# Patient Record
Sex: MALE | Race: ASIAN | NOT HISPANIC OR LATINO | Employment: OTHER | URBAN - METROPOLITAN AREA
[De-identification: names, ages, dates, MRNs, and addresses within clinical notes are randomized per-mention and may not be internally consistent; named-entity substitution may affect disease eponyms.]

---

## 2017-03-13 ENCOUNTER — OFFICE VISIT - HEALTHEAST (OUTPATIENT)
Dept: FAMILY MEDICINE | Facility: CLINIC | Age: 57
End: 2017-03-13

## 2017-03-13 ENCOUNTER — OFFICE VISIT - HEALTHEAST (OUTPATIENT)
Dept: NURSING | Facility: CLINIC | Age: 57
End: 2017-03-13

## 2017-03-13 DIAGNOSIS — E11.9 DIABETES MELLITUS (H): ICD-10-CM

## 2017-03-13 DIAGNOSIS — R26.89 LIMP: ICD-10-CM

## 2017-03-13 DIAGNOSIS — E55.9 VITAMIN D DEFICIENCY: ICD-10-CM

## 2017-03-13 DIAGNOSIS — Z79.4 TYPE 2 DIABETES MELLITUS WITH HYPERGLYCEMIA, WITH LONG-TERM CURRENT USE OF INSULIN (H): ICD-10-CM

## 2017-03-13 DIAGNOSIS — Z12.11 SCREEN FOR COLON CANCER: ICD-10-CM

## 2017-03-13 DIAGNOSIS — E11.65 TYPE 2 DIABETES MELLITUS WITH HYPERGLYCEMIA, WITH LONG-TERM CURRENT USE OF INSULIN (H): ICD-10-CM

## 2017-03-13 DIAGNOSIS — R51.9 HEADACHE DISORDER: ICD-10-CM

## 2017-03-13 DIAGNOSIS — E78.5 DYSLIPIDEMIA: ICD-10-CM

## 2017-03-13 DIAGNOSIS — M54.12 BRACHIAL NEURITIS OR RADICULITIS NOS: ICD-10-CM

## 2017-03-13 DIAGNOSIS — N28.9 DISORDER OF KIDNEY AND URETER: ICD-10-CM

## 2017-03-13 DIAGNOSIS — R53.1 WEAKNESS OF RIGHT SIDE OF BODY: ICD-10-CM

## 2017-03-13 DIAGNOSIS — G56.01 CARPAL TUNNEL SYNDROME OF RIGHT WRIST: ICD-10-CM

## 2017-03-13 DIAGNOSIS — G43.009 MIGRAINE WITHOUT AURA AND WITHOUT STATUS MIGRAINOSUS, NOT INTRACTABLE: ICD-10-CM

## 2017-03-13 LAB
CHOLEST SERPL-MCNC: 243 MG/DL
FASTING STATUS PATIENT QL REPORTED: NORMAL
HBA1C MFR BLD: 11.1 % (ref 3.5–6)
HDLC SERPL-MCNC: 52 MG/DL
LDLC SERPL CALC-MCNC: 160 MG/DL

## 2017-03-14 ENCOUNTER — AMBULATORY - HEALTHEAST (OUTPATIENT)
Dept: NURSING | Facility: CLINIC | Age: 57
End: 2017-03-14

## 2017-03-31 ENCOUNTER — OFFICE VISIT - HEALTHEAST (OUTPATIENT)
Dept: NURSING | Facility: CLINIC | Age: 57
End: 2017-03-31

## 2017-03-31 DIAGNOSIS — E11.65 TYPE 2 DIABETES MELLITUS WITH HYPERGLYCEMIA, WITH LONG-TERM CURRENT USE OF INSULIN (H): ICD-10-CM

## 2017-03-31 DIAGNOSIS — Z79.4 TYPE 2 DIABETES MELLITUS WITH HYPERGLYCEMIA, WITH LONG-TERM CURRENT USE OF INSULIN (H): ICD-10-CM

## 2017-03-31 DIAGNOSIS — E55.9 VITAMIN D DEFICIENCY: ICD-10-CM

## 2017-03-31 DIAGNOSIS — R80.9 MICROALBUMINURIA: ICD-10-CM

## 2017-05-31 ENCOUNTER — COMMUNICATION - HEALTHEAST (OUTPATIENT)
Dept: NURSING | Facility: CLINIC | Age: 57
End: 2017-05-31

## 2017-10-16 ENCOUNTER — OFFICE VISIT - HEALTHEAST (OUTPATIENT)
Dept: FAMILY MEDICINE | Facility: CLINIC | Age: 57
End: 2017-10-16

## 2017-10-16 ENCOUNTER — AMBULATORY - HEALTHEAST (OUTPATIENT)
Dept: NURSING | Facility: CLINIC | Age: 57
End: 2017-10-16

## 2017-10-16 DIAGNOSIS — E11.65 TYPE 2 DIABETES MELLITUS WITH HYPERGLYCEMIA, WITH LONG-TERM CURRENT USE OF INSULIN (H): ICD-10-CM

## 2017-10-16 DIAGNOSIS — N28.9 DISORDER OF KIDNEY AND URETER: ICD-10-CM

## 2017-10-16 DIAGNOSIS — Z79.4 TYPE 2 DIABETES MELLITUS WITH HYPERGLYCEMIA, WITH LONG-TERM CURRENT USE OF INSULIN (H): ICD-10-CM

## 2017-10-16 DIAGNOSIS — D49.6 NEOPLASM OF BRAIN (H): ICD-10-CM

## 2017-10-16 DIAGNOSIS — E11.9 DIABETES MELLITUS (H): ICD-10-CM

## 2017-10-16 DIAGNOSIS — E78.5 DYSLIPIDEMIA: ICD-10-CM

## 2017-10-16 DIAGNOSIS — R29.898 WEAKNESS OF RIGHT LOWER EXTREMITY: ICD-10-CM

## 2017-10-16 LAB — HBA1C MFR BLD: 11.8 % (ref 3.5–6)

## 2017-10-16 NOTE — ASSESSMENT & PLAN NOTE
Diabetes is unchanged.   Reminded to bring in blood sugar diary at next visit.  Dietary recommendations for ADA diet.  Discussed foot care.  Reminded to get yearly retinal exam.  Diabetes will be reassessed in 3 months       !0 units .  Novolog meals   24 units bedtime  Lantus  ---Tresciba ?  2 pens left      Trulicty bad reaction      Feet OK  No tobacco

## 2017-10-17 ENCOUNTER — COMMUNICATION - HEALTHEAST (OUTPATIENT)
Dept: FAMILY MEDICINE | Facility: CLINIC | Age: 57
End: 2017-10-17

## 2017-10-17 DIAGNOSIS — E11.65 TYPE 2 DIABETES MELLITUS WITH HYPERGLYCEMIA, WITH LONG-TERM CURRENT USE OF INSULIN (H): ICD-10-CM

## 2017-10-17 DIAGNOSIS — Z79.4 TYPE 2 DIABETES MELLITUS WITH HYPERGLYCEMIA, WITH LONG-TERM CURRENT USE OF INSULIN (H): ICD-10-CM

## 2017-10-18 ENCOUNTER — COMMUNICATION - HEALTHEAST (OUTPATIENT)
Dept: FAMILY MEDICINE | Facility: CLINIC | Age: 57
End: 2017-10-18

## 2018-01-16 ENCOUNTER — AMBULATORY - HEALTHEAST (OUTPATIENT)
Dept: NURSING | Facility: CLINIC | Age: 58
End: 2018-01-16

## 2018-01-16 DIAGNOSIS — E55.9 VITAMIN D DEFICIENCY: ICD-10-CM

## 2018-01-16 DIAGNOSIS — Z79.4 TYPE 2 DIABETES MELLITUS WITH HYPERGLYCEMIA, WITH LONG-TERM CURRENT USE OF INSULIN (H): ICD-10-CM

## 2018-01-16 DIAGNOSIS — E11.65 TYPE 2 DIABETES MELLITUS WITH HYPERGLYCEMIA, WITH LONG-TERM CURRENT USE OF INSULIN (H): ICD-10-CM

## 2018-02-12 ENCOUNTER — COMMUNICATION - HEALTHEAST (OUTPATIENT)
Dept: FAMILY MEDICINE | Facility: CLINIC | Age: 58
End: 2018-02-12

## 2018-02-12 DIAGNOSIS — E11.65 TYPE 2 DIABETES MELLITUS WITH HYPERGLYCEMIA, WITH LONG-TERM CURRENT USE OF INSULIN (H): ICD-10-CM

## 2018-02-12 DIAGNOSIS — Z79.4 TYPE 2 DIABETES MELLITUS WITH HYPERGLYCEMIA, WITH LONG-TERM CURRENT USE OF INSULIN (H): ICD-10-CM

## 2018-02-19 ENCOUNTER — RECORDS - HEALTHEAST (OUTPATIENT)
Dept: ADMINISTRATIVE | Facility: OTHER | Age: 58
End: 2018-02-19

## 2018-02-26 ENCOUNTER — OFFICE VISIT - HEALTHEAST (OUTPATIENT)
Dept: FAMILY MEDICINE | Facility: CLINIC | Age: 58
End: 2018-02-26

## 2018-02-26 DIAGNOSIS — D49.6 BRAIN TUMOR (H): ICD-10-CM

## 2018-02-26 DIAGNOSIS — E55.9 VITAMIN D DEFICIENCY: ICD-10-CM

## 2018-02-26 DIAGNOSIS — Z79.4 TYPE 2 DIABETES MELLITUS WITH HYPERGLYCEMIA, WITH LONG-TERM CURRENT USE OF INSULIN (H): ICD-10-CM

## 2018-02-26 DIAGNOSIS — N28.9 DISORDER OF KIDNEY AND URETER: ICD-10-CM

## 2018-02-26 DIAGNOSIS — R29.898 WEAKNESS OF RIGHT LOWER EXTREMITY: ICD-10-CM

## 2018-02-26 DIAGNOSIS — G60.9 HEREDITARY AND IDIOPATHIC PERIPHERAL NEUROPATHY: ICD-10-CM

## 2018-02-26 DIAGNOSIS — E11.65 TYPE 2 DIABETES MELLITUS WITH HYPERGLYCEMIA, WITH LONG-TERM CURRENT USE OF INSULIN (H): ICD-10-CM

## 2018-02-26 DIAGNOSIS — G81.90 HEMIPLEGIA OF DOMINANT SIDE (H): ICD-10-CM

## 2018-02-26 DIAGNOSIS — G43.909 MIGRAINE: ICD-10-CM

## 2018-02-26 DIAGNOSIS — Z12.11 SCREEN FOR COLON CANCER: ICD-10-CM

## 2018-02-26 LAB
ALBUMIN SERPL-MCNC: 3.3 G/DL (ref 3.5–5)
ALP SERPL-CCNC: 83 U/L (ref 45–120)
ALT SERPL W P-5'-P-CCNC: 21 U/L (ref 0–45)
ANION GAP SERPL CALCULATED.3IONS-SCNC: 10 MMOL/L (ref 5–18)
AST SERPL W P-5'-P-CCNC: 22 U/L (ref 0–40)
BASOPHILS # BLD AUTO: 0.1 THOU/UL (ref 0–0.2)
BASOPHILS NFR BLD AUTO: 1 % (ref 0–2)
BILIRUB SERPL-MCNC: 0.3 MG/DL (ref 0–1)
BUN SERPL-MCNC: 16 MG/DL (ref 8–22)
CALCIUM SERPL-MCNC: 9.3 MG/DL (ref 8.5–10.5)
CHLORIDE BLD-SCNC: 108 MMOL/L (ref 98–107)
CO2 SERPL-SCNC: 25 MMOL/L (ref 22–31)
CREAT SERPL-MCNC: 1.16 MG/DL (ref 0.7–1.3)
CREAT UR-MCNC: 51.6 MG/DL
EOSINOPHIL # BLD AUTO: 0.2 THOU/UL (ref 0–0.4)
EOSINOPHIL NFR BLD AUTO: 2 % (ref 0–6)
ERYTHROCYTE [DISTWIDTH] IN BLOOD BY AUTOMATED COUNT: 11.5 % (ref 11–14.5)
GFR SERPL CREATININE-BSD FRML MDRD: >60 ML/MIN/1.73M2
GLUCOSE BLD-MCNC: 136 MG/DL (ref 70–125)
HBA1C MFR BLD: 14 % (ref 3.5–6)
HCT VFR BLD AUTO: 35.6 % (ref 40–54)
HGB BLD-MCNC: 12.1 G/DL (ref 14–18)
LYMPHOCYTES # BLD AUTO: 3.3 THOU/UL (ref 0.8–4.4)
LYMPHOCYTES NFR BLD AUTO: 41 % (ref 20–40)
MCH RBC QN AUTO: 32.1 PG (ref 27–34)
MCHC RBC AUTO-ENTMCNC: 34 G/DL (ref 32–36)
MCV RBC AUTO: 95 FL (ref 80–100)
MICROALBUMIN UR-MCNC: 5.53 MG/DL (ref 0–1.99)
MICROALBUMIN/CREAT UR: 107.2 MG/G
MONOCYTES # BLD AUTO: 0.5 THOU/UL (ref 0–0.9)
MONOCYTES NFR BLD AUTO: 7 % (ref 2–10)
NEUTROPHILS # BLD AUTO: 3.8 THOU/UL (ref 2–7.7)
NEUTROPHILS NFR BLD AUTO: 49 % (ref 50–70)
PLATELET # BLD AUTO: 163 THOU/UL (ref 140–440)
PMV BLD AUTO: 7.3 FL (ref 7–10)
POTASSIUM BLD-SCNC: 4.7 MMOL/L (ref 3.5–5)
PROT SERPL-MCNC: 7.2 G/DL (ref 6–8)
RBC # BLD AUTO: 3.77 MILL/UL (ref 4.4–6.2)
SODIUM SERPL-SCNC: 143 MMOL/L (ref 136–145)
WBC: 7.9 THOU/UL (ref 4–11)

## 2018-02-27 ENCOUNTER — AMBULATORY - HEALTHEAST (OUTPATIENT)
Dept: FAMILY MEDICINE | Facility: CLINIC | Age: 58
End: 2018-02-27

## 2018-02-27 DIAGNOSIS — D49.6 BRAIN TUMOR (H): ICD-10-CM

## 2018-02-27 LAB
25(OH)D3 SERPL-MCNC: 16.9 NG/ML (ref 30–80)
VALPROATE SERPL-MCNC: <2 UG/ML (ref 50–150)

## 2018-02-28 ENCOUNTER — RECORDS - HEALTHEAST (OUTPATIENT)
Dept: ADMINISTRATIVE | Facility: OTHER | Age: 58
End: 2018-02-28

## 2018-03-06 ENCOUNTER — RECORDS - HEALTHEAST (OUTPATIENT)
Dept: ADMINISTRATIVE | Facility: OTHER | Age: 58
End: 2018-03-06

## 2018-03-20 ENCOUNTER — COMMUNICATION - HEALTHEAST (OUTPATIENT)
Dept: FAMILY MEDICINE | Facility: CLINIC | Age: 58
End: 2018-03-20

## 2018-06-25 ENCOUNTER — COMMUNICATION - HEALTHEAST (OUTPATIENT)
Dept: FAMILY MEDICINE | Facility: CLINIC | Age: 58
End: 2018-06-25

## 2018-07-13 LAB
HEMOCCULT SP1 STL QL: NEGATIVE
HEMOCCULT SP2 STL QL: NEGATIVE
HEMOCCULT SP3 STL QL: NEGATIVE

## 2019-05-14 ENCOUNTER — OFFICE VISIT - HEALTHEAST (OUTPATIENT)
Dept: FAMILY MEDICINE | Facility: CLINIC | Age: 59
End: 2019-05-14

## 2019-05-14 DIAGNOSIS — E11.29 TYPE 2 DIABETES MELLITUS WITH MICROALBUMINURIA, WITH LONG-TERM CURRENT USE OF INSULIN (H): ICD-10-CM

## 2019-05-14 DIAGNOSIS — Z79.4 TYPE 2 DIABETES MELLITUS WITH MICROALBUMINURIA, WITH LONG-TERM CURRENT USE OF INSULIN (H): ICD-10-CM

## 2019-05-14 DIAGNOSIS — R80.9 TYPE 2 DIABETES MELLITUS WITH MICROALBUMINURIA, WITH LONG-TERM CURRENT USE OF INSULIN (H): ICD-10-CM

## 2019-05-14 LAB
ANION GAP SERPL CALCULATED.3IONS-SCNC: 9 MMOL/L (ref 5–18)
BUN SERPL-MCNC: 18 MG/DL (ref 8–22)
CALCIUM SERPL-MCNC: 9 MG/DL (ref 8.5–10.5)
CHLORIDE BLD-SCNC: 103 MMOL/L (ref 98–107)
CO2 SERPL-SCNC: 25 MMOL/L (ref 22–31)
CREAT SERPL-MCNC: 1 MG/DL (ref 0.7–1.3)
GFR SERPL CREATININE-BSD FRML MDRD: >60 ML/MIN/1.73M2
GLUCOSE BLD-MCNC: 281 MG/DL (ref 70–125)
HBA1C MFR BLD: 13.9 % (ref 3.5–6)
POTASSIUM BLD-SCNC: 4.4 MMOL/L (ref 3.5–5)
SODIUM SERPL-SCNC: 137 MMOL/L (ref 136–145)

## 2019-05-16 ENCOUNTER — COMMUNICATION - HEALTHEAST (OUTPATIENT)
Dept: NURSING | Facility: CLINIC | Age: 59
End: 2019-05-16

## 2019-05-17 ENCOUNTER — AMBULATORY - HEALTHEAST (OUTPATIENT)
Dept: FAMILY MEDICINE | Facility: CLINIC | Age: 59
End: 2019-05-17

## 2019-05-17 DIAGNOSIS — E11.29 TYPE 2 DIABETES MELLITUS WITH MICROALBUMINURIA, WITH LONG-TERM CURRENT USE OF INSULIN (H): ICD-10-CM

## 2019-05-17 DIAGNOSIS — R80.9 TYPE 2 DIABETES MELLITUS WITH MICROALBUMINURIA, WITH LONG-TERM CURRENT USE OF INSULIN (H): ICD-10-CM

## 2019-05-17 DIAGNOSIS — Z79.4 TYPE 2 DIABETES MELLITUS WITH MICROALBUMINURIA, WITH LONG-TERM CURRENT USE OF INSULIN (H): ICD-10-CM

## 2019-05-21 ENCOUNTER — COMMUNICATION - HEALTHEAST (OUTPATIENT)
Dept: PHARMACY | Facility: CLINIC | Age: 59
End: 2019-05-21

## 2019-05-22 ENCOUNTER — COMMUNICATION - HEALTHEAST (OUTPATIENT)
Dept: PHARMACY | Facility: CLINIC | Age: 59
End: 2019-05-22

## 2019-05-23 ENCOUNTER — AMBULATORY - HEALTHEAST (OUTPATIENT)
Dept: FAMILY MEDICINE | Facility: CLINIC | Age: 59
End: 2019-05-23

## 2019-05-23 DIAGNOSIS — D49.6 NEOPLASM OF BRAIN (H): ICD-10-CM

## 2019-05-23 DIAGNOSIS — G81.90 HEMIPLEGIA OF DOMINANT SIDE (H): ICD-10-CM

## 2019-05-23 DIAGNOSIS — E11.29 TYPE 2 DIABETES MELLITUS WITH MICROALBUMINURIA, WITH LONG-TERM CURRENT USE OF INSULIN (H): ICD-10-CM

## 2019-05-23 DIAGNOSIS — Z79.4 TYPE 2 DIABETES MELLITUS WITH MICROALBUMINURIA, WITH LONG-TERM CURRENT USE OF INSULIN (H): ICD-10-CM

## 2019-05-23 DIAGNOSIS — R80.9 TYPE 2 DIABETES MELLITUS WITH MICROALBUMINURIA, WITH LONG-TERM CURRENT USE OF INSULIN (H): ICD-10-CM

## 2019-05-24 ENCOUNTER — COMMUNICATION - HEALTHEAST (OUTPATIENT)
Dept: FAMILY MEDICINE | Facility: CLINIC | Age: 59
End: 2019-05-24

## 2019-05-24 ENCOUNTER — COMMUNICATION - HEALTHEAST (OUTPATIENT)
Dept: NURSING | Facility: CLINIC | Age: 59
End: 2019-05-24

## 2019-06-03 ENCOUNTER — RECORDS - HEALTHEAST (OUTPATIENT)
Dept: HEALTH INFORMATION MANAGEMENT | Facility: CLINIC | Age: 59
End: 2019-06-03

## 2019-06-07 ENCOUNTER — AMBULATORY - HEALTHEAST (OUTPATIENT)
Dept: NURSING | Facility: CLINIC | Age: 59
End: 2019-06-07

## 2019-06-07 ENCOUNTER — COMMUNICATION - HEALTHEAST (OUTPATIENT)
Dept: CARE COORDINATION | Facility: CLINIC | Age: 59
End: 2019-06-07

## 2019-06-11 ENCOUNTER — AMBULATORY - HEALTHEAST (OUTPATIENT)
Dept: FAMILY MEDICINE | Facility: CLINIC | Age: 59
End: 2019-06-11

## 2019-06-11 ENCOUNTER — AMBULATORY - HEALTHEAST (OUTPATIENT)
Dept: CARE COORDINATION | Facility: CLINIC | Age: 59
End: 2019-06-11

## 2019-06-11 DIAGNOSIS — Z79.4 TYPE 2 DIABETES MELLITUS WITH HYPERGLYCEMIA, WITH LONG-TERM CURRENT USE OF INSULIN (H): ICD-10-CM

## 2019-06-11 DIAGNOSIS — E11.9 DIABETES MELLITUS (H): ICD-10-CM

## 2019-06-11 DIAGNOSIS — G60.9 HEREDITARY AND IDIOPATHIC PERIPHERAL NEUROPATHY: ICD-10-CM

## 2019-06-11 DIAGNOSIS — D49.6 NEOPLASM OF BRAIN (H): ICD-10-CM

## 2019-06-11 DIAGNOSIS — E11.65 TYPE 2 DIABETES MELLITUS WITH HYPERGLYCEMIA, WITH LONG-TERM CURRENT USE OF INSULIN (H): ICD-10-CM

## 2019-06-11 DIAGNOSIS — E78.5 DYSLIPIDEMIA: ICD-10-CM

## 2019-06-11 DIAGNOSIS — R51.9 HEADACHE DISORDER: ICD-10-CM

## 2019-06-11 DIAGNOSIS — E55.9 VITAMIN D DEFICIENCY: ICD-10-CM

## 2019-06-11 RX ORDER — ASPIRIN 81 MG/1
TABLET, CHEWABLE ORAL
Qty: 90 TABLET | Refills: 3 | Status: ON HOLD | OUTPATIENT
Start: 2019-06-11 | End: 2021-08-02

## 2019-06-12 ENCOUNTER — COMMUNICATION - HEALTHEAST (OUTPATIENT)
Dept: FAMILY MEDICINE | Facility: CLINIC | Age: 59
End: 2019-06-12

## 2019-06-12 DIAGNOSIS — E11.65 TYPE 2 DIABETES MELLITUS WITH HYPERGLYCEMIA, WITH LONG-TERM CURRENT USE OF INSULIN (H): ICD-10-CM

## 2019-06-12 DIAGNOSIS — Z79.4 TYPE 2 DIABETES MELLITUS WITH HYPERGLYCEMIA, WITH LONG-TERM CURRENT USE OF INSULIN (H): ICD-10-CM

## 2019-06-17 ENCOUNTER — COMMUNICATION - HEALTHEAST (OUTPATIENT)
Dept: NURSING | Facility: CLINIC | Age: 59
End: 2019-06-17

## 2019-06-24 ENCOUNTER — COMMUNICATION - HEALTHEAST (OUTPATIENT)
Dept: NURSING | Facility: CLINIC | Age: 59
End: 2019-06-24

## 2019-06-28 ENCOUNTER — COMMUNICATION - HEALTHEAST (OUTPATIENT)
Dept: FAMILY MEDICINE | Facility: CLINIC | Age: 59
End: 2019-06-28

## 2019-07-01 ENCOUNTER — COMMUNICATION - HEALTHEAST (OUTPATIENT)
Dept: NURSING | Facility: CLINIC | Age: 59
End: 2019-07-01

## 2019-07-18 ENCOUNTER — COMMUNICATION - HEALTHEAST (OUTPATIENT)
Dept: CARE COORDINATION | Facility: CLINIC | Age: 59
End: 2019-07-18

## 2019-10-11 ENCOUNTER — COMMUNICATION - HEALTHEAST (OUTPATIENT)
Dept: FAMILY MEDICINE | Facility: CLINIC | Age: 59
End: 2019-10-11

## 2019-10-11 ENCOUNTER — COMMUNICATION - HEALTHEAST (OUTPATIENT)
Dept: NURSING | Facility: CLINIC | Age: 59
End: 2019-10-11

## 2019-10-11 DIAGNOSIS — Z79.4 TYPE 2 DIABETES MELLITUS WITH HYPERGLYCEMIA, WITH LONG-TERM CURRENT USE OF INSULIN (H): ICD-10-CM

## 2019-10-11 DIAGNOSIS — E11.65 TYPE 2 DIABETES MELLITUS WITH HYPERGLYCEMIA, WITH LONG-TERM CURRENT USE OF INSULIN (H): ICD-10-CM

## 2019-11-12 ENCOUNTER — OFFICE VISIT - HEALTHEAST (OUTPATIENT)
Dept: FAMILY MEDICINE | Facility: CLINIC | Age: 59
End: 2019-11-12

## 2019-11-12 ENCOUNTER — AMBULATORY - HEALTHEAST (OUTPATIENT)
Dept: PHARMACY | Facility: CLINIC | Age: 59
End: 2019-11-12

## 2019-11-12 ENCOUNTER — COMMUNICATION - HEALTHEAST (OUTPATIENT)
Dept: NURSING | Facility: CLINIC | Age: 59
End: 2019-11-12

## 2019-11-12 DIAGNOSIS — G81.90 HEMIPLEGIA OF DOMINANT SIDE (H): ICD-10-CM

## 2019-11-12 DIAGNOSIS — Z79.4 TYPE 2 DIABETES MELLITUS WITH HYPERGLYCEMIA, WITH LONG-TERM CURRENT USE OF INSULIN (H): ICD-10-CM

## 2019-11-12 DIAGNOSIS — Z79.4 TYPE 2 DIABETES MELLITUS WITH MICROALBUMINURIA, WITH LONG-TERM CURRENT USE OF INSULIN (H): ICD-10-CM

## 2019-11-12 DIAGNOSIS — E11.29 TYPE 2 DIABETES MELLITUS WITH MICROALBUMINURIA, WITH LONG-TERM CURRENT USE OF INSULIN (H): ICD-10-CM

## 2019-11-12 DIAGNOSIS — E11.65 TYPE 2 DIABETES MELLITUS WITH HYPERGLYCEMIA, WITH LONG-TERM CURRENT USE OF INSULIN (H): ICD-10-CM

## 2019-11-12 DIAGNOSIS — R80.9 TYPE 2 DIABETES MELLITUS WITH MICROALBUMINURIA, WITH LONG-TERM CURRENT USE OF INSULIN (H): ICD-10-CM

## 2019-11-12 DIAGNOSIS — Z12.11 SCREEN FOR COLON CANCER: ICD-10-CM

## 2019-11-12 DIAGNOSIS — G43.009 MIGRAINE WITHOUT AURA AND WITHOUT STATUS MIGRAINOSUS, NOT INTRACTABLE: ICD-10-CM

## 2019-11-12 DIAGNOSIS — E11.9 DIABETES MELLITUS (H): ICD-10-CM

## 2019-11-12 DIAGNOSIS — H53.9 VISION CHANGES: ICD-10-CM

## 2019-11-12 LAB
ALBUMIN SERPL-MCNC: 3.4 G/DL (ref 3.5–5)
ALP SERPL-CCNC: 75 U/L (ref 45–120)
ALT SERPL W P-5'-P-CCNC: 13 U/L (ref 0–45)
ANION GAP SERPL CALCULATED.3IONS-SCNC: 6 MMOL/L (ref 5–18)
AST SERPL W P-5'-P-CCNC: 16 U/L (ref 0–40)
BILIRUB SERPL-MCNC: 0.5 MG/DL (ref 0–1)
BUN SERPL-MCNC: 15 MG/DL (ref 8–22)
CALCIUM SERPL-MCNC: 9 MG/DL (ref 8.5–10.5)
CHLORIDE BLD-SCNC: 107 MMOL/L (ref 98–107)
CO2 SERPL-SCNC: 28 MMOL/L (ref 22–31)
CREAT SERPL-MCNC: 0.97 MG/DL (ref 0.7–1.3)
CREAT UR-MCNC: 103.3 MG/DL
GFR SERPL CREATININE-BSD FRML MDRD: >60 ML/MIN/1.73M2
GLUCOSE BLD-MCNC: 190 MG/DL (ref 70–125)
HBA1C MFR BLD: 13 % (ref 3.5–6)
MICROALBUMIN UR-MCNC: 79.69 MG/DL (ref 0–1.99)
MICROALBUMIN/CREAT UR: 771.4 MG/G
POTASSIUM BLD-SCNC: 4.8 MMOL/L (ref 3.5–5)
PROT SERPL-MCNC: 7 G/DL (ref 6–8)
SODIUM SERPL-SCNC: 141 MMOL/L (ref 136–145)
VALPROATE SERPL-MCNC: <2 UG/ML (ref 50–150)

## 2019-11-12 ASSESSMENT — ANXIETY QUESTIONNAIRES
7. FEELING AFRAID AS IF SOMETHING AWFUL MIGHT HAPPEN: NEARLY EVERY DAY
1. FEELING NERVOUS, ANXIOUS, OR ON EDGE: NOT AT ALL
6. BECOMING EASILY ANNOYED OR IRRITABLE: SEVERAL DAYS
5. BEING SO RESTLESS THAT IT IS HARD TO SIT STILL: SEVERAL DAYS
3. WORRYING TOO MUCH ABOUT DIFFERENT THINGS: SEVERAL DAYS
4. TROUBLE RELAXING: SEVERAL DAYS
IF YOU CHECKED OFF ANY PROBLEMS ON THIS QUESTIONNAIRE, HOW DIFFICULT HAVE THESE PROBLEMS MADE IT FOR YOU TO DO YOUR WORK, TAKE CARE OF THINGS AT HOME, OR GET ALONG WITH OTHER PEOPLE: VERY DIFFICULT
GAD7 TOTAL SCORE: 8
2. NOT BEING ABLE TO STOP OR CONTROL WORRYING: SEVERAL DAYS

## 2019-11-12 ASSESSMENT — MIFFLIN-ST. JEOR: SCORE: 1234.33

## 2019-11-12 NOTE — ASSESSMENT & PLAN NOTE
Diabetes is unchanged.   Reminded to bring in blood sugar diary at next visit.  Dietary recommendations for ADA diet.  Discussed foot care.  Reminded to get yearly retinal exam.  Diabetes will be reassessed in 3 months        7   Novolog meals Orange   24 units bedtime  Basaglar  Yellow Lehman    Metformin 1000 two times a day   Atovastatin 20 mg Bedtime  Aspirin 81 at bedtime   lisnopril 2.5 mg at bedtime            Feet OK  No tobacco

## 2019-11-13 ENCOUNTER — COMMUNICATION - HEALTHEAST (OUTPATIENT)
Dept: CARE COORDINATION | Facility: CLINIC | Age: 59
End: 2019-11-13

## 2019-11-14 ENCOUNTER — COMMUNICATION - HEALTHEAST (OUTPATIENT)
Dept: PHARMACY | Facility: CLINIC | Age: 59
End: 2019-11-14

## 2019-11-14 DIAGNOSIS — E11.29 TYPE 2 DIABETES MELLITUS WITH MICROALBUMINURIA, WITH LONG-TERM CURRENT USE OF INSULIN (H): ICD-10-CM

## 2019-11-14 DIAGNOSIS — Z79.4 TYPE 2 DIABETES MELLITUS WITH MICROALBUMINURIA, WITH LONG-TERM CURRENT USE OF INSULIN (H): ICD-10-CM

## 2019-11-14 DIAGNOSIS — R80.9 TYPE 2 DIABETES MELLITUS WITH MICROALBUMINURIA, WITH LONG-TERM CURRENT USE OF INSULIN (H): ICD-10-CM

## 2019-11-15 ENCOUNTER — COMMUNICATION - HEALTHEAST (OUTPATIENT)
Dept: NURSING | Facility: CLINIC | Age: 59
End: 2019-11-15

## 2019-11-20 ASSESSMENT — PATIENT HEALTH QUESTIONNAIRE - PHQ9: SUM OF ALL RESPONSES TO PHQ QUESTIONS 1-9: 13

## 2019-11-29 ENCOUNTER — COMMUNICATION - HEALTHEAST (OUTPATIENT)
Dept: FAMILY MEDICINE | Facility: CLINIC | Age: 59
End: 2019-11-29

## 2019-12-17 ENCOUNTER — COMMUNICATION - HEALTHEAST (OUTPATIENT)
Dept: NURSING | Facility: CLINIC | Age: 59
End: 2019-12-17

## 2019-12-27 ENCOUNTER — COMMUNICATION - HEALTHEAST (OUTPATIENT)
Dept: NURSING | Facility: CLINIC | Age: 59
End: 2019-12-27

## 2020-01-14 ENCOUNTER — RECORDS - HEALTHEAST (OUTPATIENT)
Dept: ADMINISTRATIVE | Facility: OTHER | Age: 60
End: 2020-01-14

## 2020-01-14 ENCOUNTER — AMBULATORY - HEALTHEAST (OUTPATIENT)
Dept: PHARMACY | Facility: CLINIC | Age: 60
End: 2020-01-14

## 2020-01-14 ENCOUNTER — COMMUNICATION - HEALTHEAST (OUTPATIENT)
Dept: PHARMACY | Facility: CLINIC | Age: 60
End: 2020-01-14

## 2020-01-14 DIAGNOSIS — R80.9 TYPE 2 DIABETES MELLITUS WITH MICROALBUMINURIA, WITH LONG-TERM CURRENT USE OF INSULIN (H): ICD-10-CM

## 2020-01-14 DIAGNOSIS — E11.29 TYPE 2 DIABETES MELLITUS WITH MICROALBUMINURIA, WITH LONG-TERM CURRENT USE OF INSULIN (H): ICD-10-CM

## 2020-01-14 DIAGNOSIS — Z79.4 TYPE 2 DIABETES MELLITUS WITH MICROALBUMINURIA, WITH LONG-TERM CURRENT USE OF INSULIN (H): ICD-10-CM

## 2020-01-14 RX ORDER — FLASH GLUCOSE SENSOR
1 KIT MISCELLANEOUS CONTINUOUS PRN
Qty: 1 EACH | Refills: 0 | Status: SHIPPED | OUTPATIENT
Start: 2020-01-14 | End: 2021-11-08

## 2020-01-14 RX ORDER — GLUCOSAMINE HCL/CHONDROITIN SU 500-400 MG
CAPSULE ORAL
Qty: 300 STRIP | Refills: 3 | Status: SHIPPED | OUTPATIENT
Start: 2020-01-14 | End: 2021-01-01

## 2020-01-14 RX ORDER — FLASH GLUCOSE SENSOR
1 KIT MISCELLANEOUS CONTINUOUS PRN
Qty: 2 KIT | Refills: 5 | Status: SHIPPED | OUTPATIENT
Start: 2020-01-14 | End: 2021-08-09

## 2020-01-16 RX ORDER — LANCETS
EACH MISCELLANEOUS
Qty: 400 EACH | Refills: 0 | Status: SHIPPED | OUTPATIENT
Start: 2020-01-16 | End: 2021-01-01

## 2020-01-28 ENCOUNTER — COMMUNICATION - HEALTHEAST (OUTPATIENT)
Dept: NURSING | Facility: CLINIC | Age: 60
End: 2020-01-28

## 2020-02-13 ENCOUNTER — COMMUNICATION - HEALTHEAST (OUTPATIENT)
Dept: NURSING | Facility: CLINIC | Age: 60
End: 2020-02-13

## 2020-03-30 ENCOUNTER — COMMUNICATION - HEALTHEAST (OUTPATIENT)
Dept: FAMILY MEDICINE | Facility: CLINIC | Age: 60
End: 2020-03-30

## 2020-04-02 ENCOUNTER — AMBULATORY - HEALTHEAST (OUTPATIENT)
Dept: FAMILY MEDICINE | Facility: CLINIC | Age: 60
End: 2020-04-02

## 2020-04-02 DIAGNOSIS — E11.65 TYPE 2 DIABETES MELLITUS WITH HYPERGLYCEMIA, WITH LONG-TERM CURRENT USE OF INSULIN (H): ICD-10-CM

## 2020-04-02 DIAGNOSIS — Z79.4 TYPE 2 DIABETES MELLITUS WITH HYPERGLYCEMIA, WITH LONG-TERM CURRENT USE OF INSULIN (H): ICD-10-CM

## 2020-04-06 ENCOUNTER — COMMUNICATION - HEALTHEAST (OUTPATIENT)
Dept: FAMILY MEDICINE | Facility: CLINIC | Age: 60
End: 2020-04-06

## 2020-04-06 DIAGNOSIS — R80.9 TYPE 2 DIABETES MELLITUS WITH MICROALBUMINURIA, WITH LONG-TERM CURRENT USE OF INSULIN (H): ICD-10-CM

## 2020-04-06 DIAGNOSIS — Z79.4 TYPE 2 DIABETES MELLITUS WITH MICROALBUMINURIA, WITH LONG-TERM CURRENT USE OF INSULIN (H): ICD-10-CM

## 2020-04-06 DIAGNOSIS — E11.29 TYPE 2 DIABETES MELLITUS WITH MICROALBUMINURIA, WITH LONG-TERM CURRENT USE OF INSULIN (H): ICD-10-CM

## 2020-06-23 ENCOUNTER — COMMUNICATION - HEALTHEAST (OUTPATIENT)
Dept: FAMILY MEDICINE | Facility: CLINIC | Age: 60
End: 2020-06-23

## 2020-06-23 DIAGNOSIS — E78.5 DYSLIPIDEMIA: ICD-10-CM

## 2020-07-20 ENCOUNTER — OFFICE VISIT - HEALTHEAST (OUTPATIENT)
Dept: FAMILY MEDICINE | Facility: CLINIC | Age: 60
End: 2020-07-20

## 2020-07-20 DIAGNOSIS — E55.9 VITAMIN D DEFICIENCY: ICD-10-CM

## 2020-07-20 DIAGNOSIS — N28.9 DISORDER OF KIDNEY AND URETER: ICD-10-CM

## 2020-07-20 DIAGNOSIS — I73.9 PERIPHERAL VASCULAR DISEASE (H): ICD-10-CM

## 2020-07-20 DIAGNOSIS — H91.90 HEARING LOSS, UNSPECIFIED HEARING LOSS TYPE, UNSPECIFIED LATERALITY: ICD-10-CM

## 2020-07-20 DIAGNOSIS — E11.65 TYPE 2 DIABETES MELLITUS WITH HYPERGLYCEMIA, WITH LONG-TERM CURRENT USE OF INSULIN (H): ICD-10-CM

## 2020-07-20 DIAGNOSIS — G81.90 HEMIPLEGIA OF DOMINANT SIDE (H): ICD-10-CM

## 2020-07-20 DIAGNOSIS — D49.6 BRAIN TUMOR (H): ICD-10-CM

## 2020-07-20 DIAGNOSIS — Z79.4 TYPE 2 DIABETES MELLITUS WITH HYPERGLYCEMIA, WITH LONG-TERM CURRENT USE OF INSULIN (H): ICD-10-CM

## 2020-07-20 LAB
ALBUMIN SERPL-MCNC: 3.5 G/DL (ref 3.5–5)
ALP SERPL-CCNC: 82 U/L (ref 45–120)
ALT SERPL W P-5'-P-CCNC: 20 U/L (ref 0–45)
ANION GAP SERPL CALCULATED.3IONS-SCNC: 9 MMOL/L (ref 5–18)
AST SERPL W P-5'-P-CCNC: 26 U/L (ref 0–40)
BASOPHILS # BLD AUTO: 0.1 THOU/UL (ref 0–0.2)
BASOPHILS NFR BLD AUTO: 1 % (ref 0–2)
BILIRUB SERPL-MCNC: 0.6 MG/DL (ref 0–1)
BUN SERPL-MCNC: 13 MG/DL (ref 8–22)
CALCIUM SERPL-MCNC: 8.7 MG/DL (ref 8.5–10.5)
CHLORIDE BLD-SCNC: 103 MMOL/L (ref 98–107)
CO2 SERPL-SCNC: 23 MMOL/L (ref 22–31)
CREAT SERPL-MCNC: 1.17 MG/DL (ref 0.7–1.3)
CREAT UR-MCNC: 35.7 MG/DL
EOSINOPHIL # BLD AUTO: 0.1 THOU/UL (ref 0–0.4)
EOSINOPHIL NFR BLD AUTO: 1 % (ref 0–6)
ERYTHROCYTE [DISTWIDTH] IN BLOOD BY AUTOMATED COUNT: 11.2 % (ref 11–14.5)
GFR SERPL CREATININE-BSD FRML MDRD: >60 ML/MIN/1.73M2
GLUCOSE BLD-MCNC: 320 MG/DL (ref 70–125)
HBA1C MFR BLD: 13.4 %
HCT VFR BLD AUTO: 39.5 % (ref 40–54)
HGB BLD-MCNC: 13.9 G/DL (ref 14–18)
LYMPHOCYTES # BLD AUTO: 2.1 THOU/UL (ref 0.8–4.4)
LYMPHOCYTES NFR BLD AUTO: 27 % (ref 20–40)
MCH RBC QN AUTO: 33 PG (ref 27–34)
MCHC RBC AUTO-ENTMCNC: 35.2 G/DL (ref 32–36)
MCV RBC AUTO: 94 FL (ref 80–100)
MICROALBUMIN UR-MCNC: 25.77 MG/DL (ref 0–1.99)
MICROALBUMIN/CREAT UR: 721.8 MG/G
MONOCYTES # BLD AUTO: 0.6 THOU/UL (ref 0–0.9)
MONOCYTES NFR BLD AUTO: 8 % (ref 2–10)
NEUTROPHILS # BLD AUTO: 5 THOU/UL (ref 2–7.7)
NEUTROPHILS NFR BLD AUTO: 64 % (ref 50–70)
PLATELET # BLD AUTO: 138 THOU/UL (ref 140–440)
PMV BLD AUTO: 7.2 FL (ref 7–10)
POTASSIUM BLD-SCNC: 4.2 MMOL/L (ref 3.5–5)
PROT SERPL-MCNC: 6.6 G/DL (ref 6–8)
RBC # BLD AUTO: 4.22 MILL/UL (ref 4.4–6.2)
SODIUM SERPL-SCNC: 135 MMOL/L (ref 136–145)
WBC: 7.8 THOU/UL (ref 4–11)

## 2020-07-20 ASSESSMENT — PATIENT HEALTH QUESTIONNAIRE - PHQ9: SUM OF ALL RESPONSES TO PHQ QUESTIONS 1-9: 4

## 2020-07-20 ASSESSMENT — MIFFLIN-ST. JEOR: SCORE: 1244.45

## 2020-07-20 NOTE — ASSESSMENT & PLAN NOTE
Right Dorsalis Pedis weaker   Right Posterior tib normal     Walking 4 miles a day  Some tightness in his right calf when he walks  But otherwise doing well      Plan  Continue walking build collaterals  Aspirin 81 mg daily

## 2020-07-21 ENCOUNTER — COMMUNICATION - HEALTHEAST (OUTPATIENT)
Dept: PHARMACY | Facility: CLINIC | Age: 60
End: 2020-07-21

## 2020-08-19 ENCOUNTER — COMMUNICATION - HEALTHEAST (OUTPATIENT)
Dept: FAMILY MEDICINE | Facility: CLINIC | Age: 60
End: 2020-08-19

## 2020-08-19 DIAGNOSIS — E11.65 TYPE 2 DIABETES MELLITUS WITH HYPERGLYCEMIA, WITH LONG-TERM CURRENT USE OF INSULIN (H): ICD-10-CM

## 2020-08-19 DIAGNOSIS — Z79.4 TYPE 2 DIABETES MELLITUS WITH HYPERGLYCEMIA, WITH LONG-TERM CURRENT USE OF INSULIN (H): ICD-10-CM

## 2020-08-21 ENCOUNTER — COMMUNICATION - HEALTHEAST (OUTPATIENT)
Dept: FAMILY MEDICINE | Facility: CLINIC | Age: 60
End: 2020-08-21

## 2020-08-26 ENCOUNTER — COMMUNICATION - HEALTHEAST (OUTPATIENT)
Dept: FAMILY MEDICINE | Facility: CLINIC | Age: 60
End: 2020-08-26

## 2020-08-26 DIAGNOSIS — E11.29 TYPE 2 DIABETES MELLITUS WITH MICROALBUMINURIA, WITH LONG-TERM CURRENT USE OF INSULIN (H): ICD-10-CM

## 2020-08-26 DIAGNOSIS — R80.9 TYPE 2 DIABETES MELLITUS WITH MICROALBUMINURIA, WITH LONG-TERM CURRENT USE OF INSULIN (H): ICD-10-CM

## 2020-08-26 DIAGNOSIS — Z79.4 TYPE 2 DIABETES MELLITUS WITH MICROALBUMINURIA, WITH LONG-TERM CURRENT USE OF INSULIN (H): ICD-10-CM

## 2021-01-01 ENCOUNTER — OFFICE VISIT (OUTPATIENT)
Dept: FAMILY MEDICINE | Facility: CLINIC | Age: 61
End: 2021-01-01
Payer: COMMERCIAL

## 2021-01-01 ENCOUNTER — LAB (OUTPATIENT)
Dept: LAB | Facility: CLINIC | Age: 61
End: 2021-01-01
Payer: COMMERCIAL

## 2021-01-01 ENCOUNTER — TELEPHONE (OUTPATIENT)
Dept: FAMILY MEDICINE | Facility: CLINIC | Age: 61
End: 2021-01-01
Payer: MEDICAID

## 2021-01-01 ENCOUNTER — OFFICE VISIT (OUTPATIENT)
Dept: PHARMACY | Facility: CLINIC | Age: 61
End: 2021-01-01
Payer: MEDICAID

## 2021-01-01 VITALS — OXYGEN SATURATION: 97 % | DIASTOLIC BLOOD PRESSURE: 64 MMHG | SYSTOLIC BLOOD PRESSURE: 106 MMHG | HEART RATE: 63 BPM

## 2021-01-01 VITALS
WEIGHT: 120.06 LBS | SYSTOLIC BLOOD PRESSURE: 104 MMHG | BODY MASS INDEX: 21.61 KG/M2 | OXYGEN SATURATION: 99 % | DIASTOLIC BLOOD PRESSURE: 62 MMHG | HEART RATE: 64 BPM

## 2021-01-01 DIAGNOSIS — Z79.4 TYPE 2 DIABETES MELLITUS WITH MICROALBUMINURIA, WITH LONG-TERM CURRENT USE OF INSULIN (H): ICD-10-CM

## 2021-01-01 DIAGNOSIS — I25.118 CORONARY ARTERY DISEASE OF NATIVE ARTERY WITH STABLE ANGINA PECTORIS, UNSPECIFIED WHETHER NATIVE OR TRANSPLANTED HEART (H): ICD-10-CM

## 2021-01-01 DIAGNOSIS — G89.29 CHRONIC MIDLINE LOW BACK PAIN WITH RIGHT-SIDED SCIATICA: ICD-10-CM

## 2021-01-01 DIAGNOSIS — E11.29 TYPE 2 DIABETES MELLITUS WITH MICROALBUMINURIA, WITH LONG-TERM CURRENT USE OF INSULIN (H): ICD-10-CM

## 2021-01-01 DIAGNOSIS — R55 SYNCOPE, UNSPECIFIED SYNCOPE TYPE: ICD-10-CM

## 2021-01-01 DIAGNOSIS — E11.29 TYPE 2 DIABETES MELLITUS WITH MICROALBUMINURIA, WITH LONG-TERM CURRENT USE OF INSULIN (H): Primary | ICD-10-CM

## 2021-01-01 DIAGNOSIS — M54.41 CHRONIC MIDLINE LOW BACK PAIN WITH RIGHT-SIDED SCIATICA: ICD-10-CM

## 2021-01-01 DIAGNOSIS — R80.9 TYPE 2 DIABETES MELLITUS WITH MICROALBUMINURIA, WITH LONG-TERM CURRENT USE OF INSULIN (H): Primary | ICD-10-CM

## 2021-01-01 DIAGNOSIS — R80.9 TYPE 2 DIABETES MELLITUS WITH MICROALBUMINURIA, WITH LONG-TERM CURRENT USE OF INSULIN (H): ICD-10-CM

## 2021-01-01 DIAGNOSIS — Z79.4 TYPE 2 DIABETES MELLITUS WITH MICROALBUMINURIA, WITH LONG-TERM CURRENT USE OF INSULIN (H): Primary | ICD-10-CM

## 2021-01-01 DIAGNOSIS — I25.10 CORONARY ARTERY DISEASE INVOLVING NATIVE CORONARY ARTERY OF NATIVE HEART, UNSPECIFIED WHETHER ANGINA PRESENT: Primary | ICD-10-CM

## 2021-01-01 DIAGNOSIS — G60.9 HEREDITARY AND IDIOPATHIC PERIPHERAL NEUROPATHY: ICD-10-CM

## 2021-01-01 LAB
ANION GAP SERPL CALCULATED.3IONS-SCNC: 11 MMOL/L (ref 5–18)
BUN SERPL-MCNC: 24 MG/DL (ref 8–22)
CALCIUM SERPL-MCNC: 9.5 MG/DL (ref 8.5–10.5)
CHLORIDE BLD-SCNC: 99 MMOL/L (ref 98–107)
CO2 SERPL-SCNC: 23 MMOL/L (ref 22–31)
CREAT SERPL-MCNC: 1.65 MG/DL (ref 0.7–1.3)
GFR SERPL CREATININE-BSD FRML MDRD: 44 ML/MIN/1.73M2
GLUCOSE BLD-MCNC: 299 MG/DL (ref 70–125)
HBA1C MFR BLD: 10.4 % (ref 0–5.6)
POTASSIUM BLD-SCNC: 5.3 MMOL/L (ref 3.5–5)
SODIUM SERPL-SCNC: 133 MMOL/L (ref 136–145)

## 2021-01-01 PROCEDURE — 99606 MTMS BY PHARM EST 15 MIN: CPT | Performed by: PHARMACIST

## 2021-01-01 PROCEDURE — 36415 COLL VENOUS BLD VENIPUNCTURE: CPT

## 2021-01-01 PROCEDURE — 83036 HEMOGLOBIN GLYCOSYLATED A1C: CPT

## 2021-01-01 PROCEDURE — 99215 OFFICE O/P EST HI 40 MIN: CPT | Performed by: FAMILY MEDICINE

## 2021-01-01 PROCEDURE — 80048 BASIC METABOLIC PNL TOTAL CA: CPT

## 2021-01-01 PROCEDURE — 99607 MTMS BY PHARM ADDL 15 MIN: CPT | Performed by: PHARMACIST

## 2021-01-01 RX ORDER — NITROGLYCERIN 0.4 MG/1
TABLET SUBLINGUAL
Qty: 25 TABLET | Refills: 1 | Status: SHIPPED | OUTPATIENT
Start: 2021-01-01 | End: 2022-01-01

## 2021-01-01 RX ORDER — FLASH GLUCOSE SENSOR
1 KIT MISCELLANEOUS
Qty: 2 EACH | Refills: 11 | Status: SHIPPED | OUTPATIENT
Start: 2021-01-01

## 2021-01-01 RX ORDER — INSULIN LISPRO 100 [IU]/ML
8-10 INJECTION, SOLUTION INTRAVENOUS; SUBCUTANEOUS
Qty: 15 ML | Refills: 11 | COMMUNITY
Start: 2021-01-01 | End: 2022-01-01

## 2021-01-01 RX ORDER — FLASH GLUCOSE SCANNING READER
1 EACH MISCELLANEOUS ONCE
Qty: 1 EACH | Refills: 1 | Status: SHIPPED | OUTPATIENT
Start: 2021-01-01 | End: 2021-01-01

## 2021-04-23 ENCOUNTER — COMMUNICATION - HEALTHEAST (OUTPATIENT)
Dept: SCHEDULING | Facility: CLINIC | Age: 61
End: 2021-04-23

## 2021-04-26 ENCOUNTER — COMMUNICATION - HEALTHEAST (OUTPATIENT)
Dept: FAMILY MEDICINE | Facility: CLINIC | Age: 61
End: 2021-04-26

## 2021-04-26 ENCOUNTER — OFFICE VISIT - HEALTHEAST (OUTPATIENT)
Dept: FAMILY MEDICINE | Facility: CLINIC | Age: 61
End: 2021-04-26

## 2021-04-26 DIAGNOSIS — Z79.4 TYPE 2 DIABETES MELLITUS WITH MICROALBUMINURIA, WITH LONG-TERM CURRENT USE OF INSULIN (H): ICD-10-CM

## 2021-04-26 DIAGNOSIS — R80.9 TYPE 2 DIABETES MELLITUS WITH MICROALBUMINURIA, WITH LONG-TERM CURRENT USE OF INSULIN (H): ICD-10-CM

## 2021-04-26 DIAGNOSIS — I73.9 PERIPHERAL VASCULAR DISEASE (H): ICD-10-CM

## 2021-04-26 DIAGNOSIS — Z79.4 TYPE 2 DIABETES MELLITUS WITH HYPERGLYCEMIA, WITH LONG-TERM CURRENT USE OF INSULIN (H): ICD-10-CM

## 2021-04-26 DIAGNOSIS — D49.6 BRAIN TUMOR (H): ICD-10-CM

## 2021-04-26 DIAGNOSIS — E11.29 TYPE 2 DIABETES MELLITUS WITH MICROALBUMINURIA, WITH LONG-TERM CURRENT USE OF INSULIN (H): ICD-10-CM

## 2021-04-26 DIAGNOSIS — E11.65 TYPE 2 DIABETES MELLITUS WITH HYPERGLYCEMIA, WITH LONG-TERM CURRENT USE OF INSULIN (H): ICD-10-CM

## 2021-04-26 NOTE — ASSESSMENT & PLAN NOTE
"Form for Diabetes   Fax to me   's   Today     Insulin   Lispro 10 units 3 x day  \"check blood\"    Lantus nightly 24 untis     Low?  No blood sugar monitor             Tried to use friends     [facility]     No Loss of Consciousness  No Seizures  Can I operate vehicle?  Yes  No hypoglycemic episodes             Would like to have   MTM   Lisinopril 5 mg 1 Daily   Simvastatin 40 mg HS   "

## 2021-05-12 ENCOUNTER — AMBULATORY - HEALTHEAST (OUTPATIENT)
Dept: LAB | Facility: CLINIC | Age: 61
End: 2021-05-12

## 2021-05-12 ENCOUNTER — OFFICE VISIT - HEALTHEAST (OUTPATIENT)
Dept: PHARMACY | Facility: CLINIC | Age: 61
End: 2021-05-12

## 2021-05-12 DIAGNOSIS — E11.65 TYPE 2 DIABETES MELLITUS WITH HYPERGLYCEMIA, WITH LONG-TERM CURRENT USE OF INSULIN (H): ICD-10-CM

## 2021-05-12 DIAGNOSIS — D49.6 NEOPLASM OF BRAIN (H): ICD-10-CM

## 2021-05-12 DIAGNOSIS — E11.29 TYPE 2 DIABETES MELLITUS WITH MICROALBUMINURIA, WITH LONG-TERM CURRENT USE OF INSULIN (H): ICD-10-CM

## 2021-05-12 DIAGNOSIS — Z79.4 TYPE 2 DIABETES MELLITUS WITH MICROALBUMINURIA, WITH LONG-TERM CURRENT USE OF INSULIN (H): ICD-10-CM

## 2021-05-12 DIAGNOSIS — R80.9 TYPE 2 DIABETES MELLITUS WITH MICROALBUMINURIA, WITH LONG-TERM CURRENT USE OF INSULIN (H): ICD-10-CM

## 2021-05-12 DIAGNOSIS — Z79.4 TYPE 2 DIABETES MELLITUS WITH HYPERGLYCEMIA, WITH LONG-TERM CURRENT USE OF INSULIN (H): ICD-10-CM

## 2021-05-12 LAB
ALBUMIN SERPL-MCNC: 3.1 G/DL (ref 3.5–5)
ALP SERPL-CCNC: 85 U/L (ref 45–120)
ALT SERPL W P-5'-P-CCNC: 13 U/L (ref 0–45)
ANION GAP SERPL CALCULATED.3IONS-SCNC: 8 MMOL/L (ref 5–18)
AST SERPL W P-5'-P-CCNC: 20 U/L (ref 0–40)
BILIRUB SERPL-MCNC: 0.5 MG/DL (ref 0–1)
BUN SERPL-MCNC: 14 MG/DL (ref 8–22)
CALCIUM SERPL-MCNC: 8.9 MG/DL (ref 8.5–10.5)
CHLORIDE BLD-SCNC: 101 MMOL/L (ref 98–107)
CHOLEST SERPL-MCNC: 240 MG/DL
CO2 SERPL-SCNC: 28 MMOL/L (ref 22–31)
CREAT SERPL-MCNC: 1.05 MG/DL (ref 0.7–1.3)
CREAT UR-MCNC: 104.1 MG/DL
FASTING STATUS PATIENT QL REPORTED: NO
GFR SERPL CREATININE-BSD FRML MDRD: >60 ML/MIN/1.73M2
GLUCOSE BLD-MCNC: 291 MG/DL (ref 70–125)
HBA1C MFR BLD: 12.5 %
HDLC SERPL-MCNC: 55 MG/DL
LDLC SERPL CALC-MCNC: 159 MG/DL
MICROALBUMIN UR-MCNC: 108.83 MG/DL (ref 0–1.99)
MICROALBUMIN/CREAT UR: 1045.4 MG/G
POTASSIUM BLD-SCNC: 4.7 MMOL/L (ref 3.5–5)
PROT SERPL-MCNC: 6.9 G/DL (ref 6–8)
SODIUM SERPL-SCNC: 137 MMOL/L (ref 136–145)
TRIGL SERPL-MCNC: 131 MG/DL

## 2021-05-12 PROCEDURE — 99607 MTMS BY PHARM ADDL 15 MIN: CPT | Performed by: PHARMACIST

## 2021-05-12 PROCEDURE — 99605 MTMS BY PHARM NP 15 MIN: CPT | Performed by: PHARMACIST

## 2021-05-12 RX ORDER — LISINOPRIL 5 MG/1
5 TABLET ORAL DAILY
Qty: 90 TABLET | Refills: 3 | Status: SHIPPED | OUTPATIENT
Start: 2021-05-12 | End: 2021-09-28

## 2021-05-12 RX ORDER — INSULIN GLARGINE 100 [IU]/ML
INJECTION, SOLUTION SUBCUTANEOUS
Qty: 30 ML | Refills: 2 | Status: SHIPPED
Start: 2021-05-12 | End: 2021-11-08

## 2021-05-19 ENCOUNTER — COMMUNICATION - HEALTHEAST (OUTPATIENT)
Dept: FAMILY MEDICINE | Facility: CLINIC | Age: 61
End: 2021-05-19

## 2021-05-20 ENCOUNTER — AMBULATORY - HEALTHEAST (OUTPATIENT)
Dept: FAMILY MEDICINE | Facility: CLINIC | Age: 61
End: 2021-05-20

## 2021-05-20 DIAGNOSIS — E11.29 TYPE 2 DIABETES MELLITUS WITH MICROALBUMINURIA, WITH LONG-TERM CURRENT USE OF INSULIN (H): ICD-10-CM

## 2021-05-20 DIAGNOSIS — Z91.89 AT RISK FOR CORONARY ARTERY DISEASE: ICD-10-CM

## 2021-05-20 DIAGNOSIS — R80.9 TYPE 2 DIABETES MELLITUS WITH MICROALBUMINURIA, WITH LONG-TERM CURRENT USE OF INSULIN (H): ICD-10-CM

## 2021-05-20 DIAGNOSIS — E78.00 HYPERCHOLESTEROLEMIA: ICD-10-CM

## 2021-05-20 DIAGNOSIS — N28.9 DISORDER OF KIDNEY AND URETER: ICD-10-CM

## 2021-05-20 DIAGNOSIS — I73.9 PERIPHERAL VASCULAR DISEASE (H): ICD-10-CM

## 2021-05-20 DIAGNOSIS — Z79.4 TYPE 2 DIABETES MELLITUS WITH MICROALBUMINURIA, WITH LONG-TERM CURRENT USE OF INSULIN (H): ICD-10-CM

## 2021-05-26 ASSESSMENT — PATIENT HEALTH QUESTIONNAIRE - PHQ9: SUM OF ALL RESPONSES TO PHQ QUESTIONS 1-9: 13

## 2021-05-27 VITALS — WEIGHT: 117 LBS | HEART RATE: 55 BPM | SYSTOLIC BLOOD PRESSURE: 122 MMHG | DIASTOLIC BLOOD PRESSURE: 66 MMHG

## 2021-05-27 ASSESSMENT — PATIENT HEALTH QUESTIONNAIRE - PHQ9: SUM OF ALL RESPONSES TO PHQ QUESTIONS 1-9: 4

## 2021-05-28 ASSESSMENT — ANXIETY QUESTIONNAIRES: GAD7 TOTAL SCORE: 8

## 2021-05-28 NOTE — PROGRESS NOTES
Assessment & Plan  1. Type 2 diabetes mellitus with microalbuminuria, with long-term current use of insulin (H)  The patient has complications of diabetes including CKD.  He declines a referral to ophthalmology today.  He has neuropathy of his right foot, but this is likely due to his brain tumor.  He is not interested in seeking treatment for his known brain tumor.    He is due for diabetic testing.  I ordered a BMP and a hemoglobin A1c.  He does not want a referral to ophthalmology.  He has known microalbuminuria.    His current prescriptions are Lantus and NovoLog.  I am unable to make changes to his insulin as he does not record any of his blood sugars.  We encouraged him to keep a record of his blood sugars and provided him with a new glucometer today.    His prescriptions for his diabetes also include lisinopril and metformin.  I am not aware of the way for him to receive these at low or no cost.  We will contact her care coordinating team and our pharmacist to see if there are any options for him to receive his medicines at low cost.      He also has a driving form today.  He  reports he has never had a seizure.  Dr. Kaufman was able to fill this form out for him today.  Chata Nettles MD    Subjective  Chief Complaint:  Follow-up (f/u 6 months DM check ) and Form (DL form renewal )    HPI:   Av Fernando is a 59 y.o. male who presents for diabetes check.  He was last seen over a year ago in February 2018.  He is usually seen by Dr. Kaufman.  His last A1c was 14!!!!!!!!!!!   Last creatinine was 1.1  Has micro albinuria     The patient had Type II DM and he is seen because he is out of his medicines.  He has not been here for 1 year and 3 months.  He says it is because his health insurance wasn't active at the time.  He has been taking his insulin.  It is only his pills he is not taking.  He is on Lantus 24 U.  He is on Novolog 8 U 3 times per day.  He is not taking his blood sugars.  He stopped because he  didn't have a refill for his strips.  He saw an eye doctor for his diabetes 1 year ago, but he cannot pay for this.      The last time he was here he had to pay a copay.      He has not been taking his medicines since December.  He says it is due to cost.    He feels weak.      He has a known brain lesion last seen in MRI in 2018. He was advised to have it removed, but he has refused to do the operation.  He still is not interested in having surgery for this.     NO nausea or vomitting  NO fevers or chills.     Allergies:  is allergic to januvia [sitagliptin] and trulicity [dulaglutide].    SH/FH:  Social History and Family History reviewed and updated.   Tobacco Status:  He  reports that he has never smoked. He has never used smokeless tobacco.    Review of Systems:    Constitutional: weakness  Eyes: eye pain  Cardiovascular: No Chest Pain, No SOB  Respiratory:  No Dyspnea   Gastrointestinal: No Nausea, No Vomiting, No Pain,   Genitourinary: No Dysuria   Musculoskeletal: No Arthralgias  Skin: No Skin Lesions  Neuro: hemiparesis and numbness on the right  Psych: No Depression  Heme/Lymph: No Bruising,    Objective  Vitals:    05/14/19 1440   BP: 124/74   Patient Site: Left Arm   Patient Position: Sitting   Cuff Size: Adult Regular   Pulse: 64   Resp: 16   Weight: 121 lb 4 oz (55 kg)       Physical Exam:  GENERAL: Alert, well-appearing, in no acute distress, thin, with   PSYCH: Pleasant mood, affect appropriate.  Good eye contact.  SKIN: No apparent lesions, no lesions on his feet  HEAD: Normocephalic, atraumatic  EYES: Conjunctiva pink, sclera white  CV: Regular rate and rhythm without murmurs, rubs or gallops. No peripheral edema  RESP: No increased work of breathing.  Lung sounds bilateral, clear, symmetric excursion.   PV : No edema.Pedal pulses 2+   NEURO:  Decreased sensation of the right foot; normal sensation to the monofilament test on the left foot

## 2021-05-28 NOTE — PROGRESS NOTES
Attempt 1: Juliana HERNANDEZ, Clinic Care Coordination (CCC) Community Health Worker (CHW), has reviewed patient chart upon reception of order for Ambulatory Referral to Care Management.     Order Placed: 05/15/19     First outreach: 05/16/19     Outcome: LMTCB         Last OV with PCP: 02/26/19, seen by Dr. Nettles 05/14/19  Was order discussed with patient during a recent PCP communication: Unknown  Order comments: He cannot afford his medications, including his diabetes medicine. He needs help finding low cost options. And coordination of diabetes care/education    Community Health Worker comments: Per chart review patient has diabetes, recent A1C was 13.9. Patient also has brain tumor as of 2018 he declines treatment for.    Previously enrolled in [facility]: No    Next outreach: 05/24/19    Plan:  -Offer CCC

## 2021-05-29 NOTE — TELEPHONE ENCOUNTER
Josephine Kaufman,     Writer and [facility] MSW met with patient today for enrollment in [facility]. [facility] MSW was able to determine patient has Medicare Part D, so his medications should be covered. Writer forwarded insurance information to patient's pharmacy. Patient does need new prescriptions for all his medications according to the pharmacist at [facility]. Please send order for medications you feel are appropriate.     Thank you,   Dave Myhre, RN   CCC RN

## 2021-05-29 NOTE — TELEPHONE ENCOUNTER
RN cannot approve Refill Request    RN can NOT refill this medication overdue for office visits and/or labs.    Cristian Palm, [facility] Triage/Med Refill 6/13/2019    Requested Prescriptions   Pending Prescriptions Disp Refills     LANTUS SOLOSTAR U-100 INSULIN 100 unit/mL (3 mL) pen [Pharmacy Med Name: LANTUS SOLOSTAR PEN INJ 3ML] 30 mL 0     Sig: INJECT 23 UNITS UNDER THE SKIN AT BEDTIME       Insulin/GLP-1 Refill Protocol Failed - 6/12/2019  1:44 PM        Failed - Microalbumin in last year     Microalbumin, Random Urine   Date Value Ref Range Status   02/26/2018 5.53 (H) 0.00 - 1.99 mg/dL Final                  Passed - Visit with PCP or prescribing provider visit in last 6 months     Last office visit with prescriber/PCP: Visit date not found OR same dept: 5/14/2019 Chata Nettles MD OR same specialty: 5/14/2019 Chata Nettles MD Last physical: Visit date not found Last MTM visit: Visit date not found     Next appt within 3 mo: Visit date not found  Next physical within 3 mo: Visit date not found  Prescriber OR PCP: Garcia Kaufman MD  Last diagnosis associated with med order: 1. Type 2 diabetes mellitus with hyperglycemia, with long-term current use of insulin (H)  - LANTUS SOLOSTAR U-100 INSULIN 100 unit/mL (3 mL) pen [Pharmacy Med Name: LANTUS SOLOSTAR PEN INJ 3ML]; Inject 23 Units under the skin at bedtime.  Dispense: 30 mL; Refill: 0  - LANTUS SOLOSTAR U-100 INSULIN 100 unit/mL (3 mL) pen [Pharmacy Med Name: LANTUS SOLOSTAR PEN INJ 3ML]; ADMINISTER 23 UNITS UNDER THE SKIN AT BEDTIME  Dispense: 15 mL; Refill: 0    If protocol passes may refill for 6 months if within 3 months of last provider visit (or a total of 9 months).              Passed - A1C in last 6 months     Hemoglobin A1c   Date Value Ref Range Status   05/14/2019 13.9 (H) 3.5 - 6.0 % Final               Passed - Blood pressure in last year     BP Readings from Last 1 Encounters:   05/14/19 124/74             Passed -  Creatinine done in last year     Creatinine   Date Value Ref Range Status   05/14/2019 1.00 0.70 - 1.30 mg/dL Final             LANTUS SOLOSTAR U-100 INSULIN 100 unit/mL (3 mL) pen [Pharmacy Med Name: LANTUS SOLOSTAR PEN INJ 3ML] 15 mL 0     Sig: ADMINISTER 23 UNITS UNDER THE SKIN AT BEDTIME       Insulin/GLP-1 Refill Protocol Failed - 6/12/2019  1:44 PM        Failed - Microalbumin in last year     Microalbumin, Random Urine   Date Value Ref Range Status   02/26/2018 5.53 (H) 0.00 - 1.99 mg/dL Final                  Passed - Visit with PCP or prescribing provider visit in last 6 months     Last office visit with prescriber/PCP: Visit date not found OR same dept: 5/14/2019 Chata Nettles MD OR same specialty: 5/14/2019 Chata Nettles MD Last physical: Visit date not found Last MTM visit: Visit date not found     Next appt within 3 mo: Visit date not found  Next physical within 3 mo: Visit date not found  Prescriber OR PCP: Garcia Kaufman MD  Last diagnosis associated with med order: 1. Type 2 diabetes mellitus with hyperglycemia, with long-term current use of insulin (H)  - LANTUS SOLOSTAR U-100 INSULIN 100 unit/mL (3 mL) pen [Pharmacy Med Name: LANTUS SOLOSTAR PEN INJ 3ML]; Inject 23 Units under the skin at bedtime.  Dispense: 30 mL; Refill: 0  - LANTUS SOLOSTAR U-100 INSULIN 100 unit/mL (3 mL) pen [Pharmacy Med Name: LANTUS SOLOSTAR PEN INJ 3ML]; ADMINISTER 23 UNITS UNDER THE SKIN AT BEDTIME  Dispense: 15 mL; Refill: 0    If protocol passes may refill for 6 months if within 3 months of last provider visit (or a total of 9 months).              Passed - A1C in last 6 months     Hemoglobin A1c   Date Value Ref Range Status   05/14/2019 13.9 (H) 3.5 - 6.0 % Final               Passed - Blood pressure in last year     BP Readings from Last 1 Encounters:   05/14/19 124/74             Passed - Creatinine done in last year     Creatinine   Date Value Ref Range Status   05/14/2019 1.00 0.70 - 1.30 mg/dL Final

## 2021-05-29 NOTE — TELEPHONE ENCOUNTER
Received MTM referral from provider     Patient was not reachable after several attempts, will route to MTM Pharmacist/Provider as an FYI. Left MTM scheduling information on patients voicemail.    Thank you for the referral,    Tammy Mitchell, MTM Coordinator

## 2021-05-29 NOTE — PROGRESS NOTES
"Juliana HERNANDEZ, Clinic Care Coordination (CCC) Community Health Worker (CHW), has reviewed patient chart upon reception of order for Ambulatory Referral to Care Management.      Order Placed: 05/15/19                                    First outreach: 05/16/19         Second outreach: 05/24/19    Agreeable with referral: Yes  Open to scheduling: Yes  RN assessment: 06/07/19 2pm in person     CHW comments: MTM referred due to lack of coverage for trulicity; patient believed not to have Part D. PCP later referred patient on 05/22/19 citing general difficult social situation. CHW updated PCP that patient had already been referred and outreach attempted. CHW spoke with Av today who reported he did not have medical insurance but then later reported he has \"insurance from the state\" but cannot attend appointments due to copay. He also reported that he has a brain tumor he has been unable to check on due to not being able to see providers (CHW notes from chart review that patient had elected not to treat this), and that his teeth are decaying. Patient reported he has SSDI but this does not help him afford his medications. CHW has routed to [facility] for review. CHW can offer MTM back to back.    Next outreach: 06/07/19    Plan:  -Meet and greet  "

## 2021-05-29 NOTE — TELEPHONE ENCOUNTER
Placed call to patient to schedule MTM appt with Alix Chavarria per order from MD.    Thanks,  Tammy Mitchell, MTM Coordinator

## 2021-05-29 NOTE — PROGRESS NOTES
TEJ joined RN Assessment to discuss concerns with Part D coverage for medications. SW looked up pt on Medicare.gov. Pt shows as having active Part D shown below and Full Extra Help through [employer].     [employer] (LEV) (-104-5); Effective 10/01/18    TEJ called [employer] and verified pt has an active Part D plan. TEJ informed pt pays no premiums for his plan as he has a subsidy plan. Pt s RX plan info below:    ID: [id number]   RX Bin Number: [id number]   RX PCN: [id number]  Group ID: [id number]  [phone]    Pt also asked about affordable dental resources. SW provided list of sliding fee scale options.

## 2021-05-29 NOTE — TELEPHONE ENCOUNTER
Attempted to call patient to schedule MTM after CCC RN appt on 6/7/19. No answer, no ability to leave voicemail. I will plan on saying hi to patient on 6/7 after RN appointment and hopefully he will be available to set up a follow up.     Alix Chavarria, Pharm.D., BCACP  Medication Therapy Management Pharmacist  [facility] and [facility]

## 2021-05-29 NOTE — PROGRESS NOTES
Attempt 2: Juliana HERNANDEZ, Clinic Care Coordination (CCC) Community Health Worker (CHW), called patient for CCC outreach.  If this patient is returning my call, please transfer to Juliana at ext [phone] on MWF or [phone] on T/TH.     Next outreach: 07/01/19     Plan:  -Care Plan

## 2021-05-29 NOTE — PROGRESS NOTES
Josephine Kaufman,       Please send patient's medication order to the [facility] on [address]. The pharmacy should have all of his insurance information. Please let me know if there is anything I can do to assist.     Thanks,   Dave Myhre, RN  CCC RN

## 2021-05-29 NOTE — TELEPHONE ENCOUNTER
Call deric  meds filled   Have him meet with Alix to review meds and usage.  Blood sugar checks and how insulin works.    Spencer

## 2021-05-29 NOTE — TELEPHONE ENCOUNTER
We will try! I have already LMTCB for Av.     Juliana HERNANDEZ, Community Health Worker (CHW)  Clinic Care Coordination (CCC)

## 2021-05-29 NOTE — TELEPHONE ENCOUNTER
OK  Seen in clinic recently       Difficult social situation  NO drug coverage.    Juliana Can your team help?  Spencer

## 2021-05-29 NOTE — PROGRESS NOTES
Attempt 1: Juliana HERNANDEZ, Clinic Care Coordination (CCC) Community Health Worker (CHW), called patient for CCC outreach.  If this patient is returning my call, please transfer to Juliana at ext [phone] on MWF or [phone] on T/TH.    Next outreach: 06/24/19    Plan:  -Care Plan

## 2021-05-29 NOTE — PROGRESS NOTES
Patient is a 59 year old male with a history of diabetes, common migraine, hyperlipidemia, brain tumor, renal insufficiency, peripheral neuropathy, hemiparesis of right side, carpal tunnel syndrome, delayed post traumatic stress disorder. Patient here today for RN assessment and enrollment in [facility]. Patient was accompanied by a [facility] , but does speak English fairly well.   Patient currently lives in a large single family home with his 5 adult children. Patient said she has a total of 7 children. He is currently  from his wife, and said that he does not plan to divorce her because of the cost. Patient denied any safety concerns within his home and denied the need for any DME at this time.   Patient said is able to do his own cares and is able to assist his children in maintaining their home. He denied a need for in-home services at this time. Patient walks unassisted.   Patient is able to drive a car and said he has one available to him. He said he only drives short distances to the grocery store, pharmacy and to his medical appointments. He denied any transportation needs at this time.   Patient stated he is able to manage his medications independently, but was currently only taking his scheduled Lantus and Novolog insulin. Patient said he would not afford his other medications taras said he did not have a Medicare Part D plan. This writer asked [facility] MSW to join in on the assessment at this point of the interview. [facility] MSW was able to determine through calling his Medicare program that he indeed did have a  Medicare Part D plan with medication coverage. [facility] MSW asked that a Part D ID card be sent to patient's home address. [facility] MSW gave patient a printed copy of his Part D ID numbers. Writer contacted patient's pharmacy and forwarded his Medicare Part ID info to them, including the phone number for his particular insurance plan.   Writer spoke with his PCP and asked that new orders for his  medications be sent to his pharmacy, as all orders for his medications, except the Novolog and Lantus, has  according to the pharmacist. PCP stated he would send new orders for his medications.   Patient was able to name each medication on his medication list and stated the reason he took each medication for during interview. He was able to demonstrate reading off of the medication bottle label and how to correctly  take each medications. Patient denied needing assistance with medication management. He stated if he had questions he would ask his pharmacist or his daughter who he said is an RN.   Patient's last A1C was 13.9 at last Office Visit. Writer encouraged patient to take his diabetic medications as scheduled by his PCP. As noted earlier, patient reported he was taking his Lantus and Novolog as directed and had been paying out of pocket for these medications. He said he would start taking the metformin now that he knows it is covered by his insurance. Writer offered to schedule him with the Diabetic Educator, but he declined at this time. He said he would see the Diabetic Educator within the next 3 months. Writer also encouraged Patient to meet with the Clinic Pharmacist to discuss medication options for his diabetes, but he declined that offer at this time. He said he wanted to wait to see if his A1C would come down on his current medication regimen. Patient was accepting of assistance setting up a follow up appointment with his PCP. Patient was encouraged to check his blood glucose levels daily and bring his glucometer to his appointment. He agreed to this plan.   Patient reported a history of a brain tumor. He said he has spoken to his Neurologist and PCP about his brain tumor and said he is not interested in having surgery to remove it. He reported no side effects from th tumor. Patient said he would follow out with his [facility] Neurologist in the next 2 months. He said it has been over year since  he met with his Neurologist. Writer offered to assist him with setting up a follow up appointment, but he declined and said he would set up the appointment on his own or have his daughter assist him.   Patient said he is also due for an ophthalmology appointment. He said it has been over a year since he has last seen his ophthalmologist, and believes there have been changes to his eyes. Patient said he was hesitant to schedule appointment as he said he does not like that fact that his ophthalmology was not covered by his insurance. He said is still has not paid for his appointment last year. Writer offered to set up an appointment with the [facility] MSW regarding possible resources that may assist him with his ophthalmology bill, but patient declined at this time. He said he was waiting to discuss with the collection agency, and if he could not resolve the issue with them, he would contact [facility]. Writer stressed the importance of seeing his ophthalmologist, especially with is uncontrolled diabetes and vision change.   With regards to finances, patient said his wife and he have a combined income of $4,000 month. Of that, he said he receives a $1,000 a month. He denied needing assistance to pay for his food and other living expenses.   [facility] RN will continue to support patient with his current goals and will be available as nursing need arise.     RN Recommendations and Referrals  [facility] : Will assit with setting up appointment with Medicare Specialist during open enrollment.   [facility] RN: Will continue to support patient around his current goals.   See below for action plan    Action Plan    RN Will  Will add the patient to [facility] RN tracking list  Be available to the patient as nursing needs arise    Care Guide Will    Schedule appointment with [facility] MSW during Medicare Open Enrollment    Continue to support patient with current goals.     Goals    Goals        Patient Stated      I would like to have my eyes examined by an  ophthalmologist  in the next 2 months.  (pt-stated)      Action steps to achieve this goal  1.  I will schedule an appointment to see my ophthalmologist in the next month.   2.  If for some reason I cannot schedule an appointment with my ophthalmologist, I will contact my [facility] CHW for resources.   3.  If I have difficulty affording my ophthalmology appointment, I will contact my [facility] CHW and ask to be scheduled with the [facility] MSW to discuss resources that may be available to assist me with my bill.     Date goal set:  6/7/19        I would like to meet with the [facility] MSW during Medicare Open Enrollment to get connected with a Medicare Specialst.  I want to make sure I have the Medicare options that fit my medical needs.  (pt-stated)      Action steps to achieve this goal  1.  I understand the [facility] CHW will meet assist me in scheduling an appointment with the [facility] MSW during Medicare Open Enrollment.         Date goal set:  6/7/19        I would like to see my neurologist in the next 2 months.  (pt-stated)      Action steps to achieve this goal  1.  I will schedule an appointment with my [facility] Neurologist by the end of this month. If I have difficulty scheduling my appointment, I will contact my [facility] CHW or Specialty Scheduling at the [facility].   2.  If I have difficulty affording my [facility] neurology bill, I will contact my [facility] CHW and a referral will be sent to the [facility] FRG regarding resources that may be available to me to assist my my [facility] Neurology bill.       Date goal set:  6/7/19        RN Goal: I would like my diabetes to be under better control in the next 3 months.  (pt-stated)      Action steps to achieve this goal  1.  I will attend all follow up appointments with my PCP.   2.  I will take my diabetic medications daily as directed.   3.  I will check my blood sugars at least daily and bring my glucose meter to all follow up appointments.   4.  I will meet with the Diabetic Educator in the  "next 3 months for additional support in getting by diabetes back on track.   5.  I will meet with the Clinic Pharm D in the next three months to discuss any new medications that may help me get my diabetes under better control.    Date goal set:  6/7/19         Other      Carry a form of carbohydrate with you to treat low blood sugars.       Eat a consistent amount of rice at meals.       .    Clinic Care Coordination RN Assessed Needs  Patient Centered Assessment Method-PCAM TOTAL SCORE: 23 (6/11/2019  8:10 AM)    Level 1:  A score of 12-24 indicates that the patient has very little to no initial need for RN or SW intervention.  Standard care guide outreach/support should be appropriate at the discretion of the .  The care guide can reach out to the RN/SW as needed for support or with new concerns.      PCAM (Patient Centered Assessment Method)   HEALTH AND WELL-BEING  Physical Health Concerns: Diabetes, History of Stroke  Other Physical Health Concerns:: ommon migraine, hyperlipidemia, carpal tunnel syndrome.  RN Assessment: Physical Health Needs: Mod to severe symptoms or problems that impact on daily life  RN Assessment: Physical Health Problems: Mild impact on mental well-being e.g. \"feeling fed-up\", \"reduced enjoyment\"  Mental Health Concerns: PTSD (Post Traumatic Stress Disorder)  RN Assessment:Other Mental Well-Being Concern: Mild problems- don't interfere with function  Lifestyle/Habit Concerns: Denies concerns that require further investigation  RN Assessment: Lifestyle Behaviors: No identified areas of concern  SOCIAL ENVIRONMENT  Home Environment Concerns: Denies concerns that require further investigation  RN Assessment: Home Environment: Consistently safe, supportive, stable, no identified problems  RN Assessment: Daily Activites: Adequate participation with social networks  Social Network Concerns: Denies concerns that require further investigation  RN Assessment: Social Network: Adequate " participation with social networks  Financial Status and Service Concerns: Denies concerns that require further investigation  RN Assessment: Financial Resources: Financially secure, some resource challenges  HEALTH LITERACY AND COMMUNICATION  Understanding of Health and Wellbeing Concerns: Denies concerns that require further investigation  RN Assessment: Health Literacy: Reasonable to good understanding and already engages in managing health or is willing to undertake better management  Engagement Concerns: Some difficulties in communication with or without moderate barriers  RN Assessment: Engagement: Some difficulties in communication with or without moderate barriers  Barriers to Compliance with Medical Recommendations: Financial, Mental Illness, Language, Literacy  SERVICE COORDINATION  Other Services: Other care/services in place and adequate  Coordination of Services: Required care/services in place and adequately coordinated  PCAM TOTAL SCORE: 23      Emergency Plan  jLong-Term Complications of Diabetes can cause health problems over time. These are called complications. They are more likely to happen if your blood sugar is often too high. Over time, high blood sugar can damage blood vessels in your body. It is important to keep your blood sugar in your target range. This can help prevent or delay complications from diabetes.  Possible complications  Complications of diabetes include:    Eye problems, including damage to the blood vessels in the eyes (retinopathy), pressure in the eye (glaucoma), and clouding of the eye s lens (a cataract). Eye problems can eventually lead to irreversible blindness.     Tooth and gum problems (periodontal disease), causing loss of teeth and bone    Blood vessel (vascular) disease leading to circulation problems, heart attack or stroke, or a need for amputation of a limb     Problems with sexual function leading to erectile dysfunction in men and sexual discomfort in women      Kidney disease (nephropathy) can eventually lead to kidney failure, which may require dialysis or kidney transplant     Nerve problems (neuropathy), causing pain or loss of feeling in your feet and other parts of your body, potentially leading to an amputation of a limb     High blood pressure (hypertension), putting strain on your heart and blood vessels    Serious infections, possibly leading to loss of toes, feet, or limbs  How to avoid complications  The serious consequences of these complications may be avoidable for most people with diabetes by managing your blood glucose, blood pressure, and cholesterol levels. This can help you feel better and stay healthy. You can manage diabetes by tracking your blood sugar. You can also eat healthy and exercise to avoid gaining weight. And you should take medicine if directed by your healthcare provider.  Call your health care provider if:    You vomit or have diarrhea for more than 6 hours.    Your blood glucose level is higher than usual or over 250 mg/dL after you have taken extra insulin (if recommended in your sick-day plan).    You take oral medicine for diabetes, and your blood sugar is higher than usual or over 250 mg/dL, before a meal and stays that high for more than 24 hours.    Your blood glucose is lower than usual or less than 70 mg/dL    You have moderate to large amounts of ketones in your blood or urine.    You aren t better after 2 days.    Diabetes: Sick-Day Plan  Infections, the flu, and even a cold, can cause your blood sugar to rise. And, eating less, nausea, and vomiting may cause your blood glucose to fall (hypoglycemia). Ask your health care provider to help you develop a sick-day plan. The following information can help.    Call your health care provider if:    You vomit or have diarrhea for more than 6 hours.    Your blood glucose level is higher than usual or over 250 mg/dL after you have taken extra insulin (if recommended in your sick-day  plan).    You take oral medicine for diabetes, and your blood sugar is higher than usual or over 250 mg/dL, before a meal and stays that high for more than 24 hours.    Your blood glucose is lower than usual or less than 70 mg/dL    You have moderate to large amounts of ketones in your blood or urine.    You aren t better after 2 days.    Preventing Falls    Having a health problem can make you more likely to fall. Taking certain kinds of medicines may also increase your risk of falls. So, improving your health can help you avoid a fall. Work with your healthcare provider to manage health problems and to review your medicines. If you have your health under control, your risk of falling is lessened.    When to call your healthcare provider  Be sure to call your healthcare provider if you fall. Also call if you have any of these signs or symptoms (someone else may need to point them out to you):    Feeling lightheaded or dizzy more than once a day    Losing your balance often or feeling unsteady on your feet    Feeling numbness in your legs or feet, or noticing a change in the way you walk    Having a steady decline in your memory or mental sharpness  Emergency Plan Recommendation:    When to Use the Emergency Department (ED)  An emergency means you could die if you don t get care quickly. Or you could be hurt permanently (disabled). Read below to know when to use -- and when not to use -- an emergency department (also called ED).    Dangers to your life  Here are examples of emergencies. These need immediate care:  A hard time breathing  Severe chest pain  Choking  Severe bleeding  Suddenly not able to move or speak  Blacking out (fainting)`  Poisoning    Dangers of permanent injuries  Here are other emergencies. These also need immediate care:  Deep cuts or severe burns  Broken bones, or sudden severe pain and swelling in a joint    When it s an emergency  If you have an emergency, follow these steps:    1. Go to  the nearest emergency department  If you can, go to the hospital ED closest to you right away.  If you cannot get there right away, or if it is not safe to take yourself, call 911 or your police emergency number.  2. Call your primary care doctor  Tell your doctor about the emergency. Call within 24 hours of going to the ED.  If you cannot call, have someone call for you.  Go to your doctor (not the ED) for any follow-up care.    When it s not an emergency  If a problem is not an emergency, follow these steps:    1. Call your primary care doctor  If you don t know the name of your doctor, call your health plan.  If you cannot call, have someone call for you.  2. Follow instructions  Your doctor will tell you what you should do.  You may be told to see your doctor right away. You may be told to go to the ED. Or you may be told to go to an urgent care center.  Follow your doctor s advice.

## 2021-05-30 VITALS — BODY MASS INDEX: 22.95 KG/M2 | WEIGHT: 127.5 LBS

## 2021-05-30 NOTE — TELEPHONE ENCOUNTER
Please advise if we should write a letter for medical necessity.  Letter can be written and encounter routed back to the PA pool for further review.    Per prior auth team:  This Medication was denied. If the provider would like to appeal please provide a letter of medical necessity and route back to the team pool: HE PA MED. Otherwise you can close the encounter.   Thank you,   Central PA Team

## 2021-05-30 NOTE — TELEPHONE ENCOUNTER
Would patient be open to switching to Tresiba? It would be unit to unit conversion, and I prefer it over Levemir which is the other option below.     I would be happy to meet with patient. I have recommended it to him in the past recently but he refused. Will continue to try.

## 2021-05-30 NOTE — TELEPHONE ENCOUNTER
Central PA team  [phone]  Pool: HE PA MED ([id number])          PA has been initiated.       PA form completed and faxed insurance via Cover My Meds     Key:  [id number]     Medication:  Lantus SoloStar 100UNIT/ML pen-injectors    Insurance:  [employer] Medicare Request for Prescription Drug Coverage Determination Form          Response will be received via fax and may take up to 5-10 business days depending on plan

## 2021-05-30 NOTE — TELEPHONE ENCOUNTER
I will offer MTM visit with Av at my next scheduled outreach on 08/01/19. Unfortunately he has been unreachable to me since his enrollment with [facility] on 06/07/19. I have left him VM's with my contact information. He was extremely happy to get connected with [facility] so I do not think his lack of contact is due to lack of interest.    Juliana HERNANDEZ, Community Health Worker (CHW)  Clinic Care Coordination (CCC)

## 2021-05-30 NOTE — PROGRESS NOTES
Attempt 3: Juliana HERNANDEZ, Clinic Care Coordination (CCC) Community Health Worker (CHW), called patient for [facility] outreach.  If this patient is returning my call, please transfer to Juliana [facility] ext [phone], T/TH ext [phone].  I have called this patient 3 times over the past two weeks and have been unsuccessful in reaching Av.  This patient has also not returned any of my messages.  I will continue attempting to reach out to this patient in one month.  I will also check this patient's chart for upcoming appointments, ER reports that may contain a new phone number, or any other recent activity.    Next outreach: 08/01/19    Plan:  -Reassessment if no contact  -Schedule [facility]

## 2021-05-30 NOTE — TELEPHONE ENCOUNTER
Fax received from [facility] pharmacy requesting Prior Authorization    Medication Name:   LANTUS SOLOSTAR U-100 INSULIN 100 unit/mL (3 mL) pen 15 mL 0 6/13/2019     Sig: ADMINISTER 23 UNITS UNDER THE SKIN AT BEDTIME    Sent to pharmacy as: LANTUS SOLOSTAR U-100 INSULIN 100 unit/mL (3 mL) pen    E-Prescribing Status: Receipt confirmed by pharmacy (6/13/2019  7:18 AM CDT)          Insurance Plan: Medicare Part D   PBM: [employer]   Patient ID Number: [id number]    Please start PA process

## 2021-05-31 VITALS — WEIGHT: 127.5 LBS | BODY MASS INDEX: 22.95 KG/M2

## 2021-06-01 ENCOUNTER — COMMUNICATION - HEALTHEAST (OUTPATIENT)
Dept: FAMILY MEDICINE | Facility: CLINIC | Age: 61
End: 2021-06-01

## 2021-06-01 VITALS — WEIGHT: 128 LBS | BODY MASS INDEX: 23.04 KG/M2

## 2021-06-02 NOTE — PROGRESS NOTES
"Communication:   Jasmin Manning is the new Clinic Care Guide, CHW with Clinic Care Coordination Service Dept assigned for this patient. Jasmin's contact phone number is locate in the patient Care Teams for any questions. Thank you.    Per patient chart reviewed:   -07/18/2019: [employer] Upgrade.   -06/24/19: per former CHW notes \"Plan: care plan.\"   -06/07/2019: CCC RN Assessment.   -Enrollment status with [facility] service: enrolled.   -05/14/2019: Last office visit with Dr. Tho MD (PCP/GAC team).     Plan:   Status: enrolled.   Follow up reason: 10/11/2019: 1). Goal follow up. 2). Create CCC Care Plan. 3). Encourage pt to attend appt scheduled Tuesday, 11/12/19 at 2:40PM with PCP and bring all med including the glucose monitor to appt.      "

## 2021-06-02 NOTE — PROGRESS NOTES
"Patient Outreach:   Reason(s):   1). Introduce self-New CHW with [facility].   2). Goal follow up. Verify new goal and most urgent need at this time.   3. Create CCC Care Plan.   4). Encourage pt attend appt scheduled Tuesday, 11/12/2019 at 2:40PM with PCP and bring all med including the glucose monitor to appt.      Community Health Worker called patient today. Spoke with pt regards to reason's above. Have introduced self as the new CHW with [facility]. CHW phone number is provide. Set new goal. Not able to go over the Care Plan questionnaires and create care plan for pt due to pt's time. Pt request to meet with CHW on 11/12/19 in the clinic. Pt confirmed; \"will attend to appt 11/12/19 with pcp and bring all med and med bottles to appt.\"    Patient reported:   -Speak and understand most English.  request for all medical and office visit appt.   -Ophthalmologist update: \"Have poor medical insurance coverage plan. Cancelled all medical insurance including Medicare Part A and Part B. Still owe medical bill. I pay about $300.00 for each visit. Not able to afford f/u appt.\"   -Office visit: \"have co-pay $180.00 to see Dr. Kaufman each time. Not able to afford office visit.\"   -Insulin: Use 2 different insulin for DM. Use to have MA through [employer] before Medicare Part A and Part B. Insulin not cover.  -Medication refill: not refill most medication due to no income coverage. Will discuss this and appt with the DM Ed and MTM with pcp in the clinic 11/12/19.   -New goal: Spouse employed full-time. Open enrollment time. I'm added to spouse medical insurance through employer for a better coverage plan. Waiting to hear from spouse employer regards to approval.     -Currently with spouse and daughter for a appt at this time. Busy. No other concerns or questions.     Plan:   Status: enrolled.   Follow up: 11/12/2019 in clinic-Create care plan. Refer patient to the [place] [facility] Assistance Program.    "

## 2021-06-02 NOTE — TELEPHONE ENCOUNTER
RN cannot approve Refill Request    RN can NOT refill this medication PCP messaged that patient is overdue for Labs. Last office visit: 2/26/2018 Garcia Kaufman MD Last Physical: Visit date not found Last MTM visit: Visit date not found Last visit same specialty: 5/14/2019 Chata Nettles MD.  Next visit within 3 mo: Visit date not found  Next physical within 3 mo: Visit date not found      Jorge Milian, [facility] Connection Triage/Med Refill 10/13/2019    Requested Prescriptions   Pending Prescriptions Disp Refills     insulin glargine (BASAGLAR KWIKPEN) 100 unit/mL (3 mL) pen [Pharmacy Med Name: BASAGLAR 100 U/ML KWIKPEN INJ 3ML]  0     Sig: ADMINISTER 23 UNITS UNDER THE SKIN AT BEDTIME       Insulin/GLP-1 Refill Protocol Failed - 10/11/2019  1:53 PM        Failed - Microalbumin in last year     Microalbumin, Random Urine   Date Value Ref Range Status   02/26/2018 5.53 (H) 0.00 - 1.99 mg/dL Final                  Passed - Visit with PCP or prescribing provider visit in last 6 months     Last office visit with prescriber/PCP: Visit date not found OR same dept: 5/14/2019 Chata Nettles MD OR same specialty: 5/14/2019 Chata Nettles MD Last physical: Visit date not found Last MTM visit: Visit date not found     Next appt within 3 mo: Visit date not found  Next physical within 3 mo: Visit date not found  Prescriber OR PCP: Garcia Kaufman MD  Last diagnosis associated with med order: 1. Type 2 diabetes mellitus with hyperglycemia, with long-term current use of insulin (H)  - insulin glargine (BASAGLAR KWIKPEN) 100 unit/mL (3 mL) pen [Pharmacy Med Name: BASAGLAR 100 U/ML KWIKPEN INJ 3ML]; ADMINISTER 23 UNITS UNDER THE SKIN AT BEDTIME; Refill: 0    If protocol passes may refill for 6 months if within 3 months of last provider visit (or a total of 9 months).              Passed - A1C in last 6 months     Hemoglobin A1c   Date Value Ref Range Status   05/14/2019 13.9 (H) 3.5 - 6.0 % Final                Passed - Blood pressure in last year     BP Readings from Last 1 Encounters:   05/14/19 124/74             Passed - Creatinine done in last year     Creatinine   Date Value Ref Range Status   05/14/2019 1.00 0.70 - 1.30 mg/dL Final

## 2021-06-02 NOTE — TELEPHONE ENCOUNTER
RN cannot approve Refill Request    RN can NOT refill this medication PCP messaged that patient is overdue for Labs. Last office visit: 2/26/2018 Garcia Kaufman MD Last Physical: Visit date not found Last MTM visit: Visit date not found Last visit same specialty: 5/14/2019 Chata Nettles MD.  Next visit within 3 mo: Visit date not found  Next physical within 3 mo: Visit date not found      Jorge Milian, [facility] Connection Triage/Med Refill 10/13/2019    Requested Prescriptions   Pending Prescriptions Disp Refills     NOVOLOG FLEXPEN U-100 INSULIN 100 unit/mL (3 mL) injection pen 15 mL prn     Sig: INJECT 6-9 UNITS UNDER THE SKIN WITH MEALS       Insulin/GLP-1 Refill Protocol Failed - 10/11/2019  2:22 PM        Failed - Microalbumin in last year     Microalbumin, Random Urine   Date Value Ref Range Status   02/26/2018 5.53 (H) 0.00 - 1.99 mg/dL Final                  Passed - Visit with PCP or prescribing provider visit in last 6 months     Last office visit with prescriber/PCP: Visit date not found OR same dept: 5/14/2019 Chata Nettles MD OR same specialty: 5/14/2019 Chata Nettles MD Last physical: Visit date not found Last MTM visit: Visit date not found     Next appt within 3 mo: Visit date not found  Next physical within 3 mo: Visit date not found  Prescriber OR PCP: Garcia Kaufman MD  Last diagnosis associated with med order: 1. Type 2 diabetes mellitus with hyperglycemia, with long-term current use of insulin (H)  - NOVOLOG FLEXPEN U-100 INSULIN 100 unit/mL (3 mL) injection pen; INJECT 6-9 UNITS UNDER THE SKIN WITH MEALS  Dispense: 15 mL; Refill: prn    If protocol passes may refill for 6 months if within 3 months of last provider visit (or a total of 9 months).              Passed - A1C in last 6 months     Hemoglobin A1c   Date Value Ref Range Status   05/14/2019 13.9 (H) 3.5 - 6.0 % Final               Passed - Blood pressure in last year     BP Readings from Last 1 Encounters:    05/14/19 124/74             Passed - Creatinine done in last year     Creatinine   Date Value Ref Range Status   05/14/2019 1.00 0.70 - 1.30 mg/dL Final

## 2021-06-03 VITALS
DIASTOLIC BLOOD PRESSURE: 76 MMHG | SYSTOLIC BLOOD PRESSURE: 122 MMHG | WEIGHT: 119.08 LBS | HEART RATE: 60 BPM | HEIGHT: 63 IN | BODY MASS INDEX: 21.1 KG/M2

## 2021-06-03 VITALS — BODY MASS INDEX: 21.82 KG/M2 | WEIGHT: 121.25 LBS

## 2021-06-03 NOTE — TELEPHONE ENCOUNTER
This patient has Medicare Part B.. PA team does not do Part B PA's.  Most of the time the issue is with the pharmacy not being able to bill CGM supplies thru Part B.  We have been suggesting patient's use the [facility].  They are able to bill these correctly, and have a team that takes care of the Prior Auths thru patient's medicare B benefits.

## 2021-06-03 NOTE — PROGRESS NOTES
"Patient Outreach:   Pt is in the clinic and request to meet with [facility] regards to medical bill concerns per clinic staff.     Met with pt. Pt request helps with waive or lowered bill statement and applying for prescription assistance program. Pt shared enrolled with [email or web address]-member ID [id number]. Pt bring in a [employer] bill statement account# [id number] amount due $108.56. Per bill statement reviewed; \"Visit #: [id number] on 05/14/2019, loc: [facility].\"     Make copy of bill statement, pt current medical insurance card-[employer] ID#[id number], and the [email or web address] Prescription [employer] Drug Plan ID.     Pt is informed will consult with [facility] SW/central staff for further advised about applying for prescription assistance program and will connect with pt in 3-7 business days. Message will send to [facility] about \"[facility]\" bill statement for further assistance once consultation completed. Pt confirmed understand and no other questions.     Plan:   Status: enrolled.   Consult with [facility] staff on 11/13/19 in [facility] case review.    [facility] [facility].                 "

## 2021-06-03 NOTE — PROGRESS NOTES
Copy of the [employer] Prescription [employer] Plan with Medicare Rx, and the [employer] medical insurance card sent to scanning 11/14/2019.    The [employer] bill statement placed in [facility] mailbox in the [facility] clinic 11/14/19.     Plan:   Status: enrolled.   Waiting to hear from the patient.   Follow up: 12/16/2019.

## 2021-06-03 NOTE — TELEPHONE ENCOUNTER
Name of form/paperwork: Other:  CMN Form, Medicare compliance prescription form request for [employer]/supplies  Have you been seen for this request: Yes:  11/12/19  Do we have the form: Yes- Caller reported the forms were sent to the clinic on 11/22/19 and 11/25/19 using the following fax number [phone].  Caller willfax forms again.   When is form needed by: as soon as possible    How would you like the form returned: Fax  Fax Number: Included on form.  Patient Notified form requests are processed in 3-5 business days: No  (If patient needs form sooner, please note that in this message.)  Okay to leave a detailed message? Yes  [phone]

## 2021-06-03 NOTE — PROGRESS NOTES
ASSESSMENT & PLAN    Increase Basaglar to 26 units  Novolog to 8 units with meals    See [facility] for CGM    Proteinuria on Lisinopril  Change to 5 mg Daily    Vision see Ophthalmology        Type 2 diabetes mellitus with microalbuminuria, with long-term current use of insulin (H)       Diabetes is unchanged.   Reminded to bring in blood sugar diary at next visit.  Dietary recommendations for ADA diet.  Discussed foot care.  Reminded to get yearly retinal exam.  Diabetes will be reassessed in 3 months        7   Novolog meals Orange   24 units bedtime   Basaglar  Yellow Lehman    Metformin 1000 two times a day   Atovastatin 20 mg Bedtime  Aspirin 81 at bedtime   lisnopril 2.5 mg at bedtime            Feet OK  No tobacco              Av was seen today for diabetes.    Diagnoses and all orders for this visit:    Screen for colon cancer  -     Cancel: Occult Blood(ICT)  -     Occult Blood(ICT); Future    Type 2 diabetes mellitus with hyperglycemia, with long-term current use of insulin (H)  -     insulin glargine (BASAGLAR KWIKPEN) 100 unit/mL (3 mL) pen; ADMINISTER 24 UNITS UNDER THE SKIN AT BEDTIME  -     Glycosylated Hemoglobin A1c  -     Comprehensive Metabolic Panel  -     NOVOLOG FLEXPEN U-100 INSULIN 100 unit/mL (3 mL) injection pen; INJECT 7 UNITS UNDER THE SKIN WITH MEALS  -     Cancel: Comprehensive Metabolic Panel  -     Ambulatory referral to Ophthalmology  -     Microalbumin, Random Urine    Hemiparesis Of Right Side - (Dominant Side)  -     Valproic Acid (Depakene )    Migraine without aura and without status migrainosus, not intractable  -     Valproic Acid (Depakene )    Vision changes  -     Ambulatory referral to Ophthalmology        Patient Instructions     I would like you to see Dr. Alix Chavarria in 4 weeks time she can help set you up for continuous blood glucose monitoring and to see if this is covered    Your medications for diabetes  Basaglar 24 units nightly  NovoLog 7 units with  meals  Metformin 1000 mg 1 with breakfast one with dinner  8 atorvastatin 20 mg at bedtime  Aspirin 81 mg at bedtime  Lisinopril 2.5 mg at bedtime    Continue divalproex 250 mg 1 daily    I would like you to stop  Gabapentin  Amitriptyline  Duloxetine    Here the numbers and the goals for diabetes    I would like you to do a stool test to screen for colon cancer    I would like you to see an eye doctor to ensure that your vision is not worsening with a history of diabetes    Below other goals for diabetes  We should see you at least every 3 months    Goals for Diabetes  know Your Numbers  Hemoglobin A1C = A1C is a window into blood sugar control.  We shoot for a goal of less than 8%    A1C Goal <8%  Your most recent:   Lab Results   Component Value Date    HGBA1C 13.9 (H) 05/14/2019     Blood Pressure should be controlled with Top Number reading always less than 140 and Bottom number less than 90, unless during a stress test.    BP Goal <140 TOP and <90 bottom Your three most recent:    BP Readings from Last 3 Encounters:   11/12/19 122/76   05/14/19 124/74   02/26/18 122/82     Tobacco Free = Non Smoker /Non Chewer  This is a one of the most well documented risk factors that creates inflammation and damages tissues over time leading to risk of stroke, heart disease and cancers.  Being on a Statin  For the present moment this is listed as a quality measure.  Examples of Statins are Lipitor/Atorvastatin,   If you are on a statin, I suggest you consider taking a supplement of CoQ 10  200-300 mg Daily along with it.  If you have Vascular disease and no contraindication, take a baby Aspirin Daily.  If Vascular Disease History Take Baby Aspirin 81 mg     With Diabetes it is absolutely crucial to remember that Diabetes is a symptom of a back log of too much sugar.  The most important manner to reduce the diabetes burden is too Eliminate Sugars, Especially all added sugars from your nutrition.    It is also important to  move your body to use up sugar that is taken in.  With extra sugar to process, the body stimulated the production of insulin and thereby the storage of fat.  Insulin s jobs:  Burn sugars and Store fats for energy for a rainy day.          Look for low glycemic foods:    Lean Meats Protein and Fat: good fat from Fish, Nuts, Avocados, Extra Virgin Olive Oil (not cooked) other vegetables for example.    Beans- black, garbanzo, lentil, kidney, lima, sanches    Bread in general avoid, but if doing 100% whole-grain Grains --Bran, brown rice, Flax----Fiber offsets glycemic affect    Vegetables most vegetables are low glycemic with the exception of starchy ones:  Avoid excessive potatoes, carrots, corn, and beets    Whole Fruits most are low glycemic:  try to eat as snacks: Try not to pair with fat.  Insulin burns sugar and stores fat as a job     High glycemic fruits: Dates, Watermelon, Figgs, Apricots, Raisins are OK  in moderation    For Snacks go for nuts and seeds unless you are instructed not to do so due to another health condition; avoid corn chips, jellybeans, pretzels other processed snacks that usually pair sugar and fat.      If you need to use sweeteners:  Sugars with the lowest glycemic index: maple syrup, brown rice syrup, agave nectar or Stevia          Return in about 4 weeks (around 12/10/2019).            CHIEF COMPLAINT: Av Fernando had concerns including Diabetes (3 mo follow up).    [place]: 1.............. had concerns including Diabetes (3 mo follow up).    1. Screen for colon cancer    2. Type 2 diabetes mellitus with hyperglycemia, with long-term current use of insulin (H)    3. Hemiparesis Of Right Side - (Dominant Side)    4. Migraine without aura and without status migrainosus, not intractable    5. Vision changes          CC:             Why are you here today?                            Today to follow-up on diabetes mellitus      Past medical history significant for  Peripheral neuropathy  Right  hemiparesis  Headache disorder  History of a C6 radiculopathy  History of benign brain tumor    Medications reviewed today  Nontobacco user  Here today with his son as well as an   He states that his arm is getting better but still has some trouble holding a spoon  Right leg is better  Swallowing okay  He said no issues with loss of consciousness    He wishes to pare back on some of his medications  He is allergies to Januvia Trulicity    He has not had a blood sugar monitor    He brings in a bill that he is worried about paying        Any other Problems in order of Priority:        SUBJECTIVE:  Av Fernando is a 59 y.o. male                                SOCIAL: He  reports that he has never smoked. He has never used smokeless tobacco. He reports that he does not drink alcohol or use drugs.    REVIEW OF SYSTEMS:   Family history not pertinent to chief complaint or presenting problem    Review of Systems:      Nervous System: No new changes  in headache, he has some right-sided weakness mainly of the leg                                 Ears: No hearing loss or ringing in the ears    Eyes: No new blurring of vision, Double Vision has known cataracts                Nose: No nosebleed or loss of smell    Mouth: No mouth sores or  coated tongue    Throat: No hoarseness or difficulty swallowing    Neck: No enlarged thyroid or lymph nodes.    Heart: No new chest pain, palpitation or irregular heartbeat.                  Lungs: No new shortness of breath, wheezing or hemoptysis.    Gastrointestinal: No new nausea or vomiting, melena or blood in stools.    Kidney/Bladder: No new polyuria, polydipsia, or hematuria.                             Genital/Sexual: No new Sex function Changes                                Skin: No new rash    Muscles/Joints/Bones: No changes in muscles / joint swelling     Review of systems otherwise negative as requested from patient, except   Those positive ROS outlined and discussed  in [place].    Head:  No deformity  Eyes: Sclerae white, pupils equal and reactive, extra ocular movements intact bilateral cataracts  Throat: Lips, tongue, and mucosa are moist, pink and intact; tongue no thrush oral pharynx no injection or lesions  Neck: Supple, symmetric ROM no nodes, No carotid Buits  Chest: Lungs clear to auscultation, no retractions  Heart: Regular rate & rhythm, S1 S2, no murmur  Abdomen: Soft, non-tender, no masses; umbilical area normal no appreciable bruit  Pulses: Equal femoral pulses  : No hernia palpable   Extremities: Well-perfused, warm and dry, No Edema,   Right posterior tibial pulse palpable anterior dorsalis pedis pulse not palpable on the right  Posterior tibial and anterior dorsalis pedis palpable on the left  Normal foot exam normal monofilament exam facial muscles symmetric  Neuro: Easily aroused good tone, No tremor normal use of his right and left hand  Up and on the table without difficulty normal monofilament testing  Skin  No Rash    Recent Results (from the past 240 hour(s))   Glycosylated Hemoglobin A1c   Result Value Ref Range    Hemoglobin A1c 13.0 (H) 3.5 - 6.0 %   Comprehensive Metabolic Panel   Result Value Ref Range    Sodium 141 136 - 145 mmol/L    Potassium 4.8 3.5 - 5.0 mmol/L    Chloride 107 98 - 107 mmol/L    CO2 28 22 - 31 mmol/L    Anion Gap, Calculation 6 5 - 18 mmol/L    Glucose 190 (H) 70 - 125 mg/dL    BUN 15 8 - 22 mg/dL    Creatinine 0.97 0.70 - 1.30 mg/dL    GFR MDRD Af Amer >60 >60 mL/min/1.73m2    GFR MDRD Non Af Amer >60 >60 mL/min/1.73m2    Bilirubin, Total 0.5 0.0 - 1.0 mg/dL    Calcium 9.0 8.5 - 10.5 mg/dL    Protein, Total 7.0 6.0 - 8.0 g/dL    Albumin 3.4 (L) 3.5 - 5.0 g/dL    Alkaline Phosphatase 75 45 - 120 U/L    AST 16 0 - 40 U/L    ALT 13 0 - 45 U/L   Valproic Acid (Depakene )   Result Value Ref Range    Valproic Acid <2.0 (L) 50.0 - 150.0 ug/mL   Microalbumin, Random Urine   Result Value Ref Range    Microalbumin, Random Urine 79.69 (H)  "0.00 - 1.99 mg/dL    Creatinine, Urine 103.3 mg/dL    Microalbumin/Creatinine Ratio Random Urine 771.4 (H) <=19.9 mg/g       VITALS:  Vitals:    11/12/19 1455   BP: 122/76   Patient Site: Left Arm   Patient Position: Sitting   Cuff Size: Adult Regular   Pulse: 60   Weight: 119 lb 1.3 oz (54 kg)   Height: 5' 2.63\" (1.591 m)     Wt Readings from Last 3 Encounters:   11/12/19 119 lb 1.3 oz (54 kg)   05/14/19 121 lb 4 oz (55 kg)   02/26/18 128 lb (58.1 kg)     Body mass index is 21.35 kg/m .    PFSH:    Social History     Tobacco Use   Smoking Status Never Smoker   Smokeless Tobacco Never Used       History reviewed. No pertinent family history.    Social History     Socioeconomic History     Marital status:      Spouse name: Not on file     Number of children: Not on file     Years of education: Not on file     Highest education level: Not on file   Occupational History     Not on file   Social Needs     Financial resource strain: Not on file     Food insecurity:     Worry: Not on file     Inability: Not on file     Transportation needs:     Medical: Not on file     Non-medical: Not on file   Tobacco Use     Smoking status: Never Smoker     Smokeless tobacco: Never Used   Substance and Sexual Activity     Alcohol use: No     Drug use: No     Sexual activity: Yes     Partners: Female     Birth control/protection: None   Lifestyle     Physical activity:     Days per week: Not on file     Minutes per session: Not on file     Stress: Not on file   Relationships     Social connections:     Talks on phone: Not on file     Gets together: Not on file     Attends [employer] service: Not on file     Active member of club or organization: Not on file     Attends meetings of clubs or organizations: Not on file     Relationship status: Not on file     Intimate partner violence:     Fear of current or ex partner: Not on file     Emotionally abused: Not on file     Physically abused: Not on file     Forced sexual activity: Not " "on file   Other Topics Concern     Not on file   Social History Narrative     Not on file       Past Surgical History:   Procedure Laterality Date     LEG TRAUMA         Allergies   Allergen Reactions     Januvia [Sitagliptin]      HEADACHE     Trulicity [Dulaglutide] Dizziness     Weak and muscle weak       Active Ambulatory Problems     Diagnosis Date Noted     Peripheral Neuropathy      Renal Insufficiency      Corns      Arthralgias In Multiple Sites      Delayed Post-traumatic Stress Disorder      Brain Tumor      Hemiparesis Of Right Side - (Dominant Side)      Common Migraine (Without Aura)      Hyperlipidemia      Carpal Tunnel Syndrome      Type 2 diabetes mellitus with microalbuminuria, with long-term current use of insulin (H) 02/24/2015     Headache disorder 03/17/2015     Cervical radiculopathy at C6 07/28/2015     Vitamin D deficiency 10/05/2015     Lower extremity weakness 06/17/2016     Brachial neuritis or radiculitis 03/13/2017     Resolved Ambulatory Problems     Diagnosis Date Noted     Cervical Radiculopathy At C7 Nerve Root      Hyperglycemia      Past Medical History:   Diagnosis Date     Diabetes mellitus (H)          MEDICATIONS:  Current Outpatient Medications   Medication Sig Dispense Refill     aspirin 81 mg chewable tablet CHEW AND SWALLOW 1 TABLET DAILY 90 tablet 3     atorvastatin (LIPITOR) 20 MG tablet TAKE 1 TABLET BY MOUTH AT BEDTIME 90 tablet 3     divalproex (DEPAKOTE ER) 250 MG 24 hour tablet TAKE 1 TABLET BY MOUTH EVERY DAY 30 tablet 3     lisinopril (ZESTRIL) 2.5 MG tablet Take 1 tablet (2.5 mg total) by mouth daily. 30 tablet 11     metFORMIN (GLUCOPHAGE) 1000 MG tablet Take 1 tablet (1,000 mg total) by mouth 2 (two) times a day with meals. 180 tablet 3     BD ULTRA-FINE JERSON PEN NEEDLES 32 gauge x 5/32\" Ndle USE TO INJECT NOVOLOG AND LANTUS INSULIN WITH 4 NEEDLES DAILY 100 each 12     blood glucose meter (GLUCOMETER) Use 1 each As Directed as needed. Dispense glucometer brand " per patient's insurance at pharmacy discretion. 1 each 0     blood glucose test (ACCU-CHEK SMARTVIEW TEST STRIP) strips Use to test blood sugars three times daily. Dispense brand per patient's insurance at pharmacy discretion. 300 strip 3     blood-glucose meter,continuous (DEXCOM G6 ) Misc Use as directed for continuous glucose monitoring 1 each 0     blood-glucose sensor (DEXCOM G6 SENSOR) [place] Use as directed for continuous glucose monitoring. Change every 10 days 1 Device 13     blood-glucose transmitter (DEXCOM G6 TRANSMITTER) [place] Use as directed for continuous glucose monitoring. Change every 3 months 1 Device 4     ergocalciferol (ERGOCALCIFEROL) 50,000 unit capsule 1 capsule every Wednesday and Sunday 24 capsule 1     generic lancets (ACCU-CHEK SOFT TOUCH) Use 1 each As Directed as needed. Use daily as directed Accu Chek Avia 100 each 11     insulin glargine (BASAGLAR KWIKPEN) 100 unit/mL (3 mL) pen ADMINISTER 24 UNITS UNDER THE SKIN AT BEDTIME 3 Syringe 12     NOVOLOG FLEXPEN U-100 INSULIN 100 unit/mL (3 mL) injection pen INJECT 7 UNITS UNDER THE SKIN WITH MEALS 3 Pre-filled Pen Syringe 12     No current facility-administered medications for this visit.               I spent 30 minutes with this patient face to face, of which 50% or greater was spent in counseling and coordination of care with regards to Av was seen today for diabetes.    Diagnoses and all orders for this visit:    Screen for colon cancer  -     Cancel: Occult Blood(ICT)  -     Occult Blood(ICT); Future    Type 2 diabetes mellitus with hyperglycemia, with long-term current use of insulin (H)  -     insulin glargine (BASAGLAR KWIKPEN) 100 unit/mL (3 mL) pen; ADMINISTER 24 UNITS UNDER THE SKIN AT BEDTIME  -     Glycosylated Hemoglobin A1c  -     Comprehensive Metabolic Panel  -     NOVOLOG FLEXPEN U-100 INSULIN 100 unit/mL (3 mL) injection pen; INJECT 7 UNITS UNDER THE SKIN WITH MEALS  -     Cancel: Comprehensive Metabolic  Panel  -     Ambulatory referral to Ophthalmology  -     Microalbumin, Random Urine    Hemiparesis Of Right Side - (Dominant Side)  -     Valproic Acid (Depakene )    Migraine without aura and without status migrainosus, not intractable  -     Valproic Acid (Depakene )    Vision changes  -     Ambulatory referral to Ophthalmology        Garcia Kaufman MD  [facility] [id number] [phone]

## 2021-06-03 NOTE — PROGRESS NOTES
"Clinic Care Coordination Contact     Disciplines Present:    The patient was discussed during weekly Care Conference Huddle today.   Goals and barriers were discussed and a plan was established.     Goals        Patient Stated      Goal: I would like to be approved for medical insurance through spouse employer in the next 1-3 months. (pt-stated)      Action steps to achieve this goal:  1.  I have cancelled all medical insurance including Medicare Part A and Part B due to poor coverage plan for medication and office visit.   2.  Spouse employed full-time. Open enrollment time. I'm added to spouse medical insurance through employer for a better coverage plan. Application submitted.   3.  I currently waiting to hear from spouse employer regards to eligibility approval.    4.  When medical insurance through spouse employer approved, I will contact TAVIA Castellanos to coordinate appt scheduling with the speciality clinic's as recommend by PCP.    (Date goal set:  10/11/2019)            I would like to have my eyes examined by an ophthalmologist  in the next 2 months.  (pt-stated)      Action steps to achieve this goal:  1.  I have met with the ophthalmologist team couple times.   2.  I still owe medical bill from the opthalmology clinic. Medicare Part A and Part B not coverage all.   3.  I still have difficulty affording my ophthalmology appointment.  4.  \"I have cancelled all current medical insurance. Am added to spouse medical insurance through employer recently at open enrollment time. Currently waiting to hear from the employer regards to eligibility approval.   5.  I will contact TAVIA Castellanos for update regards to eligibility approval, assist with schedule f/u appt with the ophthalmologist clinic as needed, and schedule appt with the [facility] MSW to discuss resources that may be available to assist me with my bill.     (Date goal set:  6/7/19); (Updated: 10/11/2019)        I would like to meet with the [facility] MSW during Medicare Open " "Enrollment to get connected with a Medicare Specialst.  I want to make sure I have the Medicare options that fit my medical needs.  (pt-stated)      Action steps to achieve this goal:  1.  I understand the [facility] CHW will meet assist me in scheduling an appointment with the [facility] MSW during Medicare Open Enrollment. (Date: 06/7/19)  2.  \"I have cancelled all current medical insurance. Am added to spouse medical insurance through employer recently at open enrollment time. Currently waiting to hear from the employer regards to eligibility approval.   3.  I will contact TAVIA Castellanos for update regards to eligibility approval. Will schedule appt with [facility] if needed.     (Date goal set:  6/7/19); (Updated: 10/11/2019)        I would like to see my neurologist in the next 2 months.  (pt-stated)      Action steps to achieve this goal  1.  I will schedule an appointment with my [facility] Neurologist by the end of this month. If I have difficulty scheduling my appointment, I will contact my [facility] CHW or Specialty Scheduling at the [facility].   2.  If I have difficulty affording my [facility] bill, I will contact my [facility] CHW and a referral will be sent to the [facility] FRG regarding resources that may be available to me to assist my my [facility] bill.       Date goal set:  6/7/19        RN Goal: I would like my diabetes to be under better control in the next 3 months.  (pt-stated)      Action steps to achieve this goal:  1.  I will attend all follow up appointments with my PCP. (Date: 06/7/19)  2.  I will take my diabetic medications daily as directed. (Date: 06/7/19)  3.  I will check my blood sugars at least daily and bring my glucose meter to all follow up appointments. (Date: 06/7/19)  4.  I will meet with the Diabetic Educator in the next 3 months for additional support in getting by diabetes back on track. (Date: 06/7/19)  5.  I will meet with the Clinic Pharm D in the next three months to discuss any " new medications that may help me get my diabetes under better control. (Date: 06/7/19)  6.  I have not refill most med timely due to medical insurance coverage reason. All medical insurance is cancelled. Add to spouse medical insurance through employer. Currently waiting for approval. Will seek further advise with PCP on 11/12/19 in the clinic.     (Date goal set:  6/7/19); (Updated: 10/11/2019)         Other      Carry a form of carbohydrate with you to treat low blood sugars.       Eat a consistent amount of rice at meals.           Barriers: Patient reported he has difficulty affording his medications. Patient has not been in contact with the CCC team regarding his goals for several months. Last time the CCC team met, patient was informed he had a Medicare Part D plan. Patient is now stating his had cancelled his insurance and plans to go his wife's policy.     Action Plan if indicated: CCC CHW will reach out to patient to schedule an appointment with the CCC MSW to ensure insurance need are met. CCC CHW will also send referral to the [facility] FRW to for help with possible resources for an unpaid medical bill.     Plan/Follow up needed: CHW and MSW will update team as needed.

## 2021-06-03 NOTE — PATIENT INSTRUCTIONS - HE
I would like you to see Dr. Alix Chavarria in 4 weeks time she can help set you up for continuous blood glucose monitoring and to see if this is covered    Your medications for diabetes  Basaglar 24 units nightly  NovoLog 7 units with meals  Metformin 1000 mg 1 with breakfast one with dinner  8 atorvastatin 20 mg at bedtime  Aspirin 81 mg at bedtime  Lisinopril 2.5 mg at bedtime    Continue divalproex 250 mg 1 daily    I would like you to stop  Gabapentin  Amitriptyline  Duloxetine    Here the numbers and the goals for diabetes    I would like you to do a stool test to screen for colon cancer    I would like you to see an eye doctor to ensure that your vision is not worsening with a history of diabetes    Below other goals for diabetes  We should see you at least every 3 months    Goals for Diabetes  know Your Numbers  Hemoglobin A1C = A1C is a window into blood sugar control.  We shoot for a goal of less than 8%    A1C Goal <8%  Your most recent:   Lab Results   Component Value Date    HGBA1C 13.9 (H) 05/14/2019     Blood Pressure should be controlled with Top Number reading always less than 140 and Bottom number less than 90, unless during a stress test.    BP Goal <140 TOP and <90 bottom Your three most recent:    BP Readings from Last 3 Encounters:   11/12/19 122/76   05/14/19 124/74   02/26/18 122/82     Tobacco Free = Non Smoker /Non Chewer  This is a one of the most well documented risk factors that creates inflammation and damages tissues over time leading to risk of stroke, heart disease and cancers.  Being on a Statin  For the present moment this is listed as a quality measure.  Examples of Statins are Lipitor/Atorvastatin,   If you are on a statin, I suggest you consider taking a supplement of CoQ 10  200-300 mg Daily along with it.  If you have Vascular disease and no contraindication, take a baby Aspirin Daily.  If Vascular Disease History Take Baby Aspirin 81 mg     With Diabetes it is absolutely  crucial to remember that Diabetes is a symptom of a back log of too much sugar.  The most important manner to reduce the diabetes burden is too Eliminate Sugars, Especially all added sugars from your nutrition.    It is also important to move your body to use up sugar that is taken in.  With extra sugar to process, the body stimulated the production of insulin and thereby the storage of fat.  Insulin s jobs:  Burn sugars and Store fats for energy for a rainy day.          Look for low glycemic foods:    Lean Meats Protein and Fat: good fat from Fish, Nuts, Avocados, Extra Virgin Olive Oil (not cooked) other vegetables for example.    Beans- black, garbanzo, lentil, kidney, lima, sanches    Bread in general avoid, but if doing 100% whole-grain Grains --Bran, brown rice, Flax----Fiber offsets glycemic affect    Vegetables most vegetables are low glycemic with the exception of starchy ones:  Avoid excessive potatoes, carrots, corn, and beets    Whole Fruits most are low glycemic:  try to eat as snacks: Try not to pair with fat.  Insulin burns sugar and stores fat as a job     High glycemic fruits: Dates, Watermelon, Figgs, Apricots, Raisins are OK  in moderation    For Snacks go for nuts and seeds unless you are instructed not to do so due to another health condition; avoid corn chips, jellybeans, pretzels other processed snacks that usually pair sugar and fat.      If you need to use sweeteners:  Sugars with the lowest glycemic index: maple syrup, brown rice syrup, agave nectar or Stevia

## 2021-06-03 NOTE — PROGRESS NOTES
"Programs Applying For: None      Outreach:   11/15/19: FRW checked billing notes, pt's balance has been processed. Balance is zero. FRW called pt with an update and left billing number if pt has any further questions.     Health Insurance Information:   Medicare and Medicaid     Referral:   Pt bring in a [facility] bill statement account# [id number] amount due $108.56. Per bill statement reviewed; \"Visit #: [id number] on 05/14/2019, loc: [facility].\"   Pt request help with waive or lowering bill. Patient agreed to see you for a appt.     "

## 2021-06-03 NOTE — PROGRESS NOTES
11-:   CHW relayed pt request from conversation with pt on 11/12/19 with RAJAN BURNHAM and CCC RN for further advised.     Per ccc sw advised; patient need to sign up for the medicare supplement 2020 enrollment. Closing soon beginning of December or week of 12/6/2019. Okayed appt schedule with me for 1 1/2 hour long. Any non-[facility] bill, appt can schedule with me (fyi: ccc sw). [facility] bill refer to CCC FRG. End of consultation.     Plan:   Send message to CCC FRG for further assistance about the pt bill statement [facility].   Call the patient-offer appt with CCC SW sign up for the medicare supplement 2020 enrollment.

## 2021-06-03 NOTE — TELEPHONE ENCOUNTER
Central PA team  [phone]  Pool: HE PA MED ([id number])          PA has been initiated.       PA form completed and faxed insurance via Cover My Meds     Key:  [id number]     Medication:  dexcom    Insurance:  [employer] Medicare Part D        Response will be received via fax and may take up to 5-10 business days depending on plan

## 2021-06-03 NOTE — TELEPHONE ENCOUNTER
Spoke to BENJAMIN RAY and they will work on the Marcus. Currently they are verifying his insurance.

## 2021-06-03 NOTE — TELEPHONE ENCOUNTER
FreeStyle Marcus was not covered, therefore sent DexCom G6. Needs a PA. Thank you!     Prior Authorization Request  Who s requesting:  Pharmacy  Pharmacy Name and Location: [facility]  Medication Name: DexCom G6 sensor, transmitter,     Insurance Plan: Unknown -- [facility] to see what they ran rx through  Insurance Member ID Number:  ?   Informed patient that prior authorizations can take up to 10 business days for response:   Yes  Okay to leave a detailed message: Yes    Please keep me posted on status of PA -- thanks!     Alix Chavarria, Pharm.D., BCACP  Medication Therapy Management Pharmacist  [facility]

## 2021-06-03 NOTE — PROGRESS NOTES
"Patient Outreach:    Pt bring in a [facility] bill statement account #[id number] amount due $108.56. Per bill statement reviewed; \"Visit #: [id number] on 05/14/2019, loc: [facility].\"     Communication:  A Remind Me message sent to [facility] KAYLEE/W 11/14/19 at 8:38AM for further assistance about [facility] bill statement account #[id number].     Patient Outreach:   11/14/19: CHW called patient pt about appt with [facility] SW and no answer. Left message for pt to returning called. Message includes message sent to [facility] FRW (KAYLEE) to contact him about HE bill statement. CHW phone number is provide.     Plan:   Status: enrolled.   Waiting to hear from the patient.   Follow up: 12/16/2019.      "

## 2021-06-04 VITALS
OXYGEN SATURATION: 98 % | WEIGHT: 120 LBS | HEART RATE: 75 BPM | SYSTOLIC BLOOD PRESSURE: 116 MMHG | HEIGHT: 63 IN | DIASTOLIC BLOOD PRESSURE: 60 MMHG | BODY MASS INDEX: 21.26 KG/M2

## 2021-06-04 NOTE — PROGRESS NOTES
Patient Outreach:   Scheduled Follow Up Call: Attempt 1 Community Health Worker called and left a message for the patient. If the patient is returning my call, please transfer the patient to [facility] at [phone]. Thank you.     Plan:   Status: enrolled.   Follow up: 12/27/2019.

## 2021-06-04 NOTE — PATIENT INSTRUCTIONS - HE
Dear Av,                                                                                                                                      You enrolled with the [facility] Care Coordination Services.  In order for us to provide guidance we need to connect on a monthly bases.  We have tried calling you 2 times in the last month and have been unsuccessful in reaching you. Please call me at [phone] at your earliest convenience.  If you reach my voicemail, please leave a message with your daytime telephone number and a date and time that I can return your call. Feel free to connect with your pcp clinic or the [facility] at [phone] for any questions. Thank you.                                                                             Sincerely,        Jasmin Manning, [facility]                                                                     Clinic Care Coordination                                         [facility]

## 2021-06-04 NOTE — TELEPHONE ENCOUNTER
I left detailed message, advising CMN form, Medicare compliance prescription form requesting for Freestyle Marcus/supplies needs to be re- faxed to [phone].    BR,CMA

## 2021-06-04 NOTE — PROGRESS NOTES
"Patient Outreach:   Reason: goals follow up.      Scheduled Follow Up Call: Attempt 2:  Community Health Worker called and left a message for the patient. If the patient is returning my call, please transfer the patient to [facility] at [phone]. Thank you.      I have called the patient 2 times I the past month and have been unsuccessful in reaching him. The patient has not returned any of my messages. I have mailed a letter to the patient requesting a return call and will continue attempting to reach out to the patient in one month; letter includes posted it notes \"letter to patient.\"  I will also check the patient's chart for upcoming appointments, ER reports that may contain a new phone number, or any other recent activity.    Unsuccessfully outreach letter mailed to the patient today, 12/27/2019:  \"Dear Av,                                                                                                                                      You enrolled with the [facility] Care Coordination Services.  In order for us to provide guidance we need to connect on a monthly bases.  We have tried calling you 2 times in the last month and have been unsuccessful in reaching you. Please call me at [phone] at your earliest convenience.  If you reach my voicemail, please leave a message with your daytime telephone number and a date and time that I can return your call. Feel free to connect with your pcp clinic or the [facility] at [phone] for any questions. Thank you.\"    Plan:   Status: enrolled.   Follow up: 1/27/2020-one month follow up (attempt# 3)           "

## 2021-06-05 NOTE — PROGRESS NOTES
"Voicemail Check:   Patient returning called; left a message in CHW/writer phone dated 1/27/2020 at 9:17AM. Per message; \"would like to talk with you about a blood pressure machine; medical insurance is not cover. Like to discuss about 2 bill statements; 1 is a office visit and other is medication not covered. My phone number is [phone].\"    Plan:  Status: enrolled.   Follow up: 1/28/2020.     "

## 2021-06-05 NOTE — PROGRESS NOTES
Patient Outreach:  Reason: goals follow up. Returning called (see CHW note encounter dated 12/27/2019 for detail).     Scheduled Follow Up Call: Attempt 1:  Community Health Worker called and left a message for the patient to returning called. Message including this info: CHW/writer is off 1/29/2020 and returning 3/05; coverage CHW is available; encouraged continue to connect writer at [phone] regarding appt and resource need with SW, RN, FRG and CHW for any update. Refer pt to connect with PCP office [phone] regarding med and health concerns; prefer pharmacy for med refill; and 911 line regarding crisis situation. If the patient is returning my call, please transfer the patient to [facility] at ext. [phone]. Thank you.     Plan:   Status: enrolled.   Follow up: 2/05/2020.

## 2021-06-06 NOTE — PROGRESS NOTES
Scheduled Follow Up Call: Attempt 2 Community Health Worker called and left a message for the patient. If the patient is returning my call, please transfer the patient to [facility] at ext. [phone].   I have called the patient 2 times over the past two weeks, mailed an disenrollment letter today and have been unsuccessful in reaching him/her.  Patient has been disenrolled from Clinic Care coordination services, should they return my call or answer my letter, I will discuss clinic care coordination re-enrollment.

## 2021-06-07 NOTE — TELEPHONE ENCOUNTER
Prior Authorization Request  Who s requesting:  Pharmacy  Pharmacy Name and Location: [facility] #[id number]  Medication Name: NOVOLOG FLEXPEN U-100 INSULIN 100 unit/mL (3 mL) injection pen   Insurance Plan: [employer]   Insurance Member ID Number: [id number]  CoverMyMeds Key: N/A  Informed patient that prior authorizations can take up to 10 business days for response:   NA  Okay to leave a detailed message: No

## 2021-06-07 NOTE — TELEPHONE ENCOUNTER
These were both denied!!  I sent Isabela  Send to [employer] for suggestions, as this is the scond go around.    Alix ideas?    Spencer

## 2021-06-07 NOTE — TELEPHONE ENCOUNTER
Central PA team  [phone]  Pool: HE PA MED ([id number])          PA has been initiated.       PA form completed and faxed insurance via Cover My Meds     Key:  [id number]     Medication:  NOVOLOG FLEXPEN    Insurance:  [employer]        Response will be received via fax and may take up to 5-10 business days depending on plan

## 2021-06-07 NOTE — TELEPHONE ENCOUNTER
What are the alternatives?  To jamesonog    Please call the pharmacy and see if Novolog equivalent is covered.      Spencer

## 2021-06-07 NOTE — TELEPHONE ENCOUNTER
Prior Authorization Request  Who s requesting:  Pharmacy  Pharmacy Name and Location: [facility] #[id number]  Medication Name: NOVOLOG FLEXPEN U-100 INSULIN 100 unit/mL (3 mL) injection pen   Insurance Plan: [employer]   Insurance Member ID Number:  [id number]  CoverMyMeds Key: [id number]  Informed patient that prior authorizations can take up to 10 business days for response:   NA  Okay to leave a detailed message: No

## 2021-06-09 NOTE — PROGRESS NOTES
"CHIEF COMPLAINT: Av Fernando had concerns including Follow-up; Medication Refill; and Extremity Weakness.    [place]: 1.............. had concerns including Follow-up; Medication Refill; and Extremity Weakness.    1. Screen for colon cancer    2. Diabetes mellitus    3. Cervical Radiculopathy At C7 Nerve Root    4. Dyslipidemia    5. Headache disorder    6. Vitamin D deficiency    7. Renal Insufficiency    8. Carpal tunnel syndrome of right wrist    9. Limp    10. Weakness of right side of body      No problem-specific Assessment & Plan notes found for this encounter.      CC:            F/u DM and cholesterol. Med refills. Extremity weakness.            Follow-up DM and cholesterol check, discuss cholesterol and depression meds. discuss colonoscopy and Head MRI.     Medication Refill remeron, metformin, aspirin refills. pt states novolog and lantus injections super expensive, requesting alternatives     Extremity Weakness right arm pain and weakness, numbness         Upper extremity weakness  [place]  Forehands to hands  ll 5 fingers  Leg is better    What makes it worse :     Nothing, arm issue comes and goes   What makes it better:       Muscle relaxer med helps with right arm   How long is it ongoing:     Pt states ongoing right arm weakness  0/10-10/10:                6 pain and discomfort level   What's it like:  Pt states feels like numbing and weak pain     Associated Sx:  Pt states right leg use to have same issue but better due to hmong med from [facility].     Hypoglycemia?   No  With Scheduled insulin  No issues      Returned from [facility]   Only one high Blood sugar    IN [facility] Massage less pain   Sometime hand is \"stiff\" have to stretch fingers    Weak Tingling Not progressive  Weakness not progressing   Pain overall is better  Wants to see his psychiatrist          SUBJECTIVE:  Av Fernando is a 56 y.o. male    Past Medical History:   Diagnosis Date     Diabetes mellitus      Past Surgical History:   Procedure " "Laterality Date     LEG TRAUMA       Januvia [sitagliptin]  Current Outpatient Prescriptions   Medication Sig Dispense Refill     aspirin 81 mg chewable tablet CHEW AND SWALLOW 1 TABLET DAILY 90 tablet 3     BD ULTRA-FINE JERSON PEN NEEDLES 32 gauge x 5/32\" Ndle USE TO INJECT NOVOLOG AND LANTUS INSULIN WITH 4 NEEDLES DAILY 100 each 12     blood glucose meter (GLUCOMETER) Use 1 each As Directed as needed. Dispense glucometer brand per patient's insurance at pharmacy discretion. 1 each 0     divalproex (DEPAKOTE) 250 MG 24 hr tablet TAKE 1 TABLET BY MOUTH EVERY DAY 30 tablet 3     ergocalciferol (ERGOCALCIFEROL) 50,000 unit capsule 1 capsule every sunday 12 capsule 1     generic lancets (ACCU-CHEK SOFT TOUCH) Use 1 each As Directed as needed. Use daily as directed Accu Chek [facility]ia 100 each 11     magnesium 250 mg Tab Take 2 in the AM 60 each 5     metFORMIN (GLUCOPHAGE) 1000 MG tablet Take 1 tablet (1,000 mg total) by mouth 2 (two) times a day with meals. 180 tablet 3     mirtazapine (REMERON) 15 MG tablet TAKE 1 TABLET BY MOUTH AT BEDTIME 90 tablet 3     NOVOLOG FLEXPEN 100 unit/mL injection pen 9 units with meals 15 mL PRN     amitriptyline (ELAVIL) 10 MG tablet Take 1 tablet (10 mg total) by mouth bedtime. 90 tablet 1     atorvastatin (LIPITOR) 20 MG tablet TAKE 1 TABLET BY MOUTH AT BEDTIME 90 tablet 3     blood glucose test (ACCU-CHEK GENNY PLUS TEST STRP) strips Use to check blood sugars three times daily as directed. Dispense brand per patient's insurance at pharmacy discretion. 100 each 11     DULoxetine (CYMBALTA) 20 MG capsule Take 1 capsule (20 mg total) by mouth daily. Take 20 mg by mouth once daily 90 capsule 0     gabapentin (NEURONTIN) 300 MG capsule Take 1 capsule (300 mg total) by mouth daily. 90 capsule 3     insulin glargine (BASAGLAR KWIKPEN) 100 unit/mL (3 mL) pen Inject 22 Units under the skin bedtime. 15 mL 0     ondansetron (ZOFRAN) 4 MG tablet TAKE 1 TABLET BY MOUTH DAILY AS NEEDED FOR HEADACHE " WITH NAUSEA, MAY REPEAT IN 4 HOURS 30 tablet 3     No current facility-administered medications for this visit.      No family history on file.  Social History     Social History     Marital status:      Spouse name: N/A     Number of children: N/A     Years of education: N/A     Social History Main Topics     Smoking status: Never Smoker     Smokeless tobacco: Never Used     Alcohol use No     Drug use: No     Sexual activity: Yes     Partners: Female     Birth control/ protection: None     Other Topics Concern     None     Social History Narrative     Patient Active Problem List   Diagnosis     Peripheral Neuropathy     Renal Insufficiency     Corns     Arthralgias In Multiple Sites     Delayed Post-traumatic Stress Disorder     Brain Tumor     Hemiparesis Of Right Side - (Dominant Side)     Common Migraine (Without Aura)     Hyperlipidemia     Carpal Tunnel Syndrome     Diabetes mellitus     Headache disorder     Cervical radiculopathy at C6     Vitamin D deficiency     Lower extremity weakness     Cervical Radiculopathy At C7 Nerve Root                                              SOCIAL: He  reports that he has never smoked. He has never used smokeless tobacco. He reports that he does not drink alcohol or use illicit drugs.    REVIEW OF SYSTEMS:     Family reviewed and not pertinent to presenting issue   Review of systems otherwise negative as requested from patient, except   Positive ROS outlined and discussed in [place].    OBJECTIVE:  Visit Vitals     /82 (Patient Site: Left Arm, Patient Position: Sitting, Cuff Size: Adult Regular)     Pulse 75     Resp 16     Wt 127 lb 8 oz (57.8 kg)     SpO2 97%     BMI 22.95 kg/m2       GENERAL:     No acute distress.   Alert and oriented X 3         Physical:    Oral clear  Clear to auscultation lungs  No carotid bruits  Cardiac no murmur regular  Soft + BS  Supple calves  ++ DP PT   Weakness Right Hand Muscles. Flex and Extend elbow 4+  Weakness LEg limp  weakness flex and Ext knee 4+  Palpable distal pulses normal light touch sensation otherwise normal foot exam  Full range of affect     PH Q9 21  no intention hurt himself   ASSESSMENT & PLAN      Av was seen today for follow-up, medication refill and extremity weakness.    Diagnoses and all orders for this visit:    Screen for colon cancer  -     Microalbumin, Random Urine    Diabetes mellitus  -     A1c (if last A1c > 8.0 and greater than 90 days)  -     Microalbumin, Random Urine  -     aspirin 81 mg chewable tablet; CHEW AND SWALLOW 1 TABLET DAILY  -     metFORMIN (GLUCOPHAGE) 1000 MG tablet; Take 1 tablet (1,000 mg total) by mouth 2 (two) times a day with meals.  -     Basic Metabolic Panel  -     Ambulatory referral to Ophthalmology    Cervical Radiculopathy At C7 Nerve Root  -     mirtazapine (REMERON) 15 MG tablet; TAKE 1 TABLET BY MOUTH AT BEDTIME    Dyslipidemia  -     atorvastatin (LIPITOR) 20 MG tablet; TAKE 1 TABLET BY MOUTH AT BEDTIME  -     LDL Cholesterol, Direct  -     HDL Cholesterol  -     Cholesterol, Total    Headache disorder  -     MR Head With Without Contrast; Future  -     ondansetron (ZOFRAN) 4 MG tablet; TAKE 1 TABLET BY MOUTH DAILY AS NEEDED FOR HEADACHE WITH NAUSEA, MAY REPEAT IN 4 HOURS    Vitamin D deficiency    Renal Insufficiency  -     Basic Metabolic Panel    Carpal tunnel syndrome of right wrist    Limp    Weakness of right side of body    Other orders  -     Cancel: Cologuard  -     amitriptyline (ELAVIL) 10 MG tablet; Take 1 tablet (10 mg total) by mouth bedtime.  -     ergocalciferol (ERGOCALCIFEROL) 50,000 unit capsule; 1 capsule every sunday  -     gabapentin (NEURONTIN) 300 MG capsule; Take 1 capsule (300 mg total) by mouth daily.  -     DULoxetine (CYMBALTA) 20 MG capsule; Take 1 capsule (20 mg total) by mouth daily. Take 20 mg by mouth once daily  -     Tdap vaccine greater than or equal to 8yo IM; Future      Talked about the importance of diet, exercise and its  impact on diabetes  A1c is 11 he has not been regularly taking his medications and he's been eating high glycemic foods in [place]  We talked about working up his neck in the end he decided not to do anything  He has a known history of carpal tunnel the right hand he does not wish to have a brace we talked about the pathophysiology he has no peripheral to Dr. Alonso Layne  Brain tumor left side with right-sided weakness repeat MRI scan in May  Insulin today discussion continuing metformin he declined the addition of elma [facility] and he will switch to  Basaglar          Return in about 3 months (around 6/13/2017), or if symptoms worsen or fail to improve.       Anticipatory Guidance and Symptomatic Cares Discussed   Advised to call back directly if there are further questions, or if these symptoms fail to improve as anticipated or worsen.  Return to clinic if patient has a clinical concern that warrants an exam.        35  Min Total Time, > 50% counseling and coordination of Care    Garcia Kaufman MD  Family Medicine   [facility] [address] [phone]

## 2021-06-09 NOTE — PROGRESS NOTES
MTM Follow Up Encounter  ASSESSMENT AND PLAN  Type 2 Diabetes:  reasonably well controlled and experiencing frequent episodes of hypoglycemia. A1C not at goal of <7%. FBP at goal  mg/dL. reviewed that Lantus was switched to basaglar at last MTM appointment due to insurance.  Reviewed that Lantus and basically are the same type of insulin, however it appears that he is not tolerating it.  Recommended that if he continues to experience headaches and eye pain on Lantus then something else is going on and it is not medication related.  Okay to switch back to Lantus per patient preference, but will need to contact insurance.  Av reports to having at least a month and a half of Lantus at home.  Since he has been experiencing frequent hypoglycemia will not increase his dose.  Could consider switching to Victoza at follow-up.  Blood sugars to follow-up.  PLAN:   1.  Change basaglar back to Lantus.  Continue 22 units daily    Vitamin D deficiency: Last vitamin D level not at goal greater than 30.  Recommend increasing vitamin D to twice weekly and rechecking a level in 3 months.  PLAN:  1.  Increase 50,000 unit vitamin D to twice a week  2.  Recheck vitamin D in 3 months    Proteinuria: Last microalbumin creatinine ratio indicates proteinuria.  Since last potassium level was borderline high, we will initiate low-dose ACE inhibitor.  We will monitor potassium and serum creatinine at follow-up.  Reviewed last lab value and importance of an ACE inhibitor for renal protection.  PLAN  1.  Start lisinopril 2.5 mg daily  2.  BMP at follow-up    Will look at the cost of his medications and try to find cheaper alternatives if possible.     MTM FOLLOW UP  3 weeks    SUBJECTIVE AND OBJECTIVE  Av Fernando is a 56 y.o. male here for a follow-up medication therapy management (MTM) appointment. A [place]  joins us today.     Medication Concern(s)/Question(s): Headaches and his eye hurts with the new insulin.   Worried  about the cost of his medications    Medication Adherence: Brought his medications today.      Type 2 Diabetes: Currently taking basaglar 22 units and NovoLog 7 or 8 units per meal and metformin 1000 mg twice daily.  At last [facility] appointment Lantus was changed to basic lower per insurance.  Reports that since using the new insulin he has had headaches and his eye hurts.  Has been taking aspirin and Advil to help.  Has had a history of low blood sugars in the past, but reports that having hypoglycemia 1-2 times a week is good for him.  Is aware to not give NovoLog if not eating.  Tests BG 0-2 times daily. Blood sugars per glucometer: 114 this morning.  108 yesterday morning  3/29: 64 fasting a.m.  3/27 49 at 11 PM **vomiting this day, 287 at 9 PM, 210 at 10 AM  3/24 130 fasting a.m.  3/23 75 at 8 AM  3/22 89 at 8 AM  Last A1c checked 3/14/17 =.  11.1  Hypoglycemia twice.  Hypoglycemia symptoms: Sweaty.  On the 27th when blood sugar was 49 he ate rice.  Otherwise he carries glucose tablets with him.  Using a AccuChek Claire meter.     Vitamin D deficiency: Completed weekly vitamin D last week.  Reports that he feels tired and thinks he has low vitamins.  Believes he was taking twice a week vitamin D in the past.  Vitamin D level = 15.8 on 3/13/2017    Proteinuria: Is not on an ACE or an ARB.  Last microalbumin creatinine ratio = 244.6          Av's medication list was reviewed with them, discussing reason for use, directions for use, and potential side effects of each medication as needed. Indication, safety, efficacy, and convenience was assessed for all medications addressed above.  No environmental factors were noted currently affecting patient.  This care plan was communicated via EMR with his primary care provider, Garcia Kaufman MD, who is the authorizing prescriber for this visit.  Direct supervision was available by either the patient's PCP or other available provider.    Time and complexity billing metrics  are included in the docflowsheet linked to this visit    Time spent: 30 min  Alix Chavarria, Pharm.D., BCACP   MTM Pharmacist at [facility]

## 2021-06-09 NOTE — TELEPHONE ENCOUNTER
Alix is out today, can you please call Av and see when he is able to schedule a phone appointment with her to discuss medication coverage with Alix this week?

## 2021-06-09 NOTE — TELEPHONE ENCOUNTER
----- Message from Garcia Kaufman MD sent at 7/21/2020  7:54 AM CDT -----  Regarding: RE: Medication Assistance for sample or programs  He is open to it now.  No drug coverage at all on wife's insurance  His English is excellent too.  He has been reading and getting much improved.   DanL  ----- Message -----  From: Alix Chavarria, PharmD  Sent: 7/20/2020   4:36 PM CDT  To: Garcia Kaufman MD  Subject: RE: Medication Assistance for sample or prog#    Will do! He always refuses me... but I will continue to persist :) Thanks!   ----- Message -----  From: Garcia Kaufman MD  Sent: 7/20/2020   3:58 PM CDT  To: Alix Chavarria, PharmD  Subject: Medication Assistance for sample or programs     Please call Av and help him with some medication as he is still without insurance      Dan    [phone]   Av Akers

## 2021-06-09 NOTE — PROGRESS NOTES
"MTM Follow Up Encounter  ASSESSMENT AND PLAN    Type 2 Diabetes:  poorly controlled. A1C not at goal of <7%. FBP unknown but likely not goal  mg/dL -- patient only had one reported value. Unclear which meter he has, but [email or web address] covers AccuChek. Called [facility] and AccuChek is $40.88 for #100 strips. Found him a discount card but [employer] was not able to run the discount yet. Lantus no longer covered by [email or web address] either -- refill sent for basaglar $135 for 1 box since it is a 68 day supply. Also found him a discount program that [employer] was not able to try today. Will call them tomorrow to run claim.   PLAN:   1. Stop Lantus and start basaglar 22 units daily.   2. Coupons at [facility] for basaglar and AccuChek. Test blood sugars three times daily.    **labs returned -- microalbumin/creatinine ratio elevated. Start ACE/ARB at follow up    Vitamin D Deficiency: Last Vitamin D level not at goal. Will recheck level today since he has been taking Vitamin D 50,000 IU.   PLAN:   1. Check Vitamin D    Brain Tumor/Migraines: Will check hepatic panel and CBC since >1 year and risk of hepatic dysfunction and blood disorders with divalproex.   PLAN:   1. Check hepatic panel and CBC    MT FOLLOW UP  2-3 weeks     SUBJECTIVE AND OBJECTIVE  Av Fernando is a 56 y.o. male here for a follow-up medication therapy management (MTM) appointment.  A [facility] , Christal, joins us today.      Medication Concern(s)/Question(s): Needs a refill of: aspirin, metformin, mirtazepine. Threw out the bottles of amitriptyline, atorvastatin, and ondansetron and would like a refill. Does not have magnesium with him today because he ran out.   - Cannot afford test strips or insulin    Type 2 Diabetes: Currently taking Lantus 22 units, Novolog 7-8 units before meals, and metformin 1000 mg BID. Did not tolerate the once weekly \"insulin,\" was \"too strong.\" If he eats a lot, he will take 23 units of Lantus. Tests BG 2-3 times a " week because he cannot afford the strips. Does not know the name of his glucometer. Reports blood sugars: only value to report was 137 last Friday, fasting AM.   Is using his dead uncle's insulin.   Last A1c checked today = 11.1%, previously 9.6% on 6/30/16.   Microalbumin checked today    Vitamin D Deficiency: Currently taking Vitamin D 50,000 IU once weekly. Last Vitamin D level = 20.8 on 6/20/16.     Brain Tumor/Migraines: Currently taking divalproex 250 mg daily. Last CBC and hepatic panel checked Feb 2016.           Av's medication list was reviewed with them, discussing reason for use, directions for use, and potential side effects of each medication as needed. Indication, safety, efficacy, and convenience was assessed for all medications addressed above.  No environmental factors were noted currently affecting patient.  This care plan was communicated via EMR with his primary care provider, Garcia Kaufman MD, who is the authorizing prescriber for this visit.  Direct supervision was available by either the patient's PCP or other available provider.    Time and complexity billing metrics are included in the docflowsheet linked to this visit    Time spent: 30 min  Alix Chavarria, Pharm.D., BCACP   MTM Pharmacist at [facility]

## 2021-06-10 NOTE — TELEPHONE ENCOUNTER
Prior Authorization Request  Who s requesting:  [employer]  Pharmacy Name and Location: [id number] - [place], [place] - [address] AT [facility] [address]   Medication Name: insulin lispro (HUMALOG KWIKPEN INSULIN) 100 unit/mL pen   Insurance Plan: [employer]  Insurance Member ID Number:  [id number]  CoverMyMeds Key: [id number]  Informed patient that prior authorizations can take up to 10 business days for response:   No  Okay to leave a detailed message: No

## 2021-06-10 NOTE — TELEPHONE ENCOUNTER
Medication Question or Clarification  Who is calling: [facility] fax  What medication are you calling about (include dose and sig)?: Novolog Flexpen Inj 3 mL (orange) - inject 7 units under the skin with meals  Who prescribed the medication?: n/a  What is your question/concern?: [facility] is requesting for prior authorization to be done but there is no current Rx for Novolog on file. Please advise if Garcia Kaufman MD wants to send in an Rx for the Novolog or if the patient is to continue Humalog as there are past messages from 4/6/20, about a prior authorization on Novolog.    If Garcia Kaufman MD wants the patient on the Novolog then a new Rx needs to be sent in and a prior authorization needs to be done.  Requested Pharmacy: [facility]  Okay to leave a detailed message?: No

## 2021-06-10 NOTE — TELEPHONE ENCOUNTER
Refill Approved    Rx renewed per Medication Renewal Policy. Medication was last renewed on 11/12/2019.    Melody Price, [facility] Triage/Med Refill 8/22/2020     Requested Prescriptions   Pending Prescriptions Disp Refills     LANTUS SOLOSTAR U-100 INSULIN 100 unit/mL (3 mL) pen [Pharmacy Med Name: LANTUS SOLOSTAR PEN INJ 3ML] 30 mL 0     Sig: INJECT 23 UNITS UNDER THE SKIN AT BEDTIME       Insulin/GLP-1 Refill Protocol Passed - 8/19/2020  1:55 PM        Passed - Visit with PCP or prescribing provider visit in last 6 months     Last office visit with prescriber/PCP: Visit date not found OR same dept: 11/12/2019 Garcia Kaufman MD OR same specialty: 11/12/2019 Garcia Kaufman MD Last physical: 7/20/2020 Last MTM visit: Visit date not found     Next appt within 3 mo: Visit date not found  Next physical within 3 mo: Visit date not found  Prescriber OR PCP: Garcia Kaufman MD  Last diagnosis associated with med order: 1. Type 2 diabetes mellitus with hyperglycemia, with long-term current use of insulin (H)  - LANTUS SOLOSTAR U-100 INSULIN 100 unit/mL (3 mL) pen [Pharmacy Med Name: LANTUS SOLOSTAR PEN INJ 3ML]; INJECT 23 UNITS UNDER THE SKIN AT BEDTIME  Dispense: 30 mL; Refill: 0    If protocol passes may refill for 6 months if within 3 months of last provider visit (or a total of 9 months).              Passed - A1C in last 6 months     Hemoglobin A1c   Date Value Ref Range Status   07/20/2020 13.4 (H) <=5.6 % Final     Comment:     Prediabetes:   HBA1c       5.7 to 6.4%        Diabetes:        HBA1c        >=6.5%   Patients with Hgb F >5%, total bilirubin >10.0 mg/dL, abnormal red cell turnover, severe renal or hepatic disease or malignancy should not have this A1C method used to diagnose or monitor diabetes.                   Passed - Microalbumin in last year     Microalbumin, Random Urine   Date Value Ref Range Status   07/20/2020 25.77 (H) 0.00 - 1.99 mg/dL Final                  Passed - Blood  pressure in last year     BP Readings from Last 1 Encounters:   07/20/20 116/60             Passed - Creatinine done in last year     Creatinine   Date Value Ref Range Status   07/20/2020 1.17 0.70 - 1.30 mg/dL Final

## 2021-06-10 NOTE — TELEPHONE ENCOUNTER
Refill Approved    Rx renewed per Medication Renewal Policy. Medication was last renewed on 4/8/20.    Christal Fletcher, [facility] Triage/Med Refill 8/26/2020     Requested Prescriptions   Pending Prescriptions Disp Refills     insulin lispro (HUMALOG KWIKPEN INSULIN) 100 unit/mL pen 24 mL 0     Sig: INJECT 7 UNITS UNDER THE SKIN THREE TIMES DAILY BEFORE MEALS       Insulin/GLP-1 Refill Protocol Passed - 8/26/2020 11:15 AM        Passed - Visit with PCP or prescribing provider visit in last 6 months     Last office visit with prescriber/PCP: Visit date not found OR same dept: 11/12/2019 Garcia Kaufman MD OR same specialty: 11/12/2019 Garcia Kaufman MD Last physical: 7/20/2020 Last MTM visit: Visit date not found     Next appt within 3 mo: Visit date not found  Next physical within 3 mo: Visit date not found  Prescriber OR PCP: Garcia Kaufman MD  Last diagnosis associated with med order: 1. Type 2 diabetes mellitus with microalbuminuria, with long-term current use of insulin (H)  - insulin lispro (HUMALOG KWIKPEN INSULIN) 100 unit/mL pen; INJECT 7 UNITS UNDER THE SKIN THREE TIMES DAILY BEFORE MEALS  Dispense: 24 mL; Refill: 0    If protocol passes may refill for 6 months if within 3 months of last provider visit (or a total of 9 months).              Passed - A1C in last 6 months     Hemoglobin A1c   Date Value Ref Range Status   07/20/2020 13.4 (H) <=5.6 % Final     Comment:     Prediabetes:   HBA1c       5.7 to 6.4%        Diabetes:        HBA1c        >=6.5%   Patients with Hgb F >5%, total bilirubin >10.0 mg/dL, abnormal red cell turnover, severe renal or hepatic disease or malignancy should not have this A1C method used to diagnose or monitor diabetes.                   Passed - Microalbumin in last year     Microalbumin, Random Urine   Date Value Ref Range Status   07/20/2020 25.77 (H) 0.00 - 1.99 mg/dL Final                  Passed - Blood pressure in last year     BP Readings from Last 1  Encounters:   07/20/20 116/60             Passed - Creatinine done in last year     Creatinine   Date Value Ref Range Status   07/20/2020 1.17 0.70 - 1.30 mg/dL Final

## 2021-06-10 NOTE — TELEPHONE ENCOUNTER
Refill Request  Did you contact pharmacy: No  Medication name:   Requested Prescriptions     Pending Prescriptions Disp Refills     insulin lispro (HUMALOG KWIKPEN INSULIN) 100 unit/mL pen 24 mL 0     Sig: INJECT 7 UNITS UNDER THE SKIN THREE TIMES DAILY BEFORE MEALS   Who prescribed the medication: Garcia Kaufman MD  Requested Pharmacy: [facility]  Is patient out of medication: Yes  Patient notified refills processed in 3 business days:  yes  Okay to leave a detailed message: no

## 2021-06-10 NOTE — TELEPHONE ENCOUNTER
Looks like he might be on Humalog. I was supposed to meet with Av but I have not been able to get a hold of him after multiple attempts.     Let me know if the Humalog needs to be resent since it might be out of refills. I would rather not switch him to avoid confusion.

## 2021-06-10 NOTE — TELEPHONE ENCOUNTER
Patient Returning Call  Reason for call:  Returning call.  Information relayed to patient:    Relayed below message to patient.  Patient has additional questions:  Yes  If YES, what are your questions/concerns:    Patient states he has his phone at certain times.  He shares it with his son. His son has it at work a lot of the time.  Is it an option to put patient in a huddle to talk with Alix the next time he calls? Patient states he can't make an appointment as he doesn't always know when he will have his phone.  Please reach out to patient and advise.  Thank you.  Okay to leave a detailed message?: Yes

## 2021-06-13 NOTE — PROGRESS NOTES
ASSESSMENT & PLAN      Av was seen today for follow-up and medication refill.    Diagnoses and all orders for this visit:    Type 2 diabetes mellitus with hyperglycemia, with long-term current use of insulin  -     Ambulatory referral to Ophthalmology  -     Glycosylated Hemoglobin A1c    Renal Insufficiency  -     HM1(CBC and Differential)  -     Comprehensive Metabolic Panel  -     Thyroid Cascade  -     HM1 (CBC with Diff)    Diabetes mellitus  -     metFORMIN (GLUCOPHAGE) 1000 MG tablet; Take 1 tablet (1,000 mg total) by mouth 2 (two) times a day with meals.  -     aspirin 81 mg chewable tablet; CHEW AND SWALLOW 1 TABLET DAILY    Neoplasm of brain  -     divalproex (DEPAKOTE) 250 MG 24 hr tablet; TAKE 1 TABLET BY MOUTH EVERY DAY    Dyslipidemia  -     atorvastatin (LIPITOR) 20 MG tablet; TAKE 1 TABLET BY MOUTH AT BEDTIME  -     Thyroid Cascade    Weakness of right lower extremity    Other orders  -     lisinopril (ZESTRIL) 2.5 MG tablet; Take 1 tablet (2.5 mg total) by mouth daily.  -     gabapentin (NEURONTIN) 300 MG capsule; Take 1 capsule (300 mg total) by mouth daily.  -     ergocalciferol (ERGOCALCIFEROL) 50,000 unit capsule; 1 capsule every Wednesday and Sunday  -     amitriptyline (ELAVIL) 10 MG tablet; Take 1 tablet (10 mg total) by mouth at bedtime.        Return in about 3 months (around 1/16/2018).           CHIEF COMPLAINT: Av Fernando had concerns including Follow-up and Medication Refill.    [place]: 1.............. had concerns including Follow-up and Medication Refill.    1. Type 2 diabetes mellitus with hyperglycemia, with long-term current use of insulin    2. Renal Insufficiency    3. Diabetes mellitus    4. Neoplasm of brain    5. Dyslipidemia    6. Weakness of right lower extremity      Diabetes mellitus  Diabetes is unchanged.   Reminded to bring in blood sugar diary at next visit.  Dietary recommendations for ADA diet.  Discussed foot care.  Reminded to get yearly retinal exam.  Diabetes  "will be reassessed in 3 months       !0 units .  Novolog meals   24 units bedtime  Lantus  ---Tresciba ?  2 pens left      Trulicty bad reaction      Feet OK  No tobacco                SUBJECTIVE:  Av Fernando is a 57 y.o. male    Past Medical History:   Diagnosis Date     Diabetes mellitus      Past Surgical History:   Procedure Laterality Date     LEG TRAUMA       Januvia [sitagliptin] and Trulicity [dulaglutide]  Current Outpatient Prescriptions   Medication Sig Dispense Refill     amitriptyline (ELAVIL) 10 MG tablet Take 1 tablet (10 mg total) by mouth at bedtime. 90 tablet 1     aspirin 81 mg chewable tablet CHEW AND SWALLOW 1 TABLET DAILY 90 tablet 3     divalproex (DEPAKOTE) 250 MG 24 hr tablet TAKE 1 TABLET BY MOUTH EVERY DAY 30 tablet 3     gabapentin (NEURONTIN) 300 MG capsule Take 1 capsule (300 mg total) by mouth daily. 90 capsule 3     mirtazapine (REMERON) 15 MG tablet TAKE 1 TABLET BY MOUTH AT BEDTIME 90 tablet 3     NOVOLOG FLEXPEN 100 unit/mL injection pen 9 units with meals 15 mL PRN     ondansetron (ZOFRAN) 4 MG tablet TAKE 1 TABLET BY MOUTH DAILY AS NEEDED FOR HEADACHE WITH NAUSEA, MAY REPEAT IN 4 HOURS 30 tablet 3     atorvastatin (LIPITOR) 20 MG tablet TAKE 1 TABLET BY MOUTH AT BEDTIME 90 tablet 3     BD ULTRA-FINE JERSON PEN NEEDLES 32 gauge x 5/32\" Ndle USE TO INJECT NOVOLOG AND LANTUS INSULIN WITH 4 NEEDLES DAILY 100 each 12     blood glucose meter (GLUCOMETER) Use 1 each As Directed as needed. Dispense glucometer brand per patient's insurance at pharmacy discretion. 1 each 0     blood glucose test (ACCU-CHEK GENNY PLUS TEST STRP) strips Use to check blood sugars three times daily as directed. Dispense brand per patient's insurance at pharmacy discretion. 100 each 11     ergocalciferol (ERGOCALCIFEROL) 50,000 unit capsule 1 capsule every Wednesday and Sunday 24 capsule 1     generic lancets (ACCU-CHEK SOFT TOUCH) Use 1 each As Directed as needed. Use daily as directed Accu Chek Avia 100 each " 11     lisinopril (ZESTRIL) 2.5 MG tablet Take 1 tablet (2.5 mg total) by mouth daily. 30 tablet 11     metFORMIN (GLUCOPHAGE) 1000 MG tablet Take 1 tablet (1,000 mg total) by mouth 2 (two) times a day with meals. 180 tablet 3     No current facility-administered medications for this visit.      No family history on file.  Social History     Social History     Marital status:      Spouse name: N/A     Number of children: N/A     Years of education: N/A     Social History Main Topics     Smoking status: Never Smoker     Smokeless tobacco: Never Used     Alcohol use No     Drug use: No     Sexual activity: Yes     Partners: Female     Birth control/ protection: None     Other Topics Concern     None     Social History Narrative     Patient Active Problem List   Diagnosis     Peripheral Neuropathy     Renal Insufficiency     Corns     Arthralgias In Multiple Sites     Delayed Post-traumatic Stress Disorder     Brain Tumor     Hemiparesis Of Right Side - (Dominant Side)     Common Migraine (Without Aura)     Hyperlipidemia     Carpal Tunnel Syndrome     Diabetes mellitus     Headache disorder     Cervical radiculopathy at C6     Vitamin D deficiency     Lower extremity weakness     Cervical Radiculopathy At C7 Nerve Root                                              SOCIAL: He  reports that he has never smoked. He has never used smokeless tobacco. He reports that he does not drink alcohol or use illicit drugs.    REVIEW OF SYSTEMS:   Family history not pertinent to chief complaint or presenting problem    Review of systems otherwise negative as requested from patient, except   Those positive ROS outlined and discussed in [place].    OBJECTIVE:  /72 (Patient Site: Left Arm, Patient Position: Sitting, Cuff Size: Adult Regular)  Pulse 65  Resp 16  Wt 127 lb 8 oz (57.8 kg)  SpO2 97%  BMI 22.95 kg/m2    GENERAL:     No acute distress.   Alert and oriented X 3         Physical:    Her pulses are full without  bruits oropharynx is clear neck is supple no cervical or supraclavicular nodes lungs are clear with equal symmetric range  Cardiac S1-S2 regular sinus appreciable murmur gallop lower extremities 4 out of 5 flexion sense of the ankle flexion extension knee on the right  5/5 flexion sense of the ankle flexors and knee on the left  2+ reflexes at the patella and Achilles on the left difficult to elicit reflexes on the right no ankle clonus  He is able to walk without difficulty  Monofilament testing is diminished on his right leg versus his left        ASSESSMENT & PLAN      Av was seen today for follow-up and medication refill.    Diagnoses and all orders for this visit:    Type 2 diabetes mellitus with hyperglycemia, with long-term current use of insulin  -     Ambulatory referral to Ophthalmology  -     Glycosylated Hemoglobin A1c    Renal Insufficiency  -     HM1(CBC and Differential)  -     Comprehensive Metabolic Panel  -     Thyroid Cascade  -     HM1 (CBC with Diff)    Diabetes mellitus  -     metFORMIN (GLUCOPHAGE) 1000 MG tablet; Take 1 tablet (1,000 mg total) by mouth 2 (two) times a day with meals.  -     aspirin 81 mg chewable tablet; CHEW AND SWALLOW 1 TABLET DAILY    Neoplasm of brain  -     divalproex (DEPAKOTE) 250 MG 24 hr tablet; TAKE 1 TABLET BY MOUTH EVERY DAY    Dyslipidemia  -     atorvastatin (LIPITOR) 20 MG tablet; TAKE 1 TABLET BY MOUTH AT BEDTIME  -     Thyroid Cascade    Weakness of right lower extremity    Other orders  -     lisinopril (ZESTRIL) 2.5 MG tablet; Take 1 tablet (2.5 mg total) by mouth daily.  -     gabapentin (NEURONTIN) 300 MG capsule; Take 1 capsule (300 mg total) by mouth daily.  -     ergocalciferol (ERGOCALCIFEROL) 50,000 unit capsule; 1 capsule every Wednesday and Sunday  -     amitriptyline (ELAVIL) 10 MG tablet; Take 1 tablet (10 mg total) by mouth at bedtime.    Check diabetes numbers today  He will follow through with Alix patrick Pharm.D. he needs to have a  working glucometer  He needs to follow through on his MRI which is been ordered  Open referral back to neurology declined at the present time open referral back to spine care with muscle weakness and leg weakness in the right he declined  Return in about 3 months (around 1/16/2018).       Anticipatory Guidance and Symptomatic Cares Discussed   Advised to call back directly if there are further questions, or if these symptoms fail to improve as anticipated or worsen.  Return to clinic if patient has a clinical concern that warrants an exam.         25  Min Total Time, > 50% counseling and coordination of Care    Garcia Kaufman MD  Family Medicine   [facility] [id number] [phone]

## 2021-06-14 ENCOUNTER — RECORDS - HEALTHEAST (OUTPATIENT)
Dept: ADMINISTRATIVE | Facility: CLINIC | Age: 61
End: 2021-06-14

## 2021-06-15 PROBLEM — M54.12 BRACHIAL NEURITIS OR RADICULITIS: Status: ACTIVE | Noted: 2017-03-13

## 2021-06-16 NOTE — TELEPHONE ENCOUNTER
Telephone Encounter by Alicia Noyola at 11/15/2019  8:27 AM     Author: Alicia Noyola Service: -- Author Type: --    Filed: 11/15/2019  8:29 AM Encounter Date: 11/14/2019 Status: Signed    : Alicia Noyola       This will have to go under the patient's Part B, these are excluded under Part D pharmacy benefits.

## 2021-06-16 NOTE — PROGRESS NOTES
ASSESSMENT & PLAN      Av was seen today for follow-up, form and medication refill.    Diagnoses and all orders for this visit:    Renal Insufficiency  -     Comprehensive Metabolic Panel  -     HM1(CBC and Differential)  -     HM1 (CBC with Diff)    Type 2 diabetes mellitus with hyperglycemia, with long-term current use of insulin  -     HM1(CBC and Differential)  -     Glycosylated Hemoglobin A1c  -     Microalbumin, Random Urine  -     HM1 (CBC with Diff)    Screen for colon cancer  -     Occult Blood(ICT)    Vitamin D deficiency    Peripheral Neuropathy    Weakness of right lower extremity    Hemiparesis Of Right Side - (Dominant Side)      No Follow-up on file.           CHIEF COMPLAINT: Av Fernando had concerns including Follow-up; Form; and Medication Refill.    [place]: 1.............. had concerns including Follow-up; Form; and Medication Refill.    1. Renal Insufficiency    2. Type 2 diabetes mellitus with hyperglycemia, with long-term current use of insulin    3. Screen for colon cancer    4. Vitamin D deficiency    5. Peripheral Neuropathy    6. Weakness of right lower extremity    7. Hemiparesis Of Right Side - (Dominant Side)      No problem-specific Assessment & Plan notes found for this encounter.      CC:            Patient has known diabetes such as blood sugars before meals as well as at bedtime unfortunately has not had blood glucose machine as well as blood test strips she is using a friend's  He is using Lantus at 28  He is using NovoLog at 9 units with meals  He has had no symptomatic low blood sugars  He has had no seizures  He has had no loss of consciousness  His breathing is normal  He went to [place] and was treating with what sounds like CBD oil and had some improvement of his symptoms        SUBJECTIVE:  Av Fernando is a 57 y.o. male    Past Medical History:   Diagnosis Date     Diabetes mellitus      Past Surgical History:   Procedure Laterality Date     LEG TRAUMA       Yoni  "[sitagliptin] and Trulicity [dulaglutide]  Current Outpatient Prescriptions   Medication Sig Dispense Refill     amitriptyline (ELAVIL) 10 MG tablet Take 1 tablet (10 mg total) by mouth at bedtime. 90 tablet 1     aspirin 81 mg chewable tablet CHEW AND SWALLOW 1 TABLET DAILY 90 tablet 3     atorvastatin (LIPITOR) 20 MG tablet TAKE 1 TABLET BY MOUTH AT BEDTIME 90 tablet 3     BD ULTRA-FINE JERSON PEN NEEDLES 32 gauge x 5/32\" Ndle USE TO INJECT NOVOLOG AND LANTUS INSULIN WITH 4 NEEDLES DAILY 100 each 12     blood glucose meter (GLUCOMETER) Use 1 each As Directed as needed. Dispense glucometer brand per patient's insurance at pharmacy discretion. 1 each 0     blood glucose test (ACCU-CHEK SMARTVIEW TEST STRIP) strips Use to test blood sugars three times daily. Dispense brand per patient's insurance at pharmacy discretion. 300 strip 3     divalproex (DEPAKOTE) 250 MG 24 hr tablet TAKE 1 TABLET BY MOUTH EVERY DAY 30 tablet 3     ergocalciferol (ERGOCALCIFEROL) 50,000 unit capsule 1 capsule every Wednesday and Sunday 24 capsule 1     gabapentin (NEURONTIN) 300 MG capsule Take 1 capsule (300 mg total) by mouth daily. 90 capsule 3     generic lancets (ACCU-CHEK SOFT TOUCH) Use 1 each As Directed as needed. Use daily as directed Accu Chek Avia 100 each 11     LANTUS SOLOSTAR 100 unit/mL (3 mL) pen Inject 23 Units under the skin at bedtime. 30 mL 0     lisinopril (ZESTRIL) 2.5 MG tablet Take 1 tablet (2.5 mg total) by mouth daily. 30 tablet 11     metFORMIN (GLUCOPHAGE) 1000 MG tablet Take 1 tablet (1,000 mg total) by mouth 2 (two) times a day with meals. 180 tablet 3     mirtazapine (REMERON) 15 MG tablet TAKE 1 TABLET BY MOUTH AT BEDTIME 90 tablet 3     NOVOLOG FLEXPEN 100 unit/mL injection pen INJECT 9 UNITS UNDER THE SKIN WITH MEALS 15 mL 1     ondansetron (ZOFRAN) 4 MG tablet TAKE 1 TABLET BY MOUTH DAILY AS NEEDED FOR HEADACHE WITH NAUSEA, MAY REPEAT IN 4 HOURS 30 tablet 3     No current facility-administered medications " for this visit.      No family history on file.  Social History     Social History     Marital status:      Spouse name: N/A     Number of children: N/A     Years of education: N/A     Social History Main Topics     Smoking status: Never Smoker     Smokeless tobacco: Never Used     Alcohol use No     Drug use: No     Sexual activity: Yes     Partners: Female     Birth control/ protection: None     Other Topics Concern     None     Social History Narrative     Patient Active Problem List   Diagnosis     Peripheral Neuropathy     Renal Insufficiency     Corns     Arthralgias In Multiple Sites     Delayed Post-traumatic Stress Disorder     Brain Tumor     Hemiparesis Of Right Side - (Dominant Side)     Common Migraine (Without Aura)     Hyperlipidemia     Carpal Tunnel Syndrome     Diabetes mellitus     Headache disorder     Cervical radiculopathy at C6     Vitamin D deficiency     Lower extremity weakness     Cervical Radiculopathy At C7 Nerve Root                                              SOCIAL: He  reports that he has never smoked. He has never used smokeless tobacco. He reports that he does not drink alcohol or use illicit drugs.    REVIEW OF SYSTEMS:   Family history not pertinent to chief complaint or presenting problem    Review of systems otherwise negative as requested from patient, except   Those positive ROS outlined and discussed in [place].    OBJECTIVE:  /82 (Patient Site: Right Arm, Patient Position: Sitting, Cuff Size: Adult Regular)  Pulse 66  Resp 16  Wt 128 lb (58.1 kg)  SpO2 95%  BMI 23.04 kg/m2    GENERAL:     No acute distress.   Alert and oriented X 3         Physical:    Full range of affect  Pupils equal reactive  Normal facial expressions  Normal ileana of speech  Lungs are clear  Cardiac no murmur regular rate and rhythm  Lower extremities without edema  Calf muscles are essentially symmetric  Flexion sense of the ankle 4+ out of 5 on the right 5/5 in the left flexion  sense of the knee 4+ out of 5 on the right 5/5 in the left  He is able to walk with slight limp  No clonus  He is able to repetitively flex and extend at the ankle at least 12 times  He is able to lift and move his ankle from left to right without a problem        ASSESSMENT & PLAN      Av was seen today for follow-up, form and medication refill.    Diagnoses and all orders for this visit:    Renal Insufficiency  -     Comprehensive Metabolic Panel  -     HM1(CBC and Differential)  -     HM1 (CBC with Diff)    Type 2 diabetes mellitus with hyperglycemia, with long-term current use of insulin  -     HM1(CBC and Differential)  -     Glycosylated Hemoglobin A1c  -     Microalbumin, Random Urine  -     HM1 (CBC with Diff)    Screen for colon cancer  -     Occult Blood(ICT)    Vitamin D deficiency    Peripheral Neuropathy    Weakness of right lower extremity    Hemiparesis Of Right Side - (Dominant Side)      No Follow-up on file.       Anticipatory Guidance and Symptomatic Cares Discussed   Advised to call back directly if there are further questions, or if these symptoms fail to improve as anticipated or worsen.  Return to clinic if patient has a clinical concern that warrants an exam.         35 Min Total Time, > 50% counseling and coordination of Care    Garcia Kaufman MD  Family Medicine   [facility] [id number] [phone]

## 2021-06-16 NOTE — TELEPHONE ENCOUNTER
New Appointment Needed  What is the reason for the visit:    diabetic check / discuss paperwork   Provider Preference: PCP only  How soon do you need to be seen?: as soon as able. Please call pt to go over options.   Waitlist offered?: No  Okay to leave a detailed message:  Yes

## 2021-06-16 NOTE — TELEPHONE ENCOUNTER
Telephone Encounter by Alexa Arcos at 6/28/2019  5:16 PM     Author: Alexa Arcos Service: -- Author Type: --    Filed: 6/28/2019  5:19 PM Encounter Date: 6/28/2019 Status: Signed    : Alexa Arcos       PRIOR AUTHORIZATION DENIED    Denial Rational:  The patient needs to have a history of trial and failure of 2 formulary options. The other formulary alternatives are: Levemir Flextouch Solution Pen-Injector (100, 200) unit/ml, Tresiba Solution 100 unit/ml          Appeal Information: If the provider would like to appeal this denial, please provide a letter of medical necessity and once it has been completed and placed in the patient's chart, notify the [facility] PA Team ([facility] MED [id number]) and the appeal can be initiated on behalf of the patient and provider.  Please also include any therapies that the patient has tried and their outcomes.

## 2021-06-17 NOTE — PROGRESS NOTES
"Av Fernando is a 60 y.o. male who is being evaluated via a billable telephone visit.      What phone number would you like to be contacted at? [phone]  How would you like to obtain your AVS? AVS Preference: Mail a copy.    Assessment & Plan   Problem List Items Addressed This Visit     Type 2 diabetes mellitus with microalbuminuria, with long-term current use of insulin (H) - Primary     Form for Diabetes   Fax to me   's   Today     Insulin   Lispro 10 units 3 x day  \"check blood\"    Lantus nightly 24 untis     Low?  No blood sugar monitor             Tried to use friends     [facility]     No Loss of Consciousness  No Seizures  Can I operate vehicle?  Yes  No hypoglycemic episodes             Would like to have   MTM   Lisinopril 5 mg 1 Daily   Simvastatin 40 mg HS          Peripheral vascular disease (H)      Other Visit Diagnoses     Type 2 diabetes mellitus with hyperglycemia, with long-term current use of insulin (H)        Relevant Orders    Ambulatory referral to Medication Management    Glycosylated Hemoglobin A1c    Comprehensive Metabolic Panel    Lipid Cascade RANDOM    Microalbumin, Random Urine    Brain tumor (H)               Problem List Items Addressed This Visit     Type 2 diabetes mellitus with microalbuminuria, with long-term current use of insulin (H) - Primary     Form for Diabetes   Fax to me   's   Today     Insulin   Lispro 10 units 3 x day  \"check blood\"    Lantus nightly 24 untis     Low?  No blood sugar monitor             Tried to use friends     [facility]     No Loss of Consciousness  No Seizures  Can I operate vehicle?  Yes  No hypoglycemic episodes             Would like to have   MTM   Lisinopril 5 mg 1 Daily   Simvastatin 40 mg HS          Peripheral vascular disease (H)      Other Visit Diagnoses     Type 2 diabetes mellitus with hyperglycemia, with long-term current use of insulin (H)        Relevant Orders    Ambulatory referral to Medication " Management    Glycosylated Hemoglobin A1c    Comprehensive Metabolic Panel    Lipid Cascade RANDOM    Microalbumin, Random Urine    Brain tumor (H)                  Return in about 1 month (around 5/26/2021).    Garcia Kaufman MD  [facility]      Subjective   Av Fernando is 60 y.o. and presents today for the following health issues   HPI             Review of Systems        Objective       Vitals:  No vitals were obtained today due to virtual visit.    Physical Exam  None     Phone call duration: 12   minutes

## 2021-06-17 NOTE — TELEPHONE ENCOUNTER
Pt came to the [facility] looking for Dr. Kaufman, but he had a telephone visit today.  He gave me an Insulin-Treated DM report that he would like Dr. Kaufman to fill out and fax back ASA.  I have faxed this to the [facility], and I will send an email as well.

## 2021-06-17 NOTE — TELEPHONE ENCOUNTER
Telephone Encounter by Dot Newman at 4/1/2020  3:18 PM     Author: Dot Newman Service: -- Author Type: --    Filed: 4/1/2020  3:19 PM Encounter Date: 3/30/2020 Status: Signed    : Dot Newman       PRIOR AUTHORIZATION DENIED    Denial Rational: needs to try/fail Humalog (Humalog or Humalog mix brand or generic)        Appeal Information: This medication was denied. If physician would like to appeal because patient has contraindication or allergy to covered medication please write letter of medical necessity and route back to PA team to initiate.  If no further action is needed please close encounter thank you.

## 2021-06-17 NOTE — PROGRESS NOTES
Medication Therapy Management (MTM) Encounter    Assessment:                                                      1. Type 2 diabetes mellitus with microalbuminuria, with long-term current use of insulin (H)  Not at goal A1c less than 7% per ADA guidelines, however A1c is trending towards improvement.  He is nonadherent to Metformin, and taking supratherapeutic doses on occasion, provide education to continue on only max dose of 2000 mg daily.  Based on the minimal blood sugar testing due to cost of testing supplies out-of-pocket it is difficult to adjust his current insulin regimen.  He is not experiencing hypoglycemia, therefore continue current regimen at this time.  I have provided him with a new Contour glucose meter and a sample test strips so that he can test his blood sugars at this time.  Provided education on ReliOn glucose meter, which is much more affordable over-the-counter.  Additionally due to significant expense of his insulin, I provided him with the [employer] prescription assistance program phone number, as I believe he will qualify for free insulin through the .  I encouraged him to call as soon as possible.  Based on significant macroalbuminuria, he will benefit from restarting lisinopril, recommend to resume lisinopril 5 mg daily.  He is not currently interested in the use of a statin ongoing, however based on poorly controlled diabetes he would benefit from statin therapy.  We have decided to wait until lab results have returned to discuss going forward.    2. Brain Tumor  Currently untreated, recent discussion with PCP around ongoing monitoring.  He has declined ongoing use of Depakote as previously prescribed by neurology.    Plan:                                                     1.  Decrease Metformin to 1000 mg twice daily daily  2.  Initiate lisinopril 5 mg daily  3.  Increase blood sugar monitoring to 3 times daily  4.  Refer to [employer] prescription assistance program:  [phone]     Follow Up  Return in about 1 month (around 6/12/2021) for Aixa Jones PharmD, BCACP, By phone.    Subjective & Objective                                                     Av Fernando is a 61 y.o. male coming in for an initial visit for Medication Therapy Management. He was referred to me from Garcia Kaufman MD.     Reason for visit: Medication management initial    Medication Adherence/Access: Ongoing nonadherence, due to concerns about taking too many Medications and impact on his body, as well as cost.  His wife's insurance does not have prescription coverage.    Type 2 diabetes: Notes that he was diagnosed with diabetes in 2011.  He is currently taking Metformin and basal/bolus insulin.  When he eats a large meal he will take a tablet of Metformin, resulting in 2 to 3000 mg of Metformin daily.  Denies signs or symptoms of adverse effects.  He is utilizing Lantus 24 units once daily, and Humalog 10 units under the skin 3 times daily before large meals.  He had previously been on 7 units, but due to elevated blood sugars increased this dose.  Of note he does eat a bowl of rice with each meal.  Due to cost he is not checking his blood sugars prior to each meal he is generally checking 1-2 times daily.  He was having troubles with his current glucose meter, and identified that the batteries are actually dead.  He will be able to replace the batteries and use of some of the test strips that he has.  He would be a great candidate for freestyle fco device, however this would be an out-of-pocket cost for him due to lack of prescription insurance.  He has gone to a diabetes class, and learned how to treat low blood sugars which he demonstrates understanding of.  He continues on baby aspirin, but has discontinued his statin and lisinopril.  He is not interested in restarting his statin, but is interested in restarting lisinopril as he understands that this was to help with his kidneys.  He did run  "out of this several months ago.  In the past he had experienced adverse effects to both Trulicity and Januvia, he prefers to continue on insulin since he knows that he tolerates this without difficulty.  Lab Results   Component Value Date    HGBA1C 12.5 (H) 05/12/2021    HGBA1C 13.4 (H) 07/20/2020    HGBA1C 13.0 (H) 11/12/2019     Lab Results   Component Value Date    MICROALBUR 108.83 (H) 05/12/2021    LDLCALC 159 (H) 05/12/2021    CREATININE 1.05 05/12/2021     History of brain tumor: Ongoing monitoring, which has not occurred since 2018.  He previously was placed on Depakote  mg daily by neurology due to the size of his tumor.  He had declined surgery that had previously been recommended.  Notes that previously when he would get angry he would feel significant head pressure, with headache and feeling dizzy.  He went back home and met with an herbal medicine provider who provided him with herbs that he boils and drinks to help \"melt clotting blood.\"  After using this herbal he no longer experiences symptoms, and has since discontinued Depakote.  He declines ongoing use of Depakote, not only because he is feeling better, but also because of cost.      PMH: reviewed in EPIC   Allergies/ADRs: reviewed in EPIC   Alcohol: None   Tobacco:   Social History     Tobacco Use   Smoking Status Never Smoker   Smokeless Tobacco Never Used     Today's Vitals:   BP Readings from Last 3 Encounters:   05/12/21 122/66   07/20/20 116/60   11/12/19 122/76   ----------------  The patient was given a summary of these recommendations as an after visit summary    I spent 60 minutes with this patient today.   All changes were made via collaborative practice agreement with Garcia Kaufman MD. A copy of the visit note was provided to the patient's provider.     Dwayne Jones, PharmD, BCACP  Medication Management (MTM) Pharmacist  [facility]      Current Outpatient Medications   Medication Sig Dispense Refill     " "aspirin 81 mg chewable tablet CHEW AND SWALLOW 1 TABLET DAILY 90 tablet 3     blood glucose meter (GLUCOMETER) Use 1 each As Directed as needed. Dispense glucometer brand per patient's insurance at pharmacy discretion. 1 each 0     flash glucose scanning reader (FREESTYLE KATIE 14 DAY READER) Misc Use 1 each As Directed continuous as needed. Use to read blood sugars as 's instructions 1 each 0     flash glucose sensor (FREESTYLE KATIE 14 DAY SENSOR) Kit Use 1 each As Directed continuous as needed. Change every 14 days 2 kit 5     insulin lispro (HUMALOG KWIKPEN INSULIN) 100 unit/mL pen INJECT 10 UNITS UNDER THE SKIN THREE TIMES DAILY BEFORE MEALS 24 mL 0     LANTUS SOLOSTAR U-100 INSULIN 100 unit/mL (3 mL) pen INJECT 24 UNITS UNDER THE SKIN AT BEDTIME 30 mL 2     lisinopriL (PRINIVIL,ZESTRIL) 5 MG tablet Take 1 tablet (5 mg total) by mouth daily. 90 tablet 3     metFORMIN (GLUCOPHAGE) 1000 MG tablet Take 1 tablet (1,000 mg total) by mouth 2 (two) times a day. With lunch and dinner 180 tablet 3     ACCU-CHEK SOFTCLIX LANCETS lancets CHECK BLOOD SUGAR FOUR TIMES DAILY 400 each 0     BD ULTRA-FINE JERSON PEN NEEDLES 32 gauge x 5/32\" Ndle USE TO INJECT NOVOLOG AND LANTUS INSULIN WITH 4 NEEDLES DAILY 100 each 12     blood glucose test (ACCU-CHEK SMARTVIEW TEST STRIP) strips Use to test blood sugars four times daily. Dispense brand per patient's insurance at pharmacy discretion. 300 strip 3     No current facility-administered medications for this visit.         Medication Therapy Recommendations  Type 2 diabetes mellitus with microalbuminuria, with long-term current use of insulin (H)    Current Medication: insulin lispro (HUMALOG KWIKPEN INSULIN) 100 unit/mL pen   Rationale: Medication requires monitoring - Needs additional monitoring   Recommendation: Self-Monitoring - Increase self-monitoring to 3 times daily, with sample supplies that were provided   Status: Patient Agreed - Adherence/Education          " Current Medication: LANTUS SOLOSTAR U-100 INSULIN 100 unit/mL (3 mL) pen   Rationale: Cannot afford medication product - Cost - Adherence   Recommendation: Referral to Service  - Referral to [facility] prescription assistance program   Status: Patient Agreed - Adherence/Education          Current Medication: metFORMIN (GLUCOPHAGE) 1000 MG tablet   Rationale: Dose too high - Dosage too high - Safety   Recommendation: Decrease Dose - Decrease Metformin to 1000 mg twice daily   Status: Patient Agreed - Adherence/Education          Rationale: Preventive therapy - Needs additional medication therapy - Indication   Recommendation: Start Medication - lisinopriL 5 MG tablet - Initiate lisinopril 5 mg daily   Status: Accepted per CPA

## 2021-06-17 NOTE — TELEPHONE ENCOUNTER
Reason contacted: Message from Dr. Kaufman  Information relayed:  Message from Garcia Kaufman MD sent at 5/16/2021 10:03 AM CDT -----  Latha has persistent out of control diabetes      HE should see an Endocrinologist   I have referred  A1C out of control   Protein in the urine needs addressing   Additional questions:  No  Further follow-up needed:  No  Okay to leave a detailed message:  No

## 2021-06-17 NOTE — PATIENT INSTRUCTIONS - HE
See MTM   Needs Diabetes supplies  Needs ACE Inh and Statin       No issues with Hypoglycemia    Needs follow up brain scan for Meningioma     Preventative care due

## 2021-06-17 NOTE — TELEPHONE ENCOUNTER
Telephone Encounter by Dot Newman at 8/28/2020  9:01 AM     Author: Dot Newman Service: -- Author Type: --    Filed: 8/28/2020  9:04 AM Encounter Date: 8/21/2020 Status: Addendum    : Dot Newman    Related Notes: Original Note by Dot Newman filed at 8/28/2020  9:04 AM       No PA needed, Humalog is the preferred insulin and is covered under patient's plan.  Novolog is non-preferred.

## 2021-06-17 NOTE — TELEPHONE ENCOUNTER
----- Message from Garcia Kaufman MD sent at 5/16/2021 10:03 AM CDT -----  Latha has persistent out of control diabetes     HE should see an Endocrinologist   I have referred  A1C out of control   Protein in the urine needs addressing     [place]

## 2021-06-17 NOTE — TELEPHONE ENCOUNTER
Left message to call back for: Patient  Information to relay to patient:  Please see test results message below from Dr. Kaufman and relay to pt when he calls back.

## 2021-06-17 NOTE — PATIENT INSTRUCTIONS - HE
1. You can purchase aspirin 81mg over the counter at the store   2. Only take metformin, 1 tablet by mouth with lunch and dinner (three tabs daily is too much)   3. Lisinopril is a blood pressure medication that also helps prevent kidney damage from diabetes   4. Atorvastatin is for cholesterol (to decrease risk of heart attack and stroke) we will wait to send a prescription until after we have your lab results   5.   Monitoring blood sugars and goals:   Right away in the morning: less than 140   Before meals: less than 140  Two hours after meals: less than 180     6. [place] Prescription Assistance Program - please call to ask if you qualify for  program for free insulin   [phone]     7. There are cheaper glucose meter brand like Relion (at [facility]) or at [facility]

## 2021-06-17 NOTE — TELEPHONE ENCOUNTER
Telephone Encounter by Dot Newman at 4/6/2020  1:05 PM     Author: Dot Newman Service: -- Author Type: --    Filed: 4/6/2020  1:11 PM Encounter Date: 4/6/2020 Status: Addendum    : Dot Newman    Related Notes: Original Note by Dot Newman filed at 4/6/2020  1:07 PM       PA was initiated for Novolog and was previously denied, please see telephone encounter from 3/30/2020.  Called insurance and confirmed Humalog is the preferred alternative. Patient needs to try/fail Humalog.   Information was routed to clinic for review.     If provider would like to appeal please write a letter of medical necessity and route back to HE PAMED to initiate appeal.

## 2021-06-20 NOTE — LETTER
Letter by Ashanti Chavez CHW at      Author: Ashanti Chavez CHW Service: -- Author Type: --    Filed:  Encounter Date: 2/13/2020 Status: (Other)       CARE COORDINATION  [facility] [[email or web address]]  [address] [place] [address]    February 13, 2020    Av Fernando  [address] [address] [facility] [address] [facility] [address]      Dear vA,  You enrolled with the [facility] Care Coordination Services.  In order for us to provide guidance we need to connect on a monthly bases.  I have been trying to reach you for 2 weeks and have been unsuccessful.   At this time the Care Coordination team will make no further attempts to reach you, however this does not change your ability to continue receiving care from your providers at your primary care clinic. If you need additional support from a care coordinator in the future, please contact Jasmin at [phone].    All of us at [facility] are invested in your health and are here to assist you in meeting your goals.     Sincerely,  Ashanti (on behalf of Jasmin)- Community Health Worker

## 2021-06-20 NOTE — LETTER
Letter by Garcia Kaufman MD at      Author: Garcia Kaufman MD Service: -- Author Type: --    Filed:  Encounter Date: 4/2/2020 Status: (Other)                    My Depression Action Plan  Name: Av Fernando   Date of Birth 1960  Date: 4/2/2020    My Doctor: Garcia Kaufman MD   My Clinic: [facility]  [address] [facility] [address]  [phone]          GREEN    ZONE   Good Control    What it looks like:     Things are going generally well. You have normal ups and downs. You may even feel depressed from time to time, but bad moods usually last less than a day.   What you need to do:  1. Continue to care for yourself (see self care plan)  2. Check your depression survival kit and update it as needed  3. Follow your physicians recommendations including any medication.  4. Do not stop taking medication unless you consult with your physician first.           YELLOW         ZONE Getting Worse    What it looks like:     Depression is starting to interfere with your life.     It may be hard to get out of bed; you may be starting to isolate yourself from others.    Symptoms of depression are starting to last most all day and this has happened for several days.     You may have suicidal thoughts but they are not constant.   What you need to do:     1. Call your care team. Your response to treatment will improve if you keep your care team informed of your progress. Yellow periods are signs an adjustment may need to be made.     2. Continue your self-care.  Just get dressed and ready for the day.  Don't give yourself time to talk yourself out of it.    3. Talk to someone in your support network.    4. Open up your depression Depression Self-Care Plan / Wellness kit.           RED    ZONE Medical Alert - Get Help    What it looks like:     Depression is seriously interfering with your life.     You may experience these or other symptoms: You cant get out of bed most days, cant work or  engage in other necessary activities, you have trouble taking care of basic hygiene, or basic responsibilities, thoughts of suicide or death that will not go away, self-injurious behavior.     What you need to do:  1. Call your care team and request a same-day appointment. If they are not available (weekends or after hours) call your local crisis line, emergency room or 911.            Self-Care Plan / Wellness Kit    Self-Care for Depression  Heres the deal. Your body and mind are really not as separate as most people think.  What you do and think affects how you feel and how you feel influences what you do and think. This means if you do things that people who feel good do, it will help you feel better.  Sometimes this is all it takes.  There is also a place for medication and therapy depending on how severe your depression is, so be sure to consult with your medical provider and/ or Behavioral Health Consultant if your symptoms are worsening or not improving.     In order to better manage my stress, I will:    Exercise  Get some form of exercise, every day. This will help reduce pain and release endorphins, the feel good chemicals in your brain. This is almost as good as taking antidepressants!  This is not the same as joining a gym and then never going! (they count on that by the way?) It can be as simple as just going for a walk or doing some gardening, anything that will get you moving.      Hygiene   Maintain good hygiene (get out of bed in the morning, make your bed, brush your teeth, take a shower, and get dressed like you were going to work, even if you are unemployed).  If your clothes don't fit try to get ones that do.    Diet  Strive to eat foods that are good for me, drink plenty of water, and avoid excessive sugar, caffeine, alcohol, and other mood-altering substances.  Some foods that are helpful in depression are: complex carbohydrates, B vitamins, flaxseed, fish or fish oil, fresh fruits and  vegetables.    Psychotherapy  Agree to participate in Individual Therapy (if recommended).    Medication  If prescribed medications, I agree to take them.  Missing doses can result in serious side effects.  I understand that drinking alcohol, or other illicit drug use, may cause potential side effects.  I will not stop my medication abruptly without first discussing it with my provider.    Staying Connected With Others  Stay in touch with my friends, family members, and my primary care provider/team.    Use your imagination  Be creative.  We all have a creative side; it doesnt matter if its oil painting, sand castles, or mud pies! This will also kick up the endorphins.    Witness Beauty  (AKA stop and smell the roses) Take a look outside, even in mid-winter. Notice colors, textures. Watch the squirrels and birds.     Service to others  Be of service to others.  There is always someone else in need.  By helping others we can get out of ourselves and remember the really important things.  This also provides opportunities for practicing all the other parts of the program.    Humor  Laugh and be silly!  Adjust your TV habits for less news and crime-drama and more comedy.    Control your stress  Try breathing deep, massage therapy, biofeedback, and meditation. Find time to relax each day.     Crisis Text Line  http://www.crisistextline.org    The Crisis Text Line serves anyone, in any type of crisis, providing access to free, 24/7 support and information via the medium people already use and trust:    Here's how it works:  1.  Text 687-287 from anywhere in the [place], anytime, about any type of crisis.  2.  A live, trained Crisis Counselor receives the text and responds quickly.  3.  The volunteer Crisis Counselor will help you move from a 'hot moment to a cool moment'.  My support system    Clinic Contact:  Phone number:    Contact 1:  Phone number:    Contact 2:  Phone number:    Uatsdin/:  Phone  number:    Therapist:  Phone number:    [facility] crisis center:    Phone number:    Other community support:  Phone number:

## 2021-06-25 NOTE — TELEPHONE ENCOUNTER
Called pt back and I reprinted forms and going to refax to facility for him and also send a copy to his home address on file.  Mor Dela Cruz CMA

## 2021-06-25 NOTE — TELEPHONE ENCOUNTER
Called pt to get him sched with  for a 40 mi office visit. Pt was a no show for a f/u with Aixa.   Please schedule pt for a 40 min office visit with agapito if pt calls back.   Mor Dela Cruz CMA

## 2021-06-25 NOTE — TELEPHONE ENCOUNTER
Reason for Call:  Other-states he brought in a form in April for Dr Kaufman    Detailed comments: Wondering where the form is and if it ever got sent in.  DM form for drivers license     He needs to figure out the form as they need it back by Friday.    Phone Number Patient can be reached at: Home number on file [phone] (home)    Best Time: any    Can we leave a detailed message on this number?: Yes    Call taken on 6/14/2021 at 9:34 AM by Pamela Behr

## 2021-06-25 NOTE — TELEPHONE ENCOUNTER
----- Message from Dwayne Jones PharmD sent at 6/15/2021 10:51 AM CDT -----  I have already met with him and am following. He no showed our follow up.   Salbador   ----- Message -----  From: Mor Dela Cruz CMA  Sent: 6/15/2021  10:42 AM CDT  To: Gigi Watson called pt with information on 05/20/2021. No further questions at this time. Waiting for response from salbador to take on pt.   Mor Dela Cruz CMA

## 2021-06-26 NOTE — TELEPHONE ENCOUNTER
Pt sched for august. Soonest open date to see  unless we take same day or INF/EDF spots. Pt is out of town until further notice. We will keep August appt for pt to be seen.  Mor Dela Cruz CMA

## 2021-07-03 NOTE — ADDENDUM NOTE
Addendum Note by Alix Chavarria, PharmWILLIAM at 11/12/2019  4:10 PM     Author: Alix Chavarria PharmD Service: -- Author Type: Pharmacist    Filed: 11/13/2019  4:00 PM Encounter Date: 11/12/2019 Status: Signed    : Alix Chavarria PharmD (Pharmacist)    Addended by: ALIX CHAVARRIA on: 11/13/2019 04:00 PM        Modules accepted: Orders

## 2021-07-03 NOTE — ADDENDUM NOTE
Addendum Note by Alix Chavarria, PharmD at 1/14/2020  2:01 PM     Author: Alix Chavarria, PharmWILLIAM Service: -- Author Type: Pharmacist    Filed: 1/14/2020  2:08 PM Encounter Date: 1/14/2020 Status: Signed    : Alix Chavarria PharmWILLIAM (Pharmacist)    Addended by: ALIX CHAVARRIA on: 1/14/2020 02:08 PM        Modules accepted: Orders

## 2021-07-04 NOTE — LETTER
Author: -- Service: -- Author Type: --    Filed:  Encounter Date: 6/1/2021 Status: (Other)       06/01/21  Re: Av Fernando  Birthday: 1960          Dear Colleague,      Thank you for your referral to Endocrinology. This is a notification to let you know that we were unable to reach the patient to arrange an appointment.     If you have any questions or concerns, please contact our office at [phone]          Sincerely,    Endocrinology Scheduling

## 2021-08-01 ENCOUNTER — APPOINTMENT (OUTPATIENT)
Dept: RADIOLOGY | Facility: HOSPITAL | Age: 61
DRG: 282 | End: 2021-08-01
Attending: INTERNAL MEDICINE
Payer: MEDICARE

## 2021-08-01 ENCOUNTER — HOSPITAL ENCOUNTER (INPATIENT)
Facility: HOSPITAL | Age: 61
LOS: 1 days | Discharge: LEFT BEFORE DISCHARGE | DRG: 282 | End: 2021-08-02
Attending: EMERGENCY MEDICINE | Admitting: INTERNAL MEDICINE
Payer: MEDICARE

## 2021-08-01 DIAGNOSIS — R00.2 PALPITATIONS: ICD-10-CM

## 2021-08-01 DIAGNOSIS — Z98.890 STATUS POST CORONARY ANGIOGRAM: Primary | ICD-10-CM

## 2021-08-01 DIAGNOSIS — I21.3 ST ELEVATION MYOCARDIAL INFARCTION (STEMI), UNSPECIFIED ARTERY (H): ICD-10-CM

## 2021-08-01 DIAGNOSIS — R06.00 DYSPNEA, UNSPECIFIED TYPE: ICD-10-CM

## 2021-08-01 DIAGNOSIS — R55 SYNCOPE, UNSPECIFIED SYNCOPE TYPE: ICD-10-CM

## 2021-08-01 DIAGNOSIS — R73.9 HYPERGLYCEMIA: ICD-10-CM

## 2021-08-01 DIAGNOSIS — R07.9 CHEST PAIN, UNSPECIFIED TYPE: ICD-10-CM

## 2021-08-01 DIAGNOSIS — I24.9 ACS (ACUTE CORONARY SYNDROME) (H): ICD-10-CM

## 2021-08-01 DIAGNOSIS — I21.3 ST ELEVATION MI (STEMI) (H): ICD-10-CM

## 2021-08-01 LAB
ALBUMIN SERPL-MCNC: 3.5 G/DL (ref 3.5–5)
ALP SERPL-CCNC: 94 U/L (ref 45–120)
ALT SERPL W P-5'-P-CCNC: 21 U/L (ref 0–45)
ANION GAP SERPL CALCULATED.3IONS-SCNC: 9 MMOL/L (ref 5–18)
AST SERPL W P-5'-P-CCNC: 51 U/L (ref 0–40)
BASOPHILS # BLD AUTO: 0 10E3/UL (ref 0–0.2)
BASOPHILS NFR BLD AUTO: 0 %
BILIRUB SERPL-MCNC: 0.6 MG/DL (ref 0–1)
BNP SERPL-MCNC: 963 PG/ML (ref 0–53)
BUN SERPL-MCNC: 14 MG/DL (ref 8–22)
CALCIUM SERPL-MCNC: 9.5 MG/DL (ref 8.5–10.5)
CHLORIDE BLD-SCNC: 102 MMOL/L (ref 98–107)
CO2 SERPL-SCNC: 25 MMOL/L (ref 22–31)
CREAT SERPL-MCNC: 1.3 MG/DL (ref 0.7–1.3)
EOSINOPHIL # BLD AUTO: 0 10E3/UL (ref 0–0.7)
EOSINOPHIL NFR BLD AUTO: 0 %
ERYTHROCYTE [DISTWIDTH] IN BLOOD BY AUTOMATED COUNT: 12.2 % (ref 10–15)
ERYTHROCYTE [DISTWIDTH] IN BLOOD BY AUTOMATED COUNT: 12.3 % (ref 10–15)
GFR SERPL CREATININE-BSD FRML MDRD: 59 ML/MIN/1.73M2
GLUCOSE BLD-MCNC: 436 MG/DL (ref 70–125)
GLUCOSE BLDC GLUCOMTR-MCNC: 146 MG/DL (ref 70–125)
HCT VFR BLD AUTO: 37.3 % (ref 40–53)
HCT VFR BLD AUTO: 37.6 % (ref 40–53)
HGB BLD-MCNC: 12.8 G/DL (ref 13.3–17.7)
HGB BLD-MCNC: 13.1 G/DL (ref 13.3–17.7)
IMM GRANULOCYTES # BLD: 0 10E3/UL
IMM GRANULOCYTES NFR BLD: 0 %
INR PPP: 0.96 (ref 0.9–1.15)
LYMPHOCYTES # BLD AUTO: 1.9 10E3/UL (ref 0.8–5.3)
LYMPHOCYTES NFR BLD AUTO: 24 %
MAGNESIUM SERPL-MCNC: 2.1 MG/DL (ref 1.8–2.6)
MCH RBC QN AUTO: 32.1 PG (ref 26.5–33)
MCH RBC QN AUTO: 32.6 PG (ref 26.5–33)
MCHC RBC AUTO-ENTMCNC: 34.3 G/DL (ref 31.5–36.5)
MCHC RBC AUTO-ENTMCNC: 34.8 G/DL (ref 31.5–36.5)
MCV RBC AUTO: 94 FL (ref 78–100)
MCV RBC AUTO: 94 FL (ref 78–100)
MONOCYTES # BLD AUTO: 0.5 10E3/UL (ref 0–1.3)
MONOCYTES NFR BLD AUTO: 6 %
NEUTROPHILS # BLD AUTO: 5.5 10E3/UL (ref 1.6–8.3)
NEUTROPHILS NFR BLD AUTO: 70 %
NRBC # BLD AUTO: 0 10E3/UL
NRBC BLD AUTO-RTO: 0 /100
PLATELET # BLD AUTO: 176 10E3/UL (ref 150–450)
PLATELET # BLD AUTO: 186 10E3/UL (ref 150–450)
POTASSIUM BLD-SCNC: 4.7 MMOL/L (ref 3.5–5)
PROT SERPL-MCNC: 7.8 G/DL (ref 6–8)
RBC # BLD AUTO: 3.99 10E6/UL (ref 4.4–5.9)
RBC # BLD AUTO: 4.02 10E6/UL (ref 4.4–5.9)
SARS-COV-2 RNA RESP QL NAA+PROBE: NEGATIVE
SODIUM SERPL-SCNC: 136 MMOL/L (ref 136–145)
TROPONIN I SERPL-MCNC: 1.27 NG/ML (ref 0–0.29)
UFH PPP CHRO-ACNC: 0.26 IU/ML
WBC # BLD AUTO: 7.6 10E3/UL (ref 4–11)
WBC # BLD AUTO: 7.9 10E3/UL (ref 4–11)

## 2021-08-01 PROCEDURE — 80053 COMPREHEN METABOLIC PANEL: CPT | Performed by: STUDENT IN AN ORGANIZED HEALTH CARE EDUCATION/TRAINING PROGRAM

## 2021-08-01 PROCEDURE — 96376 TX/PRO/DX INJ SAME DRUG ADON: CPT

## 2021-08-01 PROCEDURE — 93005 ELECTROCARDIOGRAM TRACING: CPT | Performed by: STUDENT IN AN ORGANIZED HEALTH CARE EDUCATION/TRAINING PROGRAM

## 2021-08-01 PROCEDURE — 250N000011 HC RX IP 250 OP 636: Performed by: INTERNAL MEDICINE

## 2021-08-01 PROCEDURE — C1769 GUIDE WIRE: HCPCS | Performed by: INTERNAL MEDICINE

## 2021-08-01 PROCEDURE — C1894 INTRO/SHEATH, NON-LASER: HCPCS | Performed by: INTERNAL MEDICINE

## 2021-08-01 PROCEDURE — 96365 THER/PROPH/DIAG IV INF INIT: CPT

## 2021-08-01 PROCEDURE — 99223 1ST HOSP IP/OBS HIGH 75: CPT | Mod: 25 | Performed by: INTERNAL MEDICINE

## 2021-08-01 PROCEDURE — 250N000009 HC RX 250: Performed by: INTERNAL MEDICINE

## 2021-08-01 PROCEDURE — B2111ZZ FLUOROSCOPY OF MULTIPLE CORONARY ARTERIES USING LOW OSMOLAR CONTRAST: ICD-10-PCS | Performed by: INTERNAL MEDICINE

## 2021-08-01 PROCEDURE — 87635 SARS-COV-2 COVID-19 AMP PRB: CPT | Performed by: EMERGENCY MEDICINE

## 2021-08-01 PROCEDURE — B2151ZZ FLUOROSCOPY OF LEFT HEART USING LOW OSMOLAR CONTRAST: ICD-10-PCS | Performed by: INTERNAL MEDICINE

## 2021-08-01 PROCEDURE — C9803 HOPD COVID-19 SPEC COLLECT: HCPCS

## 2021-08-01 PROCEDURE — 272N000001 HC OR GENERAL SUPPLY STERILE: Performed by: INTERNAL MEDICINE

## 2021-08-01 PROCEDURE — 83880 ASSAY OF NATRIURETIC PEPTIDE: CPT | Performed by: STUDENT IN AN ORGANIZED HEALTH CARE EDUCATION/TRAINING PROGRAM

## 2021-08-01 PROCEDURE — 99223 1ST HOSP IP/OBS HIGH 75: CPT | Mod: AI | Performed by: INTERNAL MEDICINE

## 2021-08-01 PROCEDURE — 200N000001 HC R&B ICU

## 2021-08-01 PROCEDURE — 36415 COLL VENOUS BLD VENIPUNCTURE: CPT | Performed by: INTERNAL MEDICINE

## 2021-08-01 PROCEDURE — 85520 HEPARIN ASSAY: CPT | Performed by: INTERNAL MEDICINE

## 2021-08-01 PROCEDURE — 83735 ASSAY OF MAGNESIUM: CPT | Performed by: STUDENT IN AN ORGANIZED HEALTH CARE EDUCATION/TRAINING PROGRAM

## 2021-08-01 PROCEDURE — 85027 COMPLETE CBC AUTOMATED: CPT | Performed by: INTERNAL MEDICINE

## 2021-08-01 PROCEDURE — 4A023N7 MEASUREMENT OF CARDIAC SAMPLING AND PRESSURE, LEFT HEART, PERCUTANEOUS APPROACH: ICD-10-PCS | Performed by: INTERNAL MEDICINE

## 2021-08-01 PROCEDURE — 250N000012 HC RX MED GY IP 250 OP 636 PS 637: Performed by: INTERNAL MEDICINE

## 2021-08-01 PROCEDURE — 93458 L HRT ARTERY/VENTRICLE ANGIO: CPT | Mod: 26 | Performed by: INTERNAL MEDICINE

## 2021-08-01 PROCEDURE — 250N000011 HC RX IP 250 OP 636: Performed by: EMERGENCY MEDICINE

## 2021-08-01 PROCEDURE — 36415 COLL VENOUS BLD VENIPUNCTURE: CPT | Performed by: STUDENT IN AN ORGANIZED HEALTH CARE EDUCATION/TRAINING PROGRAM

## 2021-08-01 PROCEDURE — 255N000002 HC RX 255 OP 636: Performed by: INTERNAL MEDICINE

## 2021-08-01 PROCEDURE — 93458 L HRT ARTERY/VENTRICLE ANGIO: CPT | Performed by: INTERNAL MEDICINE

## 2021-08-01 PROCEDURE — 85025 COMPLETE CBC W/AUTO DIFF WBC: CPT | Performed by: STUDENT IN AN ORGANIZED HEALTH CARE EDUCATION/TRAINING PROGRAM

## 2021-08-01 PROCEDURE — 250N000012 HC RX MED GY IP 250 OP 636 PS 637

## 2021-08-01 PROCEDURE — 99285 EMERGENCY DEPT VISIT HI MDM: CPT | Mod: 25

## 2021-08-01 PROCEDURE — 71045 X-RAY EXAM CHEST 1 VIEW: CPT

## 2021-08-01 PROCEDURE — 84484 ASSAY OF TROPONIN QUANT: CPT | Performed by: STUDENT IN AN ORGANIZED HEALTH CARE EDUCATION/TRAINING PROGRAM

## 2021-08-01 PROCEDURE — 96375 TX/PRO/DX INJ NEW DRUG ADDON: CPT

## 2021-08-01 PROCEDURE — 250N000012 HC RX MED GY IP 250 OP 636 PS 637: Performed by: EMERGENCY MEDICINE

## 2021-08-01 PROCEDURE — C1887 CATHETER, GUIDING: HCPCS | Performed by: INTERNAL MEDICINE

## 2021-08-01 PROCEDURE — 250N000013 HC RX MED GY IP 250 OP 250 PS 637: Performed by: INTERNAL MEDICINE

## 2021-08-01 PROCEDURE — 250N000013 HC RX MED GY IP 250 OP 250 PS 637: Performed by: EMERGENCY MEDICINE

## 2021-08-01 PROCEDURE — 85610 PROTHROMBIN TIME: CPT | Performed by: EMERGENCY MEDICINE

## 2021-08-01 PROCEDURE — 258N000003 HC RX IP 258 OP 636: Performed by: INTERNAL MEDICINE

## 2021-08-01 RX ORDER — ACETAMINOPHEN 325 MG/1
650 TABLET ORAL EVERY 4 HOURS PRN
Status: DISCONTINUED | OUTPATIENT
Start: 2021-08-01 | End: 2021-08-02 | Stop reason: HOSPADM

## 2021-08-01 RX ORDER — BISACODYL 10 MG
10 SUPPOSITORY, RECTAL RECTAL DAILY PRN
Status: DISCONTINUED | OUTPATIENT
Start: 2021-08-01 | End: 2021-08-02 | Stop reason: HOSPADM

## 2021-08-01 RX ORDER — OXYCODONE HYDROCHLORIDE 5 MG/1
5 TABLET ORAL EVERY 4 HOURS PRN
Status: DISCONTINUED | OUTPATIENT
Start: 2021-08-01 | End: 2021-08-02 | Stop reason: HOSPADM

## 2021-08-01 RX ORDER — ASPIRIN 325 MG
325 TABLET ORAL ONCE
Status: COMPLETED | OUTPATIENT
Start: 2021-08-01 | End: 2021-08-01

## 2021-08-01 RX ORDER — ONDANSETRON 2 MG/ML
4 INJECTION INTRAMUSCULAR; INTRAVENOUS EVERY 6 HOURS PRN
Status: DISCONTINUED | OUTPATIENT
Start: 2021-08-01 | End: 2021-08-02 | Stop reason: HOSPADM

## 2021-08-01 RX ORDER — LANOLIN ALCOHOL/MO/W.PET/CERES
3 CREAM (GRAM) TOPICAL
Status: DISCONTINUED | OUTPATIENT
Start: 2021-08-01 | End: 2021-08-02 | Stop reason: HOSPADM

## 2021-08-01 RX ORDER — NICOTINE POLACRILEX 4 MG
15-30 LOZENGE BUCCAL
Status: DISCONTINUED | OUTPATIENT
Start: 2021-08-01 | End: 2021-08-02 | Stop reason: HOSPADM

## 2021-08-01 RX ORDER — ATROPINE SULFATE 0.1 MG/ML
0.5 INJECTION INTRAVENOUS
Status: DISCONTINUED | OUTPATIENT
Start: 2021-08-01 | End: 2021-08-02

## 2021-08-01 RX ORDER — FENTANYL CITRATE 50 UG/ML
INJECTION, SOLUTION INTRAMUSCULAR; INTRAVENOUS
Status: DISCONTINUED | OUTPATIENT
Start: 2021-08-01 | End: 2021-08-01 | Stop reason: HOSPADM

## 2021-08-01 RX ORDER — OXYCODONE HYDROCHLORIDE 5 MG/1
10 TABLET ORAL EVERY 4 HOURS PRN
Status: DISCONTINUED | OUTPATIENT
Start: 2021-08-01 | End: 2021-08-02 | Stop reason: HOSPADM

## 2021-08-01 RX ORDER — NALOXONE HYDROCHLORIDE 0.4 MG/ML
0.2 INJECTION, SOLUTION INTRAMUSCULAR; INTRAVENOUS; SUBCUTANEOUS
Status: DISCONTINUED | OUTPATIENT
Start: 2021-08-01 | End: 2021-08-02

## 2021-08-01 RX ORDER — POLYETHYLENE GLYCOL 3350 17 G/17G
17 POWDER, FOR SOLUTION ORAL DAILY PRN
Status: DISCONTINUED | OUTPATIENT
Start: 2021-08-01 | End: 2021-08-02 | Stop reason: HOSPADM

## 2021-08-01 RX ORDER — SODIUM CHLORIDE 9 MG/ML
75 INJECTION, SOLUTION INTRAVENOUS CONTINUOUS
Status: ACTIVE | OUTPATIENT
Start: 2021-08-01 | End: 2021-08-01

## 2021-08-01 RX ORDER — HEPARIN SODIUM 10000 [USP'U]/100ML
0-5000 INJECTION, SOLUTION INTRAVENOUS CONTINUOUS
Status: DISCONTINUED | OUTPATIENT
Start: 2021-08-01 | End: 2021-08-01 | Stop reason: ALTCHOICE

## 2021-08-01 RX ORDER — HYDRALAZINE HYDROCHLORIDE 20 MG/ML
10 INJECTION INTRAMUSCULAR; INTRAVENOUS
Status: DISCONTINUED | OUTPATIENT
Start: 2021-08-01 | End: 2021-08-02 | Stop reason: HOSPADM

## 2021-08-01 RX ORDER — ONDANSETRON 4 MG/1
4 TABLET, ORALLY DISINTEGRATING ORAL EVERY 6 HOURS PRN
Status: DISCONTINUED | OUTPATIENT
Start: 2021-08-01 | End: 2021-08-02 | Stop reason: HOSPADM

## 2021-08-01 RX ORDER — LIDOCAINE 40 MG/G
CREAM TOPICAL
Status: DISCONTINUED | OUTPATIENT
Start: 2021-08-01 | End: 2021-08-02 | Stop reason: HOSPADM

## 2021-08-01 RX ORDER — NALOXONE HYDROCHLORIDE 0.4 MG/ML
0.4 INJECTION, SOLUTION INTRAMUSCULAR; INTRAVENOUS; SUBCUTANEOUS
Status: DISCONTINUED | OUTPATIENT
Start: 2021-08-01 | End: 2021-08-02

## 2021-08-01 RX ORDER — NITROGLYCERIN 20 MG/100ML
10-200 INJECTION INTRAVENOUS CONTINUOUS
Status: DISCONTINUED | OUTPATIENT
Start: 2021-08-01 | End: 2021-08-02 | Stop reason: HOSPADM

## 2021-08-01 RX ORDER — MAGNESIUM HYDROXIDE/ALUMINUM HYDROXICE/SIMETHICONE 120; 1200; 1200 MG/30ML; MG/30ML; MG/30ML
30 SUSPENSION ORAL EVERY 4 HOURS PRN
Status: DISCONTINUED | OUTPATIENT
Start: 2021-08-01 | End: 2021-08-02 | Stop reason: HOSPADM

## 2021-08-01 RX ORDER — MORPHINE SULFATE 4 MG/ML
4 INJECTION, SOLUTION INTRAMUSCULAR; INTRAVENOUS ONCE
Status: COMPLETED | OUTPATIENT
Start: 2021-08-01 | End: 2021-08-01

## 2021-08-01 RX ORDER — FLUMAZENIL 0.1 MG/ML
0.2 INJECTION, SOLUTION INTRAVENOUS
Status: DISCONTINUED | OUTPATIENT
Start: 2021-08-01 | End: 2021-08-02

## 2021-08-01 RX ORDER — DEXTROSE MONOHYDRATE 25 G/50ML
25-50 INJECTION, SOLUTION INTRAVENOUS
Status: DISCONTINUED | OUTPATIENT
Start: 2021-08-01 | End: 2021-08-02 | Stop reason: HOSPADM

## 2021-08-01 RX ORDER — LISINOPRIL 5 MG/1
5 TABLET ORAL DAILY
Status: DISCONTINUED | OUTPATIENT
Start: 2021-08-02 | End: 2021-08-02 | Stop reason: HOSPADM

## 2021-08-01 RX ORDER — HEPARIN SODIUM 10000 [USP'U]/100ML
0-5000 INJECTION, SOLUTION INTRAVENOUS CONTINUOUS
Status: DISCONTINUED | OUTPATIENT
Start: 2021-08-01 | End: 2021-08-02 | Stop reason: HOSPADM

## 2021-08-01 RX ORDER — ROSUVASTATIN CALCIUM 40 MG/1
40 TABLET, COATED ORAL DAILY
Status: DISCONTINUED | OUTPATIENT
Start: 2021-08-01 | End: 2021-08-02 | Stop reason: HOSPADM

## 2021-08-01 RX ORDER — IODIXANOL 320 MG/ML
INJECTION, SOLUTION INTRAVASCULAR
Status: DISCONTINUED | OUTPATIENT
Start: 2021-08-01 | End: 2021-08-01 | Stop reason: HOSPADM

## 2021-08-01 RX ORDER — NITROGLYCERIN 0.4 MG/1
0.4 TABLET SUBLINGUAL EVERY 5 MIN PRN
Status: DISCONTINUED | OUTPATIENT
Start: 2021-08-01 | End: 2021-08-02 | Stop reason: HOSPADM

## 2021-08-01 RX ORDER — ACETAMINOPHEN 325 MG/1
650 TABLET ORAL EVERY 6 HOURS PRN
Status: DISCONTINUED | OUTPATIENT
Start: 2021-08-01 | End: 2021-08-02 | Stop reason: HOSPADM

## 2021-08-01 RX ORDER — ACETAMINOPHEN 650 MG/1
650 SUPPOSITORY RECTAL EVERY 6 HOURS PRN
Status: DISCONTINUED | OUTPATIENT
Start: 2021-08-01 | End: 2021-08-02 | Stop reason: HOSPADM

## 2021-08-01 RX ORDER — ASPIRIN 81 MG/1
81 TABLET ORAL DAILY
Status: DISCONTINUED | OUTPATIENT
Start: 2021-08-02 | End: 2021-08-02 | Stop reason: HOSPADM

## 2021-08-01 RX ORDER — FENTANYL CITRATE 50 UG/ML
25 INJECTION, SOLUTION INTRAMUSCULAR; INTRAVENOUS
Status: DISCONTINUED | OUTPATIENT
Start: 2021-08-01 | End: 2021-08-02 | Stop reason: HOSPADM

## 2021-08-01 RX ADMIN — HEPARIN SODIUM 3500 UNITS: 1000 INJECTION INTRAVENOUS; SUBCUTANEOUS at 17:27

## 2021-08-01 RX ADMIN — NITROGLYCERIN 10 MCG/MIN: 20 INJECTION INTRAVENOUS at 23:08

## 2021-08-01 RX ADMIN — TICAGRELOR 180 MG: 90 TABLET ORAL at 17:34

## 2021-08-01 RX ADMIN — INSULIN ASPART 10 UNITS: 100 INJECTION, SOLUTION INTRAVENOUS; SUBCUTANEOUS at 17:38

## 2021-08-01 RX ADMIN — SODIUM CHLORIDE 75 ML/HR: 9 INJECTION, SOLUTION INTRAVENOUS at 21:09

## 2021-08-01 RX ADMIN — HEPARIN SODIUM 650 UNITS/HR: 10000 INJECTION, SOLUTION INTRAVENOUS at 17:25

## 2021-08-01 RX ADMIN — MORPHINE SULFATE 4 MG: 4 INJECTION, SOLUTION INTRAMUSCULAR; INTRAVENOUS at 17:04

## 2021-08-01 RX ADMIN — ROSUVASTATIN CALCIUM 40 MG: 40 TABLET, FILM COATED ORAL at 20:55

## 2021-08-01 RX ADMIN — INSULIN GLARGINE 24 UNITS: 100 INJECTION, SOLUTION SUBCUTANEOUS at 20:54

## 2021-08-01 RX ADMIN — ASPIRIN 325 MG: 325 TABLET ORAL at 17:01

## 2021-08-01 RX ADMIN — HEPARIN SODIUM 650 UNITS/HR: 10000 INJECTION, SOLUTION INTRAVENOUS at 22:35

## 2021-08-01 RX ADMIN — NITROGLYCERIN 15 MG: 20 OINTMENT TOPICAL at 17:12

## 2021-08-01 NOTE — Clinical Note
Hemodynamic equipment used: 5 lead ECG, LIFEPAK With 3 Leads, Calometric ETCO2 device, Machine BP Cuff and pulse oximeter probe.

## 2021-08-01 NOTE — ED PROVIDER NOTES
EMERGENCY DEPARTMENT ENCOUNTER      NAME: Av Fernando  AGE: 61 year old male  YOB: 1960  MRN: [id number]  EVALUATION DATE & TIME: 8/1/2021  4:07 PM    PCP: Garcia Kaufman    ED PROVIDER: Nikki Bartlett M.D.      Chief Complaint   Patient presents with     Shortness of Breath     Syncope         FINAL IMPRESSION:  1. Syncope, unspecified syncope type    2. Chest pain, unspecified type    3. Palpitations    4. Dyspnea, unspecified type    5. ST elevation myocardial infarction (STEMI), unspecified artery (H)    6. Hyperglycemia          ED COURSE & MEDICAL DECISION MAKING:    ED Course as of Aug 01 1733   Sun Aug 01, 2021   1645 Pt with chest pain this week with follow up with [facility] cardiology per him with palpitations and then prodromal syncope on Wednesday with unconscious 25 min at that time per wife and again today, here in ED VS WNL, EKG reassuring. Patietn amenable to plan to CTA r/o PE and labs (CMP, CBC, troponin, BNP) and admit overnight on cardiac tele for serial cardiac reassessments and troponin testing, COVID19 PCR underway to facilitate admission, no leg swelling to suggest DVT and no prior DVT/PE, no CHF history or rales, vaccinated against COVID19. Pt and family amenable. EKG not done in triage, I asked RN to do EKG now      1653 Patient with chest pain with episodes of palpitations leading to prodromal prolonged syncopal events twice this week, EKG concerning with T inversions in inferolateral leads, given ASA/NTG/morphine, on cardiac monitor and currently with NSR iwthout ST anomalies apparent on monitor other than EKG visible T inversions in inferolateral leads and HR 69 with /88, pt given ASA/NTG ointment and morphine for pain, cardiology paged to discuss s ST elevation III but J point approximately normal II and aVF and in V4-v6 thus not perfectly at STEMI criteria but concerning and suggestive      1655 Cardiology paged again with concerning EKG with chest pain      1705  Per lab call, troponin 1.2, and with ongoing pain and EKG ST depressions, heparin begun and cardiology page awaited, pt and family updated      1708 T Eze indicates call STEMI with inferior ST slight elevation III and possibly enough elevation aVF with other changes and story consistent with ACS, STEMI alert called stat and orders placed, charge RN aware and discussing with faisal's RN      1713 [facility] paging stat hospitalist      1714 Per lab, glucose 436, and per patient his glucose has recently been poorly controlled, insulin administered for tight glycemic control with STEMI      1719 I spoke with interventional cardiology who is en route to take pt to cath lab now      1725 Pt endorsed to hospitalist to cardiac tele          Pertinent Labs & Imaging studies reviewed. (See chart for details)    4:41 PM I met with the patient, obtained history, performed an initial exam, and discussed options and plan for diagnostics and treatment here in the ED, wearing a surgical mask.    At the conclusion of the encounter I discussed the results of all of the tests and the disposition. The questions were answered. The patient or family acknowledged understanding and was agreeable with the care plan.     Critical Care time was 35 minutes for this patient excluding procedures.      MEDICATIONS GIVEN IN THE EMERGENCY:  Medications   nitroGLYcerin (NITRO-BID) 2 % ointment 15 mg (15 mg Transdermal Patch/Med Applied 8/1/21 1712)   heparin 25,000 units in 0.45% NaCl 250 mL ANTICOAGULANT infusion (650 Units/hr Intravenous New Bag 8/1/21 1725)   ticagrelor (BRILINTA) tablet 180 mg (has no administration in time range)   insulin aspart (NovoLOG) injection (RAPID ACTING) (has no administration in time range)   aspirin (ASA) tablet 325 mg (325 mg Oral Given 8/1/21 1701)   morphine (PF) injection 4 mg (4 mg Intravenous Given 8/1/21 1704)   heparin (porcine) injection 1000 units/mL (rounds in 500 unit increments) (3,500 Units Intravenous  "Given 8/1/21 5135)       NEW PRESCRIPTIONS STARTED AT TODAY'S ER VISIT  New Prescriptions    No medications on file          =================================================================    HPI      Av Fernando is a 61 year old male with PMHx of hyperlipidemia, diabetes mellitus (type II), peripheral vascular disease, remote brain tumor who presents to the ED today via private car with shortness of breath and syncope.     For the past week, patient has been experiencing ongoing 7-8/10 upper middle pressured chest pain, having two syncopal episodes. Before each episode, patient was outside gardening, when he had a sudden onset of cloudy vision, lightheaded dizziness, heart racing sensation and chest tightness before lowering himself to the ground, secondary to syncope. Patient denies head trauma from either syncopal episodes. Per patient's family members, patient had been unconscious for about  20-25 minutes after each syncopal episode. On Saturday, patient had a heard time breathing, secondary to chest pressure. He states that after eating food, he feels irritating abdominal discomfort and the sensation that \"something is stuck in his chest\" He reports that when he coughs hard, this minimally relieves his chest pressure. Patient is vaccinated for COVID.     Denies past history of syncope. Denies past history of blood clots. Denies use of blood thinners. Denies history of smoking. Denies fever, abdominal pain, nausea, vomiting, diarrhea, urinary symptoms including hematuria, dysuria, frequency, urgency or any changes in bowel movement habits, rashes, or any other symptoms at this time.         REVIEW OF SYSTEMS   All other systems reviewed and are negative except as noted above in HPI.    PAST MEDICAL HISTORY:  Past Medical History:   Diagnosis Date     Diabetes mellitus (H)        PAST SURGICAL HISTORY:  Past Surgical History:   Procedure Laterality Date     OTHER SURGICAL HISTORY      LEG TRAUMA       CURRENT " "MEDICATIONS:    ACCU-CHEK SOFTCLIX LANCETS lancets  aspirin 81 mg chewable tablet  BD ULTRA-FINE JERSON PEN NEEDLES 32 gauge x 5/32\" Ndle  blood glucose meter (GLUCOMETER)  blood glucose test (ACCU-CHEK SMARTVIEW TEST STRIP) strips  flash glucose scanning reader (FREESTYLE KATIE 14 DAY READER) Misc  flash glucose sensor (FREESTYLE KATIE 14 DAY SENSOR) Kit  insulin lispro (HUMALOG KWIKPEN INSULIN) 100 unit/mL pen  LANTUS SOLOSTAR U-100 INSULIN 100 unit/mL (3 mL) pen  lisinopriL (PRINIVIL,ZESTRIL) 5 MG tablet  metFORMIN (GLUCOPHAGE) 1000 MG tablet        ALLERGIES:  Allergies   Allergen Reactions     Januvia [Sitagliptin] Unknown     HEADACHE     Trulicity [Dulaglutide] Dizziness     Weak and muscle weak       FAMILY HISTORY:  History reviewed. No pertinent family history.    SOCIAL HISTORY:   Social History     Socioeconomic History     Marital status:      Spouse name: Not on file     Number of children: Not on file     Years of education: Not on file     Highest education level: Not on file   Occupational History     Not on file   Tobacco Use     Smoking status: Never Smoker     Smokeless tobacco: Never Used   Substance and Sexual Activity     Alcohol use: No     Drug use: No     Sexual activity: Yes     Partners: Female     Birth control/protection: None   Other Topics Concern     Not on file   Social History Narrative     Not on file     Social Determinants of Health     Financial Resource Strain:      Difficulty of Paying Living Expenses:    Food Insecurity:      Worried About Running Out of Food in the Last Year:      Ran Out of Food in the Last Year:    Transportation Needs:      Lack of Transportation (Medical):      Lack of Transportation (Non-Medical):    Physical Activity:      Days of Exercise per Week:      Minutes of Exercise per Session:    Stress:      Feeling of Stress :    Social Connections:      Frequency of Communication with Friends and Family:      Frequency of Social Gatherings with " Friends and Family:      Attends [employer] Services:      Active Member of Clubs or Organizations:      Attends Club or Organization Meetings:      Marital Status:    Intimate Partner Violence:      Fear of Current or Ex-Partner:      Emotionally Abused:      Physically Abused:      Sexually Abused:        VITALS:  Patient Vitals for the past 24 hrs:   BP Temp Temp src Pulse Resp SpO2 Weight   08/01/21 1715 -- -- -- 69 25 99 % --   08/01/21 1700 (!) 148/95 -- -- 72 16 100 % --   08/01/21 1645 -- -- -- 65 23 100 % --   08/01/21 1630 134/88 -- -- 66 -- 100 % --   08/01/21 1549 119/71 -- -- 64 16 98 % --   08/01/21 1433 126/82 97.7  F (36.5  C) Tympanic 68 18 98 % 53.1 kg (117 lb)       PHYSICAL EXAM    GENERAL: Awake, alert.  In no acute distress.   HEENT: Normocephalic, atraumatic.  Pupils equal, round and reactive.  Conjunctiva normal.  EOMI.  NECK: No stridor or apparent deformity.  PULMONARY: Symmetrical breath sounds without distress.  Lungs clear to auscultation bilaterally without wheezes, rhonchi or rales.  CARDIO: Regular rate and rhythm.  No significant murmur, rub or gallop.  Radial pulses strong and symmetrical.  ABDOMINAL: Abdomen soft, non-distended and non-tender to palpation.  No CVAT, no palpable hepatosplenomegaly.  EXTREMITIES: No lower extremity swelling or edema.    NEURO: Alert and oriented to person, place and time.  Cranial nerves grossly intact.  No focal motor deficit.  PSYCH: Normal mood and affect  SKIN: No rashes      LAB:  All pertinent labs reviewed and interpreted.  Results for orders placed or performed during the hospital encounter of 08/01/21   Comprehensive metabolic panel   Result Value Ref Range    Sodium 136 136 - 145 mmol/L    Potassium 4.7 3.5 - 5.0 mmol/L    Chloride 102 98 - 107 mmol/L    Carbon Dioxide (CO2) 25 22 - 31 mmol/L    Anion Gap 9 5 - 18 mmol/L    Urea Nitrogen 14 8 - 22 mg/dL    Creatinine 1.30 0.70 - 1.30 mg/dL    Calcium 9.5 8.5 - 10.5 mg/dL    Glucose 436 (HH)  70 - 125 mg/dL    Alkaline Phosphatase 94 45 - 120 U/L    AST 51 (H) 0 - 40 U/L    ALT 21 0 - 45 U/L    Protein Total 7.8 6.0 - 8.0 g/dL    Albumin 3.5 3.5 - 5.0 g/dL    Bilirubin Total 0.6 0.0 - 1.0 mg/dL    GFR Estimate 59 (L) >60 mL/min/1.73m2   Result Value Ref Range    Troponin I 1.27 () 0.00 - 0.29 ng/mL   Result Value Ref Range    Magnesium 2.1 1.8 - 2.6 mg/dL   B-Type Natriuretic Peptide ([facility] Only)   Result Value Ref Range     (H) 0 - 53 pg/mL   CBC with platelets and differential   Result Value Ref Range    WBC Count 7.9 4.0 - 11.0 10e3/uL    RBC Count 4.02 (L) 4.40 - 5.90 10e6/uL    Hemoglobin 13.1 (L) 13.3 - 17.7 g/dL    Hematocrit 37.6 (L) 40.0 - 53.0 %    MCV 94 78 - 100 fL    MCH 32.6 26.5 - 33.0 pg    MCHC 34.8 31.5 - 36.5 g/dL    RDW 12.2 10.0 - 15.0 %    Platelet Count 186 150 - 450 10e3/uL    % Neutrophils 70 %    % Lymphocytes 24 %    % Monocytes 6 %    % Eosinophils 0 %    % Basophils 0 %    % Immature Granulocytes 0 %    NRBCs per 100 WBC 0 <1 /100    Absolute Neutrophils 5.5 1.6 - 8.3 10e3/uL    Absolute Lymphocytes 1.9 0.8 - 5.3 10e3/uL    Absolute Monocytes 0.5 0.0 - 1.3 10e3/uL    Absolute Eosinophils 0.0 0.0 - 0.7 10e3/uL    Absolute Basophils 0.0 0.0 - 0.2 10e3/uL    Absolute Immature Granulocytes 0.0 <=0.0 10e3/uL    Absolute NRBCs 0.0 10e3/uL   Result Value Ref Range    INR 0.96 0.90 - 1.15       RADIOLOGY:  Reviewed all pertinent imaging. Please see official radiology report.  XR Chest Port 1 View    (Results Pending)         EKG:    Reviewed and interpreted as: 01-Aug-2021 16:47:31. Vent. Rate: 65 BPM. Sinus rhythm. ST & T wave abnormality, consider inferolateral ischemia. Abnormal ECG. No previous ECGs available.       I have independently reviewed and interpreted the EKG(s) documented above.        I, Sara Behmanesh , am serving as a scribe to document services personally performed by Dr. Nikki Bartlett based on my observation and the provider's statements to me. INikki  MD Tri attest that Sara Behmanesh  is acting in a scribe capacity, has observed my performance of the services and has documented them in accordance with my direction.       Nikki Bartlett MD  08/01/21 1737

## 2021-08-01 NOTE — CONSULTS
HEART CARE CONSULTATON NOTE        Assessment/Recommendations   Assessment/Plan:  Coronary artery disease: Urgent coronary angiography showing severe multivessel coronary artery disease, details per cath report.  Given multivessel disease including proximal to mid LAD involvement, cardiomyopathy and diabetes, as well as the absence of chest pain at the end of the angiogram, no intervention was performed, and a surgical consultation will be obtained.    Ischemic cardiomyopathy: Left ventriculography showing severe left ventricular dysfunction, with an ejection fraction of 25%.  Echocardiogram will be obtained to confirm LVEF.  Will be started on beta-blockade and ACE inhibition, based on ability to tolerate them in terms of hemodynamics    Arrhythmia: His prior syncopal events may have been episodes of VT/V. fib with spontaneous recovery.  Monitor closely on telemetry.       History of Present Illness/Subjective    HPI: Av Fernando is a 61 year old male with no documented prior cardiac history who was brought by his family to the emergency department with complaints of chest discomfort and shortness of breath this afternoon.    The patient has a history of hypertension, dyslipidemia, diabetes mellitus type 2, peripheral vascular disease, remote brain tumor who describes approximately 1 week of 7-8 out of 10 chest pressure along with two syncopal episodes.  It appears that each of the syncopal episodes occurred with activity or exertion and was preceded by a sensation of cloudy vision, lightheadedness, dizziness and palpitations.  According to family the patient will take about 20 to 30 minutes to recover full consciousness.    Today in the ER he continued to complain of 3-4 out of 10 chest pain.  An EKG shows sinus rhythm with inferolateral current of injury in lead III and V6.       Physical Examination  Review of Systems   VITALS: /79   Pulse 67   Temp 97.7  F (36.5  C) (Tympanic)   Resp 17   Wt 53.1 kg  (117 lb)   SpO2 98%   BMI 20.73 kg/m    BMI: Body mass index is 20.73 kg/m .  Wt Readings from Last 3 Encounters:   08/01/21 53.1 kg (117 lb)   05/12/21 53.1 kg (117 lb)   07/20/20 54.4 kg (120 lb)     No intake or output data in the 24 hours ending 08/01/21 1807  General Appearance:   no distress, normal body habitus   ENT/Mouth: membranes moist, no oral lesions or bleeding gums.      EYES:  no scleral icterus, normal conjunctivae   Neck: no carotid bruits or thyromegaly   Chest/Lungs:   lungs are clear to auscultation, no rales or wheezing,   Cardiovascular:   Regular. Normal first and second heart sounds, radial and posterior tibial pulses are intact, , no edema bilaterally    Abdomen:  no organomegaly, masses, bruits, or tenderness; bowel sounds are present   Extremities: no cyanosis or clubbing   Skin: no xanthelasma, warm.    Neurologic: normal  bilateral, no tremors     Psychiatric: alert and oriented x3, calm     Review Of Systems negative except as noted in the HPI          Lab Results    Chemistry/lipid CBC Cardiac Enzymes/BNP/TSH/INR   Recent Labs   Lab Test 05/12/21  0934   CHOL 240*   HDL 55   *   TRIG 131     Recent Labs   Lab Test 05/12/21  0934 03/13/17  1719 06/30/16  1516   * 160* 136*     Recent Labs   Lab Test 08/01/21  1637      POTASSIUM 4.7   CHLORIDE 102   CO2 25   *   BUN 14   CR 1.30   GFRESTIMATED 59*   LISA 9.5     Recent Labs   Lab Test 08/01/21  1637 05/12/21  0934 07/20/20  1627   CR 1.30 1.05 1.17     Recent Labs   Lab Test 05/12/21  0934 07/20/20  1627 11/12/19  1609   A1C 12.5* 13.4* 13.0*          Recent Labs   Lab Test 08/01/21  1600   WBC 7.9   HGB 13.1*   HCT 37.6*   MCV 94        Recent Labs   Lab Test 08/01/21  1600 07/20/20  1627 02/26/18  1600   HGB 13.1* 13.9* 12.1*    Recent Labs   Lab Test 08/01/21  1600   TROPONINI 1.27*     Recent Labs   Lab Test 08/01/21  1600   *     No results for input(s): TSH in the last 88390  hours.  Recent Labs   Lab Test 08/01/21  1637   INR 0.96        Medical History  Surgical History Family History Social History   Past Medical History:   Diagnosis Date     Diabetes mellitus (H)      Past Surgical History:   Procedure Laterality Date     OTHER SURGICAL HISTORY      LEG TRAUMA     History reviewed. No pertinent family history.     Social History     Socioeconomic History     Marital status:      Spouse name: Not on file     Number of children: Not on file     Years of education: Not on file     Highest education level: Not on file   Occupational History     Not on file   Tobacco Use     Smoking status: Never Smoker     Smokeless tobacco: Never Used   Substance and Sexual Activity     Alcohol use: No     Drug use: No     Sexual activity: Yes     Partners: Female     Birth control/protection: None   Other Topics Concern     Not on file   Social History Narrative     Not on file     Social Determinants of Health     Financial Resource Strain:      Difficulty of Paying Living Expenses:    Food Insecurity:      Worried About Running Out of Food in the Last Year:      Ran Out of Food in the Last Year:    Transportation Needs:      Lack of Transportation (Medical):      Lack of Transportation (Non-Medical):    Physical Activity:      Days of Exercise per Week:      Minutes of Exercise per Session:    Stress:      Feeling of Stress :    Social Connections:      Frequency of Communication with Friends and Family:      Frequency of Social Gatherings with Friends and Family:      Attends Church Services:      Active Member of Clubs or Organizations:      Attends Club or Organization Meetings:      Marital Status:    Intimate Partner Violence:      Fear of Current or Ex-Partner:      Emotionally Abused:      Physically Abused:      Sexually Abused:          Medications  Allergies   No current outpatient medications on file.        Allergies   Allergen Reactions     Januvia [Sitagliptin] Unknown      HEADACHE     Trulicity [Dulaglutide] Dizziness     Weak and muscle weak         Deidre Lopes MD

## 2021-08-01 NOTE — ED NOTES
Patient now taken to cath lab. Report given nurses.  Patient presented to the ED with intermittent chest pain since Wednesday and also some syncopal episodes.  Patient does have a cardiac history, is diabetic and takes insulin. Family escorted to the waiting room while patient is in cath lab.

## 2021-08-01 NOTE — ED TRIAGE NOTES
Patient arrives by private car with his son for evaluation of shortness of breath for 1 week and syncopal episode x 2.  Patient reports he had 1st syncopal episode on Wednesday after gardening and 2nd episode was this am when he was gardening.

## 2021-08-02 ENCOUNTER — APPOINTMENT (OUTPATIENT)
Dept: CARDIOLOGY | Facility: HOSPITAL | Age: 61
DRG: 282 | End: 2021-08-02
Attending: INTERNAL MEDICINE
Payer: MEDICARE

## 2021-08-02 VITALS
BODY MASS INDEX: 21.66 KG/M2 | TEMPERATURE: 98 F | DIASTOLIC BLOOD PRESSURE: 80 MMHG | HEART RATE: 77 BPM | WEIGHT: 122.3 LBS | SYSTOLIC BLOOD PRESSURE: 133 MMHG | RESPIRATION RATE: 28 BRPM | OXYGEN SATURATION: 97 %

## 2021-08-02 LAB
ANION GAP SERPL CALCULATED.3IONS-SCNC: 5 MMOL/L (ref 5–18)
BUN SERPL-MCNC: 12 MG/DL (ref 8–22)
CALCIUM SERPL-MCNC: 8.8 MG/DL (ref 8.5–10.5)
CHLORIDE BLD-SCNC: 111 MMOL/L (ref 98–107)
CHOLEST SERPL-MCNC: 176 MG/DL
CO2 SERPL-SCNC: 28 MMOL/L (ref 22–31)
CREAT SERPL-MCNC: 0.96 MG/DL (ref 0.7–1.3)
ERYTHROCYTE [DISTWIDTH] IN BLOOD BY AUTOMATED COUNT: 12.5 % (ref 10–15)
FASTING STATUS PATIENT QL REPORTED: NORMAL
GFR SERPL CREATININE-BSD FRML MDRD: 85 ML/MIN/1.73M2
GLUCOSE BLD-MCNC: 69 MG/DL (ref 70–125)
GLUCOSE BLDC GLUCOMTR-MCNC: 106 MG/DL (ref 70–125)
GLUCOSE BLDC GLUCOMTR-MCNC: 111 MG/DL (ref 70–125)
GLUCOSE BLDC GLUCOMTR-MCNC: 78 MG/DL (ref 70–125)
HBA1C MFR BLD: 10.4 %
HCT VFR BLD AUTO: 35.7 % (ref 40–53)
HDLC SERPL-MCNC: 53 MG/DL
HGB BLD-MCNC: 12.1 G/DL (ref 13.3–17.7)
LDLC SERPL CALC-MCNC: 109 MG/DL
LVEF ECHO: NORMAL
MCH RBC QN AUTO: 32.1 PG (ref 26.5–33)
MCHC RBC AUTO-ENTMCNC: 33.9 G/DL (ref 31.5–36.5)
MCV RBC AUTO: 95 FL (ref 78–100)
PLATELET # BLD AUTO: 164 10E3/UL (ref 150–450)
POTASSIUM BLD-SCNC: 4.9 MMOL/L (ref 3.5–5)
RBC # BLD AUTO: 3.77 10E6/UL (ref 4.4–5.9)
SODIUM SERPL-SCNC: 144 MMOL/L (ref 136–145)
TRIGL SERPL-MCNC: 70 MG/DL
TROPONIN I SERPL-MCNC: 39.19 NG/ML (ref 0–0.29)
UFH PPP CHRO-ACNC: 0.4 IU/ML
UFH PPP CHRO-ACNC: 0.41 IU/ML
WBC # BLD AUTO: 9.9 10E3/UL (ref 4–11)

## 2021-08-02 PROCEDURE — 99233 SBSQ HOSP IP/OBS HIGH 50: CPT | Performed by: HOSPITALIST

## 2021-08-02 PROCEDURE — 93306 TTE W/DOPPLER COMPLETE: CPT

## 2021-08-02 PROCEDURE — 36415 COLL VENOUS BLD VENIPUNCTURE: CPT | Performed by: INTERNAL MEDICINE

## 2021-08-02 PROCEDURE — 85520 HEPARIN ASSAY: CPT | Performed by: INTERNAL MEDICINE

## 2021-08-02 PROCEDURE — 250N000013 HC RX MED GY IP 250 OP 250 PS 637: Performed by: INTERNAL MEDICINE

## 2021-08-02 PROCEDURE — 80061 LIPID PANEL: CPT | Performed by: INTERNAL MEDICINE

## 2021-08-02 PROCEDURE — 83036 HEMOGLOBIN GLYCOSYLATED A1C: CPT | Performed by: INTERNAL MEDICINE

## 2021-08-02 PROCEDURE — 85027 COMPLETE CBC AUTOMATED: CPT | Performed by: INTERNAL MEDICINE

## 2021-08-02 PROCEDURE — 93306 TTE W/DOPPLER COMPLETE: CPT | Mod: 26 | Performed by: INTERNAL MEDICINE

## 2021-08-02 PROCEDURE — 84484 ASSAY OF TROPONIN QUANT: CPT | Performed by: INTERNAL MEDICINE

## 2021-08-02 PROCEDURE — 80048 BASIC METABOLIC PNL TOTAL CA: CPT | Performed by: INTERNAL MEDICINE

## 2021-08-02 PROCEDURE — 99233 SBSQ HOSP IP/OBS HIGH 50: CPT | Performed by: INTERNAL MEDICINE

## 2021-08-02 RX ORDER — ROSUVASTATIN CALCIUM 40 MG/1
40 TABLET, COATED ORAL DAILY
Qty: 30 TABLET | Refills: 0 | Status: SHIPPED | OUTPATIENT
Start: 2021-08-03 | End: 2021-09-14

## 2021-08-02 RX ORDER — CLOPIDOGREL BISULFATE 75 MG/1
150 TABLET ORAL ONCE
Status: COMPLETED | OUTPATIENT
Start: 2021-08-02 | End: 2021-08-02

## 2021-08-02 RX ORDER — CLOPIDOGREL BISULFATE 75 MG/1
75 TABLET ORAL DAILY
Qty: 30 TABLET | Refills: 0 | Status: SHIPPED | OUTPATIENT
Start: 2021-08-03 | End: 2021-09-14

## 2021-08-02 RX ORDER — CLOPIDOGREL BISULFATE 75 MG/1
75 TABLET ORAL DAILY
Status: DISCONTINUED | OUTPATIENT
Start: 2021-08-03 | End: 2021-08-02 | Stop reason: HOSPADM

## 2021-08-02 RX ORDER — NITROGLYCERIN 0.4 MG/1
TABLET SUBLINGUAL
Qty: 10 TABLET | Refills: 0 | Status: SHIPPED | OUTPATIENT
Start: 2021-08-02 | End: 2021-09-14

## 2021-08-02 RX ADMIN — CLOPIDOGREL BISULFATE 150 MG: 75 TABLET ORAL at 20:51

## 2021-08-02 RX ADMIN — ROSUVASTATIN CALCIUM 40 MG: 40 TABLET, FILM COATED ORAL at 08:24

## 2021-08-02 RX ADMIN — ASPIRIN 81 MG: 81 TABLET, COATED ORAL at 08:23

## 2021-08-02 ASSESSMENT — ACTIVITIES OF DAILY LIVING (ADL): DEPENDENT_IADLS:: CLEANING;COOKING;LAUNDRY;SHOPPING;MEAL PREPARATION

## 2021-08-02 NOTE — H&P
ADMISSION HISTORY & PHYSICAL    CODE STATUS:  Full Code    Garcia Kaufman, [phone]    Patient YOB: 1960  Admit date: 8/1/2021  Date of Service: 8/1/2021    ASSESSMENT AND PLAN:  61 year old male with past medical history of type II DM, essential hypertension, hyperlipidemia, brain tumor who presented to ED for evaluation of intermittent chest pain, palpitation, episode of syncope, found to have ST depression MI, emergently coronary angiogram, reported multivessel disease and admitted for cardiothoracic surgery consultation and further management    STEMI;  --Emergent coronary angiogram, reported multivessel disease, at the time of this H&P interventional cardiologist has not placed consult note or dictated coronary angiogram result  --Continue heparin drip, aspirin, Crestor, as needed nitro, if recurrent chest pain then will consider nitro drip  --Cardiology consulted  --Cardiothoracic surgery consulted    Type II DM, uncontrolled with hyperglycemia;  --Recent A1c 12.5 on 5/12/2021  Patient reports at home taking Metformin, NovoLog 10 units with each meal and Lantus 25 units at bedtime.  --On presentation to ED blood sugar more than 400  --Hold Metformin while inpatient  --Resume home Lantus.  --Insulin sliding scale and hypoglycemic protocol  --Hold Metformin while inpatient    Probable mild ELIZABETH versus progressive CKD;  --On IV fluid for post coronary angiogram protocol  --Monitor intake/output, weight, labs closely.    Essential hypertension; controlled  --Home medication includes lisinopril, resume once verified by pharmacy  --Defer to cardiology team for beta-blocker  --Monitor vitals closely    History of brain tumor;  --Reviewed previous imaging, MRI brain from 6/2016 reported 1.6 cm rim-enhancing lesion within lateral aspect of left postcentral gyrus, stable in appearance dating back to 10/2013  --Currently denied headache, focal weakness      DVT prophylaxis; on heparin drip for other  medical reason      Disposition:  -Anticipated Length of Stay in midnights and medical necessity (including a midnight in the Emergency Department after triage if applicable): >2      CHIEF COMPLAINT:  Chest pain, syncope    HISTORY OF PRESENTING ILLNESS:  Patient is a 61 year old male with PMH significant for  type II DM, essential hypertension, hyperlipidemia, brain tumor who presented to ED for evaluation of intermittent chest pain, palpitation, episode of syncope, found to have ST depression MI, emergently coronary angiogram, reported multivessel disease and admitted for cardiothoracic surgery consultation and further management.    As per available information, patient reported having intermittent mid chest pressure/discomfort, when severe pain is scale 7-8/10 since last 1 week.  Last Saturday, after eating food, felt irritation in her upper abdomen moving to his upper chest, felt something stuck in his chest, reported hard time breathing and chest discomfort, reported when he coughs hard that helps to relieve his chest pressure.  On Admission, patient was outside gardening when he suddenly felt lightheaded, dizziness, heart racing, cloudy vision then later he passed out, reported patient's wife was with him and no reported fall.   Patient denied recent febrile illness.  Denied cough or congestion.  Denied abdomen pain, nausea, vomiting, diarrhea.  Denied urinary symptoms.  Denied headache or focal weakness.  Denied family history of CAD.  Denied smoking or drinking.  Patient does report that his blood sugar has been running high lately.    Currently patient reports chest pain/discomfort continue to improve, currently 3/10 in intensity on mid chest, no radiation, no other associated symptoms.  Currently on a clear liquid diet per intervention cardiologist ordered.  Cardiothoracic surgery team discussed with patient nurse about what to do overnight if any acute issues arises and they will see him tomorrow  morning    PMH/PSH:  Patient Active Problem List   Diagnosis     Peripheral Neuropathy     Renal Insufficiency     Corns     Arthralgias In Multiple Sites     Delayed Post-traumatic Stress Disorder     Brain Tumor     Hypercholesterolemia     Carpal Tunnel Syndrome     Type 2 diabetes mellitus with microalbuminuria, with long-term current use of insulin (H)     Cervical radiculopathy at C6     Vitamin D deficiency     Brachial neuritis or radiculitis     Peripheral vascular disease (H)     Palpitations     ST elevation myocardial infarction (STEMI), unspecified artery (H)     Syncope, unspecified syncope type     Dyspnea, unspecified type     Chest pain, unspecified type     Status post coronary angiogram       Past Medical History:   Diagnosis Date     Diabetes mellitus (H)         Past Surgical History:   Procedure Laterality Date     OTHER SURGICAL HISTORY      LEG TRAUMA          ALLERGIES:  Allergies   Allergen Reactions     Januvia [Sitagliptin] Unknown     HEADACHE     Trulicity [Dulaglutide] Dizziness     Weak and muscle weak       MEDICATIONS:  Reviewed.  Current Facility-Administered Medications   Medication     0.9% sodium chloride BOLUS     acetaminophen (TYLENOL) tablet 650 mg    Or     acetaminophen (TYLENOL) Suppository 650 mg     acetaminophen (TYLENOL) tablet 650 mg     alum & mag hydroxide-simethicone (MAALOX) suspension 30 mL     [START ON 8/2/2021] aspirin EC tablet 81 mg     atropine injection 0.5 mg     bisacodyl (DULCOLAX) Suppository 10 mg     glucose gel 15-30 g    Or     dextrose 50 % injection 25-50 mL    Or     glucagon injection 1 mg     fentaNYL (PF) (SUBLIMAZE) injection 25 mcg     flumazenil (ROMAZICON) injection 0.2 mg     heparin 25,000 units in 0.45% NaCl 250 mL ANTICOAGULANT infusion     HOLD:  Metformin and metformin containing medications if patient received IV contrast with acute kidney injury or severe chronic kidney disease (stage IV or stage V; i.e., eGFR less than 30)      hydrALAZINE (APRESOLINE) injection 10 mg     insulin aspart (NovoLOG) injection (RAPID ACTING)     insulin glargine (LANTUS PEN) injection 24 Units     lidocaine (LMX4) cream     lidocaine 1 % 0.1-1 mL     magnesium hydroxide (MILK OF MAGNESIA) suspension 30 mL     melatonin tablet 3 mg     midazolam (VERSED) injection 0.5 mg     naloxone (NARCAN) injection 0.2 mg    Or     naloxone (NARCAN) injection 0.4 mg    Or     naloxone (NARCAN) injection 0.2 mg    Or     naloxone (NARCAN) injection 0.4 mg     nitroGLYcerin (NITRO-BID) 2 % ointment 15 mg     ondansetron (ZOFRAN-ODT) ODT tab 4 mg    Or     ondansetron (ZOFRAN) injection 4 mg     oxyCODONE (ROXICODONE) tablet 5 mg    Or     oxyCODONE (ROXICODONE) tablet 10 mg     Patient is already receiving anticoagulation with heparin, enoxaparin (LOVENOX), warfarin (COUMADIN)  or other anticoagulant medication     polyethylene glycol (MIRALAX) Packet 17 g     polyethylene glycol (MIRALAX) Packet 17 g     rosuvastatin (CRESTOR) tablet 40 mg     sodium chloride (PF) 0.9% PF flush 3 mL     sodium chloride (PF) 0.9% PF flush 3 mL     sodium chloride 0.9% infusion       Current Facility-Administered Medications:      0.9% sodium chloride BOLUS, 250 mL, Intravenous, Once PRN, Jerrell TANNER MD     acetaminophen (TYLENOL) tablet 650 mg, 650 mg, Oral, Q6H PRN **OR** acetaminophen (TYLENOL) Suppository 650 mg, 650 mg, Rectal, Q6H PRN, Jerrell TANNER MD     acetaminophen (TYLENOL) tablet 650 mg, 650 mg, Oral, Q4H PRN, Jerrell TANNER MD     alum & mag hydroxide-simethicone (MAALOX) suspension 30 mL, 30 mL, Oral, Q4H PRN, Jerrell TANNER MD     [START ON 8/2/2021] aspirin EC tablet 81 mg, 81 mg, Oral, Daily, Jerrell TANNER MD     atropine injection 0.5 mg, 0.5 mg, Intravenous, Once PRN, Jerrell TANNER MD     bisacodyl (DULCOLAX) Suppository 10 mg, 10 mg, Rectal, Daily PRN, Jerrell TANNER MD     glucose gel 15-30 g, 15-30 g, Oral, Q15 Min PRN **OR** dextrose 50 % injection 25-50 mL, 25-50 mL, Intravenous, Q15 Min  PRN **OR** glucagon injection 1 mg, 1 mg, Subcutaneous, Q15 Min PRN, Jerrell TANNER MD     fentaNYL (PF) (SUBLIMAZE) injection 25 mcg, 25 mcg, Intravenous, Q15 Min PRN, Jerrell TANNER MD     flumazenil (ROMAZICON) injection 0.2 mg, 0.2 mg, Intravenous, q1 min prn, Jerrell TANNER MD     heparin 25,000 units in 0.45% NaCl 250 mL ANTICOAGULANT infusion, 0-5,000 Units/hr, Intravenous, Continuous, Jerrell TANNER MD     HOLD:  Metformin and metformin containing medications if patient received IV contrast with acute kidney injury or severe chronic kidney disease (stage IV or stage V; i.e., eGFR less than 30), , Does not apply, HOLD, Jerrell TANNER MD     hydrALAZINE (APRESOLINE) injection 10 mg, 10 mg, Intravenous, Once PRN, Jerrell TANNER MD     insulin aspart (NovoLOG) injection (RAPID ACTING), 1-12 Units, Subcutaneous, Q4H, Jerrell TANNER MD     insulin glargine (LANTUS PEN) injection 24 Units, 24 Units, Subcutaneous, At Bedtime, Jerrell TANNER MD     lidocaine (LMX4) cream, , Topical, Q1H PRN, Jerrell TANNER MD     lidocaine 1 % 0.1-1 mL, 0.1-1 mL, Other, Q1H PRN, Jerrell TANNER MD     magnesium hydroxide (MILK OF MAGNESIA) suspension 30 mL, 30 mL, Oral, Daily PRN, Jerrell TANNER MD     melatonin tablet 3 mg, 3 mg, Oral, At Bedtime PRN, Jerrell TANNER MD     midazolam (VERSED) injection 0.5 mg, 0.5 mg, Intravenous, Q5 Min PRN, Jerrell TANNER MD     naloxone (NARCAN) injection 0.2 mg, 0.2 mg, Intravenous, Q2 Min PRN **OR** naloxone (NARCAN) injection 0.4 mg, 0.4 mg, Intravenous, Q2 Min PRN **OR** naloxone (NARCAN) injection 0.2 mg, 0.2 mg, Intramuscular, Q2 Min PRN **OR** naloxone (NARCAN) injection 0.4 mg, 0.4 mg, Intramuscular, Q2 Min PRN, Jerrell TANNER MD     nitroGLYcerin (NITRO-BID) 2 % ointment 15 mg, 1 inch, Transdermal, Once, Nikki Bartlett MD, 15 mg at 08/01/21 1712     ondansetron (ZOFRAN-ODT) ODT tab 4 mg, 4 mg, Oral, Q6H PRN **OR** ondansetron (ZOFRAN) injection 4 mg, 4 mg, Intravenous, Q6H PRN, Jerrell TANNER MD     oxyCODONE (ROXICODONE) tablet 5 mg, 5 mg, Oral,  Q4H PRN **OR** oxyCODONE (ROXICODONE) tablet 10 mg, 10 mg, Oral, Q4H PRN, Jerrell TANNER MD     Patient is already receiving anticoagulation with heparin, enoxaparin (LOVENOX), warfarin (COUMADIN)  or other anticoagulant medication, , Does not apply, Continuous PRN, Jerrell TANNER MD     polyethylene glycol (MIRALAX) Packet 17 g, 17 g, Oral, Daily PRN, Jerrell TANNER MD     polyethylene glycol (MIRALAX) Packet 17 g, 17 g, Oral, Daily PRN, Jerrell TANNER MD     rosuvastatin (CRESTOR) tablet 40 mg, 40 mg, Oral, Daily, Jerrell TANNER MD     sodium chloride (PF) 0.9% PF flush 3 mL, 3 mL, Intracatheter, Q8H, Jerrell TANNER MD     sodium chloride (PF) 0.9% PF flush 3 mL, 3 mL, Intracatheter, q1 min prn, Jerrell TANNER MD     sodium chloride 0.9% infusion, 75 mL/hr, Intravenous, Continuous, Jerrell TANNER MD   Scheduled Meds:    [START ON 8/2/2021] aspirin  81 mg Oral Daily     insulin aspart  1-12 Units Subcutaneous Q4H     insulin glargine  24 Units Subcutaneous At Bedtime     nitroGLYcerin  1 inch Transdermal Once     rosuvastatin  40 mg Oral Daily     sodium chloride (PF)  3 mL Intracatheter Q8H     Continuous Infusions:    heparin       - MEDICATION INSTRUCTIONS -       sodium chloride       PRN Meds:.    SOCIAL HISTORY:  Social History     Socioeconomic History     Marital status:      Spouse name: Not on file     Number of children: Not on file     Years of education: Not on file     Highest education level: Not on file   Occupational History     Not on file   Tobacco Use     Smoking status: Never Smoker     Smokeless tobacco: Never Used   Substance and Sexual Activity     Alcohol use: No     Drug use: No     Sexual activity: Yes     Partners: Female     Birth control/protection: None   Other Topics Concern     Not on file   Social History Narrative     Not on file     Social Determinants of Health     Financial Resource Strain:      Difficulty of Paying Living Expenses:    Food Insecurity:      Worried About Running Out of Food in the Last  Year:      Ran Out of Food in the Last Year:    Transportation Needs:      Lack of Transportation (Medical):      Lack of Transportation (Non-Medical):    Physical Activity:      Days of Exercise per Week:      Minutes of Exercise per Session:    Stress:      Feeling of Stress :    Social Connections:      Frequency of Communication with Friends and Family:      Frequency of Social Gatherings with Friends and Family:      Attends Yarsani Services:      Active Member of Clubs or Organizations:      Attends Club or Organization Meetings:      Marital Status:    Intimate Partner Violence:      Fear of Current or Ex-Partner:      Emotionally Abused:      Physically Abused:      Sexually Abused:        FAMILY HISTORY:  Denied family history of CAD    ROS:  12 point review of systems reviewed and is negative except for what has already been mentioned in HPI.       PHYSICAL EXAM:  GENRL:  Not in acute distress   /77   Pulse 72   Temp 97.7  F (36.5  C) (Tympanic)   Resp 15   Wt 53.1 kg (117 lb)   SpO2 98%   BMI 20.73 kg/m    No intake/output data recorded.  No intake/output data recorded.  HEENT: NC/AT      Eyes-  Pupil round and reactive to light bilaterally       Neck- supple, no JVP elevation,       Sclera- anicteric      Oropharynx- moist and pink  CHEST: Clear to auscultation bilateral anteriorly, no ronchi or wheezing  HEART: S1S2 normal, regular.   ABDMN: Soft. Non-tender, non-distended.  No guarding or rigidity. Bowel sounds- active  EXTRM: No pedal edema   INTGM: No skin rash, no cyanosis or clubbing  MUSCULOSKELETAL: no joint tenderness or swelling on upper and lower extremities  NEURO: Alert and awake. Cranial nerves II-XII grossly intact. No focal neurological deficit.  PSYCH:   Normal affect and mood, cooperative    DIAGNOSTIC DATA:    Recent Labs   Lab 08/01/21  1600   WBC 7.9   HGB 13.1*   HCT 37.6*          Recent Labs   Lab 08/01/21  1637      CO2 25   BUN 14       Recent Labs    Lab 08/01/21  1637   INR 0.96       Recent Labs   Lab 08/01/21  1637      CO2 25   BUN 14   ALBUMIN 3.5   ALKPHOS 94   ALT 21   AST 51*     Patient's new lab studies reviewed personally.  Patient's new radiology reports reviewed personally.  I personally viewed and personally interpreted patient's EKG: Inferior ST elevation on 3, possible elevation in aVF, T wave depression on lateral leads    Detailed plan of care discussed with patient and his family members at bedside.  Patient and family declined professional .  Discussed with nursing staffs.    Note created using dragon voice recognition software.  Errors in spelling or words which seems out of context are unintentional.  Sounds alike errors may have escaped editing.     08/01/2021  JAIME RODRIGUEZ MD  HOSPITALIST, [facility]  PAGER NO. [phone]

## 2021-08-02 NOTE — PLAN OF CARE
Received report from cath lab, patient arrived to unit at 1850. Alert, oriented, instructed on call light use.  Vital signs stable.

## 2021-08-02 NOTE — CONSULTS
Care Management Note    Length of Stay (days) 1    Patient plan of care discussed at Interdisciplinary Rounds: yes                Expected Discharge Date:  8/4/21.       Concerns to be Addressed / Barriers to Discharge:  Heparin drip, Nitroglycerin drip.     Follow up from rounds/notes:   Patient would like to discuss any possible surgery with his family before deciding. Has a  cardiologist.     Anticipated Discharge Disposition: Discharge goal is home pending response to treatment, medical needs and mobility closer to discharge.   Anticipated Discharge Services:  To be determined by destination, patient/family preferences, medical needs and mobility status closer to the time of discharge.  Anticipated Discharge DME: Per therapy (if indicated).    Plan:  Per chart review, patient is  and independent at baseline. He has a history of a brain tumor but denies any focal weakness. CM will meet with patient after therapy today to determine patient's discharge planning goals and preferences.     Care Management Initial Consult    General Information  Assessment completed with: Av Joseph  Type of CM/SW Visit: Initial Assessment    Primary Care Provider verified and updated as needed: Yes   Readmission within the last 30 days:        Reason for Consult: discharge planning  Advance Care Planning:            Communication Assessment  Patient's communication style: spoken language (non-English)    Hearing Difficulty or Deaf: no   Wear Glasses or Blind: no    Cognitive  Cognitive/Neuro/Behavioral: WDL                      Living Environment:   People in home: spouse     Current living Arrangements: house      Able to return to prior arrangements: yes       Family/Social Support:  Care provided by: self  Provides care for:    Marital Status:              Description of Support System:           Current Resources:   Patient receiving home care services: No     Community Resources: None  Equipment  currently used at home: none  Supplies currently used at home: None    Employment/Financial:  Employment Status:          Financial Concerns: No concerns identified   Referral to Financial Counselor: No       Lifestyle & Psychosocial Needs:  Social Determinants of Health     Tobacco Use: Low Risk      Smoking Tobacco Use: Never Smoker     Smokeless Tobacco Use: Never Used   Alcohol Use:      Frequency of Alcohol Consumption:      Average Number of Drinks:      Frequency of Binge Drinking:    Financial Resource Strain:      Difficulty of Paying Living Expenses:    Food Insecurity:      Worried About Running Out of Food in the Last Year:      Ran Out of Food in the Last Year:    Transportation Needs:      Lack of Transportation (Medical):      Lack of Transportation (Non-Medical):    Physical Activity:      Days of Exercise per Week:      Minutes of Exercise per Session:    Stress:      Feeling of Stress :    Social Connections:      Frequency of Communication with Friends and Family:      Frequency of Social Gatherings with Friends and Family:      Attends Adventism Services:      Active Member of Clubs or Organizations:      Attends Club or Organization Meetings:      Marital Status:    Intimate Partner Violence:      Fear of Current or Ex-Partner:      Emotionally Abused:      Physically Abused:      Sexually Abused:    Depression: Not at risk     PHQ-2 Score: 2   Housing Stability:      Unable to Pay for Housing in the Last Year:      Number of Places Lived in the Last Year:      Unstable Housing in the Last Year:        Functional Status:  Prior to admission patient needed assistance:   Dependent ADLs:: Independent, Ambulation-no assistive device, Bathing, Eating  Dependent IADLs:: Cleaning, Cooking, Laundry, Shopping, Meal Preparation  Assesssment of Functional Status: At functional baseline    Mental Health Status:  Mental Health Status: No Current Concerns       Chemical Dependency Status:  Chemical Dependency  Status: No Current Concerns             Values/Beliefs:  Spiritual, Cultural Beliefs, Synagogue Practices, Values that affect care:                 Additional Information:  Goal is home.     Anali Callejas RN

## 2021-08-02 NOTE — PROGRESS NOTES
[facility] [place]    Medicine Progress Note - Hospitalist Service       Date of Admission:  8/1/2021    Assessment & Plan           Av Fernando is a 61 year old male admitted on 8/1/2021. He has history of type II DM, essential hypertension, hyperlipidemia, brain tumor who presented to ED for evaluation of intermittent chest pain, palpitation, episode of syncope, found to have STEMI, emergently had coronary angiogram, reported multivessel disease and admitted for cardiothoracic surgery consultation and further management     STEMI;  --Presented for chest pain, palpitations, and syncope, found to have inferior STEMI, underwent emergent coronary angiogram, reported multivessel disease and severe left ventricular dysfunction  --Continue heparin drip, as needed ntg  -Currently chest pain-free  --Cardiology following appreciate assistance  --Cardiothoracic surgery consulted  -Started on Crestor 40 mg  -Continue home lisinopril 5mg and aspirin 81 mg  -Holding on initiation of beta-blocker due to lower normal heart rates     Type II DM, uncontrolled with hyperglycemia;  --Recent A1c 12.5 on 5/12/2021  --Patient reports at home taking Metformin, humalog 10 units with each meal and Lantus 25 units at bedtime.  --On presentation to ED blood sugar more than 400  --Hold Metformin while inpatient  --Resume home Lantus.  --Insulin sliding scale and hypoglycemic protocol  --For some reason no noon Accu-Chek charted today. Continue QID Accuchecks     Probable mild ELIZABETH versus progressive CKD;  --Cr 1.3 on admission  --On IV fluid for post coronary angiogram protocol. Cr better today 0.96  --Monitor intake/output, weight, labs closely.     Essential hypertension; controlled  --Continue home lisinopril  --Monitor vitals closely    History of brain tumor;  --Reviewed previous imaging, MRI brain from 6/2016 reported 1.6 cm rim-enhancing lesion within lateral aspect of left postcentral gyrus, stable in appearance dating  back to 10/2013  --Currently denied headache, focal weakness       Diet: Combination Diet Consistent Carb 60 Grams CHO per Meal Diet; No Caffeine Diet, Low Saturated Fat Na <2400mg Diet    DVT Prophylaxis: Moderate risk. heparin drip   Bustamante Catheter: Not present  Central Lines: None  Code Status: Full Code      Disposition Plan   Expected discharge: 08/04/2021 recommended to prior living arrangement once cardiac plan established.     The patient's care was discussed with the Patient and Patient's Family.    Erika Brannon MD  Hospitalist Service  [facility]  Text page via [employer] Paging/Directory      Clinically Significant Risk Factors Present on Admission              # Platelet Defect: home medication list includes an antiplatelet medication      ____________        Physical Exam   Vital Signs: Temp: 98  F (36.7  C) Temp src: Oral BP: 112/70 Pulse: 74   Resp: 25 SpO2: 98 % O2 Device: None (Room air) Oxygen Delivery: 2 LPM  Weight: 122 lbs 4.8 oz  General: in no apparent distress, non-toxic and alert male lying in hospital bed oriented x3  HEENT: Head normocephalic atraumatic, oral mucosa moist. Sclerae anicteric  CV: Regular rhythm, normal rate, no murmurs  Resp: No wheezes, no rales or rhonchi, no focal consolidations  GI: Belly soft, nondistended, nontender, bowel sounds present  Skin: No rashes or lesions  Extremities: No peripheral edema  Psych: Normal affect, mood euthymic  Neuro: CNII-XII grossly intact, moving all 4 extremities      Data   Recent Results (from the past 24 hour(s))   CBC with platelets    Collection Time: 08/01/21  7:42 PM   Result Value Ref Range    WBC Count 7.6 4.0 - 11.0 10e3/uL    RBC Count 3.99 (L) 4.40 - 5.90 10e6/uL    Hemoglobin 12.8 (L) 13.3 - 17.7 g/dL    Hematocrit 37.3 (L) 40.0 - 53.0 %    MCV 94 78 - 100 fL    MCH 32.1 26.5 - 33.0 pg    MCHC 34.3 31.5 - 36.5 g/dL    RDW 12.3 10.0 - 15.0 %    Platelet Count 176 150 - 450 10e3/uL   Glucose by meter     Collection Time: 08/01/21  8:23 PM   Result Value Ref Range    GLUCOSE BY METER POCT 146 (H) 70 - 125 mg/dL   Heparin Unfractionated Anti Xa Level    Collection Time: 08/01/21 10:53 PM   Result Value Ref Range    Anti Xa Unfractionated Heparin 0.26 For Reference Range, See Comment IU/mL   Glucose by meter    Collection Time: 08/02/21  4:00 AM   Result Value Ref Range    GLUCOSE BY METER POCT 78 70 - 125 mg/dL   Basic metabolic panel    Collection Time: 08/02/21  4:18 AM   Result Value Ref Range    Sodium 144 136 - 145 mmol/L    Potassium 4.9 3.5 - 5.0 mmol/L    Chloride 111 (H) 98 - 107 mmol/L    Carbon Dioxide (CO2) 28 22 - 31 mmol/L    Anion Gap 5 5 - 18 mmol/L    Urea Nitrogen 12 8 - 22 mg/dL    Creatinine 0.96 0.70 - 1.30 mg/dL    Calcium 8.8 8.5 - 10.5 mg/dL    Glucose 69 (L) 70 - 125 mg/dL    GFR Estimate 85 >60 mL/min/1.73m2   Lipid panel    Collection Time: 08/02/21  4:18 AM   Result Value Ref Range    Cholesterol 176 <=199 mg/dL    Triglycerides 70 <=149 mg/dL    Direct Measure HDL 53 >=40 mg/dL    LDL Cholesterol Calculated 109 <=129 mg/dL    Patient Fasting > 8hrs? Unknown    CBC with platelets    Collection Time: 08/02/21  4:18 AM   Result Value Ref Range    WBC Count 9.9 4.0 - 11.0 10e3/uL    RBC Count 3.77 (L) 4.40 - 5.90 10e6/uL    Hemoglobin 12.1 (L) 13.3 - 17.7 g/dL    Hematocrit 35.7 (L) 40.0 - 53.0 %    MCV 95 78 - 100 fL    MCH 32.1 26.5 - 33.0 pg    MCHC 33.9 31.5 - 36.5 g/dL    RDW 12.5 10.0 - 15.0 %    Platelet Count 164 150 - 450 10e3/uL   Troponin I    Collection Time: 08/02/21  4:18 AM   Result Value Ref Range    Troponin I 39.19 (HH) 0.00 - 0.29 ng/mL   Hemoglobin A1c    Collection Time: 08/02/21  4:18 AM   Result Value Ref Range    Hemoglobin A1C 10.4 (H) <=5.6 %   Heparin Unfractionated Anti Xa Level    Collection Time: 08/02/21  4:18 AM   Result Value Ref Range    Anti Xa Unfractionated Heparin 0.40 For Reference Range, See Comment IU/mL   Glucose by meter    Collection Time: 08/02/21   7:15 AM   Result Value Ref Range    GLUCOSE BY METER POCT 111 70 - 125 mg/dL   Echocardiogram Complete    Collection Time: 08/02/21  9:15 AM   Result Value Ref Range    LVEF  30-35%    Heparin Unfractionated Anti Xa Level    Collection Time: 08/02/21 10:18 AM   Result Value Ref Range    Anti Xa Unfractionated Heparin 0.41 For Reference Range, See Comment IU/mL     ____________  Interval History   Data reviewed today: I reviewed all medications, new labs and imaging results over the last 24 hours. I personally reviewed the EKG tracing showing subtle inferior ST elevation, ST depression in lateral leads.  patient states he is doing ok today. chest pain has resolved. has not been out of bed so unsure about clayton or dizziness. had low BG this AM better 111 on recheck and he is tolerating clear liquid diet. hold on dextrose drip. discussed briefly plan to meet with CV surgery today. patient would like his own [facility] cardiologist involved in planning if at all possible. son also indicates they would want to have a family discussion before agreeing to any big surgery. not ready for discharge.

## 2021-08-02 NOTE — PROGRESS NOTES
Cardiology Progress Note    Assessment/Plan:  1.  Status post ST elevation inferior wall MI with emergent cardiac catheterization demonstrating severe three-vessel coronary artery disease along with severe left ventricular dysfunction.  Given his diabetic status, CV surgical consult requested.  He currently reports no recurrence of the chest discomfort that prompted his presentation.  We will continue with current medical management.  Given borderline bradycardia, will hold on initiation of beta-blocker therapy.  2.  Severe ischemic cardiomyopathy, EF 25% at time of coronary angiogram.  Echocardiogram has been ordered to reassess LV function as well as valve status.  He reports no symptoms of shortness of breath.  Initiating low-dose lisinopril.  Will monitor blood pressure response.  3.  Mildly elevated LDL of 109.  Initiated statin therapy to drive LDL below 70.  4.  Type 2 diabetes mellitus    Subjective:  Patient reports no complaints of chest discomfort or shortness of breath.    No current outpatient medications on file.         Objective:   Vital signs in last 24 hours:  Temp:  [97.6  F (36.4  C)-97.9  F (36.6  C)] 97.6  F (36.4  C)  Pulse:  [59-77] 60  Resp:  [9-29] 18  BP: ()/(51-97) 112/68  SpO2:  [96 %-100 %] 98 %  Weight:   [unfilled]     Review of Systems:  Negative    Physical Exam:  General appearance: alert, cooperative, no distress   Head: Normocephalic, without obvious abnormality, atraumatic  Neck: no JVD   Lungs: clear to auscultation bilaterally   Heart: Regular rate and rhythm.  S1, S2 normal.  No murmur or gallop  Extremities: No peripheral edema, clubbing or cyanosis.  Mild ecchymosis and small hematoma above the right radial catheterization site.    Cardiographics (personally reviewed):   Telemetry demonstrates sinus rhythm to sinus bradycardia    Imaging (personally reviewed):   Echocardiogram currently pending.    Lab Results (personally reviewed):   Recent Labs   Lab  08/02/21  0418      CO2 28   BUN 12       INR   Date Value Ref Range Status   08/01/2021 0.96 0.90 - 1.15 Final     Comment:     Effective 7/11/2021, the reference range for this assay has changed.       Troponin I   Date Value Ref Range Status   08/02/2021 39.19 (HH) 0.00 - 0.29 ng/mL Final   08/01/2021 1.27 (HH) 0.00 - 0.29 ng/mL Final       BNP   Date Value Ref Range Status   08/01/2021 963 (H) 0 - 53 pg/mL Final       Hemoglobin   Date Value Ref Range Status   08/02/2021 12.1 (L) 13.3 - 17.7 g/dL Final         Yamilet Knowles MD

## 2021-08-02 NOTE — PHARMACY-ADMISSION MEDICATION HISTORY
Pharmacy Note - Admission Medication History    Pertinent Provider Information: None     ______________________________________________________________________    Prior To Admission (PTA) med list completed and updated in EMR.       PTA Med List   Medication Sig Last Dose     aspirin 81 mg chewable tablet [ASPIRIN 81 MG CHEWABLE TABLET] CHEW AND SWALLOW 1 TABLET DAILY 8/1/2021 at Unknown time     insulin lispro (HUMALOG KWIKPEN INSULIN) 100 unit/mL pen [INSULIN LISPRO (HUMALOG KWIKPEN INSULIN) 100 UNIT/ML PEN] INJECT 10 UNITS UNDER THE SKIN THREE TIMES DAILY BEFORE MEALS 8/1/2021 at Unknown time     LANTUS SOLOSTAR U-100 INSULIN 100 unit/mL (3 mL) pen [LANTUS SOLOSTAR U-100 INSULIN 100 UNIT/ML (3 ML) PEN] INJECT 24 UNITS UNDER THE SKIN AT BEDTIME 7/31/2021 at Unknown time     lisinopriL (PRINIVIL,ZESTRIL) 5 MG tablet [LISINOPRIL (PRINIVIL,ZESTRIL) 5 MG TABLET] Take 1 tablet (5 mg total) by mouth daily. 8/1/2021 at Unknown time     metFORMIN (GLUCOPHAGE) 1000 MG tablet [METFORMIN (GLUCOPHAGE) 1000 MG TABLET] Take 1 tablet (1,000 mg total) by mouth 2 (two) times a day. With lunch and dinner 8/1/2021 at Unknown time       Information source(s): Patient, Family member and [facility]  Method of interview communication: in-person    Summary of Changes to PTA Med List  New:    Discontinued:    Changed:      Patient was asked about OTC/herbal products specifically.  PTA med list reflects this.    In the past week, patient estimated taking medication this percent of the time:  greater than 90%.    Allergies were reviewed, assessed, and updated with the patient.      Patient does not use any multi-dose medications prior to admission.    The information provided in this note is only as accurate as the sources available at the time of the update(s).    Thank you for the opportunity to participate in the care of this patient.    Ernie Lemus RPH  8/1/2021 8:42 PM

## 2021-08-02 NOTE — CONSULTS
DIABETES CARE  Situation:  Consulted by Provider for Diabetes Education  61 year old Male with type 2 Diabetes. Patient was admitted for possible CABG., was having intermittent chest pain, palpitation and episode of syncope.     Background:  Related Co-morbidities include: HTN, HLD,   PCP: Dr. Garcia Kaufman  Social: Lives with spouse.  Son helped wt interpreting today at their request to do so.     Diabetes History:   Insulin dependent Type 2 for long time. Uses meal and basal insulin.       Meds for BG Management PTA:  Lantus insulin 24 units at bedtime  Humalog 10 units under the skin before meals   Metformin 1000 mg twice daily with lunch and dinner.     Current Inpatient Meds for BG Management:  Novolog corrrection scale : 1 unit every 50 over 140.  Lantus pen 15 units at bedtime.     Labs:  Hemoglobin A1C: 10.4              Hgb: 12.1    SCr: 0.96    GFR:85   Blood Glucose POC: 146,78,111    Diet Order:  60 grams, low NA no caffeine  Intake: Not known   Weight: 55.5    BMI:21.66    DM EDUCATION/COUNSELING:  Barriers to Learning and/or DM Self-Management: Language: Declined interpretor and son would interpret for consult.   Previous DM Education: Yes  Current Education and/or visit with Patient and/or caregiver  Educated/reviewed diabetes basics: pathophysiology, hyperglycemia, long term complications, treatment.  Educated/reviewed hypoglycemia: sx, causes, treatment  Explained normal/goals of blood sugar control,A1C, when to call provider.  Educated/reviewed use of home glucose meter..    Specific medications were explained, including how they each acted on blood sugar, their timing and differences in dosing.      Also discussed site rotation, proper storage and safe needle disposal.   Carbohydrates were briefly discussed and their effect on BG. Reinforced healthy eating.    Discussed needing to adjust insulin to what he is eating since weight loss is not a concern but blood glucose control is.   Written  "handouts given on all education provided.      Created goals with patient:A1cand blood glucose goals      Assessment:  Av takes his insulin regulary and rotates sites. Encouraged not just side to side but doing in in a clock like way around the abdomen.    He finds restricting rice hard. Not a concern but needs to cover for the rice he does eat.   More of a concern is the Japanese energy beverage he drinks that has caffeine and sugar that he is drinking regularly.  Encouraged stopping or lessening of this.     Recommendations:  1. Stop energy drink if possible or lessen intake  2. Needs medication adjustment to match his intake for lowering of his blood sugar levels.   3. Would like to go back on Freestyle marcus if can lower cost or covered.    Refer to \"Guidelines for Insulin Initiation and Care in Hospitalized Adults\"  link in Diabetes Management Order set for dosing guidelines    Hospital BG goals for blood glucose levels are < 180 for improved health outcomes.    F/U Plans:  Checking on coverage for Freestyle Marcus.     Thank you,   Jennifer Price RN, [facility]  Diabetes Care   VM: [phone]            "

## 2021-08-03 NOTE — PLAN OF CARE
Physical Therapy Discharge Summary    Reason for therapy discharge:    Pt. Left hospital AMA    Progress towards therapy goal(s). See goals on Care Plan in [facility] electronic health record for goal details.  Pt. was not seen by PT  prior to discharge    Therapy recommendation(s):    Not able to assess (possibly cardiac rehab if medically indicated)

## 2021-08-03 NOTE — SIGNIFICANT EVENT
"Significant Event Note    Time of event: 8:26 PM August 2, 2021    Description of event:  Nursing staffs paged oncall hospitalist for medications prescription recommended by cardiologist, Dr Becerra as patient leaving AMA.    Patient admitted for STEMI and now wanting to go AMA. Already seen and House Officer and patient signed AMA paper.   I again discussed with patient and recommended to remain in hospital for treatment/management. Patient reported \" I and my family decided to try cultural treatment for few days if that doesn't work then I will come to hospital\". Patient understands risk of fatal heart attack. He agreed to take medications recommended by cardiologist.    Plan:  D/w Cardiologist Dr Becerra  recommended ASA 81mg daily, plavix 75mg daily, rosuvastatin and s/l NTG. I did advise patient to continue taking home insulin regimen, lisinopril.      Note created using dragon voice recognition software.  Errors in spelling or words which seems out of context are unintentional.  Sounds alike errors may have escaped editing.    8/2/2021  JAIME RODRIGUEZ MD  [facility]  PAGER NO. [phone]    "

## 2021-08-03 NOTE — SIGNIFICANT EVENT
Significant Event Note  Time of event: 7:50pm PM August 2, 2021    Description of event:  Patient requesting to leave AMA despite STEMI and recommendations from cardiology and hospitalist.    Plan:  Did have a long discussion with patient and family, going over extensive risks, etc.  Patient still desiring to leave AMA stating that he needs to try herbal remedies for the next week because of cultural desires.  Counseled on return precautions as much as possible.  Hospitalist to place orders for any meds needed at discharge.    Discussed with: patient's family, patient, cardiology    Rory Hidalgo MD

## 2021-08-03 NOTE — PROGRESS NOTES
Called by nurse re: patients wishes to leave AMA.  Cahrt reviewed.  Svere 3v CAD, with severe LV dysfunction. Currently on heparin gtt.  Advised of potentially fatal result of patient's plan of action, and recommendation for him to remain hospitalized at this point.  If he chooses to leave against medical advice, would suggest  ASA 81 mg daily, lisinopril, rosuvastatin, SL NTG for prn use, and clopidogrel 150mg today, then 75 mg daily with close followup. cmcliffeMD

## 2021-08-03 NOTE — PROGRESS NOTES
Pt left AMA at 2058, all paperwork was signed. All of pts belongings sent with him. Pt advised on new prescriptions and nurse stressed the importance of taking these medications as well as stressed the need to return to the hospital if cardiac symptoms returned.     Malinda Ott RN

## 2021-08-03 NOTE — DISCHARGE SUMMARY
[facility] MEDICINE  DISCHARGE SUMMARY  *LEFT AGAINST MEDICAL ADVICE*     Primary Care Physician: Garcia Kaufman  Admission Date: 8/1/2021   Discharge Provider: Eirka Brannon MD Discharge Date: 8/2/2021   Diet: n/a   Code Status: Full   Activity: n/a        Condition at Discharge: Serious     REASON FOR PRESENTATION(See Admission Note for Details)   Chest pain, syncope    PRINCIPAL & ACTIVE DISCHARGE DIAGNOSES     Principal Problem:    ST elevation myocardial infarction (STEMI), unspecified artery (H)  Active Problems:    Palpitations    Syncope, unspecified syncope type    Dyspnea, unspecified type    Chest pain, unspecified type    Status post coronary angiogram    ACS (acute coronary syndrome) (H)      PENDING LABS     Unresulted Labs Ordered in the Past 30 Days of this Admission     No orders found from 7/2/2021 to 8/2/2021.            PROCEDURES ( this hospitalization only)      Procedure(s):  Coronary Angiogram  Left Ventriculogram    RECOMMENDATIONS TO OUTPATIENT PROVIDER FOR F/U VISIT     Patient left the hospital against medical advice. He was recommended to have CV Surgery consult for likely urgent CABG but left AMA to try herbal remedies at home.    DISPOSITION     Left AMA    SUMMARY OF HOSPITAL COURSE:      Av Fernando is a 61 year old male admitted on 8/1/2021. He has history of type II DM, essential hypertension, hyperlipidemia, brain tumor who presented to ED for evaluation of intermittent chest pain, palpitation, episode of syncope, found to have STEMI. He was seen by cardiology, underwent emergent coronary angiogram which showed severe multivessel disease. Patient was admitted to the hospital, started on heparin drip with plan for CV Surgery to see him and plan for likely CABG. Patient expressed hesitation about proceeding without input from his own [facility] cardiologist, we expressed willingness to work with him on getting their input with the caveat  that [facility] is outside of our system and this may not be possible. Before CV Surgery could see him, patient demanded to be discharged, indicating that he wished to return home and try [facility] traditional therapies. This was against medical advice and multiple after-hours providers attempted to inform him of the high risk nature of this decision. Cardiology did leave recommendations for bare minimum medications for patient to take on discharge and these were sent by after-hours provider. We remain seriously concerned about his prognosis with his chosen course of action.    Discharge Medications with Med changes:     Discharge Medication List as of 8/2/2021  9:00 PM      START taking these medications    Details   aspirin (ASA) 81 MG EC tablet Take 1 tablet (81 mg) by mouth daily, Disp-30 tablet, R-0, E-Prescribe      clopidogrel (PLAVIX) 75 MG tablet Take 1 tablet (75 mg) by mouth daily, Disp-30 tablet, R-0, E-Prescribe      nitroGLYcerin (NITROSTAT) 0.4 MG sublingual tablet For chest pain place 1 tablet under the tongue every 5 minutes for 3 doses. If symptoms persist 5 minutes after 1st dose call 911., Disp-10 tablet, R-0, E-Prescribe      rosuvastatin (CRESTOR) 40 MG tablet Take 1 tablet (40 mg) by mouth daily, Disp-30 tablet, R-0, E-Prescribe         CONTINUE these medications which have NOT CHANGED    Details   insulin lispro (HUMALOG KWIKPEN INSULIN) 100 unit/mL pen [INSULIN LISPRO (HUMALOG KWIKPEN INSULIN) 100 UNIT/ML PEN] INJECT 10 UNITS UNDER THE SKIN THREE TIMES DAILY BEFORE MEALS, Disp-24 mL, R-0, No Print Out**Patient requests 90 days supply**      LANTUS SOLOSTAR U-100 INSULIN 100 unit/mL (3 mL) pen [LANTUS SOLOSTAR U-100 INSULIN 100 UNIT/ML (3 ML) PEN] INJECT 24 UNITS UNDER THE SKIN AT BEDTIME, Disp-30 mL, R-2, NATALIA, No Print OutIf LANTUS is not covered by insurance, may substitute BASAGLAR at same dose and frequency.        lisinopriL (PRINIVIL,ZESTRIL) 5 MG tablet [LISINOPRIL (PRINIVIL,ZESTRIL) 5  "MG TABLET] Take 1 tablet (5 mg total) by mouth daily., Disp-90 tablet, R-3, Normal      metFORMIN (GLUCOPHAGE) 1000 MG tablet [METFORMIN (GLUCOPHAGE) 1000 MG TABLET] Take 1 tablet (1,000 mg total) by mouth 2 (two) times a day. With lunch and dinner, Disp-180 tablet, R-3, Normal      ACCU-CHEK SOFTCLIX LANCETS lancets [ACCU-CHEK SOFTCLIX LANCETS LANCETS] CHECK BLOOD SUGAR FOUR TIMES DAILY, Disp-400 each, R-0, Normal**Patient requests 90 days supply**      BD ULTRA-FINE JERSON PEN NEEDLES 32 gauge x 5/32\" Ndle [BD ULTRA-FINE JERSON PEN NEEDLES 32 GAUGE X 5/32\" NDLE] USE TO INJECT NOVOLOG AND LANTUS INSULIN WITH 4 NEEDLES DAILYDisp-100 each, R-12Normal      blood glucose meter (GLUCOMETER) [BLOOD GLUCOSE METER (GLUCOMETER)] Use 1 each As Directed as needed. Dispense glucometer brand per patient's insurance at pharmacy discretion.Disp-1 each, A-3GeybssBedl-lzcb Jeri Plus      blood glucose test (ACCU-CHEK SMARTVIEW TEST STRIP) strips [BLOOD GLUCOSE TEST (ACCU-CHEK SMARTVIEW TEST STRIP) STRIPS] Use to test blood sugars four times daily. Dispense brand per patient's insurance at pharmacy discretion., Disp-300 strip, R-3, NormalAccuChek Jerson meter      flash glucose scanning reader (FREESTYLE KATIE 14 DAY READER) Misc [FLASH GLUCOSE SCANNING READER (FREESTYLE KATIE 14 DAY READER) MISC] Use 1 each As Directed continuous as needed. Use to read blood sugars as 's instructions, Disp-1 each, R-0, Normal      flash glucose sensor (FREESTYLE KATIE 14 DAY SENSOR) Kit [FLASH GLUCOSE SENSOR (FREESTYLE KATIE 14 DAY SENSOR) KIT] Use 1 each As Directed continuous as needed. Change every 14 days, Disp-2 kit, R-5, Normal         STOP taking these medications       aspirin 81 mg chewable tablet Comments:   Reason for Stopping:               Consults       PHARMACY IP CONSULT  PHARMACY IP CONSULT  PHARMACY IP CONSULT  PHARMACY IP CONSULT  CARDIOTHORACIC SURGERY IP CONSULT  HOSPITALIST IP CONSULT  PHARMACY IP CONSULT  PHARMACY IP " CONSULT  CARE MANAGEMENT / SOCIAL WORK IP CONSULT  PHYSICAL THERAPY ADULT IP CONSULT  OCCUPATIONAL THERAPY ADULT IP CONSULT  CARDIOLOGY IP CONSULT  DIABETES EDUCATION IP CONSULT  PHARMACY IP CONSULT  SMOKING CESSATION PROGRAM IP CONSULT    SIGNIFICANT IMAGING FINDINGS     Results for orders placed or performed during the hospital encounter of 08/01/21   XR Chest Port 1 View    Impression    IMPRESSION: Heart size prominent on this AP view. Pulmonary vascularity normal. The lungs are.   Echocardiogram Complete   Result Value Ref Range    LVEF  30-35%        SIGNIFICANT LABORATORY FINDINGS     n/a    Discharge Orders     No discharge procedures on file.    Examination   Physical Exam   Temp:  [97.9  F (36.6  C)-98  F (36.7  C)] 98  F (36.7  C)  Pulse:  [66-83] 77  Resp:  [12-28] 28  BP: (100-133)/(65-87) 133/80  SpO2:  [95 %-98 %] 97 %  Wt Readings from Last 1 Encounters:   08/02/21 55.5 kg (122 lb 4.8 oz)     Please see EMR for more detailed significant labs, imaging, consultant notes etc.    Patient not discharged by me personally as incident occurred after hours    Erika Brannon MD  [facility]    CC:Garcia Kaufman

## 2021-08-04 LAB
ATRIAL RATE - MUSE: 65 BPM
DIASTOLIC BLOOD PRESSURE - MUSE: 88 MMHG
INTERPRETATION ECG - MUSE: NORMAL
P AXIS - MUSE: 51 DEGREES
PR INTERVAL - MUSE: 166 MS
QRS DURATION - MUSE: 108 MS
QT - MUSE: 428 MS
QTC - MUSE: 445 MS
R AXIS - MUSE: 57 DEGREES
SYSTOLIC BLOOD PRESSURE - MUSE: 134 MMHG
T AXIS - MUSE: -75 DEGREES
VENTRICULAR RATE- MUSE: 65 BPM

## 2021-08-06 NOTE — PATIENT INSTRUCTIONS - HE
Patient Instructions by Garcia Kaufman MD at 7/20/2020  3:20 PM     Author: Garcia Kaufman MD Service: -- Author Type: Physician    Filed: 7/20/2020  4:18 PM Encounter Date: 7/20/2020 Status: Addendum    : Garcia Kaufman MD (Physician)    Related Notes: Original Note by Garcia Kaufman MD (Physician) filed at 7/20/2020  4:16 PM         Patient Education   Instrumental Activities of Daily Living  Your Health Risk Assessment indicates you have difficulties with instrumental activities of daily living which include laundry, housekeeping, banking, shopping, using the telephone, food preparation, transportation, or taking your own medications. Please make a follow up appointment for us to address this issue in more detail.    [employer] has resources available on the following website: [email or web address]     Also, here is a local agency that provides help with meals and other assistance:   [facility] Line: [phone]     Patient Education   Signs of Hearing Loss  Hearing loss is a problem shared by many people. In fact, it is one of the most common health conditions, particularly as people age. Most people over age 65 have some hearing loss, and by age 80, almost everyone does. Because hearing loss usually occurs slowly over the years, you may not realize your hearing ability has gotten worse.       Have your hearing checked  Contact your [facility] care provider if you:    Have to strain to hear normal conversation.    Have to watch other peoples faces very carefully to follow what theyre saying.    Need to ask people to repeat what theyve said.    Often misunderstand what people are saying.    Turn the volume of the television or radio up so high that others complain.    Feel that people are mumbling when theyre talking to you.    Find that the effort to hear leaves you feeling tired and irritated.    Notice, when using the phone, that you hear better with 1 ear than the  other.    0426-2438 [employer]. [address] [place] [address], [place] [address]. All rights reserved. This information is not intended as a substitute for professional medical care. Always follow your healthcare professional's instructions.         Patient Education   Your Health Risk Assessment indicates you feel you are not in good emotional health.    Recreation   Recreation is not limited to sports and team events. It includes any activity that provides relaxation, interest, enjoyment, and exercise. Recreation provides an outlet for physical, mental, and social energy. It can give a sense of worth and achievement. It can help you stay healthy.    Mental Exercise and Social Involvement  Mental and emotional health is as important as physical health. Keep in touch with friends and family. Stay as active as possible. Continue to learn and challenge yourself.   Things you can do to stay mentally active are:    Learn something new, like a foreign language or musical instrument.     Play SCRABBLE or do crossword puzzles. If you cannot find people to play these games with you at home, you can play them with others on your computer through the Internet.     Join a games club--anything from card games to chess or checkers or lawn bowling.     Start a new hobby.     Go back to school.     Volunteer.     Read.     Keep up with world events.       Patient Education   Understanding Advance Care Planning  Advance care planning is the process of deciding ones own future medical care. It helps ensure that if you cant speak for yourself, your wishes can still be carried out. The plan is a series of legal documents that note a persons wishes. The documents vary by state. Advance care planning may be done when a person has a serious illness that is expected to get worse. It may be done before major surgery. And it can help you and your family be prepared in case of a major illness or injury. Advance care planning helps  with making decisions at these times.       A health care proxy is a person who acts as the voice of a patient when the patient cant speak for himself or herself. The name of this role varies by state. It may be called a Durable Medical Power of  or Durable Power of  for Healthcare. It may be called an agent, surrogate, or advocate. Or it may be called a representative or decision maker. It is an official duty that is identified by a legal document. The document also varies by state.    Why Is Advance Care Planning Important?  If a person communicates their healthcare wishes:    They will be given medical care that matches their values and goals.    Their family members will not be forced to make decisions in a crisis with no guidance.  Creating a Plan  Making an advance care plan is often done in 3 steps:    Thinking about ones wishes. To create an advance care plan, you should think about what kind of medical treatment you would want if you lose the ability to communicate. Are there any situations in which you would refuse or stop treatment? Are there therapies you would want or not want? And whom do you want to make decisions for you? There are many places to learn more about how to plan for your care. Ask your doctor or  for resources.    Picking a health care proxy. This means choosing a trusted person to speak for you only when you cant speak for yourself. When you cannot make medical decisions, your proxy makes sure the instructions in your advance care plan are followed. A proxy does not make decisions based on his or her own opinions. They must put aside those opinions and values if needed, and carry out your wishes.    Filling out the legal documents. There are several kinds of legal documents for advance care planning. Each one tells health care providers your wishes. The documents may vary by state. They must be signed and may need to be witnessed or notarized. You can cancel  or change them whenever you wish. Depending on your state, the documents may include a Healthcare Proxy form, Living Will, Durable Medical Power of , Advance Directive, or others.  The Familys Role  The best help a family can give is to support their loved ones wishes. Open and honest communication is vital. Family should express any concerns they have about the patients choices while the patient can still make decisions.    1787-7419 [employer]. [address] [place] [address], [place] [address]. All rights reserved. This information is not intended as a substitute for professional medical care. Always follow your healthcare professional's instructions.         Also, [facility] LOG[facility] [place] offers a free, downloadable health care directive that allows you to share your treatment choices and personal preferences if you cannot communicate your wishes. It also allows you to appoint another person (called a health care agent) to make health care decisions if you are unable to do so. You can download an advance directive by going here: [email or web address]     Patient Education   Personalized Prevention Plan  You are due for the preventive services outlined below.  Your care team is available to assist you in scheduling these services.  If you have already completed any of these items, please share that information with your care team to update in your medical record.  Health Maintenance   Topic Date Due   ? HIV SCREENING  05/05/1975   ? MEDICARE ANNUAL WELLNESS VISIT  05/05/1978   ? TD 18+ HE  05/05/1978   ? TDAP ADULT ONE TIME DOSE  05/05/1978   ? PNEUMOCOCCAL IMMUNIZATION 19-64 MEDIUM RISK (1 of 1 - PPSV23) 05/05/1979   ? HEPATITIS B VACCINES (1 of 3 - Risk 3-dose series) 05/05/1979   ? ZOSTER VACCINES (1 of 2) 05/05/2010   ? LIPID  06/30/2017   ? DIABETIC EYE EXAM  03/06/2019   ? COLORECTAL CANCER SCREENING  07/13/2019   ? A1C  05/12/2020   ? ADVANCE CARE PLANNING   01/11/2021   ? INFLUENZA VACCINE RULE BASED (1) 08/01/2020   ? BMP  11/12/2020   ? DIABETIC FOOT EXAM  11/12/2020   ? MICROALBUMIN  11/12/2020   ? DEPRESSION ACTION PLAN  Completed   ? HEPATITIS C SCREENING  Completed             Av it was so good to see you today  Connect with our junk is daily    We will try to connect you with the medications that I looked up    The Humulin and is like a long-acting insulin  It does not last for 24 hours but it does a good job  You take this in the evening like you taking your basaglar  24 units at bedtime    The Novolin R  Is like NovoLog    Take 7 units with meals    Let us know if you have any low blood sugars    It would be excellent to see you every 3 months

## 2021-08-06 NOTE — PROGRESS NOTES
Progress Notes by Garcia Kaufman MD at 7/20/2020  3:20 PM     Author: Garcia Kaufman MD Service: -- Author Type: Physician    Filed: 7/20/2020  5:32 PM Encounter Date: 7/20/2020 Status: Signed    : Garcia Kaufman MD (Physician)       ASSESSMENT & PLAN    Peripheral vascular disease (H)  Right Dorsalis Pedis weaker   Right Posterior tib normal     Walking 4 miles a day  Some tightness in his right calf when he walks  But otherwise doing well      Plan  Continue walking build collaterals  Aspirin 81 mg daily        Av was seen today for annual wellness visit.    Diagnoses and all orders for this visit:    Renal Insufficiency    Hemiplegia of dominant side (H)    Type 2 diabetes mellitus with hyperglycemia, with long-term current use of insulin (H)  -     Comprehensive Metabolic Panel  -     HM1(CBC and Differential)  -     Glycosylated Hemoglobin A1c  -     Microalbumin, Random Urine  -     HM1 (CBC with Diff)    Brain tumor (H)    Peripheral vascular disease (H)    Vitamin D deficiency    Hearing loss, unspecified hearing loss type, unspecified laterality  -     Hearing Screening        Patient Instructions       Patient Education   Instrumental Activities of Daily Living  Your Health Risk Assessment indicates you have difficulties with instrumental activities of daily living which include laundry, housekeeping, banking, shopping, using the telephone, food preparation, transportation, or taking your own medications. Please make a follow up appointment for us to address this issue in more detail.    [employer] has resources available on the following website: [email or web address]     Also, here is a local agency that provides help with meals and other assistance:   [facility] Line: [phone]     Patient Education   Signs of Hearing Loss  Hearing loss is a problem shared by many people. In fact, it is one of the most common health conditions, particularly as people age.  Most people over age 65 have some hearing loss, and by age 80, almost everyone does. Because hearing loss usually occurs slowly over the years, you may not realize your hearing ability has gotten worse.       Have your hearing checked  Contact your [facility] care provider if you:    Have to strain to hear normal conversation.    Have to watch other peoples faces very carefully to follow what theyre saying.    Need to ask people to repeat what theyve said.    Often misunderstand what people are saying.    Turn the volume of the television or radio up so high that others complain.    Feel that people are mumbling when theyre talking to you.    Find that the effort to hear leaves you feeling tired and irritated.    Notice, when using the phone, that you hear better with 1 ear than the other.    6415-1570 [employer]. [address] [place] [address], [place] [address]. All rights reserved. This information is not intended as a substitute for professional medical care. Always follow your healthcare professional's instructions.         Patient Education   Your Health Risk Assessment indicates you feel you are not in good emotional health.    Recreation   Recreation is not limited to sports and team events. It includes any activity that provides relaxation, interest, enjoyment, and exercise. Recreation provides an outlet for physical, mental, and social energy. It can give a sense of worth and achievement. It can help you stay healthy.    Mental Exercise and Social Involvement  Mental and emotional health is as important as physical health. Keep in touch with friends and family. Stay as active as possible. Continue to learn and challenge yourself.   Things you can do to stay mentally active are:    Learn something new, like a foreign language or musical instrument.     Play SCRABBLE or do crossword puzzles. If you cannot find people to play these games with you at home, you can play them with others on your computer  through the Internet.     Join a games club--anything from card games to chess or checkers or lawn bowling.     Start a new hobby.     Go back to school.     Volunteer.     Read.     Keep up with world events.       Patient Education   Understanding Advance Care Planning  Advance care planning is the process of deciding ones own future medical care. It helps ensure that if you cant speak for yourself, your wishes can still be carried out. The plan is a series of legal documents that note a persons wishes. The documents vary by state. Advance care planning may be done when a person has a serious illness that is expected to get worse. It may be done before major surgery. And it can help you and your family be prepared in case of a major illness or injury. Advance care planning helps with making decisions at these times.       A health care proxy is a person who acts as the voice of a patient when the patient cant speak for himself or herself. The name of this role varies by state. It may be called a Durable Medical Power of  or Durable Power of  for Healthcare. It may be called an agent, surrogate, or advocate. Or it may be called a representative or decision maker. It is an official duty that is identified by a legal document. The document also varies by state.    Why Is Advance Care Planning Important?  If a person communicates their healthcare wishes:    They will be given medical care that matches their values and goals.    Their family members will not be forced to make decisions in a crisis with no guidance.  Creating a Plan  Making an advance care plan is often done in 3 steps:    Thinking about ones wishes. To create an advance care plan, you should think about what kind of medical treatment you would want if you lose the ability to communicate. Are there any situations in which you would refuse or stop treatment? Are there therapies you would want or not want? And whom do you want to make  decisions for you? There are many places to learn more about how to plan for your care. Ask your doctor or  for resources.    Picking a health care proxy. This means choosing a trusted person to speak for you only when you cant speak for yourself. When you cannot make medical decisions, your proxy makes sure the instructions in your advance care plan are followed. A proxy does not make decisions based on his or her own opinions. They must put aside those opinions and values if needed, and carry out your wishes.    Filling out the legal documents. There are several kinds of legal documents for advance care planning. Each one tells health care providers your wishes. The documents may vary by state. They must be signed and may need to be witnessed or notarized. You can cancel or change them whenever you wish. Depending on your state, the documents may include a Healthcare Proxy form, Living Will, Durable Medical Power of , Advance Directive, or others.  The Familys Role  The best help a family can give is to support their loved ones wishes. Open and honest communication is vital. Family should express any concerns they have about the patients choices while the patient can still make decisions.    0128-0075 [employer]. [address] [place] [address]. All rights reserved. This information is not intended as a substitute for professional medical care. Always follow your healthcare professional's instructions.         Also, [employer] [place] offers a free, downloadable health care directive that allows you to share your treatment choices and personal preferences if you cannot communicate your wishes. It also allows you to appoint another person (called a health care agent) to make health care decisions if you are unable to do so. You can download an advance directive by going here: [email or web address]     Patient Education   Personalized  Prevention Plan  You are due for the preventive services outlined below.  Your care team is available to assist you in scheduling these services.  If you have already completed any of these items, please share that information with your care team to update in your medical record.  Health Maintenance   Topic Date Due   ? HIV SCREENING  05/05/1975   ? MEDICARE ANNUAL WELLNESS VISIT  05/05/1978   ? TD 18+ HE  05/05/1978   ? TDAP ADULT ONE TIME DOSE  05/05/1978   ? PNEUMOCOCCAL IMMUNIZATION 19-64 MEDIUM RISK (1 of 1 - PPSV23) 05/05/1979   ? HEPATITIS B VACCINES (1 of 3 - Risk 3-dose series) 05/05/1979   ? ZOSTER VACCINES (1 of 2) 05/05/2010   ? LIPID  06/30/2017   ? DIABETIC EYE EXAM  03/06/2019   ? COLORECTAL CANCER SCREENING  07/13/2019   ? A1C  05/12/2020   ? ADVANCE CARE PLANNING  01/11/2021   ? INFLUENZA VACCINE RULE BASED (1) 08/01/2020   ? BMP  11/12/2020   ? DIABETIC FOOT EXAM  11/12/2020   ? MICROALBUMIN  11/12/2020   ? DEPRESSION ACTION PLAN  Completed   ? HEPATITIS C SCREENING  Completed             Av it was so good to see you today  Connect with our junk is daily    We will try to connect you with the medications that I looked up    The Humulin and is like a long-acting insulin  It does not last for 24 hours but it does a good job  You take this in the evening like you taking your basaglar  24 units at bedtime    The Novolin R  Is like NovoLog    Take 7 units with meals    Let us know if you have any low blood sugars          No follow-ups on file.       Little interest or pleasure in doing things: Several days  Feeling down, depressed, or hopeless: Several days  Trouble falling or staying asleep, or sleeping too much: Several days  Feeling tired or having little energy: Several days  Poor appetite or overeating: Not at all  Feeling bad about yourself - or that you are a failure or have let yourself or your family down: Not at all  Trouble concentrating on things, such as reading the newspaper or  watching television: Not at all  Moving or speaking so slowly that other people could have noticed. Or the opposite - being so fidgety or restless that you have been moving around a lot more than usual: Not at all  Thoughts that you would be better off dead, or of hurting yourself in some way: Not at all  PHQ-9 Total Score: 4  If you checked off any problems, how difficult have these problems made it for you to do your work, take care of things at home, or get along with other people?: Not difficult at all    CHIEF COMPLAINT: Av Fernando had concerns including Annual Wellness Visit.    [place]: 1.............. SUBJECTIVE:  Av Fernando is a 60 y.o. male had concerns including Annual Wellness Visit.    1. Renal Insufficiency    2. Hemiplegia of dominant side (H)    3. Type 2 diabetes mellitus with hyperglycemia, with long-term current use of insulin (H)    4. Brain tumor (H)    5. Peripheral vascular disease (H)    6. Vitamin D deficiency    7. Hearing loss, unspecified hearing loss type, unspecified laterality      Av is doing an annual wellness exam  His English is excellent  General health is overall good  Is exercising regularly  He describes he is walking 4 miles a day  No alcohol  He is   Eating fruits and vegetables he is a carbon monoxide detector  He does need some help with banking and shopping  He is a little concerned about his hearing  He wishes to have a hearing screen  No issues with leaking of his urine  He is having some mild itch issues with depression   He does not have an advanced directive  No falls  He had no problems with hypoglycemia  He said no seizures  He is able to operate a vehicle safely          Allergies   Allergen Reactions   ? Januvia [Sitagliptin]      HEADACHE   ? Trulicity [Dulaglutide] Dizziness     Weak and muscle weak                         SOCIAL: He  reports that he has never smoked. He has never used smokeless tobacco. He reports that he does not drink alcohol or use  "drugs.    REVIEW OF SYSTEMS:   Family history not pertinent to chief complaint or presenting problem    Review of systems otherwise negative as requested from patient, except   Those positive ROS outlined and discussed in [facility].      VITALS:  Vitals:    07/20/20 1522   BP: 116/60   Pulse: 75   SpO2: 98%   Weight: 120 lb (54.4 kg)   Height: 5' 3\" (1.6 m)     Wt Readings from Last 3 Encounters:   07/20/20 120 lb (54.4 kg)   11/12/19 119 lb 1.3 oz (54 kg)   05/14/19 121 lb 4 oz (55 kg)     Body mass index is 21.26 kg/m .    Physical Exam:  Bilateral cataracts present  TMs are clear  No carotid bruits  Cervical supraclavicular nodes  Lungs are clear  Cardiac no murmur regular rhythm  No appreciable abdominal bruit  Palpable femoral pulses  Absent dorsalis pedis on the right  Palpable posterior tibial pulse on the right  Calves are supple  Monofilament testing is slightly diminished at the foot on the right    Recent Results (from the past 240 hour(s))   HM1 (CBC with Diff)   Result Value Ref Range    WBC 7.8 4.0 - 11.0 thou/uL    RBC 4.22 (L) 4.40 - 6.20 mill/uL    Hemoglobin 13.9 (L) 14.0 - 18.0 g/dL    Hematocrit 39.5 (L) 40.0 - 54.0 %    MCV 94 80 - 100 fL    MCH 33.0 27.0 - 34.0 pg    MCHC 35.2 32.0 - 36.0 g/dL    RDW 11.2 11.0 - 14.5 %    Platelets 138 (L) 140 - 440 thou/uL    MPV 7.2 7.0 - 10.0 fL    Neutrophils % 64 50 - 70 %    Lymphocytes % 27 20 - 40 %    Monocytes % 8 2 - 10 %    Eosinophils % 1 0 - 6 %    Basophils % 1 0 - 2 %    Neutrophils Absolute 5.0 2.0 - 7.7 thou/uL    Lymphocytes Absolute 2.1 0.8 - 4.4 thou/uL    Monocytes Absolute 0.6 0.0 - 0.9 thou/uL    Eosinophils Absolute 0.1 0.0 - 0.4 thou/uL    Basophils Absolute 0.1 0.0 - 0.2 thou/uL          I spent 25 minutes with this patient face to face, of which 50% or greater was spent in counseling and coordination of care with regards to Av was seen today for annual wellness visit.    Diagnoses and all orders for this visit:    Renal " Insufficiency    Hemiplegia of dominant side (H)    Type 2 diabetes mellitus with hyperglycemia, with long-term current use of insulin (H)  -     Comprehensive Metabolic Panel  -     HM1(CBC and Differential)  -     Glycosylated Hemoglobin A1c  -     Microalbumin, Random Urine  -     HM1 (CBC with Diff)    Brain tumor (H)    Peripheral vascular disease (H)    Vitamin D deficiency    Hearing loss, unspecified hearing loss type, unspecified laterality  -     Hearing Screening        Garcia Kaufman MD  [facility] [id number] [phone]  Assessment and Plan:       Problem List Items Addressed This Visit     Vitamin D deficiency    Renal Insufficiency - Primary    Peripheral vascular disease (H)     Right Dorsalis Pedis weaker   Right Posterior tib normal     Walking 4 miles a day  Some tightness in his right calf when he walks  But otherwise doing well      Plan  Continue walking build collaterals  Aspirin 81 mg daily           RESOLVED: Hemiparesis Of Right Side - (Dominant Side)      Other Visit Diagnoses     Type 2 diabetes mellitus with hyperglycemia, with long-term current use of insulin (H)        Relevant Orders    Comprehensive Metabolic Panel    HM1(CBC and Differential)    Glycosylated Hemoglobin A1c    Microalbumin, Random Urine    HM1 (CBC with Diff)    Brain tumor (H)        Hearing loss, unspecified hearing loss type, unspecified laterality        Relevant Orders    Hearing Screening (Completed)            The patient's current medical problems were reviewed.    I have had an Advance Directives discussion with the patient.  The following health maintenance schedule was reviewed with the patient and provided in printed form in the after visit summary:   Health Maintenance   Topic Date Due   ? HIV SCREENING  05/05/1975   ? MEDICARE ANNUAL WELLNESS VISIT  05/05/1978   ? TD 18+ HE  05/05/1978   ? TDAP ADULT ONE TIME DOSE  05/05/1978   ? PNEUMOCOCCAL  IMMUNIZATION 19-64 MEDIUM RISK (1 of 1 - PPSV23) 05/05/1979   ? HEPATITIS B VACCINES (1 of 3 - Risk 3-dose series) 05/05/1979   ? ZOSTER VACCINES (1 of 2) 05/05/2010   ? LIPID  06/30/2017   ? DIABETIC EYE EXAM  03/06/2019   ? COLORECTAL CANCER SCREENING  07/13/2019   ? A1C  05/12/2020   ? ADVANCE CARE PLANNING  01/11/2021   ? INFLUENZA VACCINE RULE BASED (1) 08/01/2020   ? BMP  11/12/2020   ? DIABETIC FOOT EXAM  11/12/2020   ? MICROALBUMIN  11/12/2020   ? DEPRESSION ACTION PLAN  Completed   ? HEPATITIS C SCREENING  Completed        Subjective:   Chief Complaint: Av Fernando is an 60 y.o. male here for an Annual Wellness visit.   HPI:      Review of Systems:    Please see above.  The rest of the review of systems are negative for all systems.    Patient Care Team:  Garcia Kaufman MD as PCP - General  Garcia Kaufman MD as Assigned PCP  Alix Chavarria, PharmD as Pharmacist (Pharmacist)     Patient Active Problem List   Diagnosis   ? Peripheral Neuropathy   ? Renal Insufficiency   ? Corns   ? Arthralgias In Multiple Sites   ? Delayed Post-traumatic Stress Disorder   ? Brain Tumor   ? Hyperlipidemia   ? Carpal Tunnel Syndrome   ? Type 2 diabetes mellitus with microalbuminuria, with long-term current use of insulin (H)   ? Cervical radiculopathy at C6   ? Vitamin D deficiency   ? Brachial neuritis or radiculitis   ? Peripheral vascular disease (H)     Past Medical History:   Diagnosis Date   ? Diabetes mellitus (H)       Past Surgical History:   Procedure Laterality Date   ? LEG TRAUMA        No family history on file.   Social History     Socioeconomic History   ? Marital status:      Spouse name: Not on file   ? Number of children: Not on file   ? Years of education: Not on file   ? Highest education level: Not on file   Occupational History   ? Not on file   Social Needs   ? Financial resource strain: Not on file   ? Food insecurity     Worry: Not on file     Inability: Not on file   ? Transportation  "needs     Medical: Not on file     Non-medical: Not on file   Tobacco Use   ? Smoking status: Never Smoker   ? Smokeless tobacco: Never Used   Substance and Sexual Activity   ? Alcohol use: No   ? Drug use: No   ? Sexual activity: Yes     Partners: Female     Birth control/protection: None   Lifestyle   ? Physical activity     Days per week: Not on file     Minutes per session: Not on file   ? Stress: Not on file   Relationships   ? Social connections     Talks on phone: Not on file     Gets together: Not on file     Attends [employer] service: Not on file     Active member of club or organization: Not on file     Attends meetings of clubs or organizations: Not on file     Relationship status: Not on file   ? Intimate partner violence     Fear of current or ex partner: Not on file     Emotionally abused: Not on file     Physically abused: Not on file     Forced sexual activity: Not on file   Other Topics Concern   ? Not on file   Social History Narrative   ? Not on file      Current Outpatient Medications   Medication Sig Dispense Refill   ? ACCU-CHEK SOFTCLIX LANCETS lancets CHECK BLOOD SUGAR FOUR TIMES DAILY 400 each 0   ? aspirin 81 mg chewable tablet CHEW AND SWALLOW 1 TABLET DAILY 90 tablet 3   ? atorvastatin (LIPITOR) 20 MG tablet TAKE 1 TABLET BY MOUTH AT BEDTIME 90 tablet 1   ? BD ULTRA-FINE JERSON PEN NEEDLES 32 gauge x 5/32\" Ndle USE TO INJECT NOVOLOG AND LANTUS INSULIN WITH 4 NEEDLES DAILY 100 each 12   ? blood glucose meter (GLUCOMETER) Use 1 each As Directed as needed. Dispense glucometer brand per patient's insurance at pharmacy discretion. 1 each 0   ? blood glucose test (ACCU-CHEK SMARTVIEW TEST STRIP) strips Use to test blood sugars four times daily. Dispense brand per patient's insurance at pharmacy discretion. 300 strip 3   ? blood-glucose transmitter (DEXCOM G6 TRANSMITTER) [place] Use as directed for continuous glucose monitoring. Change every 3 months 1 Device 4   ? divalproex (DEPAKOTE ER) 250 MG 24 " "hour tablet TAKE 1 TABLET BY MOUTH EVERY DAY 30 tablet 3   ? flash glucose scanning reader (FREESTYLE KATIE 14 DAY READER) Misc Use 1 each As Directed continuous as needed. Use to read blood sugars as 's instructions 1 each 0   ? flash glucose sensor (FREESTYLE KATIE 14 DAY SENSOR) Kit Use 1 each As Directed continuous as needed. Change every 14 days 2 kit 5   ? HUMALOG KWIKPEN INSULIN 100 unit/mL pen INJECT 7 UNITS UNDER THE SKIN THREE TIMES DAILY BEFORE MEALS 24 mL 0   ? insulin glargine (BASAGLAR KWIKPEN) 100 unit/mL (3 mL) pen ADMINISTER 24 UNITS UNDER THE SKIN AT BEDTIME 3 Syringe 12   ? lisinopril (PRINIVIL,ZESTRIL) 5 MG tablet Take 1 tablet (5 mg total) by mouth daily. 90 tablet 3   ? metFORMIN (GLUCOPHAGE) 1000 MG tablet Take 1 tablet (1,000 mg total) by mouth 2 (two) times a day with meals. 180 tablet 3     No current facility-administered medications for this visit.       Objective:   Vital Signs:   Visit Vitals  /60   Pulse 75   Ht 5' 3\" (1.6 m)   Wt 120 lb (54.4 kg)   SpO2 98%   BMI 21.26 kg/m           VisionScreening:  No exam data present     PHYSICAL EXAM    Assessment Results 7/20/2020   Activities of Daily Living No help needed   Instrumental Activities of Daily Living 2-4 - Moderate impairment   Mini Cog Total Score 3   Some recent data might be hidden     A Mini-Cog score of 0-2 suggests the possibility of dementia, score of 3-5 suggests no dementia  No falls risk  Cognitive screen within normal limits normal clock test normal recall    Identified Health Risks:     The patient reports that he has difficulty with instrumental activities of daily living.  He was provided with a list of local organizations that provide support services and advised to make a follow up appointment in12 weeks to address this further.   The patient was provided with written information regarding signs of hearing loss.  The patient was provided with suggestions to help him develop a healthy emotional " lifestyle.   Information regarding advance directives (living woods), including where he can download the appropriate form, was provided to the patient via the AVS.

## 2021-08-09 ENCOUNTER — OFFICE VISIT (OUTPATIENT)
Dept: FAMILY MEDICINE | Facility: CLINIC | Age: 61
End: 2021-08-09
Payer: COMMERCIAL

## 2021-08-09 VITALS
SYSTOLIC BLOOD PRESSURE: 116 MMHG | OXYGEN SATURATION: 98 % | HEART RATE: 64 BPM | WEIGHT: 123.4 LBS | BODY MASS INDEX: 21.86 KG/M2 | DIASTOLIC BLOOD PRESSURE: 60 MMHG

## 2021-08-09 DIAGNOSIS — I73.9 PERIPHERAL VASCULAR DISEASE (H): ICD-10-CM

## 2021-08-09 DIAGNOSIS — Z79.4 TYPE 2 DIABETES MELLITUS WITH MICROALBUMINURIA, WITH LONG-TERM CURRENT USE OF INSULIN (H): ICD-10-CM

## 2021-08-09 DIAGNOSIS — R80.9 TYPE 2 DIABETES MELLITUS WITH MICROALBUMINURIA, WITH LONG-TERM CURRENT USE OF INSULIN (H): ICD-10-CM

## 2021-08-09 DIAGNOSIS — I21.3 ST ELEVATION MYOCARDIAL INFARCTION (STEMI), UNSPECIFIED ARTERY (H): Primary | ICD-10-CM

## 2021-08-09 DIAGNOSIS — I24.9 ACS (ACUTE CORONARY SYNDROME) (H): ICD-10-CM

## 2021-08-09 DIAGNOSIS — E11.29 TYPE 2 DIABETES MELLITUS WITH MICROALBUMINURIA, WITH LONG-TERM CURRENT USE OF INSULIN (H): ICD-10-CM

## 2021-08-09 PROCEDURE — 80053 COMPREHEN METABOLIC PANEL: CPT | Performed by: FAMILY MEDICINE

## 2021-08-09 PROCEDURE — 99214 OFFICE O/P EST MOD 30 MIN: CPT | Performed by: FAMILY MEDICINE

## 2021-08-09 PROCEDURE — 36415 COLL VENOUS BLD VENIPUNCTURE: CPT | Performed by: FAMILY MEDICINE

## 2021-08-09 RX ORDER — CARVEDILOL 3.12 MG/1
3.12 TABLET ORAL 2 TIMES DAILY WITH MEALS
Qty: 60 TABLET | Refills: 1 | Status: SHIPPED | OUTPATIENT
Start: 2021-08-09 | End: 2021-09-28

## 2021-08-09 RX ORDER — FLASH GLUCOSE SENSOR
1 KIT MISCELLANEOUS CONTINUOUS
Qty: 2 EACH | Refills: 11 | Status: SHIPPED | OUTPATIENT
Start: 2021-08-09 | End: 2021-11-08

## 2021-08-09 NOTE — PROGRESS NOTES
Conclusion    61-year-old male with multiple risk factors for atherosclerosis, but no documented prior cardiac history, presenting with 1 week of anginal symptoms as well as 2-3 episodes of syncope concerning for a cardiac arrhythmic etiology.  Presented to the ER today with chest pain and shortness of breath with EKG showing borderline inferior current of injury.     Urgent coronary angiography showed:     Left main with mild disease  LAD with a 60% proximal stenosis, along diagonal vessel with a 70 to 80% proximal stenosis.  The mid LAD has a 90% stenosis in the distal LAD is subtotally occluded with severe diffuse disease  Left circumflex is nondominant with a 90% proximal stenosis leading into a large first obtuse marginal which has ostial involvement.  The mid circumflex is occluded with a second obtuse marginal filling via faint left to left collaterals.  Right coronary artery is large and dominant with an 80% distal stenosis.  There appeared to be 2 parallel PDA's with the more distal one having an 80% ostial stenosis.  The R MINDY has a 70% stenosis.     LVEF 25% with inferoapical hypokinesis more pronounced  EDP 15mmHg     The angiographic films were carefully reviewed.  The patient was chest pain-free at this point.  Given the severe multivessel disease, resolution of symptoms, cardiomyopathy and diabetes, it was felt that a multidisciplinary evaluation including surgical consultation would be beneficial prior to any decisions regarding revascularization.       Recommendations    Recommendations Restart intravenous heparin 4 hours after radial sheath removal  Intravenous nitroglycerin for chest discomfort if it recurs  Started on high-dose statin  Continue aspirin therapy (holding P2 Y 12 agents, although he did get a Brilinta load in the emergency department)  Heart team evaluation to determine optimal mode of revascularization

## 2021-08-09 NOTE — PATIENT INSTRUCTIONS
Please start the Carvedilol  3.125 1 at 8 AM and 1 at 8 PM   See Cardiology I have mentioned to Dr Av Loo but the cardiologist are all excellent    Start Cardiac Rehab      I would like you to see Cardiology regularly      Currently the medications you are taking for your heart  Aspirin 81 mg 1 daily to thin the blood  Clopidogrel/Plavix 75 mg 1 daily to thin the blood  Lisinopril 5 mg 1 in the morning to help with blood pressure as well as to help the heart heal  Rosuvastatin 40 mg 1 at bedtime this is to help lower the cholesterol and stabilize any cholesterol plaques in the heart arteries    It is important to test your stamina ability because you have 3 major blood vessels that have significant blockages  By exercising you can tell if you are heart can tolerate the increase in workload for demand for blood and oxygen to the heart muscle    I think this is one of the most important follow-ups in addition to seeing a cardiologist    From a blood sugar standpoint  Try to obtain the freestyle fco monitor  Your Lantus level at 24 units controls your first thing in the morning blood sugar after you have woken up  Your after meal blood sugars are indicative of how well your NovoLog or short acting insulin is working    We will follow your hemoglobin A1c to adjust the levels of your medication as well as look at the freestyle fco blood sugars that you are machine is giving you    DanL since are excellently this CAD this could possibly could

## 2021-08-09 NOTE — LETTER
August 11, 2021      Av Fernando  [id number] [address] [address]        Dear ,    We are writing to inform you of your test results.    Kidney liver tests are within normal limits  This is reassuring with current medications  Resulted Orders   Comprehensive metabolic panel (BMP + Alb, Alk Phos, ALT, AST, Total. Bili, TP)   Result Value Ref Range    Sodium 134 (L) 136 - 145 mmol/L    Potassium 4.9 3.5 - 5.0 mmol/L    Chloride 102 98 - 107 mmol/L    Carbon Dioxide (CO2) 22 22 - 31 mmol/L    Anion Gap 10 5 - 18 mmol/L    Urea Nitrogen 11 8 - 22 mg/dL    Creatinine 1.26 0.70 - 1.30 mg/dL    Calcium 8.6 8.5 - 10.5 mg/dL    Glucose 283 (H) 70 - 125 mg/dL    Alkaline Phosphatase 73 45 - 120 U/L    AST 22 0 - 40 U/L    ALT 17 0 - 45 U/L    Protein Total 6.8 6.0 - 8.0 g/dL    Albumin 3.2 (L) 3.5 - 5.0 g/dL    Bilirubin Total 0.3 0.0 - 1.0 mg/dL    GFR Estimate 61 >60 mL/min/1.73m2      Comment:      As of July 11, 2021, eGFR is calculated by the CKD-EPI creatinine equation, without race adjustment. eGFR can be influenced by muscle mass, exercise, and diet. The reported eGFR is an estimation only and is only applicable if the renal function is stable.       If you have any questions or concerns, please call the clinic at the number listed above.       Sincerely,      Garcia Kaufman MD

## 2021-08-10 LAB
ALBUMIN SERPL-MCNC: 3.2 G/DL (ref 3.5–5)
ALP SERPL-CCNC: 73 U/L (ref 45–120)
ALT SERPL W P-5'-P-CCNC: 17 U/L (ref 0–45)
ANION GAP SERPL CALCULATED.3IONS-SCNC: 10 MMOL/L (ref 5–18)
AST SERPL W P-5'-P-CCNC: 22 U/L (ref 0–40)
BILIRUB SERPL-MCNC: 0.3 MG/DL (ref 0–1)
BUN SERPL-MCNC: 11 MG/DL (ref 8–22)
CALCIUM SERPL-MCNC: 8.6 MG/DL (ref 8.5–10.5)
CHLORIDE BLD-SCNC: 102 MMOL/L (ref 98–107)
CO2 SERPL-SCNC: 22 MMOL/L (ref 22–31)
CREAT SERPL-MCNC: 1.26 MG/DL (ref 0.7–1.3)
GFR SERPL CREATININE-BSD FRML MDRD: 61 ML/MIN/1.73M2
GLUCOSE BLD-MCNC: 283 MG/DL (ref 70–125)
POTASSIUM BLD-SCNC: 4.9 MMOL/L (ref 3.5–5)
PROT SERPL-MCNC: 6.8 G/DL (ref 6–8)
SODIUM SERPL-SCNC: 134 MMOL/L (ref 136–145)

## 2021-08-22 ENCOUNTER — HEALTH MAINTENANCE LETTER (OUTPATIENT)
Age: 61
End: 2021-08-22

## 2021-09-15 DIAGNOSIS — Z98.890 STATUS POST CORONARY ANGIOGRAM: ICD-10-CM

## 2021-09-15 DIAGNOSIS — I21.3 ST ELEVATION MYOCARDIAL INFARCTION (STEMI), UNSPECIFIED ARTERY (H): ICD-10-CM

## 2021-09-15 RX ORDER — ROSUVASTATIN CALCIUM 40 MG/1
40 TABLET, COATED ORAL DAILY
Qty: 30 TABLET | Refills: 0 | OUTPATIENT
Start: 2021-09-15

## 2021-09-15 RX ORDER — CLOPIDOGREL BISULFATE 75 MG/1
75 TABLET ORAL DAILY
Qty: 30 TABLET | Refills: 0 | OUTPATIENT
Start: 2021-09-15

## 2021-09-15 RX ORDER — NITROGLYCERIN 0.4 MG/1
TABLET SUBLINGUAL
Qty: 10 TABLET | Refills: 0 | OUTPATIENT
Start: 2021-09-15

## 2021-09-15 NOTE — TELEPHONE ENCOUNTER
Crista, these printed for Dr Kaufman can you please resend    Donna Rodriguez CMA ([facility])

## 2021-09-16 ENCOUNTER — NURSE TRIAGE (OUTPATIENT)
Dept: NURSING | Facility: CLINIC | Age: 61
End: 2021-09-16

## 2021-09-16 NOTE — TELEPHONE ENCOUNTER
"Call from patient's son, Jocelyn, who says he picked up 2 of the medication for patient. He is missing the nitroglycerin. Advised will resend it to the pharmacy since it was sent to local print and may not have been sent to the pharmacy.    Verbal order given to pharmacy.    Susan Manning RN/[facility]        Reason for Disposition    [1] Prescription prescribed recently is not at pharmacy AND [2] triager has access to patient's EMR AND [3] prescription is recorded in the EMR    Additional Information    Negative: MORE THAN A DOUBLE DOSE of a prescription or over-the-counter (OTC) drug    Negative: [1] DOUBLE DOSE (an extra dose or lesser amount) of over-the-counter (OTC) drug AND [2] any symptoms (e.g., dizziness, nausea, pain, sleepiness)    Negative: [1] DOUBLE DOSE (an extra dose or lesser amount) of prescription drug AND [2] any symptoms (e.g., dizziness, nausea, pain, sleepiness)    Negative: Took another person's prescription drug    Negative: [1] DOUBLE DOSE (an extra dose or lesser amount) of prescription drug AND [2] NO symptoms (Exception: a double dose of antibiotics)    Negative: Diabetes drug error or overdose (e.g., took wrong type of insulin or took extra dose)    Negative: [1] Request for URGENT new prescription or refill of \"essential\" medication (i.e., likelihood of harm to patient if not taken) AND [2] triager unable to fill per unit policy    Negative: [1] Prescription not at pharmacy AND [2] was prescribed by PCP recently    Negative: [1] Pharmacy calling with prescription questions AND [2] triager unable to answer question    Negative: [1] Caller has URGENT medication question about med that PCP or specialist prescribed AND [2] triager unable to answer question    Negative: [1] Caller has NON-URGENT medication question about med that PCP prescribed AND [2] triager unable to answer question    Negative: [1] Caller requesting a NON-URGENT new prescription or refill AND [2] triager " unable to refill per unit policy    Negative: [1] Caller has medication question about med not prescribed by PCP AND [2] triager unable to answer question (e.g., compatibility with other med, storage)    Negative: Caller requesting a CONTROLLED substance prescription refill (e.g., narcotics, ADHD medicines)    Negative: Caller wants to use a complementary or alternative medicine    Protocols used: MEDICATION QUESTION CALL-A-AH

## 2021-09-21 ENCOUNTER — NURSE TRIAGE (OUTPATIENT)
Dept: NURSING | Facility: CLINIC | Age: 61
End: 2021-09-21

## 2021-09-21 DIAGNOSIS — I21.3 ST ELEVATION MYOCARDIAL INFARCTION (STEMI), UNSPECIFIED ARTERY (H): ICD-10-CM

## 2021-09-21 DIAGNOSIS — Z98.890 STATUS POST CORONARY ANGIOGRAM: ICD-10-CM

## 2021-09-21 RX ORDER — ROSUVASTATIN CALCIUM 40 MG/1
40 TABLET, COATED ORAL DAILY
Qty: 30 TABLET | Refills: 0 | Status: SHIPPED | OUTPATIENT
Start: 2021-09-21 | End: 2021-09-28

## 2021-09-21 NOTE — TELEPHONE ENCOUNTER
Last night had a hard time breathing and this morning both ankles are swollen.  Av has concerns for his heart and kidneys and is requesting an in person clinic visit.  Denies fever and shortness of breath.  Patient has been tested for covid and was negative.  Patient states that he has had a cough since July.      COVID 19 Nurse Triage Plan/Patient Instructions    Please be aware that novel coronavirus (COVID-19) may be circulating in the community. If you develop symptoms such as fever, cough, or SOB or if you have concerns about the presence of another infection including coronavirus (COVID-19), please contact your health care provider or visit [email or web address].     Disposition/Instructions    In-Person Visit with provider recommended. Reference Visit Selection Guide.    Thank you for taking steps to prevent the spread of this virus.  o Limit your contact with others.  o Wear a simple mask to cover your cough.  o Wash your hands well and often.    Resources    [facility]: About COVID-19: [email or web address]    CDC: What to Do If You're Sick: www.cdc.gov/coronavirus/2019-ncov/about/steps-when-sick.html    CDC: Ending Home Isolation: www.cdc.gov/coronavirus/2019-ncov/hcp/disposition-in-home-patients.html     CDC: Caring for Someone: www.cdc.gov/coronavirus/2019-ncov/if-you-are-sick/care-for-someone.html     [facility]: Interim Guidance for Hospital Discharge to Home: www.health.[facility].mn.us/diseases/coronavirus/hcp/hospdischarge.pdf    [facility] clinical trials (COVID-19 research studies): clinicalaffairs.[facility]/umn-clinical-trials     Below are the COVID-19 hotlines at the [facility] of [facility] ([facility]). Interpreters are available.   o For health questions: Call 275-930-4037 or 1-167.571.9699 (7 a.m. to 7 p.m.)  o For questions about schools and childcare: Call 580-704-9586 or 1-698.344.9644 (7 a.m. to 7 p.m.)                       Reason for Disposition    [1]  MODERATE pain (e.g., interferes with normal activities, limping) AND [2] present > 3 days    Additional Information    Negative: Difficulty breathing    Negative: Entire foot is cool or blue in comparison to other side    Negative: Fever    Negative: Patient sounds very sick or weak to the triager    Negative: [1] SEVERE pain (e.g., excruciating, unable to walk) AND [2] not improved after 2 hours of pain medicine    Negative: [1] Can't move swollen joint at all AND [2] no fever    Negative: [1] Redness AND [2] painful when touched AND [3] no fever    Negative: [1] Red area or streak [2] large (> 2 in. or 5 cm)    Negative: [1] Thigh or calf pain AND [2] only 1 side AND [3] present > 1 hour    Negative: [1] Thigh, calf, or ankle swelling AND [2] only 1 side    Negative: [1] Thigh, calf, or ankle swelling AND [2] bilateral AND [3] 1 side is more swollen    Negative: [1] Very swollen joint AND [2] no fever    Negative: Looks like a boil, infected sore, deep ulcer or other infected rash (spreading redness, pus)    Protocols used: ANKLE SWELLING-A-AH

## 2021-09-21 NOTE — TELEPHONE ENCOUNTER
Scheduling transferred Av to [facility] as no appointment available.  [facility] advised to go to [facility] for edema in ankles and patient agreed.    COVID 19 Nurse Triage Plan/Patient Instructions    Please be aware that novel coronavirus (COVID-19) may be circulating in the community. If you develop symptoms such as fever, cough, or SOB or if you have concerns about the presence of another infection including coronavirus (COVID-19), please contact your health care provider or visit [email or web address].     Disposition/Instructions    Home care recommended. Follow home care protocol based instructions.    Thank you for taking steps to prevent the spread of this virus.  o Limit your contact with others.  o Wear a simple mask to cover your cough.  o Wash your hands well and often.    Resources    [facility]: About COVID-19: [email or web address]    CDC: What to Do If You're Sick: www.cdc.gov/coronavirus/2019-ncov/about/steps-when-sick.html    CDC: Ending Home Isolation: www.cdc.gov/coronavirus/2019-ncov/hcp/disposition-in-home-patients.html     CDC: Caring for Someone: www.cdc.gov/coronavirus/2019-ncov/if-you-are-sick/care-for-someone.html     [facility]: Interim Guidance for Hospital Discharge to Home: www.health.[facility].mn.us/diseases/coronavirus/hcp/hospdischarge.pdf    [facility] clinical trials (COVID-19 research studies): clinicalaffairs.[facility]-clinical-trials     Below are the COVID-19 hotlines at the [facility] of Health ([facility]). Interpreters are available.   o For health questions: Call 322-489-2638 or 1-301.167.7820 (7 a.m. to 7 p.m.)  o For questions about schools and childcare: Call 714-616-7593 or 1-328.333.4730 (7 a.m. to 7 p.m.)

## 2021-09-24 ENCOUNTER — NURSE TRIAGE (OUTPATIENT)
Dept: NURSING | Facility: CLINIC | Age: 61
End: 2021-09-24

## 2021-09-24 NOTE — TELEPHONE ENCOUNTER
For the last three patient is having a hard time to breath.  Shortness of breath has been present for three days now.  Av is feeling tightness in his chest currently.  Cough is present but has for many years.  Cough is minimal.  Patient was told in August that he has troubles with his veins to heart and declined to have surgery.    Chest tightness currently is minimal.  When Av lays down is when he is getting shortness of breath.  Patient is requesting an in person clinic visit.      COVID 19 Nurse Triage Plan/Patient Instructions    Please be aware that novel coronavirus (COVID-19) may be circulating in the community. If you develop symptoms such as fever, cough, or SOB or if you have concerns about the presence of another infection including coronavirus (COVID-19), please contact your health care provider or visit [email or web address].     Disposition/Instructions    In-Person Visit with provider recommended. Reference Visit Selection Guide.    Thank you for taking steps to prevent the spread of this virus.  o Limit your contact with others.  o Wear a simple mask to cover your cough.  o Wash your hands well and often.    Resources    [facility]: About COVID-19: [email or web address]    CDC: What to Do If You're Sick: www.cdc.gov/coronavirus/2019-ncov/about/steps-when-sick.html    CDC: Ending Home Isolation: www.cdc.gov/coronavirus/2019-ncov/hcp/disposition-in-home-patients.html     CDC: Caring for Someone: www.cdc.gov/coronavirus/2019-ncov/if-you-are-sick/care-for-someone.html     [facility]: Interim Guidance for Hospital Discharge to Home: www.health.[facility].mn.us/diseases/coronavirus/hcp/hospdischarge.pdf    [facility] clinical trials (COVID-19 research studies): clinicalaffairs.[facility]-clinical-trials     Below are the COVID-19 hotlines at the [facility] of [facility] ([facility]). Interpreters are available.   o For health questions: Call 938-264-4022 or 1-792.575.2178  (7 a.m. to 7 p.m.)  o For questions about schools and childcare: Call 112-411-0534 or 1-461.136.8150 (7 a.m. to 7 p.m.)                       Reason for Disposition    Patient wants to be seen    Additional Information    Negative: Severe difficulty breathing (e.g., struggling for each breath, speaks in single words)    Negative: Passed out (i.e., fainted, collapsed and was not responding)    Negative: Difficult to awaken or acting confused (e.g., disoriented, slurred speech)    Negative: Shock suspected (e.g., cold/pale/clammy skin, too weak to stand, low BP, rapid pulse)    Negative: Chest pain lasting longer than 5 minutes and ANY of the following:* Over 45 years old* Over 30 years old and at least one cardiac risk factor (e.g., diabetes, high blood pressure, high cholesterol, smoker, or strong family history of heart disease)* History of heart disease (i.e., angina, heart attack, heart failure, bypass surgery, takes nitroglycerin)* Pain is crushing, pressure-like, or heavy    Negative: Heart beating < 50 beats per minute OR > 140 beats per minute    Negative: Visible sweat on face or sweat dripping down face    Negative: Sounds like a life-threatening emergency to the triager    Negative: SEVERE chest pain    Negative: Pain also in shoulder(s) or arm(s) or jaw    Negative: Difficulty breathing    Negative: Cocaine use within last 3 days    Negative: Major surgery in the past month    Negative: Hip or leg fracture (broken bone) in past month (or had cast on leg or ankle in past month)    Negative: Illness requiring prolonged bedrest in past month (e.g., immobilization, long hospital stay)    Negative: Long-distance travel in past month (e.g., car, bus, train, plane; with trip lasting 6 or more hours)    Negative: History of prior 'blood clot' in leg or lungs (i.e., deep vein thrombosis, pulmonary embolism)    Negative: History of inherited increased risk of blood clots (e.g., Factor 5 Leiden, Anti-thrombin 3,  Protein C or Protein S deficiency, Prothrombin mutation)    Negative: Heart beating irregularly or very rapidly    Negative: Chest pain lasting longer than 5 minutes and occurred in last 3 days (72 hours) (Exception: feels exactly the same as previously diagnosed heartburn and has accompanying sour taste in mouth)    Negative: Chest pain or 'angina' comes and goes and is happening more often (increasing in frequency) or getting worse (increasing in severity) (Exception: chest pains that last only a few seconds)    Negative: Dizziness or lightheadedness    Negative: Coughing up blood    Negative: Patient sounds very sick or weak to the triager    Negative: Cancer treatment in the past two months (or has cancer now)    Negative: Patient says chest pain feels exactly the same as previously diagnosed 'heartburn'  and  describes burning in chest and accompanying sour taste in mouth    Negative: Fever > 100.4 F (38.0 C)    Negative: Chest pain(s) lasting a few seconds persists > 3 days    Negative: Rash in same area as pain (may be described as 'small blisters')    Negative: All other patients with chest pain (Exception: fleeting chest pain lasting a few seconds)    Protocols used: CHEST PAIN-A-OH

## 2021-09-25 NOTE — TELEPHONE ENCOUNTER
Hello       This appeared in my in basket     This patient needs to go to the Emergency Room     [place]

## 2021-09-28 ENCOUNTER — OFFICE VISIT (OUTPATIENT)
Dept: CARDIOLOGY | Facility: CLINIC | Age: 61
End: 2021-09-28
Attending: FAMILY MEDICINE
Payer: COMMERCIAL

## 2021-09-28 VITALS
DIASTOLIC BLOOD PRESSURE: 62 MMHG | HEART RATE: 72 BPM | WEIGHT: 122 LBS | RESPIRATION RATE: 16 BRPM | SYSTOLIC BLOOD PRESSURE: 102 MMHG | BODY MASS INDEX: 21.62 KG/M2 | HEIGHT: 63 IN

## 2021-09-28 DIAGNOSIS — I50.22 CHRONIC SYSTOLIC HEART FAILURE (H): ICD-10-CM

## 2021-09-28 DIAGNOSIS — E11.29 TYPE 2 DIABETES MELLITUS WITH MICROALBUMINURIA, WITH LONG-TERM CURRENT USE OF INSULIN (H): ICD-10-CM

## 2021-09-28 DIAGNOSIS — R80.9 TYPE 2 DIABETES MELLITUS WITH MICROALBUMINURIA, WITH LONG-TERM CURRENT USE OF INSULIN (H): ICD-10-CM

## 2021-09-28 DIAGNOSIS — Z98.890 STATUS POST CORONARY ANGIOGRAM: ICD-10-CM

## 2021-09-28 DIAGNOSIS — Z79.4 TYPE 2 DIABETES MELLITUS WITH MICROALBUMINURIA, WITH LONG-TERM CURRENT USE OF INSULIN (H): ICD-10-CM

## 2021-09-28 DIAGNOSIS — I21.3 ST ELEVATION MYOCARDIAL INFARCTION (STEMI), UNSPECIFIED ARTERY (H): Primary | ICD-10-CM

## 2021-09-28 DIAGNOSIS — I24.9 ACS (ACUTE CORONARY SYNDROME) (H): ICD-10-CM

## 2021-09-28 DIAGNOSIS — I25.5 ISCHEMIC CARDIOMYOPATHY: ICD-10-CM

## 2021-09-28 DIAGNOSIS — I21.3 ST ELEVATION MYOCARDIAL INFARCTION (STEMI), UNSPECIFIED ARTERY (H): ICD-10-CM

## 2021-09-28 LAB
ANION GAP SERPL CALCULATED.3IONS-SCNC: 4 MMOL/L (ref 5–18)
BUN SERPL-MCNC: 14 MG/DL (ref 8–22)
CALCIUM SERPL-MCNC: 9.2 MG/DL (ref 8.5–10.5)
CHLORIDE BLD-SCNC: 108 MMOL/L (ref 98–107)
CO2 SERPL-SCNC: 28 MMOL/L (ref 22–31)
CREAT SERPL-MCNC: 1.1 MG/DL (ref 0.7–1.3)
GFR SERPL CREATININE-BSD FRML MDRD: 72 ML/MIN/1.73M2
GLUCOSE BLD-MCNC: 213 MG/DL (ref 70–125)
POTASSIUM BLD-SCNC: 4.9 MMOL/L (ref 3.5–5)
SODIUM SERPL-SCNC: 140 MMOL/L (ref 136–145)

## 2021-09-28 PROCEDURE — 36415 COLL VENOUS BLD VENIPUNCTURE: CPT | Performed by: INTERNAL MEDICINE

## 2021-09-28 PROCEDURE — 99215 OFFICE O/P EST HI 40 MIN: CPT | Performed by: INTERNAL MEDICINE

## 2021-09-28 PROCEDURE — 83880 ASSAY OF NATRIURETIC PEPTIDE: CPT | Performed by: INTERNAL MEDICINE

## 2021-09-28 PROCEDURE — 80048 BASIC METABOLIC PNL TOTAL CA: CPT | Performed by: INTERNAL MEDICINE

## 2021-09-28 RX ORDER — CLOPIDOGREL BISULFATE 75 MG/1
75 TABLET ORAL DAILY
Qty: 90 TABLET | Refills: 3 | Status: SHIPPED | OUTPATIENT
Start: 2021-09-28 | End: 2021-11-08

## 2021-09-28 RX ORDER — LISINOPRIL 5 MG/1
5 TABLET ORAL DAILY
Qty: 90 TABLET | Refills: 3 | Status: SHIPPED | OUTPATIENT
Start: 2021-09-28 | End: 2021-11-08

## 2021-09-28 RX ORDER — CARVEDILOL 3.12 MG/1
3.12 TABLET ORAL 2 TIMES DAILY WITH MEALS
Qty: 180 TABLET | Refills: 3 | Status: ON HOLD | OUTPATIENT
Start: 2021-09-28 | End: 2022-01-01

## 2021-09-28 RX ORDER — ROSUVASTATIN CALCIUM 40 MG/1
40 TABLET, COATED ORAL DAILY
Qty: 90 TABLET | Refills: 3 | Status: SHIPPED | OUTPATIENT
Start: 2021-09-28 | End: 2022-01-01

## 2021-09-28 RX ORDER — FUROSEMIDE 20 MG
20 TABLET ORAL DAILY
Qty: 90 TABLET | Refills: 3 | Status: ON HOLD | OUTPATIENT
Start: 2021-09-28 | End: 2022-01-01

## 2021-09-28 RX ORDER — ISOSORBIDE MONONITRATE 10 MG/1
20 TABLET ORAL DAILY
Qty: 90 TABLET | Refills: 3 | Status: SHIPPED | OUTPATIENT
Start: 2021-09-28 | End: 2021-09-30

## 2021-09-28 ASSESSMENT — MIFFLIN-ST. JEOR: SCORE: 1245.58

## 2021-09-28 NOTE — LETTER
9/28/2021    Garcia Kaufman MD  [address] [place] [address]    RE: Av Fernando       Dear Colleague,    I had the pleasure of seeing Av Fernando in the [facility].        Thank you, Dr. Garcia Kaufman, for asking the [facility] team to see Mr. Av Fernando to evaluate ischemic heart disease.    Assessment/Recommendations   Assessment:    1.  Coronary artery disease, status post ST elevation inferior wall myocardial infarction in August 2021 with subsequent coronary angiography demonstrating multivessel coronary artery disease.  Was advised to undergo evaluation for cardiovascular surgery; however, the patient left AMA as he has lost to distant cousins following bypass surgery.  Currently reports symptoms of exertional dyspnea and chest tightness with minimal activity.  Suggested adding low-dose isosorbide mononitrate to see if we can help with symptoms.  Also brought up the option of cardiovascular surgical consultation so that he can formally hear options and morbidity/mortality associated with his surgery.  He seems reluctant to consider this today.  2.  Moderate ischemic cardiomyopathy, EF 30 to 35%.  He does report symptoms of shortness of breath, orthopnea and frequent coughing at night.  Suspect he may have an element of volume overload.  Will initiate furosemide at low-dose as his BNP was elevated in the hospital and he was discharged without any diuretic.  We will plan to check BNP today along with basic metabolic profile.  3.  Hypercholesterolemia, now on rosuvastatin 40 mg daily.  4.  Type 2 diabetes mellitus    Plan:  1.  Continue current medications  2.  Check BNP and basic metabolic profile today  3.  Begin isosorbide mononitrate 10 mg daily.  We will also initiate furosemide 20 mg daily for the next 2 days then decrease to 10 mg daily  4.  Follow-up with me in 1 month     History of Present Illness      "Av Fernando is a 61 year old male with history of essential hypertension, hypercholesterolemia, type 2 diabetes mellitus who presented to the ED in early 2021 with complaint of chest pain.  He was found to have evidence of an acute ST elevation inferior wall MI and was taken emergently to cardiac catheterization lab where he was found to have severe three-vessel coronary artery disease.  No stenting was performed and it was recommended that the patient be seen by cardiovascular surgery regarding coronary artery bypass grafting.  Unfortunately, patient became quite fearful as he had 2 cousins who  some time following bypass surgery.  As a result, he signed out AGAINST MEDICAL ADVICE and had plans to pursue a second opinion at [facility] although this never occurred.  Over the last several weeks, he has noted symptoms of exertional dyspnea with some chest tightness.  Has used sublingual nitroglycerin but states it takes a couple hours for his symptoms to improve.  Does report orthopnea as well as a nonproductive cough at nighttime.  Was not discharged on any diuretic despite elevated BNP of 963 at admission.    ECG (personally reviewed): No ECG today    Cardiac Imaging Studies (personally reviewed): No new imaging     Physical Examination Review of Systems   /62 (BP Location: Left arm, Patient Position: Sitting, Cuff Size: Adult Regular)   Pulse 72   Resp 16   Ht 1.588 m (5' 2.5\")   Wt 55.3 kg (122 lb)   BMI 21.96 kg/m    Body mass index is 21.96 kg/m .  Wt Readings from Last 3 Encounters:   21 55.3 kg (122 lb)   21 56 kg (123 lb 6.4 oz)   21 55.5 kg (122 lb 4.8 oz)     General Appearance:   Awake, Alert, No acute distress.   HEENT:  No scleral icterus; the mucous membranes were pink and moist.   Neck: No cervical bruits or jugular venous distention    Chest: The spine was straight. The chest was symmetric.   Lungs:   Respirations unlabored; the lungs are relatively clear " "with few crackles noted at the right base   Cardiovascular:    Regular rate and rhythm.  S1, S2 normal.  No murmur or gallop   Abdomen:  No organomegaly, masses, bruits, or tenderness. Bowels sounds are present   Extremities:  No peripheral edema, clubbing or cyanosis   Skin: No xanthelasma. Warm, Dry.   Musculoskeletal: No tenderness.   Neurologic: Mood and affect are appropriate.    Enc Vitals  BP: 102/62  Pulse: 72  Resp: 16  Weight: 55.3 kg (122 lb)  Height: 158.8 cm (5' 2.5\")                                         Medical History  Surgical History Family History Social History   Past Medical History:   Diagnosis Date     Diabetes mellitus (H)     Past Surgical History:   Procedure Laterality Date     CV CORONARY ANGIOGRAM N/A 8/1/2021    Procedure: Coronary Angiogram;  Surgeon: Deidre Lopes MD;  Location: [facility] CV     CV LEFT VENTRICULOGRAM N/A 8/1/2021    Procedure: Left Ventriculogram;  Surgeon: Deidre Lopes MD;  Location: [facility] CATH LAB CV     OTHER SURGICAL HISTORY      LEG TRAUMA    No family history on file. Social History     Socioeconomic History     Marital status:      Spouse name: Not on file     Number of children: Not on file     Years of education: Not on file     Highest education level: Not on file   Occupational History     Not on file   Tobacco Use     Smoking status: Never Smoker     Smokeless tobacco: Never Used   Substance and Sexual Activity     Alcohol use: No     Drug use: No     Sexual activity: Yes     Partners: Female     Birth control/protection: None   Other Topics Concern     Not on file   Social History Narrative     Not on file     Social Determinants of Health     Financial Resource Strain:      Difficulty of Paying Living Expenses:    Food Insecurity:      Worried About Running Out of Food in the Last Year:      Ran Out of Food in the Last Year:    Transportation Needs:      Lack of Transportation (Medical):      Lack of Transportation " "(Non-Medical):    Physical Activity:      Days of Exercise per Week:      Minutes of Exercise per Session:    Stress:      Feeling of Stress :    Social Connections:      Frequency of Communication with Friends and Family:      Frequency of Social Gatherings with Friends and Family:      Attends [employer] Services:      Active Member of Clubs or Organizations:      Attends Club or Organization Meetings:      Marital Status:    Intimate Partner Violence:      Fear of Current or Ex-Partner:      Emotionally Abused:      Physically Abused:      Sexually Abused:           Medications  Allergies   Current Outpatient Medications   Medication Sig Dispense Refill     ACCU-CHEK SOFTCLIX LANCETS lancets [ACCU-CHEK SOFTCLIX LANCETS LANCETS] CHECK BLOOD SUGAR FOUR TIMES DAILY 400 each 0     aspirin (ASA) 81 MG EC tablet Take 1 tablet (81 mg) by mouth daily 30 tablet 0     BD ULTRA-FINE JERSON PEN NEEDLES 32 gauge x 5/32\" Ndle [BD ULTRA-FINE JERSON PEN NEEDLES 32 GAUGE X 5/32\" NDLE] USE TO INJECT NOVOLOG AND LANTUS INSULIN WITH 4 NEEDLES DAILY 100 each 12     blood glucose meter (GLUCOMETER) [BLOOD GLUCOSE METER (GLUCOMETER)] Use 1 each As Directed as needed. Dispense glucometer brand per patient's insurance at pharmacy discretion. 1 each 0     blood glucose test (ACCU-CHEK SMARTVIEW TEST STRIP) strips [BLOOD GLUCOSE TEST (ACCU-CHEK SMARTVIEW TEST STRIP) STRIPS] Use to test blood sugars four times daily. Dispense brand per patient's insurance at pharmacy discretion. 300 strip 3     carvedilol (COREG) 3.125 MG tablet Take 1 tablet (3.125 mg) by mouth 2 times daily (with meals) 180 tablet 3     clopidogrel (PLAVIX) 75 MG tablet Take 1 tablet (75 mg) by mouth daily 90 tablet 3     continuous blood glucose monitoring (FREESTYLE KATIE) sensor 1 each continuous 2 each 11     flash glucose scanning reader (FREESTYLE KATIE 14 DAY READER) Misc [FLASH GLUCOSE SCANNING READER (FREESTYLE KATIE 14 DAY READER) MISC] Use 1 each As Directed " continuous as needed. Use to read blood sugars as 's instructions 1 each 0     furosemide (LASIX) 20 MG tablet Take 1 tablet (20 mg) by mouth daily 90 tablet 3     insulin lispro (HUMALOG KWIKPEN INSULIN) 100 unit/mL pen [INSULIN LISPRO (HUMALOG KWIKPEN INSULIN) 100 UNIT/ML PEN] INJECT 10 UNITS UNDER THE SKIN THREE TIMES DAILY BEFORE MEALS 24 mL 0     isosorbide mononitrate (ISMO/MONOKET) 10 MG tablet Take 2 tablets (20 mg) by mouth daily 90 tablet 3     LANTUS SOLOSTAR U-100 INSULIN 100 unit/mL (3 mL) pen [LANTUS SOLOSTAR U-100 INSULIN 100 UNIT/ML (3 ML) PEN] INJECT 24 UNITS UNDER THE SKIN AT BEDTIME 30 mL 2     lisinopril (ZESTRIL) 5 MG tablet Take 1 tablet (5 mg) by mouth daily 90 tablet 3     metFORMIN (GLUCOPHAGE) 1000 MG tablet [METFORMIN (GLUCOPHAGE) 1000 MG TABLET] Take 1 tablet (1,000 mg total) by mouth 2 (two) times a day. With lunch and dinner (Patient taking differently: Take 1,000 mg by mouth 2 times daily as needed ) 180 tablet 3     nitroGLYcerin (NITROSTAT) 0.4 MG sublingual tablet For chest pain place 1 tablet under the tongue every 5 minutes for 3 doses. If symptoms persist 5 minutes after 1st dose call 911. 10 tablet 0     rosuvastatin (CRESTOR) 40 MG tablet Take 1 tablet (40 mg) by mouth daily 90 tablet 3      Allergies   Allergen Reactions     Januvia [Sitagliptin] Unknown     HEADACHE     Trulicity [Dulaglutide] Dizziness     Weak and muscle weak         Lab Results    Chemistry/lipid CBC Cardiac Enzymes/BNP/TSH/INR   Recent Labs   Lab Test 08/09/21  1636 08/02/21 0418 08/02/21 0418   TRIG  --   --  70   LDL  --   --  109   BUN 11   < > 12   *   < > 144   CO2 22   < > 28    < > = values in this interval not displayed.    Recent Labs   Lab Test 08/02/21 0418   WBC 9.9   HGB 12.1*   HCT 35.7*   MCV 95       Recent Labs   Lab Test 08/02/21 0418 08/01/21  1637 08/01/21  1600 08/01/21  1600   TROPONINI 39.19*  --    < > 1.27*   BNP  --   --   --  963*   INR  --  0.96  --    --     < > = values in this interval not displayed.        A total of 61 minutes was spent reviewing patient's medical records, obtaining history and performing examination, as well as discussing diagnoses/ recommendations with patient and answering all questions.                          Thank you for allowing me to participate in the care of your patient.      Sincerely,     Yamilet Knowlse MD     [facility]  cc:   Garcia Kaufman MD  [address] [place] [address]

## 2021-09-28 NOTE — PATIENT INSTRUCTIONS
1. Draw blood work today  2. Continue your current medications  3. Begin furosemide 20 mg daily to get extra fluid out of your system; do this for 2 days, then decrease to half tablet daily  4. Begin isosorbide mononitrate 10 mg daily  5. Follow back in 1 month  
No

## 2021-09-29 LAB — NT-PROBNP SERPL-MCNC: 3508 PG/ML (ref 0–125)

## 2021-09-30 DIAGNOSIS — I21.3 ST ELEVATION MYOCARDIAL INFARCTION (STEMI), UNSPECIFIED ARTERY (H): ICD-10-CM

## 2021-09-30 RX ORDER — ISOSORBIDE MONONITRATE 10 MG/1
10 TABLET ORAL DAILY
Qty: 90 TABLET | Refills: 1 | Status: SHIPPED | OUTPATIENT
Start: 2021-09-30 | End: 2022-01-01

## 2021-10-05 ENCOUNTER — TELEPHONE (OUTPATIENT)
Dept: FAMILY MEDICINE | Facility: CLINIC | Age: 61
End: 2021-10-05

## 2021-10-05 DIAGNOSIS — R04.2 HEMOPTYSIS: Primary | ICD-10-CM

## 2021-10-05 NOTE — TELEPHONE ENCOUNTER
I have made a referral to pulmonary critical care medicine for coughing up blood    I would like him to do a CT scan prior to seeing them    But he should make an appointment to be scheduled as soon as he is able    Spencer

## 2021-10-05 NOTE — TELEPHONE ENCOUNTER
Reason for Call:  Other call back    Detailed comments: ALFREDO SPOKE TO PT WHO STATES BLOOD WHEN COUGH. DR BRAXTON IS OUT OF OFFICE. PT DOES NOT AGREE WITH DR BRAXTON AND DOES NOT WANT TO SEE HER. PT STATED HE CALLED DR NAVARRO AND WAS TOLD NO APPTS AVAIL TO SEE HIM. ALFREDO STATED SHE WOULD CONTACT DR MARES OFFICE TO SEE IF THEY CAN HELP PT GET APPT SOON. ALFREDO WILL FORWARD HER NOTES FROM RECENT LAB RESULTS TO DR NAVARRO.    Phone Number Patient can be reached at: Home number on file [phone] (home) PT CALL BACK (ALFREDO CELL [phone])    Best Time: ALFREDO IS NOT EXPECTING CALL BACK, WOULD LIKE CALL BACK FOR PT BUT IF YOU DO CALL HER YOU CAN LEAVE DETAILED VM    Can we leave a detailed message on this number? YES    Call taken on 10/5/2021 at 12:02 PM by Chantale Blevins

## 2021-10-05 NOTE — TELEPHONE ENCOUNTER
Called all numbers listed for patient in chart. Left voice mail for patient to return call. Left voice mail for Yamilet the Heart clinic nurse for Dr. Knowles and informed her.

## 2021-10-17 ENCOUNTER — HEALTH MAINTENANCE LETTER (OUTPATIENT)
Age: 61
End: 2021-10-17

## 2021-11-05 ENCOUNTER — HOSPITAL ENCOUNTER (OUTPATIENT)
Dept: CT IMAGING | Facility: CLINIC | Age: 61
Discharge: HOME | End: 2021-11-05
Attending: FAMILY MEDICINE | Admitting: FAMILY MEDICINE
Payer: COMMERCIAL

## 2021-11-05 DIAGNOSIS — R04.2 HEMOPTYSIS: ICD-10-CM

## 2021-11-05 PROCEDURE — 71250 CT THORAX DX C-: CPT

## 2021-11-08 ENCOUNTER — OFFICE VISIT (OUTPATIENT)
Dept: FAMILY MEDICINE | Facility: CLINIC | Age: 61
End: 2021-11-08
Payer: COMMERCIAL

## 2021-11-08 VITALS
DIASTOLIC BLOOD PRESSURE: 62 MMHG | OXYGEN SATURATION: 99 % | WEIGHT: 122.1 LBS | HEART RATE: 60 BPM | BODY MASS INDEX: 21.98 KG/M2 | SYSTOLIC BLOOD PRESSURE: 124 MMHG

## 2021-11-08 DIAGNOSIS — I21.3 ST ELEVATION MYOCARDIAL INFARCTION (STEMI), UNSPECIFIED ARTERY (H): ICD-10-CM

## 2021-11-08 DIAGNOSIS — R80.9 TYPE 2 DIABETES MELLITUS WITH MICROALBUMINURIA, WITH LONG-TERM CURRENT USE OF INSULIN (H): ICD-10-CM

## 2021-11-08 DIAGNOSIS — E11.29 TYPE 2 DIABETES MELLITUS WITH MICROALBUMINURIA, WITH LONG-TERM CURRENT USE OF INSULIN (H): ICD-10-CM

## 2021-11-08 DIAGNOSIS — R05.9 COUGH: Primary | ICD-10-CM

## 2021-11-08 DIAGNOSIS — Z98.890 STATUS POST CORONARY ANGIOGRAM: ICD-10-CM

## 2021-11-08 DIAGNOSIS — Z79.4 TYPE 2 DIABETES MELLITUS WITH MICROALBUMINURIA, WITH LONG-TERM CURRENT USE OF INSULIN (H): ICD-10-CM

## 2021-11-08 PROCEDURE — 99214 OFFICE O/P EST MOD 30 MIN: CPT | Performed by: FAMILY MEDICINE

## 2021-11-08 RX ORDER — INSULIN GLARGINE 100 [IU]/ML
INJECTION, SOLUTION SUBCUTANEOUS
Qty: 15 ML | Refills: 11 | Status: SHIPPED | OUTPATIENT
Start: 2021-11-08 | End: 2022-01-01

## 2021-11-08 RX ORDER — INSULIN LISPRO 100 [IU]/ML
8 INJECTION, SOLUTION INTRAVENOUS; SUBCUTANEOUS
Qty: 15 ML | Refills: 11 | Status: SHIPPED | OUTPATIENT
Start: 2021-11-08 | End: 2021-01-01

## 2021-11-08 RX ORDER — CLOPIDOGREL BISULFATE 75 MG/1
75 TABLET ORAL DAILY
Qty: 90 TABLET | Refills: 3 | Status: SHIPPED | OUTPATIENT
Start: 2021-11-08 | End: 2022-01-01

## 2021-11-08 RX ORDER — NITROGLYCERIN 0.4 MG/1
TABLET SUBLINGUAL
Qty: 10 TABLET | Refills: 0 | Status: SHIPPED | OUTPATIENT
Start: 2021-11-08 | End: 2021-01-01

## 2021-11-08 NOTE — ASSESSMENT & PLAN NOTE
Night only   Since July   After Emergency and Heart Doctor     Carvedilol 3.125 mg BID   Furosemide 20 mg Daily   Isosorbide Mono 20 mg 2 daily   Lisinopril 5 mg Daily   No chest pain   No tightness anymore     Happened in September

## 2021-11-08 NOTE — PROGRESS NOTES
Av is 61 diabetes dyslipidemia was in the emergency room in August 2021 was found to have evidence of acute ST-T wave elevation  Inferior wall myocardial infarction  He was taken to the Cath Lab  Was found to have severe three-vessel disease    No stenting was performed it was recommended that he be seen by cardiovascular surgery    He declined cardiovascular surgery  Apparently left AGAINST MEDICAL ADVICE  He continues to feel better  Has been walking 2-3 blocks a day  Recently saw Dr. Knowles    Is managing his diabetes  Is interested in continuous glucose monitoring such as freestyle or Dexcom    Currently is on medical assistance    He states that he is having trouble caring for himself that his wife needs to do things around the house    No chest pain activity    Lantus dose 24  NovoLog dose 8  No issues with low blood sugars    Does have an ongoing cough  Is on lisinopril 5 mg  Had a CT scan of his chest which showed some bronchiectasis    He is pleasant well dressed no distress    Full range of affect  I do not appreciate carotid bruits  Lungs clear no crackles no wheezing  Cardiac S1-S2 I do not appreciate a murmur I reviewed his echocardiogram he does have a mitral valve regurgitation  No lower extremity edema  Palpable excellent distal pulses  Some nail changes  No evidence of callus buildup or preulcerative calluses  Light touch sensation is intact          Patient Instructions   Av thank you for coming in    The following medications are for your heart    Carvedilol 3.125 mg 1 twice daily   this is to take the pressure off your heart    Furosemide 20 mg 1 daily  This is to help to keep the water levels lower in your circulation again to take the pressure off your heart    Isosorbide mononitrate 10 mg 1 daily  This is to help keep the heart arteries flowing freely    Clopidogrel 75 mg 1 daily this is a blood thinner  Aspirin 81 mg 1 daily this is a blood thinner    Rosuvastatin 40 mg 1 at bedtime  this is to help prevent buildup of plaque or stuff in the arteries of the heart    Your diabetes medications  Lantus 24 units at bedtime  NovoLog 8 units with meals  Metformin 1000 mg twice daily is again to help diabetes        I would like to see you back in 3 weeks    Talk to your  about a PCA evaluation  This will need to document services that are needed    We are stopping the lisinopril 5 mg 1 daily as this may be causing your cough I would also like to treat you with  Amoxicillin 500 mg 3 times a day for total of 7 days for bronchitis

## 2021-11-08 NOTE — PATIENT INSTRUCTIONS
Av thank you for coming in    The following medications are for your heart    Carvedilol 3.125 mg 1 twice daily   this is to take the pressure off your heart    Furosemide 20 mg 1 daily  This is to help to keep the water levels lower in your circulation again to take the pressure off your heart    Isosorbide mononitrate 10 mg 1 daily  This is to help keep the heart arteries flowing freely    Clopidogrel 75 mg 1 daily this is a blood thinner  Aspirin 81 mg 1 daily this is a blood thinner    Rosuvastatin 40 mg 1 at bedtime this is to help prevent buildup of plaque or stuff in the arteries of the heart    Your diabetes medications  Lantus 24 units at bedtime  NovoLog 8 units with meals  Metformin 1000 mg twice daily is again to help diabetes        I would like to see you back in 3 weeks    Talk to your  about a PCA evaluation  This will need to document services that are needed      Changes:  We are stopping the lisinopril 5 mg 1 daily as this may be causing your cough I would also like to treat you with  Amoxicillin 500 mg 3 times a day for total of 7 days for bronchitis    I would like you to follow-up in 3 weeks  Lets see how your cough is  Lets do your lab work at that time including your hemoglobin A1c which is a measure of your diabetes  I would also like you to see Dwayne Jones about a continuous glucose monitor

## 2021-11-09 ENCOUNTER — TELEPHONE (OUTPATIENT)
Dept: FAMILY MEDICINE | Facility: CLINIC | Age: 61
End: 2021-11-09

## 2021-11-09 NOTE — TELEPHONE ENCOUNTER
----- Message from Garcia Kaufman MD sent at 11/6/2021 12:29 PM CDT -----  Please relay the following message to Av:        Av     This is reassuringThere is no evidence of cancerThere is some trapped debris in the left lower lobe which is felt to be small and mildThis does reaffirm that they are a coronary calcium deposits in your coronary arteries consistent with your known coronary artery diseaseThe last coronary angiogram showed significant changes inLeft anterior descending artery of the heart with 2 areas of blockageLeft circumflex artery with a significant blockageRight coronary artery with 2 blockagesIn some diminished heart function pump as evidenced by a low or depressed ejection fraction of 25%I would be happy to talk to you about these findings at a visit ThomasIt is absolutely imperative that you continue medications to mitigate the stress and strain on your heart and to allow your heart arteries to develop collateral circulation or work around the circulation to feed the muscles of your heartThis also involves controlling your diabetes which will lower inflammation which will also lower your risk of developing more blockage    I would like him to follow-up for a visit to go over these findings and talk through his CT scan as well as his angiogram and bring all of his medicationsDanL

## 2021-11-29 PROBLEM — G89.29 CHRONIC MIDLINE LOW BACK PAIN WITH RIGHT-SIDED SCIATICA: Status: ACTIVE | Noted: 2021-01-01

## 2021-11-29 PROBLEM — M54.41 CHRONIC MIDLINE LOW BACK PAIN WITH RIGHT-SIDED SCIATICA: Status: ACTIVE | Noted: 2021-01-01

## 2021-11-29 PROBLEM — R00.2 PALPITATIONS: Status: RESOLVED | Noted: 2021-08-01 | Resolved: 2021-01-01

## 2021-11-29 NOTE — ASSESSMENT & PLAN NOTE
"Carvedilol 3.125 mg 1 twice daily currently once daily   this is to take the pressure off your heart     Furosemide 20 mg 1 daily  This is to help to keep the water levels lower in your circulation again to take the pressure off your heart     Isosorbide mononitrate 10 mg 1 Twice daily 7:30 AM/ 5:00 PM   This is to help keep the heart arteries flowing freely     Clopidogrel 75 mg 1 daily this is a blood thinner  Aspirin 81 mg 1 daily this is a blood thinner     Rosuvastatin 40 mg 1 at bedtime this is to help prevent buildup of plaque or stuff in the arteries of the heart    Last NTG 2 weeks Triggered   \"at night Chest was tight>> NTG >> 30 minutes better\"  Went to kitchen hot water >> even better         Walking 1/2 mile every day   Feeling OK as long as I walk        "

## 2021-11-29 NOTE — ASSESSMENT & PLAN NOTE
"July 27 2021  = in the home \"hungry for food\" and went to basement to get food and walked up and wnet from kitchen to tv room \"passed out\"  10 minutes      Aug 1 2021 = Had lumber sale \"grabbed 4-5 2x4 and hard time breathing and collapse and passed out\" \"close to 30 min\"     "

## 2021-11-29 NOTE — PATIENT INSTRUCTIONS
Thank you for coming in again today Av    Your blood pressure is excellent    I am glad to hear your angina (pain that comes on with activity) has only been once in the last 2 weeks  Remember nitroglycerin under the tongue helps to dilate the arteries of the heart to help improve blood flow    I would like to explore the possibility of you taking the medicine Entresto  This is a combination of 2 medications  It helps to improve the function of the heart and preserve heart function  If you were to take this likely you would have to discontinue 2 of your other medications because it can powerfully lower blood pressure  We would have to consider likely discontinuing: isosorbide mononitrate 10 mg dosing and furosemide 20 mg dosing    I will ask our pharmacist Dwayne Jones to see if this is a covered benefit  If you wish to transition to this Entresto at the lowest dose 24/26 I would have you come in for discussion with Dwayne Jones to ensure that you discontinue the medications that could lower your blood pressure too much    I would also like you to check your hemoglobin A1c at next visit which is your diabetes test    Spencer

## 2021-11-29 NOTE — PROGRESS NOTES
"ASSESSMENT & PLAN    Coronary artery disease of native artery with stable angina pectoris (H)  Carvedilol 3.125 mg 1 twice daily currently once daily   this is to take the pressure off your heart     Furosemide 20 mg 1 daily  This is to help to keep the water levels lower in your circulation again to take the pressure off your heart     Isosorbide mononitrate 10 mg 1 Twice daily 7:30 AM/ 5:00 PM   This is to help keep the heart arteries flowing freely     Clopidogrel 75 mg 1 daily this is a blood thinner  Aspirin 81 mg 1 daily this is a blood thinner     Rosuvastatin 40 mg 1 at bedtime this is to help prevent buildup of plaque or stuff in the arteries of the heart    Last NTG 2 weeks Triggered   \"at night Chest was tight>> NTG >> 30 minutes better\"  Went to kitchen hot water >> even better         Walking 1/2 mile every day   Feeling OK as long as I walk          Type 2 diabetes mellitus with microalbuminuria, with long-term current use of insulin (H)  Your diabetes medications  Lantus 24 units at bedtime  NovoLog 8 units with meals  (2 meals)         Metformin 1000 mg twice daily is again to help diabetes      Peripheral Neuropathy  Pain       Chronic midline low back pain with right-sided sciatica  Makes legs shaky  Low back   Right > Left  Occasional Numb     Occasional Leg gets frozen  \"help out of bed\"    Trouble with   Can't Clean house / Laundry \"back Locks\"  Cooking  Back starts to hurt and lock   Shopping Due to can't go by myself had a fainting    Somebody there in case of falls    6- 8 hours       Syncope, unspecified syncope type  July 27 2021  = in the home \"hungry for food\" and went to basement to get food and walked up and wnet from kitchen to tv room \"passed out\"  10 minutes      Aug 1 2021 = Had lumber sale \"grabbed 4-5 2x4 and hard time breathing and collapse and passed out\" \"close to 30 min\"         Av was seen today for follow up.    Diagnoses and all orders for this visit:    Coronary artery " disease involving native coronary artery of native heart, unspecified whether angina present    Type 2 diabetes mellitus with microalbuminuria, with long-term current use of insulin (H)  -     metFORMIN (GLUCOPHAGE) 1000 MG tablet; Take 1 tablet (1,000 mg) by mouth 2 times daily (with meals)    Peripheral Neuropathy    Chronic midline low back pain with right-sided sciatica    Syncope, unspecified syncope type        There are no Patient Instructions on file for this visit.    No follow-ups on file.       [unfilled]    CHIEF COMPLAINT: Av Fernando had concerns including Follow Up (cough has gotten better, coronary angiogram).    [place]: 1.............. SUBJECTIVE:  Av Fernando is a 61 year old male had concerns including Follow Up (cough has gotten better, coronary angiogram).    1. Coronary artery disease involving native coronary artery of native heart, unspecified whether angina present    2. Type 2 diabetes mellitus with microalbuminuria, with long-term current use of insulin (H)    3. Peripheral Neuropathy    4. Chronic midline low back pain with right-sided sciatica    5. Syncope, unspecified syncope type      Av comes in follow-up coronary artery disease  He has been doing quite well  He continues to walk half a mile as long as it is on the flat he does well    He is having some problems with certain activities of daily living  Cleaning, cooking, shopping and sometimes just transferring such as getting out of a chair needs help  Has had 2 episodes of syncope    He is concerned that he has coronary artery disease was triggered by his Covid vaccine  Unfortunately he had episodes of syncope x2 after he received the vaccine  And was diagnosed with three-vessel coronary disease    Report as below    He has a 80% left circumflex  He has a 60% and 90% LAD  He has another 80% RCA lesion      His ejection fraction was 25%    We talked about an ICD defibrillator device which she is for the time being declined    We  talked about coronary bypass he tells me about 2 stories about cousins who passed away after coronary bypass surgery    We talked about sequential stenting and stenting procedures he wishes to hold off at the present time    Currently he is on furosemide 20 mg daily  Isosorbide 10 mg prescribed to twice daily twice daily but he takes it 1 twice daily  Carvedilol 3.125 prescribed twice daily he takes it once daily    He consistently takes clopidogrel 75 mg daily and aspirin 81 mg daily    He is currently not using Metformin as he takes this only when he feels he has larger meals    He is taking his Lantus at 24 and Humalog 8 mg usually before 2 meals daily he is due for hemoglobin A1c decline any blood work at the present time  Next we talked about patient care assistance PCA services and I wrote a letter for him today    He has stable angina but does come on after certain activities last time was 2 weeks ago which resolved after 30 minutes        Allergies   Allergen Reactions     Januvia [Sitagliptin] Unknown     HEADACHE     Trulicity [Dulaglutide] Dizziness     Weak and muscle weak                         SOCIAL: He  reports that he has never smoked. He has never used smokeless tobacco. He reports that he does not drink alcohol and does not use drugs.    REVIEW OF SYSTEMS:   Family history not pertinent to chief complaint or presenting problem    Review of systems otherwise negative as requested from patient, except   Those positive ROS outlined and discussed in [place].      VITALS:  Vitals:    11/29/21 1547   BP: 104/62   Pulse: 64   SpO2: 99%   Weight: 54.5 kg (120 lb 1 oz)     Wt Readings from Last 3 Encounters:   11/29/21 54.5 kg (120 lb 1 oz)   11/08/21 55.4 kg (122 lb 1.6 oz)   09/28/21 55.3 kg (122 lb)     Body mass index is 21.61 kg/m .    Physical Exam:  Full range of affect  No carotid bruits  No jugular venous distention  Lungs clear without crackles  Cardiac S1-S2 regular sinus no appreciable murmur  gallop  Lower extremities without edema  He has palpable posterior tibial pulses       I spent 40  minutes with this patient.  This includes pre-visit, intra-visit and post visit work an evaluation with regards to Av was seen today for follow up.    Diagnoses and all orders for this visit:    Coronary artery disease involving native coronary artery of native heart, unspecified whether angina present    Type 2 diabetes mellitus with microalbuminuria, with long-term current use of insulin (H)  -     metFORMIN (GLUCOPHAGE) 1000 MG tablet; Take 1 tablet (1,000 mg) by mouth 2 times daily (with meals)    Peripheral Neuropathy    Chronic midline low back pain with right-sided sciatica    Syncope, unspecified syncope type        Garcia Kaufman MD  Family Medicine   [place] [id number]  [phone]        Conclusion    61-year-old male with multiple risk factors for atherosclerosis, but no documented prior cardiac history, presenting with 1 week of anginal symptoms as well as 2-3 episodes of syncope concerning for a cardiac arrhythmic etiology.  Presented to the ER today with chest pain and shortness of breath with EKG showing borderline inferior current of injury.     Urgent coronary angiography showed:     Left main with mild disease  LAD with a 60% proximal stenosis, along diagonal vessel with a 70 to 80% proximal stenosis.  The mid LAD has a 90% stenosis in the distal LAD is subtotally occluded with severe diffuse disease  Left circumflex is nondominant with a 90% proximal stenosis leading into a large first obtuse marginal which has ostial involvement.  The mid circumflex is occluded with a second obtuse marginal filling via faint left to left collaterals.  Right coronary artery is large and dominant with an 80% distal stenosis.  There appeared to be 2 parallel PDA's with the more distal one having an 80% ostial stenosis.  The R MINDY has a 70% stenosis.     LVEF 25% with inferoapical  hypokinesis more pronounced  EDP 15mmHg     The angiographic films were carefully reviewed.  The patient was chest pain-free at this point.  Given the severe multivessel disease, resolution of symptoms, cardiomyopathy and diabetes, it was felt that a multidisciplinary evaluation including surgical consultation would be beneficial prior to any decisions regarding revascularization.        Recommendations    Restart intravenous heparin 4 hours after radial sheath removal  Intravenous nitroglycerin for chest discomfort if it recurs  Started on high-dose statin  Continue aspirin therapy (holding P2 Y 12 agents, although he did get a Brilinta load in the emergency department)  Heart team evaluation to determine optimal mode of revascularization     Covid Booster Declined

## 2021-11-29 NOTE — ASSESSMENT & PLAN NOTE
Your diabetes medications  Lantus 24 units at bedtime  NovoLog 8 units with meals  (2 meals)         Metformin 1000 mg twice daily is again to help diabetes

## 2021-11-29 NOTE — ASSESSMENT & PLAN NOTE
"Makes legs shaky  Low back   Right > Left  Occasional Numb     Occasional Leg gets frozen  \"help out of bed\"    Trouble with   Can't Clean house / Laundry \"back Locks\"  Cooking  Back starts to hurt and lock   Shopping Due to can't go by myself had a fainting    Somebody there in case of falls    6- 8 hours     "

## 2021-12-10 NOTE — PATIENT INSTRUCTIONS
PLAN:                            1. Do not take additional aspirin or advil, this may increase your risk of bleeding. Tylenol (acetaminophen) is the safest medication for you to take.   2. We will try to see if the Freestyle Marcus system is covered by your insurance.   3. Aixa will talk to Dr. Kaufman about a new medication called Farxiga to help with heart failure, diabetes, and kidney   4. Labs today     Follow-up: Return in about 2 weeks (around 12/24/2021) for Follow up, with me.

## 2021-12-10 NOTE — Clinical Note
Dr. Kaufman. I do not think he will tolerate Entresto, the best data was at the highest dose. I had historically talked about this with Dr. Echols when I was there several years ago. I wonder about SGLT2 inhibitor. What do you think? I will be out of the office Monday but back Tuesday. I would like to discuss this with you when I get back before starting Av on one of these new medications. I did add BMP to blood work as baseline in anticipation of adjustment next week. Thank you! Aixa

## 2021-12-10 NOTE — PROGRESS NOTES
Medication Therapy Management (MTM) Encounter    ASSESSMENT:                            Medication Adherence/Access: See below for considerations    1. Type 2 diabetes mellitus with microalbuminuria, with long-term current use of insulin (H)  Not at goal A1c less than 7% per ADA guidelines, however A1c is slightly improving.  A1c updated today.  Now that he is on medical assistance a CGM device should be covered, will send this in today.  If needing education on how to use this device he can follow back up in clinic to do so.  Will recommend to continue his current blood sugar regimen until he receives this device.  Again acknowledged that he is not adherent to goal dose of Metformin, recommend continue highest tolerated dose at 1000 mg daily.  Additionally as discussed further below it would benefit from addition of Farxiga 5 mg daily.  Updated baseline BMP today.  Stable on aspirin, ACE inhibitor, refuses statin.    2. Coronary artery disease of native artery with stable angina pectoris, unspecified whether native or transplanted heart (H)  Significant nonadherence to medications as recommended by PCP and cardiology.  He is taking all of his medications once daily including aspirin, clopidogrel, furosemide, lisinopril, isosorbide, carvedilol.  Tolerating without adverse effects.  He adamantly refuses statin.  Provided education to avoid additional aspirin or NSAIDs due to increased risk of bleeding.  The most significant data to reduce rehospitalization for heart failure with regards to Entresto occurred at maximum dose.  He will not tolerate that high of a dose.  Considering benefits versus risks of his current medication regimen which he tolerates versus switching to Entresto, I would favor continuing his current regimen.  Alternatively consider initiation of Farxiga once daily, for decreasing heart failure exacerbations rapidly, benefits occur at low dose, and would also provide additional benefits for elevated  blood sugars.  To discuss with PCP.  Update BMP today in anticipation of medication adjustment next week.    PLAN:                            1. Do not take additional aspirin or advil, this may increase your risk of bleeding. Tylenol (acetaminophen) is the safest medication for you to take.   2. We will try to see if the Freestyle Marcus system is covered by your insurance.   3. Aixa will talk to Dr. Kaufman about a new medication called Farxiga to help with heart failure, diabetes, and kidney   4. Labs today     Follow-up: Return in about 2 weeks (around 12/24/2021) for Follow up, with me.    SUBJECTIVE/OBJECTIVE:                          Av Fernando is a 61 year old male coming in for a follow-up visit. He was referred to me from Dr. Kaufman.  Today's visit is a follow-up MTM visit from 5/21/21.      Reason for visit: Medication management follow-up.    Allergies/ADRs: Reviewed in chart  Past Medical History: Reviewed in chart  Tobacco: He reports that he has never smoked. He has never used smokeless tobacco.  Alcohol: none    Medication Adherence/Access: Ongoing nonadherence, due to concerns about taking too many Medications and impact on his body.  He was removed from his wife's insurance due to cost, he is now through medical assistance and has prescription coverage.    1. Type 2 diabetes mellitus with microalbuminuria, with long-term current use of insulin (H)  He is not monitoring his blood sugars because his test strips are no longer covered by insurance.  He would like to consider a continuous glucose monitor with medical assistance.  He continues on Metformin, however is only agreeable to take 1000 mg once daily.  He has refused previous recommendations to increase to 2 tablets daily.  He is taking 24 units of Lantus every night at bedtime, and Humalog with meals.  He generally takes 8 units prior to each meal, however if he has a particularly large meal he may take 10 units.  If he is simply eating fruits  or vegetables he will not take Humalog.  He will only take Humalog if he eats meat or rice.  Denies signs or symptoms of hypoglycemia.  He will feel sluggish at times.   Hemoglobin A1C   Date Value Ref Range Status   08/02/2021 10.4 (H) <=5.6 % Final   05/12/2021 12.5 (H) <=5.6 % Final   07/20/2020 13.4 (H) <=5.6 % Final      Microalbumin Urine mg/dL   Date Value Ref Range Status   05/12/2021 108.83 (H) 0.00 - 1.99 mg/dL Final      Creatinine   Date Value Ref Range Status   09/28/2021 1.10 0.70 - 1.30 mg/dL Final     2. Coronary artery disease of native artery with stable angina pectoris, unspecified whether native or transplanted heart (H)  Denies chest pain however he does experience shortness of breath particularly when lying flat.  He does feel better when he takes furosemide, experiencing less shortness of breath and improved exercise tolerance.  He will take furosemide, aspirin, clopidogrel every morning and 1 dose of isosorbide and carvedilol in the evening.  He does adhere to lisinopril daily however he adjusts the time of this dosing depending on how he is feeling.  He refuses to take rosuvastatin.  Denies signs or symptoms of hypotension at this time.  He keeps track of his medications on a piece of paper, noting what time he takes which medication as he adjusts them.  If he had pain he would rarely take an extra dose of aspirin or Advil.  He has not previously felt that Tylenol is effective for him.  Reviewed alternative medication discussed by Dion Martinez.  We also reviewed use of Farxiga.  He would be agreeable to start an additional medication, if recommended by Dr. Kaufman.    Today's Vitals:   BP Readings from Last 3 Encounters:   12/10/21 106/64   11/29/21 104/62   11/08/21 124/62   ----------------    I spent 45 minutes with this patient today. All changes were made via collaborative practice agreement with Garcia Kaufman MD. A copy of the visit note was provided to the patient's primary  care provider.    The patient was given a summary of these recommendations.     Dwayne Jones, PharmD, BCACP  Medication Management (MTM) Pharmacist  [facility]   Medication Therapy Recommendations  Coronary artery disease of native artery with stable angina pectoris (H)    Current Medication: aspirin (ASA) 81 MG EC tablet   Rationale: Does not understand instructions - Adherence - Adherence   Recommendation: Provide Education   Status: Patient Agreed - Adherence/Education         Type 2 diabetes mellitus with microalbuminuria, with long-term current use of insulin (H)    Current Medication: insulin lispro (HUMALOG KWIKPEN) 100 UNIT/ML (1 unit dial) KWIKPEN   Rationale: Medication requires monitoring - Needs additional monitoring   Recommendation: Self-Monitoring - FreeStyle Marcus 2 Sensor Misc   Status: Accepted per CPA

## 2021-12-13 NOTE — TELEPHONE ENCOUNTER
Called and LVM.     Due to lab results, PCP would like a follow up with patient and have patient bring in all herbal supplements, supplements, and medications for an office visit. Okay to change a virtual into an office visit.      PCP would also like for patient to be scheduled with Dwayne Jones for MTM review of medications.

## 2021-12-14 NOTE — TELEPHONE ENCOUNTER
I spoke with Tony, since I cannot get a hold of Av (his phone does not consistently function correctly). I have relayed the message that based on recent labs we want Av to completely stop Metformin at this time, and drink more water.  His electrolyte abnormalities are likely due to volume depletion.  I would like to recheck these labs within 1 to 2 weeks to ensure that his renal function has normalized.  Pending those follow-up labs, discussed with Dr. Kaufman that Av would benefit and tolerate SGLT2 inhibitor Farxiga 5 mg daily.  We will discuss this at follow-up. Tony will try to attend this follow-up appointment as well.

## 2022-01-01 ENCOUNTER — OFFICE VISIT (OUTPATIENT)
Dept: CARDIOLOGY | Facility: CLINIC | Age: 62
End: 2022-01-01
Attending: FAMILY MEDICINE
Payer: MEDICAID

## 2022-01-01 ENCOUNTER — APPOINTMENT (OUTPATIENT)
Dept: CT IMAGING | Facility: HOSPITAL | Age: 62
DRG: 291 | End: 2022-01-01
Attending: INTERNAL MEDICINE
Payer: MEDICARE

## 2022-01-01 ENCOUNTER — TELEPHONE (OUTPATIENT)
Dept: CARDIOLOGY | Facility: CLINIC | Age: 62
End: 2022-01-01

## 2022-01-01 ENCOUNTER — OFFICE VISIT (OUTPATIENT)
Dept: CARDIOLOGY | Facility: CLINIC | Age: 62
End: 2022-01-01
Attending: NURSE PRACTITIONER
Payer: MEDICAID

## 2022-01-01 ENCOUNTER — NURSE TRIAGE (OUTPATIENT)
Dept: NURSING | Facility: CLINIC | Age: 62
End: 2022-01-01

## 2022-01-01 ENCOUNTER — MEDICAL CORRESPONDENCE (OUTPATIENT)
Dept: HEALTH INFORMATION MANAGEMENT | Facility: CLINIC | Age: 62
End: 2022-01-01

## 2022-01-01 ENCOUNTER — HOME INFUSION (PRE-WILLOW HOME INFUSION) (OUTPATIENT)
Dept: PHARMACY | Facility: CLINIC | Age: 62
End: 2022-01-01

## 2022-01-01 ENCOUNTER — APPOINTMENT (OUTPATIENT)
Dept: INTERPRETER SERVICES | Facility: CLINIC | Age: 62
End: 2022-01-01

## 2022-01-01 ENCOUNTER — APPOINTMENT (OUTPATIENT)
Dept: OCCUPATIONAL THERAPY | Facility: HOSPITAL | Age: 62
DRG: 291 | End: 2022-01-01
Attending: HOSPITALIST
Payer: MEDICARE

## 2022-01-01 ENCOUNTER — VIRTUAL VISIT (OUTPATIENT)
Dept: INTERNAL MEDICINE | Facility: CLINIC | Age: 62
End: 2022-01-01
Payer: COMMERCIAL

## 2022-01-01 ENCOUNTER — HOSPITAL ENCOUNTER (INPATIENT)
Facility: HOSPITAL | Age: 62
LOS: 3 days | Discharge: HOME | DRG: 250 | End: 2022-05-07
Attending: EMERGENCY MEDICINE | Admitting: INTERNAL MEDICINE
Payer: MEDICARE

## 2022-01-01 ENCOUNTER — TELEPHONE (OUTPATIENT)
Dept: INTERNAL MEDICINE | Facility: CLINIC | Age: 62
End: 2022-01-01

## 2022-01-01 ENCOUNTER — APPOINTMENT (OUTPATIENT)
Dept: OCCUPATIONAL THERAPY | Facility: HOSPITAL | Age: 62
DRG: 291 | End: 2022-01-01
Payer: MEDICARE

## 2022-01-01 ENCOUNTER — PATIENT OUTREACH (OUTPATIENT)
Dept: CARE COORDINATION | Facility: CLINIC | Age: 62
End: 2022-01-01

## 2022-01-01 ENCOUNTER — HOSPITAL ENCOUNTER (INPATIENT)
Facility: HOSPITAL | Age: 62
LOS: 1 days | DRG: 100 | End: 2022-11-11
Attending: EMERGENCY MEDICINE | Admitting: HOSPITALIST
Payer: MEDICARE

## 2022-01-01 ENCOUNTER — OFFICE VISIT (OUTPATIENT)
Dept: FAMILY MEDICINE | Facility: CLINIC | Age: 62
End: 2022-01-01
Payer: MEDICAID

## 2022-01-01 ENCOUNTER — HOSPITAL ENCOUNTER (OUTPATIENT)
Dept: CARDIOLOGY | Facility: HOSPITAL | Age: 62
Discharge: HOME | End: 2022-02-21
Attending: NURSE PRACTITIONER | Admitting: NURSE PRACTITIONER
Payer: MEDICAID

## 2022-01-01 ENCOUNTER — TRANSFERRED RECORDS (OUTPATIENT)
Dept: HEALTH INFORMATION MANAGEMENT | Facility: CLINIC | Age: 62
End: 2022-01-01

## 2022-01-01 ENCOUNTER — TELEPHONE (OUTPATIENT)
Dept: NURSING | Facility: CLINIC | Age: 62
End: 2022-01-01

## 2022-01-01 ENCOUNTER — APPOINTMENT (OUTPATIENT)
Dept: OCCUPATIONAL THERAPY | Facility: HOSPITAL | Age: 62
DRG: 871 | End: 2022-01-01
Payer: MEDICARE

## 2022-01-01 ENCOUNTER — APPOINTMENT (OUTPATIENT)
Dept: RADIOLOGY | Facility: HOSPITAL | Age: 62
DRG: 291 | End: 2022-01-01
Attending: INTERNAL MEDICINE
Payer: MEDICARE

## 2022-01-01 ENCOUNTER — ANESTHESIA (OUTPATIENT)
Dept: INTENSIVE CARE | Facility: HOSPITAL | Age: 62
DRG: 100 | End: 2022-01-01
Payer: MEDICARE

## 2022-01-01 ENCOUNTER — APPOINTMENT (OUTPATIENT)
Dept: RADIOLOGY | Facility: HOSPITAL | Age: 62
DRG: 100 | End: 2022-01-01
Attending: SOCIAL WORKER
Payer: MEDICARE

## 2022-01-01 ENCOUNTER — APPOINTMENT (OUTPATIENT)
Dept: CARDIOLOGY | Facility: HOSPITAL | Age: 62
DRG: 100 | End: 2022-01-01
Attending: INTERNAL MEDICINE
Payer: MEDICARE

## 2022-01-01 ENCOUNTER — DOCUMENTATION ONLY (OUTPATIENT)
Dept: INTERNAL MEDICINE | Facility: CLINIC | Age: 62
End: 2022-01-01

## 2022-01-01 ENCOUNTER — OFFICE VISIT (OUTPATIENT)
Dept: CARDIOLOGY | Facility: CLINIC | Age: 62
End: 2022-01-01
Payer: MEDICAID

## 2022-01-01 ENCOUNTER — HEALTH MAINTENANCE LETTER (OUTPATIENT)
Age: 62
End: 2022-01-01

## 2022-01-01 ENCOUNTER — APPOINTMENT (OUTPATIENT)
Dept: RADIOLOGY | Facility: HOSPITAL | Age: 62
DRG: 100 | End: 2022-01-01
Attending: INTERNAL MEDICINE
Payer: MEDICARE

## 2022-01-01 ENCOUNTER — LAB REQUISITION (OUTPATIENT)
Dept: LAB | Facility: CLINIC | Age: 62
End: 2022-01-01

## 2022-01-01 ENCOUNTER — ANESTHESIA EVENT (OUTPATIENT)
Dept: INTENSIVE CARE | Facility: HOSPITAL | Age: 62
DRG: 100 | End: 2022-01-01
Payer: MEDICARE

## 2022-01-01 ENCOUNTER — APPOINTMENT (OUTPATIENT)
Dept: CT IMAGING | Facility: HOSPITAL | Age: 62
DRG: 871 | End: 2022-01-01
Attending: EMERGENCY MEDICINE
Payer: MEDICARE

## 2022-01-01 ENCOUNTER — APPOINTMENT (OUTPATIENT)
Dept: PHYSICAL THERAPY | Facility: HOSPITAL | Age: 62
DRG: 871 | End: 2022-01-01
Attending: INTERNAL MEDICINE
Payer: MEDICARE

## 2022-01-01 ENCOUNTER — HOSPITAL ENCOUNTER (OUTPATIENT)
Facility: HOSPITAL | Age: 62
Discharge: HOME | End: 2022-03-04
Attending: INTERNAL MEDICINE | Admitting: INTERNAL MEDICINE
Payer: MEDICAID

## 2022-01-01 ENCOUNTER — DOCUMENTATION ONLY (OUTPATIENT)
Dept: CARDIOLOGY | Facility: CLINIC | Age: 62
End: 2022-01-01

## 2022-01-01 ENCOUNTER — HOSPITAL ENCOUNTER (INPATIENT)
Facility: HOSPITAL | Age: 62
LOS: 6 days | Discharge: HOME | DRG: 291 | End: 2022-01-19
Attending: EMERGENCY MEDICINE | Admitting: HOSPITALIST
Payer: MEDICARE

## 2022-01-01 ENCOUNTER — TELEPHONE (OUTPATIENT)
Dept: OPHTHALMOLOGY | Facility: CLINIC | Age: 62
End: 2022-01-01

## 2022-01-01 ENCOUNTER — TELEPHONE (OUTPATIENT)
Dept: FAMILY MEDICINE | Facility: CLINIC | Age: 62
End: 2022-01-01

## 2022-01-01 ENCOUNTER — OFFICE VISIT (OUTPATIENT)
Dept: CARDIOLOGY | Facility: CLINIC | Age: 62
End: 2022-01-01
Attending: INTERNAL MEDICINE
Payer: MEDICAID

## 2022-01-01 ENCOUNTER — APPOINTMENT (OUTPATIENT)
Dept: MRI IMAGING | Facility: HOSPITAL | Age: 62
DRG: 291 | End: 2022-01-01
Attending: INTERNAL MEDICINE
Payer: MEDICARE

## 2022-01-01 ENCOUNTER — APPOINTMENT (OUTPATIENT)
Dept: CT IMAGING | Facility: HOSPITAL | Age: 62
DRG: 100 | End: 2022-01-01
Attending: EMERGENCY MEDICINE
Payer: MEDICARE

## 2022-01-01 ENCOUNTER — HOSPITAL ENCOUNTER (OUTPATIENT)
Dept: CARDIOLOGY | Facility: CLINIC | Age: 62
Discharge: HOME | End: 2022-06-21
Attending: INTERNAL MEDICINE | Admitting: INTERNAL MEDICINE
Payer: MEDICAID

## 2022-01-01 ENCOUNTER — HOSPITAL ENCOUNTER (EMERGENCY)
Facility: HOSPITAL | Age: 62
Discharge: HOME | End: 2022-11-03
Attending: EMERGENCY MEDICINE | Admitting: EMERGENCY MEDICINE
Payer: COMMERCIAL

## 2022-01-01 ENCOUNTER — APPOINTMENT (OUTPATIENT)
Dept: RADIOLOGY | Facility: HOSPITAL | Age: 62
DRG: 100 | End: 2022-01-01
Attending: EMERGENCY MEDICINE
Payer: MEDICARE

## 2022-01-01 ENCOUNTER — PREP FOR PROCEDURE (OUTPATIENT)
Dept: CARDIOLOGY | Facility: CLINIC | Age: 62
End: 2022-01-01

## 2022-01-01 ENCOUNTER — HOSPITAL ENCOUNTER (EMERGENCY)
Facility: HOSPITAL | Age: 62
Discharge: HOME | End: 2022-01-27
Attending: EMERGENCY MEDICINE | Admitting: EMERGENCY MEDICINE
Payer: MEDICAID

## 2022-01-01 ENCOUNTER — APPOINTMENT (OUTPATIENT)
Dept: OCCUPATIONAL THERAPY | Facility: HOSPITAL | Age: 62
DRG: 871 | End: 2022-01-01
Attending: INTERNAL MEDICINE
Payer: MEDICARE

## 2022-01-01 ENCOUNTER — OFFICE VISIT (OUTPATIENT)
Dept: OPHTHALMOLOGY | Facility: CLINIC | Age: 62
End: 2022-01-01
Attending: OPHTHALMOLOGY
Payer: MEDICAID

## 2022-01-01 ENCOUNTER — LAB (OUTPATIENT)
Dept: LAB | Facility: CLINIC | Age: 62
End: 2022-01-01
Payer: MEDICAID

## 2022-01-01 ENCOUNTER — OFFICE VISIT (OUTPATIENT)
Dept: CARDIOLOGY | Facility: CLINIC | Age: 62
End: 2022-01-01
Attending: INTERNAL MEDICINE
Payer: COMMERCIAL

## 2022-01-01 ENCOUNTER — APPOINTMENT (OUTPATIENT)
Dept: RADIOLOGY | Facility: HOSPITAL | Age: 62
DRG: 100 | End: 2022-01-01
Attending: HOSPITALIST
Payer: MEDICARE

## 2022-01-01 ENCOUNTER — LAB REQUISITION (OUTPATIENT)
Dept: LAB | Facility: HOSPITAL | Age: 62
End: 2022-01-01
Payer: COMMERCIAL

## 2022-01-01 ENCOUNTER — HOSPITAL ENCOUNTER (OUTPATIENT)
Facility: HOSPITAL | Age: 62
End: 2022-01-01
Attending: INTERNAL MEDICINE | Admitting: INTERNAL MEDICINE
Payer: MEDICARE

## 2022-01-01 ENCOUNTER — VIRTUAL VISIT (OUTPATIENT)
Dept: PHARMACY | Facility: CLINIC | Age: 62
End: 2022-01-01
Attending: FAMILY MEDICINE
Payer: MEDICAID

## 2022-01-01 ENCOUNTER — APPOINTMENT (OUTPATIENT)
Dept: ULTRASOUND IMAGING | Facility: HOSPITAL | Age: 62
DRG: 100 | End: 2022-01-01
Attending: HOSPITALIST
Payer: MEDICARE

## 2022-01-01 ENCOUNTER — DOCUMENTATION ONLY (OUTPATIENT)
Dept: FAMILY MEDICINE | Facility: CLINIC | Age: 62
End: 2022-01-01

## 2022-01-01 ENCOUNTER — APPOINTMENT (OUTPATIENT)
Dept: RADIOLOGY | Facility: HOSPITAL | Age: 62
DRG: 291 | End: 2022-01-01
Attending: EMERGENCY MEDICINE
Payer: MEDICARE

## 2022-01-01 ENCOUNTER — APPOINTMENT (OUTPATIENT)
Dept: RADIOLOGY | Facility: HOSPITAL | Age: 62
DRG: 250 | End: 2022-01-01
Attending: FAMILY MEDICINE
Payer: MEDICARE

## 2022-01-01 ENCOUNTER — APPOINTMENT (OUTPATIENT)
Dept: CARDIOLOGY | Facility: HOSPITAL | Age: 62
DRG: 291 | End: 2022-01-01
Attending: HOSPITALIST
Payer: MEDICARE

## 2022-01-01 ENCOUNTER — HOSPITAL ENCOUNTER (INPATIENT)
Facility: HOSPITAL | Age: 62
LOS: 8 days | Discharge: HOME HEALTH | DRG: 871 | End: 2022-10-19
Attending: EMERGENCY MEDICINE | Admitting: INTERNAL MEDICINE
Payer: MEDICARE

## 2022-01-01 ENCOUNTER — APPOINTMENT (OUTPATIENT)
Dept: OCCUPATIONAL THERAPY | Facility: HOSPITAL | Age: 62
DRG: 250 | End: 2022-01-01
Attending: INTERNAL MEDICINE
Payer: MEDICARE

## 2022-01-01 ENCOUNTER — APPOINTMENT (OUTPATIENT)
Dept: RADIOLOGY | Facility: HOSPITAL | Age: 62
DRG: 280 | End: 2022-01-01
Attending: EMERGENCY MEDICINE
Payer: MEDICARE

## 2022-01-01 ENCOUNTER — PATIENT OUTREACH (OUTPATIENT)
Dept: NURSING | Facility: CLINIC | Age: 62
End: 2022-01-01

## 2022-01-01 ENCOUNTER — APPOINTMENT (OUTPATIENT)
Dept: PHYSICAL THERAPY | Facility: HOSPITAL | Age: 62
DRG: 871 | End: 2022-01-01
Payer: MEDICARE

## 2022-01-01 ENCOUNTER — OFFICE VISIT (OUTPATIENT)
Dept: INTERNAL MEDICINE | Facility: CLINIC | Age: 62
End: 2022-01-01
Payer: MEDICAID

## 2022-01-01 ENCOUNTER — HOSPITAL ENCOUNTER (INPATIENT)
Facility: HOSPITAL | Age: 62
LOS: 2 days | Discharge: HOME | DRG: 280 | End: 2022-04-27
Attending: EMERGENCY MEDICINE | Admitting: HOSPITALIST
Payer: MEDICARE

## 2022-01-01 ENCOUNTER — APPOINTMENT (OUTPATIENT)
Dept: RADIOLOGY | Facility: HOSPITAL | Age: 62
DRG: 250 | End: 2022-01-01
Payer: MEDICARE

## 2022-01-01 VITALS
BODY MASS INDEX: 21.22 KG/M2 | HEART RATE: 62 BPM | WEIGHT: 116 LBS | RESPIRATION RATE: 16 BRPM | DIASTOLIC BLOOD PRESSURE: 62 MMHG | SYSTOLIC BLOOD PRESSURE: 88 MMHG

## 2022-01-01 VITALS
SYSTOLIC BLOOD PRESSURE: 104 MMHG | OXYGEN SATURATION: 98 % | BODY MASS INDEX: 21.64 KG/M2 | HEART RATE: 80 BPM | WEIGHT: 118.3 LBS | DIASTOLIC BLOOD PRESSURE: 70 MMHG

## 2022-01-01 VITALS
WEIGHT: 99 LBS | SYSTOLIC BLOOD PRESSURE: 104 MMHG | HEIGHT: 62 IN | HEART RATE: 63 BPM | DIASTOLIC BLOOD PRESSURE: 70 MMHG | BODY MASS INDEX: 18.22 KG/M2 | RESPIRATION RATE: 16 BRPM

## 2022-01-01 VITALS
HEIGHT: 62 IN | HEART RATE: 60 BPM | TEMPERATURE: 98 F | SYSTOLIC BLOOD PRESSURE: 90 MMHG | RESPIRATION RATE: 18 BRPM | OXYGEN SATURATION: 97 % | BODY MASS INDEX: 20.98 KG/M2 | HEART RATE: 62 BPM | HEIGHT: 62 IN | RESPIRATION RATE: 16 BRPM | DIASTOLIC BLOOD PRESSURE: 58 MMHG | WEIGHT: 114 LBS | SYSTOLIC BLOOD PRESSURE: 89 MMHG | BODY MASS INDEX: 21.71 KG/M2 | DIASTOLIC BLOOD PRESSURE: 62 MMHG | WEIGHT: 118 LBS

## 2022-01-01 VITALS
WEIGHT: 115 LBS | SYSTOLIC BLOOD PRESSURE: 112 MMHG | BODY MASS INDEX: 21.03 KG/M2 | DIASTOLIC BLOOD PRESSURE: 68 MMHG | HEART RATE: 63 BPM | OXYGEN SATURATION: 97 %

## 2022-01-01 VITALS
DIASTOLIC BLOOD PRESSURE: 58 MMHG | SYSTOLIC BLOOD PRESSURE: 86 MMHG | RESPIRATION RATE: 16 BRPM | BODY MASS INDEX: 20.5 KG/M2 | OXYGEN SATURATION: 98 % | WEIGHT: 112.1 LBS | HEART RATE: 80 BPM

## 2022-01-01 VITALS
SYSTOLIC BLOOD PRESSURE: 92 MMHG | DIASTOLIC BLOOD PRESSURE: 66 MMHG | WEIGHT: 116.4 LBS | BODY MASS INDEX: 21.42 KG/M2 | RESPIRATION RATE: 20 BRPM | HEART RATE: 72 BPM | HEIGHT: 62 IN

## 2022-01-01 VITALS
TEMPERATURE: 93.4 F | WEIGHT: 108.6 LBS | DIASTOLIC BLOOD PRESSURE: 51 MMHG | BODY MASS INDEX: 19.86 KG/M2 | RESPIRATION RATE: 20 BRPM | OXYGEN SATURATION: 88 % | SYSTOLIC BLOOD PRESSURE: 119 MMHG

## 2022-01-01 VITALS
HEIGHT: 62 IN | WEIGHT: 110.7 LBS | TEMPERATURE: 97.4 F | HEART RATE: 63 BPM | SYSTOLIC BLOOD PRESSURE: 112 MMHG | BODY MASS INDEX: 20.37 KG/M2 | RESPIRATION RATE: 16 BRPM | OXYGEN SATURATION: 98 % | DIASTOLIC BLOOD PRESSURE: 66 MMHG

## 2022-01-01 VITALS
WEIGHT: 108 LBS | BODY MASS INDEX: 19.75 KG/M2 | OXYGEN SATURATION: 98 % | DIASTOLIC BLOOD PRESSURE: 69 MMHG | HEART RATE: 58 BPM | SYSTOLIC BLOOD PRESSURE: 128 MMHG | TEMPERATURE: 98 F | RESPIRATION RATE: 16 BRPM

## 2022-01-01 VITALS
WEIGHT: 109 LBS | OXYGEN SATURATION: 97 % | BODY MASS INDEX: 19.94 KG/M2 | HEART RATE: 68 BPM | RESPIRATION RATE: 16 BRPM | SYSTOLIC BLOOD PRESSURE: 102 MMHG | DIASTOLIC BLOOD PRESSURE: 68 MMHG

## 2022-01-01 VITALS
DIASTOLIC BLOOD PRESSURE: 63 MMHG | HEIGHT: 62 IN | SYSTOLIC BLOOD PRESSURE: 102 MMHG | HEART RATE: 87 BPM | RESPIRATION RATE: 22 BRPM | WEIGHT: 99.5 LBS | TEMPERATURE: 98.6 F | BODY MASS INDEX: 18.31 KG/M2 | OXYGEN SATURATION: 90 %

## 2022-01-01 VITALS
SYSTOLIC BLOOD PRESSURE: 108 MMHG | WEIGHT: 120.9 LBS | OXYGEN SATURATION: 99 % | HEART RATE: 68 BPM | RESPIRATION RATE: 18 BRPM | DIASTOLIC BLOOD PRESSURE: 75 MMHG | TEMPERATURE: 97.4 F | HEIGHT: 62 IN | BODY MASS INDEX: 22.25 KG/M2

## 2022-01-01 VITALS
BODY MASS INDEX: 21.36 KG/M2 | HEART RATE: 60 BPM | DIASTOLIC BLOOD PRESSURE: 60 MMHG | RESPIRATION RATE: 12 BRPM | WEIGHT: 116.8 LBS | SYSTOLIC BLOOD PRESSURE: 92 MMHG

## 2022-01-01 VITALS
HEIGHT: 62 IN | TEMPERATURE: 97.8 F | SYSTOLIC BLOOD PRESSURE: 109 MMHG | BODY MASS INDEX: 21.42 KG/M2 | WEIGHT: 116.4 LBS | OXYGEN SATURATION: 99 % | RESPIRATION RATE: 16 BRPM | DIASTOLIC BLOOD PRESSURE: 69 MMHG | HEART RATE: 65 BPM

## 2022-01-01 VITALS
OXYGEN SATURATION: 99 % | SYSTOLIC BLOOD PRESSURE: 102 MMHG | HEIGHT: 62 IN | DIASTOLIC BLOOD PRESSURE: 65 MMHG | TEMPERATURE: 97.3 F | BODY MASS INDEX: 21.9 KG/M2 | WEIGHT: 119 LBS | HEART RATE: 64 BPM

## 2022-01-01 VITALS
SYSTOLIC BLOOD PRESSURE: 98 MMHG | WEIGHT: 115.31 LBS | BODY MASS INDEX: 21.09 KG/M2 | HEART RATE: 76 BPM | DIASTOLIC BLOOD PRESSURE: 66 MMHG

## 2022-01-01 VITALS
TEMPERATURE: 97.4 F | HEART RATE: 64 BPM | WEIGHT: 97 LBS | RESPIRATION RATE: 24 BRPM | BODY MASS INDEX: 17.85 KG/M2 | OXYGEN SATURATION: 100 % | HEIGHT: 62 IN | DIASTOLIC BLOOD PRESSURE: 77 MMHG | SYSTOLIC BLOOD PRESSURE: 118 MMHG

## 2022-01-01 DIAGNOSIS — E11.29 TYPE 2 DIABETES MELLITUS WITH MICROALBUMINURIA, WITH LONG-TERM CURRENT USE OF INSULIN (H): ICD-10-CM

## 2022-01-01 DIAGNOSIS — I21.4 NSTEMI (NON-ST ELEVATED MYOCARDIAL INFARCTION) (H): ICD-10-CM

## 2022-01-01 DIAGNOSIS — I24.9 ACS (ACUTE CORONARY SYNDROME) (H): ICD-10-CM

## 2022-01-01 DIAGNOSIS — Z11.4 SCREENING FOR HIV (HUMAN IMMUNODEFICIENCY VIRUS): ICD-10-CM

## 2022-01-01 DIAGNOSIS — J18.9 PNEUMONIA OF BOTH LOWER LOBES DUE TO INFECTIOUS ORGANISM: ICD-10-CM

## 2022-01-01 DIAGNOSIS — R80.9 TYPE 2 DIABETES MELLITUS WITH MICROALBUMINURIA, WITH LONG-TERM CURRENT USE OF INSULIN (H): ICD-10-CM

## 2022-01-01 DIAGNOSIS — I25.5 ISCHEMIC CARDIOMYOPATHY: Primary | ICD-10-CM

## 2022-01-01 DIAGNOSIS — Z98.890 STATUS POST CORONARY ANGIOGRAM: ICD-10-CM

## 2022-01-01 DIAGNOSIS — R74.01 ELEVATION OF LEVELS OF LIVER TRANSAMINASE LEVELS: ICD-10-CM

## 2022-01-01 DIAGNOSIS — Z79.4 TYPE 2 DIABETES MELLITUS WITH MICROALBUMINURIA, WITH LONG-TERM CURRENT USE OF INSULIN (H): ICD-10-CM

## 2022-01-01 DIAGNOSIS — Z98.890 STATUS POST CORONARY ANGIOGRAM: Primary | ICD-10-CM

## 2022-01-01 DIAGNOSIS — I25.5 ISCHEMIC CARDIOMYOPATHY: ICD-10-CM

## 2022-01-01 DIAGNOSIS — Z12.11 SCREEN FOR COLON CANCER: ICD-10-CM

## 2022-01-01 DIAGNOSIS — R80.9 TYPE 2 DIABETES MELLITUS WITH MICROALBUMINURIA, WITH LONG-TERM CURRENT USE OF INSULIN (H): Primary | ICD-10-CM

## 2022-01-01 DIAGNOSIS — I50.20 SYSTOLIC CONGESTIVE HEART FAILURE, UNSPECIFIED HF CHRONICITY (H): ICD-10-CM

## 2022-01-01 DIAGNOSIS — I25.10 CORONARY ARTERY DISEASE INVOLVING NATIVE CORONARY ARTERY OF NATIVE HEART, UNSPECIFIED WHETHER ANGINA PRESENT: ICD-10-CM

## 2022-01-01 DIAGNOSIS — I50.23 ACUTE ON CHRONIC SYSTOLIC CONGESTIVE HEART FAILURE (H): ICD-10-CM

## 2022-01-01 DIAGNOSIS — I21.3 ST ELEVATION MYOCARDIAL INFARCTION (STEMI), UNSPECIFIED ARTERY (H): ICD-10-CM

## 2022-01-01 DIAGNOSIS — B33.8 RSV (RESPIRATORY SYNCYTIAL VIRUS INFECTION): ICD-10-CM

## 2022-01-01 DIAGNOSIS — I25.118 CORONARY ARTERY DISEASE OF NATIVE ARTERY WITH STABLE ANGINA PECTORIS, UNSPECIFIED WHETHER NATIVE OR TRANSPLANTED HEART (H): ICD-10-CM

## 2022-01-01 DIAGNOSIS — I21.3 ST ELEVATION MYOCARDIAL INFARCTION (STEMI), UNSPECIFIED ARTERY (H): Primary | ICD-10-CM

## 2022-01-01 DIAGNOSIS — I50.9 ACUTE DECOMPENSATED HEART FAILURE (H): ICD-10-CM

## 2022-01-01 DIAGNOSIS — I51.3 MURAL THROMBUS OF HEART: ICD-10-CM

## 2022-01-01 DIAGNOSIS — R06.01 ORTHOPNEA: ICD-10-CM

## 2022-01-01 DIAGNOSIS — Z79.4 TYPE 2 DIABETES MELLITUS WITH MICROALBUMINURIA, WITH LONG-TERM CURRENT USE OF INSULIN (H): Primary | ICD-10-CM

## 2022-01-01 DIAGNOSIS — R65.20 SEVERE SEPSIS (H): ICD-10-CM

## 2022-01-01 DIAGNOSIS — H53.132 SUDDEN VISUAL LOSS OF LEFT EYE: Primary | ICD-10-CM

## 2022-01-01 DIAGNOSIS — A41.9 SEVERE SEPSIS (H): ICD-10-CM

## 2022-01-01 DIAGNOSIS — R79.89 ELEVATED TROPONIN: ICD-10-CM

## 2022-01-01 DIAGNOSIS — I50.9 HEART FAILURE, UNSPECIFIED (H): ICD-10-CM

## 2022-01-01 DIAGNOSIS — I31.9 DISEASE OF PERICARDIUM, UNSPECIFIED: ICD-10-CM

## 2022-01-01 DIAGNOSIS — I25.118 CORONARY ARTERY DISEASE OF NATIVE ARTERY OF NATIVE HEART WITH STABLE ANGINA PECTORIS (H): Primary | ICD-10-CM

## 2022-01-01 DIAGNOSIS — R07.9 CHEST PAIN, UNSPECIFIED TYPE: ICD-10-CM

## 2022-01-01 DIAGNOSIS — I50.9 ACUTE DECOMPENSATED HEART FAILURE (H): Primary | ICD-10-CM

## 2022-01-01 DIAGNOSIS — R50.9 FEVER, UNSPECIFIED: ICD-10-CM

## 2022-01-01 DIAGNOSIS — D49.6 NEOPLASM OF BRAIN (H): Primary | ICD-10-CM

## 2022-01-01 DIAGNOSIS — I50.23 ACUTE ON CHRONIC SYSTOLIC (CONGESTIVE) HEART FAILURE (H): ICD-10-CM

## 2022-01-01 DIAGNOSIS — K21.9 GASTROESOPHAGEAL REFLUX DISEASE WITHOUT ESOPHAGITIS: ICD-10-CM

## 2022-01-01 DIAGNOSIS — I50.20 HEART FAILURE WITH REDUCED EJECTION FRACTION (H): ICD-10-CM

## 2022-01-01 DIAGNOSIS — R73.9 HYPERGLYCEMIA: ICD-10-CM

## 2022-01-01 DIAGNOSIS — I50.9 ACUTE ON CHRONIC CONGESTIVE HEART FAILURE, UNSPECIFIED HEART FAILURE TYPE (H): ICD-10-CM

## 2022-01-01 DIAGNOSIS — I25.10 ATHEROSCLEROTIC HEART DISEASE OF NATIVE CORONARY ARTERY WITHOUT ANGINA PECTORIS: ICD-10-CM

## 2022-01-01 DIAGNOSIS — I50.43 CHF (CONGESTIVE HEART FAILURE), NYHA CLASS I, ACUTE ON CHRONIC, COMBINED (H): Primary | ICD-10-CM

## 2022-01-01 DIAGNOSIS — I20.0 ACCELERATING ANGINA (H): ICD-10-CM

## 2022-01-01 DIAGNOSIS — R79.89 ABNORMAL LFTS: ICD-10-CM

## 2022-01-01 DIAGNOSIS — E11.29 TYPE 2 DIABETES MELLITUS WITH MICROALBUMINURIA, WITH LONG-TERM CURRENT USE OF INSULIN (H): Primary | ICD-10-CM

## 2022-01-01 DIAGNOSIS — R33.9 URINARY RETENTION: ICD-10-CM

## 2022-01-01 DIAGNOSIS — K59.04 CHRONIC IDIOPATHIC CONSTIPATION: ICD-10-CM

## 2022-01-01 DIAGNOSIS — I50.23 ACUTE ON CHRONIC SYSTOLIC CONGESTIVE HEART FAILURE (H): Primary | ICD-10-CM

## 2022-01-01 DIAGNOSIS — I25.118 CORONARY ARTERY DISEASE OF NATIVE ARTERY OF NATIVE HEART WITH STABLE ANGINA PECTORIS (H): ICD-10-CM

## 2022-01-01 DIAGNOSIS — I50.20 HEART FAILURE WITH REDUCED EJECTION FRACTION (H): Primary | ICD-10-CM

## 2022-01-01 DIAGNOSIS — I50.43 CHF (CONGESTIVE HEART FAILURE), NYHA CLASS I, ACUTE ON CHRONIC, COMBINED (H): ICD-10-CM

## 2022-01-01 DIAGNOSIS — Z71.89 OTHER SPECIFIED COUNSELING: ICD-10-CM

## 2022-01-01 DIAGNOSIS — R56.9 SEIZURES (H): ICD-10-CM

## 2022-01-01 DIAGNOSIS — B01.9 VARICELLA WITHOUT COMPLICATION: ICD-10-CM

## 2022-01-01 DIAGNOSIS — F41.9 ANXIETY: Primary | ICD-10-CM

## 2022-01-01 DIAGNOSIS — Z11.59 ENCOUNTER FOR SCREENING FOR OTHER VIRAL DISEASES: Primary | ICD-10-CM

## 2022-01-01 DIAGNOSIS — I50.20 HFREF (HEART FAILURE WITH REDUCED EJECTION FRACTION) (H): ICD-10-CM

## 2022-01-01 DIAGNOSIS — Z11.59 ENCOUNTER FOR SCREENING FOR OTHER VIRAL DISEASES: ICD-10-CM

## 2022-01-01 DIAGNOSIS — N17.9 ACUTE KIDNEY FAILURE, UNSPECIFIED (H): ICD-10-CM

## 2022-01-01 DIAGNOSIS — E11.319 DIABETIC RETINOPATHY ASSOCIATED WITH TYPE 2 DIABETES MELLITUS (H): Primary | ICD-10-CM

## 2022-01-01 DIAGNOSIS — R06.00 DYSPNEA, UNSPECIFIED TYPE: ICD-10-CM

## 2022-01-01 DIAGNOSIS — N17.9 ACUTE KIDNEY INJURY (H): ICD-10-CM

## 2022-01-01 DIAGNOSIS — H11.30 CONJUNCTIVAL HEMORRHAGE, UNSPECIFIED LATERALITY: ICD-10-CM

## 2022-01-01 DIAGNOSIS — H31.309: Primary | ICD-10-CM

## 2022-01-01 DIAGNOSIS — N17.9 AKI (ACUTE KIDNEY INJURY) (H): ICD-10-CM

## 2022-01-01 DIAGNOSIS — I51.3 MURAL THROMBUS OF HEART: Primary | ICD-10-CM

## 2022-01-01 DIAGNOSIS — I50.22 CHRONIC SYSTOLIC HEART FAILURE (H): Primary | ICD-10-CM

## 2022-01-01 DIAGNOSIS — E11.311 DIABETIC RETINOPATHY WITH MACULAR EDEMA ASSOCIATED WITH TYPE 2 DIABETES MELLITUS, UNSPECIFIED LATERALITY, UNSPECIFIED RETINOPATHY SEVERITY (H): Primary | ICD-10-CM

## 2022-01-01 DIAGNOSIS — I50.9 ACUTE CONGESTIVE HEART FAILURE, UNSPECIFIED HEART FAILURE TYPE (H): ICD-10-CM

## 2022-01-01 DIAGNOSIS — E11.319 DIABETIC RETINOPATHY ASSOCIATED WITH TYPE 2 DIABETES MELLITUS (H): ICD-10-CM

## 2022-01-01 DIAGNOSIS — N28.9 DISORDER OF KIDNEY AND URETER: ICD-10-CM

## 2022-01-01 DIAGNOSIS — E03.9 HYPOTHYROIDISM, UNSPECIFIED: ICD-10-CM

## 2022-01-01 DIAGNOSIS — I51.3 MURAL THROMBUS OF LEFT VENTRICLE: ICD-10-CM

## 2022-01-01 LAB
ABO/RH(D): NORMAL
ABO/RH(D): NORMAL
ACT BLD: 156 SECONDS (ref 74–150)
ACT BLD: 177 SECONDS (ref 74–150)
ACT BLD: 186 SECONDS (ref 74–150)
ACT BLD: 220 SECONDS (ref 74–150)
ACT BLD: 220 SECONDS (ref 74–150)
ACT BLD: 245 SECONDS (ref 74–150)
ALBUMIN SERPL BCG-MCNC: 2 G/DL (ref 3.5–5.2)
ALBUMIN SERPL BCG-MCNC: 2.2 G/DL (ref 3.5–5.2)
ALBUMIN SERPL BCG-MCNC: 2.4 G/DL (ref 3.5–5.2)
ALBUMIN SERPL BCG-MCNC: 2.5 G/DL (ref 3.5–5.2)
ALBUMIN SERPL BCG-MCNC: 2.7 G/DL (ref 3.5–5.2)
ALBUMIN SERPL BCG-MCNC: 2.8 G/DL (ref 3.5–5.2)
ALBUMIN SERPL BCG-MCNC: 2.8 G/DL (ref 3.5–5.2)
ALBUMIN SERPL BCG-MCNC: 2.9 G/DL (ref 3.5–5.2)
ALBUMIN SERPL BCG-MCNC: 3.1 G/DL (ref 3.5–5.2)
ALBUMIN SERPL-MCNC: 3.4 G/DL (ref 3.5–5)
ALBUMIN SERPL-MCNC: 3.6 G/DL (ref 3.5–5)
ALBUMIN SERPL-MCNC: 3.6 G/DL (ref 3.5–5)
ALBUMIN UR-MCNC: 300 MG/DL
ALP SERPL-CCNC: 120 U/L (ref 40–129)
ALP SERPL-CCNC: 127 U/L (ref 40–129)
ALP SERPL-CCNC: 201 U/L (ref 40–129)
ALP SERPL-CCNC: 213 U/L (ref 40–129)
ALP SERPL-CCNC: 255 U/L (ref 40–129)
ALP SERPL-CCNC: 257 U/L (ref 40–129)
ALP SERPL-CCNC: 266 U/L (ref 40–129)
ALP SERPL-CCNC: 266 U/L (ref 40–129)
ALP SERPL-CCNC: 277 U/L (ref 40–129)
ALP SERPL-CCNC: 291 U/L (ref 40–129)
ALP SERPL-CCNC: 431 U/L (ref 40–129)
ALP SERPL-CCNC: 485 U/L (ref 40–129)
ALP SERPL-CCNC: 55 U/L (ref 45–120)
ALP SERPL-CCNC: 61 U/L (ref 45–120)
ALP SERPL-CCNC: 77 U/L (ref 45–120)
ALT SERPL W P-5'-P-CCNC: 114 U/L (ref 10–50)
ALT SERPL W P-5'-P-CCNC: 127 U/L (ref 10–50)
ALT SERPL W P-5'-P-CCNC: 16 U/L (ref 10–50)
ALT SERPL W P-5'-P-CCNC: 19 U/L (ref 10–50)
ALT SERPL W P-5'-P-CCNC: 20 U/L (ref 0–45)
ALT SERPL W P-5'-P-CCNC: 230 U/L (ref 10–50)
ALT SERPL W P-5'-P-CCNC: 24 U/L (ref 0–45)
ALT SERPL W P-5'-P-CCNC: 295 U/L (ref 10–50)
ALT SERPL W P-5'-P-CCNC: 50 U/L (ref 10–50)
ALT SERPL W P-5'-P-CCNC: 52 U/L (ref 0–45)
ALT SERPL W P-5'-P-CCNC: 60 U/L (ref 10–50)
ALT SERPL W P-5'-P-CCNC: 75 U/L (ref 10–50)
ALT SERPL W P-5'-P-CCNC: 85 U/L (ref 10–50)
ALT SERPL W P-5'-P-CCNC: 88 U/L (ref 10–50)
ALT SERPL W P-5'-P-CCNC: 90 U/L (ref 10–50)
ANION GAP SERPL CALCULATED.3IONS-SCNC: 10 MMOL/L (ref 5–18)
ANION GAP SERPL CALCULATED.3IONS-SCNC: 10 MMOL/L (ref 7–15)
ANION GAP SERPL CALCULATED.3IONS-SCNC: 11 MMOL/L (ref 5–18)
ANION GAP SERPL CALCULATED.3IONS-SCNC: 11 MMOL/L (ref 7–15)
ANION GAP SERPL CALCULATED.3IONS-SCNC: 12 MMOL/L (ref 5–18)
ANION GAP SERPL CALCULATED.3IONS-SCNC: 12 MMOL/L (ref 7–15)
ANION GAP SERPL CALCULATED.3IONS-SCNC: 13 MMOL/L (ref 5–18)
ANION GAP SERPL CALCULATED.3IONS-SCNC: 13 MMOL/L (ref 7–15)
ANION GAP SERPL CALCULATED.3IONS-SCNC: 16 MMOL/L (ref 7–15)
ANION GAP SERPL CALCULATED.3IONS-SCNC: 17 MMOL/L (ref 7–15)
ANION GAP SERPL CALCULATED.3IONS-SCNC: 20 MMOL/L (ref 7–15)
ANION GAP SERPL CALCULATED.3IONS-SCNC: 21 MMOL/L (ref 7–15)
ANION GAP SERPL CALCULATED.3IONS-SCNC: 5 MMOL/L (ref 5–18)
ANION GAP SERPL CALCULATED.3IONS-SCNC: 7 MMOL/L (ref 5–18)
ANION GAP SERPL CALCULATED.3IONS-SCNC: 7 MMOL/L (ref 5–18)
ANION GAP SERPL CALCULATED.3IONS-SCNC: 8 MMOL/L (ref 5–18)
ANION GAP SERPL CALCULATED.3IONS-SCNC: 9 MMOL/L (ref 5–18)
ANTIBODY SCREEN: NEGATIVE
ANTIBODY SCREEN: NEGATIVE
APPEARANCE UR: CLEAR
APTT PPP: 30 SECONDS (ref 22–38)
APTT PPP: 32 SECONDS (ref 22–38)
APTT PPP: 38 SECONDS (ref 22–38)
APTT PPP: 59 SECONDS (ref 22–38)
APTT PPP: 60 SECONDS (ref 22–38)
APTT PPP: 65 SECONDS (ref 22–38)
APTT PPP: 67 SECONDS (ref 22–38)
APTT PPP: 69 SECONDS (ref 22–38)
APTT PPP: 70 SECONDS (ref 22–38)
AST SERPL W P-5'-P-CCNC: 114 U/L (ref 10–50)
AST SERPL W P-5'-P-CCNC: 188 U/L (ref 10–50)
AST SERPL W P-5'-P-CCNC: 219 U/L (ref 10–50)
AST SERPL W P-5'-P-CCNC: 26 U/L (ref 0–40)
AST SERPL W P-5'-P-CCNC: 26 U/L (ref 10–50)
AST SERPL W P-5'-P-CCNC: 27 U/L (ref 0–40)
AST SERPL W P-5'-P-CCNC: 30 U/L (ref 10–50)
AST SERPL W P-5'-P-CCNC: 32 U/L (ref 10–50)
AST SERPL W P-5'-P-CCNC: 404 U/L (ref 10–50)
AST SERPL W P-5'-P-CCNC: 46 U/L (ref 10–50)
AST SERPL W P-5'-P-CCNC: 46 U/L (ref 10–50)
AST SERPL W P-5'-P-CCNC: 51 U/L (ref 10–50)
AST SERPL W P-5'-P-CCNC: 52 U/L (ref 10–50)
AST SERPL W P-5'-P-CCNC: 53 U/L (ref 10–50)
AST SERPL W P-5'-P-CCNC: 71 U/L (ref 10–50)
AST SERPL W P-5'-P-CCNC: 88 U/L (ref 0–40)
AST SERPL W P-5'-P-CCNC: 95 U/L (ref 10–50)
AST SERPL W P-5'-P-CCNC: ABNORMAL U/L
ATRIAL RATE - MUSE: 108 BPM
ATRIAL RATE - MUSE: 54 BPM
ATRIAL RATE - MUSE: 57 BPM
ATRIAL RATE - MUSE: 58 BPM
ATRIAL RATE - MUSE: 60 BPM
ATRIAL RATE - MUSE: 61 BPM
ATRIAL RATE - MUSE: 68 BPM
ATRIAL RATE - MUSE: 79 BPM
ATRIAL RATE - MUSE: 80 BPM
ATRIAL RATE - MUSE: 82 BPM
ATRIAL RATE - MUSE: 83 BPM
ATRIAL RATE - MUSE: 84 BPM
ATRIAL RATE - MUSE: 96 BPM
BACTERIA #/AREA URNS HPF: ABNORMAL /HPF
BACTERIA BLD CULT: NO GROWTH
BACTERIA BLD CULT: NO GROWTH
BASE EXCESS BLDA CALC-SCNC: -0.6 MMOL/L
BASE EXCESS BLDA CALC-SCNC: -21.6 MMOL/L
BASE EXCESS BLDV CALC-SCNC: -6.1 MMOL/L
BASE EXCESS BLDV CALC-SCNC: 2 MMOL/L
BASOPHILS # BLD AUTO: 0 10E3/UL (ref 0–0.2)
BASOPHILS # BLD AUTO: 0.1 10E3/UL (ref 0–0.2)
BASOPHILS # BLD AUTO: 0.1 10E3/UL (ref 0–0.2)
BASOPHILS # BLD MANUAL: 0 10E3/UL (ref 0–0.2)
BASOPHILS # BLD MANUAL: 0 10E3/UL (ref 0–0.2)
BASOPHILS NFR BLD AUTO: 0 %
BASOPHILS NFR BLD AUTO: 1 %
BASOPHILS NFR BLD MANUAL: 0 %
BASOPHILS NFR BLD MANUAL: 0 %
BILIRUB DIRECT SERPL-MCNC: 0.23 MG/DL (ref 0–0.3)
BILIRUB DIRECT SERPL-MCNC: 0.57 MG/DL (ref 0–0.3)
BILIRUB SERPL-MCNC: 0.3 MG/DL (ref 0–1)
BILIRUB SERPL-MCNC: 0.5 MG/DL (ref 0–1)
BILIRUB SERPL-MCNC: 0.6 MG/DL
BILIRUB SERPL-MCNC: 0.7 MG/DL
BILIRUB SERPL-MCNC: 0.8 MG/DL
BILIRUB SERPL-MCNC: 0.8 MG/DL
BILIRUB SERPL-MCNC: 0.9 MG/DL
BILIRUB SERPL-MCNC: 1 MG/DL
BILIRUB SERPL-MCNC: 1 MG/DL (ref 0–1)
BILIRUB SERPL-MCNC: 1.3 MG/DL
BILIRUB SERPL-MCNC: 1.5 MG/DL
BILIRUB SERPL-MCNC: 1.5 MG/DL
BILIRUB UR QL STRIP: NEGATIVE
BNP SERPL-MCNC: 2494 PG/ML (ref 0–53)
BNP SERPL-MCNC: 3288 PG/ML (ref 0–53)
BNP SERPL-MCNC: 4308 PG/ML (ref 0–53)
BUN SERPL-MCNC: 12 MG/DL (ref 8–22)
BUN SERPL-MCNC: 13 MG/DL (ref 8–22)
BUN SERPL-MCNC: 14 MG/DL (ref 8–22)
BUN SERPL-MCNC: 16 MG/DL (ref 8–22)
BUN SERPL-MCNC: 16.6 MG/DL (ref 8–23)
BUN SERPL-MCNC: 17 MG/DL (ref 8–22)
BUN SERPL-MCNC: 17.1 MG/DL (ref 8–23)
BUN SERPL-MCNC: 17.4 MG/DL (ref 8–23)
BUN SERPL-MCNC: 18 MG/DL (ref 8–22)
BUN SERPL-MCNC: 18 MG/DL (ref 8–22)
BUN SERPL-MCNC: 18.7 MG/DL (ref 8–23)
BUN SERPL-MCNC: 20 MG/DL (ref 8–22)
BUN SERPL-MCNC: 20.6 MG/DL (ref 8–23)
BUN SERPL-MCNC: 21 MG/DL (ref 8–22)
BUN SERPL-MCNC: 22 MG/DL (ref 8–22)
BUN SERPL-MCNC: 22.9 MG/DL (ref 8–23)
BUN SERPL-MCNC: 23.4 MG/DL (ref 8–23)
BUN SERPL-MCNC: 23.8 MG/DL (ref 8–23)
BUN SERPL-MCNC: 24.2 MG/DL (ref 8–23)
BUN SERPL-MCNC: 24.9 MG/DL (ref 8–23)
BUN SERPL-MCNC: 25.4 MG/DL (ref 8–23)
BUN SERPL-MCNC: 27 MG/DL (ref 8–22)
BUN SERPL-MCNC: 27.1 MG/DL (ref 8–23)
BUN SERPL-MCNC: 28 MG/DL (ref 8–22)
BUN SERPL-MCNC: 28 MG/DL (ref 8–22)
BUN SERPL-MCNC: 35.9 MG/DL (ref 8–23)
BUN SERPL-MCNC: 36 MG/DL (ref 8–22)
BUN SERPL-MCNC: 38 MG/DL (ref 8–22)
BUN SERPL-MCNC: 38 MG/DL (ref 8–22)
BUN SERPL-MCNC: 38.5 MG/DL (ref 8–23)
BUN SERPL-MCNC: 40 MG/DL (ref 8–22)
BUN SERPL-MCNC: 41.4 MG/DL (ref 8–23)
BUN SERPL-MCNC: 46 MG/DL (ref 8–22)
BUN SERPL-MCNC: 46.8 MG/DL (ref 8–23)
BUN SERPL-MCNC: 48 MG/DL (ref 8–22)
BUN SERPL-MCNC: 48.3 MG/DL (ref 8–23)
BUN SERPL-MCNC: 61.1 MG/DL (ref 8–23)
BUN SERPL-MCNC: 62.5 MG/DL (ref 8–23)
BURR CELLS BLD QL SMEAR: ABNORMAL
BURR CELLS BLD QL SMEAR: SLIGHT
CALCIUM SERPL-MCNC: 10 MG/DL (ref 8.5–10.5)
CALCIUM SERPL-MCNC: 13.3 MG/DL (ref 8.8–10.2)
CALCIUM SERPL-MCNC: 6.8 MG/DL (ref 8.8–10.2)
CALCIUM SERPL-MCNC: 7.6 MG/DL (ref 8.8–10.2)
CALCIUM SERPL-MCNC: 7.8 MG/DL (ref 8.8–10.2)
CALCIUM SERPL-MCNC: 7.9 MG/DL (ref 8.8–10.2)
CALCIUM SERPL-MCNC: 8.1 MG/DL (ref 8.5–10.5)
CALCIUM SERPL-MCNC: 8.1 MG/DL (ref 8.8–10.2)
CALCIUM SERPL-MCNC: 8.2 MG/DL (ref 8.8–10.2)
CALCIUM SERPL-MCNC: 8.2 MG/DL (ref 8.8–10.2)
CALCIUM SERPL-MCNC: 8.3 MG/DL (ref 8.8–10.2)
CALCIUM SERPL-MCNC: 8.4 MG/DL (ref 8.5–10.5)
CALCIUM SERPL-MCNC: 8.5 MG/DL (ref 8.5–10.5)
CALCIUM SERPL-MCNC: 8.5 MG/DL (ref 8.5–10.5)
CALCIUM SERPL-MCNC: 8.5 MG/DL (ref 8.8–10.2)
CALCIUM SERPL-MCNC: 8.7 MG/DL (ref 8.5–10.5)
CALCIUM SERPL-MCNC: 8.7 MG/DL (ref 8.5–10.5)
CALCIUM SERPL-MCNC: 8.7 MG/DL (ref 8.8–10.2)
CALCIUM SERPL-MCNC: 8.7 MG/DL (ref 8.8–10.2)
CALCIUM SERPL-MCNC: 8.8 MG/DL (ref 8.8–10.2)
CALCIUM SERPL-MCNC: 8.9 MG/DL (ref 8.5–10.5)
CALCIUM SERPL-MCNC: 8.9 MG/DL (ref 8.5–10.5)
CALCIUM SERPL-MCNC: 9 MG/DL (ref 8.5–10.5)
CALCIUM SERPL-MCNC: 9.1 MG/DL (ref 8.5–10.5)
CALCIUM SERPL-MCNC: 9.2 MG/DL (ref 8.5–10.5)
CALCIUM SERPL-MCNC: 9.3 MG/DL (ref 8.5–10.5)
CALCIUM SERPL-MCNC: 9.4 MG/DL (ref 8.5–10.5)
CALCIUM SERPL-MCNC: 9.5 MG/DL (ref 8.5–10.5)
CALCIUM SERPL-MCNC: 9.5 MG/DL (ref 8.5–10.5)
CALCIUM SERPL-MCNC: 9.6 MG/DL (ref 8.5–10.5)
CALCIUM, IONIZED MEASURED: 1.07 MMOL/L (ref 1.11–1.3)
CHLORIDE BLD-SCNC: 102 MMOL/L (ref 98–107)
CHLORIDE BLD-SCNC: 103 MMOL/L (ref 98–107)
CHLORIDE BLD-SCNC: 103 MMOL/L (ref 98–107)
CHLORIDE BLD-SCNC: 104 MMOL/L (ref 98–107)
CHLORIDE BLD-SCNC: 104 MMOL/L (ref 98–107)
CHLORIDE BLD-SCNC: 105 MMOL/L (ref 98–107)
CHLORIDE BLD-SCNC: 105 MMOL/L (ref 98–107)
CHLORIDE BLD-SCNC: 107 MMOL/L (ref 98–107)
CHLORIDE BLD-SCNC: 93 MMOL/L (ref 98–107)
CHLORIDE BLD-SCNC: 96 MMOL/L (ref 98–107)
CHLORIDE BLD-SCNC: 96 MMOL/L (ref 98–107)
CHLORIDE BLD-SCNC: 99 MMOL/L (ref 98–107)
CHLORIDE BLD-SCNC: 99 MMOL/L (ref 98–107)
CHLORIDE SERPL-SCNC: 103 MMOL/L (ref 98–107)
CHLORIDE SERPL-SCNC: 103 MMOL/L (ref 98–107)
CHLORIDE SERPL-SCNC: 104 MMOL/L (ref 98–107)
CHLORIDE SERPL-SCNC: 87 MMOL/L (ref 98–107)
CHLORIDE SERPL-SCNC: 89 MMOL/L (ref 98–107)
CHLORIDE SERPL-SCNC: 90 MMOL/L (ref 98–107)
CHLORIDE SERPL-SCNC: 91 MMOL/L (ref 98–107)
CHLORIDE SERPL-SCNC: 92 MMOL/L (ref 98–107)
CHLORIDE SERPL-SCNC: 94 MMOL/L (ref 98–107)
CHLORIDE SERPL-SCNC: 94 MMOL/L (ref 98–107)
CHLORIDE SERPL-SCNC: 95 MMOL/L (ref 98–107)
CHLORIDE SERPL-SCNC: 95 MMOL/L (ref 98–107)
CHLORIDE SERPL-SCNC: 97 MMOL/L (ref 98–107)
CHLORIDE SERPL-SCNC: 97 MMOL/L (ref 98–107)
CHLORIDE SERPL-SCNC: 98 MMOL/L (ref 98–107)
CHLORIDE SERPL-SCNC: 99 MMOL/L (ref 98–107)
CHLORIDE SERPL-SCNC: 99 MMOL/L (ref 98–107)
CHOLEST SERPL-MCNC: 125 MG/DL
CK SERPL-CCNC: 78 U/L (ref 39–308)
CO2 SERPL-SCNC: 21 MMOL/L (ref 22–31)
CO2 SERPL-SCNC: 22 MMOL/L (ref 22–31)
CO2 SERPL-SCNC: 23 MMOL/L (ref 22–31)
CO2 SERPL-SCNC: 25 MMOL/L (ref 22–31)
CO2 SERPL-SCNC: 26 MMOL/L (ref 22–31)
CO2 SERPL-SCNC: 26 MMOL/L (ref 22–31)
CO2 SERPL-SCNC: 27 MMOL/L (ref 22–31)
CO2 SERPL-SCNC: 28 MMOL/L (ref 22–31)
CO2 SERPL-SCNC: 29 MMOL/L (ref 22–31)
CO2 SERPL-SCNC: 31 MMOL/L (ref 22–31)
COHGB MFR BLD: 89.1 % (ref 96–97)
COHGB MFR BLD: 96.6 % (ref 96–97)
COLOR UR AUTO: YELLOW
CREAT SERPL-MCNC: 0.79 MG/DL (ref 0.67–1.17)
CREAT SERPL-MCNC: 1.16 MG/DL (ref 0.67–1.17)
CREAT SERPL-MCNC: 1.18 MG/DL (ref 0.7–1.3)
CREAT SERPL-MCNC: 1.18 MG/DL (ref 0.7–1.3)
CREAT SERPL-MCNC: 1.21 MG/DL (ref 0.7–1.3)
CREAT SERPL-MCNC: 1.23 MG/DL (ref 0.7–1.3)
CREAT SERPL-MCNC: 1.26 MG/DL (ref 0.67–1.17)
CREAT SERPL-MCNC: 1.29 MG/DL (ref 0.67–1.17)
CREAT SERPL-MCNC: 1.3 MG/DL (ref 0.7–1.3)
CREAT SERPL-MCNC: 1.33 MG/DL (ref 0.7–1.3)
CREAT SERPL-MCNC: 1.34 MG/DL (ref 0.67–1.17)
CREAT SERPL-MCNC: 1.38 MG/DL (ref 0.67–1.17)
CREAT SERPL-MCNC: 1.38 MG/DL (ref 0.7–1.3)
CREAT SERPL-MCNC: 1.4 MG/DL (ref 0.67–1.17)
CREAT SERPL-MCNC: 1.41 MG/DL (ref 0.7–1.3)
CREAT SERPL-MCNC: 1.42 MG/DL (ref 0.67–1.17)
CREAT SERPL-MCNC: 1.44 MG/DL (ref 0.7–1.3)
CREAT SERPL-MCNC: 1.45 MG/DL (ref 0.67–1.17)
CREAT SERPL-MCNC: 1.45 MG/DL (ref 0.67–1.17)
CREAT SERPL-MCNC: 1.48 MG/DL (ref 0.67–1.17)
CREAT SERPL-MCNC: 1.48 MG/DL (ref 0.7–1.3)
CREAT SERPL-MCNC: 1.54 MG/DL (ref 0.7–1.3)
CREAT SERPL-MCNC: 1.56 MG/DL (ref 0.67–1.17)
CREAT SERPL-MCNC: 1.56 MG/DL (ref 0.67–1.17)
CREAT SERPL-MCNC: 1.56 MG/DL (ref 0.7–1.3)
CREAT SERPL-MCNC: 1.6 MG/DL (ref 0.7–1.3)
CREAT SERPL-MCNC: 1.62 MG/DL (ref 0.7–1.3)
CREAT SERPL-MCNC: 1.66 MG/DL (ref 0.67–1.17)
CREAT SERPL-MCNC: 1.67 MG/DL (ref 0.67–1.17)
CREAT SERPL-MCNC: 1.71 MG/DL (ref 0.67–1.17)
CREAT SERPL-MCNC: 1.74 MG/DL (ref 0.7–1.3)
CREAT SERPL-MCNC: 1.77 MG/DL (ref 0.67–1.17)
CREAT SERPL-MCNC: 1.8 MG/DL (ref 0.7–1.3)
CREAT SERPL-MCNC: 1.82 MG/DL (ref 0.7–1.3)
CREAT SERPL-MCNC: 1.85 MG/DL (ref 0.67–1.17)
CREAT SERPL-MCNC: 1.91 MG/DL (ref 0.67–1.17)
CREAT SERPL-MCNC: 1.97 MG/DL (ref 0.7–1.3)
CREAT SERPL-MCNC: 2.23 MG/DL (ref 0.7–1.3)
CREAT SERPL-MCNC: 2.37 MG/DL (ref 0.67–1.17)
CREAT SERPL-MCNC: 2.55 MG/DL (ref 0.67–1.17)
CRP SERPL-MCNC: 13.6 MG/L
CRP SERPL-MCNC: 320 MG/L
CRP SERPL-MCNC: <3 MG/L
CRP SERPL-MCNC: <3 MG/L
D DIMER PPP FEU-MCNC: 3.27 UG/ML FEU (ref 0–0.5)
DEPRECATED CALCIDIOL+CALCIFEROL SERPL-MC: 8 UG/L (ref 20–75)
DEPRECATED HCO3 PLAS-SCNC: 14 MMOL/L (ref 22–29)
DEPRECATED HCO3 PLAS-SCNC: 14 MMOL/L (ref 22–29)
DEPRECATED HCO3 PLAS-SCNC: 17 MMOL/L (ref 22–29)
DEPRECATED HCO3 PLAS-SCNC: 18 MMOL/L (ref 22–29)
DEPRECATED HCO3 PLAS-SCNC: 18 MMOL/L (ref 22–29)
DEPRECATED HCO3 PLAS-SCNC: 21 MMOL/L (ref 22–29)
DEPRECATED HCO3 PLAS-SCNC: 23 MMOL/L (ref 22–29)
DEPRECATED HCO3 PLAS-SCNC: 24 MMOL/L (ref 22–29)
DEPRECATED HCO3 PLAS-SCNC: 25 MMOL/L (ref 22–29)
DEPRECATED HCO3 PLAS-SCNC: 25 MMOL/L (ref 22–29)
DEPRECATED HCO3 PLAS-SCNC: 27 MMOL/L (ref 22–29)
DEPRECATED HCO3 PLAS-SCNC: 28 MMOL/L (ref 22–29)
DEPRECATED HCO3 PLAS-SCNC: 30 MMOL/L (ref 22–29)
DIASTOLIC BLOOD PRESSURE - MUSE: 74 MMHG
DIASTOLIC BLOOD PRESSURE - MUSE: NORMAL MMHG
EOSINOPHIL # BLD AUTO: 0 10E3/UL (ref 0–0.7)
EOSINOPHIL # BLD AUTO: 0.1 10E3/UL (ref 0–0.7)
EOSINOPHIL # BLD MANUAL: 0 10E3/UL (ref 0–0.7)
EOSINOPHIL # BLD MANUAL: 0 10E3/UL (ref 0–0.7)
EOSINOPHIL NFR BLD AUTO: 0 %
EOSINOPHIL NFR BLD AUTO: 1 %
EOSINOPHIL NFR BLD AUTO: 2 %
EOSINOPHIL NFR BLD MANUAL: 0 %
EOSINOPHIL NFR BLD MANUAL: 0 %
ERYTHROCYTE [DISTWIDTH] IN BLOOD BY AUTOMATED COUNT: 11.8 % (ref 10–15)
ERYTHROCYTE [DISTWIDTH] IN BLOOD BY AUTOMATED COUNT: 11.9 % (ref 10–15)
ERYTHROCYTE [DISTWIDTH] IN BLOOD BY AUTOMATED COUNT: 12 % (ref 10–15)
ERYTHROCYTE [DISTWIDTH] IN BLOOD BY AUTOMATED COUNT: 12.1 % (ref 10–15)
ERYTHROCYTE [DISTWIDTH] IN BLOOD BY AUTOMATED COUNT: 12.1 % (ref 10–15)
ERYTHROCYTE [DISTWIDTH] IN BLOOD BY AUTOMATED COUNT: 12.2 % (ref 10–15)
ERYTHROCYTE [DISTWIDTH] IN BLOOD BY AUTOMATED COUNT: 12.4 % (ref 10–15)
ERYTHROCYTE [DISTWIDTH] IN BLOOD BY AUTOMATED COUNT: 12.5 % (ref 10–15)
ERYTHROCYTE [DISTWIDTH] IN BLOOD BY AUTOMATED COUNT: 12.7 % (ref 10–15)
ERYTHROCYTE [DISTWIDTH] IN BLOOD BY AUTOMATED COUNT: 12.8 % (ref 10–15)
ERYTHROCYTE [DISTWIDTH] IN BLOOD BY AUTOMATED COUNT: 13.5 % (ref 10–15)
ERYTHROCYTE [DISTWIDTH] IN BLOOD BY AUTOMATED COUNT: 13.6 % (ref 10–15)
ERYTHROCYTE [DISTWIDTH] IN BLOOD BY AUTOMATED COUNT: 14.1 % (ref 10–15)
ERYTHROCYTE [DISTWIDTH] IN BLOOD BY AUTOMATED COUNT: 14.5 % (ref 10–15)
ERYTHROCYTE [DISTWIDTH] IN BLOOD BY AUTOMATED COUNT: 15.4 % (ref 10–15)
ERYTHROCYTE [DISTWIDTH] IN BLOOD BY AUTOMATED COUNT: 16.5 % (ref 10–15)
ERYTHROCYTE [DISTWIDTH] IN BLOOD BY AUTOMATED COUNT: 16.7 % (ref 10–15)
ERYTHROCYTE [DISTWIDTH] IN BLOOD BY AUTOMATED COUNT: 16.7 % (ref 10–15)
ERYTHROCYTE [DISTWIDTH] IN BLOOD BY AUTOMATED COUNT: 17.1 % (ref 10–15)
ERYTHROCYTE [DISTWIDTH] IN BLOOD BY AUTOMATED COUNT: 17.2 % (ref 10–15)
ERYTHROCYTE [DISTWIDTH] IN BLOOD BY AUTOMATED COUNT: 17.2 % (ref 10–15)
ERYTHROCYTE [DISTWIDTH] IN BLOOD BY AUTOMATED COUNT: 17.3 % (ref 10–15)
ERYTHROCYTE [DISTWIDTH] IN BLOOD BY AUTOMATED COUNT: 17.5 % (ref 10–15)
ERYTHROCYTE [DISTWIDTH] IN BLOOD BY AUTOMATED COUNT: 20.8 % (ref 10–15)
ERYTHROCYTE [DISTWIDTH] IN BLOOD BY AUTOMATED COUNT: 21.4 % (ref 10–15)
ERYTHROCYTE [DISTWIDTH] IN BLOOD BY AUTOMATED COUNT: 21.5 % (ref 10–15)
ERYTHROCYTE [DISTWIDTH] IN BLOOD BY AUTOMATED COUNT: 21.6 % (ref 10–15)
ERYTHROCYTE [SEDIMENTATION RATE] IN BLOOD BY WESTERGREN METHOD: 13 MM/HR (ref 0–15)
ERYTHROCYTE [SEDIMENTATION RATE] IN BLOOD BY WESTERGREN METHOD: 16 MM/HR (ref 0–15)
FLOW: 100 LPM
FLUAV RNA SPEC QL NAA+PROBE: NEGATIVE
FLUAV RNA SPEC QL NAA+PROBE: NEGATIVE
FLUBV RNA RESP QL NAA+PROBE: NEGATIVE
FLUBV RNA RESP QL NAA+PROBE: NEGATIVE
GFR SERPL CREATININE-BSD FRML MDRD: 28 ML/MIN/1.73M2
GFR SERPL CREATININE-BSD FRML MDRD: 30 ML/MIN/1.73M2
GFR SERPL CREATININE-BSD FRML MDRD: 33 ML/MIN/1.73M2
GFR SERPL CREATININE-BSD FRML MDRD: 38 ML/MIN/1.73M2
GFR SERPL CREATININE-BSD FRML MDRD: 39 ML/MIN/1.73M2
GFR SERPL CREATININE-BSD FRML MDRD: 41 ML/MIN/1.73M2
GFR SERPL CREATININE-BSD FRML MDRD: 42 ML/MIN/1.73M2
GFR SERPL CREATININE-BSD FRML MDRD: 42 ML/MIN/1.73M2
GFR SERPL CREATININE-BSD FRML MDRD: 43 ML/MIN/1.73M2
GFR SERPL CREATININE-BSD FRML MDRD: 44 ML/MIN/1.73M2
GFR SERPL CREATININE-BSD FRML MDRD: 45 ML/MIN/1.73M2
GFR SERPL CREATININE-BSD FRML MDRD: 46 ML/MIN/1.73M2
GFR SERPL CREATININE-BSD FRML MDRD: 46 ML/MIN/1.73M2
GFR SERPL CREATININE-BSD FRML MDRD: 48 ML/MIN/1.73M2
GFR SERPL CREATININE-BSD FRML MDRD: 49 ML/MIN/1.73M2
GFR SERPL CREATININE-BSD FRML MDRD: 50 ML/MIN/1.73M2
GFR SERPL CREATININE-BSD FRML MDRD: 51 ML/MIN/1.73M2
GFR SERPL CREATININE-BSD FRML MDRD: 53 ML/MIN/1.73M2
GFR SERPL CREATININE-BSD FRML MDRD: 53 ML/MIN/1.73M2
GFR SERPL CREATININE-BSD FRML MDRD: 54 ML/MIN/1.73M2
GFR SERPL CREATININE-BSD FRML MDRD: 54 ML/MIN/1.73M2
GFR SERPL CREATININE-BSD FRML MDRD: 55 ML/MIN/1.73M2
GFR SERPL CREATININE-BSD FRML MDRD: 56 ML/MIN/1.73M2
GFR SERPL CREATININE-BSD FRML MDRD: 57 ML/MIN/1.73M2
GFR SERPL CREATININE-BSD FRML MDRD: 57 ML/MIN/1.73M2
GFR SERPL CREATININE-BSD FRML MDRD: 58 ML/MIN/1.73M2
GFR SERPL CREATININE-BSD FRML MDRD: 58 ML/MIN/1.73M2
GFR SERPL CREATININE-BSD FRML MDRD: 60 ML/MIN/1.73M2
GFR SERPL CREATININE-BSD FRML MDRD: 61 ML/MIN/1.73M2
GFR SERPL CREATININE-BSD FRML MDRD: 63 ML/MIN/1.73M2
GFR SERPL CREATININE-BSD FRML MDRD: 63 ML/MIN/1.73M2
GFR SERPL CREATININE-BSD FRML MDRD: 64 ML/MIN/1.73M2
GFR SERPL CREATININE-BSD FRML MDRD: 67 ML/MIN/1.73M2
GFR SERPL CREATININE-BSD FRML MDRD: 68 ML/MIN/1.73M2
GFR SERPL CREATININE-BSD FRML MDRD: 70 ML/MIN/1.73M2
GFR SERPL CREATININE-BSD FRML MDRD: 70 ML/MIN/1.73M2
GFR SERPL CREATININE-BSD FRML MDRD: 71 ML/MIN/1.73M2
GFR SERPL CREATININE-BSD FRML MDRD: >90 ML/MIN/1.73M2
GLUCOSE BLD-MCNC: 113 MG/DL (ref 70–125)
GLUCOSE BLD-MCNC: 116 MG/DL (ref 70–125)
GLUCOSE BLD-MCNC: 127 MG/DL (ref 70–125)
GLUCOSE BLD-MCNC: 129 MG/DL (ref 70–125)
GLUCOSE BLD-MCNC: 134 MG/DL (ref 70–125)
GLUCOSE BLD-MCNC: 150 MG/DL (ref 70–125)
GLUCOSE BLD-MCNC: 152 MG/DL (ref 70–125)
GLUCOSE BLD-MCNC: 157 MG/DL (ref 70–125)
GLUCOSE BLD-MCNC: 162 MG/DL (ref 70–125)
GLUCOSE BLD-MCNC: 165 MG/DL (ref 70–125)
GLUCOSE BLD-MCNC: 183 MG/DL (ref 70–125)
GLUCOSE BLD-MCNC: 189 MG/DL (ref 70–125)
GLUCOSE BLD-MCNC: 191 MG/DL (ref 70–125)
GLUCOSE BLD-MCNC: 197 MG/DL (ref 70–125)
GLUCOSE BLD-MCNC: 252 MG/DL (ref 70–125)
GLUCOSE BLD-MCNC: 282 MG/DL (ref 70–125)
GLUCOSE BLD-MCNC: 363 MG/DL (ref 70–125)
GLUCOSE BLD-MCNC: 605 MG/DL (ref 70–125)
GLUCOSE BLD-MCNC: 82 MG/DL (ref 70–125)
GLUCOSE BLDC GLUCOMTR-MCNC: 100 MG/DL (ref 70–99)
GLUCOSE BLDC GLUCOMTR-MCNC: 101 MG/DL (ref 70–99)
GLUCOSE BLDC GLUCOMTR-MCNC: 105 MG/DL (ref 70–99)
GLUCOSE BLDC GLUCOMTR-MCNC: 108 MG/DL (ref 70–99)
GLUCOSE BLDC GLUCOMTR-MCNC: 109 MG/DL (ref 70–99)
GLUCOSE BLDC GLUCOMTR-MCNC: 109 MG/DL (ref 70–99)
GLUCOSE BLDC GLUCOMTR-MCNC: 111 MG/DL (ref 70–99)
GLUCOSE BLDC GLUCOMTR-MCNC: 113 MG/DL (ref 70–99)
GLUCOSE BLDC GLUCOMTR-MCNC: 114 MG/DL (ref 70–99)
GLUCOSE BLDC GLUCOMTR-MCNC: 115 MG/DL (ref 70–99)
GLUCOSE BLDC GLUCOMTR-MCNC: 117 MG/DL (ref 70–99)
GLUCOSE BLDC GLUCOMTR-MCNC: 118 MG/DL (ref 70–99)
GLUCOSE BLDC GLUCOMTR-MCNC: 119 MG/DL (ref 70–99)
GLUCOSE BLDC GLUCOMTR-MCNC: 119 MG/DL (ref 70–99)
GLUCOSE BLDC GLUCOMTR-MCNC: 120 MG/DL (ref 70–99)
GLUCOSE BLDC GLUCOMTR-MCNC: 120 MG/DL (ref 70–99)
GLUCOSE BLDC GLUCOMTR-MCNC: 121 MG/DL (ref 70–99)
GLUCOSE BLDC GLUCOMTR-MCNC: 123 MG/DL (ref 70–99)
GLUCOSE BLDC GLUCOMTR-MCNC: 126 MG/DL (ref 70–99)
GLUCOSE BLDC GLUCOMTR-MCNC: 127 MG/DL (ref 70–99)
GLUCOSE BLDC GLUCOMTR-MCNC: 129 MG/DL (ref 70–99)
GLUCOSE BLDC GLUCOMTR-MCNC: 132 MG/DL (ref 70–99)
GLUCOSE BLDC GLUCOMTR-MCNC: 134 MG/DL (ref 70–99)
GLUCOSE BLDC GLUCOMTR-MCNC: 140 MG/DL (ref 70–99)
GLUCOSE BLDC GLUCOMTR-MCNC: 140 MG/DL (ref 70–99)
GLUCOSE BLDC GLUCOMTR-MCNC: 142 MG/DL (ref 70–99)
GLUCOSE BLDC GLUCOMTR-MCNC: 143 MG/DL (ref 70–99)
GLUCOSE BLDC GLUCOMTR-MCNC: 144 MG/DL (ref 70–99)
GLUCOSE BLDC GLUCOMTR-MCNC: 145 MG/DL (ref 70–99)
GLUCOSE BLDC GLUCOMTR-MCNC: 146 MG/DL (ref 70–99)
GLUCOSE BLDC GLUCOMTR-MCNC: 146 MG/DL (ref 70–99)
GLUCOSE BLDC GLUCOMTR-MCNC: 150 MG/DL (ref 70–99)
GLUCOSE BLDC GLUCOMTR-MCNC: 152 MG/DL (ref 70–99)
GLUCOSE BLDC GLUCOMTR-MCNC: 154 MG/DL (ref 70–99)
GLUCOSE BLDC GLUCOMTR-MCNC: 155 MG/DL (ref 70–99)
GLUCOSE BLDC GLUCOMTR-MCNC: 156 MG/DL (ref 70–99)
GLUCOSE BLDC GLUCOMTR-MCNC: 157 MG/DL (ref 70–99)
GLUCOSE BLDC GLUCOMTR-MCNC: 160 MG/DL (ref 70–99)
GLUCOSE BLDC GLUCOMTR-MCNC: 160 MG/DL (ref 70–99)
GLUCOSE BLDC GLUCOMTR-MCNC: 162 MG/DL (ref 70–99)
GLUCOSE BLDC GLUCOMTR-MCNC: 163 MG/DL (ref 70–99)
GLUCOSE BLDC GLUCOMTR-MCNC: 168 MG/DL (ref 70–99)
GLUCOSE BLDC GLUCOMTR-MCNC: 169 MG/DL (ref 70–99)
GLUCOSE BLDC GLUCOMTR-MCNC: 170 MG/DL (ref 70–99)
GLUCOSE BLDC GLUCOMTR-MCNC: 173 MG/DL (ref 70–99)
GLUCOSE BLDC GLUCOMTR-MCNC: 173 MG/DL (ref 70–99)
GLUCOSE BLDC GLUCOMTR-MCNC: 174 MG/DL (ref 70–99)
GLUCOSE BLDC GLUCOMTR-MCNC: 175 MG/DL (ref 70–99)
GLUCOSE BLDC GLUCOMTR-MCNC: 179 MG/DL (ref 70–99)
GLUCOSE BLDC GLUCOMTR-MCNC: 180 MG/DL (ref 70–99)
GLUCOSE BLDC GLUCOMTR-MCNC: 180 MG/DL (ref 70–99)
GLUCOSE BLDC GLUCOMTR-MCNC: 181 MG/DL (ref 70–99)
GLUCOSE BLDC GLUCOMTR-MCNC: 184 MG/DL (ref 70–99)
GLUCOSE BLDC GLUCOMTR-MCNC: 190 MG/DL (ref 70–99)
GLUCOSE BLDC GLUCOMTR-MCNC: 192 MG/DL (ref 70–99)
GLUCOSE BLDC GLUCOMTR-MCNC: 194 MG/DL (ref 70–99)
GLUCOSE BLDC GLUCOMTR-MCNC: 199 MG/DL (ref 70–99)
GLUCOSE BLDC GLUCOMTR-MCNC: 199 MG/DL (ref 70–99)
GLUCOSE BLDC GLUCOMTR-MCNC: 202 MG/DL (ref 70–99)
GLUCOSE BLDC GLUCOMTR-MCNC: 217 MG/DL (ref 70–99)
GLUCOSE BLDC GLUCOMTR-MCNC: 217 MG/DL (ref 70–99)
GLUCOSE BLDC GLUCOMTR-MCNC: 219 MG/DL (ref 70–99)
GLUCOSE BLDC GLUCOMTR-MCNC: 221 MG/DL (ref 70–99)
GLUCOSE BLDC GLUCOMTR-MCNC: 223 MG/DL (ref 70–99)
GLUCOSE BLDC GLUCOMTR-MCNC: 223 MG/DL (ref 70–99)
GLUCOSE BLDC GLUCOMTR-MCNC: 224 MG/DL (ref 70–99)
GLUCOSE BLDC GLUCOMTR-MCNC: 226 MG/DL (ref 70–99)
GLUCOSE BLDC GLUCOMTR-MCNC: 227 MG/DL (ref 70–99)
GLUCOSE BLDC GLUCOMTR-MCNC: 233 MG/DL (ref 70–99)
GLUCOSE BLDC GLUCOMTR-MCNC: 237 MG/DL (ref 70–99)
GLUCOSE BLDC GLUCOMTR-MCNC: 238 MG/DL (ref 70–99)
GLUCOSE BLDC GLUCOMTR-MCNC: 241 MG/DL (ref 70–99)
GLUCOSE BLDC GLUCOMTR-MCNC: 242 MG/DL (ref 70–99)
GLUCOSE BLDC GLUCOMTR-MCNC: 243 MG/DL (ref 70–99)
GLUCOSE BLDC GLUCOMTR-MCNC: 244 MG/DL (ref 70–99)
GLUCOSE BLDC GLUCOMTR-MCNC: 248 MG/DL (ref 70–99)
GLUCOSE BLDC GLUCOMTR-MCNC: 260 MG/DL (ref 70–99)
GLUCOSE BLDC GLUCOMTR-MCNC: 275 MG/DL (ref 70–99)
GLUCOSE BLDC GLUCOMTR-MCNC: 279 MG/DL (ref 70–99)
GLUCOSE BLDC GLUCOMTR-MCNC: 282 MG/DL (ref 70–99)
GLUCOSE BLDC GLUCOMTR-MCNC: 284 MG/DL (ref 70–99)
GLUCOSE BLDC GLUCOMTR-MCNC: 303 MG/DL (ref 70–99)
GLUCOSE BLDC GLUCOMTR-MCNC: 305 MG/DL (ref 70–99)
GLUCOSE BLDC GLUCOMTR-MCNC: 308 MG/DL (ref 70–99)
GLUCOSE BLDC GLUCOMTR-MCNC: 312 MG/DL (ref 70–99)
GLUCOSE BLDC GLUCOMTR-MCNC: 329 MG/DL (ref 70–99)
GLUCOSE BLDC GLUCOMTR-MCNC: 331 MG/DL (ref 70–99)
GLUCOSE BLDC GLUCOMTR-MCNC: 339 MG/DL (ref 70–99)
GLUCOSE BLDC GLUCOMTR-MCNC: 347 MG/DL (ref 70–99)
GLUCOSE BLDC GLUCOMTR-MCNC: 366 MG/DL (ref 70–99)
GLUCOSE BLDC GLUCOMTR-MCNC: 376 MG/DL (ref 70–99)
GLUCOSE BLDC GLUCOMTR-MCNC: 445 MG/DL (ref 70–99)
GLUCOSE BLDC GLUCOMTR-MCNC: 48 MG/DL (ref 70–99)
GLUCOSE BLDC GLUCOMTR-MCNC: 481 MG/DL (ref 70–99)
GLUCOSE BLDC GLUCOMTR-MCNC: 57 MG/DL (ref 70–99)
GLUCOSE BLDC GLUCOMTR-MCNC: 60 MG/DL (ref 70–99)
GLUCOSE BLDC GLUCOMTR-MCNC: 61 MG/DL (ref 70–99)
GLUCOSE BLDC GLUCOMTR-MCNC: 62 MG/DL (ref 70–99)
GLUCOSE BLDC GLUCOMTR-MCNC: 63 MG/DL (ref 70–99)
GLUCOSE BLDC GLUCOMTR-MCNC: 68 MG/DL (ref 70–99)
GLUCOSE BLDC GLUCOMTR-MCNC: 78 MG/DL (ref 70–99)
GLUCOSE BLDC GLUCOMTR-MCNC: 80 MG/DL (ref 70–99)
GLUCOSE BLDC GLUCOMTR-MCNC: 82 MG/DL (ref 70–99)
GLUCOSE BLDC GLUCOMTR-MCNC: 84 MG/DL (ref 70–99)
GLUCOSE BLDC GLUCOMTR-MCNC: 86 MG/DL (ref 70–99)
GLUCOSE BLDC GLUCOMTR-MCNC: 91 MG/DL (ref 70–99)
GLUCOSE BLDC GLUCOMTR-MCNC: 92 MG/DL (ref 70–99)
GLUCOSE BLDC GLUCOMTR-MCNC: 93 MG/DL (ref 70–99)
GLUCOSE BLDC GLUCOMTR-MCNC: 96 MG/DL (ref 70–99)
GLUCOSE SERPL-MCNC: 108 MG/DL (ref 70–99)
GLUCOSE SERPL-MCNC: 111 MG/DL (ref 70–99)
GLUCOSE SERPL-MCNC: 116 MG/DL (ref 70–99)
GLUCOSE SERPL-MCNC: 127 MG/DL (ref 70–99)
GLUCOSE SERPL-MCNC: 128 MG/DL (ref 70–99)
GLUCOSE SERPL-MCNC: 129 MG/DL (ref 70–99)
GLUCOSE SERPL-MCNC: 140 MG/DL (ref 70–99)
GLUCOSE SERPL-MCNC: 171 MG/DL (ref 70–99)
GLUCOSE SERPL-MCNC: 247 MG/DL (ref 70–99)
GLUCOSE SERPL-MCNC: 256 MG/DL (ref 70–99)
GLUCOSE SERPL-MCNC: 259 MG/DL (ref 70–99)
GLUCOSE SERPL-MCNC: 278 MG/DL (ref 70–99)
GLUCOSE SERPL-MCNC: 286 MG/DL (ref 70–99)
GLUCOSE SERPL-MCNC: 294 MG/DL (ref 70–99)
GLUCOSE SERPL-MCNC: 358 MG/DL (ref 70–99)
GLUCOSE SERPL-MCNC: 43 MG/DL (ref 70–99)
GLUCOSE SERPL-MCNC: 48 MG/DL (ref 70–99)
GLUCOSE UR STRIP-MCNC: NEGATIVE MG/DL
HBA1C MFR BLD: 10.3 % (ref 4.2–6.1)
HBA1C MFR BLD: 11.6 %
HBA1C MFR BLD: 9.6 % (ref 0–5.6)
HCO3 BLD-SCNC: 12 MMOL/L (ref 23–29)
HCO3 BLD-SCNC: 24 MMOL/L (ref 23–29)
HCO3 BLDV-SCNC: 22 MMOL/L (ref 24–30)
HCO3 BLDV-SCNC: 25 MMOL/L (ref 24–30)
HCT VFR BLD AUTO: 26.7 % (ref 40–53)
HCT VFR BLD AUTO: 27 % (ref 40–53)
HCT VFR BLD AUTO: 28 % (ref 40–53)
HCT VFR BLD AUTO: 29.3 % (ref 40–53)
HCT VFR BLD AUTO: 29.8 % (ref 40–53)
HCT VFR BLD AUTO: 30.5 % (ref 40–53)
HCT VFR BLD AUTO: 30.5 % (ref 40–53)
HCT VFR BLD AUTO: 30.9 % (ref 40–53)
HCT VFR BLD AUTO: 31.3 % (ref 40–53)
HCT VFR BLD AUTO: 31.4 % (ref 40–53)
HCT VFR BLD AUTO: 32.4 % (ref 40–53)
HCT VFR BLD AUTO: 32.8 % (ref 40–53)
HCT VFR BLD AUTO: 33 % (ref 40–53)
HCT VFR BLD AUTO: 33.2 % (ref 40–53)
HCT VFR BLD AUTO: 33.7 % (ref 40–53)
HCT VFR BLD AUTO: 34.4 % (ref 40–53)
HCT VFR BLD AUTO: 35.8 % (ref 40–53)
HCT VFR BLD AUTO: 36.2 % (ref 40–53)
HCT VFR BLD AUTO: 36.5 % (ref 40–53)
HCT VFR BLD AUTO: 36.8 % (ref 40–53)
HCT VFR BLD AUTO: 37.2 % (ref 40–53)
HCT VFR BLD AUTO: 37.5 % (ref 40–53)
HCT VFR BLD AUTO: 37.9 % (ref 40–53)
HCT VFR BLD AUTO: 38.6 % (ref 40–53)
HCT VFR BLD AUTO: 38.6 % (ref 40–53)
HCT VFR BLD AUTO: 39.5 % (ref 40–53)
HCT VFR BLD AUTO: 41.4 % (ref 40–53)
HCT VFR BLD AUTO: 41.8 % (ref 40–53)
HCT VFR BLD AUTO: 42.5 % (ref 40–53)
HDLC SERPL-MCNC: 56 MG/DL
HGB BLD-MCNC: 10 G/DL (ref 13.3–17.7)
HGB BLD-MCNC: 10 G/DL (ref 13.3–17.7)
HGB BLD-MCNC: 10.3 G/DL (ref 13.3–17.7)
HGB BLD-MCNC: 10.4 G/DL (ref 13.3–17.7)
HGB BLD-MCNC: 10.5 G/DL (ref 13.3–17.7)
HGB BLD-MCNC: 10.8 G/DL (ref 13.3–17.7)
HGB BLD-MCNC: 10.8 G/DL (ref 13.3–17.7)
HGB BLD-MCNC: 11.3 G/DL (ref 13.3–17.7)
HGB BLD-MCNC: 11.3 G/DL (ref 13.3–17.7)
HGB BLD-MCNC: 11.5 G/DL (ref 13.3–17.7)
HGB BLD-MCNC: 12.3 G/DL (ref 13.3–17.7)
HGB BLD-MCNC: 12.3 G/DL (ref 13.3–17.7)
HGB BLD-MCNC: 12.4 G/DL (ref 13.3–17.7)
HGB BLD-MCNC: 12.6 G/DL (ref 13.3–17.7)
HGB BLD-MCNC: 12.6 G/DL (ref 13.3–17.7)
HGB BLD-MCNC: 12.7 G/DL (ref 13.3–17.7)
HGB BLD-MCNC: 12.9 G/DL (ref 13.3–17.7)
HGB BLD-MCNC: 13 G/DL (ref 13.3–17.7)
HGB BLD-MCNC: 13.5 G/DL (ref 13.3–17.7)
HGB BLD-MCNC: 13.6 G/DL (ref 13.3–17.7)
HGB BLD-MCNC: 13.9 G/DL (ref 13.3–17.7)
HGB BLD-MCNC: 14.4 G/DL (ref 13.3–17.7)
HGB BLD-MCNC: 14.8 G/DL (ref 13.3–17.7)
HGB BLD-MCNC: 8.8 G/DL (ref 13.3–17.7)
HGB BLD-MCNC: 8.8 G/DL (ref 13.3–17.7)
HGB BLD-MCNC: 8.9 G/DL (ref 13.3–17.7)
HGB BLD-MCNC: 9.1 G/DL (ref 13.3–17.7)
HGB BLD-MCNC: 9.4 G/DL (ref 13.3–17.7)
HGB BLD-MCNC: 9.8 G/DL (ref 13.3–17.7)
HGB UR QL STRIP: ABNORMAL
HOLD SPECIMEN: NORMAL
HYALINE CASTS: 10 /LPF
IMM GRANULOCYTES # BLD: 0 10E3/UL
IMM GRANULOCYTES # BLD: 0.1 10E3/UL
IMM GRANULOCYTES # BLD: 0.1 10E3/UL
IMM GRANULOCYTES # BLD: 0.2 10E3/UL
IMM GRANULOCYTES NFR BLD: 0 %
IMM GRANULOCYTES NFR BLD: 1 %
IMM GRANULOCYTES NFR BLD: 1 %
IMM GRANULOCYTES NFR BLD: 2 %
INR PPP: 1.1 (ref 0.85–1.15)
INR PPP: 1.24 (ref 0.9–1.15)
INR PPP: 1.28 (ref 0.85–1.15)
INR PPP: 1.82 (ref 0.85–1.15)
INTERPRETATION ECG - MUSE: NORMAL
ION CA PH 7.4: ABNORMAL
IRON BINDING CAPACITY (ROCHE): 318 UG/DL (ref 240–430)
IRON SATN MFR SERPL: 22 % (ref 15–46)
IRON SERPL-MCNC: 36 UG/DL (ref 61–157)
IRON SERPL-MCNC: 70 UG/DL (ref 61–157)
KETONES BLD-SCNC: 0.2 MMOL/L (ref 0–0.6)
KETONES UR STRIP-MCNC: NEGATIVE MG/DL
LACTATE SERPL-SCNC: 1.3 MMOL/L (ref 0.7–2)
LACTATE SERPL-SCNC: 11.4 MMOL/L (ref 0.7–2)
LACTATE SERPL-SCNC: 16 MMOL/L (ref 0.7–2)
LACTATE SERPL-SCNC: 3.1 MMOL/L (ref 0.7–2)
LACTATE SERPL-SCNC: 3.5 MMOL/L (ref 0.7–2)
LACTATE SERPL-SCNC: 6.7 MMOL/L (ref 0.7–2)
LACTATE SERPL-SCNC: 7.9 MMOL/L (ref 0.7–2)
LDLC SERPL CALC-MCNC: 60 MG/DL
LEUKOCYTE ESTERASE UR QL STRIP: NEGATIVE
LEVETIRACETAM SERPL-MCNC: 33.3 ΜG/ML (ref 10–40)
LIPASE SERPL-CCNC: 24 U/L (ref 0–52)
LVEF ECHO: NORMAL
LVEF ECHO: NORMAL
LYMPHOCYTES # BLD AUTO: 0.6 10E3/UL (ref 0.8–5.3)
LYMPHOCYTES # BLD AUTO: 0.6 10E3/UL (ref 0.8–5.3)
LYMPHOCYTES # BLD AUTO: 0.7 10E3/UL (ref 0.8–5.3)
LYMPHOCYTES # BLD AUTO: 1 10E3/UL (ref 0.8–5.3)
LYMPHOCYTES # BLD AUTO: 1.6 10E3/UL (ref 0.8–5.3)
LYMPHOCYTES # BLD AUTO: 1.6 10E3/UL (ref 0.8–5.3)
LYMPHOCYTES # BLD AUTO: 2 10E3/UL (ref 0.8–5.3)
LYMPHOCYTES # BLD AUTO: 2.2 10E3/UL (ref 0.8–5.3)
LYMPHOCYTES # BLD MANUAL: 0.8 10E3/UL (ref 0.8–5.3)
LYMPHOCYTES # BLD MANUAL: 4.9 10E3/UL (ref 0.8–5.3)
LYMPHOCYTES NFR BLD AUTO: 10 %
LYMPHOCYTES NFR BLD AUTO: 10 %
LYMPHOCYTES NFR BLD AUTO: 18 %
LYMPHOCYTES NFR BLD AUTO: 25 %
LYMPHOCYTES NFR BLD AUTO: 27 %
LYMPHOCYTES NFR BLD AUTO: 29 %
LYMPHOCYTES NFR BLD AUTO: 40 %
LYMPHOCYTES NFR BLD AUTO: 6 %
LYMPHOCYTES NFR BLD MANUAL: 15 %
LYMPHOCYTES NFR BLD MANUAL: 93 %
MAGNESIUM SERPL-MCNC: 1.7 MG/DL (ref 1.8–2.6)
MAGNESIUM SERPL-MCNC: 1.7 MG/DL (ref 1.8–2.6)
MAGNESIUM SERPL-MCNC: 1.8 MG/DL (ref 1.7–2.3)
MAGNESIUM SERPL-MCNC: 1.8 MG/DL (ref 1.7–2.3)
MAGNESIUM SERPL-MCNC: 1.8 MG/DL (ref 1.8–2.6)
MAGNESIUM SERPL-MCNC: 1.8 MG/DL (ref 1.8–2.6)
MAGNESIUM SERPL-MCNC: 1.9 MG/DL (ref 1.7–2.3)
MAGNESIUM SERPL-MCNC: 1.9 MG/DL (ref 1.7–2.3)
MAGNESIUM SERPL-MCNC: 1.9 MG/DL (ref 1.8–2.6)
MAGNESIUM SERPL-MCNC: 2 MG/DL (ref 1.7–2.3)
MAGNESIUM SERPL-MCNC: 2 MG/DL (ref 1.8–2.6)
MAGNESIUM SERPL-MCNC: 2.1 MG/DL (ref 1.8–2.6)
MAGNESIUM SERPL-MCNC: 2.2 MG/DL (ref 1.7–2.3)
MAGNESIUM SERPL-MCNC: 2.2 MG/DL (ref 1.7–2.3)
MAGNESIUM SERPL-MCNC: 2.3 MG/DL (ref 1.7–2.3)
MAGNESIUM SERPL-MCNC: 2.3 MG/DL (ref 1.7–2.3)
MAGNESIUM SERPL-MCNC: 2.3 MG/DL (ref 1.8–2.6)
MAGNESIUM SERPL-MCNC: 2.4 MG/DL (ref 1.7–2.3)
MAGNESIUM SERPL-MCNC: 3.8 MG/DL (ref 1.7–2.3)
MCH RBC QN AUTO: 28.9 PG (ref 26.5–33)
MCH RBC QN AUTO: 29.1 PG (ref 26.5–33)
MCH RBC QN AUTO: 29.1 PG (ref 26.5–33)
MCH RBC QN AUTO: 29.3 PG (ref 26.5–33)
MCH RBC QN AUTO: 29.5 PG (ref 26.5–33)
MCH RBC QN AUTO: 29.5 PG (ref 26.5–33)
MCH RBC QN AUTO: 29.6 PG (ref 26.5–33)
MCH RBC QN AUTO: 29.7 PG (ref 26.5–33)
MCH RBC QN AUTO: 29.7 PG (ref 26.5–33)
MCH RBC QN AUTO: 29.8 PG (ref 26.5–33)
MCH RBC QN AUTO: 30.2 PG (ref 26.5–33)
MCH RBC QN AUTO: 30.4 PG (ref 26.5–33)
MCH RBC QN AUTO: 31.5 PG (ref 26.5–33)
MCH RBC QN AUTO: 31.8 PG (ref 26.5–33)
MCH RBC QN AUTO: 31.9 PG (ref 26.5–33)
MCH RBC QN AUTO: 32 PG (ref 26.5–33)
MCH RBC QN AUTO: 32.1 PG (ref 26.5–33)
MCH RBC QN AUTO: 32.1 PG (ref 26.5–33)
MCH RBC QN AUTO: 32.2 PG (ref 26.5–33)
MCH RBC QN AUTO: 32.3 PG (ref 26.5–33)
MCH RBC QN AUTO: 32.3 PG (ref 26.5–33)
MCH RBC QN AUTO: 32.4 PG (ref 26.5–33)
MCH RBC QN AUTO: 32.6 PG (ref 26.5–33)
MCH RBC QN AUTO: 32.7 PG (ref 26.5–33)
MCH RBC QN AUTO: 32.9 PG (ref 26.5–33)
MCHC RBC AUTO-ENTMCNC: 29.5 G/DL (ref 31.5–36.5)
MCHC RBC AUTO-ENTMCNC: 29.9 G/DL (ref 31.5–36.5)
MCHC RBC AUTO-ENTMCNC: 31.8 G/DL (ref 31.5–36.5)
MCHC RBC AUTO-ENTMCNC: 32.4 G/DL (ref 31.5–36.5)
MCHC RBC AUTO-ENTMCNC: 32.5 G/DL (ref 31.5–36.5)
MCHC RBC AUTO-ENTMCNC: 32.5 G/DL (ref 31.5–36.5)
MCHC RBC AUTO-ENTMCNC: 32.8 G/DL (ref 31.5–36.5)
MCHC RBC AUTO-ENTMCNC: 32.8 G/DL (ref 31.5–36.5)
MCHC RBC AUTO-ENTMCNC: 32.9 G/DL (ref 31.5–36.5)
MCHC RBC AUTO-ENTMCNC: 33 G/DL (ref 31.5–36.5)
MCHC RBC AUTO-ENTMCNC: 33 G/DL (ref 31.5–36.5)
MCHC RBC AUTO-ENTMCNC: 33.1 G/DL (ref 31.5–36.5)
MCHC RBC AUTO-ENTMCNC: 33.3 G/DL (ref 31.5–36.5)
MCHC RBC AUTO-ENTMCNC: 33.4 G/DL (ref 31.5–36.5)
MCHC RBC AUTO-ENTMCNC: 33.5 G/DL (ref 31.5–36.5)
MCHC RBC AUTO-ENTMCNC: 33.6 G/DL (ref 31.5–36.5)
MCHC RBC AUTO-ENTMCNC: 33.7 G/DL (ref 31.5–36.5)
MCHC RBC AUTO-ENTMCNC: 34.1 G/DL (ref 31.5–36.5)
MCHC RBC AUTO-ENTMCNC: 34.1 G/DL (ref 31.5–36.5)
MCHC RBC AUTO-ENTMCNC: 34.4 G/DL (ref 31.5–36.5)
MCHC RBC AUTO-ENTMCNC: 34.5 G/DL (ref 31.5–36.5)
MCHC RBC AUTO-ENTMCNC: 34.8 G/DL (ref 31.5–36.5)
MCHC RBC AUTO-ENTMCNC: 34.8 G/DL (ref 31.5–36.5)
MCHC RBC AUTO-ENTMCNC: 35 G/DL (ref 31.5–36.5)
MCHC RBC AUTO-ENTMCNC: 35.2 G/DL (ref 31.5–36.5)
MCV RBC AUTO: 100 FL (ref 78–100)
MCV RBC AUTO: 88 FL (ref 78–100)
MCV RBC AUTO: 89 FL (ref 78–100)
MCV RBC AUTO: 90 FL (ref 78–100)
MCV RBC AUTO: 92 FL (ref 78–100)
MCV RBC AUTO: 93 FL (ref 78–100)
MCV RBC AUTO: 94 FL (ref 78–100)
MCV RBC AUTO: 94 FL (ref 78–100)
MCV RBC AUTO: 95 FL (ref 78–100)
MCV RBC AUTO: 96 FL (ref 78–100)
MCV RBC AUTO: 96 FL (ref 78–100)
MCV RBC AUTO: 97 FL (ref 78–100)
MCV RBC AUTO: 98 FL (ref 78–100)
METAMYELOCYTES # BLD MANUAL: 0.1 10E3/UL
METAMYELOCYTES NFR BLD MANUAL: 1 %
MONOCYTES # BLD AUTO: 0.2 10E3/UL (ref 0–1.3)
MONOCYTES # BLD AUTO: 0.4 10E3/UL (ref 0–1.3)
MONOCYTES # BLD AUTO: 0.4 10E3/UL (ref 0–1.3)
MONOCYTES # BLD AUTO: 0.5 10E3/UL (ref 0–1.3)
MONOCYTES # BLD MANUAL: 0.3 10E3/UL (ref 0–1.3)
MONOCYTES # BLD MANUAL: 0.4 10E3/UL (ref 0–1.3)
MONOCYTES NFR BLD AUTO: 3 %
MONOCYTES NFR BLD AUTO: 3 %
MONOCYTES NFR BLD AUTO: 4 %
MONOCYTES NFR BLD AUTO: 7 %
MONOCYTES NFR BLD AUTO: 8 %
MONOCYTES NFR BLD AUTO: 9 %
MONOCYTES NFR BLD MANUAL: 5 %
MONOCYTES NFR BLD MANUAL: 7 %
NEUTROPHILS # BLD AUTO: 2.8 10E3/UL (ref 1.6–8.3)
NEUTROPHILS # BLD AUTO: 3.6 10E3/UL (ref 1.6–8.3)
NEUTROPHILS # BLD AUTO: 4.1 10E3/UL (ref 1.6–8.3)
NEUTROPHILS # BLD AUTO: 4.2 10E3/UL (ref 1.6–8.3)
NEUTROPHILS # BLD AUTO: 4.4 10E3/UL (ref 1.6–8.3)
NEUTROPHILS # BLD AUTO: 5.7 10E3/UL (ref 1.6–8.3)
NEUTROPHILS # BLD AUTO: 5.8 10E3/UL (ref 1.6–8.3)
NEUTROPHILS # BLD AUTO: 8.8 10E3/UL (ref 1.6–8.3)
NEUTROPHILS # BLD MANUAL: 0.1 10E3/UL (ref 1.6–8.3)
NEUTROPHILS # BLD MANUAL: 3.9 10E3/UL (ref 1.6–8.3)
NEUTROPHILS NFR BLD AUTO: 51 %
NEUTROPHILS NFR BLD AUTO: 61 %
NEUTROPHILS NFR BLD AUTO: 62 %
NEUTROPHILS NFR BLD AUTO: 68 %
NEUTROPHILS NFR BLD AUTO: 72 %
NEUTROPHILS NFR BLD AUTO: 85 %
NEUTROPHILS NFR BLD AUTO: 86 %
NEUTROPHILS NFR BLD AUTO: 89 %
NEUTROPHILS NFR BLD MANUAL: 2 %
NEUTROPHILS NFR BLD MANUAL: 77 %
NITRATE UR QL: NEGATIVE
NRBC # BLD AUTO: 0 10E3/UL
NRBC # BLD AUTO: 0.1 10E3/UL
NRBC # BLD AUTO: 0.1 10E3/UL
NRBC BLD AUTO-RTO: 0 /100
NRBC BLD MANUAL-RTO: 1 %
NRBC BLD MANUAL-RTO: 1 %
NT-PROBNP SERPL-MCNC: ABNORMAL PG/ML (ref 0–125)
NT-PROBNP SERPL-MCNC: ABNORMAL PG/ML (ref 0–900)
NT-PROBNP SERPL-MCNC: ABNORMAL PG/ML (ref 0–900)
O2/TOTAL GAS SETTING VFR VENT: 0.21 %
O2/TOTAL GAS SETTING VFR VENT: 100 %
OXYHGB MFR BLD: 88.1 % (ref 96–97)
OXYHGB MFR BLD: 95.2 % (ref 96–97)
OXYHGB MFR BLDV: 53.4 % (ref 70–75)
OXYHGB MFR BLDV: 9.9 % (ref 70–75)
P AXIS - MUSE: 36 DEGREES
P AXIS - MUSE: 38 DEGREES
P AXIS - MUSE: 38 DEGREES
P AXIS - MUSE: 44 DEGREES
P AXIS - MUSE: 47 DEGREES
P AXIS - MUSE: 47 DEGREES
P AXIS - MUSE: 52 DEGREES
P AXIS - MUSE: 52 DEGREES
P AXIS - MUSE: 54 DEGREES
P AXIS - MUSE: 54 DEGREES
P AXIS - MUSE: 58 DEGREES
P AXIS - MUSE: 65 DEGREES
P AXIS - MUSE: 67 DEGREES
PCO2 BLD: 32 MM HG (ref 35–45)
PCO2 BLD: 62 MM HG (ref 35–45)
PCO2 BLDV: 41 MM HG (ref 35–50)
PCO2 BLDV: 53 MM HG (ref 35–50)
PEEP: 0 CM H2O
PH BLD: 6.88 [PH] (ref 7.37–7.44)
PH BLD: 7.47 [PH] (ref 7.37–7.44)
PH BLDV: 7.22 [PH] (ref 7.35–7.45)
PH BLDV: 7.42 [PH] (ref 7.35–7.45)
PH UR STRIP: 6 [PH] (ref 5–7)
PH: 7.2 (ref 7.35–7.45)
PHOSPHATE SERPL-MCNC: 3.6 MG/DL (ref 2.5–4.5)
PHOSPHATE SERPL-MCNC: 4.9 MG/DL (ref 2.5–4.5)
PLAT MORPH BLD: ABNORMAL
PLAT MORPH BLD: ABNORMAL
PLATELET # BLD AUTO: 100 10E3/UL (ref 150–450)
PLATELET # BLD AUTO: 131 10E3/UL (ref 150–450)
PLATELET # BLD AUTO: 133 10E3/UL (ref 150–450)
PLATELET # BLD AUTO: 135 10E3/UL (ref 150–450)
PLATELET # BLD AUTO: 138 10E3/UL (ref 150–450)
PLATELET # BLD AUTO: 141 10E3/UL (ref 150–450)
PLATELET # BLD AUTO: 143 10E3/UL (ref 150–450)
PLATELET # BLD AUTO: 146 10E3/UL (ref 150–450)
PLATELET # BLD AUTO: 146 10E3/UL (ref 150–450)
PLATELET # BLD AUTO: 149 10E3/UL (ref 150–450)
PLATELET # BLD AUTO: 152 10E3/UL (ref 150–450)
PLATELET # BLD AUTO: 153 10E3/UL (ref 150–450)
PLATELET # BLD AUTO: 159 10E3/UL (ref 150–450)
PLATELET # BLD AUTO: 164 10E3/UL (ref 150–450)
PLATELET # BLD AUTO: 167 10E3/UL (ref 150–450)
PLATELET # BLD AUTO: 168 10E3/UL (ref 150–450)
PLATELET # BLD AUTO: 169 10E3/UL (ref 150–450)
PLATELET # BLD AUTO: 169 10E3/UL (ref 150–450)
PLATELET # BLD AUTO: 170 10E3/UL (ref 150–450)
PLATELET # BLD AUTO: 177 10E3/UL (ref 150–450)
PLATELET # BLD AUTO: 182 10E3/UL (ref 150–450)
PLATELET # BLD AUTO: 192 10E3/UL (ref 150–450)
PLATELET # BLD AUTO: 195 10E3/UL (ref 150–450)
PLATELET # BLD AUTO: 209 10E3/UL (ref 150–450)
PLATELET # BLD AUTO: 229 10E3/UL (ref 150–450)
PLATELET # BLD AUTO: 71 10E3/UL (ref 150–450)
PLATELET # BLD AUTO: 86 10E3/UL (ref 150–450)
PO2 BLD: 85 MM HG (ref 80–90)
PO2 BLD: 99 MM HG (ref 80–90)
PO2 BLDV: 32 MM HG (ref 25–47)
PO2 BLDV: <15 MM HG (ref 25–47)
POLYCHROMASIA BLD QL SMEAR: SLIGHT
POTASSIUM BLD-SCNC: 3.5 MMOL/L (ref 3.5–5)
POTASSIUM BLD-SCNC: 3.6 MMOL/L (ref 3.5–5)
POTASSIUM BLD-SCNC: 3.8 MMOL/L (ref 3.5–5)
POTASSIUM BLD-SCNC: 3.9 MMOL/L (ref 3.5–5)
POTASSIUM BLD-SCNC: 3.9 MMOL/L (ref 3.5–5)
POTASSIUM BLD-SCNC: 4 MMOL/L (ref 3.5–5)
POTASSIUM BLD-SCNC: 4 MMOL/L (ref 3.5–5)
POTASSIUM BLD-SCNC: 4.1 MMOL/L (ref 3.5–5)
POTASSIUM BLD-SCNC: 4.1 MMOL/L (ref 3.5–5)
POTASSIUM BLD-SCNC: 4.3 MMOL/L (ref 3.5–5)
POTASSIUM BLD-SCNC: 4.4 MMOL/L (ref 3.5–5)
POTASSIUM BLD-SCNC: 4.6 MMOL/L (ref 3.5–5)
POTASSIUM BLD-SCNC: 4.6 MMOL/L (ref 3.5–5)
POTASSIUM BLD-SCNC: 4.7 MMOL/L (ref 3.5–5)
POTASSIUM BLD-SCNC: 4.7 MMOL/L (ref 3.5–5)
POTASSIUM BLD-SCNC: 5 MMOL/L (ref 3.5–5)
POTASSIUM BLD-SCNC: 5.2 MMOL/L (ref 3.5–5)
POTASSIUM SERPL-SCNC: 3.2 MMOL/L (ref 3.4–5.3)
POTASSIUM SERPL-SCNC: 3.3 MMOL/L (ref 3.4–5.3)
POTASSIUM SERPL-SCNC: 3.3 MMOL/L (ref 3.4–5.3)
POTASSIUM SERPL-SCNC: 3.4 MMOL/L (ref 3.4–5.3)
POTASSIUM SERPL-SCNC: 3.5 MMOL/L (ref 3.4–5.3)
POTASSIUM SERPL-SCNC: 3.5 MMOL/L (ref 3.4–5.3)
POTASSIUM SERPL-SCNC: 3.6 MMOL/L (ref 3.4–5.3)
POTASSIUM SERPL-SCNC: 4.2 MMOL/L (ref 3.4–5.3)
POTASSIUM SERPL-SCNC: 4.2 MMOL/L (ref 3.4–5.3)
POTASSIUM SERPL-SCNC: 4.4 MMOL/L (ref 3.4–5.3)
POTASSIUM SERPL-SCNC: 4.6 MMOL/L (ref 3.4–5.3)
POTASSIUM SERPL-SCNC: 4.8 MMOL/L (ref 3.4–5.3)
POTASSIUM SERPL-SCNC: 5.1 MMOL/L (ref 3.4–5.3)
POTASSIUM SERPL-SCNC: 5.1 MMOL/L (ref 3.4–5.3)
POTASSIUM SERPL-SCNC: 5.2 MMOL/L (ref 3.4–5.3)
POTASSIUM SERPL-SCNC: 5.4 MMOL/L (ref 3.4–5.3)
POTASSIUM SERPL-SCNC: 6.1 MMOL/L (ref 3.4–5.3)
PR INTERVAL - MUSE: 150 MS
PR INTERVAL - MUSE: 162 MS
PR INTERVAL - MUSE: 164 MS
PR INTERVAL - MUSE: 164 MS
PR INTERVAL - MUSE: 172 MS
PR INTERVAL - MUSE: 174 MS
PR INTERVAL - MUSE: 174 MS
PR INTERVAL - MUSE: 176 MS
PR INTERVAL - MUSE: 180 MS
PR INTERVAL - MUSE: 188 MS
PR INTERVAL - MUSE: 190 MS
PROCALCITONIN SERPL IA-MCNC: 0.32 NG/ML
PROCALCITONIN SERPL IA-MCNC: 1.31 NG/ML
PROCALCITONIN SERPL IA-MCNC: 14.05 NG/ML
PROCALCITONIN SERPL-MCNC: 0.03 NG/ML (ref 0–0.49)
PROLACTIN SERPL 3RD IS-MCNC: 34 NG/ML (ref 4–15)
PROT SERPL-MCNC: 5.6 G/DL (ref 6.4–8.3)
PROT SERPL-MCNC: 6 G/DL (ref 6.4–8.3)
PROT SERPL-MCNC: 6.3 G/DL (ref 6.4–8.3)
PROT SERPL-MCNC: 6.4 G/DL (ref 6.4–8.3)
PROT SERPL-MCNC: 6.9 G/DL (ref 6.4–8.3)
PROT SERPL-MCNC: 6.9 G/DL (ref 6–8)
PROT SERPL-MCNC: 7 G/DL (ref 6.4–8.3)
PROT SERPL-MCNC: 7.1 G/DL (ref 6.4–8.3)
PROT SERPL-MCNC: 7.2 G/DL (ref 6.4–8.3)
PROT SERPL-MCNC: 7.2 G/DL (ref 6–8)
PROT SERPL-MCNC: 7.4 G/DL (ref 6–8)
PROT SERPL-MCNC: 7.7 G/DL (ref 6.4–8.3)
PROT SERPL-MCNC: 7.7 G/DL (ref 6.4–8.3)
PSV (LAB BLOOD GAS): 0 CM H2O
QRS DURATION - MUSE: 100 MS
QRS DURATION - MUSE: 106 MS
QRS DURATION - MUSE: 86 MS
QRS DURATION - MUSE: 86 MS
QRS DURATION - MUSE: 88 MS
QRS DURATION - MUSE: 90 MS
QRS DURATION - MUSE: 92 MS
QRS DURATION - MUSE: 94 MS
QT - MUSE: 324 MS
QT - MUSE: 352 MS
QT - MUSE: 392 MS
QT - MUSE: 408 MS
QT - MUSE: 410 MS
QT - MUSE: 428 MS
QT - MUSE: 428 MS
QT - MUSE: 430 MS
QT - MUSE: 458 MS
QT - MUSE: 458 MS
QT - MUSE: 466 MS
QT - MUSE: 470 MS
QT - MUSE: 480 MS
QTC - MUSE: 409 MS
QTC - MUSE: 449 MS
QTC - MUSE: 455 MS
QTC - MUSE: 455 MS
QTC - MUSE: 457 MS
QTC - MUSE: 457 MS
QTC - MUSE: 461 MS
QTC - MUSE: 466 MS
QTC - MUSE: 470 MS
QTC - MUSE: 471 MS
QTC - MUSE: 481 MS
QTC - MUSE: 490 MS
QTC - MUSE: 508 MS
R AXIS - MUSE: -11 DEGREES
R AXIS - MUSE: 0 DEGREES
R AXIS - MUSE: 1 DEGREES
R AXIS - MUSE: 1 DEGREES
R AXIS - MUSE: 102 DEGREES
R AXIS - MUSE: 14 DEGREES
R AXIS - MUSE: 14 DEGREES
R AXIS - MUSE: 18 DEGREES
R AXIS - MUSE: 23 DEGREES
R AXIS - MUSE: 28 DEGREES
R AXIS - MUSE: 47 DEGREES
R AXIS - MUSE: 50 DEGREES
R AXIS - MUSE: 52 DEGREES
RATE: 16 RR/MIN
RBC # BLD AUTO: 2.96 10E6/UL (ref 4.4–5.9)
RBC # BLD AUTO: 2.98 10E6/UL (ref 4.4–5.9)
RBC # BLD AUTO: 3.01 10E6/UL (ref 4.4–5.9)
RBC # BLD AUTO: 3.11 10E6/UL (ref 4.4–5.9)
RBC # BLD AUTO: 3.23 10E6/UL (ref 4.4–5.9)
RBC # BLD AUTO: 3.32 10E6/UL (ref 4.4–5.9)
RBC # BLD AUTO: 3.39 10E6/UL (ref 4.4–5.9)
RBC # BLD AUTO: 3.43 10E6/UL (ref 4.4–5.9)
RBC # BLD AUTO: 3.44 10E6/UL (ref 4.4–5.9)
RBC # BLD AUTO: 3.44 10E6/UL (ref 4.4–5.9)
RBC # BLD AUTO: 3.46 10E6/UL (ref 4.4–5.9)
RBC # BLD AUTO: 3.5 10E6/UL (ref 4.4–5.9)
RBC # BLD AUTO: 3.54 10E6/UL (ref 4.4–5.9)
RBC # BLD AUTO: 3.55 10E6/UL (ref 4.4–5.9)
RBC # BLD AUTO: 3.62 10E6/UL (ref 4.4–5.9)
RBC # BLD AUTO: 3.63 10E6/UL (ref 4.4–5.9)
RBC # BLD AUTO: 3.82 10E6/UL (ref 4.4–5.9)
RBC # BLD AUTO: 3.86 10E6/UL (ref 4.4–5.9)
RBC # BLD AUTO: 3.9 10E6/UL (ref 4.4–5.9)
RBC # BLD AUTO: 3.94 10E6/UL (ref 4.4–5.9)
RBC # BLD AUTO: 4 10E6/UL (ref 4.4–5.9)
RBC # BLD AUTO: 4.02 10E6/UL (ref 4.4–5.9)
RBC # BLD AUTO: 4.09 10E6/UL (ref 4.4–5.9)
RBC # BLD AUTO: 4.13 10E6/UL (ref 4.4–5.9)
RBC # BLD AUTO: 4.2 10E6/UL (ref 4.4–5.9)
RBC # BLD AUTO: 4.26 10E6/UL (ref 4.4–5.9)
RBC # BLD AUTO: 4.37 10E6/UL (ref 4.4–5.9)
RBC # BLD AUTO: 4.41 10E6/UL (ref 4.4–5.9)
RBC # BLD AUTO: 4.62 10E6/UL (ref 4.4–5.9)
RBC MORPH BLD: ABNORMAL
RBC MORPH BLD: ABNORMAL
RBC URINE: <1 /HPF
RSV RNA SPEC NAA+PROBE: NEGATIVE
RSV RNA SPEC NAA+PROBE: POSITIVE
SAO2 % BLDV: 10 % (ref 70–75)
SAO2 % BLDV: 54.4 % (ref 70–75)
SARS-COV-2 RNA RESP QL NAA+PROBE: NEGATIVE
SODIUM SERPL-SCNC: 126 MMOL/L (ref 136–145)
SODIUM SERPL-SCNC: 128 MMOL/L (ref 136–145)
SODIUM SERPL-SCNC: 129 MMOL/L (ref 136–145)
SODIUM SERPL-SCNC: 130 MMOL/L (ref 136–145)
SODIUM SERPL-SCNC: 131 MMOL/L (ref 136–145)
SODIUM SERPL-SCNC: 132 MMOL/L (ref 136–145)
SODIUM SERPL-SCNC: 132 MMOL/L (ref 136–145)
SODIUM SERPL-SCNC: 133 MMOL/L (ref 136–145)
SODIUM SERPL-SCNC: 134 MMOL/L (ref 136–145)
SODIUM SERPL-SCNC: 135 MMOL/L (ref 136–145)
SODIUM SERPL-SCNC: 136 MMOL/L (ref 136–145)
SODIUM SERPL-SCNC: 136 MMOL/L (ref 136–145)
SODIUM SERPL-SCNC: 137 MMOL/L (ref 136–145)
SODIUM SERPL-SCNC: 138 MMOL/L (ref 136–145)
SODIUM SERPL-SCNC: 139 MMOL/L (ref 136–145)
SODIUM SERPL-SCNC: 139 MMOL/L (ref 136–145)
SODIUM SERPL-SCNC: 140 MMOL/L (ref 136–145)
SODIUM SERPL-SCNC: 141 MMOL/L (ref 136–145)
SODIUM SERPL-SCNC: 141 MMOL/L (ref 136–145)
SP GR UR STRIP: 1.02 (ref 1–1.03)
SPECIMEN EXPIRATION DATE: NORMAL
SPECIMEN EXPIRATION DATE: NORMAL
SYSTOLIC BLOOD PRESSURE - MUSE: 106 MMHG
SYSTOLIC BLOOD PRESSURE - MUSE: NORMAL MMHG
T AXIS - MUSE: -26 DEGREES
T AXIS - MUSE: -51 DEGREES
T AXIS - MUSE: 118 DEGREES
T AXIS - MUSE: 13 DEGREES
T AXIS - MUSE: 141 DEGREES
T AXIS - MUSE: 156 DEGREES
T AXIS - MUSE: 177 DEGREES
T AXIS - MUSE: 256 DEGREES
T AXIS - MUSE: 269 DEGREES
T AXIS - MUSE: 34 DEGREES
T AXIS - MUSE: 52 DEGREES
T AXIS - MUSE: 81 DEGREES
T AXIS - MUSE: 84 DEGREES
TEMPERATURE: 37 DEGREES C
TEMPERATURE: 37 DEGREES C
TOXIC GRANULES BLD QL SMEAR: PRESENT
TRIGL SERPL-MCNC: 43 MG/DL
TROPONIN I SERPL-MCNC: 0.02 NG/ML (ref 0–0.29)
TROPONIN I SERPL-MCNC: 0.06 NG/ML (ref 0–0.29)
TROPONIN I SERPL-MCNC: 0.07 NG/ML (ref 0–0.29)
TROPONIN I SERPL-MCNC: 0.69 NG/ML (ref 0–0.29)
TROPONIN I SERPL-MCNC: 0.71 NG/ML (ref 0–0.29)
TROPONIN I SERPL-MCNC: 0.73 NG/ML (ref 0–0.29)
TROPONIN I SERPL-MCNC: 10.49 NG/ML (ref 0–0.29)
TROPONIN I SERPL-MCNC: 8.33 NG/ML (ref 0–0.29)
TROPONIN T SERPL HS-MCNC: 42 NG/L
TROPONIN T SERPL HS-MCNC: 55 NG/L
TROPONIN T SERPL HS-MCNC: 59 NG/L
TROPONIN T SERPL HS-MCNC: 80 NG/L
TROPONIN T SERPL HS-MCNC: 81 NG/L
TROPONIN T SERPL HS-MCNC: 96 NG/L
TSH SERPL DL<=0.005 MIU/L-ACNC: 18.6 UIU/ML (ref 0.3–4.2)
TSH SERPL DL<=0.005 MIU/L-ACNC: 31.8 UIU/ML (ref 0.3–4.2)
UFH PPP CHRO-ACNC: 0.43 IU/ML
UFH PPP CHRO-ACNC: 0.45 IU/ML
UFH PPP CHRO-ACNC: 0.47 IU/ML
UFH PPP CHRO-ACNC: 0.47 IU/ML
UFH PPP CHRO-ACNC: 0.48 IU/ML
UFH PPP CHRO-ACNC: 0.52 IU/ML
UFH PPP CHRO-ACNC: 0.55 IU/ML
UFH PPP CHRO-ACNC: >1.1 IU/ML
UROBILINOGEN UR STRIP-MCNC: <2 MG/DL
VENTILATION MODE: ABNORMAL
VENTILATOR TIDAL VOLUME: 0 ML
VENTRICULAR RATE- MUSE: 108 BPM
VENTRICULAR RATE- MUSE: 54 BPM
VENTRICULAR RATE- MUSE: 57 BPM
VENTRICULAR RATE- MUSE: 58 BPM
VENTRICULAR RATE- MUSE: 60 BPM
VENTRICULAR RATE- MUSE: 61 BPM
VENTRICULAR RATE- MUSE: 68 BPM
VENTRICULAR RATE- MUSE: 79 BPM
VENTRICULAR RATE- MUSE: 80 BPM
VENTRICULAR RATE- MUSE: 82 BPM
VENTRICULAR RATE- MUSE: 83 BPM
VENTRICULAR RATE- MUSE: 84 BPM
VENTRICULAR RATE- MUSE: 96 BPM
VIT B12 SERPL-MCNC: 1546 PG/ML (ref 232–1245)
WBC # BLD AUTO: 10 10E3/UL (ref 4–11)
WBC # BLD AUTO: 4.5 10E3/UL (ref 4–11)
WBC # BLD AUTO: 4.6 10E3/UL (ref 4–11)
WBC # BLD AUTO: 4.9 10E3/UL (ref 4–11)
WBC # BLD AUTO: 5 10E3/UL (ref 4–11)
WBC # BLD AUTO: 5.1 10E3/UL (ref 4–11)
WBC # BLD AUTO: 5.3 10E3/UL (ref 4–11)
WBC # BLD AUTO: 5.4 10E3/UL (ref 4–11)
WBC # BLD AUTO: 5.5 10E3/UL (ref 4–11)
WBC # BLD AUTO: 5.5 10E3/UL (ref 4–11)
WBC # BLD AUTO: 5.6 10E3/UL (ref 4–11)
WBC # BLD AUTO: 5.7 10E3/UL (ref 4–11)
WBC # BLD AUTO: 5.7 10E3/UL (ref 4–11)
WBC # BLD AUTO: 6.2 10E3/UL (ref 4–11)
WBC # BLD AUTO: 6.4 10E3/UL (ref 4–11)
WBC # BLD AUTO: 6.5 10E3/UL (ref 4–11)
WBC # BLD AUTO: 6.7 10E3/UL (ref 4–11)
WBC # BLD AUTO: 6.7 10E3/UL (ref 4–11)
WBC # BLD AUTO: 6.9 10E3/UL (ref 4–11)
WBC # BLD AUTO: 7.1 10E3/UL (ref 4–11)
WBC # BLD AUTO: 7.4 10E3/UL (ref 4–11)
WBC # BLD AUTO: 9.6 10E3/UL (ref 4–11)
WBC URINE: <1 /HPF

## 2022-01-01 PROCEDURE — 99214 OFFICE O/P EST MOD 30 MIN: CPT | Performed by: FAMILY MEDICINE

## 2022-01-01 PROCEDURE — 51702 INSERT TEMP BLADDER CATH: CPT

## 2022-01-01 PROCEDURE — 82010 KETONE BODYS QUAN: CPT | Performed by: INTERNAL MEDICINE

## 2022-01-01 PROCEDURE — 85027 COMPLETE CBC AUTOMATED: CPT | Performed by: INTERNAL MEDICINE

## 2022-01-01 PROCEDURE — 84520 ASSAY OF UREA NITROGEN: CPT | Performed by: FAMILY MEDICINE

## 2022-01-01 PROCEDURE — 96376 TX/PRO/DX INJ SAME DRUG ADON: CPT

## 2022-01-01 PROCEDURE — 36415 COLL VENOUS BLD VENIPUNCTURE: CPT | Performed by: EMERGENCY MEDICINE

## 2022-01-01 PROCEDURE — 36415 COLL VENOUS BLD VENIPUNCTURE: CPT | Performed by: INTERNAL MEDICINE

## 2022-01-01 PROCEDURE — 250N000013 HC RX MED GY IP 250 OP 250 PS 637: Performed by: INTERNAL MEDICINE

## 2022-01-01 PROCEDURE — 99232 SBSQ HOSP IP/OBS MODERATE 35: CPT | Performed by: HOSPITALIST

## 2022-01-01 PROCEDURE — 81001 URINALYSIS AUTO W/SCOPE: CPT | Performed by: EMERGENCY MEDICINE

## 2022-01-01 PROCEDURE — 200N000001 HC R&B ICU

## 2022-01-01 PROCEDURE — 84132 ASSAY OF SERUM POTASSIUM: CPT | Performed by: INTERNAL MEDICINE

## 2022-01-01 PROCEDURE — 120N000013 HC R&B IMCU

## 2022-01-01 PROCEDURE — 83735 ASSAY OF MAGNESIUM: CPT | Performed by: INTERNAL MEDICINE

## 2022-01-01 PROCEDURE — 82248 BILIRUBIN DIRECT: CPT | Performed by: EMERGENCY MEDICINE

## 2022-01-01 PROCEDURE — 82607 VITAMIN B-12: CPT | Performed by: HOSPITALIST

## 2022-01-01 PROCEDURE — 85610 PROTHROMBIN TIME: CPT | Performed by: FAMILY MEDICINE

## 2022-01-01 PROCEDURE — 3E043XZ INTRODUCTION OF VASOPRESSOR INTO CENTRAL VEIN, PERCUTANEOUS APPROACH: ICD-10-PCS | Performed by: INTERNAL MEDICINE

## 2022-01-01 PROCEDURE — 80053 COMPREHEN METABOLIC PANEL: CPT | Performed by: EMERGENCY MEDICINE

## 2022-01-01 PROCEDURE — 93458 L HRT ARTERY/VENTRICLE ANGIO: CPT | Performed by: INTERNAL MEDICINE

## 2022-01-01 PROCEDURE — 250N000012 HC RX MED GY IP 250 OP 636 PS 637: Performed by: EMERGENCY MEDICINE

## 2022-01-01 PROCEDURE — 250N000012 HC RX MED GY IP 250 OP 636 PS 637: Performed by: INTERNAL MEDICINE

## 2022-01-01 PROCEDURE — 93010 ELECTROCARDIOGRAM REPORT: CPT | Performed by: INTERNAL MEDICINE

## 2022-01-01 PROCEDURE — 93005 ELECTROCARDIOGRAM TRACING: CPT | Performed by: EMERGENCY MEDICINE

## 2022-01-01 PROCEDURE — 80053 COMPREHEN METABOLIC PANEL: CPT | Performed by: INTERNAL MEDICINE

## 2022-01-01 PROCEDURE — 82550 ASSAY OF CK (CPK): CPT | Performed by: INTERNAL MEDICINE

## 2022-01-01 PROCEDURE — 258N000003 HC RX IP 258 OP 636: Performed by: HOSPITALIST

## 2022-01-01 PROCEDURE — 85027 COMPLETE CBC AUTOMATED: CPT | Performed by: HOSPITALIST

## 2022-01-01 PROCEDURE — 85025 COMPLETE CBC W/AUTO DIFF WBC: CPT | Performed by: EMERGENCY MEDICINE

## 2022-01-01 PROCEDURE — 80048 BASIC METABOLIC PNL TOTAL CA: CPT | Performed by: NURSE PRACTITIONER

## 2022-01-01 PROCEDURE — 250N000013 HC RX MED GY IP 250 OP 250 PS 637: Performed by: GENERAL ACUTE CARE HOSPITAL

## 2022-01-01 PROCEDURE — 99232 SBSQ HOSP IP/OBS MODERATE 35: CPT | Performed by: INTERNAL MEDICINE

## 2022-01-01 PROCEDURE — 84484 ASSAY OF TROPONIN QUANT: CPT | Performed by: EMERGENCY MEDICINE

## 2022-01-01 PROCEDURE — 87040 BLOOD CULTURE FOR BACTERIA: CPT | Performed by: INTERNAL MEDICINE

## 2022-01-01 PROCEDURE — 97535 SELF CARE MNGMENT TRAINING: CPT | Mod: GO

## 2022-01-01 PROCEDURE — 250N000011 HC RX IP 250 OP 636: Performed by: NURSE PRACTITIONER

## 2022-01-01 PROCEDURE — 99214 OFFICE O/P EST MOD 30 MIN: CPT | Performed by: NURSE PRACTITIONER

## 2022-01-01 PROCEDURE — 86140 C-REACTIVE PROTEIN: CPT | Performed by: FAMILY MEDICINE

## 2022-01-01 PROCEDURE — 3E043XZ INTRODUCTION OF VASOPRESSOR INTO CENTRAL VEIN, PERCUTANEOUS APPROACH: ICD-10-PCS | Performed by: HOSPITALIST

## 2022-01-01 PROCEDURE — 85730 THROMBOPLASTIN TIME PARTIAL: CPT | Performed by: INTERNAL MEDICINE

## 2022-01-01 PROCEDURE — 999N000157 HC STATISTIC RCP TIME EA 10 MIN

## 2022-01-01 PROCEDURE — 71046 X-RAY EXAM CHEST 2 VIEWS: CPT

## 2022-01-01 PROCEDURE — 250N000013 HC RX MED GY IP 250 OP 250 PS 637

## 2022-01-01 PROCEDURE — 210N000001 HC R&B IMCU HEART CARE

## 2022-01-01 PROCEDURE — 96366 THER/PROPH/DIAG IV INF ADDON: CPT

## 2022-01-01 PROCEDURE — 83735 ASSAY OF MAGNESIUM: CPT | Performed by: HOSPITALIST

## 2022-01-01 PROCEDURE — 999N000208 ECHOCARDIOGRAM COMPLETE

## 2022-01-01 PROCEDURE — 82374 ASSAY BLOOD CARBON DIOXIDE: CPT | Performed by: HOSPITALIST

## 2022-01-01 PROCEDURE — 97162 PT EVAL MOD COMPLEX 30 MIN: CPT | Mod: GP | Performed by: PHYSICAL THERAPIST

## 2022-01-01 PROCEDURE — 86140 C-REACTIVE PROTEIN: CPT | Performed by: INTERNAL MEDICINE

## 2022-01-01 PROCEDURE — 93970 EXTREMITY STUDY: CPT

## 2022-01-01 PROCEDURE — 93005 ELECTROCARDIOGRAM TRACING: CPT

## 2022-01-01 PROCEDURE — 250N000009 HC RX 250: Performed by: INTERNAL MEDICINE

## 2022-01-01 PROCEDURE — 255N000002 HC RX 255 OP 636: Performed by: INTERNAL MEDICINE

## 2022-01-01 PROCEDURE — 99207 PR NO CHARGE LOS: CPT | Performed by: PHARMACIST

## 2022-01-01 PROCEDURE — 99239 HOSP IP/OBS DSCHRG MGMT >30: CPT | Performed by: INTERNAL MEDICINE

## 2022-01-01 PROCEDURE — 99285 EMERGENCY DEPT VISIT HI MDM: CPT | Mod: 25

## 2022-01-01 PROCEDURE — 93306 TTE W/DOPPLER COMPLETE: CPT | Mod: 26 | Performed by: INTERNAL MEDICINE

## 2022-01-01 PROCEDURE — 250N000011 HC RX IP 250 OP 636: Performed by: NURSE ANESTHETIST, CERTIFIED REGISTERED

## 2022-01-01 PROCEDURE — 258N000001 HC RX 258: Performed by: INTERNAL MEDICINE

## 2022-01-01 PROCEDURE — 85730 THROMBOPLASTIN TIME PARTIAL: CPT | Performed by: NURSE PRACTITIONER

## 2022-01-01 PROCEDURE — 99291 CRITICAL CARE FIRST HOUR: CPT | Performed by: INTERNAL MEDICINE

## 2022-01-01 PROCEDURE — 82947 ASSAY GLUCOSE BLOOD QUANT: CPT | Performed by: HOSPITALIST

## 2022-01-01 PROCEDURE — 94002 VENT MGMT INPAT INIT DAY: CPT

## 2022-01-01 PROCEDURE — 83880 ASSAY OF NATRIURETIC PEPTIDE: CPT | Performed by: EMERGENCY MEDICINE

## 2022-01-01 PROCEDURE — 250N000011 HC RX IP 250 OP 636: Performed by: INTERNAL MEDICINE

## 2022-01-01 PROCEDURE — 96360 HYDRATION IV INFUSION INIT: CPT

## 2022-01-01 PROCEDURE — 85520 HEPARIN ASSAY: CPT | Performed by: INTERNAL MEDICINE

## 2022-01-01 PROCEDURE — 5A1935Z RESPIRATORY VENTILATION, LESS THAN 24 CONSECUTIVE HOURS: ICD-10-PCS | Performed by: INTERNAL MEDICINE

## 2022-01-01 PROCEDURE — 94799 UNLISTED PULMONARY SVC/PX: CPT

## 2022-01-01 PROCEDURE — 94640 AIRWAY INHALATION TREATMENT: CPT

## 2022-01-01 PROCEDURE — 97110 THERAPEUTIC EXERCISES: CPT | Mod: GO

## 2022-01-01 PROCEDURE — 83735 ASSAY OF MAGNESIUM: CPT | Performed by: EMERGENCY MEDICINE

## 2022-01-01 PROCEDURE — 82310 ASSAY OF CALCIUM: CPT | Performed by: INTERNAL MEDICINE

## 2022-01-01 PROCEDURE — 99254 IP/OBS CNSLTJ NEW/EST MOD 60: CPT | Performed by: INTERNAL MEDICINE

## 2022-01-01 PROCEDURE — 36415 COLL VENOUS BLD VENIPUNCTURE: CPT | Performed by: HOSPITALIST

## 2022-01-01 PROCEDURE — 85007 BL SMEAR W/DIFF WBC COUNT: CPT | Performed by: NURSE PRACTITIONER

## 2022-01-01 PROCEDURE — 97166 OT EVAL MOD COMPLEX 45 MIN: CPT | Mod: GO

## 2022-01-01 PROCEDURE — 84155 ASSAY OF PROTEIN SERUM: CPT | Performed by: STUDENT IN AN ORGANIZED HEALTH CARE EDUCATION/TRAINING PROGRAM

## 2022-01-01 PROCEDURE — 250N000009 HC RX 250: Performed by: NURSE ANESTHETIST, CERTIFIED REGISTERED

## 2022-01-01 PROCEDURE — 999N000009 HC STATISTIC AIRWAY CARE

## 2022-01-01 PROCEDURE — 93325 DOPPLER ECHO COLOR FLOW MAPG: CPT | Mod: 26 | Performed by: INTERNAL MEDICINE

## 2022-01-01 PROCEDURE — 80048 BASIC METABOLIC PNL TOTAL CA: CPT | Performed by: EMERGENCY MEDICINE

## 2022-01-01 PROCEDURE — 84155 ASSAY OF PROTEIN SERUM: CPT | Performed by: EMERGENCY MEDICINE

## 2022-01-01 PROCEDURE — 97165 OT EVAL LOW COMPLEX 30 MIN: CPT | Mod: GO

## 2022-01-01 PROCEDURE — C1894 INTRO/SHEATH, NON-LASER: HCPCS | Performed by: INTERNAL MEDICINE

## 2022-01-01 PROCEDURE — 82306 VITAMIN D 25 HYDROXY: CPT | Performed by: INTERNAL MEDICINE

## 2022-01-01 PROCEDURE — 250N000011 HC RX IP 250 OP 636: Performed by: EMERGENCY MEDICINE

## 2022-01-01 PROCEDURE — 83036 HEMOGLOBIN GLYCOSYLATED A1C: CPT | Performed by: FAMILY MEDICINE

## 2022-01-01 PROCEDURE — 84520 ASSAY OF UREA NITROGEN: CPT | Performed by: INTERNAL MEDICINE

## 2022-01-01 PROCEDURE — 258N000003 HC RX IP 258 OP 636: Performed by: INTERNAL MEDICINE

## 2022-01-01 PROCEDURE — 80053 COMPREHEN METABOLIC PANEL: CPT | Performed by: HOSPITALIST

## 2022-01-01 PROCEDURE — 83735 ASSAY OF MAGNESIUM: CPT | Performed by: NURSE PRACTITIONER

## 2022-01-01 PROCEDURE — 272N000452 HC KIT SHRLOCK 5FR POWER PICC TRIPLE LUMEN

## 2022-01-01 PROCEDURE — 99215 OFFICE O/P EST HI 40 MIN: CPT | Performed by: NURSE PRACTITIONER

## 2022-01-01 PROCEDURE — 93010 ELECTROCARDIOGRAM REPORT: CPT | Mod: 76 | Performed by: INTERNAL MEDICINE

## 2022-01-01 PROCEDURE — 99238 HOSP IP/OBS DSCHRG MGMT 30/<: CPT | Mod: 25 | Performed by: INTERNAL MEDICINE

## 2022-01-01 PROCEDURE — 99223 1ST HOSP IP/OBS HIGH 75: CPT | Mod: 25 | Performed by: GENERAL ACUTE CARE HOSPITAL

## 2022-01-01 PROCEDURE — 99284 EMERGENCY DEPT VISIT MOD MDM: CPT

## 2022-01-01 PROCEDURE — C9803 HOPD COVID-19 SPEC COLLECT: HCPCS

## 2022-01-01 PROCEDURE — 250N000011 HC RX IP 250 OP 636: Performed by: GENERAL ACUTE CARE HOSPITAL

## 2022-01-01 PROCEDURE — 85027 COMPLETE CBC AUTOMATED: CPT | Performed by: EMERGENCY MEDICINE

## 2022-01-01 PROCEDURE — 82565 ASSAY OF CREATININE: CPT | Performed by: INTERNAL MEDICINE

## 2022-01-01 PROCEDURE — U0005 INFEC AGEN DETEC AMPLI PROBE: HCPCS

## 2022-01-01 PROCEDURE — 250N000013 HC RX MED GY IP 250 OP 250 PS 637: Performed by: HOSPITALIST

## 2022-01-01 PROCEDURE — 85347 COAGULATION TIME ACTIVATED: CPT

## 2022-01-01 PROCEDURE — 99232 SBSQ HOSP IP/OBS MODERATE 35: CPT | Performed by: STUDENT IN AN ORGANIZED HEALTH CARE EDUCATION/TRAINING PROGRAM

## 2022-01-01 PROCEDURE — 97110 THERAPEUTIC EXERCISES: CPT | Mod: GP | Performed by: PHYSICAL THERAPIST

## 2022-01-01 PROCEDURE — 80048 BASIC METABOLIC PNL TOTAL CA: CPT | Performed by: INTERNAL MEDICINE

## 2022-01-01 PROCEDURE — 99207 PR APP CREDIT; MD BILLING SHARED VISIT: CPT | Performed by: INTERNAL MEDICINE

## 2022-01-01 PROCEDURE — 99255 IP/OBS CONSLTJ NEW/EST HI 80: CPT | Performed by: INTERNAL MEDICINE

## 2022-01-01 PROCEDURE — 93321 DOPPLER ECHO F-UP/LMTD STD: CPT | Mod: 26 | Performed by: INTERNAL MEDICINE

## 2022-01-01 PROCEDURE — 93325 DOPPLER ECHO COLOR FLOW MAPG: CPT

## 2022-01-01 PROCEDURE — 99233 SBSQ HOSP IP/OBS HIGH 50: CPT | Performed by: INTERNAL MEDICINE

## 2022-01-01 PROCEDURE — 83880 ASSAY OF NATRIURETIC PEPTIDE: CPT | Performed by: FAMILY MEDICINE

## 2022-01-01 PROCEDURE — 92920 PRQ TRLUML C ANGIOP 1ART&/BR: CPT | Mod: LD | Performed by: INTERNAL MEDICINE

## 2022-01-01 PROCEDURE — 97530 THERAPEUTIC ACTIVITIES: CPT | Mod: GP

## 2022-01-01 PROCEDURE — 93005 ELECTROCARDIOGRAM TRACING: CPT | Performed by: STUDENT IN AN ORGANIZED HEALTH CARE EDUCATION/TRAINING PROGRAM

## 2022-01-01 PROCEDURE — 250N000011 HC RX IP 250 OP 636: Performed by: HOSPITALIST

## 2022-01-01 PROCEDURE — 999N000122 MR MYOCARDIUM  OVERREAD

## 2022-01-01 PROCEDURE — 96375 TX/PRO/DX INJ NEW DRUG ADDON: CPT

## 2022-01-01 PROCEDURE — 999N000287 HC ICU ADULT ROUNDING, EACH 10 MINS

## 2022-01-01 PROCEDURE — 99223 1ST HOSP IP/OBS HIGH 75: CPT | Performed by: INTERNAL MEDICINE

## 2022-01-01 PROCEDURE — 80053 COMPREHEN METABOLIC PANEL: CPT | Performed by: FAMILY MEDICINE

## 2022-01-01 PROCEDURE — 272N000202 HC AEROBIKA WITH MANOMETER

## 2022-01-01 PROCEDURE — 99214 OFFICE O/P EST MOD 30 MIN: CPT | Performed by: INTERNAL MEDICINE

## 2022-01-01 PROCEDURE — 258N000001 HC RX 258

## 2022-01-01 PROCEDURE — 258N000003 HC RX IP 258 OP 636: Performed by: EMERGENCY MEDICINE

## 2022-01-01 PROCEDURE — 82040 ASSAY OF SERUM ALBUMIN: CPT | Performed by: EMERGENCY MEDICINE

## 2022-01-01 PROCEDURE — 86850 RBC ANTIBODY SCREEN: CPT | Performed by: INTERNAL MEDICINE

## 2022-01-01 PROCEDURE — 258N000003 HC RX IP 258 OP 636: Performed by: GENERAL ACUTE CARE HOSPITAL

## 2022-01-01 PROCEDURE — 85048 AUTOMATED LEUKOCYTE COUNT: CPT | Performed by: INTERNAL MEDICINE

## 2022-01-01 PROCEDURE — 250N000013 HC RX MED GY IP 250 OP 250 PS 637: Performed by: NURSE PRACTITIONER

## 2022-01-01 PROCEDURE — 93308 TTE F-UP OR LMTD: CPT | Mod: 26 | Performed by: INTERNAL MEDICINE

## 2022-01-01 PROCEDURE — 36415 COLL VENOUS BLD VENIPUNCTURE: CPT | Performed by: FAMILY MEDICINE

## 2022-01-01 PROCEDURE — 96365 THER/PROPH/DIAG IV INF INIT: CPT

## 2022-01-01 PROCEDURE — 83605 ASSAY OF LACTIC ACID: CPT | Performed by: INTERNAL MEDICINE

## 2022-01-01 PROCEDURE — 85007 BL SMEAR W/DIFF WBC COUNT: CPT | Performed by: EMERGENCY MEDICINE

## 2022-01-01 PROCEDURE — B211YZZ FLUOROSCOPY OF MULTIPLE CORONARY ARTERIES USING OTHER CONTRAST: ICD-10-PCS | Performed by: INTERNAL MEDICINE

## 2022-01-01 PROCEDURE — 250N000011 HC RX IP 250 OP 636: Performed by: STUDENT IN AN ORGANIZED HEALTH CARE EDUCATION/TRAINING PROGRAM

## 2022-01-01 PROCEDURE — 85730 THROMBOPLASTIN TIME PARTIAL: CPT | Performed by: EMERGENCY MEDICINE

## 2022-01-01 PROCEDURE — 93005 ELECTROCARDIOGRAM TRACING: CPT | Performed by: INTERNAL MEDICINE

## 2022-01-01 PROCEDURE — 99203 OFFICE O/P NEW LOW 30 MIN: CPT | Mod: GC | Performed by: OPHTHALMOLOGY

## 2022-01-01 PROCEDURE — 84484 ASSAY OF TROPONIN QUANT: CPT | Performed by: HOSPITALIST

## 2022-01-01 PROCEDURE — 36415 COLL VENOUS BLD VENIPUNCTURE: CPT | Performed by: GENERAL ACUTE CARE HOSPITAL

## 2022-01-01 PROCEDURE — 71045 X-RAY EXAM CHEST 1 VIEW: CPT | Mod: 77

## 2022-01-01 PROCEDURE — 74176 CT ABD & PELVIS W/O CONTRAST: CPT

## 2022-01-01 PROCEDURE — 80048 BASIC METABOLIC PNL TOTAL CA: CPT | Performed by: HOSPITALIST

## 2022-01-01 PROCEDURE — 84443 ASSAY THYROID STIM HORMONE: CPT | Performed by: FAMILY MEDICINE

## 2022-01-01 PROCEDURE — 99223 1ST HOSP IP/OBS HIGH 75: CPT | Performed by: HOSPITALIST

## 2022-01-01 PROCEDURE — 85014 HEMATOCRIT: CPT | Performed by: INTERNAL MEDICINE

## 2022-01-01 PROCEDURE — 84484 ASSAY OF TROPONIN QUANT: CPT | Performed by: FAMILY MEDICINE

## 2022-01-01 PROCEDURE — 85004 AUTOMATED DIFF WBC COUNT: CPT | Performed by: EMERGENCY MEDICINE

## 2022-01-01 PROCEDURE — 99232 SBSQ HOSP IP/OBS MODERATE 35: CPT

## 2022-01-01 PROCEDURE — 85025 COMPLETE CBC W/AUTO DIFF WBC: CPT | Performed by: INTERNAL MEDICINE

## 2022-01-01 PROCEDURE — 82310 ASSAY OF CALCIUM: CPT | Performed by: EMERGENCY MEDICINE

## 2022-01-01 PROCEDURE — 83605 ASSAY OF LACTIC ACID: CPT | Performed by: EMERGENCY MEDICINE

## 2022-01-01 PROCEDURE — C1725 CATH, TRANSLUMIN NON-LASER: HCPCS | Performed by: INTERNAL MEDICINE

## 2022-01-01 PROCEDURE — 99231 SBSQ HOSP IP/OBS SF/LOW 25: CPT

## 2022-01-01 PROCEDURE — 80177 DRUG SCRN QUAN LEVETIRACETAM: CPT | Performed by: PSYCHIATRY & NEUROLOGY

## 2022-01-01 PROCEDURE — 36569 INSJ PICC 5 YR+ W/O IMAGING: CPT

## 2022-01-01 PROCEDURE — G0463 HOSPITAL OUTPT CLINIC VISIT: HCPCS

## 2022-01-01 PROCEDURE — 93308 TTE F-UP OR LMTD: CPT

## 2022-01-01 PROCEDURE — 0BH17EZ INSERTION OF ENDOTRACHEAL AIRWAY INTO TRACHEA, VIA NATURAL OR ARTIFICIAL OPENING: ICD-10-PCS | Performed by: NURSE ANESTHETIST, CERTIFIED REGISTERED

## 2022-01-01 PROCEDURE — 74177 CT ABD & PELVIS W/CONTRAST: CPT

## 2022-01-01 PROCEDURE — 83690 ASSAY OF LIPASE: CPT | Performed by: EMERGENCY MEDICINE

## 2022-01-01 PROCEDURE — 83605 ASSAY OF LACTIC ACID: CPT | Performed by: HOSPITALIST

## 2022-01-01 PROCEDURE — 99254 IP/OBS CNSLTJ NEW/EST MOD 60: CPT | Performed by: STUDENT IN AN ORGANIZED HEALTH CARE EDUCATION/TRAINING PROGRAM

## 2022-01-01 PROCEDURE — 94640 AIRWAY INHALATION TREATMENT: CPT | Mod: 76

## 2022-01-01 PROCEDURE — 84450 TRANSFERASE (AST) (SGOT): CPT | Performed by: INTERNAL MEDICINE

## 2022-01-01 PROCEDURE — U0003 INFECTIOUS AGENT DETECTION BY NUCLEIC ACID (DNA OR RNA); SEVERE ACUTE RESPIRATORY SYNDROME CORONAVIRUS 2 (SARS-COV-2) (CORONAVIRUS DISEASE [COVID-19]), AMPLIFIED PROBE TECHNIQUE, MAKING USE OF HIGH THROUGHPUT TECHNOLOGIES AS DESCRIBED BY CMS-2020-01-R: HCPCS

## 2022-01-01 PROCEDURE — 83605 ASSAY OF LACTIC ACID: CPT | Performed by: NURSE PRACTITIONER

## 2022-01-01 PROCEDURE — C1887 CATHETER, GUIDING: HCPCS | Performed by: INTERNAL MEDICINE

## 2022-01-01 PROCEDURE — 93017 CV STRESS TEST TRACING ONLY: CPT | Performed by: INTERNAL MEDICINE

## 2022-01-01 PROCEDURE — 85652 RBC SED RATE AUTOMATED: CPT | Performed by: INTERNAL MEDICINE

## 2022-01-01 PROCEDURE — A9585 GADOBUTROL INJECTION: HCPCS | Performed by: INTERNAL MEDICINE

## 2022-01-01 PROCEDURE — 87635 SARS-COV-2 COVID-19 AMP PRB: CPT | Performed by: EMERGENCY MEDICINE

## 2022-01-01 PROCEDURE — 4A023N7 MEASUREMENT OF CARDIAC SAMPLING AND PRESSURE, LEFT HEART, PERCUTANEOUS APPROACH: ICD-10-PCS | Performed by: INTERNAL MEDICINE

## 2022-01-01 PROCEDURE — 258N000003 HC RX IP 258 OP 636: Performed by: NURSE ANESTHETIST, CERTIFIED REGISTERED

## 2022-01-01 PROCEDURE — G0463 HOSPITAL OUTPT CLINIC VISIT: HCPCS | Mod: 25

## 2022-01-01 PROCEDURE — 93010 ELECTROCARDIOGRAM REPORT: CPT | Mod: 77 | Performed by: INTERNAL MEDICINE

## 2022-01-01 PROCEDURE — 82805 BLOOD GASES W/O2 SATURATION: CPT | Performed by: NURSE PRACTITIONER

## 2022-01-01 PROCEDURE — 255N000002 HC RX 255 OP 636: Performed by: NURSE PRACTITIONER

## 2022-01-01 PROCEDURE — 80048 BASIC METABOLIC PNL TOTAL CA: CPT | Performed by: STUDENT IN AN ORGANIZED HEALTH CARE EDUCATION/TRAINING PROGRAM

## 2022-01-01 PROCEDURE — 82805 BLOOD GASES W/O2 SATURATION: CPT | Performed by: INTERNAL MEDICINE

## 2022-01-01 PROCEDURE — 999N000026 HC STATISTIC CARDIOPULM RESUSCITATION

## 2022-01-01 PROCEDURE — 250N000009 HC RX 250: Performed by: HOSPITALIST

## 2022-01-01 PROCEDURE — 96367 TX/PROPH/DG ADDL SEQ IV INF: CPT

## 2022-01-01 PROCEDURE — 83735 ASSAY OF MAGNESIUM: CPT | Performed by: FAMILY MEDICINE

## 2022-01-01 PROCEDURE — 83550 IRON BINDING TEST: CPT | Performed by: HOSPITALIST

## 2022-01-01 PROCEDURE — 71045 X-RAY EXAM CHEST 1 VIEW: CPT

## 2022-01-01 PROCEDURE — 120N000001 HC R&B MED SURG/OB

## 2022-01-01 PROCEDURE — C1769 GUIDE WIRE: HCPCS | Performed by: INTERNAL MEDICINE

## 2022-01-01 PROCEDURE — 93018 CV STRESS TEST I&R ONLY: CPT | Performed by: INTERNAL MEDICINE

## 2022-01-01 PROCEDURE — 85610 PROTHROMBIN TIME: CPT | Performed by: EMERGENCY MEDICINE

## 2022-01-01 PROCEDURE — 250N000013 HC RX MED GY IP 250 OP 250 PS 637: Performed by: EMERGENCY MEDICINE

## 2022-01-01 PROCEDURE — 83540 ASSAY OF IRON: CPT | Performed by: INTERNAL MEDICINE

## 2022-01-01 PROCEDURE — 71275 CT ANGIOGRAPHY CHEST: CPT

## 2022-01-01 PROCEDURE — 36620 INSERTION CATHETER ARTERY: CPT | Performed by: INTERNAL MEDICINE

## 2022-01-01 PROCEDURE — C9600 PERC DRUG-EL COR STENT SING: HCPCS | Performed by: INTERNAL MEDICINE

## 2022-01-01 PROCEDURE — 84145 PROCALCITONIN (PCT): CPT | Performed by: HOSPITALIST

## 2022-01-01 PROCEDURE — 84100 ASSAY OF PHOSPHORUS: CPT | Performed by: INTERNAL MEDICINE

## 2022-01-01 PROCEDURE — 36415 COLL VENOUS BLD VENIPUNCTURE: CPT | Performed by: NURSE PRACTITIONER

## 2022-01-01 PROCEDURE — 85610 PROTHROMBIN TIME: CPT | Performed by: NURSE PRACTITIONER

## 2022-01-01 PROCEDURE — 96372 THER/PROPH/DIAG INJ SC/IM: CPT | Mod: 59

## 2022-01-01 PROCEDURE — 93005 ELECTROCARDIOGRAM TRACING: CPT | Performed by: FAMILY MEDICINE

## 2022-01-01 PROCEDURE — 83036 HEMOGLOBIN GLYCOSYLATED A1C: CPT | Performed by: INTERNAL MEDICINE

## 2022-01-01 PROCEDURE — 87040 BLOOD CULTURE FOR BACTERIA: CPT | Performed by: EMERGENCY MEDICINE

## 2022-01-01 PROCEDURE — 85379 FIBRIN DEGRADATION QUANT: CPT | Performed by: HOSPITALIST

## 2022-01-01 PROCEDURE — P9047 ALBUMIN (HUMAN), 25%, 50ML: HCPCS | Performed by: INTERNAL MEDICINE

## 2022-01-01 PROCEDURE — 97530 THERAPEUTIC ACTIVITIES: CPT | Mod: GP | Performed by: PHYSICAL THERAPIST

## 2022-01-01 PROCEDURE — 85520 HEPARIN ASSAY: CPT | Performed by: EMERGENCY MEDICINE

## 2022-01-01 PROCEDURE — 82330 ASSAY OF CALCIUM: CPT | Performed by: HOSPITALIST

## 2022-01-01 PROCEDURE — 84145 PROCALCITONIN (PCT): CPT | Performed by: INTERNAL MEDICINE

## 2022-01-01 PROCEDURE — 97116 GAIT TRAINING THERAPY: CPT | Mod: GP

## 2022-01-01 PROCEDURE — 96368 THER/DIAG CONCURRENT INF: CPT

## 2022-01-01 PROCEDURE — 76512 OPH US DX B-SCAN: CPT | Performed by: OPHTHALMOLOGY

## 2022-01-01 PROCEDURE — 87637 SARSCOV2&INF A&B&RSV AMP PRB: CPT | Performed by: EMERGENCY MEDICINE

## 2022-01-01 PROCEDURE — 85027 COMPLETE CBC AUTOMATED: CPT | Performed by: NURSE PRACTITIONER

## 2022-01-01 PROCEDURE — 02703ZZ DILATION OF CORONARY ARTERY, ONE ARTERY, PERCUTANEOUS APPROACH: ICD-10-PCS | Performed by: INTERNAL MEDICINE

## 2022-01-01 PROCEDURE — 82040 ASSAY OF SERUM ALBUMIN: CPT | Performed by: HOSPITALIST

## 2022-01-01 PROCEDURE — 250N000011 HC RX IP 250 OP 636

## 2022-01-01 PROCEDURE — 75563 CARD MRI W/STRESS IMG & DYE: CPT

## 2022-01-01 PROCEDURE — U0003 INFECTIOUS AGENT DETECTION BY NUCLEIC ACID (DNA OR RNA); SEVERE ACUTE RESPIRATORY SYNDROME CORONAVIRUS 2 (SARS-COV-2) (CORONAVIRUS DISEASE [COVID-19]), AMPLIFIED PROBE TECHNIQUE, MAKING USE OF HIGH THROUGHPUT TECHNOLOGIES AS DESCRIBED BY CMS-2020-01-R: HCPCS | Performed by: INTERNAL MEDICINE

## 2022-01-01 PROCEDURE — 85027 COMPLETE CBC AUTOMATED: CPT | Performed by: FAMILY MEDICINE

## 2022-01-01 PROCEDURE — C9113 INJ PANTOPRAZOLE SODIUM, VIA: HCPCS | Performed by: INTERNAL MEDICINE

## 2022-01-01 PROCEDURE — 85027 COMPLETE CBC AUTOMATED: CPT | Performed by: GENERAL ACUTE CARE HOSPITAL

## 2022-01-01 PROCEDURE — 93010 ELECTROCARDIOGRAM REPORT: CPT | Mod: 59 | Performed by: INTERNAL MEDICINE

## 2022-01-01 PROCEDURE — 97530 THERAPEUTIC ACTIVITIES: CPT | Mod: GO

## 2022-01-01 PROCEDURE — 80061 LIPID PANEL: CPT | Performed by: INTERNAL MEDICINE

## 2022-01-01 PROCEDURE — 93005 ELECTROCARDIOGRAM TRACING: CPT | Performed by: HOSPITALIST

## 2022-01-01 PROCEDURE — 99231 SBSQ HOSP IP/OBS SF/LOW 25: CPT | Performed by: INTERNAL MEDICINE

## 2022-01-01 PROCEDURE — 99207 PR NO CHARGE LOS: CPT | Performed by: INTERNAL MEDICINE

## 2022-01-01 PROCEDURE — 96372 THER/PROPH/DIAG INJ SC/IM: CPT | Performed by: EMERGENCY MEDICINE

## 2022-01-01 PROCEDURE — 84146 ASSAY OF PROLACTIN: CPT | Performed by: INTERNAL MEDICINE

## 2022-01-01 PROCEDURE — 51798 US URINE CAPACITY MEASURE: CPT

## 2022-01-01 PROCEDURE — 36415 COLL VENOUS BLD VENIPUNCTURE: CPT | Performed by: STUDENT IN AN ORGANIZED HEALTH CARE EDUCATION/TRAINING PROGRAM

## 2022-01-01 PROCEDURE — 36600 WITHDRAWAL OF ARTERIAL BLOOD: CPT

## 2022-01-01 PROCEDURE — 80165 DIPROPYLACETIC ACID FREE: CPT | Performed by: PSYCHIATRY & NEUROLOGY

## 2022-01-01 PROCEDURE — 99233 SBSQ HOSP IP/OBS HIGH 50: CPT | Performed by: HOSPITALIST

## 2022-01-01 PROCEDURE — 82248 BILIRUBIN DIRECT: CPT | Performed by: STUDENT IN AN ORGANIZED HEALTH CARE EDUCATION/TRAINING PROGRAM

## 2022-01-01 PROCEDURE — 70450 CT HEAD/BRAIN W/O DYE: CPT

## 2022-01-01 PROCEDURE — 85027 COMPLETE CBC AUTOMATED: CPT | Performed by: STUDENT IN AN ORGANIZED HEALTH CARE EDUCATION/TRAINING PROGRAM

## 2022-01-01 PROCEDURE — 99214 OFFICE O/P EST MOD 30 MIN: CPT | Mod: 95 | Performed by: INTERNAL MEDICINE

## 2022-01-01 PROCEDURE — 85730 THROMBOPLASTIN TIME PARTIAL: CPT | Performed by: HOSPITALIST

## 2022-01-01 PROCEDURE — 99284 EMERGENCY DEPT VISIT MOD MDM: CPT | Mod: 25

## 2022-01-01 PROCEDURE — 85025 COMPLETE CBC W/AUTO DIFF WBC: CPT | Performed by: HOSPITALIST

## 2022-01-01 PROCEDURE — 84484 ASSAY OF TROPONIN QUANT: CPT | Performed by: INTERNAL MEDICINE

## 2022-01-01 PROCEDURE — 82805 BLOOD GASES W/O2 SATURATION: CPT | Performed by: HOSPITALIST

## 2022-01-01 PROCEDURE — 80069 RENAL FUNCTION PANEL: CPT | Performed by: INTERNAL MEDICINE

## 2022-01-01 PROCEDURE — 272N000001 HC OR GENERAL SUPPLY STERILE: Performed by: INTERNAL MEDICINE

## 2022-01-01 PROCEDURE — 70250 X-RAY EXAM OF SKULL: CPT

## 2022-01-01 PROCEDURE — 96374 THER/PROPH/DIAG INJ IV PUSH: CPT

## 2022-01-01 PROCEDURE — 99255 IP/OBS CONSLTJ NEW/EST HI 80: CPT | Performed by: PSYCHIATRY & NEUROLOGY

## 2022-01-01 PROCEDURE — 85520 HEPARIN ASSAY: CPT | Performed by: GENERAL ACUTE CARE HOSPITAL

## 2022-01-01 PROCEDURE — 99223 1ST HOSP IP/OBS HIGH 75: CPT | Mod: AI | Performed by: HOSPITALIST

## 2022-01-01 PROCEDURE — 75563 CARD MRI W/STRESS IMG & DYE: CPT | Mod: 26 | Performed by: INTERNAL MEDICINE

## 2022-01-01 PROCEDURE — 80048 BASIC METABOLIC PNL TOTAL CA: CPT | Performed by: FAMILY MEDICINE

## 2022-01-01 PROCEDURE — 93016 CV STRESS TEST SUPVJ ONLY: CPT | Performed by: INTERNAL MEDICINE

## 2022-01-01 PROCEDURE — 84484 ASSAY OF TROPONIN QUANT: CPT | Performed by: STUDENT IN AN ORGANIZED HEALTH CARE EDUCATION/TRAINING PROGRAM

## 2022-01-01 PROCEDURE — 85014 HEMATOCRIT: CPT | Performed by: EMERGENCY MEDICINE

## 2022-01-01 PROCEDURE — 85004 AUTOMATED DIFF WBC COUNT: CPT | Performed by: HOSPITALIST

## 2022-01-01 PROCEDURE — 92134 CPTRZ OPH DX IMG PST SGM RTA: CPT | Performed by: OPHTHALMOLOGY

## 2022-01-01 PROCEDURE — 370N000003 HC ANESTHESIA WARD SERVICE

## 2022-01-01 PROCEDURE — 250N000012 HC RX MED GY IP 250 OP 636 PS 637: Performed by: HOSPITALIST

## 2022-01-01 PROCEDURE — 999N000065 XR CHEST PORT 1 VIEW

## 2022-01-01 PROCEDURE — 84295 ASSAY OF SERUM SODIUM: CPT | Performed by: FAMILY MEDICINE

## 2022-01-01 RX ORDER — SPIRONOLACTONE 25 MG/1
25 TABLET ORAL DAILY
Qty: 90 TABLET | Refills: 1 | Status: SHIPPED | OUTPATIENT
Start: 2022-01-01 | End: 2022-01-01

## 2022-01-01 RX ORDER — PIPERACILLIN SODIUM, TAZOBACTAM SODIUM 3; .375 G/15ML; G/15ML
3.38 INJECTION, POWDER, LYOPHILIZED, FOR SOLUTION INTRAVENOUS ONCE
Status: COMPLETED | OUTPATIENT
Start: 2022-01-01 | End: 2022-01-01

## 2022-01-01 RX ORDER — SPIRONOLACTONE 25 MG/1
25 TABLET ORAL
COMMUNITY
Start: 2022-01-01 | End: 2022-01-01

## 2022-01-01 RX ORDER — PROCHLORPERAZINE 25 MG
25 SUPPOSITORY, RECTAL RECTAL EVERY 12 HOURS PRN
Status: DISCONTINUED | OUTPATIENT
Start: 2022-01-01 | End: 2022-01-01 | Stop reason: HOSPADM

## 2022-01-01 RX ORDER — BUMETANIDE 2 MG/1
2 TABLET ORAL DAILY
Qty: 30 TABLET | Refills: 0 | Status: SHIPPED | OUTPATIENT
Start: 2022-01-01 | End: 2022-11-19

## 2022-01-01 RX ORDER — LIDOCAINE 40 MG/G
CREAM TOPICAL
Status: CANCELLED | OUTPATIENT
Start: 2022-01-01

## 2022-01-01 RX ORDER — SODIUM CHLORIDE 9 MG/ML
INJECTION, SOLUTION INTRAVENOUS CONTINUOUS
Status: DISCONTINUED | OUTPATIENT
Start: 2022-01-01 | End: 2022-01-01 | Stop reason: HOSPADM

## 2022-01-01 RX ORDER — ASPIRIN 81 MG/1
81 TABLET ORAL DAILY
Status: DISCONTINUED | OUTPATIENT
Start: 2022-01-01 | End: 2022-01-01 | Stop reason: HOSPADM

## 2022-01-01 RX ORDER — GUAIFENESIN 600 MG/1
1200 TABLET, EXTENDED RELEASE ORAL 2 TIMES DAILY
Status: DISCONTINUED | OUTPATIENT
Start: 2022-01-01 | End: 2022-01-01 | Stop reason: HOSPADM

## 2022-01-01 RX ORDER — ROSUVASTATIN CALCIUM 40 MG/1
40 TABLET, COATED ORAL DAILY
Qty: 90 TABLET | Refills: 3 | Status: ON HOLD | OUTPATIENT
Start: 2022-01-01 | End: 2022-01-01

## 2022-01-01 RX ORDER — NICOTINE POLACRILEX 4 MG
15-30 LOZENGE BUCCAL
Status: DISCONTINUED | OUTPATIENT
Start: 2022-01-01 | End: 2022-01-01 | Stop reason: HOSPADM

## 2022-01-01 RX ORDER — CARVEDILOL 3.12 MG/1
6.25 TABLET ORAL 2 TIMES DAILY WITH MEALS
Status: DISCONTINUED | OUTPATIENT
Start: 2022-01-01 | End: 2022-01-01

## 2022-01-01 RX ORDER — PIPERACILLIN SODIUM, TAZOBACTAM SODIUM 3; .375 G/15ML; G/15ML
3.38 INJECTION, POWDER, LYOPHILIZED, FOR SOLUTION INTRAVENOUS EVERY 8 HOURS
Status: DISCONTINUED | OUTPATIENT
Start: 2022-01-01 | End: 2022-01-01 | Stop reason: HOSPADM

## 2022-01-01 RX ORDER — ISOSORBIDE MONONITRATE 20 MG/1
20 TABLET ORAL 2 TIMES DAILY
Status: DISCONTINUED | OUTPATIENT
Start: 2022-01-01 | End: 2022-01-01 | Stop reason: HOSPADM

## 2022-01-01 RX ORDER — ATORVASTATIN CALCIUM 40 MG/1
80 TABLET, FILM COATED ORAL EVERY EVENING
Status: DISCONTINUED | OUTPATIENT
Start: 2022-01-01 | End: 2022-01-01 | Stop reason: HOSPADM

## 2022-01-01 RX ORDER — NALOXONE HYDROCHLORIDE 0.4 MG/ML
0.4 INJECTION, SOLUTION INTRAMUSCULAR; INTRAVENOUS; SUBCUTANEOUS
Status: DISCONTINUED | OUTPATIENT
Start: 2022-01-01 | End: 2022-01-01 | Stop reason: HOSPADM

## 2022-01-01 RX ORDER — POTASSIUM CHLORIDE 1500 MG/1
20 TABLET, EXTENDED RELEASE ORAL DAILY
Qty: 90 TABLET | Refills: 3 | Status: ON HOLD | OUTPATIENT
Start: 2022-01-01 | End: 2022-01-01

## 2022-01-01 RX ORDER — NITROGLYCERIN 0.4 MG/1
0.4 TABLET SUBLINGUAL ONCE
Status: CANCELLED | OUTPATIENT
Start: 2022-01-01 | End: 2022-01-01

## 2022-01-01 RX ORDER — BUMETANIDE 2 MG/1
2 TABLET ORAL DAILY
Qty: 30 TABLET | Refills: 0 | Status: SHIPPED | OUTPATIENT
Start: 2022-01-01 | End: 2022-01-01

## 2022-01-01 RX ORDER — DEXTROSE MONOHYDRATE 25 G/50ML
25-50 INJECTION, SOLUTION INTRAVENOUS
Status: DISCONTINUED | OUTPATIENT
Start: 2022-01-01 | End: 2022-01-01 | Stop reason: HOSPADM

## 2022-01-01 RX ORDER — GADOBUTROL 604.72 MG/ML
14 INJECTION INTRAVENOUS ONCE
Status: COMPLETED | OUTPATIENT
Start: 2022-01-01 | End: 2022-01-01

## 2022-01-01 RX ORDER — CHLORHEXIDINE GLUCONATE ORAL RINSE 1.2 MG/ML
15 SOLUTION DENTAL EVERY 12 HOURS
Status: DISCONTINUED | OUTPATIENT
Start: 2022-01-01 | End: 2022-01-01 | Stop reason: HOSPADM

## 2022-01-01 RX ORDER — METOPROLOL SUCCINATE 25 MG/1
12.5 TABLET, EXTENDED RELEASE ORAL DAILY
Qty: 30 TABLET | Refills: 0 | Status: SHIPPED | OUTPATIENT
Start: 2022-01-01 | End: 2022-01-01

## 2022-01-01 RX ORDER — INSULIN LISPRO 100 [IU]/ML
3-7 INJECTION, SOLUTION INTRAVENOUS; SUBCUTANEOUS
COMMUNITY
End: 2022-01-01

## 2022-01-01 RX ORDER — CLOPIDOGREL BISULFATE 75 MG/1
75 TABLET ORAL DAILY
Status: DISCONTINUED | OUTPATIENT
Start: 2022-01-01 | End: 2022-01-01

## 2022-01-01 RX ORDER — FUROSEMIDE 40 MG
40 TABLET ORAL DAILY
Qty: 30 TABLET | Refills: 0 | Status: SHIPPED | OUTPATIENT
Start: 2022-01-01 | End: 2022-01-01

## 2022-01-01 RX ORDER — MAGNESIUM SULFATE HEPTAHYDRATE 40 MG/ML
2 INJECTION, SOLUTION INTRAVENOUS ONCE
Status: COMPLETED | OUTPATIENT
Start: 2022-01-01 | End: 2022-01-01

## 2022-01-01 RX ORDER — ISOSORBIDE MONONITRATE 20 MG/1
20 TABLET ORAL 2 TIMES DAILY
Qty: 30 TABLET | Refills: 0 | Status: SHIPPED | OUTPATIENT
Start: 2022-01-01 | End: 2022-01-01

## 2022-01-01 RX ORDER — SODIUM CHLORIDE 9 MG/ML
INJECTION, SOLUTION INTRAVENOUS CONTINUOUS
Status: DISCONTINUED | OUTPATIENT
Start: 2022-01-01 | End: 2022-01-01

## 2022-01-01 RX ORDER — FUROSEMIDE 10 MG/ML
40 INJECTION INTRAMUSCULAR; INTRAVENOUS EVERY 12 HOURS
Status: DISCONTINUED | OUTPATIENT
Start: 2022-01-01 | End: 2022-01-01

## 2022-01-01 RX ORDER — BLOOD SUGAR DIAGNOSTIC
STRIP MISCELLANEOUS
Qty: 100 STRIP | Refills: 11 | Status: SHIPPED | OUTPATIENT
Start: 2022-01-01

## 2022-01-01 RX ORDER — SPIRONOLACTONE 25 MG/1
25 TABLET ORAL DAILY
Qty: 90 TABLET | Refills: 3 | Status: ON HOLD | OUTPATIENT
Start: 2022-01-01 | End: 2022-01-01

## 2022-01-01 RX ORDER — METOPROLOL SUCCINATE 25 MG/1
25 TABLET, EXTENDED RELEASE ORAL 2 TIMES DAILY
Qty: 60 TABLET | Refills: 0 | Status: SHIPPED | OUTPATIENT
Start: 2022-01-01 | End: 2022-01-01

## 2022-01-01 RX ORDER — ASPIRIN 81 MG/1
243 TABLET, CHEWABLE ORAL ONCE
Status: COMPLETED | OUTPATIENT
Start: 2022-01-01 | End: 2022-01-01

## 2022-01-01 RX ORDER — POTASSIUM CHLORIDE 750 MG/1
10 TABLET, EXTENDED RELEASE ORAL ONCE
Status: COMPLETED | OUTPATIENT
Start: 2022-01-01 | End: 2022-01-01

## 2022-01-01 RX ORDER — NITROGLYCERIN 0.4 MG/1
TABLET SUBLINGUAL
Qty: 25 TABLET | Refills: 1 | Status: ON HOLD | OUTPATIENT
Start: 2022-01-01 | End: 2022-01-01

## 2022-01-01 RX ORDER — IOPAMIDOL 755 MG/ML
80 INJECTION, SOLUTION INTRAVASCULAR ONCE
Status: COMPLETED | OUTPATIENT
Start: 2022-01-01 | End: 2022-01-01

## 2022-01-01 RX ORDER — HYDRALAZINE HYDROCHLORIDE 20 MG/ML
10 INJECTION INTRAMUSCULAR; INTRAVENOUS EVERY 4 HOURS PRN
Status: DISCONTINUED | OUTPATIENT
Start: 2022-01-01 | End: 2022-01-01 | Stop reason: HOSPADM

## 2022-01-01 RX ORDER — GUAIFENESIN 600 MG/1
1200 TABLET, EXTENDED RELEASE ORAL 2 TIMES DAILY
Qty: 20 TABLET | Refills: 0 | Status: SHIPPED | OUTPATIENT
Start: 2022-01-01 | End: 2022-01-01

## 2022-01-01 RX ORDER — POTASSIUM CHLORIDE 1500 MG/1
20 TABLET, EXTENDED RELEASE ORAL ONCE
Status: COMPLETED | OUTPATIENT
Start: 2022-01-01 | End: 2022-01-01

## 2022-01-01 RX ORDER — PROPOFOL 10 MG/ML
30 INJECTION, EMULSION INTRAVENOUS ONCE
Status: DISCONTINUED | OUTPATIENT
Start: 2022-01-01 | End: 2022-01-01 | Stop reason: HOSPADM

## 2022-01-01 RX ORDER — ONDANSETRON 4 MG/1
4 TABLET, ORALLY DISINTEGRATING ORAL EVERY 6 HOURS PRN
Status: DISCONTINUED | OUTPATIENT
Start: 2022-01-01 | End: 2022-01-01 | Stop reason: HOSPADM

## 2022-01-01 RX ORDER — IBUPROFEN 400 MG/1
400 TABLET, FILM COATED ORAL EVERY 4 HOURS PRN
Status: DISCONTINUED | OUTPATIENT
Start: 2022-01-01 | End: 2022-01-01 | Stop reason: HOSPADM

## 2022-01-01 RX ORDER — IPRATROPIUM BROMIDE AND ALBUTEROL SULFATE 2.5; .5 MG/3ML; MG/3ML
3 SOLUTION RESPIRATORY (INHALATION)
Status: COMPLETED | OUTPATIENT
Start: 2022-01-01 | End: 2022-01-01

## 2022-01-01 RX ORDER — MULTIVITAMIN,THERAPEUTIC
1 TABLET ORAL DAILY
Status: DISCONTINUED | OUTPATIENT
Start: 2022-01-01 | End: 2022-01-01 | Stop reason: HOSPADM

## 2022-01-01 RX ORDER — ONDANSETRON 2 MG/ML
4 INJECTION INTRAMUSCULAR; INTRAVENOUS EVERY 30 MIN PRN
Status: DISCONTINUED | OUTPATIENT
Start: 2022-01-01 | End: 2022-01-01

## 2022-01-01 RX ORDER — APIXABAN 2.5 MG/1
2.5 TABLET, FILM COATED ORAL 2 TIMES DAILY
Status: ON HOLD | COMMUNITY
Start: 2022-01-01 | End: 2022-01-01

## 2022-01-01 RX ORDER — LORAZEPAM 2 MG/ML
INJECTION INTRAMUSCULAR
Status: COMPLETED
Start: 2022-01-01 | End: 2022-01-01

## 2022-01-01 RX ORDER — APIXABAN 5 MG/1
5 TABLET, FILM COATED ORAL 2 TIMES DAILY
COMMUNITY
Start: 2022-01-01 | End: 2022-01-01

## 2022-01-01 RX ORDER — ONDANSETRON 2 MG/ML
4 INJECTION INTRAMUSCULAR; INTRAVENOUS EVERY 6 HOURS PRN
Status: DISCONTINUED | OUTPATIENT
Start: 2022-01-01 | End: 2022-01-01 | Stop reason: HOSPADM

## 2022-01-01 RX ORDER — PROCHLORPERAZINE MALEATE 10 MG
10 TABLET ORAL EVERY 6 HOURS PRN
Status: DISCONTINUED | OUTPATIENT
Start: 2022-01-01 | End: 2022-01-01 | Stop reason: HOSPADM

## 2022-01-01 RX ORDER — HYDROXYZINE HYDROCHLORIDE 25 MG/1
25 TABLET, FILM COATED ORAL 4 TIMES DAILY PRN
COMMUNITY
End: 2022-01-01

## 2022-01-01 RX ORDER — FUROSEMIDE 10 MG/ML
40 INJECTION INTRAMUSCULAR; INTRAVENOUS EVERY 8 HOURS
Status: DISCONTINUED | OUTPATIENT
Start: 2022-01-01 | End: 2022-01-01

## 2022-01-01 RX ORDER — METOPROLOL TARTRATE 1 MG/ML
5 INJECTION, SOLUTION INTRAVENOUS
Status: DISCONTINUED | OUTPATIENT
Start: 2022-01-01 | End: 2022-01-01 | Stop reason: HOSPADM

## 2022-01-01 RX ORDER — HYDRALAZINE HYDROCHLORIDE 20 MG/ML
10 INJECTION INTRAMUSCULAR; INTRAVENOUS EVERY 4 HOURS PRN
Status: DISCONTINUED | OUTPATIENT
Start: 2022-01-01 | End: 2022-01-01

## 2022-01-01 RX ORDER — FAMOTIDINE 20 MG/1
20 TABLET, FILM COATED ORAL DAILY
Status: DISCONTINUED | OUTPATIENT
Start: 2022-01-01 | End: 2022-01-01 | Stop reason: HOSPADM

## 2022-01-01 RX ORDER — ACETAMINOPHEN 325 MG/1
650 TABLET ORAL EVERY 6 HOURS PRN
Status: DISCONTINUED | OUTPATIENT
Start: 2022-01-01 | End: 2022-01-01

## 2022-01-01 RX ORDER — NICOTINE POLACRILEX 4 MG
15-30 LOZENGE BUCCAL
Status: DISCONTINUED | OUTPATIENT
Start: 2022-01-01 | End: 2022-01-01

## 2022-01-01 RX ORDER — SPIRONOLACTONE 25 MG/1
25 TABLET ORAL DAILY
Status: DISCONTINUED | OUTPATIENT
Start: 2022-01-01 | End: 2022-01-01 | Stop reason: HOSPADM

## 2022-01-01 RX ORDER — METOPROLOL SUCCINATE 25 MG/1
12.5 TABLET, EXTENDED RELEASE ORAL DAILY
Qty: 45 TABLET | Refills: 3 | Status: ON HOLD | OUTPATIENT
Start: 2022-01-01 | End: 2022-01-01

## 2022-01-01 RX ORDER — SPIRONOLACTONE 25 MG/1
25 TABLET ORAL DAILY
Qty: 90 TABLET | Refills: 3 | Status: SHIPPED | OUTPATIENT
Start: 2022-01-01 | End: 2022-01-01

## 2022-01-01 RX ORDER — FENTANYL CITRATE-0.9 % NACL/PF 10 MCG/ML
200 PLASTIC BAG, INJECTION (ML) INTRAVENOUS ONCE
Status: COMPLETED | OUTPATIENT
Start: 2022-01-01 | End: 2022-01-01

## 2022-01-01 RX ORDER — POTASSIUM CHLORIDE 1500 MG/1
TABLET, EXTENDED RELEASE ORAL
COMMUNITY
Start: 2022-01-01 | End: 2022-01-01

## 2022-01-01 RX ORDER — LOPERAMIDE HCL 2 MG
2 CAPSULE ORAL 4 TIMES DAILY PRN
Status: DISCONTINUED | OUTPATIENT
Start: 2022-01-01 | End: 2022-01-01 | Stop reason: HOSPADM

## 2022-01-01 RX ORDER — CLOPIDOGREL BISULFATE 75 MG/1
225 TABLET ORAL ONCE
Status: COMPLETED | OUTPATIENT
Start: 2022-01-01 | End: 2022-01-01

## 2022-01-01 RX ORDER — CLOPIDOGREL BISULFATE 75 MG/1
75 TABLET ORAL DAILY
Status: DISCONTINUED | OUTPATIENT
Start: 2022-01-01 | End: 2022-01-01 | Stop reason: HOSPADM

## 2022-01-01 RX ORDER — FUROSEMIDE 10 MG/ML
40 INJECTION INTRAMUSCULAR; INTRAVENOUS DAILY
Status: DISCONTINUED | OUTPATIENT
Start: 2022-01-01 | End: 2022-01-01

## 2022-01-01 RX ORDER — CEFTRIAXONE 2 G/1
2 INJECTION, POWDER, FOR SOLUTION INTRAMUSCULAR; INTRAVENOUS ONCE
Status: COMPLETED | OUTPATIENT
Start: 2022-01-01 | End: 2022-01-01

## 2022-01-01 RX ORDER — HEPARIN SODIUM 10000 [USP'U]/100ML
0-5000 INJECTION, SOLUTION INTRAVENOUS CONTINUOUS
Status: DISCONTINUED | OUTPATIENT
Start: 2022-01-01 | End: 2022-01-01

## 2022-01-01 RX ORDER — FAMOTIDINE 20 MG/1
20 TABLET, FILM COATED ORAL 2 TIMES DAILY
Status: DISCONTINUED | OUTPATIENT
Start: 2022-01-01 | End: 2022-01-01

## 2022-01-01 RX ORDER — ISOSORBIDE MONONITRATE 20 MG
10 TABLET ORAL DAILY
Status: DISCONTINUED | OUTPATIENT
Start: 2022-01-01 | End: 2022-01-01 | Stop reason: HOSPADM

## 2022-01-01 RX ORDER — OXYCODONE HYDROCHLORIDE 5 MG/1
5 TABLET ORAL EVERY 4 HOURS PRN
COMMUNITY

## 2022-01-01 RX ORDER — NALOXONE HYDROCHLORIDE 0.4 MG/ML
0.2 INJECTION, SOLUTION INTRAMUSCULAR; INTRAVENOUS; SUBCUTANEOUS
Status: DISCONTINUED | OUTPATIENT
Start: 2022-01-01 | End: 2022-01-01 | Stop reason: HOSPADM

## 2022-01-01 RX ORDER — ASPIRIN 81 MG/1
81 TABLET ORAL DAILY
Status: DISCONTINUED | OUTPATIENT
Start: 2022-01-01 | End: 2022-01-01

## 2022-01-01 RX ORDER — DEXMEDETOMIDINE HYDROCHLORIDE 4 UG/ML
.1-1.2 INJECTION, SOLUTION INTRAVENOUS CONTINUOUS
Status: DISCONTINUED | OUTPATIENT
Start: 2022-01-01 | End: 2022-01-01

## 2022-01-01 RX ORDER — ATORVASTATIN CALCIUM 40 MG/1
80 TABLET, FILM COATED ORAL DAILY
Status: DISCONTINUED | OUTPATIENT
Start: 2022-01-01 | End: 2022-01-01 | Stop reason: HOSPADM

## 2022-01-01 RX ORDER — INSULIN GLARGINE 100 [IU]/ML
20 INJECTION, SOLUTION SUBCUTANEOUS AT BEDTIME
Qty: 9 ML | Refills: 0
Start: 2022-01-01 | End: 2022-01-01

## 2022-01-01 RX ORDER — PIPERACILLIN SODIUM, TAZOBACTAM SODIUM 3; .375 G/15ML; G/15ML
3.38 INJECTION, POWDER, LYOPHILIZED, FOR SOLUTION INTRAVENOUS EVERY 8 HOURS
Status: DISCONTINUED | OUTPATIENT
Start: 2022-01-01 | End: 2022-01-01 | Stop reason: CLARIF

## 2022-01-01 RX ORDER — POTASSIUM CHLORIDE 1500 MG/1
20 TABLET, EXTENDED RELEASE ORAL DAILY
Qty: 90 TABLET | Refills: 0 | Status: SHIPPED | OUTPATIENT
Start: 2022-01-01 | End: 2022-01-01

## 2022-01-01 RX ORDER — LIDOCAINE 40 MG/G
CREAM TOPICAL
Status: DISCONTINUED | OUTPATIENT
Start: 2022-01-01 | End: 2022-01-01 | Stop reason: HOSPADM

## 2022-01-01 RX ORDER — DOCUSATE SODIUM 50 MG AND SENNOSIDES 8.6 MG 8.6; 5 MG/1; MG/1
2 TABLET, FILM COATED ORAL DAILY
Qty: 180 TABLET | Refills: 3 | Status: SHIPPED | OUTPATIENT
Start: 2022-01-01

## 2022-01-01 RX ORDER — POTASSIUM CHLORIDE 20MEQ/15ML
10 LIQUID (ML) ORAL ONCE
Status: COMPLETED | OUTPATIENT
Start: 2022-01-01 | End: 2022-01-01

## 2022-01-01 RX ORDER — FUROSEMIDE 20 MG
40 TABLET ORAL DAILY
Status: DISCONTINUED | OUTPATIENT
Start: 2022-01-01 | End: 2022-01-01 | Stop reason: HOSPADM

## 2022-01-01 RX ORDER — FUROSEMIDE 10 MG/ML
40 INJECTION INTRAMUSCULAR; INTRAVENOUS EVERY 6 HOURS
Status: DISCONTINUED | OUTPATIENT
Start: 2022-01-01 | End: 2022-01-01

## 2022-01-01 RX ORDER — SODIUM CHLORIDE 9 MG/ML
INJECTION, SOLUTION INTRAVENOUS CONTINUOUS
Status: CANCELLED | OUTPATIENT
Start: 2022-01-01

## 2022-01-01 RX ORDER — METOPROLOL SUCCINATE 25 MG/1
12.5 TABLET, EXTENDED RELEASE ORAL DAILY
Qty: 45 TABLET | Refills: 3 | Status: SHIPPED | OUTPATIENT
Start: 2022-01-01 | End: 2023-09-12

## 2022-01-01 RX ORDER — NOREPINEPHRINE BITARTRATE 0.02 MG/ML
.01-.6 INJECTION, SOLUTION INTRAVENOUS CONTINUOUS
Status: DISCONTINUED | OUTPATIENT
Start: 2022-01-01 | End: 2022-01-01 | Stop reason: HOSPADM

## 2022-01-01 RX ORDER — CLOPIDOGREL BISULFATE 75 MG/1
75 TABLET ORAL DAILY
Qty: 90 TABLET | Refills: 3 | Status: SHIPPED | OUTPATIENT
Start: 2022-01-01 | End: 2022-01-01

## 2022-01-01 RX ORDER — NITROGLYCERIN 0.4 MG/1
0.4 TABLET SUBLINGUAL EVERY 5 MIN PRN
Status: DISCONTINUED | OUTPATIENT
Start: 2022-01-01 | End: 2022-01-01 | Stop reason: HOSPADM

## 2022-01-01 RX ORDER — ACETAMINOPHEN 325 MG/1
650 TABLET ORAL ONCE
Status: DISCONTINUED | OUTPATIENT
Start: 2022-01-01 | End: 2022-01-01

## 2022-01-01 RX ORDER — INSULIN LISPRO 100 [IU]/ML
8-10 INJECTION, SOLUTION INTRAVENOUS; SUBCUTANEOUS
Qty: 15 ML | Refills: 11 | Status: SHIPPED | OUTPATIENT
Start: 2022-01-01 | End: 2022-01-01

## 2022-01-01 RX ORDER — ACETAMINOPHEN 650 MG/1
650 SUPPOSITORY RECTAL EVERY 6 HOURS PRN
Status: DISCONTINUED | OUTPATIENT
Start: 2022-01-01 | End: 2022-01-01 | Stop reason: HOSPADM

## 2022-01-01 RX ORDER — ALBUTEROL SULFATE 90 UG/1
2 AEROSOL, METERED RESPIRATORY (INHALATION) 4 TIMES DAILY
Status: DISCONTINUED | OUTPATIENT
Start: 2022-01-01 | End: 2022-01-01

## 2022-01-01 RX ORDER — ONDANSETRON 2 MG/ML
4 INJECTION INTRAMUSCULAR; INTRAVENOUS EVERY 6 HOURS PRN
Status: DISCONTINUED | OUTPATIENT
Start: 2022-01-01 | End: 2022-01-01

## 2022-01-01 RX ORDER — SODIUM CHLORIDE, SODIUM LACTATE, POTASSIUM CHLORIDE, CALCIUM CHLORIDE 600; 310; 30; 20 MG/100ML; MG/100ML; MG/100ML; MG/100ML
INJECTION, SOLUTION INTRAVENOUS CONTINUOUS
Status: DISCONTINUED | OUTPATIENT
Start: 2022-01-01 | End: 2022-01-01

## 2022-01-01 RX ORDER — LORAZEPAM 2 MG/ML
1 INJECTION INTRAMUSCULAR ONCE
Status: DISCONTINUED | OUTPATIENT
Start: 2022-01-01 | End: 2022-01-01 | Stop reason: HOSPADM

## 2022-01-01 RX ORDER — ISOSORBIDE MONONITRATE 10 MG/1
20 TABLET ORAL 2 TIMES DAILY
Qty: 180 TABLET | Refills: 3 | Status: SHIPPED | OUTPATIENT
Start: 2022-01-01 | End: 2022-01-01

## 2022-01-01 RX ORDER — NALOXONE HYDROCHLORIDE 0.4 MG/ML
0.4 INJECTION, SOLUTION INTRAMUSCULAR; INTRAVENOUS; SUBCUTANEOUS
Status: ACTIVE | OUTPATIENT
Start: 2022-01-01 | End: 2022-01-01

## 2022-01-01 RX ORDER — INSULIN LISPRO 100 [IU]/ML
3-7 INJECTION, SOLUTION INTRAVENOUS; SUBCUTANEOUS
Qty: 15 ML | Refills: 1 | Status: SHIPPED | OUTPATIENT
Start: 2022-01-01

## 2022-01-01 RX ORDER — FENTANYL CITRATE 50 UG/ML
25 INJECTION, SOLUTION INTRAMUSCULAR; INTRAVENOUS
Status: DISCONTINUED | OUTPATIENT
Start: 2022-01-01 | End: 2022-01-01

## 2022-01-01 RX ORDER — OXYCODONE HYDROCHLORIDE 5 MG/1
5 TABLET ORAL EVERY 4 HOURS PRN
Status: DISCONTINUED | OUTPATIENT
Start: 2022-01-01 | End: 2022-01-01 | Stop reason: HOSPADM

## 2022-01-01 RX ORDER — ISOSORBIDE DINITRATE 10 MG/1
10 TABLET ORAL
Status: DISCONTINUED | OUTPATIENT
Start: 2022-01-01 | End: 2022-01-01

## 2022-01-01 RX ORDER — AMINOPHYLLINE 25 MG/ML
50 INJECTION, SOLUTION INTRAVENOUS
Status: ACTIVE | OUTPATIENT
Start: 2022-01-01 | End: 2022-01-01

## 2022-01-01 RX ORDER — LORAZEPAM 2 MG/ML
1 INJECTION INTRAMUSCULAR ONCE
Status: COMPLETED | OUTPATIENT
Start: 2022-01-01 | End: 2022-01-01

## 2022-01-01 RX ORDER — DILTIAZEM HYDROCHLORIDE 5 MG/ML
10 INJECTION INTRAVENOUS
Status: CANCELLED | OUTPATIENT
Start: 2022-01-01

## 2022-01-01 RX ORDER — NALOXONE HYDROCHLORIDE 0.4 MG/ML
0.2 INJECTION, SOLUTION INTRAMUSCULAR; INTRAVENOUS; SUBCUTANEOUS
Status: ACTIVE | OUTPATIENT
Start: 2022-01-01 | End: 2022-01-01

## 2022-01-01 RX ORDER — CARVEDILOL 3.12 MG/1
3.12 TABLET ORAL 2 TIMES DAILY WITH MEALS
Status: DISCONTINUED | OUTPATIENT
Start: 2022-01-01 | End: 2022-01-01

## 2022-01-01 RX ORDER — METOPROLOL SUCCINATE 25 MG/1
12.5 TABLET, EXTENDED RELEASE ORAL DAILY
Qty: 45 TABLET | Refills: 3 | Status: SHIPPED | OUTPATIENT
Start: 2022-01-01 | End: 2022-01-01

## 2022-01-01 RX ORDER — SPIRONOLACTONE 25 MG
12.5 TABLET ORAL
Status: DISCONTINUED | OUTPATIENT
Start: 2022-01-01 | End: 2022-01-01

## 2022-01-01 RX ORDER — HEPARIN SODIUM 10000 [USP'U]/100ML
0-5000 INJECTION, SOLUTION INTRAVENOUS CONTINUOUS
Status: DISCONTINUED | OUTPATIENT
Start: 2022-01-01 | End: 2022-01-01 | Stop reason: DRUGHIGH

## 2022-01-01 RX ORDER — INSULIN LISPRO 100 [IU]/ML
8-10 INJECTION, SOLUTION INTRAVENOUS; SUBCUTANEOUS
Qty: 30 ML | Refills: 3 | Status: ON HOLD | OUTPATIENT
Start: 2022-01-01 | End: 2022-01-01

## 2022-01-01 RX ORDER — IBUPROFEN 200 MG
200 TABLET ORAL EVERY 8 HOURS PRN
Status: ON HOLD | COMMUNITY
End: 2022-01-01

## 2022-01-01 RX ORDER — ROPIVACAINE IN 0.9% SOD CHL/PF 0.1 %
.03-.125 PLASTIC BAG, INJECTION (ML) EPIDURAL CONTINUOUS
Status: DISCONTINUED | OUTPATIENT
Start: 2022-01-01 | End: 2022-01-01

## 2022-01-01 RX ORDER — ONDANSETRON 2 MG/ML
4 INJECTION INTRAMUSCULAR; INTRAVENOUS ONCE
Status: COMPLETED | OUTPATIENT
Start: 2022-01-01 | End: 2022-01-01

## 2022-01-01 RX ORDER — NOREPINEPHRINE BITARTRATE 0.02 MG/ML
.01-.6 INJECTION, SOLUTION INTRAVENOUS CONTINUOUS
Status: DISCONTINUED | OUTPATIENT
Start: 2022-01-01 | End: 2022-01-01

## 2022-01-01 RX ORDER — NITROGLYCERIN 0.4 MG/1
0.4 TABLET SUBLINGUAL ONCE
Status: DISCONTINUED | OUTPATIENT
Start: 2022-01-01 | End: 2022-01-01 | Stop reason: HOSPADM

## 2022-01-01 RX ORDER — ASPIRIN 325 MG
325 TABLET ORAL ONCE
Status: DISCONTINUED | OUTPATIENT
Start: 2022-01-01 | End: 2022-01-01 | Stop reason: HOSPADM

## 2022-01-01 RX ORDER — POTASSIUM CHLORIDE 1.5 G/1.58G
20 POWDER, FOR SOLUTION ORAL ONCE
Status: COMPLETED | OUTPATIENT
Start: 2022-01-01 | End: 2022-01-01

## 2022-01-01 RX ORDER — ATORVASTATIN CALCIUM 40 MG/1
80 TABLET, FILM COATED ORAL EVERY EVENING
Status: DISCONTINUED | OUTPATIENT
Start: 2022-01-01 | End: 2022-01-01

## 2022-01-01 RX ORDER — MAGNESIUM HYDROXIDE/ALUMINUM HYDROXICE/SIMETHICONE 120; 1200; 1200 MG/30ML; MG/30ML; MG/30ML
SUSPENSION ORAL
Status: COMPLETED
Start: 2022-01-01 | End: 2022-01-01

## 2022-01-01 RX ORDER — DIAZEPAM 5 MG
10 TABLET ORAL
Status: DISCONTINUED | OUTPATIENT
Start: 2022-01-01 | End: 2022-01-01 | Stop reason: HOSPADM

## 2022-01-01 RX ORDER — LIDOCAINE 40 MG/G
CREAM TOPICAL
Status: ACTIVE | OUTPATIENT
Start: 2022-01-01 | End: 2022-01-01

## 2022-01-01 RX ORDER — DEXMEDETOMIDINE HYDROCHLORIDE 4 UG/ML
.1-1.2 INJECTION, SOLUTION INTRAVENOUS CONTINUOUS
Status: DISCONTINUED | OUTPATIENT
Start: 2022-01-01 | End: 2022-01-01 | Stop reason: HOSPADM

## 2022-01-01 RX ORDER — PIPERACILLIN SODIUM, TAZOBACTAM SODIUM 3; .375 G/15ML; G/15ML
3.38 INJECTION, POWDER, LYOPHILIZED, FOR SOLUTION INTRAVENOUS EVERY 8 HOURS
Status: DISCONTINUED | OUTPATIENT
Start: 2022-01-01 | End: 2022-01-01

## 2022-01-01 RX ORDER — ISOSORBIDE MONONITRATE 10 MG/1
20 TABLET ORAL 2 TIMES DAILY
Qty: 360 TABLET | Refills: 3 | Status: ON HOLD | OUTPATIENT
Start: 2022-01-01 | End: 2022-01-01

## 2022-01-01 RX ORDER — BUMETANIDE 2 MG/1
2 TABLET ORAL DAILY
Status: DISCONTINUED | OUTPATIENT
Start: 2022-01-01 | End: 2022-01-01

## 2022-01-01 RX ORDER — ASPIRIN 325 MG
325 TABLET ORAL ONCE
Status: CANCELLED | OUTPATIENT
Start: 2022-01-01 | End: 2022-01-01

## 2022-01-01 RX ORDER — BENZONATATE 100 MG/1
100 CAPSULE ORAL 3 TIMES DAILY PRN
Status: DISCONTINUED | OUTPATIENT
Start: 2022-01-01 | End: 2022-01-01 | Stop reason: HOSPADM

## 2022-01-01 RX ORDER — SPIRONOLACTONE 25 MG/1
25 TABLET ORAL DAILY
Qty: 30 TABLET | Refills: 0 | Status: ON HOLD | OUTPATIENT
Start: 2022-01-01 | End: 2022-01-01

## 2022-01-01 RX ORDER — TORSEMIDE 20 MG/1
TABLET ORAL
COMMUNITY
Start: 2022-01-01 | End: 2022-01-01

## 2022-01-01 RX ORDER — MAGNESIUM HYDROXIDE/ALUMINUM HYDROXICE/SIMETHICONE 120; 1200; 1200 MG/30ML; MG/30ML; MG/30ML
30 SUSPENSION ORAL EVERY 4 HOURS PRN
Status: DISCONTINUED | OUTPATIENT
Start: 2022-01-01 | End: 2022-01-01 | Stop reason: HOSPADM

## 2022-01-01 RX ORDER — CALCIUM CARBONATE 500 MG/1
500 TABLET, CHEWABLE ORAL DAILY PRN
Status: DISCONTINUED | OUTPATIENT
Start: 2022-01-01 | End: 2022-01-01 | Stop reason: HOSPADM

## 2022-01-01 RX ORDER — PANTOPRAZOLE SODIUM 40 MG/1
40 TABLET, DELAYED RELEASE ORAL DAILY
COMMUNITY
Start: 2022-01-01 | End: 2022-01-01

## 2022-01-01 RX ORDER — ACETAMINOPHEN 325 MG/1
650 TABLET ORAL EVERY 4 HOURS PRN
Status: DISCONTINUED | OUTPATIENT
Start: 2022-01-01 | End: 2022-01-01

## 2022-01-01 RX ORDER — CLOPIDOGREL BISULFATE 75 MG/1
75 TABLET ORAL DAILY
Qty: 30 TABLET | Refills: 0 | Status: ON HOLD | OUTPATIENT
Start: 2022-01-01 | End: 2022-01-01

## 2022-01-01 RX ORDER — SPIRONOLACTONE 25 MG/1
25 TABLET ORAL DAILY
Status: ON HOLD | COMMUNITY
Start: 2022-01-01 | End: 2022-01-01

## 2022-01-01 RX ORDER — DEXTROSE MONOHYDRATE 25 G/50ML
INJECTION, SOLUTION INTRAVENOUS
Status: COMPLETED
Start: 2022-01-01 | End: 2022-01-01

## 2022-01-01 RX ORDER — HEPARIN SODIUM 1000 [USP'U]/ML
INJECTION, SOLUTION INTRAVENOUS; SUBCUTANEOUS
Status: DISCONTINUED | OUTPATIENT
Start: 2022-01-01 | End: 2022-01-01 | Stop reason: HOSPADM

## 2022-01-01 RX ORDER — AMOXICILLIN 250 MG
1 CAPSULE ORAL 2 TIMES DAILY PRN
Status: DISCONTINUED | OUTPATIENT
Start: 2022-01-01 | End: 2022-01-01 | Stop reason: HOSPADM

## 2022-01-01 RX ORDER — DEXTROSE MONOHYDRATE 25 G/50ML
25-50 INJECTION, SOLUTION INTRAVENOUS
Status: DISCONTINUED | OUTPATIENT
Start: 2022-01-01 | End: 2022-01-01

## 2022-01-01 RX ORDER — BUMETANIDE 1 MG/1
2 TABLET ORAL DAILY
Status: DISCONTINUED | OUTPATIENT
Start: 2022-01-01 | End: 2022-01-01 | Stop reason: HOSPADM

## 2022-01-01 RX ORDER — INSULIN ASPART 100 [IU]/ML
8-10 INJECTION, SOLUTION INTRAVENOUS; SUBCUTANEOUS 2 TIMES DAILY WITH MEALS
COMMUNITY
End: 2022-01-01

## 2022-01-01 RX ORDER — DIAZEPAM 5 MG
10 TABLET ORAL ONCE
Status: COMPLETED | OUTPATIENT
Start: 2022-01-01 | End: 2022-01-01

## 2022-01-01 RX ORDER — BUMETANIDE 2 MG/1
2 TABLET ORAL DAILY
Status: DISCONTINUED | OUTPATIENT
Start: 2022-01-01 | End: 2022-01-01 | Stop reason: HOSPADM

## 2022-01-01 RX ORDER — LEVETIRACETAM 500 MG/1
500 TABLET ORAL 2 TIMES DAILY
COMMUNITY

## 2022-01-01 RX ORDER — BUMETANIDE 0.25 MG/ML
1 INJECTION INTRAMUSCULAR; INTRAVENOUS ONCE
Status: COMPLETED | OUTPATIENT
Start: 2022-01-01 | End: 2022-01-01

## 2022-01-01 RX ORDER — MAGNESIUM OXIDE 400 MG/1
TABLET ORAL
COMMUNITY
Start: 2022-01-01 | End: 2022-01-01

## 2022-01-01 RX ORDER — FLUMAZENIL 0.1 MG/ML
0.2 INJECTION, SOLUTION INTRAVENOUS
Status: DISCONTINUED | OUTPATIENT
Start: 2022-01-01 | End: 2022-01-01

## 2022-01-01 RX ORDER — LORAZEPAM 2 MG/ML
INJECTION INTRAMUSCULAR
Status: DISCONTINUED
Start: 2022-01-01 | End: 2022-01-01 | Stop reason: HOSPADM

## 2022-01-01 RX ORDER — BUMETANIDE 2 MG/1
2 TABLET ORAL DAILY
Qty: 90 TABLET | Refills: 3 | Status: ON HOLD | OUTPATIENT
Start: 2022-01-01 | End: 2022-01-01

## 2022-01-01 RX ORDER — ISOSORBIDE MONONITRATE 10 MG/1
TABLET ORAL
COMMUNITY
Start: 2022-01-01 | End: 2022-01-01

## 2022-01-01 RX ORDER — ALBUMIN (HUMAN) 12.5 G/50ML
50 SOLUTION INTRAVENOUS ONCE
Status: COMPLETED | OUTPATIENT
Start: 2022-01-01 | End: 2022-01-01

## 2022-01-01 RX ORDER — SPIRONOLACTONE 25 MG
12.5 TABLET ORAL DAILY
Status: DISCONTINUED | OUTPATIENT
Start: 2022-01-01 | End: 2022-01-01 | Stop reason: HOSPADM

## 2022-01-01 RX ORDER — APIXABAN 2.5 MG/1
TABLET, FILM COATED ORAL
Qty: 90 TABLET | Refills: 0 | Status: SHIPPED | OUTPATIENT
Start: 2022-01-01 | End: 2022-01-01

## 2022-01-01 RX ORDER — DIAZEPAM 5 MG
10 TABLET ORAL
Status: CANCELLED | OUTPATIENT
Start: 2022-01-01

## 2022-01-01 RX ORDER — GABAPENTIN 100 MG/1
100 CAPSULE ORAL 3 TIMES DAILY
Status: DISCONTINUED | OUTPATIENT
Start: 2022-01-01 | End: 2022-01-01 | Stop reason: HOSPADM

## 2022-01-01 RX ORDER — GABAPENTIN 100 MG/1
100 CAPSULE ORAL 3 TIMES DAILY
COMMUNITY

## 2022-01-01 RX ORDER — ASPIRIN 81 MG/1
243 TABLET, CHEWABLE ORAL ONCE
Status: CANCELLED | OUTPATIENT
Start: 2022-01-01

## 2022-01-01 RX ORDER — ATORVASTATIN CALCIUM 80 MG/1
80 TABLET, FILM COATED ORAL
COMMUNITY
End: 2022-01-01

## 2022-01-01 RX ORDER — ROSUVASTATIN CALCIUM 10 MG/1
40 TABLET, COATED ORAL EVERY EVENING
Status: DISCONTINUED | OUTPATIENT
Start: 2022-01-01 | End: 2022-01-01 | Stop reason: HOSPADM

## 2022-01-01 RX ORDER — ATORVASTATIN CALCIUM 80 MG/1
80 TABLET, FILM COATED ORAL DAILY
Qty: 90 TABLET | Refills: 3 | Status: SHIPPED | OUTPATIENT
Start: 2022-01-01

## 2022-01-01 RX ORDER — BISACODYL 10 MG
10 SUPPOSITORY, RECTAL RECTAL DAILY PRN
Status: DISCONTINUED | OUTPATIENT
Start: 2022-01-01 | End: 2022-01-01 | Stop reason: HOSPADM

## 2022-01-01 RX ORDER — PROPOFOL 10 MG/ML
70 INJECTION, EMULSION INTRAVENOUS ONCE
Status: COMPLETED | OUTPATIENT
Start: 2022-01-01 | End: 2022-01-01

## 2022-01-01 RX ORDER — HYDROXYZINE HYDROCHLORIDE 25 MG/1
25 TABLET, FILM COATED ORAL 4 TIMES DAILY PRN
Status: DISCONTINUED | OUTPATIENT
Start: 2022-01-01 | End: 2022-01-01 | Stop reason: HOSPADM

## 2022-01-01 RX ORDER — BISACODYL 5 MG
5 TABLET, DELAYED RELEASE (ENTERIC COATED) ORAL DAILY PRN
Status: DISCONTINUED | OUTPATIENT
Start: 2022-01-01 | End: 2022-01-01 | Stop reason: HOSPADM

## 2022-01-01 RX ORDER — MAGNESIUM OXIDE 400 MG/1
400 TABLET ORAL EVERY 4 HOURS
Status: COMPLETED | OUTPATIENT
Start: 2022-01-01 | End: 2022-01-01

## 2022-01-01 RX ORDER — FENTANYL CITRATE 50 UG/ML
25 INJECTION, SOLUTION INTRAMUSCULAR; INTRAVENOUS
Status: CANCELLED | OUTPATIENT
Start: 2022-01-01

## 2022-01-01 RX ORDER — ONDANSETRON 4 MG/1
4 TABLET, ORALLY DISINTEGRATING ORAL EVERY 6 HOURS PRN
Status: DISCONTINUED | OUTPATIENT
Start: 2022-01-01 | End: 2022-01-01

## 2022-01-01 RX ORDER — LIDOCAINE 40 MG/G
CREAM TOPICAL
Status: DISCONTINUED | OUTPATIENT
Start: 2022-01-01 | End: 2022-01-01

## 2022-01-01 RX ORDER — ASPIRIN 81 MG/1
243 TABLET, CHEWABLE ORAL ONCE
Status: DISCONTINUED | OUTPATIENT
Start: 2022-01-01 | End: 2022-01-01 | Stop reason: HOSPADM

## 2022-01-01 RX ORDER — BENZONATATE 100 MG/1
100 CAPSULE ORAL 3 TIMES DAILY PRN
Status: DISCONTINUED | OUTPATIENT
Start: 2022-01-01 | End: 2022-01-01

## 2022-01-01 RX ORDER — MAGNESIUM OXIDE 400 MG/1
400 TABLET ORAL 2 TIMES DAILY
Status: DISCONTINUED | OUTPATIENT
Start: 2022-01-01 | End: 2022-01-01

## 2022-01-01 RX ORDER — INSULIN GLARGINE 100 [IU]/ML
24 INJECTION, SOLUTION SUBCUTANEOUS AT BEDTIME
Qty: 9 ML | Refills: 0 | Status: SHIPPED | OUTPATIENT
Start: 2022-01-01 | End: 2022-01-01

## 2022-01-01 RX ORDER — BENZONATATE 100 MG/1
100 CAPSULE ORAL 3 TIMES DAILY
Status: DISCONTINUED | OUTPATIENT
Start: 2022-01-01 | End: 2022-01-01

## 2022-01-01 RX ORDER — ATORVASTATIN CALCIUM 80 MG/1
80 TABLET, FILM COATED ORAL DAILY
Qty: 90 TABLET | Refills: 3 | Status: SHIPPED | OUTPATIENT
Start: 2022-01-01 | End: 2022-01-01

## 2022-01-01 RX ORDER — LIDOCAINE HYDROCHLORIDE 20 MG/ML
INJECTION, SOLUTION INFILTRATION; PERINEURAL PRN
Status: DISCONTINUED | OUTPATIENT
Start: 2022-01-01 | End: 2022-01-01

## 2022-01-01 RX ORDER — BUMETANIDE 0.25 MG/ML
3 INJECTION INTRAMUSCULAR; INTRAVENOUS ONCE
Status: COMPLETED | OUTPATIENT
Start: 2022-01-01 | End: 2022-01-01

## 2022-01-01 RX ORDER — ROSUVASTATIN CALCIUM 40 MG/1
40 TABLET, COATED ORAL DAILY
Status: DISCONTINUED | OUTPATIENT
Start: 2022-01-01 | End: 2022-01-01 | Stop reason: HOSPADM

## 2022-01-01 RX ORDER — FENTANYL CITRATE 50 UG/ML
25 INJECTION, SOLUTION INTRAMUSCULAR; INTRAVENOUS
Status: DISCONTINUED | OUTPATIENT
Start: 2022-01-01 | End: 2022-01-01 | Stop reason: HOSPADM

## 2022-01-01 RX ORDER — HYDROXYZINE HYDROCHLORIDE 25 MG/1
25 TABLET, FILM COATED ORAL 4 TIMES DAILY PRN
Qty: 60 TABLET | Refills: 3 | Status: SHIPPED | OUTPATIENT
Start: 2022-01-01 | End: 2022-01-01

## 2022-01-01 RX ORDER — FUROSEMIDE 10 MG/ML
40 INJECTION INTRAMUSCULAR; INTRAVENOUS
Status: DISCONTINUED | OUTPATIENT
Start: 2022-01-01 | End: 2022-01-01

## 2022-01-01 RX ORDER — VANCOMYCIN HYDROCHLORIDE 1 G/200ML
1000 INJECTION, SOLUTION INTRAVENOUS ONCE
Status: COMPLETED | OUTPATIENT
Start: 2022-01-01 | End: 2022-01-01

## 2022-01-01 RX ORDER — APIXABAN 5 MG (74)
KIT ORAL
Qty: 74 EACH | Refills: 0 | Status: SHIPPED | OUTPATIENT
Start: 2022-01-01 | End: 2022-01-01

## 2022-01-01 RX ORDER — IODIXANOL 320 MG/ML
INJECTION, SOLUTION INTRAVASCULAR
Status: DISCONTINUED | OUTPATIENT
Start: 2022-01-01 | End: 2022-01-01 | Stop reason: HOSPADM

## 2022-01-01 RX ORDER — INSULIN GLARGINE 100 [IU]/ML
20-24 INJECTION, SOLUTION SUBCUTANEOUS AT BEDTIME
Qty: 15 ML | Refills: 3 | Status: ON HOLD | OUTPATIENT
Start: 2022-01-01 | End: 2022-01-01

## 2022-01-01 RX ORDER — FAMOTIDINE 20 MG/1
20 TABLET, FILM COATED ORAL DAILY
Qty: 30 TABLET | Refills: 0 | Status: SHIPPED | OUTPATIENT
Start: 2022-01-01 | End: 2022-01-01

## 2022-01-01 RX ORDER — LANOLIN ALCOHOL/MO/W.PET/CERES
3 CREAM (GRAM) TOPICAL
Status: DISCONTINUED | OUTPATIENT
Start: 2022-01-01 | End: 2022-01-01 | Stop reason: HOSPADM

## 2022-01-01 RX ORDER — BUMETANIDE 0.25 MG/ML
2 INJECTION INTRAMUSCULAR; INTRAVENOUS ONCE
Status: COMPLETED | OUTPATIENT
Start: 2022-01-01 | End: 2022-01-01

## 2022-01-01 RX ORDER — REGADENOSON 0.08 MG/ML
0.4 INJECTION, SOLUTION INTRAVENOUS ONCE
Status: COMPLETED | OUTPATIENT
Start: 2022-01-01 | End: 2022-01-01

## 2022-01-01 RX ORDER — HYDRALAZINE HYDROCHLORIDE 25 MG/1
1 TABLET, FILM COATED ORAL 2 TIMES DAILY WITH MEALS
COMMUNITY
Start: 2022-01-01

## 2022-01-01 RX ORDER — ASPIRIN 81 MG/1
81 TABLET, CHEWABLE ORAL ONCE
Status: COMPLETED | OUTPATIENT
Start: 2022-01-01 | End: 2022-01-01

## 2022-01-01 RX ORDER — FUROSEMIDE 10 MG/ML
40 INJECTION INTRAMUSCULAR; INTRAVENOUS ONCE
Status: COMPLETED | OUTPATIENT
Start: 2022-01-01 | End: 2022-01-01

## 2022-01-01 RX ORDER — ROSUVASTATIN CALCIUM 40 MG/1
40 TABLET, COATED ORAL DAILY
Qty: 30 TABLET | Refills: 0 | Status: SHIPPED | OUTPATIENT
Start: 2022-01-01 | End: 2022-01-01

## 2022-01-01 RX ORDER — ACETAMINOPHEN 325 MG/1
650 TABLET ORAL EVERY 6 HOURS PRN
Status: DISCONTINUED | OUTPATIENT
Start: 2022-01-01 | End: 2022-01-01 | Stop reason: HOSPADM

## 2022-01-01 RX ORDER — METOPROLOL TARTRATE 1 MG/ML
5-20 INJECTION, SOLUTION INTRAVENOUS
Status: CANCELLED | OUTPATIENT
Start: 2022-01-01

## 2022-01-01 RX ORDER — METOPROLOL SUCCINATE 25 MG/1
25 TABLET, EXTENDED RELEASE ORAL 2 TIMES DAILY
Status: DISCONTINUED | OUTPATIENT
Start: 2022-01-01 | End: 2022-01-01 | Stop reason: HOSPADM

## 2022-01-01 RX ORDER — POTASSIUM CHLORIDE 7.45 MG/ML
10 INJECTION INTRAVENOUS ONCE
Status: COMPLETED | OUTPATIENT
Start: 2022-01-01 | End: 2022-01-01

## 2022-01-01 RX ORDER — METOPROLOL SUCCINATE 25 MG/1
0.5 TABLET, EXTENDED RELEASE ORAL DAILY
COMMUNITY
Start: 2022-01-01 | End: 2022-01-01

## 2022-01-01 RX ORDER — NITROGLYCERIN 0.4 MG/1
TABLET SUBLINGUAL
Qty: 25 TABLET | Refills: 0 | Status: SHIPPED | OUTPATIENT
Start: 2022-01-01

## 2022-01-01 RX ORDER — CALCIUM CARBONATE 500 MG/1
1000 TABLET, CHEWABLE ORAL 4 TIMES DAILY PRN
Status: DISCONTINUED | OUTPATIENT
Start: 2022-01-01 | End: 2022-01-01 | Stop reason: HOSPADM

## 2022-01-01 RX ORDER — NITROGLYCERIN 0.4 MG/1
TABLET SUBLINGUAL
Qty: 25 TABLET | Refills: 1 | Status: SHIPPED | OUTPATIENT
Start: 2022-01-01 | End: 2022-01-01

## 2022-01-01 RX ORDER — ASPIRIN 81 MG/1
81 TABLET, CHEWABLE ORAL DAILY
Status: CANCELLED | OUTPATIENT
Start: 2022-01-01

## 2022-01-01 RX ORDER — IBUPROFEN 200 MG
400 TABLET ORAL EVERY 4 HOURS PRN
Status: DISCONTINUED | OUTPATIENT
Start: 2022-01-01 | End: 2022-01-01

## 2022-01-01 RX ORDER — LEVETIRACETAM 500 MG/1
500 TABLET ORAL 2 TIMES DAILY
Status: DISCONTINUED | OUTPATIENT
Start: 2022-01-01 | End: 2022-01-01 | Stop reason: HOSPADM

## 2022-01-01 RX ORDER — METOPROLOL SUCCINATE 25 MG/1
12.5 TABLET, EXTENDED RELEASE ORAL DAILY
Qty: 15 TABLET | Refills: 0 | Status: ON HOLD | OUTPATIENT
Start: 2022-01-01 | End: 2022-01-01

## 2022-01-01 RX ORDER — PANTOPRAZOLE SODIUM 40 MG/1
40 TABLET, DELAYED RELEASE ORAL DAILY
Status: DISCONTINUED | OUTPATIENT
Start: 2022-01-01 | End: 2022-01-01 | Stop reason: HOSPADM

## 2022-01-01 RX ORDER — DOCUSATE SODIUM 50 MG AND SENNOSIDES 8.6 MG 8.6; 5 MG/1; MG/1
2 TABLET, FILM COATED ORAL DAILY
Qty: 180 TABLET | Refills: 3 | Status: SHIPPED | OUTPATIENT
Start: 2022-01-01 | End: 2022-01-01

## 2022-01-01 RX ORDER — FUROSEMIDE 10 MG/ML
20 INJECTION INTRAMUSCULAR; INTRAVENOUS ONCE
Status: COMPLETED | OUTPATIENT
Start: 2022-01-01 | End: 2022-01-01

## 2022-01-01 RX ORDER — CLOPIDOGREL BISULFATE 75 MG/1
75 TABLET ORAL DAILY
Qty: 90 TABLET | Refills: 3 | Status: ON HOLD | OUTPATIENT
Start: 2022-01-01 | End: 2022-01-01

## 2022-01-01 RX ORDER — INSULIN LISPRO 100 [IU]/ML
8-10 INJECTION, SOLUTION INTRAVENOUS; SUBCUTANEOUS
Qty: 30 ML | Refills: 0 | Status: SHIPPED | OUTPATIENT
Start: 2022-01-01 | End: 2022-01-01

## 2022-01-01 RX ORDER — INSULIN LISPRO 100 [IU]/ML
8 INJECTION, SOLUTION INTRAVENOUS; SUBCUTANEOUS
Status: DISCONTINUED | OUTPATIENT
Start: 2022-01-01 | End: 2022-01-01

## 2022-01-01 RX ORDER — DOCUSATE SODIUM 50 MG AND SENNOSIDES 8.6 MG 8.6; 5 MG/1; MG/1
TABLET, FILM COATED ORAL
COMMUNITY
Start: 2022-01-01 | End: 2022-01-01

## 2022-01-01 RX ORDER — SPIRONOLACTONE 25 MG/1
12.5 TABLET ORAL DAILY
Qty: 30 TABLET | Refills: 0 | Status: SHIPPED | OUTPATIENT
Start: 2022-01-01 | End: 2022-01-01

## 2022-01-01 RX ORDER — ASPIRIN 81 MG/1
81 TABLET ORAL DAILY
Status: ON HOLD | COMMUNITY
End: 2022-01-01

## 2022-01-01 RX ORDER — DILTIAZEM HYDROCHLORIDE 5 MG/ML
10 INJECTION INTRAVENOUS
Status: DISCONTINUED | OUTPATIENT
Start: 2022-01-01 | End: 2022-01-01 | Stop reason: HOSPADM

## 2022-01-01 RX ORDER — OXYCODONE HYDROCHLORIDE 5 MG/1
10 TABLET ORAL EVERY 4 HOURS PRN
Status: DISCONTINUED | OUTPATIENT
Start: 2022-01-01 | End: 2022-01-01 | Stop reason: HOSPADM

## 2022-01-01 RX ORDER — ATROPINE SULFATE 0.1 MG/ML
0.5 INJECTION INTRAVENOUS
Status: ACTIVE | OUTPATIENT
Start: 2022-01-01 | End: 2022-01-01

## 2022-01-01 RX ORDER — SPIRONOLACTONE 25 MG/1
25 TABLET ORAL DAILY
Qty: 30 TABLET | Refills: 0 | Status: SHIPPED | OUTPATIENT
Start: 2022-01-01 | End: 2022-01-01

## 2022-01-01 RX ORDER — ACETAMINOPHEN 325 MG/1
650 TABLET ORAL EVERY 4 HOURS PRN
Status: DISCONTINUED | OUTPATIENT
Start: 2022-01-01 | End: 2022-01-01 | Stop reason: HOSPADM

## 2022-01-01 RX ORDER — BUMETANIDE 2 MG/1
2 TABLET ORAL DAILY
Qty: 90 TABLET | Refills: 1 | Status: SHIPPED | OUTPATIENT
Start: 2022-01-01 | End: 2022-01-01

## 2022-01-01 RX ORDER — ATORVASTATIN CALCIUM 40 MG/1
80 TABLET, FILM COATED ORAL DAILY
Status: DISCONTINUED | OUTPATIENT
Start: 2022-01-01 | End: 2022-01-01

## 2022-01-01 RX ORDER — ASPIRIN 81 MG/1
81 TABLET, CHEWABLE ORAL ONCE
Status: DISCONTINUED | OUTPATIENT
Start: 2022-01-01 | End: 2022-01-01 | Stop reason: CLARIF

## 2022-01-01 RX ORDER — HYDRALAZINE HYDROCHLORIDE 25 MG/1
25 TABLET, FILM COATED ORAL 2 TIMES DAILY
Status: DISCONTINUED | OUTPATIENT
Start: 2022-01-01 | End: 2022-01-01

## 2022-01-01 RX ADMIN — IPRATROPIUM BROMIDE AND ALBUTEROL SULFATE 3 ML: 2.5; .5 SOLUTION RESPIRATORY (INHALATION) at 15:36

## 2022-01-01 RX ADMIN — CLOPIDOGREL BISULFATE 75 MG: 75 TABLET ORAL at 10:55

## 2022-01-01 RX ADMIN — FUROSEMIDE 40 MG: 10 INJECTION, SOLUTION INTRAMUSCULAR; INTRAVENOUS at 11:30

## 2022-01-01 RX ADMIN — CARVEDILOL 3.12 MG: 3.12 TABLET, FILM COATED ORAL at 08:13

## 2022-01-01 RX ADMIN — INSULIN GLARGINE 8 UNITS: 100 INJECTION, SOLUTION SUBCUTANEOUS at 00:40

## 2022-01-01 RX ADMIN — APIXABAN 2.5 MG: 2.5 TABLET, FILM COATED ORAL at 08:14

## 2022-01-01 RX ADMIN — Medication 10 MG: at 14:18

## 2022-01-01 RX ADMIN — HYDROXYZINE HYDROCHLORIDE 25 MG: 25 TABLET, FILM COATED ORAL at 09:24

## 2022-01-01 RX ADMIN — GABAPENTIN 100 MG: 100 CAPSULE ORAL at 20:11

## 2022-01-01 RX ADMIN — FUROSEMIDE 40 MG: 10 INJECTION, SOLUTION INTRAMUSCULAR; INTRAVENOUS at 21:46

## 2022-01-01 RX ADMIN — BUMETANIDE 2 MG: 2 TABLET ORAL at 08:17

## 2022-01-01 RX ADMIN — NITROGLYCERIN 7.5 MG: 20 OINTMENT TOPICAL at 08:18

## 2022-01-01 RX ADMIN — ATORVASTATIN CALCIUM 80 MG: 40 TABLET, FILM COATED ORAL at 10:03

## 2022-01-01 RX ADMIN — APIXABAN 5 MG: 5 TABLET, FILM COATED ORAL at 10:07

## 2022-01-01 RX ADMIN — POTASSIUM CHLORIDE 10 MEQ: 750 TABLET, EXTENDED RELEASE ORAL at 06:12

## 2022-01-01 RX ADMIN — ROSUVASTATIN CALCIUM 40 MG: 40 TABLET, FILM COATED ORAL at 08:02

## 2022-01-01 RX ADMIN — FUROSEMIDE 40 MG: 10 INJECTION, SOLUTION INTRAMUSCULAR; INTRAVENOUS at 19:52

## 2022-01-01 RX ADMIN — APIXABAN 2.5 MG: 2.5 TABLET, FILM COATED ORAL at 08:00

## 2022-01-01 RX ADMIN — GABAPENTIN 100 MG: 100 CAPSULE ORAL at 19:52

## 2022-01-01 RX ADMIN — Medication 5 MG: at 20:17

## 2022-01-01 RX ADMIN — SODIUM CHLORIDE 1000 MG: 9 INJECTION, SOLUTION INTRAVENOUS at 06:52

## 2022-01-01 RX ADMIN — GABAPENTIN 100 MG: 100 CAPSULE ORAL at 20:17

## 2022-01-01 RX ADMIN — Medication 10 MG: at 08:12

## 2022-01-01 RX ADMIN — Medication 81 MG: at 11:46

## 2022-01-01 RX ADMIN — METOPROLOL SUCCINATE 25 MG: 25 TABLET, EXTENDED RELEASE ORAL at 09:26

## 2022-01-01 RX ADMIN — APIXABAN 2.5 MG: 2.5 TABLET, FILM COATED ORAL at 20:16

## 2022-01-01 RX ADMIN — AMIODARONE HYDROCHLORIDE 1 MG/MIN: 1.8 INJECTION, SOLUTION INTRAVENOUS at 09:52

## 2022-01-01 RX ADMIN — IPRATROPIUM BROMIDE AND ALBUTEROL SULFATE 3 ML: 2.5; .5 SOLUTION RESPIRATORY (INHALATION) at 19:14

## 2022-01-01 RX ADMIN — LOPERAMIDE HYDROCHLORIDE 2 MG: 2 CAPSULE ORAL at 11:30

## 2022-01-01 RX ADMIN — BUMETANIDE 2 MG: 1 TABLET ORAL at 13:37

## 2022-01-01 RX ADMIN — SODIUM CHLORIDE 1000 ML: 9 INJECTION, SOLUTION INTRAVENOUS at 18:06

## 2022-01-01 RX ADMIN — HYDROXYZINE HYDROCHLORIDE 25 MG: 25 TABLET, FILM COATED ORAL at 14:28

## 2022-01-01 RX ADMIN — GABAPENTIN 100 MG: 100 CAPSULE ORAL at 14:25

## 2022-01-01 RX ADMIN — VANCOMYCIN HYDROCHLORIDE 1000 MG: 1 INJECTION, SOLUTION INTRAVENOUS at 01:22

## 2022-01-01 RX ADMIN — DEXTROSE AND SODIUM CHLORIDE: 5; 900 INJECTION, SOLUTION INTRAVENOUS at 04:15

## 2022-01-01 RX ADMIN — SODIUM BICARBONATE: 84 INJECTION, SOLUTION INTRAVENOUS at 10:24

## 2022-01-01 RX ADMIN — ALBUTEROL SULFATE 2 PUFF: 90 AEROSOL, METERED RESPIRATORY (INHALATION) at 21:47

## 2022-01-01 RX ADMIN — LEVETIRACETAM 500 MG: 100 INJECTION, SOLUTION INTRAVENOUS at 11:30

## 2022-01-01 RX ADMIN — DEXTROSE MONOHYDRATE 25 ML: 25 INJECTION, SOLUTION INTRAVENOUS at 03:58

## 2022-01-01 RX ADMIN — VASOPRESSIN 2.4 UNITS/HR: 20 INJECTION INTRAVENOUS at 08:13

## 2022-01-01 RX ADMIN — ASPIRIN 81 MG: 81 TABLET, COATED ORAL at 08:38

## 2022-01-01 RX ADMIN — LEVETIRACETAM 500 MG: 500 TABLET, FILM COATED ORAL at 08:06

## 2022-01-01 RX ADMIN — ASPIRIN 81 MG: 81 TABLET, COATED ORAL at 08:18

## 2022-01-01 RX ADMIN — GABAPENTIN 100 MG: 100 CAPSULE ORAL at 08:14

## 2022-01-01 RX ADMIN — MAGNESIUM SULFATE HEPTAHYDRATE 2 G: 40 INJECTION, SOLUTION INTRAVENOUS at 12:31

## 2022-01-01 RX ADMIN — Medication 81 MG: at 08:16

## 2022-01-01 RX ADMIN — SODIUM CHLORIDE 250 ML: 9 INJECTION, SOLUTION INTRAVENOUS at 15:45

## 2022-01-01 RX ADMIN — AMOXICILLIN AND CLAVULANATE POTASSIUM 1 TABLET: 875; 125 TABLET, FILM COATED ORAL at 21:20

## 2022-01-01 RX ADMIN — GUAIFENESIN 1200 MG: 600 TABLET ORAL at 08:06

## 2022-01-01 RX ADMIN — SODIUM CHLORIDE 500 ML: 9 INJECTION, SOLUTION INTRAVENOUS at 22:52

## 2022-01-01 RX ADMIN — PERFLUTREN 2.5 ML: 6.52 INJECTION, SUSPENSION INTRAVENOUS at 14:50

## 2022-01-01 RX ADMIN — ROSUVASTATIN CALCIUM 40 MG: 10 TABLET, FILM COATED ORAL at 19:27

## 2022-01-01 RX ADMIN — FAMOTIDINE 20 MG: 20 TABLET ORAL at 08:27

## 2022-01-01 RX ADMIN — ISOSORBIDE MONONITRATE 20 MG: 20 TABLET ORAL at 20:50

## 2022-01-01 RX ADMIN — Medication 5 MG: at 21:20

## 2022-01-01 RX ADMIN — APIXABAN 2.5 MG: 2.5 TABLET, FILM COATED ORAL at 12:41

## 2022-01-01 RX ADMIN — LORAZEPAM 1 MG: 2 INJECTION INTRAMUSCULAR; INTRAVENOUS at 23:19

## 2022-01-01 RX ADMIN — PANTOPRAZOLE SODIUM 40 MG: 40 TABLET, DELAYED RELEASE ORAL at 08:16

## 2022-01-01 RX ADMIN — AMINOPHYLLINE 50 MG: 25 INJECTION, SOLUTION INTRAVENOUS at 12:49

## 2022-01-01 RX ADMIN — DEXMEDETOMIDINE HYDROCHLORIDE 0.1 MCG/KG/HR: 400 INJECTION INTRAVENOUS at 06:36

## 2022-01-01 RX ADMIN — AZITHROMYCIN MONOHYDRATE 500 MG: 500 INJECTION, POWDER, LYOPHILIZED, FOR SOLUTION INTRAVENOUS at 21:04

## 2022-01-01 RX ADMIN — SPIRONOLACTONE 25 MG: 25 TABLET, FILM COATED ORAL at 09:06

## 2022-01-01 RX ADMIN — INSULIN GLARGINE 24 UNITS: 100 INJECTION, SOLUTION SUBCUTANEOUS at 21:47

## 2022-01-01 RX ADMIN — ATORVASTATIN CALCIUM 80 MG: 40 TABLET, FILM COATED ORAL at 11:46

## 2022-01-01 RX ADMIN — GABAPENTIN 100 MG: 100 CAPSULE ORAL at 13:33

## 2022-01-01 RX ADMIN — SODIUM CHLORIDE 250 ML: 9 INJECTION, SOLUTION INTRAVENOUS at 16:36

## 2022-01-01 RX ADMIN — GADOBUTROL 14 ML: 604.72 INJECTION INTRAVENOUS at 13:24

## 2022-01-01 RX ADMIN — APIXABAN 2.5 MG: 2.5 TABLET, FILM COATED ORAL at 19:52

## 2022-01-01 RX ADMIN — CARVEDILOL 6.25 MG: 3.12 TABLET, FILM COATED ORAL at 18:07

## 2022-01-01 RX ADMIN — HYDROXYZINE HYDROCHLORIDE 25 MG: 25 TABLET, FILM COATED ORAL at 08:08

## 2022-01-01 RX ADMIN — FUROSEMIDE 40 MG: 10 INJECTION, SOLUTION INTRAVENOUS at 10:08

## 2022-01-01 RX ADMIN — IPRATROPIUM BROMIDE AND ALBUTEROL SULFATE 3 ML: 2.5; .5 SOLUTION RESPIRATORY (INHALATION) at 11:36

## 2022-01-01 RX ADMIN — ALBUTEROL SULFATE 2 PUFF: 90 AEROSOL, METERED RESPIRATORY (INHALATION) at 19:52

## 2022-01-01 RX ADMIN — CEFTRIAXONE SODIUM 2 G: 2 INJECTION, POWDER, FOR SOLUTION INTRAMUSCULAR; INTRAVENOUS at 20:24

## 2022-01-01 RX ADMIN — ASPIRIN 81 MG: 81 TABLET, COATED ORAL at 08:21

## 2022-01-01 RX ADMIN — APIXABAN 2.5 MG: 2.5 TABLET, FILM COATED ORAL at 21:48

## 2022-01-01 RX ADMIN — SPIRONOLACTONE 12.5 MG: 25 TABLET, FILM COATED ORAL at 08:18

## 2022-01-01 RX ADMIN — FUROSEMIDE 40 MG: 20 TABLET ORAL at 08:18

## 2022-01-01 RX ADMIN — INSULIN ASPART 3 UNITS: 100 INJECTION, SOLUTION INTRAVENOUS; SUBCUTANEOUS at 07:47

## 2022-01-01 RX ADMIN — MAGNESIUM SULFATE HEPTAHYDRATE 2 G: 40 INJECTION, SOLUTION INTRAVENOUS at 08:10

## 2022-01-01 RX ADMIN — POTASSIUM CHLORIDE 20 MEQ: 1500 TABLET, EXTENDED RELEASE ORAL at 06:42

## 2022-01-01 RX ADMIN — ATORVASTATIN CALCIUM 80 MG: 40 TABLET, FILM COATED ORAL at 08:45

## 2022-01-01 RX ADMIN — Medication 0.03 MCG/KG/MIN: at 19:02

## 2022-01-01 RX ADMIN — FUROSEMIDE 40 MG: 10 INJECTION, SOLUTION INTRAMUSCULAR; INTRAVENOUS at 05:07

## 2022-01-01 RX ADMIN — SPIRONOLACTONE 25 MG: 25 TABLET, FILM COATED ORAL at 08:14

## 2022-01-01 RX ADMIN — Medication 100 MG: at 17:56

## 2022-01-01 RX ADMIN — HYDROXYZINE HYDROCHLORIDE 25 MG: 25 TABLET, FILM COATED ORAL at 21:24

## 2022-01-01 RX ADMIN — PERFLUTREN 2 ML: 6.52 INJECTION, SUSPENSION INTRAVENOUS at 08:18

## 2022-01-01 RX ADMIN — FAMOTIDINE 20 MG: 20 TABLET, FILM COATED ORAL at 09:27

## 2022-01-01 RX ADMIN — APIXABAN 5 MG: 5 TABLET, FILM COATED ORAL at 00:38

## 2022-01-01 RX ADMIN — GABAPENTIN 100 MG: 100 CAPSULE ORAL at 07:46

## 2022-01-01 RX ADMIN — FUROSEMIDE 40 MG: 10 INJECTION, SOLUTION INTRAMUSCULAR; INTRAVENOUS at 03:20

## 2022-01-01 RX ADMIN — ATORVASTATIN CALCIUM 80 MG: 40 TABLET, FILM COATED ORAL at 07:59

## 2022-01-01 RX ADMIN — METOPROLOL SUCCINATE 25 MG: 25 TABLET, EXTENDED RELEASE ORAL at 08:18

## 2022-01-01 RX ADMIN — Medication 81 MG: at 07:59

## 2022-01-01 RX ADMIN — ISOSORBIDE MONONITRATE 20 MG: 20 TABLET ORAL at 19:00

## 2022-01-01 RX ADMIN — INSULIN ASPART 2 UNITS: 100 INJECTION, SOLUTION INTRAVENOUS; SUBCUTANEOUS at 16:20

## 2022-01-01 RX ADMIN — FUROSEMIDE 40 MG: 10 INJECTION, SOLUTION INTRAVENOUS at 20:19

## 2022-01-01 RX ADMIN — PIPERACILLIN AND TAZOBACTAM 3.38 G: 3; .375 INJECTION, POWDER, LYOPHILIZED, FOR SOLUTION INTRAVENOUS at 10:36

## 2022-01-01 RX ADMIN — ISOSORBIDE MONONITRATE 20 MG: 20 TABLET ORAL at 08:02

## 2022-01-01 RX ADMIN — PIPERACILLIN AND TAZOBACTAM 3.38 G: 3; .375 INJECTION, POWDER, LYOPHILIZED, FOR SOLUTION INTRAVENOUS at 00:39

## 2022-01-01 RX ADMIN — ALBUTEROL SULFATE 2 PUFF: 90 AEROSOL, METERED RESPIRATORY (INHALATION) at 08:20

## 2022-01-01 RX ADMIN — PHENYLEPHRINE HYDROCHLORIDE 200 MCG: 10 INJECTION INTRAVENOUS at 06:23

## 2022-01-01 RX ADMIN — LEVETIRACETAM 1000 MG: 100 INJECTION, SOLUTION INTRAVENOUS at 23:55

## 2022-01-01 RX ADMIN — IPRATROPIUM BROMIDE AND ALBUTEROL SULFATE 3 ML: 2.5; .5 SOLUTION RESPIRATORY (INHALATION) at 11:25

## 2022-01-01 RX ADMIN — GABAPENTIN 100 MG: 100 CAPSULE ORAL at 15:15

## 2022-01-01 RX ADMIN — INSULIN GLARGINE 8 UNITS: 100 INJECTION, SOLUTION SUBCUTANEOUS at 22:31

## 2022-01-01 RX ADMIN — CHLORHEXIDINE GLUCONATE 15 ML: 1.2 RINSE ORAL at 10:19

## 2022-01-01 RX ADMIN — LORAZEPAM 1 MG: 2 INJECTION INTRAMUSCULAR; INTRAVENOUS at 00:22

## 2022-01-01 RX ADMIN — ALBUMIN HUMAN 50 G: 0.25 SOLUTION INTRAVENOUS at 12:17

## 2022-01-01 RX ADMIN — Medication 400 MG: at 17:06

## 2022-01-01 RX ADMIN — GABAPENTIN 100 MG: 100 CAPSULE ORAL at 14:52

## 2022-01-01 RX ADMIN — PIPERACILLIN AND TAZOBACTAM 3.38 G: 3; .375 INJECTION, POWDER, LYOPHILIZED, FOR SOLUTION INTRAVENOUS at 03:18

## 2022-01-01 RX ADMIN — METOPROLOL SUCCINATE 12.5 MG: 25 TABLET, EXTENDED RELEASE ORAL at 16:52

## 2022-01-01 RX ADMIN — HEPARIN SODIUM AND DEXTROSE 600 UNITS/HR: 10000; 5 INJECTION INTRAVENOUS at 02:15

## 2022-01-01 RX ADMIN — POTASSIUM CHLORIDE 20 MEQ: 1500 TABLET, EXTENDED RELEASE ORAL at 06:35

## 2022-01-01 RX ADMIN — METOPROLOL SUCCINATE 12.5 MG: 25 TABLET, EXTENDED RELEASE ORAL at 11:03

## 2022-01-01 RX ADMIN — DEXTROSE MONOHYDRATE 50 ML: 25 INJECTION, SOLUTION INTRAVENOUS at 10:15

## 2022-01-01 RX ADMIN — MAGNESIUM SULFATE IN WATER 2 G: 40 INJECTION, SOLUTION INTRAVENOUS at 14:24

## 2022-01-01 RX ADMIN — LEVETIRACETAM 500 MG: 500 TABLET, FILM COATED ORAL at 20:17

## 2022-01-01 RX ADMIN — SPIRONOLACTONE 25 MG: 25 TABLET, FILM COATED ORAL at 14:35

## 2022-01-01 RX ADMIN — GABAPENTIN 100 MG: 100 CAPSULE ORAL at 21:19

## 2022-01-01 RX ADMIN — INSULIN ASPART 8 UNITS: 100 INJECTION, SOLUTION INTRAVENOUS; SUBCUTANEOUS at 09:31

## 2022-01-01 RX ADMIN — INSULIN GLARGINE 24 UNITS: 100 INJECTION, SOLUTION SUBCUTANEOUS at 22:20

## 2022-01-01 RX ADMIN — EPINEPHRINE 0.4 MCG/KG/MIN: 1 INJECTION INTRAMUSCULAR; INTRAVENOUS; SUBCUTANEOUS at 11:41

## 2022-01-01 RX ADMIN — INSULIN GLARGINE 12 UNITS: 100 INJECTION, SOLUTION SUBCUTANEOUS at 00:12

## 2022-01-01 RX ADMIN — INSULIN ASPART 10 UNITS: 100 INJECTION, SOLUTION INTRAVENOUS; SUBCUTANEOUS at 19:14

## 2022-01-01 RX ADMIN — SODIUM CHLORIDE: 9 INJECTION, SOLUTION INTRAVENOUS at 15:23

## 2022-01-01 RX ADMIN — Medication 10 MG: at 11:03

## 2022-01-01 RX ADMIN — ASPIRIN 81 MG 243 MG: 81 TABLET ORAL at 11:04

## 2022-01-01 RX ADMIN — APIXABAN 2.5 MG: 2.5 TABLET, FILM COATED ORAL at 20:32

## 2022-01-01 RX ADMIN — ALBUTEROL SULFATE 2 PUFF: 90 AEROSOL, METERED RESPIRATORY (INHALATION) at 16:37

## 2022-01-01 RX ADMIN — APIXABAN 2.5 MG: 2.5 TABLET, FILM COATED ORAL at 08:06

## 2022-01-01 RX ADMIN — LIDOCAINE HYDROCHLORIDE 70 MG: 20 INJECTION, SOLUTION INFILTRATION; PERINEURAL at 06:23

## 2022-01-01 RX ADMIN — SODIUM BICARBONATE 50 MEQ: 84 INJECTION INTRAVENOUS at 09:49

## 2022-01-01 RX ADMIN — CLOPIDOGREL BISULFATE 75 MG: 75 TABLET ORAL at 08:19

## 2022-01-01 RX ADMIN — Medication 10 MG: at 09:05

## 2022-01-01 RX ADMIN — INSULIN GLARGINE 20 UNITS: 100 INJECTION, SOLUTION SUBCUTANEOUS at 21:12

## 2022-01-01 RX ADMIN — FUROSEMIDE 40 MG: 10 INJECTION, SOLUTION INTRAMUSCULAR; INTRAVENOUS at 12:50

## 2022-01-01 RX ADMIN — FUROSEMIDE 40 MG: 10 INJECTION, SOLUTION INTRAMUSCULAR; INTRAVENOUS at 06:12

## 2022-01-01 RX ADMIN — LEVETIRACETAM 500 MG: 500 TABLET, FILM COATED ORAL at 07:46

## 2022-01-01 RX ADMIN — ASPIRIN 81 MG CHEWABLE TABLET 81 MG: 81 TABLET CHEWABLE at 14:53

## 2022-01-01 RX ADMIN — LIDOCAINE HYDROCHLORIDE 30 MG: 20 INJECTION, SOLUTION INFILTRATION; PERINEURAL at 06:26

## 2022-01-01 RX ADMIN — INSULIN ASPART 3 UNITS: 100 INJECTION, SOLUTION INTRAVENOUS; SUBCUTANEOUS at 17:45

## 2022-01-01 RX ADMIN — METOPROLOL SUCCINATE 12.5 MG: 25 TABLET, EXTENDED RELEASE ORAL at 08:02

## 2022-01-01 RX ADMIN — SPIRONOLACTONE 25 MG: 25 TABLET, FILM COATED ORAL at 08:27

## 2022-01-01 RX ADMIN — LEVETIRACETAM 500 MG: 500 TABLET, FILM COATED ORAL at 20:40

## 2022-01-01 RX ADMIN — INSULIN GLARGINE 8 UNITS: 100 INJECTION, SOLUTION SUBCUTANEOUS at 21:04

## 2022-01-01 RX ADMIN — APIXABAN 2.5 MG: 2.5 TABLET, FILM COATED ORAL at 08:44

## 2022-01-01 RX ADMIN — NITROGLYCERIN 7.5 MG: 20 OINTMENT TOPICAL at 14:06

## 2022-01-01 RX ADMIN — Medication 100 MG: at 14:19

## 2022-01-01 RX ADMIN — CLOPIDOGREL BISULFATE 75 MG: 75 TABLET ORAL at 09:27

## 2022-01-01 RX ADMIN — BUMETANIDE 2 MG: 2 TABLET ORAL at 10:40

## 2022-01-01 RX ADMIN — DIAZEPAM 10 MG: 5 TABLET ORAL at 11:05

## 2022-01-01 RX ADMIN — HEPARIN SODIUM 1000 UNITS/HR: 1000 INJECTION INTRAVENOUS; SUBCUTANEOUS at 14:39

## 2022-01-01 RX ADMIN — AMOXICILLIN AND CLAVULANATE POTASSIUM 1 TABLET: 875; 125 TABLET, FILM COATED ORAL at 08:06

## 2022-01-01 RX ADMIN — NITROGLYCERIN 7.5 MG: 20 OINTMENT TOPICAL at 09:27

## 2022-01-01 RX ADMIN — HEPARIN SODIUM AND DEXTROSE 600 UNITS/HR: 10000; 5 INJECTION INTRAVENOUS at 14:18

## 2022-01-01 RX ADMIN — GUAIFENESIN 1200 MG: 600 TABLET ORAL at 08:17

## 2022-01-01 RX ADMIN — Medication 100 MG: at 08:14

## 2022-01-01 RX ADMIN — MAGNESIUM OXIDE TAB 400 MG (241.3 MG ELEMENTAL MG) 400 MG: 400 (241.3 MG) TAB at 14:35

## 2022-01-01 RX ADMIN — FUROSEMIDE 40 MG: 10 INJECTION, SOLUTION INTRAVENOUS at 19:27

## 2022-01-01 RX ADMIN — ALBUTEROL SULFATE 2 PUFF: 90 AEROSOL, METERED RESPIRATORY (INHALATION) at 16:05

## 2022-01-01 RX ADMIN — ATORVASTATIN CALCIUM 80 MG: 40 TABLET, FILM COATED ORAL at 20:28

## 2022-01-01 RX ADMIN — GABAPENTIN 100 MG: 100 CAPSULE ORAL at 08:20

## 2022-01-01 RX ADMIN — HEPARIN SODIUM 1000 UNITS/HR: 1000 INJECTION INTRAVENOUS; SUBCUTANEOUS at 18:40

## 2022-01-01 RX ADMIN — PROPOFOL 70 MG: 10 INJECTION, EMULSION INTRAVENOUS at 06:23

## 2022-01-01 RX ADMIN — POTASSIUM CHLORIDE 10 MEQ: 750 TABLET, EXTENDED RELEASE ORAL at 13:23

## 2022-01-01 RX ADMIN — PANTOPRAZOLE SODIUM 40 MG: 40 TABLET, DELAYED RELEASE ORAL at 09:02

## 2022-01-01 RX ADMIN — APIXABAN 2.5 MG: 2.5 TABLET, FILM COATED ORAL at 21:20

## 2022-01-01 RX ADMIN — FUROSEMIDE 40 MG: 10 INJECTION, SOLUTION INTRAMUSCULAR; INTRAVENOUS at 10:56

## 2022-01-01 RX ADMIN — INSULIN ASPART 4 UNITS: 100 INJECTION, SOLUTION INTRAVENOUS; SUBCUTANEOUS at 12:15

## 2022-01-01 RX ADMIN — LEVETIRACETAM 500 MG: 500 TABLET, FILM COATED ORAL at 20:13

## 2022-01-01 RX ADMIN — PIPERACILLIN AND TAZOBACTAM 3.38 G: 3; .375 INJECTION, POWDER, LYOPHILIZED, FOR SOLUTION INTRAVENOUS at 10:26

## 2022-01-01 RX ADMIN — PANTOPRAZOLE SODIUM 40 MG: 40 INJECTION, POWDER, FOR SOLUTION INTRAVENOUS at 11:10

## 2022-01-01 RX ADMIN — APIXABAN 5 MG: 5 TABLET, FILM COATED ORAL at 20:50

## 2022-01-01 RX ADMIN — METOPROLOL SUCCINATE 12.5 MG: 25 TABLET, EXTENDED RELEASE ORAL at 08:15

## 2022-01-01 RX ADMIN — HYDROXYZINE HYDROCHLORIDE 25 MG: 25 TABLET, FILM COATED ORAL at 21:52

## 2022-01-01 RX ADMIN — INSULIN ASPART 3 UNITS: 100 INJECTION, SOLUTION INTRAVENOUS; SUBCUTANEOUS at 11:31

## 2022-01-01 RX ADMIN — ISOSORBIDE MONONITRATE 20 MG: 20 TABLET ORAL at 08:21

## 2022-01-01 RX ADMIN — Medication 0.6 MCG/KG/MIN: at 11:07

## 2022-01-01 RX ADMIN — METOPROLOL SUCCINATE 12.5 MG: 25 TABLET, EXTENDED RELEASE ORAL at 08:27

## 2022-01-01 RX ADMIN — ALBUTEROL SULFATE 2 PUFF: 90 AEROSOL, METERED RESPIRATORY (INHALATION) at 16:18

## 2022-01-01 RX ADMIN — ALBUTEROL SULFATE 2 PUFF: 90 AEROSOL, METERED RESPIRATORY (INHALATION) at 12:10

## 2022-01-01 RX ADMIN — FUROSEMIDE 40 MG: 10 INJECTION, SOLUTION INTRAMUSCULAR; INTRAVENOUS at 20:33

## 2022-01-01 RX ADMIN — ISOSORBIDE DINITRATE 10 MG: 10 TABLET ORAL at 18:05

## 2022-01-01 RX ADMIN — AMOXICILLIN AND CLAVULANATE POTASSIUM 1 TABLET: 875; 125 TABLET, FILM COATED ORAL at 20:17

## 2022-01-01 RX ADMIN — LEVETIRACETAM 500 MG: 500 TABLET, FILM COATED ORAL at 00:38

## 2022-01-01 RX ADMIN — INSULIN ASPART 3 UNITS: 100 INJECTION, SOLUTION INTRAVENOUS; SUBCUTANEOUS at 08:25

## 2022-01-01 RX ADMIN — MAGNESIUM SULFATE HEPTAHYDRATE 2 G: 40 INJECTION, SOLUTION INTRAVENOUS at 06:42

## 2022-01-01 RX ADMIN — HEPARIN SODIUM 1000 UNITS/HR: 1000 INJECTION INTRAVENOUS; SUBCUTANEOUS at 13:13

## 2022-01-01 RX ADMIN — Medication 81 MG: at 08:20

## 2022-01-01 RX ADMIN — PANTOPRAZOLE SODIUM 40 MG: 40 TABLET, DELAYED RELEASE ORAL at 08:45

## 2022-01-01 RX ADMIN — INSULIN ASPART 3 UNITS: 100 INJECTION, SOLUTION INTRAVENOUS; SUBCUTANEOUS at 11:36

## 2022-01-01 RX ADMIN — CLOPIDOGREL BISULFATE 75 MG: 75 TABLET ORAL at 08:09

## 2022-01-01 RX ADMIN — FUROSEMIDE 40 MG: 10 INJECTION, SOLUTION INTRAMUSCULAR; INTRAVENOUS at 04:19

## 2022-01-01 RX ADMIN — THERA TABS 1 TABLET: TAB at 14:19

## 2022-01-01 RX ADMIN — ATORVASTATIN CALCIUM 80 MG: 40 TABLET, FILM COATED ORAL at 07:46

## 2022-01-01 RX ADMIN — CLOPIDOGREL BISULFATE 75 MG: 75 TABLET ORAL at 10:08

## 2022-01-01 RX ADMIN — APIXABAN 2.5 MG: 2.5 TABLET, FILM COATED ORAL at 08:20

## 2022-01-01 RX ADMIN — POTASSIUM CHLORIDE 10 MEQ: 20 SOLUTION ORAL at 08:14

## 2022-01-01 RX ADMIN — Medication 81 MG: at 08:45

## 2022-01-01 RX ADMIN — POTASSIUM CHLORIDE 10 MEQ: 750 TABLET, EXTENDED RELEASE ORAL at 17:07

## 2022-01-01 RX ADMIN — PERFLUTREN 2 ML: 6.52 INJECTION, SUSPENSION INTRAVENOUS at 15:05

## 2022-01-01 RX ADMIN — ROSUVASTATIN CALCIUM 40 MG: 40 TABLET, FILM COATED ORAL at 08:26

## 2022-01-01 RX ADMIN — FUROSEMIDE 40 MG: 20 TABLET ORAL at 09:27

## 2022-01-01 RX ADMIN — CLOPIDOGREL BISULFATE 75 MG: 75 TABLET ORAL at 08:21

## 2022-01-01 RX ADMIN — ALBUTEROL SULFATE 2 PUFF: 90 AEROSOL, METERED RESPIRATORY (INHALATION) at 20:11

## 2022-01-01 RX ADMIN — GABAPENTIN 100 MG: 100 CAPSULE ORAL at 21:46

## 2022-01-01 RX ADMIN — LEVETIRACETAM 500 MG: 500 TABLET, FILM COATED ORAL at 21:20

## 2022-01-01 RX ADMIN — INSULIN ASPART 1 UNITS: 100 INJECTION, SOLUTION INTRAVENOUS; SUBCUTANEOUS at 09:09

## 2022-01-01 RX ADMIN — PANTOPRAZOLE SODIUM 40 MG: 40 TABLET, DELAYED RELEASE ORAL at 07:59

## 2022-01-01 RX ADMIN — CARVEDILOL 3.12 MG: 3.12 TABLET, FILM COATED ORAL at 17:51

## 2022-01-01 RX ADMIN — BUMETANIDE 3 MG: 0.25 INJECTION, SOLUTION INTRAMUSCULAR; INTRAVENOUS at 07:52

## 2022-01-01 RX ADMIN — Medication 81 MG: at 07:46

## 2022-01-01 RX ADMIN — GABAPENTIN 100 MG: 100 CAPSULE ORAL at 08:00

## 2022-01-01 RX ADMIN — INSULIN GLARGINE 24 UNITS: 100 INJECTION, SOLUTION SUBCUTANEOUS at 23:27

## 2022-01-01 RX ADMIN — BUMETANIDE 2 MG: 0.25 INJECTION, SOLUTION INTRAMUSCULAR; INTRAVENOUS at 12:05

## 2022-01-01 RX ADMIN — BUMETANIDE 2 MG/HR: 0.25 INJECTION, SOLUTION INTRAMUSCULAR; INTRAVENOUS at 08:13

## 2022-01-01 RX ADMIN — GABAPENTIN 100 MG: 100 CAPSULE ORAL at 20:40

## 2022-01-01 RX ADMIN — ASPIRIN 81 MG: 81 TABLET, COATED ORAL at 08:15

## 2022-01-01 RX ADMIN — PIPERACILLIN AND TAZOBACTAM 3.38 G: 3; .375 INJECTION, POWDER, LYOPHILIZED, FOR SOLUTION INTRAVENOUS at 17:45

## 2022-01-01 RX ADMIN — METOPROLOL SUCCINATE 12.5 MG: 25 TABLET, EXTENDED RELEASE ORAL at 14:19

## 2022-01-01 RX ADMIN — APIXABAN 10 MG: 5 TABLET, FILM COATED ORAL at 12:51

## 2022-01-01 RX ADMIN — Medication 0.03 MCG/KG/MIN: at 06:33

## 2022-01-01 RX ADMIN — CARVEDILOL 3.12 MG: 3.12 TABLET, FILM COATED ORAL at 10:23

## 2022-01-01 RX ADMIN — GABAPENTIN 100 MG: 100 CAPSULE ORAL at 14:19

## 2022-01-01 RX ADMIN — HEPARIN SODIUM 1000 UNITS/HR: 1000 INJECTION INTRAVENOUS; SUBCUTANEOUS at 13:19

## 2022-01-01 RX ADMIN — PANTOPRAZOLE SODIUM 40 MG: 40 TABLET, DELAYED RELEASE ORAL at 07:46

## 2022-01-01 RX ADMIN — LEVETIRACETAM 500 MG: 500 TABLET, FILM COATED ORAL at 07:59

## 2022-01-01 RX ADMIN — AMOXICILLIN AND CLAVULANATE POTASSIUM 1 TABLET: 875; 125 TABLET, FILM COATED ORAL at 08:14

## 2022-01-01 RX ADMIN — PIPERACILLIN AND TAZOBACTAM 3.38 G: 3; .375 INJECTION, POWDER, LYOPHILIZED, FOR SOLUTION INTRAVENOUS at 02:46

## 2022-01-01 RX ADMIN — Medication 81 MG: at 10:02

## 2022-01-01 RX ADMIN — PIPERACILLIN AND TAZOBACTAM 3.38 G: 3; .375 INJECTION, POWDER, LYOPHILIZED, FOR SOLUTION INTRAVENOUS at 09:36

## 2022-01-01 RX ADMIN — INSULIN ASPART 1 UNITS: 100 INJECTION, SOLUTION INTRAVENOUS; SUBCUTANEOUS at 17:54

## 2022-01-01 RX ADMIN — POTASSIUM CHLORIDE 20 MEQ: 1.5 POWDER, FOR SOLUTION ORAL at 14:04

## 2022-01-01 RX ADMIN — Medication 100 MG: at 10:54

## 2022-01-01 RX ADMIN — HEPARIN SODIUM AND DEXTROSE 650 UNITS/HR: 10000; 5 INJECTION INTRAVENOUS at 23:35

## 2022-01-01 RX ADMIN — ATORVASTATIN CALCIUM 80 MG: 40 TABLET, FILM COATED ORAL at 08:08

## 2022-01-01 RX ADMIN — SPIRONOLACTONE 25 MG: 25 TABLET, FILM COATED ORAL at 08:12

## 2022-01-01 RX ADMIN — FAMOTIDINE 20 MG: 20 TABLET ORAL at 08:01

## 2022-01-01 RX ADMIN — CLOPIDOGREL BISULFATE 75 MG: 75 TABLET ORAL at 09:06

## 2022-01-01 RX ADMIN — GUAIFENESIN 1200 MG: 600 TABLET ORAL at 14:53

## 2022-01-01 RX ADMIN — APIXABAN 5 MG: 5 TABLET, FILM COATED ORAL at 20:40

## 2022-01-01 RX ADMIN — LEVETIRACETAM 500 MG: 500 TABLET, FILM COATED ORAL at 19:52

## 2022-01-01 RX ADMIN — INSULIN ASPART 3 UNITS: 100 INJECTION, SOLUTION INTRAVENOUS; SUBCUTANEOUS at 10:36

## 2022-01-01 RX ADMIN — APIXABAN 5 MG: 5 TABLET, FILM COATED ORAL at 11:47

## 2022-01-01 RX ADMIN — Medication 81 MG: at 08:06

## 2022-01-01 RX ADMIN — HYDROXYZINE HYDROCHLORIDE 25 MG: 25 TABLET, FILM COATED ORAL at 16:05

## 2022-01-01 RX ADMIN — SPIRONOLACTONE 25 MG: 25 TABLET, FILM COATED ORAL at 08:02

## 2022-01-01 RX ADMIN — FUROSEMIDE 40 MG: 10 INJECTION, SOLUTION INTRAMUSCULAR; INTRAVENOUS at 11:48

## 2022-01-01 RX ADMIN — ALBUTEROL SULFATE 2 PUFF: 90 AEROSOL, METERED RESPIRATORY (INHALATION) at 20:41

## 2022-01-01 RX ADMIN — ALBUTEROL SULFATE 2 PUFF: 90 AEROSOL, METERED RESPIRATORY (INHALATION) at 16:26

## 2022-01-01 RX ADMIN — HEPARIN SODIUM 1000 UNITS/HR: 1000 INJECTION INTRAVENOUS; SUBCUTANEOUS at 12:46

## 2022-01-01 RX ADMIN — AMIODARONE HYDROCHLORIDE 1 MG/MIN: 1.8 INJECTION, SOLUTION INTRAVENOUS at 10:00

## 2022-01-01 RX ADMIN — CLOPIDOGREL BISULFATE 75 MG: 75 TABLET ORAL at 08:13

## 2022-01-01 RX ADMIN — GABAPENTIN 100 MG: 100 CAPSULE ORAL at 08:06

## 2022-01-01 RX ADMIN — INSULIN ASPART 1 UNITS: 100 INJECTION, SOLUTION INTRAVENOUS; SUBCUTANEOUS at 08:13

## 2022-01-01 RX ADMIN — INSULIN ASPART 8 UNITS: 100 INJECTION, SOLUTION INTRAVENOUS; SUBCUTANEOUS at 12:28

## 2022-01-01 RX ADMIN — GUAIFENESIN 1200 MG: 600 TABLET ORAL at 20:17

## 2022-01-01 RX ADMIN — IOPAMIDOL 80 ML: 755 INJECTION, SOLUTION INTRAVENOUS at 20:25

## 2022-01-01 RX ADMIN — GUAIFENESIN 1200 MG: 600 TABLET ORAL at 21:20

## 2022-01-01 RX ADMIN — POTASSIUM CHLORIDE 20 MEQ: 1500 TABLET, EXTENDED RELEASE ORAL at 07:07

## 2022-01-01 RX ADMIN — LIDOCAINE HYDROCHLORIDE 2 ML: 10 INJECTION, SOLUTION EPIDURAL; INFILTRATION; INTRACAUDAL; PERINEURAL at 08:30

## 2022-01-01 RX ADMIN — INSULIN ASPART 4 UNITS: 100 INJECTION, SOLUTION INTRAVENOUS; SUBCUTANEOUS at 10:09

## 2022-01-01 RX ADMIN — Medication 400 MG: at 21:20

## 2022-01-01 RX ADMIN — GABAPENTIN 100 MG: 100 CAPSULE ORAL at 13:31

## 2022-01-01 RX ADMIN — IPRATROPIUM BROMIDE AND ALBUTEROL SULFATE 3 ML: 2.5; .5 SOLUTION RESPIRATORY (INHALATION) at 20:11

## 2022-01-01 RX ADMIN — ATORVASTATIN CALCIUM 80 MG: 40 TABLET, FILM COATED ORAL at 08:20

## 2022-01-01 RX ADMIN — GABAPENTIN 100 MG: 100 CAPSULE ORAL at 08:45

## 2022-01-01 RX ADMIN — THERA TABS 1 TABLET: TAB at 08:15

## 2022-01-01 RX ADMIN — LEVETIRACETAM 500 MG: 500 TABLET, FILM COATED ORAL at 08:44

## 2022-01-01 RX ADMIN — CLOPIDOGREL BISULFATE 225 MG: 75 TABLET ORAL at 16:08

## 2022-01-01 RX ADMIN — PIPERACILLIN AND TAZOBACTAM 3.38 G: 3; .375 INJECTION, POWDER, LYOPHILIZED, FOR SOLUTION INTRAVENOUS at 02:25

## 2022-01-01 RX ADMIN — ASPIRIN 81 MG: 81 TABLET, COATED ORAL at 19:57

## 2022-01-01 RX ADMIN — PIPERACILLIN AND TAZOBACTAM 3.38 G: 3; .375 INJECTION, POWDER, LYOPHILIZED, FOR SOLUTION INTRAVENOUS at 10:31

## 2022-01-01 RX ADMIN — INSULIN ASPART 2 UNITS: 100 INJECTION, SOLUTION INTRAVENOUS; SUBCUTANEOUS at 13:57

## 2022-01-01 RX ADMIN — BUMETANIDE 2 MG/HR: 0.25 INJECTION, SOLUTION INTRAMUSCULAR; INTRAVENOUS at 19:07

## 2022-01-01 RX ADMIN — SPIRONOLACTONE 25 MG: 25 TABLET, FILM COATED ORAL at 08:21

## 2022-01-01 RX ADMIN — ONDANSETRON 4 MG: 2 INJECTION INTRAMUSCULAR; INTRAVENOUS at 00:24

## 2022-01-01 RX ADMIN — IPRATROPIUM BROMIDE AND ALBUTEROL SULFATE 3 ML: 2.5; .5 SOLUTION RESPIRATORY (INHALATION) at 07:25

## 2022-01-01 RX ADMIN — BUMETANIDE 1 MG: 0.25 INJECTION, SOLUTION INTRAMUSCULAR; INTRAVENOUS at 23:03

## 2022-01-01 RX ADMIN — PIPERACILLIN AND TAZOBACTAM 3.38 G: 3; .375 INJECTION, POWDER, LYOPHILIZED, FOR SOLUTION INTRAVENOUS at 17:56

## 2022-01-01 RX ADMIN — ALBUTEROL SULFATE 2 PUFF: 90 AEROSOL, METERED RESPIRATORY (INHALATION) at 11:32

## 2022-01-01 RX ADMIN — Medication 200 MCG: at 06:23

## 2022-01-01 RX ADMIN — IBUPROFEN 400 MG: 200 TABLET, FILM COATED ORAL at 20:42

## 2022-01-01 RX ADMIN — BENZONATATE 100 MG: 100 CAPSULE ORAL at 03:19

## 2022-01-01 RX ADMIN — POTASSIUM CHLORIDE 10 MEQ: 7.46 INJECTION, SOLUTION INTRAVENOUS at 06:58

## 2022-01-01 RX ADMIN — ATORVASTATIN CALCIUM 80 MG: 40 TABLET, FILM COATED ORAL at 19:57

## 2022-01-01 RX ADMIN — LEVETIRACETAM 500 MG: 500 TABLET, FILM COATED ORAL at 08:20

## 2022-01-01 RX ADMIN — INSULIN GLARGINE 8 UNITS: 100 INJECTION, SOLUTION SUBCUTANEOUS at 21:51

## 2022-01-01 RX ADMIN — DEXTROSE MONOHYDRATE 25 ML: 25 INJECTION, SOLUTION INTRAVENOUS at 02:37

## 2022-01-01 RX ADMIN — PANTOPRAZOLE SODIUM 40 MG: 40 TABLET, DELAYED RELEASE ORAL at 08:06

## 2022-01-01 RX ADMIN — INSULIN ASPART 1 UNITS: 100 INJECTION, SOLUTION INTRAVENOUS; SUBCUTANEOUS at 11:56

## 2022-01-01 RX ADMIN — LEVETIRACETAM 500 MG: 500 TABLET, FILM COATED ORAL at 10:07

## 2022-01-01 RX ADMIN — ALUMINUM HYDROXIDE, MAGNESIUM HYDROXIDE, AND SIMETHICONE: 200; 200; 20 SUSPENSION ORAL at 21:50

## 2022-01-01 RX ADMIN — FAMOTIDINE 20 MG: 20 TABLET, FILM COATED ORAL at 08:38

## 2022-01-01 RX ADMIN — FUROSEMIDE 40 MG: 10 INJECTION, SOLUTION INTRAMUSCULAR; INTRAVENOUS at 11:36

## 2022-01-01 RX ADMIN — ALUMINUM HYDROXIDE, MAGNESIUM HYDROXIDE, AND SIMETHICONE 30 ML: 200; 200; 20 SUSPENSION ORAL at 08:16

## 2022-01-01 RX ADMIN — SODIUM CHLORIDE, POTASSIUM CHLORIDE, SODIUM LACTATE AND CALCIUM CHLORIDE 500 ML: 600; 310; 30; 20 INJECTION, SOLUTION INTRAVENOUS at 07:44

## 2022-01-01 RX ADMIN — ALUMINUM HYDROXIDE, MAGNESIUM HYDROXIDE, AND SIMETHICONE 30 ML: 200; 200; 20 SUSPENSION ORAL at 21:49

## 2022-01-01 RX ADMIN — ONDANSETRON 4 MG: 2 INJECTION INTRAMUSCULAR; INTRAVENOUS at 23:03

## 2022-01-01 RX ADMIN — FUROSEMIDE 20 MG: 10 INJECTION, SOLUTION INTRAMUSCULAR; INTRAVENOUS at 14:46

## 2022-01-01 RX ADMIN — Medication 10 MG: at 10:23

## 2022-01-01 RX ADMIN — APIXABAN 2.5 MG: 2.5 TABLET, FILM COATED ORAL at 07:46

## 2022-01-01 RX ADMIN — GABAPENTIN 100 MG: 100 CAPSULE ORAL at 10:06

## 2022-01-01 RX ADMIN — ROSUVASTATIN CALCIUM 40 MG: 10 TABLET, FILM COATED ORAL at 20:58

## 2022-01-01 RX ADMIN — SPIRONOLACTONE 12.5 MG: 25 TABLET, FILM COATED ORAL at 08:39

## 2022-01-01 RX ADMIN — ROSUVASTATIN CALCIUM 40 MG: 10 TABLET, FILM COATED ORAL at 21:45

## 2022-01-01 RX ADMIN — FAMOTIDINE 20 MG: 20 TABLET ORAL at 08:19

## 2022-01-01 RX ADMIN — SODIUM CHLORIDE 1000 ML: 9 INJECTION, SOLUTION INTRAVENOUS at 17:05

## 2022-01-01 RX ADMIN — FUROSEMIDE 40 MG: 10 INJECTION, SOLUTION INTRAMUSCULAR; INTRAVENOUS at 18:06

## 2022-01-01 RX ADMIN — PIPERACILLIN AND TAZOBACTAM 3.38 G: 3; .375 INJECTION, POWDER, LYOPHILIZED, FOR SOLUTION INTRAVENOUS at 19:01

## 2022-01-01 RX ADMIN — FUROSEMIDE 40 MG: 10 INJECTION, SOLUTION INTRAMUSCULAR; INTRAVENOUS at 09:08

## 2022-01-01 RX ADMIN — ASPIRIN 81 MG: 81 TABLET, COATED ORAL at 09:27

## 2022-01-01 RX ADMIN — POTASSIUM CHLORIDE 10 MEQ: 750 TABLET, EXTENDED RELEASE ORAL at 12:20

## 2022-01-01 RX ADMIN — REGADENOSON 0.4 MG: 0.08 INJECTION, SOLUTION INTRAVENOUS at 12:44

## 2022-01-01 RX ADMIN — CLOPIDOGREL BISULFATE 75 MG: 75 TABLET ORAL at 08:14

## 2022-01-01 RX ADMIN — BENZOCAINE AND MENTHOL 1 LOZENGE: 15; 3.6 LOZENGE ORAL at 12:13

## 2022-01-01 RX ADMIN — ASPIRIN 81 MG: 81 TABLET, COATED ORAL at 10:56

## 2022-01-01 RX ADMIN — DEXTROSE MONOHYDRATE 50 ML: 25 INJECTION, SOLUTION INTRAVENOUS at 04:41

## 2022-01-01 RX ADMIN — METOPROLOL SUCCINATE 12.5 MG: 25 TABLET, EXTENDED RELEASE ORAL at 08:06

## 2022-01-01 RX ADMIN — ROSUVASTATIN CALCIUM 40 MG: 10 TABLET, FILM COATED ORAL at 20:18

## 2022-01-01 RX ADMIN — IPRATROPIUM BROMIDE AND ALBUTEROL SULFATE 3 ML: 2.5; .5 SOLUTION RESPIRATORY (INHALATION) at 07:10

## 2022-01-01 RX ADMIN — ASPIRIN 81 MG: 81 TABLET, COATED ORAL at 10:24

## 2022-01-01 RX ADMIN — FUROSEMIDE 40 MG: 10 INJECTION, SOLUTION INTRAVENOUS at 08:13

## 2022-01-01 RX ADMIN — ALBUTEROL SULFATE 2 PUFF: 90 AEROSOL, METERED RESPIRATORY (INHALATION) at 07:47

## 2022-01-01 RX ADMIN — FUROSEMIDE 40 MG: 10 INJECTION, SOLUTION INTRAMUSCULAR; INTRAVENOUS at 08:15

## 2022-01-01 RX ADMIN — PANTOPRAZOLE SODIUM 40 MG: 40 TABLET, DELAYED RELEASE ORAL at 10:05

## 2022-01-01 RX ADMIN — METOPROLOL SUCCINATE 25 MG: 25 TABLET, EXTENDED RELEASE ORAL at 19:57

## 2022-01-01 RX ADMIN — CLOPIDOGREL BISULFATE 75 MG: 75 TABLET ORAL at 08:27

## 2022-01-01 RX ADMIN — ALBUTEROL SULFATE 2 PUFF: 90 AEROSOL, METERED RESPIRATORY (INHALATION) at 11:48

## 2022-01-01 RX ADMIN — INSULIN ASPART 3 UNITS: 100 INJECTION, SOLUTION INTRAVENOUS; SUBCUTANEOUS at 11:55

## 2022-01-01 RX ADMIN — IBUPROFEN 400 MG: 200 TABLET, FILM COATED ORAL at 12:25

## 2022-01-01 RX ADMIN — LEVETIRACETAM 500 MG: 500 TABLET, FILM COATED ORAL at 11:47

## 2022-01-01 RX ADMIN — INSULIN ASPART 1 UNITS: 100 INJECTION, SOLUTION INTRAVENOUS; SUBCUTANEOUS at 10:21

## 2022-01-01 RX ADMIN — FUROSEMIDE 40 MG: 10 INJECTION, SOLUTION INTRAMUSCULAR; INTRAVENOUS at 04:07

## 2022-01-01 RX ADMIN — ROSUVASTATIN CALCIUM 40 MG: 40 TABLET, FILM COATED ORAL at 08:19

## 2022-01-01 RX ADMIN — BENZONATATE 100 MG: 100 CAPSULE ORAL at 21:38

## 2022-01-01 RX ADMIN — INSULIN ASPART 8 UNITS: 100 INJECTION, SOLUTION INTRAVENOUS; SUBCUTANEOUS at 21:44

## 2022-01-01 RX ADMIN — THERA TABS 1 TABLET: TAB at 10:54

## 2022-01-01 RX ADMIN — BUMETANIDE 2 MG/HR: 0.25 INJECTION, SOLUTION INTRAMUSCULAR; INTRAVENOUS at 08:38

## 2022-01-01 RX ADMIN — CLOPIDOGREL BISULFATE 75 MG: 75 TABLET ORAL at 08:18

## 2022-01-01 RX ADMIN — FUROSEMIDE 40 MG: 10 INJECTION, SOLUTION INTRAMUSCULAR; INTRAVENOUS at 00:39

## 2022-01-01 RX ADMIN — DOPAMINE HYDROCHLORIDE 1 MCG/KG/MIN: 160 INJECTION, SOLUTION INTRAVENOUS at 08:36

## 2022-01-01 RX ADMIN — SODIUM CHLORIDE, POTASSIUM CHLORIDE, SODIUM LACTATE AND CALCIUM CHLORIDE 1000 ML: 600; 310; 30; 20 INJECTION, SOLUTION INTRAVENOUS at 09:13

## 2022-01-01 RX ADMIN — PIPERACILLIN AND TAZOBACTAM 3.38 G: 3; .375 INJECTION, POWDER, LYOPHILIZED, FOR SOLUTION INTRAVENOUS at 03:11

## 2022-01-01 RX ADMIN — NITROGLYCERIN 0.4 MG: 0.4 TABLET SUBLINGUAL at 14:15

## 2022-01-01 RX ADMIN — PANTOPRAZOLE SODIUM 40 MG: 40 TABLET, DELAYED RELEASE ORAL at 07:07

## 2022-01-01 RX ADMIN — SODIUM CHLORIDE 500 ML: 9 INJECTION, SOLUTION INTRAVENOUS at 23:42

## 2022-01-01 RX ADMIN — SPIRONOLACTONE 25 MG: 25 TABLET, FILM COATED ORAL at 10:55

## 2022-01-01 RX ADMIN — CLOPIDOGREL BISULFATE 75 MG: 75 TABLET ORAL at 08:38

## 2022-01-01 RX ADMIN — MAGNESIUM SULFATE HEPTAHYDRATE 2 G: 40 INJECTION, SOLUTION INTRAVENOUS at 08:14

## 2022-01-01 RX ADMIN — ASPIRIN 81 MG: 81 TABLET, COATED ORAL at 08:13

## 2022-01-01 RX ADMIN — SPIRONOLACTONE 12.5 MG: 25 TABLET, FILM COATED ORAL at 18:04

## 2022-01-01 RX ADMIN — FUROSEMIDE 40 MG: 10 INJECTION, SOLUTION INTRAMUSCULAR; INTRAVENOUS at 20:11

## 2022-01-01 RX ADMIN — INSULIN ASPART 1 UNITS: 100 INJECTION, SOLUTION INTRAVENOUS; SUBCUTANEOUS at 17:38

## 2022-01-01 RX ADMIN — INSULIN ASPART 8 UNITS: 100 INJECTION, SOLUTION INTRAVENOUS; SUBCUTANEOUS at 18:45

## 2022-01-01 RX ADMIN — THERA TABS 1 TABLET: TAB at 17:56

## 2022-01-01 RX ADMIN — FUROSEMIDE 40 MG: 10 INJECTION, SOLUTION INTRAMUSCULAR; INTRAVENOUS at 14:18

## 2022-01-01 RX ADMIN — FUROSEMIDE 40 MG: 10 INJECTION, SOLUTION INTRAMUSCULAR; INTRAVENOUS at 06:50

## 2022-01-01 RX ADMIN — Medication 10 MG: at 08:14

## 2022-01-01 RX ADMIN — POTASSIUM CHLORIDE 10 MEQ: 750 TABLET, EXTENDED RELEASE ORAL at 16:18

## 2022-01-01 RX ADMIN — BUMETANIDE 2 MG: 2 TABLET ORAL at 08:19

## 2022-01-01 RX ADMIN — ASPIRIN 81 MG: 81 TABLET, COATED ORAL at 09:06

## 2022-01-01 RX ADMIN — ATORVASTATIN CALCIUM 80 MG: 40 TABLET, FILM COATED ORAL at 20:10

## 2022-01-01 RX ADMIN — PIPERACILLIN AND TAZOBACTAM 3.38 G: 3; .375 INJECTION, POWDER, LYOPHILIZED, FOR SOLUTION INTRAVENOUS at 05:46

## 2022-01-01 RX ADMIN — ALBUTEROL SULFATE 2 PUFF: 90 AEROSOL, METERED RESPIRATORY (INHALATION) at 11:36

## 2022-01-01 RX ADMIN — BUMETANIDE 2 MG: 2 TABLET ORAL at 14:52

## 2022-01-01 RX ADMIN — APIXABAN 2.5 MG: 2.5 TABLET, FILM COATED ORAL at 20:11

## 2022-01-01 RX ADMIN — LEVETIRACETAM 500 MG: 500 TABLET, FILM COATED ORAL at 20:35

## 2022-01-01 RX ADMIN — IPRATROPIUM BROMIDE AND ALBUTEROL SULFATE 3 ML: 2.5; .5 SOLUTION RESPIRATORY (INHALATION) at 17:07

## 2022-01-01 RX ADMIN — LEVETIRACETAM 500 MG: 500 TABLET, FILM COATED ORAL at 21:46

## 2022-01-01 RX ADMIN — PIPERACILLIN AND TAZOBACTAM 3.38 G: 3; .375 INJECTION, POWDER, LYOPHILIZED, FOR SOLUTION INTRAVENOUS at 11:34

## 2022-01-01 RX ADMIN — ROSUVASTATIN CALCIUM 40 MG: 10 TABLET, FILM COATED ORAL at 20:32

## 2022-01-01 RX ADMIN — FAMOTIDINE 20 MG: 20 TABLET, FILM COATED ORAL at 08:18

## 2022-01-01 RX ADMIN — ATORVASTATIN CALCIUM 80 MG: 40 TABLET, FILM COATED ORAL at 08:16

## 2022-01-01 RX ADMIN — ISOSORBIDE MONONITRATE 20 MG: 20 TABLET ORAL at 08:16

## 2022-01-01 RX ADMIN — BUMETANIDE 2 MG: 2 TABLET ORAL at 08:06

## 2022-01-01 RX ADMIN — ALBUTEROL SULFATE 2 PUFF: 90 AEROSOL, METERED RESPIRATORY (INHALATION) at 12:21

## 2022-01-01 RX ADMIN — FUROSEMIDE 40 MG: 10 INJECTION, SOLUTION INTRAMUSCULAR; INTRAVENOUS at 20:40

## 2022-01-01 RX ADMIN — SPIRONOLACTONE 25 MG: 25 TABLET, FILM COATED ORAL at 14:19

## 2022-01-01 RX ADMIN — ALBUTEROL SULFATE 2 PUFF: 90 AEROSOL, METERED RESPIRATORY (INHALATION) at 16:51

## 2022-01-01 RX ADMIN — LIDOCAINE HYDROCHLORIDE 2.5 ML: 10 INJECTION, SOLUTION EPIDURAL; INFILTRATION; INTRACAUDAL; PERINEURAL at 13:53

## 2022-01-01 RX ADMIN — PIPERACILLIN AND TAZOBACTAM 3.38 G: 3; .375 INJECTION, POWDER, LYOPHILIZED, FOR SOLUTION INTRAVENOUS at 18:00

## 2022-01-01 RX ADMIN — ISOSORBIDE DINITRATE 10 MG: 10 TABLET ORAL at 08:39

## 2022-01-01 RX ADMIN — MAGNESIUM SULFATE HEPTAHYDRATE 2 G: 40 INJECTION, SOLUTION INTRAVENOUS at 06:35

## 2022-01-01 RX ADMIN — ALBUTEROL SULFATE 2 PUFF: 90 AEROSOL, METERED RESPIRATORY (INHALATION) at 08:00

## 2022-01-01 RX ADMIN — CARVEDILOL 6.25 MG: 3.12 TABLET, FILM COATED ORAL at 09:06

## 2022-01-01 RX ADMIN — HEPARIN SODIUM 1000 UNITS/HR: 1000 INJECTION INTRAVENOUS; SUBCUTANEOUS at 10:47

## 2022-01-01 RX ADMIN — LEVETIRACETAM 500 MG: 500 TABLET, FILM COATED ORAL at 08:14

## 2022-01-01 ASSESSMENT — MIFFLIN-ST. JEOR
SCORE: 1186.38
SCORE: 1202.71
SCORE: 1192.28
SCORE: 1195.45
SCORE: 1182.3
SCORE: 1186.84

## 2022-01-01 ASSESSMENT — ACTIVITIES OF DAILY LIVING (ADL)
ADLS_ACUITY_SCORE: 7
ADLS_ACUITY_SCORE: 39
ADLS_ACUITY_SCORE: 5
ADLS_ACUITY_SCORE: 7
ADLS_ACUITY_SCORE: 5
ADLS_ACUITY_SCORE: 5
DEPENDENT_IADLS:: CLEANING;COOKING;LAUNDRY;SHOPPING;MEAL PREPARATION
ADLS_ACUITY_SCORE: 39
ADLS_ACUITY_SCORE: 7
ADLS_ACUITY_SCORE: 7
ADLS_ACUITY_SCORE: 44
EQUIPMENT_CURRENTLY_USED_AT_HOME: WALKER, ROLLING
CONCENTRATING,_REMEMBERING_OR_MAKING_DECISIONS_DIFFICULTY: NO
ADLS_ACUITY_SCORE: 7
HEARING_DIFFICULTY_OR_DEAF: NO
ADLS_ACUITY_SCORE: 5
DIFFICULTY_EATING/SWALLOWING: NO
ADLS_ACUITY_SCORE: 7
ADLS_ACUITY_SCORE: 5
ADLS_ACUITY_SCORE: 35
ADLS_ACUITY_SCORE: 5
ADLS_ACUITY_SCORE: 5
ADLS_ACUITY_SCORE: 10
ADLS_ACUITY_SCORE: 5
ADLS_ACUITY_SCORE: 39
ADLS_ACUITY_SCORE: 50
ADLS_ACUITY_SCORE: 38
ADLS_ACUITY_SCORE: 7
DIFFICULTY_COMMUNICATING: NO
ADLS_ACUITY_SCORE: 12
FALL_HISTORY_WITHIN_LAST_SIX_MONTHS: YES
ADLS_ACUITY_SCORE: 39
ADLS_ACUITY_SCORE: 5
ADLS_ACUITY_SCORE: 5
TRANSFERRING: 1-->ASSISTANCE (EQUIPMENT/PERSON) NEEDED
ADLS_ACUITY_SCORE: 5
DIFFICULTY_EATING/SWALLOWING: NO
TOILETING_ISSUES: NO
HEARING_DIFFICULTY_OR_DEAF: NO
WALKING_OR_CLIMBING_STAIRS_DIFFICULTY: NO
ADLS_ACUITY_SCORE: 40
EQUIPMENT_CURRENTLY_USED_AT_HOME: WALKER, ROLLING
EATING/SWALLOWING: SWALLOWING LIQUIDS;SWALLOWING SOLID FOOD
DOING_ERRANDS_INDEPENDENTLY_DIFFICULTY: YES
ADLS_ACUITY_SCORE: 7
ADLS_ACUITY_SCORE: 40
WEAR_GLASSES_OR_BLIND: YES
ADLS_ACUITY_SCORE: 7
ADLS_ACUITY_SCORE: 39
ADLS_ACUITY_SCORE: 5
ADLS_ACUITY_SCORE: 50
TRANSFERRING: 0-->INDEPENDENT
ADLS_ACUITY_SCORE: 7
ADLS_ACUITY_SCORE: 39
ADLS_ACUITY_SCORE: 5
CONCENTRATING,_REMEMBERING_OR_MAKING_DECISIONS_DIFFICULTY: NO
TRANSFERRING: 0-->ASSISTANCE NEEDED (DEVELOPMENTALLY APPROPRIATE)
SWALLOWING: 2-->DIFFICULTY SWALLOWING FOODS
DIFFICULTY_COMMUNICATING: NO
ADLS_ACUITY_SCORE: 5
ADLS_ACUITY_SCORE: 39
WALKING_OR_CLIMBING_STAIRS_DIFFICULTY: NO
ADLS_ACUITY_SCORE: 5
WHICH_OF_THE_ABOVE_FUNCTIONAL_RISKS_HAD_A_RECENT_ONSET_OR_CHANGE?: FALL HISTORY
ADLS_ACUITY_SCORE: 5
WALKING_OR_CLIMBING_STAIRS_DIFFICULTY: YES
ADLS_ACUITY_SCORE: 50
ADLS_ACUITY_SCORE: 5
DIFFICULTY_COMMUNICATING: NO
ADLS_ACUITY_SCORE: 39
ADLS_ACUITY_SCORE: 7
ADLS_ACUITY_SCORE: 5
WALKING_OR_CLIMBING_STAIRS: AMBULATION DIFFICULTY, ASSISTANCE 1 PERSON;AMBULATION DIFFICULTY, REQUIRES EQUIPMENT
ADLS_ACUITY_SCORE: 7
CONCENTRATING,_REMEMBERING_OR_MAKING_DECISIONS_DIFFICULTY: NO
ADLS_ACUITY_SCORE: 10
ADLS_ACUITY_SCORE: 5
ADLS_ACUITY_SCORE: 44
EATING: 0-->INDEPENDENT
ADLS_ACUITY_SCORE: 5
ADLS_ACUITY_SCORE: 5
ADLS_ACUITY_SCORE: 7
ADLS_ACUITY_SCORE: 40
ADLS_ACUITY_SCORE: 35
ADLS_ACUITY_SCORE: 44
ADLS_ACUITY_SCORE: 7
ADLS_ACUITY_SCORE: 7
ADLS_ACUITY_SCORE: 5
ADLS_ACUITY_SCORE: 40
ADLS_ACUITY_SCORE: 7
ADLS_ACUITY_SCORE: 10
ADLS_ACUITY_SCORE: 7
ADLS_ACUITY_SCORE: 7
ADLS_ACUITY_SCORE: 10
ADLS_ACUITY_SCORE: 37
ADLS_ACUITY_SCORE: 35
ADLS_ACUITY_SCORE: 7
ADLS_ACUITY_SCORE: 5
DEPENDENT_IADLS:: INDEPENDENT
ADLS_ACUITY_SCORE: 5
ADLS_ACUITY_SCORE: 5
NUMBER_OF_TIMES_PATIENT_HAS_FALLEN_WITHIN_LAST_SIX_MONTHS: 0
ADLS_ACUITY_SCORE: 44
TOILETING_ISSUES: YES
ADLS_ACUITY_SCORE: 37
ADLS_ACUITY_SCORE: 5
ADLS_ACUITY_SCORE: 37
ADLS_ACUITY_SCORE: 5
ADLS_ACUITY_SCORE: 7
DOING_ERRANDS_INDEPENDENTLY_DIFFICULTY: YES
ADLS_ACUITY_SCORE: 39
ADLS_ACUITY_SCORE: 5
ADLS_ACUITY_SCORE: 37
ADLS_ACUITY_SCORE: 7
ADLS_ACUITY_SCORE: 40
ADLS_ACUITY_SCORE: 38
ADLS_ACUITY_SCORE: 5
ADLS_ACUITY_SCORE: 7
ADLS_ACUITY_SCORE: 35
NUMBER_OF_TIMES_PATIENT_HAS_FALLEN_WITHIN_LAST_SIX_MONTHS: 2
ADLS_ACUITY_SCORE: 40
ADLS_ACUITY_SCORE: 7
ADLS_ACUITY_SCORE: 50
DIFFICULTY_EATING/SWALLOWING: YES
ADLS_ACUITY_SCORE: 10
ADLS_ACUITY_SCORE: 5
ADLS_ACUITY_SCORE: 50
ADLS_ACUITY_SCORE: 40
ADLS_ACUITY_SCORE: 5
EQUIPMENT_CURRENTLY_USED_AT_HOME: CANE, STRAIGHT
ADLS_ACUITY_SCORE: 12
ADLS_ACUITY_SCORE: 5
ADLS_ACUITY_SCORE: 5
ADLS_ACUITY_SCORE: 37
ADLS_ACUITY_SCORE: 12
ADLS_ACUITY_SCORE: 50
ADLS_ACUITY_SCORE: 7
ADLS_ACUITY_SCORE: 5
ADLS_ACUITY_SCORE: 10
DOING_ERRANDS_INDEPENDENTLY_DIFFICULTY: NO
ADLS_ACUITY_SCORE: 44
TOILETING_ISSUES: NO
ADLS_ACUITY_SCORE: 50
ADLS_ACUITY_SCORE: 5
ADLS_ACUITY_SCORE: 12
ADLS_ACUITY_SCORE: 5
ADLS_ACUITY_SCORE: 5
ADLS_ACUITY_SCORE: 7
ADLS_ACUITY_SCORE: 12
ADLS_ACUITY_SCORE: 5
ADLS_ACUITY_SCORE: 5
ADLS_ACUITY_SCORE: 7
ADLS_ACUITY_SCORE: 5
DRESS: 1-->ASSISTANCE (EQUIPMENT/PERSON) NEEDED
ADLS_ACUITY_SCORE: 5
ADLS_ACUITY_SCORE: 40
ADLS_ACUITY_SCORE: 7
ADLS_ACUITY_SCORE: 50
DRESS: 1-->ASSISTANCE (EQUIPMENT/PERSON) NEEDED (NOT DEVELOPMENTALLY APPROPRIATE)
DIFFICULTY_EATING/SWALLOWING: YES
ADLS_ACUITY_SCORE: 5
ADLS_ACUITY_SCORE: 5
ADLS_ACUITY_SCORE: 12
ADLS_ACUITY_SCORE: 10
ADLS_ACUITY_SCORE: 39
WEAR_GLASSES_OR_BLIND: YES
ADLS_ACUITY_SCORE: 44
ADLS_ACUITY_SCORE: 7
DRESSING/BATHING_DIFFICULTY: NO
ADLS_ACUITY_SCORE: 5
ADLS_ACUITY_SCORE: 7
ADLS_ACUITY_SCORE: 35
ADLS_ACUITY_SCORE: 5
ADLS_ACUITY_SCORE: 10
ADLS_ACUITY_SCORE: 37
ADLS_ACUITY_SCORE: 37
ADLS_ACUITY_SCORE: 12
ADLS_ACUITY_SCORE: 5
ADLS_ACUITY_SCORE: 7
DRESSING/BATHING: BATHING DIFFICULTY, ASSISTANCE 1 PERSON
ADLS_ACUITY_SCORE: 5
ADLS_ACUITY_SCORE: 5
ADLS_ACUITY_SCORE: 7
ADLS_ACUITY_SCORE: 37
ADLS_ACUITY_SCORE: 5
ADLS_ACUITY_SCORE: 5
BATHING: 1-->ASSISTANCE NEEDED
ADLS_ACUITY_SCORE: 5
ADLS_ACUITY_SCORE: 44
ADLS_ACUITY_SCORE: 35
ADLS_ACUITY_SCORE: 40
ADLS_ACUITY_SCORE: 5
ADLS_ACUITY_SCORE: 10
ADLS_ACUITY_SCORE: 44
FALL_HISTORY_WITHIN_LAST_SIX_MONTHS: NO
WALKING_OR_CLIMBING_STAIRS_DIFFICULTY: YES
ADLS_ACUITY_SCORE: 35
ADLS_ACUITY_SCORE: 35
ADLS_ACUITY_SCORE: 5
ADLS_ACUITY_SCORE: 5
ADLS_ACUITY_SCORE: 40
ADLS_ACUITY_SCORE: 5
ADLS_ACUITY_SCORE: 39
ADLS_ACUITY_SCORE: 5
EATING: 0-->INDEPENDENT
ADLS_ACUITY_SCORE: 7
ADLS_ACUITY_SCORE: 7
ADLS_ACUITY_SCORE: 12
ADLS_ACUITY_SCORE: 7
ADLS_ACUITY_SCORE: 5
ADLS_ACUITY_SCORE: 7
ADLS_ACUITY_SCORE: 5
ADLS_ACUITY_SCORE: 7
ADLS_ACUITY_SCORE: 10
ADLS_ACUITY_SCORE: 12
ADLS_ACUITY_SCORE: 7
ADLS_ACUITY_SCORE: 5
ADLS_ACUITY_SCORE: 7
ADLS_ACUITY_SCORE: 5
ADLS_ACUITY_SCORE: 46
ADLS_ACUITY_SCORE: 5
ADLS_ACUITY_SCORE: 50
ADLS_ACUITY_SCORE: 5
DRESSING/BATHING_DIFFICULTY: NO
ADLS_ACUITY_SCORE: 40
HEARING_DIFFICULTY_OR_DEAF: NO
ADLS_ACUITY_SCORE: 5
SWALLOWING: 2-->DIFFICULTY SWALLOWING LIQUIDS/FOODS
ADLS_ACUITY_SCORE: 46
ADLS_ACUITY_SCORE: 12
CHANGE_IN_FUNCTIONAL_STATUS_SINCE_ONSET_OF_CURRENT_ILLNESS/INJURY: YES
ADLS_ACUITY_SCORE: 39
ADLS_ACUITY_SCORE: 35
ADLS_ACUITY_SCORE: 39
TOILETING_ISSUES: NO
ADLS_ACUITY_SCORE: 5
ADLS_ACUITY_SCORE: 35
DRESS: 1-->ASSISTANCE (EQUIPMENT/PERSON) NEEDED
ADLS_ACUITY_SCORE: 12
ADLS_ACUITY_SCORE: 5
ADLS_ACUITY_SCORE: 7
ADLS_ACUITY_SCORE: 5
ADLS_ACUITY_SCORE: 35
ADLS_ACUITY_SCORE: 7
WALKING_OR_CLIMBING_STAIRS_DIFFICULTY: YES
ADLS_ACUITY_SCORE: 7
ADLS_ACUITY_SCORE: 5
DOING_ERRANDS_INDEPENDENTLY_DIFFICULTY: YES
ADLS_ACUITY_SCORE: 7
ADLS_ACUITY_SCORE: 10
ADLS_ACUITY_SCORE: 10
ADLS_ACUITY_SCORE: 39
ADLS_ACUITY_SCORE: 35
WEAR_GLASSES_OR_BLIND: YES
ADLS_ACUITY_SCORE: 44
WEAR_GLASSES_OR_BLIND: NO
ADLS_ACUITY_SCORE: 5
ADLS_ACUITY_SCORE: 7
ADLS_ACUITY_SCORE: 39
ADLS_ACUITY_SCORE: 39
ADLS_ACUITY_SCORE: 35
WEAR_GLASSES_OR_BLIND: YES
ADLS_ACUITY_SCORE: 5
ADLS_ACUITY_SCORE: 35
ADLS_ACUITY_SCORE: 12
ADLS_ACUITY_SCORE: 12
ADLS_ACUITY_SCORE: 40
ADLS_ACUITY_SCORE: 5
ADLS_ACUITY_SCORE: 44
ADLS_ACUITY_SCORE: 5
CHANGE_IN_FUNCTIONAL_STATUS_SINCE_ONSET_OF_CURRENT_ILLNESS/INJURY: NO
ADLS_ACUITY_SCORE: 7
FALL_HISTORY_WITHIN_LAST_SIX_MONTHS: NO
ADLS_ACUITY_SCORE: 12
ADLS_ACUITY_SCORE: 35
ADLS_ACUITY_SCORE: 5
DOING_ERRANDS_INDEPENDENTLY_DIFFICULTY: NO
ADLS_ACUITY_SCORE: 37
ADLS_ACUITY_SCORE: 5
ADLS_ACUITY_SCORE: 7
ADLS_ACUITY_SCORE: 39
ADLS_ACUITY_SCORE: 7
ADLS_ACUITY_SCORE: 5
ADLS_ACUITY_SCORE: 7
ADLS_ACUITY_SCORE: 5
TOILETING_ISSUES: NO
ADLS_ACUITY_SCORE: 7
TRANSFERRING: 0-->INDEPENDENT
ADLS_ACUITY_SCORE: 12
ADLS_ACUITY_SCORE: 40
ADLS_ACUITY_SCORE: 44
ADLS_ACUITY_SCORE: 50
SWALLOWING: 2-->DIFFICULTY SWALLOWING FOODS
ADLS_ACUITY_SCORE: 5
ADLS_ACUITY_SCORE: 7
ADLS_ACUITY_SCORE: 39
ADLS_ACUITY_SCORE: 50
ADLS_ACUITY_SCORE: 7
ADLS_ACUITY_SCORE: 5
ADLS_ACUITY_SCORE: 7
ADLS_ACUITY_SCORE: 5
ADLS_ACUITY_SCORE: 5
ADLS_ACUITY_SCORE: 7
ADLS_ACUITY_SCORE: 44
ADLS_ACUITY_SCORE: 7
ADLS_ACUITY_SCORE: 40
ADLS_ACUITY_SCORE: 7
ADLS_ACUITY_SCORE: 5
VISION_MANAGEMENT: GLASSES
ADLS_ACUITY_SCORE: 41
ADLS_ACUITY_SCORE: 37
ADLS_ACUITY_SCORE: 5
ADLS_ACUITY_SCORE: 7
ADLS_ACUITY_SCORE: 7
ADLS_ACUITY_SCORE: 35
ADLS_ACUITY_SCORE: 5
ADLS_ACUITY_SCORE: 7
ADLS_ACUITY_SCORE: 5
ADLS_ACUITY_SCORE: 10
CONCENTRATING,_REMEMBERING_OR_MAKING_DECISIONS_DIFFICULTY: YES
ADLS_ACUITY_SCORE: 39
DRESSING/BATHING_DIFFICULTY: NO
ADLS_ACUITY_SCORE: 5
ADLS_ACUITY_SCORE: 5
ADLS_ACUITY_SCORE: 37
ADLS_ACUITY_SCORE: 7
ADLS_ACUITY_SCORE: 50
ADLS_ACUITY_SCORE: 39
TOILETING: 1-->ASSISTANCE (EQUIPMENT/PERSON) NEEDED
WALKING_OR_CLIMBING_STAIRS: AMBULATION DIFFICULTY, REQUIRES EQUIPMENT
ADLS_ACUITY_SCORE: 7
ADLS_ACUITY_SCORE: 12
ADLS_ACUITY_SCORE: 7
ADLS_ACUITY_SCORE: 41
ADLS_ACUITY_SCORE: 7
ADLS_ACUITY_SCORE: 5
DRESSING/BATHING_DIFFICULTY: YES
EATING: 0-->INDEPENDENT
ADLS_ACUITY_SCORE: 44
ADLS_ACUITY_SCORE: 5
ADLS_ACUITY_SCORE: 5
ADLS_ACUITY_SCORE: 7
ADLS_ACUITY_SCORE: 7
ADLS_ACUITY_SCORE: 5
ADLS_ACUITY_SCORE: 5
DRESSING/BATHING_DIFFICULTY: YES
ADLS_ACUITY_SCORE: 5
ADLS_ACUITY_SCORE: 5
ADLS_ACUITY_SCORE: 7
ADLS_ACUITY_SCORE: 5
ADLS_ACUITY_SCORE: 7
CHANGE_IN_FUNCTIONAL_STATUS_SINCE_ONSET_OF_CURRENT_ILLNESS/INJURY: YES
CHANGE_IN_FUNCTIONAL_STATUS_SINCE_ONSET_OF_CURRENT_ILLNESS/INJURY: NO
ADLS_ACUITY_SCORE: 5
ADLS_ACUITY_SCORE: 5
ADLS_ACUITY_SCORE: 12
ADLS_ACUITY_SCORE: 7
ADLS_ACUITY_SCORE: 5
ADLS_ACUITY_SCORE: 7
ADLS_ACUITY_SCORE: 5
ADLS_ACUITY_SCORE: 37
ADLS_ACUITY_SCORE: 50
ADLS_ACUITY_SCORE: 37
ADLS_ACUITY_SCORE: 7
ADLS_ACUITY_SCORE: 7
ADLS_ACUITY_SCORE: 44
ADLS_ACUITY_SCORE: 7
ADLS_ACUITY_SCORE: 5
ADLS_ACUITY_SCORE: 5
ADLS_ACUITY_SCORE: 46
EQUIPMENT_CURRENTLY_USED_AT_HOME: CANE, STRAIGHT
ADLS_ACUITY_SCORE: 7
ADLS_ACUITY_SCORE: 44
ADLS_ACUITY_SCORE: 7
ADLS_ACUITY_SCORE: 10
ADLS_ACUITY_SCORE: 7
ADLS_ACUITY_SCORE: 5
TOILETING: 1-->ASSISTANCE (EQUIPMENT/PERSON) NEEDED (NOT DEVELOPMENTALLY APPROPRIATE)
ADLS_ACUITY_SCORE: 5
ADLS_ACUITY_SCORE: 10
ADLS_ACUITY_SCORE: 12
ADLS_ACUITY_SCORE: 10
ADLS_ACUITY_SCORE: 12
ADLS_ACUITY_SCORE: 5
ADLS_ACUITY_SCORE: 12
ADLS_ACUITY_SCORE: 37
ADLS_ACUITY_SCORE: 7
ADLS_ACUITY_SCORE: 5
ADLS_ACUITY_SCORE: 7
FALL_HISTORY_WITHIN_LAST_SIX_MONTHS: NO
ADLS_ACUITY_SCORE: 5
ADLS_ACUITY_SCORE: 44
DIFFICULTY_EATING/SWALLOWING: NO
EATING/SWALLOWING: SWALLOWING SOLID FOOD
ADLS_ACUITY_SCORE: 5
ADLS_ACUITY_SCORE: 12
ADLS_ACUITY_SCORE: 5
ADLS_ACUITY_SCORE: 10
ADLS_ACUITY_SCORE: 35
ADLS_ACUITY_SCORE: 5
ADLS_ACUITY_SCORE: 7
ADLS_ACUITY_SCORE: 7
DRESSING/BATHING: BATHING DIFFICULTY, REQUIRES EQUIPMENT;BATHING DIFFICULTY, ASSISTANCE 1 PERSON
ADLS_ACUITY_SCORE: 37
ADLS_ACUITY_SCORE: 50
ADLS_ACUITY_SCORE: 12
ADLS_ACUITY_SCORE: 5
TOILETING_ASSISTANCE: TOILETING DIFFICULTY, ASSISTANCE 1 PERSON
ADLS_ACUITY_SCORE: 5
ADLS_ACUITY_SCORE: 5
ADLS_ACUITY_SCORE: 50
CONCENTRATING,_REMEMBERING_OR_MAKING_DECISIONS_DIFFICULTY: YES
ADLS_ACUITY_SCORE: 7
BATHING: 1-->ASSISTANCE NEEDED
ADLS_ACUITY_SCORE: 41
ADLS_ACUITY_SCORE: 10
ADLS_ACUITY_SCORE: 5
ADLS_ACUITY_SCORE: 5
ADLS_ACUITY_SCORE: 37
ADLS_ACUITY_SCORE: 39
ADLS_ACUITY_SCORE: 5
ADLS_ACUITY_SCORE: 37
ADLS_ACUITY_SCORE: 12
ADLS_ACUITY_SCORE: 39
EATING/SWALLOWING_MANAGEMENT: SOFT FOODS
ADLS_ACUITY_SCORE: 5
EATING: 0-->INDEPENDENT
ADLS_ACUITY_SCORE: 5
ADLS_ACUITY_SCORE: 5
DRESS: 0-->ASSISTANCE NEEDED (DEVELOPMENTALLY APPROPRIATE)
ADLS_ACUITY_SCORE: 5
FALL_HISTORY_WITHIN_LAST_SIX_MONTHS: NO
ADLS_ACUITY_SCORE: 5
ADLS_ACUITY_SCORE: 12

## 2022-01-01 ASSESSMENT — ENCOUNTER SYMPTOMS
WHEEZING: 0
FEVER: 0
SHORTNESS OF BREATH: 1
JOINT SWELLING: 0
COUGH: 0
VOMITING: 0
PALPITATIONS: 0
LIGHT-HEADEDNESS: 0
BLOOD IN STOOL: 0
NAUSEA: 0
SHORTNESS OF BREATH: 1
ABDOMINAL PAIN: 0
WEAKNESS: 1
VOMITING: 1
VOMITING: 0
CONFUSION: 0
HEMATURIA: 0
DIZZINESS: 0
ABDOMINAL DISTENTION: 0
CHILLS: 0
NAUSEA: 0
SORE THROAT: 0
DYSURIA: 0
COUGH: 1
DIARRHEA: 0
ABDOMINAL PAIN: 1
ABDOMINAL PAIN: 0
FEVER: 0

## 2022-01-01 ASSESSMENT — VISUAL ACUITY
METHOD: SNELLEN - LINEAR
OS_SC: HM
OD_PH_SC: 20/50
OD_SC: 20/150

## 2022-01-01 ASSESSMENT — CONF VISUAL FIELD
OS_INFERIOR_NASAL_RESTRICTION: 1
OS_SUPERIOR_NASAL_RESTRICTION: 1
OS_SUPERIOR_TEMPORAL_RESTRICTION: 1
METHOD: COUNTING FINGERS
OS_INFERIOR_TEMPORAL_RESTRICTION: 1
OD_NORMAL: 1

## 2022-01-01 ASSESSMENT — CUP TO DISC RATIO: OD_RATIO: 0.3

## 2022-01-01 ASSESSMENT — TONOMETRY
OS_IOP_MMHG: 19
IOP_METHOD: TONOPEN
OD_IOP_MMHG: 21

## 2022-01-01 ASSESSMENT — EJECTION FRACTION: EF_VALUE: .09

## 2022-01-01 ASSESSMENT — SLIT LAMP EXAM - LIDS
COMMENTS: NORMAL
COMMENTS: NORMAL

## 2022-01-13 PROBLEM — I50.20 SYSTOLIC CONGESTIVE HEART FAILURE, UNSPECIFIED HF CHRONICITY (H): Status: ACTIVE | Noted: 2022-01-01

## 2022-01-13 NOTE — ED PROVIDER NOTES
ED Provider In Triage Note  [facility]  Encounter Date: Jan 13, 2022    Chief Complaint   Patient presents with     Shortness of Breath       Brief HPI:   Av Fernando is a 61 year old male presenting to the Emergency Department with a chief complaint of dyspnea and cough since yesterday.  Also has chest tightness and nausea.  No fever or headache.  Hx of CAD but no stents.  Fully vaccinated.     Brief Physical Exam:  /58   Pulse 58   Resp 24   Wt 56.7 kg (125 lb)   SpO2 98%   BMI 22.50 kg/m    General: Non-toxic appearing  HEENT: Atraumatic  Resp: No respiratory distress  Abdomen: Non-peritoneal  Neuro: Alert, oriented, answers questions appropriately  Psych: Behavior appropriate      Plan Initiated in Triage:  Orders Placed This Encounter     XR Chest 2 Views     CBC with platelets     Basic metabolic panel     Troponin I     Symptomatic; Unknown COVID-19 Virus (Coronavirus) by PCR Nasopharyngeal           PIT Dispo:   Return to lobby while awaiting workup and ED bed availability    Garcia Ley MD on 1/13/2022 at 3:37 PM    Patient was evaluated by the Physician in Triage due to a limitation of available rooms in the Emergency Department. A plan of care was discussed based on the information obtained on the initial evaluation and patient was consuled to return back to the Emergency Department lobby after this initial evalutaiton until results were obtained or a room became available in the Emergency Department. Patient was counseled not to leave prior to receiving the results of their workup.      Garcia Ley MD  01/13/22 [id number]

## 2022-01-13 NOTE — ED TRIAGE NOTES
Pt developed shortness of breath yesterday. Pt is vaccinated for covid. Pt also endorses cough, nausea, and chest tightness. Pt has cardiovascular disease. Pt endorses that SOB is worse with exertion but present at rest. No distress noted.

## 2022-01-14 PROBLEM — I25.118 CORONARY ARTERY DISEASE OF NATIVE ARTERY WITH STABLE ANGINA PECTORIS (H): Status: ACTIVE | Noted: 2021-01-01

## 2022-01-14 PROBLEM — N17.9 AKI (ACUTE KIDNEY INJURY) (H): Status: ACTIVE | Noted: 2022-01-01

## 2022-01-14 PROBLEM — I50.23 ACUTE ON CHRONIC SYSTOLIC CONGESTIVE HEART FAILURE (H): Status: ACTIVE | Noted: 2022-01-01

## 2022-01-14 NOTE — PROGRESS NOTES
01/14/22 1030   Quick Adds   Type of Visit Initial Occupational Therapy Evaluation       Present no  (Pt. declined, speaking English)   Living Environment   People in home spouse;child(rupert), adult   Current Living Arrangements house   Home Accessibility stairs within home   Number of Stairs, Within Home, Primary 8  (split entry, bed/bath/kitchen on upper level)   Stair Railings, Within Home, Primary none   Living Environment Comments Pt. stays on main level, once he is inside.  Tub/shower no chair/GB, standard toilet.     Self-Care   Usual Activity Tolerance fair   Current Activity Tolerance poor   Equipment Currently Used at Home cane, straight   Activity/Exercise/Self-Care Comment Pt. does not use cane, assist with stairs and tub tranf., cooking, cleaning.   Ind. dressing, toileting, g/h, med. mgmt..   Disability/Function   Change in Functional Status Since Onset of Current Illness/Injury yes   General Information   Onset of Illness/Injury or Date of Surgery 01/13/22   Referring Physician Zoltan Ellington MD   Patient/Family Therapy Goal Statement (OT) Return home   Existing Precautions/Restrictions fall   General Observations and Info Admit with SOB, chest tightness, CHF.  PMH: stemi 8/21   Cognitive Status Examination   Orientation Status not oriented to person, place or time   Affect/Mental Status (Cognitive) WFL   Follows Commands WFL   Cognitive Status Comments Pt. reports having some STM challenges recently.   Visual Perception   Visual Impairment/Limitations corrective lenses for reading   Pain Assessment   Patient Currently in Pain No   Posture   Posture not impaired   Range of Motion Comprehensive   General Range of Motion bilateral upper extremity ROM WFL   Strength Comprehensive (MMT)   Comment, General Manual Muscle Testing (MMT) Assessment generalized weakness   Coordination   Upper Extremity Coordination No deficits were identified   Bed Mobility   Comment (Bed Mobility)  mod. I supine to sit with HOB elevated (ER cart)   Transfers   Transfer Comments SBA sit to stand from cart and ambulate in room, pt. limited by SOB and fatigue.  Sats 90-93% on RA.    Balance   Balance Comments Ind. sitting balance, SBA standing balance   Activities of Daily Living   BADL Assessment lower body dressing;toileting   Lower Body Dressing Assessment   Comment (Lower Body Dressing) Ind don/doff socks   Toileting   Comment (Toileting) SBA to use urinal in standing   Clinical Impression   Criteria for Skilled Therapeutic Interventions Met (OT) yes;meets criteria;skilled treatment is necessary   OT Diagnosis impaired self cares and mobility   OT Problem List-Impairments impacting ADL activity tolerance impaired;balance;strength   Assessment of Occupational Performance 3-5 Performance Deficits   Identified Performance Deficits transf/amb., toileting, dressing, bathing, g/h   Planned Therapy Interventions (OT) ADL retraining;balance training;strengthening;transfer training;home program guidelines;progressive activity/exercise   Clinical Decision Making Complexity (OT) low complexity   Therapy Frequency (OT) Daily   Predicted Duration of Therapy 7 days   Anticipated Equipment Needs Upon Discharge (OT) shower chair   Risk & Benefits of therapy have been explained evaluation/treatment results reviewed;care plan/treatment goals reviewed;participants voiced agreement with care plan;participants included;patient   OT Discharge Planning    OT Discharge Recommendation (DC Rec) Home with assist   OT Rationale for DC Rec Pt. completes basic self-cares and transf. with SBA/CGA assist of 1.  Pt. significantly limited by decreased activity jose and SOB.  Pt. reports he has 24 hr. supervision at home.    Total Evaluation Time (Minutes)   Total Evaluation Time (Minutes) 10

## 2022-01-14 NOTE — CONSULTS
We have been asked to see Av Fernando at [facility] by Dr. Joanna Yancey, for evaluation of decompensated heart failure.    Impression and Plan     Assessment:  1. Acute on chronic congestive heart failure with reduced left ventricular ejection fraction of 30%, due to ischemic cardiomyopathy. He appears fluid-overloaded.  2. New left ventricular mural thrombus.  3. Coronary artery disease with non-ST elevation myocardial infarction on 8/1/2021. He is having unstable anginal symptoms.  4. Insulin-dependent diabetes mellitus type 2. Last HbA1c 10.4%.  5. Benign essential hypertension.  6. Hyperlipidemia.    Plan:    Start intravenous heparin infusion    Continue intravenous diuresis with furosemide    Start spironolactone 25 mg daily    Rosuvastatin 40 mg daily.    Carvedilol 3.125 mg twice daily    Continue isosorbide mononitrate 10 mg daily    If he remains stable, we may be able to add a low dose of lisinopril    He continues to decline surgical revascularization for his coronary artery disease. We will discuss the case with out interventional colleagues to see if percutaneous coronary intervention would be a feasible alternative    Primary Cardiologist: Yamilet Knowles    History of Present Illness      Mr. Av Fernando is a 61 year old male with a history of recently-diagnosed CAD with presentation of non-ST elevation myocardial infarction on 8/1/2021 found to have multivessel CAD on cath, HFrEF LVEF 30% due to ICM, IDDM2, HTN, and HLD admitted with several days of worsened exertional dyspnea and chest tightness.     He has been recommended to undergo CABG evaluation, but he does not want to do surgery as many of his family members have undergone CABG with poor outcomes. He opted to try medical therapy. He does not recall percutaneous coronary intervention being presented as an option.    He can do very little walking without getting shortness of breath and chest tightness and cannot lie flat. Sleeping  is difficult. He also finds it hard to eat. No palpitations, lightheadedness, or recent syncope. No lower extremity edema.     He was hemodynamically stable on arrival to the ED. ECG in the ED showed no new ischemic changes. Troponins were not elevated, but BNP was markedly elevated at 2494. CXR showed evidence of fluid-overload. Diuresis was initiated and he does feel a bit better.    Review of Systems:  Further review of systems is otherwise negative/noncontributory (based on review of medical record (admission H&P) and 13 point review of systems reviewed. Pertinent positives noted).    Cardiac Diagnostics     ECG 1/13/2022 (personally reviewed and interpreted): SR, PVCs, nonspecific ST/T changes    Telemetry (personally reviewed): SR, no arrhythmias    Most recent:  Echocardiogram 1/14/2022 (results reviewed):   1. The left ventricle is mildly enlarged. Left ventricular systolic  performance is moderately reduced. The ejection fraction is estimated to be  30%.  2. There is severe hypokinesis to akinesis of the posterior, lateral, basal  inferior, and apical segments. The the mid to distal inferior segments and  anterior wall are moderately hypokinetic. There is preservation of the basal  anteroseptal and septal segments.  3. There is a 1.2 x 1.4 cm apical mural thrombus.  4. There is trace aortic insufficiency.  5. There is moderate mitral insufficiency.  6. There is mild to moderate tricuspid insufficiency.  7. Normal right ventricular size with low normal right ventricular systolic  performance.  8. There is moderate left atrial enlargement.  9. Right ventricular systolic pressure relative to right atrial pressure is  mildly increased. The pulmonary artery pressure is estimated to be 45-50 mmHg  plus right atrial pressure (IVC is of normal caliber).     When compared to the prior real-time echocardiogram dated 2 August 2021, and  apical mural thrombus is now noted. Otherwise, the findings are fairly similar  on  both examinations. Specifically, left ventricular systolic performance and  regional wall motion appears fairly similar on both examinations.    Cardiac Cath 8/1/2021 (results reviewed):   Left main with mild disease  LAD with a 60% proximal stenosis, along diagonal vessel with a 70 to 80% proximal stenosis.  The mid LAD has a 90% stenosis in the distal LAD is subtotally occluded with severe diffuse disease  Left circumflex is nondominant with a 90% proximal stenosis leading into a large first obtuse marginal which has ostial involvement.  The mid circumflex is occluded with a second obtuse marginal filling via faint left to left collaterals.  Right coronary artery is large and dominant with an 80% distal stenosis.  There appeared to be 2 parallel PDA's with the more distal one having an 80% ostial stenosis.  The R MINDY has a 70% stenosis.     LVEF 25% with inferoapical hypokinesis more pronounced  EDP 15mmHg      Medical History  Surgical History Family History Social History   Past Medical History:   Diagnosis Date     Congestive heart failure (H) 08/2021    ischemic CM with LVEF 30-35%     Coronary artery disease 08/2021    STEMI with multivessel CAD on angiography     Diabetes mellitus (H)      Hyperlipidemia      Hypertension      Myocardial infarction (H) 08/2021    inferior STEMI     Past Surgical History:   Procedure Laterality Date     CV CORONARY ANGIOGRAM N/A 8/1/2021    Procedure: Coronary Angiogram;  Surgeon: Deidre Lopes MD;  Location: [facility] CATH LAB CV     CV LEFT VENTRICULOGRAM N/A 8/1/2021    Procedure: Left Ventriculogram;  Surgeon: Deidre Lopes MD;  Location: [facility] CATH LAB CV     OTHER SURGICAL HISTORY      LEG TRAUMA     Multiple family members with coronary artery disease.     Social History     Socioeconomic History     Marital status:      Spouse name: Not on file     Number of children: Not on file     Years of education: Not on file     Highest education level: Not on  file   Occupational History     Not on file   Tobacco Use     Smoking status: Never Smoker     Smokeless tobacco: Never Used   Substance and Sexual Activity     Alcohol use: No     Drug use: No     Sexual activity: Yes     Partners: Female     Birth control/protection: None   Other Topics Concern     Parent/sibling w/ CABG, MI or angioplasty before 65F 55M? Not Asked   Social History Narrative     Not on file     Social Determinants of Health     Financial Resource Strain: Not on file   Food Insecurity: Not on file   Transportation Needs: Not on file   Physical Activity: Not on file   Stress: Not on file   Social Connections: Not on file   Intimate Partner Violence: Not on file   Housing Stability: Not on file             Physical Examination   VITALS: /76   Pulse 75   Temp 97.8  F (36.6  C) (Oral)   Resp 20   Wt 56.7 kg (125 lb)   SpO2 96%   BMI 22.50 kg/m    BMI: Body mass index is 22.5 kg/m .  Wt Readings from Last 3 Encounters:   01/13/22 56.7 kg (125 lb)   11/29/21 54.5 kg (120 lb 1 oz)   11/08/21 55.4 kg (122 lb 1.6 oz)     Intake/Output Summary (Last 24 hours) at 1/14/2022 1223  Last data filed at 1/14/2022 1036  Gross per 24 hour   Intake 10 ml   Output 2650 ml   Net -2640 ml       General Appearance:  Alert, cooperative, no distress, appears stated age    Head:  Normocephalic, without obvious abnormality, atraumatic   Eyes:  PERRL, conjunctiva/corneas clear, EOM's intact   Ears:  Normal external ear canals bilaterally   Nose: Nares normal, septum midline, no drainage   Throat: Lips, mucosa, and tongue normal; teeth and gums normal   Neck: Supple, symmetrical, trachea midline, no adenopathy, no carotid bruit. JVP elevated at 10-12 cm H2O   Back:   Symmetric, no abnormal curvature, ROM normal   Lungs:   Bibasilar inspiratory crackles, respirations unlabored   Chest Wall:  No tenderness or deformity   Heart:  Regular rate and rhythm, S1, S2 normal,no murmur, rub or gallop   Abdomen:   Soft,  non-tender, bowel sounds active all four quadrants,  no masses, no organomegaly   Extremities: Extremities normal, atraumatic, no cyanosis or edema   Skin: Skin color, texture, turgor normal, no rashes or lesions   Psychiatric: Normal affect, pleasant and cooperative   Neurologic: Alert and oriented X 3, Moves all 4 extremities            Imaging      CXR 1/14/2022 (report reviewed):  EXAM: XR CHEST 2 VW  LOCATION: [facility]  DATE/TIME: 1/13/2022 4:16 PM     INDICATION: dyspnea  COMPARISON: CT chest 11/05/2021                                                                      IMPRESSION: Small bilateral pleural effusions with adjacent atelectasis/infiltrates, right greater than left. No definite pneumothorax. Right midlung fissural thickening/fluid. Mild cardiomegaly with mild central vascular congestion and suspected mild   interstitial edema.       Lab Results    Chemistry/lipid CBC Cardiac Enzymes/BNP/TSH/INR   Recent Labs   Lab Test 08/02/21  0418   CHOL 176   HDL 53      TRIG 70     Recent Labs   Lab Test 08/02/21  0418 05/12/21  0934 03/13/17  1719    159* 160*     Recent Labs   Lab Test 01/14/22  0941      POTASSIUM 4.0   CHLORIDE 104   CO2 25   *   BUN 12   CR 1.23   GFRESTIMATED 67   LISA 9.0     Recent Labs   Lab Test 01/14/22  0941 01/13/22  1731 12/10/21  1520   CR 1.23 1.33* 1.65*     Recent Labs   Lab Test 12/10/21  1520 08/02/21  0418 05/12/21  0934   A1C 10.4* 10.4* 12.5*          Recent Labs   Lab Test 01/13/22  1731   WBC 5.5   HGB 12.3*   HCT 36.5*   MCV 96   *     Recent Labs   Lab Test 01/13/22  1731 08/02/21  0418 08/01/21  1942   HGB 12.3* 12.1* 12.8*    Recent Labs   Lab Test 01/14/22  0941 01/13/22  1731 08/02/21  0418   TROPONINI 0.06 0.07 39.19*     Recent Labs   Lab Test 01/13/22  1731 09/28/21  0852 08/01/21  1600   BNP 2,494*  --  963*   NTBNP  --  3,508*  --        Recent Labs   Lab Test 08/01/21  1637   INR 0.96            Current Inpatient Scheduled Medications   Scheduled Meds:    aspirin  81 mg Oral Daily     carvedilol  3.125 mg Oral BID w/meals     clopidogrel  75 mg Oral Daily     enoxaparin ANTICOAGULANT  40 mg Subcutaneous Q24H     furosemide  40 mg Intravenous Q12H     insulin aspart  1-7 Units Subcutaneous TID AC     insulin glargine  20 Units Subcutaneous At Bedtime     isosorbide mononitrate  10 mg Oral Daily     magnesium oxide  400 mg Oral BID     rosuvastatin  40 mg Oral QPM     sodium chloride (PF)  3 mL Intracatheter Q8H     Continuous Infusions:    Current Outpatient Medications   Medication Sig Dispense Refill     aspirin (ASA) 81 MG EC tablet Take 1 tablet (81 mg) by mouth daily 90 tablet 3     carvedilol (COREG) 3.125 MG tablet Take 1 tablet (3.125 mg) by mouth 2 times daily (with meals) 180 tablet 3     clopidogrel (PLAVIX) 75 MG tablet Take 1 tablet (75 mg) by mouth daily 90 tablet 3     furosemide (LASIX) 20 MG tablet Take 1 tablet (20 mg) by mouth daily 90 tablet 3     ibuprofen (ADVIL/MOTRIN) 200 MG tablet Take 200 mg by mouth every 8 hours as needed for mild pain       insulin lispro (HUMALOG KWIKPEN) 100 UNIT/ML (1 unit dial) KWIKPEN Inject 8-10 Units Subcutaneous 3 times daily (before meals) 15 mL 11     isosorbide mononitrate (ISMO/MONOKET) 10 MG tablet Take 1 tablet (10 mg) by mouth daily 90 tablet 1     LANTUS SOLOSTAR 100 UNIT/ML soln INJECT 24 UNITS UNDER THE SKIN AT BEDTIME (Patient taking differently: 20-24 Units INJECT 24 UNITS UNDER THE SKIN AT BEDTIME) 15 mL 11     nitroGLYcerin (NITROSTAT) 0.4 MG sublingual tablet For chest pain place 1 tablet under the tongue every 5 minutes for 3 doses. If symptoms persist 5 minutes after 1st dose call 911. 25 tablet 1     rosuvastatin (CRESTOR) 40 MG tablet Take 1 tablet (40 mg) by mouth daily 90 tablet 3     Continuous Blood Gluc Sensor (SocietyOneSTYLE KATIE 14 DAY SENSOR) [facility] 1 Device every 14 days Change sensor as directed every 14 days 2 each 11           Medications Prior to Admission   Prior to Admission medications    Medication Sig Start Date End Date Taking? Authorizing Provider   aspirin (ASA) 81 MG EC tablet Take 1 tablet (81 mg) by mouth daily 12/10/21  Yes Garcia Kaufman MD   carvedilol (COREG) 3.125 MG tablet Take 1 tablet (3.125 mg) by mouth 2 times daily (with meals) 9/28/21  Yes Yamilet Knowles MD   clopidogrel (PLAVIX) 75 MG tablet Take 1 tablet (75 mg) by mouth daily 11/8/21  Yes Garcia Kaufman MD   furosemide (LASIX) 20 MG tablet Take 1 tablet (20 mg) by mouth daily 9/28/21  Yes Yamilet Knowles MD   ibuprofen (ADVIL/MOTRIN) 200 MG tablet Take 200 mg by mouth every 8 hours as needed for mild pain   Yes Unknown, Entered By History   insulin lispro (HUMALOG KWIKPEN) 100 UNIT/ML (1 unit dial) KWIKPEN Inject 8-10 Units Subcutaneous 3 times daily (before meals) 12/10/21  Yes Garcia Kaufman MD   isosorbide mononitrate (ISMO/MONOKET) 10 MG tablet Take 1 tablet (10 mg) by mouth daily 9/30/21  Yes Yamilet Knowles MD   LANTUS SOLOSTAR 100 UNIT/ML soln INJECT 24 UNITS UNDER THE SKIN AT BEDTIME  Patient taking differently: 20-24 Units INJECT 24 UNITS UNDER THE SKIN AT BEDTIME 11/8/21  Yes Garcia Kaufman MD   nitroGLYcerin (NITROSTAT) 0.4 MG sublingual tablet For chest pain place 1 tablet under the tongue every 5 minutes for 3 doses. If symptoms persist 5 minutes after 1st dose call 911. 12/10/21  Yes Garcia Kaufman MD   rosuvastatin (CRESTOR) 40 MG tablet Take 1 tablet (40 mg) by mouth daily 9/28/21  Yes Yamilet Knowles MD   Continuous Blood Gluc Sensor (FREESTYLE KATIE 14 DAY SENSOR) [place] 1 Device every 14 days Change sensor as directed every 14 days 12/10/21   Garcia Kaufman MD Brent E. White, MD [facility]  Non-Invasive Cardiologist  [facility]  Pager [phone]

## 2022-01-14 NOTE — PLAN OF CARE
Problem: Chest Pain  Goal: Resolution of Chest Pain Symptoms  Outcome: Improving     Problem: Fluid Imbalance (Heart Failure)  Goal: Fluid Balance  Outcome: Improving    Admit from ED at 1430.  Has chest tightness but much better than yesterday.  On heparin drip 1000/unit(s)/kg. Walked to the bathroom.  Ind.  RA 94%  NSR.  Crackles in both lungs.

## 2022-01-14 NOTE — ED PROVIDER NOTES
"EMERGENCY DEPARTMENT ENCOUNTER            IMPRESSION:  Congestive heart failure      MEDICAL DECISION MAKING:  Patient evaluated for cardiopulmonary symptoms including dyspnea and orthopnea.  He has a history of CHF.  He has been noncompliant with some of this medication.    On exam his vital signs are unremarkable.  He has occasional cough.  He has crackles bilateral lower lung fields.  No murmur.  No lower extremity edema    EKG shows sinus rhythm with occasional PVCs.  Nonspecific T wave changes have replaced T wave inversions    CBC shows baseline anemia    Chemistry with creatinine of 1.3 which is down from baseline    Troponin detectable at point 07    BNP elevated over 2000    Chest x-ray shows evidence of pulmonary edema    He was given a dose of IV Lasix 40 mg.  He had quite significant diuresis.    Patient still reports however he is short of breath and having orthopnea.  He will be placed under observational care      =================================================================  CHIEF COMPLAINT:  Chief Complaint   Patient presents with     Shortness of Breath         HPI  Av Fernando is a 61 year old male with a history of diabetes, MI, CHF, hypertension, CAD who presents to the ED by walk in for evaluation of shortness of breath.    Patient reports he has a history of shortness of breath, but his symptoms have significantly worsened over the past 2 weeks. Over the past 3 days, he has not been able to eat or drink because it feels like his \"heart will stop pumping\". Patient reports chest tightness and feels like he can't breathe. He notes some orthopnea. Patient is on blood thinners. Denies leg swelling or any other complaints at this time.     I, Jean-Paul Kelly am serving as a scribe to document services personally performed by Dr. Ken Samano MD, based on my observation and the provider's statements to me. I, Dr. Ken Samano MD attest that Jean-Paul Kelly is acting in a scribe capacity, has " observed my performance of the services and has documented them in accordance with my direction.      REVIEW OF SYSTEMS   Constitutional: Positive for appetite change. Denies fever, chills, unintentional weight loss or fatigue   Eyes: Denies visual changes or discharge    HENT: Denies sore throat, ear pain or neck pain  Respiratory: Positive for shortness of breath, orthopnea, chest tightness. Denies cough    Cardiovascular: Denies chest pain, palpitations or leg swelling  GI: Denies abdominal pain, nausea, vomiting, or dark, bloody stools.    : Denies hematuria, dysuria, or flank pain  Musculoskeletal: Denies any new back pain or new muscle/joint pains  Skin: Denies rash or wound  Neurologic: Denies current headache, new weakness, focal weakness, or sensory changes    Lymphatic: Denies swollen glands    Psychiatric: Denies depression, suicidal ideation or homicidal ideation.    Remainder of systems reviewed, unless noted in HPI all others negative.      PAST MEDICAL HISTORY:  Past Medical History:   Diagnosis Date     Congestive heart failure (H) 08/2021    ischemic CM with LVEF 30-35%     Coronary artery disease 08/2021    STEMI with multivessel CAD on angiography     Diabetes mellitus (H)      Hyperlipidemia      Hypertension      Myocardial infarction (H) 08/2021    inferior STEMI       PAST SURGICAL HISTORY:  Past Surgical History:   Procedure Laterality Date     CV CORONARY ANGIOGRAM N/A 8/1/2021    Procedure: Coronary Angiogram;  Surgeon: Deidre Lopes MD;  Location: [facility] CV     CV LEFT VENTRICULOGRAM N/A 8/1/2021    Procedure: Left Ventriculogram;  Surgeon: Deidre Lopes MD;  Location: [facility] CATH LAB CV     OTHER SURGICAL HISTORY      LEG TRAUMA         CURRENT MEDICATIONS:    aspirin (ASA) 81 MG EC tablet  carvedilol (COREG) 3.125 MG tablet  clopidogrel (PLAVIX) 75 MG tablet  Continuous Blood Gluc Sensor (FREESTYLE KATIE 14 DAY SENSOR) MISC  furosemide (LASIX) 20 MG tablet  insulin  lispro (HUMALOG KWIKPEN) 100 UNIT/ML (1 unit dial) KWIKPEN  isosorbide mononitrate (ISMO/MONOKET) 10 MG tablet  LANTUS SOLOSTAR 100 UNIT/ML soln  nitroGLYcerin (NITROSTAT) 0.4 MG sublingual tablet  rosuvastatin (CRESTOR) 40 MG tablet        ALLERGIES:  Allergies   Allergen Reactions     Januvia [Sitagliptin] Unknown     HEADACHE     Trulicity [Dulaglutide] Dizziness     Weak and muscle weak       FAMILY HISTORY:  No family history on file.    SOCIAL HISTORY:   Social History     Socioeconomic History     Marital status:      Spouse name: Not on file     Number of children: Not on file     Years of education: Not on file     Highest education level: Not on file   Occupational History     Not on file   Tobacco Use     Smoking status: Never Smoker     Smokeless tobacco: Never Used   Substance and Sexual Activity     Alcohol use: No     Drug use: No     Sexual activity: Yes     Partners: Female     Birth control/protection: None   Other Topics Concern     Parent/sibling w/ CABG, MI or angioplasty before 65F 55M? Not Asked   Social History Narrative     Not on file     Social Determinants of Health     Financial Resource Strain: Not on file   Food Insecurity: Not on file   Transportation Needs: Not on file   Physical Activity: Not on file   Stress: Not on file   Social Connections: Not on file   Intimate Partner Violence: Not on file   Housing Stability: Not on file       PHYSICAL EXAM:    /75   Pulse 85   Resp 21   Wt 56.7 kg (125 lb)   SpO2 97%   BMI 22.50 kg/m      Constitutional: Awake, alert, in no acute distress  Head: Normocephalic, atraumatic.  ENT: Mucous membranes moist. Posterior oropharynx appears normal.  Eyes: Pupils midrange and reactive ,no conjunctival discharge  Neck: No lymphadenopathy, no stridor, supple, soft tissue swelling  Chest: No tenderness   Respiratory: Crackles in the bases  Cardiovascular: Regular rate and rhythm.+2 radial pulses, equal bilaterally.  No murmurs.   GI:  Abdomen soft, non-tender to palpation in all 4 quadrants. No guarding or rebound. Bowel sounds intact on all 4 quadrants.   Back: No CVA tenderness.    Musculoskeletal: Moves all 4 extremities equally, strength symmetrical on bilateral uppers and lowers.  No peripheral edema  Integument: Warm, dry. No rash. No bruising or petechiae.  Lymphatic: No cervical lymphadenopathy  Neurologic: Alert & oriented x 3. Normal speech. Grossly normal motor and sensory function. No focal deficits noted.  NIHSS = 0  Psychiatric: Normal mood and affect. Normal judgement.    ED COURSE:  8:45 PM I met the patient and performed my initial interview and exam.    LAB:  All pertinent labs reviewed and interpreted.  Results for orders placed or performed during the hospital encounter of 01/13/22   XR Chest 2 Views    Impression    IMPRESSION: Small bilateral pleural effusions with adjacent atelectasis/infiltrates, right greater than left. No definite pneumothorax. Right midlung fissural thickening/fluid. Mild cardiomegaly with mild central vascular congestion and suspected mild   interstitial edema.   CBC with platelets   Result Value Ref Range    WBC Count 5.5 4.0 - 11.0 10e3/uL    RBC Count 3.82 (L) 4.40 - 5.90 10e6/uL    Hemoglobin 12.3 (L) 13.3 - 17.7 g/dL    Hematocrit 36.5 (L) 40.0 - 53.0 %    MCV 96 78 - 100 fL    MCH 32.2 26.5 - 33.0 pg    MCHC 33.7 31.5 - 36.5 g/dL    RDW 12.5 10.0 - 15.0 %    Platelet Count 146 (L) 150 - 450 10e3/uL   Basic metabolic panel   Result Value Ref Range    Sodium 135 (L) 136 - 145 mmol/L    Potassium 4.4 3.5 - 5.0 mmol/L    Chloride 103 98 - 107 mmol/L    Carbon Dioxide (CO2) 25 22 - 31 mmol/L    Anion Gap 7 5 - 18 mmol/L    Urea Nitrogen 13 8 - 22 mg/dL    Creatinine 1.33 (H) 0.70 - 1.30 mg/dL    Calcium 8.9 8.5 - 10.5 mg/dL    Glucose 183 (H) 70 - 125 mg/dL    GFR Estimate 61 >60 mL/min/1.73m2   Result Value Ref Range    Troponin I 0.07 0.00 - 0.29 ng/mL   Symptomatic; Unknown COVID-19 Virus  (Coronavirus) by PCR Nasopharyngeal    Specimen: Nasopharyngeal; Swab   Result Value Ref Range    SARS CoV2 PCR Negative Negative   Extra Blue Top Tube   Result Value Ref Range    Hold Specimen JIC    Extra Red Top Tube   Result Value Ref Range    Hold Specimen JIC    B-Type Natriuretic Peptide ([facility] Only)   Result Value Ref Range    BNP 2,494 (H) 0 - 53 pg/mL   ECG 12-LEAD WITH MUSE ([facility])   Result Value Ref Range    Systolic Blood Pressure  mmHg    Diastolic Blood Pressure  mmHg    Ventricular Rate 82 BPM    Atrial Rate 82 BPM    PA Interval 174 ms    QRS Duration 90 ms     ms    QTc 457 ms    P Axis 54 degrees    R AXIS 52 degrees    T Axis 13 degrees    Interpretation ECG       Sinus rhythm with occasional Premature ventricular complexes  Possible Anterior infarct , age undetermined  Abnormal ECG  When compared with ECG of 01-AUG-2021 16:47,  Premature ventricular complexes are now Present  ST no longer depressed in Anterior leads  Nonspecific T wave abnormality has replaced inverted T waves in Inferior leads  Nonspecific T wave abnormality has replaced inverted T waves in Lateral leads  Confirmed by SEE ED PROVIDER NOTE FOR, ECG INTERPRETATION ([id number]),  [place] ([id number]) on 1/13/2022 11:24:44 PM         RADIOLOGY:  Reviewed all pertinent imaging. Please see official radiology report.  XR Chest 2 Views   Final Result   IMPRESSION: Small bilateral pleural effusions with adjacent atelectasis/infiltrates, right greater than left. No definite pneumothorax. Right midlung fissural thickening/fluid. Mild cardiomegaly with mild central vascular congestion and suspected mild    interstitial edema.           EKG:    Sinus rhythm with occasional PVCs.  No acute ischemic changes.  I have independently reviewed and interpreted the EKG(s) documented above.      MEDICATIONS GIVEN IN THE EMERGENCY:  Medications   furosemide (LASIX) injection 40 mg (40 mg Intravenous Given 1/13/22 2019)           NEW  PRESCRIPTIONS STARTED AT TODAY'S ER VISIT:  New Prescriptions    No medications on file          FINAL DIAGNOSIS:    ICD-10-CM    1. Systolic congestive heart failure, unspecified HF chronicity (H)  I50.20             At the conclusion of the encounter I discussed the results of all of the tests and the disposition. The questions were answered. The patient or family acknowledged understanding and was agreeable with the care plan.     NAME: Av Fernando  AGE: 61 year old male  YOB: 1960  MRN: [id number]  EVALUATION DATE & TIME: 1/13/2022  7:53 PM    PCP: Garcia Kaufman    ED PROVIDER: Ken Samano M.D.      Jean-Paul LYONS, am serving as a scribe to document services personally performed by Dr. Ken Samano based on my observation and the provider's statements to me. I, Ken Samano MD attest that Jean-Paul Kelly is acting in a scribe capacity, has observed my performance of the services and has documented them in accordance with my direction.    Ken Samano M.D.  Emergency Medicine  [facility] EMERGENCY DEPARTMENT  [address] [place] [id number]  [phone]  Dept: [phone]  1/13/2022         Ken Samano MD  01/13/22 2334

## 2022-01-14 NOTE — H&P
[facility]    History and Physical - Hospitalist Service       Date of Admission:  1/13/2022    Assessment & Plan      Av Fernando is a 61 year old male admitted on 1/13/2022. He came to the emergency department for evaluation of shortness of breath, chest tightness, cough and nausea that started 1 day prior to arrival.    1.  Acute on chronic systolic congestive heart failure  Ischemic cardiomyopathy, echocardiogram August 2021 showed EF 30-35%, hypokinetic to akinetic walls, moderate mitral and tricuspid regurgitation  BNP 2494  IV furosemide  Monitor I's and O's, daily weight  Repeat echocardiogram    2.  Coronary artery disease, multivessel  History of STEMI in August 2021, patient left AMA  Reconcile PTA aspirin, Plavix, carvedilol, isosorbide mononitrate, rosuvastatin  Nitroglycerin as needed    3.  Acute kidney injury  Baseline creatinine 0.9-1.1  Likely prerenal in the setting of acute CHF  Monitor renal function    4.  Hyponatremia, mild  Likely secondary to volume overload    5.  Type 2 diabetes mellitus with long-term insulin  Monitor with insulin sliding scale  Reconcile PTA medications    6.  Chronic anemia  Stable hemoglobin without signs of acute blood loss     Diet: Combination Diet Moderate Consistent Carb (60 g CHO per Meal) Diet; Low Saturated Fat Na <2400mg Diet    DVT Prophylaxis: Enoxaparin (Lovenox) SQ  Bustamante Catheter: Not present  Central Lines: None  Code Status: Full Code      Clinically Significant Risk Factors Present on Admission              # Platelet Defect: home medication list includes an antiplatelet medication       Disposition Plan   Expected Discharge:  after at least 2 midnights  Anticipated discharge location:  Awaiting care coordination huddle  Delays:    Acute CHF       The patient's care was discussed with the Patient.    Zoltan Ellington MD  [facility]  Securely message with the Vocera Web Console (learn more here)  Text  page via [facility] Paging/Directory        ______________________________________________________________________    Chief Complaint   Shortness of breath, chest tightness, cough    History is obtained from the patient, electronic health record and emergency department physician    History of Present Illness   Av Fernando is a 61 year old male who came to the emergency department for evaluation of above chief complaint.  Past medical history of multivessel coronary artery disease status post STEMI in August 2021, recommended cardiothoracic surgery evaluation, however patient left AMA because family member had passed from bypass surgery.  Also past medical history of ischemic cardiomyopathy, type 2 diabetes, dyslipidemia, anemia.  Patient complains of difficulty breathing that worsens with ambulation.  He also felt some chest tightness especially when laying flat in bed.  He had a dry cough but denied sore throat, fevers or chills.  He reports taking his medications.  Denies lower extremity edema and does not check his weight at home.    Review of Systems    The 10 point Review of Systems is negative other than noted in the HPI or here.     Past Medical History    I have reviewed this patient's medical history and updated it with pertinent information if needed.   Past Medical History:   Diagnosis Date     Congestive heart failure (H) 08/2021    ischemic CM with LVEF 30-35%     Coronary artery disease 08/2021    STEMI with multivessel CAD on angiography     Diabetes mellitus (H)      Hyperlipidemia      Hypertension      Myocardial infarction (H) 08/2021    inferior STEMI       Past Surgical History   I have reviewed this patient's surgical history and updated it with pertinent information if needed.  Past Surgical History:   Procedure Laterality Date     CV CORONARY ANGIOGRAM N/A 8/1/2021    Procedure: Coronary Angiogram;  Surgeon: Deidre Lopes MD;  Location: [facility] CATH LAB CV     CV LEFT VENTRICULOGRAM N/A  2021    Procedure: Left Ventriculogram;  Surgeon: Deidre Lopes MD;  Location: [facility] LAB CV     OTHER SURGICAL HISTORY      LEG TRAUMA       Social History   I have reviewed this patient's social history and updated it with pertinent information if needed.  Social History     Tobacco Use     Smoking status: Never Smoker     Smokeless tobacco: Never Used   Substance Use Topics     Alcohol use: No     Drug use: No       Family History     No significant family history, including no history of:     Prior to Admission Medications   Prior to Admission Medications   Prescriptions Last Dose Informant Patient Reported? Taking?   Continuous Blood Gluc Sensor (FREESTYLE KATIE 14 DAY SENSOR) [facility]   No No   Si Device every 14 days Change sensor as directed every 14 days   LANTUS SOLOSTAR 100 UNIT/ML soln   No No   Sig: INJECT 24 UNITS UNDER THE SKIN AT BEDTIME   aspirin (ASA) 81 MG EC tablet   No No   Sig: Take 1 tablet (81 mg) by mouth daily   carvedilol (COREG) 3.125 MG tablet   No No   Sig: Take 1 tablet (3.125 mg) by mouth 2 times daily (with meals)   clopidogrel (PLAVIX) 75 MG tablet   No No   Sig: Take 1 tablet (75 mg) by mouth daily   furosemide (LASIX) 20 MG tablet   No No   Sig: Take 1 tablet (20 mg) by mouth daily   insulin lispro (HUMALOG KWIKPEN) 100 UNIT/ML (1 unit dial) KWIKPEN   Yes No   Sig: Inject 8-10 Units Subcutaneous 3 times daily (before meals)   isosorbide mononitrate (ISMO/MONOKET) 10 MG tablet   No No   Sig: Take 1 tablet (10 mg) by mouth daily   nitroGLYcerin (NITROSTAT) 0.4 MG sublingual tablet   No No   Sig: For chest pain place 1 tablet under the tongue every 5 minutes for 3 doses. If symptoms persist 5 minutes after 1st dose call 911.   rosuvastatin (CRESTOR) 40 MG tablet   No No   Sig: Take 1 tablet (40 mg) by mouth daily      Facility-Administered Medications: None     Allergies   Allergies   Allergen Reactions     Januvia [Sitagliptin] Unknown     HEADACHE     Trulicity  [Dulaglutide] Dizziness     Weak and muscle weak       Physical Exam   Vital Signs:     BP: 123/74 Pulse: 83   Resp: 23 SpO2: 93 % O2 Device: None (Room air)    Weight: 125 lbs 0 oz    Constitutional: awake, alert, cooperative, no apparent distress, and appears stated age  Eyes: extra-ocular muscles intact and sclera clear  ENT: normocepalic, without obvious abnormality, atramatic  Hematologic / Lymphatic: no cervical lymphadenopathy and no supraclavicular lymphadenopathy  Respiratory: no increased work of breathing, good air exchange, no retractions and crackles right base and left base  Cardiovascular: regular rate and rhythm and normal S1 and S2  GI: normal bowel sounds, soft, non-distended and non-tender  Skin: no bruising or bleeding and no redness, warmth, or swelling  Musculoskeletal: no lower extremity pitting edema present  there is no redness, warmth, or swelling of the joints  Neurologic: Mental Status Exam:  Level of Alertness:   awake  Orientation:   person, place, time  Motor Exam:  moves all extremities well and symmetrically  Neuropsychiatric: General: normal, calm and normal eye contact    Data   Data reviewed today: I reviewed all medications, new labs and imaging results over the last 24 hours. I personally reviewed the EKG tracing showing Sinus rhythm with PVCs at 82 bpm, when compared to previous EKG acute ST-T waves abnormalities are no longer present in the inferolateral leads.    Recent Labs   Lab 01/13/22  1731   WBC 5.5   HGB 12.3*   MCV 96   *   *   POTASSIUM 4.4   CHLORIDE 103   CO2 25   BUN 13   CR 1.33*   ANIONGAP 7   LISA 8.9   *     Recent Results (from the past 24 hour(s))   XR Chest 2 Views    Narrative    EXAM: XR CHEST 2 VW  LOCATION: [facility]  DATE/TIME: 1/13/2022 4:16 PM    INDICATION: dyspnea  COMPARISON: CT chest 11/05/2021      Impression    IMPRESSION: Small bilateral pleural effusions with adjacent atelectasis/infiltrates, right  greater than left. No definite pneumothorax. Right midlung fissural thickening/fluid. Mild cardiomegaly with mild central vascular congestion and suspected mild   interstitial edema.

## 2022-01-14 NOTE — PHARMACY-ADMISSION MEDICATION HISTORY
Pharmacy Note - Admission Medication History    Pertinent Provider Information: none     ______________________________________________________________________    Prior To Admission (PTA) med list completed and updated in EMR.       PTA Med List   Medication Sig Note Last Dose     aspirin (ASA) 81 MG EC tablet Take 1 tablet (81 mg) by mouth daily  1/13/2022 at am     carvedilol (COREG) 3.125 MG tablet Take 1 tablet (3.125 mg) by mouth 2 times daily (with meals)  1/13/2022 at pm     clopidogrel (PLAVIX) 75 MG tablet Take 1 tablet (75 mg) by mouth daily  1/13/2022 at am     furosemide (LASIX) 20 MG tablet Take 1 tablet (20 mg) by mouth daily 1/14/2022: 1/14/22- he has been taking lasix twice a day for the last 3-4 days. 1/13/2022 at x 2 dose     ibuprofen (ADVIL/MOTRIN) 200 MG tablet Take 200 mg by mouth every 8 hours as needed for mild pain  3-4 weeks ago     insulin lispro (HUMALOG KWIKPEN) 100 UNIT/ML (1 unit dial) KWIKPEN Inject 8-10 Units Subcutaneous 3 times daily (before meals)  1/12/2022     isosorbide mononitrate (ISMO/MONOKET) 10 MG tablet Take 1 tablet (10 mg) by mouth daily  1/13/2022 at am     LANTUS SOLOSTAR 100 UNIT/ML soln INJECT 24 UNITS UNDER THE SKIN AT BEDTIME (Patient taking differently: 20-24 Units INJECT 24 UNITS UNDER THE SKIN AT BEDTIME)  1/12/2022 at pm     nitroGLYcerin (NITROSTAT) 0.4 MG sublingual tablet For chest pain place 1 tablet under the tongue every 5 minutes for 3 doses. If symptoms persist 5 minutes after 1st dose call 911. 1/14/2022: 1/14/22- took 2 tabs 1/12/22 and 1 tab 1/13/22 1/13/2022 at x1     rosuvastatin (CRESTOR) 40 MG tablet Take 1 tablet (40 mg) by mouth daily  1/12/2022 at pm       Information source(s): Patient, Clinic records and [facility]  Method of interview communication: in-person    Summary of Changes to PTA Med List  New: none  Discontinued: metformin, lisinopril  Changed: has been taking lasix bid last 3-4 days instead of qday    Patient was  asked about OTC/herbal products specifically.  PTA med list reflects this.    In the past week, patient estimated taking medication this percent of the time:  greater than 90%.    Allergies were reviewed, assessed, and updated with the patient.      Patient does not use any multi-dose medications prior to admission.    The information provided in this note is only as accurate as the sources available at the time of the update(s).    Thank you for the opportunity to participate in the care of this patient.    Enoch Carney RPH  1/14/2022 9:05 AM

## 2022-01-14 NOTE — PROGRESS NOTES
[facility]    Medicine Progress Note - Hospitalist Service       Date of Admission:  1/13/2022    Assessment & Plan            Av Fernando is a 61 year old male admitted on 1/13/2022. He came to the emergency department for evaluation of shortness of breath, chest tightness, cough and nausea that started 1 day prior to arrival.     1.  Acute on chronic systolic congestive heart failure  -Ischemic cardiomyopathy, echocardiogram August 2021 showed EF 30-35%, hypokinetic to akinetic walls, moderate mitral and tricuspid regurgitation  -BNP 2494  -IV furosemide--helping  -Monitor I's and O's, daily weight  -Repeat echocardiogram today  -cards to see     2.  Coronary artery disease, multivessel  -History of STEMI in August 2021, patient left AMA  -Once pharm d Reconciles PTA -aspirin, Plavix, carvedilol, isosorbide mononitrate, rosuvastatin  -Nitroglycerin as needed  -cards to see here     3.  Acute kidney injury  -Baseline creatinine 0.9-1.1  -Likely prerenal in the setting of acute CHF  -Monitor renal function--already improved with diureses     4.  Hyponatremia, mild  -Likely secondary to volume overload  -trend NA     5.  Type 2 diabetes mellitus with long-term insulin  -Monitor with insulin sliding scale     6.  Chronic anemia  -Stable hemoglobin without signs of acute blood loss     Social--he left AMA last time he was here. I stressed importance of being here and having cardiac eval with CHF now    Addendum  Echo shows mural thrombus--hep gtt         Diet: Combination Diet Moderate Consistent Carb (60 g CHO per Meal) Diet; Low Saturated Fat Na <2400mg Diet    DVT Prophylaxis: Enoxaparin (Lovenox) SQ  Bustamante Catheter: Not present  Central Lines: None  Code Status: Full Code      Disposition Plan   Expected Discharge:days, cards to see       The patient's care was discussed with the Patient. I called son to update at [id number]    Joanna Yancey MD  Hospitalist Service  [facility]  [facility]  Securely message with the Vocera Web Console (learn more here)  Text page via [email or web address] Paging/Directory          ______________________________________________________________________    Interval History   I used phone    He feels better  Less sob  No cp  Agreed to see cards today    Data reviewed today: I reviewed all medications, new labs and imaging results over the last 24 hours. I personally reviewed no images or EKG's today.    Physical Exam   Vital Signs:     BP: 120/79 Pulse: 83   Resp: 25 SpO2: 92 % O2 Device: None (Room air)    Weight: 125 lbs 0 oz  Constitutional: awake, fatigued, alert, cooperative and no apparent distress  Respiratory: no increased work of breathing, moderate air exchange, no retractions and diminished breath sounds throughout lungs  Cardiovascular: Normal apical impulse, regular rate and rhythm, normal S1 and S2, no S3 or S4, and no murmur noted  GI: normal bowel sounds, soft, non-distended and non-tender  Skin: no bruising or bleeding  Musculoskeletal: no lower extremity pitting edema present  Neurologic: Mental Status Exam:  Level of Alertness:   awake  Neuropsychiatric: General: normal, calm and normal eye contact    Data   Recent Labs   Lab 01/13/22  1731   WBC 5.5   HGB 12.3*   MCV 96   *   *   POTASSIUM 4.4   CHLORIDE 103   CO2 25   BUN 13   CR 1.33*   ANIONGAP 7   LISA 8.9   *     Recent Results (from the past 24 hour(s))   XR Chest 2 Views    Narrative    EXAM: XR CHEST 2 VW  LOCATION: [facility]  DATE/TIME: 1/13/2022 4:16 PM    INDICATION: dyspnea  COMPARISON: CT chest 11/05/2021      Impression    IMPRESSION: Small bilateral pleural effusions with adjacent atelectasis/infiltrates, right greater than left. No definite pneumothorax. Right midlung fissural thickening/fluid. Mild cardiomegaly with mild central vascular congestion and suspected mild   interstitial edema.

## 2022-01-15 NOTE — PROGRESS NOTES
[facility] Progress Note - Hospitalist Service       Date of Admission:  1/13/2022    Assessment & Plan            Av Fernando is a 61 year old male admitted on 1/13/2022. He came to the emergency department for evaluation of shortness of breath, chest tightness, cough and nausea that started 1 day prior to arrival. On echo here found to have cardiac thrombus as well     1.  Acute on chronic systolic congestive heart failure  -Ischemic cardiomyopathy, echocardiogram August 2021 showed EF 30-35%, hypokinetic to akinetic walls, moderate mitral and tricuspid regurgitation  -BNP 2494  -IV furosemide--helping  -Monitor I's and O's, daily weight  1. The left ventricle is mildly enlarged. Left ventricular systolic  performance is moderately reduced. The ejection fraction is estimated to be  30%.  2. There is severe hypokinesis to akinesis of the posterior, lateral, basal  inferior, and apical segments. The the mid to distal inferior segments and  anterior wall are moderately hypokinetic. There is preservation of the basal  anteroseptal and septal segments.  3. There is a 1.2 x 1.4 cm apical mural thrombus.  4. There is trace aortic insufficiency.  5. There is moderate mitral insufficiency.  6. There is mild to moderate tricuspid insufficiency.  7. Normal right ventricular size with low normal right ventricular systolic  performance.  8. There is moderate left atrial enlargement.  9. Right ventricular systolic pressure relative to right atrial pressure is  mildly increased. The pulmonary artery pressure is estimated to be 45-50 mmHg  plus right atrial pressure (IVC is of normal caliber).      2.  Coronary artery disease, multivessel-concern [place]  -History of STEMI in August 2021, patient left AMA  -aspirin, Plavix, carvedilol, isosorbide mononitrate, rosuvastatin  -Nitroglycerin as needed  -hep gtt  -cards to eval on Monday for possible intervention    3.Cardiac thrombus  -likely related  to severe CHF  -hep gtt     4.  Acute kidney injury  -Baseline creatinine 0.9-1.1  -Likely prerenal in the setting of acute CHF  -Monitor renal function--back up now 1.54, related to diurese and also has some loose stools overnight  -cards is adjusting lasix     5.  Hyponatremia, mild  -Likely secondary to volume overload  -trend NA, 135     6.  Type 2 diabetes mellitus with long-term insulin  -Monitor with insulin sliding scale  -he had refused some of the lantus last night--I talked today about need to control bs and that his hep gtt is in dextrose too  -add carb counting     7.  Chronic anemia  -Stable hemoglobin without signs of acute blood loss    8.hypomag  -iv mag, I suspect po mag gave him loose stools     Social--he left AMA last time he was here. I stressed importance of being here and having cardiac eval with CHF now              Diet: Combination Diet Moderate Consistent Carb (60 g CHO per Meal) Diet; Low Saturated Fat Na <2400mg Diet    DVT Prophylaxis: hep gtt  Bustamante Catheter: Not present  Central Lines: None  Code Status: Full Code      Disposition Plan   Expected Discharge:days     The patient's care was discussed with the Care Coordinator/ and Patient. I called son and updated today and he is aware of cards having meeting on Monday    Joanna Yancey MD  Hospitalist Service  [facility]  Securely message with the Vocera Web Console (learn more here)  Text page via [employer] + Paging/Directory          ______________________________________________________________________    Interval History   He refused a phone  with me  He notes off and on cp, none now  Sob better  Had multiple loose stools overnight      Data reviewed today: I reviewed all medications, new labs and imaging results over the last 24 hours. I personally reviewed no images or EKG's today.    Physical Exam   Vital Signs: Temp: 97.5  F (36.4  C) Temp src: Oral BP: 95/63 Pulse: 60   Resp: 18  SpO2: 95 % O2 Device: None (Room air)    Weight: 110 lbs 12.8 oz  Constitutional: awake, fatigued, alert, cooperative, no apparent distress and appears older than stated age  Respiratory: no increased work of breathing, good air exchange, no retractions and diminished breath sounds right base and left base  Cardiovascular: Normal apical impulse, regular rate and rhythm, normal S1 and S2, no S3 or S4, and no murmur noted  GI: normal bowel sounds, soft, non-distended and non-tender  Skin: no bruising or bleeding  Musculoskeletal: no lower extremity pitting edema present  Neurologic: Mental Status Exam:  Level of Alertness:   awake  Neuropsychiatric: General: normal, calm and normal eye contact    Data   Recent Labs   Lab 01/15/22  1156 01/15/22  0729 01/15/22  0535 01/15/22  0222 01/14/22  1332 01/14/22  0941 01/14/22  0939 01/13/22  1731   WBC  --   --   --  7.4  --  5.5  --  5.5   HGB  --   --   --  13.5  --  12.7*  --  12.3*   MCV  --   --   --  92  --  95  --  96   PLT  --   --   --  152  --  146*  --  146*   NA  --   --   --  135*  --  137  --  135*   POTASSIUM  --   --   --  3.9  --  4.0  --  4.4   CHLORIDE  --   --   --  102  --  104  --  103   CO2  --   --   --  23  --  25  --  25   BUN  --   --   --  21  --  12  --  13   CR  --   --   --  1.54*  --  1.23  --  1.33*   ANIONGAP  --   --   --  10  --  8  --  7   LISA  --   --   --  9.0  --  9.0  --  8.9   * 146* 170* 252*   < > 150*   < > 183*    < > = values in this interval not displayed.     No results found for this or any previous visit (from the past 24 hour(s)).

## 2022-01-15 NOTE — SIGNIFICANT EVENT
Nursing paged HMS, patient refusing to take 20 units of insulin patient wants 8 units. Discussed with nursing can give 8 units and recheck in 2 hours and page if BG >200 if he still refuses to take 20 units.

## 2022-01-15 NOTE — PLAN OF CARE
Problem: Chest Pain  Goal: Resolution of Chest Pain Symptoms  Outcome: No Change  Intervention: Manage Acute Chest Pain  Recent Flowsheet Documentation  Taken 1/14/2022 1933 by Eliz Bhatia RN  Chest Pain Intervention: cardiac monitoring continued  Taken 1/14/2022 1547 by Eliz Bhatia RN  Chest Pain Intervention: cardiac monitoring continued     Problem: Adjustment to Illness (Heart Failure)  Goal: Optimal Coping  Outcome: No Change     Problem: Cardiac Output Decreased (Heart Failure)  Goal: Optimal Cardiac Output  Outcome: No Change     Problem: Fluid Imbalance (Heart Failure)  Goal: Fluid Balance  Outcome: No Change   Pt is alert and oriented x 4, c/o chest tightness and SOB. Offered O2 for comfort. Fine crackles heard in the bases, clear, diminished in the upper lobes. Has occasional dry cough. Offered O2 for comfort, per pt it helped a little bit but he prefers not using it. SpO2 ranges 94 to 97% on RA. At 2030 H, pt reported his chest tightness is better but he is c/o diarrhea since he took the Magnesium Oxide and is feeling weak. He had at least 4 loose BM's now. He voided adequately. On Heparin drip at 1000 units/hr, next Anti XA will be at 0200 H tomorrow. He also declines to take 20 units of Lantus at HS. Dr. Miranda informed, advised to give 8 units per pt's request and then check BG in 2 hours. Magnesium oxide was held, MD informed about the diarrhea. Will continue to monitor.

## 2022-01-15 NOTE — PROGRESS NOTES
Impression and Plan     Impression:   1. Acute on chronic heart failure with reduced LVEF - does not appear grossly volume overloaded today.  2. Ischemic cardiomyopathy - CABG has been recommended, though the patient had declined. He continues to decline CABG and is very steadfast in that decision. Will need to discuss percutaneous options as he is willing to consider these.  3. New left ventricular mural thrombus  4. Coronary artery disease with NSTEMI on 8/1/2021 - continues with unstable anginal symptoms.  5. Insulin-dependent diabetes - poorly controlled. Discussed need for tight glucose control.  6. Essential hypertension  7. hyperlipidemia    Plan:    Continue IV heparin    Decrease furosemide to once daily    Increase carvedilol to 6.25 mg BID    Will need to review angiogram and discuss PCI options with interventional cardiology on Monday.    Primary Cardiologist: Dr. Yamilet Knowles MD    Subjective     Feeling somewhat better. Still with some intermittent chest tightness. Breathing is at baseline. No swelling.    Cardiac Diagnostics   Telemetry (personally reviewed): sinus rhythm.    Echocardiogram 1/14/2022 (results reviewed):   1. The left ventricle is mildly enlarged. Left ventricular systolic  performance is moderately reduced. The ejection fraction is estimated to be  30%.  2. There is severe hypokinesis to akinesis of the posterior, lateral, basal  inferior, and apical segments. The the mid to distal inferior segments and  anterior wall are moderately hypokinetic. There is preservation of the basal  anteroseptal and septal segments.  3. There is a 1.2 x 1.4 cm apical mural thrombus.  4. There is trace aortic insufficiency.  5. There is moderate mitral insufficiency.  6. There is mild to moderate tricuspid insufficiency.  7. Normal right ventricular size with low normal right ventricular systolic  performance.  8. There is moderate left atrial enlargement.  9. Right ventricular systolic pressure  "relative to right atrial pressure is  mildly increased. The pulmonary artery pressure is estimated to be 45-50 mmHg  plus right atrial pressure (IVC is of normal caliber).     When compared to the prior real-time echocardiogram dated 2 August 2021, and  apical mural thrombus is now noted. Otherwise, the findings are fairly similar  on both examinations. Specifically, left ventricular systolic performance and  regional wall motion appears fairly similar on both examinations.     Cardiac Cath 8/1/2021 (results reviewed):   Left main with mild disease  LAD with a 60% proximal stenosis, along diagonal vessel with a 70 to 80% proximal stenosis.  The mid LAD has a 90% stenosis in the distal LAD is subtotally occluded with severe diffuse disease  Left circumflex is nondominant with a 90% proximal stenosis leading into a large first obtuse marginal which has ostial involvement.  The mid circumflex is occluded with a second obtuse marginal filling via faint left to left collaterals.  Right coronary artery is large and dominant with an 80% distal stenosis.  There appeared to be 2 parallel PDA's with the more distal one having an 80% ostial stenosis.  The R MINDY has a 70% stenosis.     LVEF 25% with inferoapical hypokinesis more pronounced  EDP 15mmHg      Physical Examination       /72 (BP Location: Right arm)   Pulse 69   Temp 98.9  F (37.2  C) (Oral)   Resp 18   Ht 1.575 m (5' 2\")   Wt 50.3 kg (110 lb 12.8 oz)   SpO2 95%   BMI 20.27 kg/m        [unfilled]        Intake/Output Summary (Last 24 hours) at 1/15/2022 0832  Last data filed at 1/15/2022 0612  Gross per 24 hour   Intake 782.84 ml   Output 3000 ml   Net -2217.16 ml       General: pleasant male. No acute distress.   HENT: external ears normal. Nares patent. Mucous membranes moist.  Eyes: perrla, extraocular muscles intact. No scleral icterus.   Neck: No JVD  Lungs: clear to auscultation  COR: regular rate and rhythm, No murmurs, rubs, or gallops  Abd: " nondistended, BS present  Extrem: No edema         Imaging      No new imaging.    Lab Results   Lab Results   Component Value Date    CHOL 176 08/02/2021    HDL 53 08/02/2021    TRIG 70 08/02/2021    BUN 21 01/15/2022     (L) 01/15/2022    CO2 23 01/15/2022       Lab Results   Component Value Date    WBC 7.4 01/15/2022    HGB 13.5 01/15/2022    HCT 38.6 (L) 01/15/2022    MCV 92 01/15/2022     01/15/2022       Lab Results   Component Value Date    TROPONINI 0.06 01/14/2022    BNP 2,494 (H) 01/13/2022    INR 0.96 08/01/2021               Current Inpatient Scheduled Medications   Scheduled Meds:    aspirin  81 mg Oral Daily     carvedilol  3.125 mg Oral BID w/meals     clopidogrel  75 mg Oral Daily     furosemide  40 mg Intravenous Q12H     insulin aspart  1-7 Units Subcutaneous TID AC     insulin glargine  20 Units Subcutaneous At Bedtime     isosorbide mononitrate  10 mg Oral Daily     [Held by provider] magnesium oxide  400 mg Oral BID     magnesium sulfate  2 g Intravenous Once     rosuvastatin  40 mg Oral QPM     sodium chloride (PF)  3 mL Intracatheter Q8H     spironolactone  25 mg Oral Daily     Continuous Infusions:    heparin 1,000 Units/hr (01/15/22 0257)            Medications Prior to Admission   Prior to Admission medications    Medication Sig Start Date End Date Taking? Authorizing Provider   aspirin (ASA) 81 MG EC tablet Take 1 tablet (81 mg) by mouth daily 12/10/21  Yes Garcia Kaufman MD   carvedilol (COREG) 3.125 MG tablet Take 1 tablet (3.125 mg) by mouth 2 times daily (with meals) 9/28/21  Yes Yamilet Knowles MD   clopidogrel (PLAVIX) 75 MG tablet Take 1 tablet (75 mg) by mouth daily 11/8/21  Yes Garcia Kaufman MD   furosemide (LASIX) 20 MG tablet Take 1 tablet (20 mg) by mouth daily 9/28/21  Yes Yamilet Knowles MD   ibuprofen (ADVIL/MOTRIN) 200 MG tablet Take 200 mg by mouth every 8 hours as needed for mild pain   Yes Unknown, Entered By History   insulin lispro (HUMALOG KWIKPEN) 100  UNIT/ML (1 unit dial) KWIKPEN Inject 8-10 Units Subcutaneous 3 times daily (before meals) 12/10/21  Yes Garcia Kaufman MD   isosorbide mononitrate (ISMO/MONOKET) 10 MG tablet Take 1 tablet (10 mg) by mouth daily 9/30/21  Yes Yamilet Knowles MD   LANTUS SOLOSTAR 100 UNIT/ML soln INJECT 24 UNITS UNDER THE SKIN AT BEDTIME  Patient taking differently: 20-24 Units INJECT 24 UNITS UNDER THE SKIN AT BEDTIME 11/8/21  Yes Garcia Kaufman MD   nitroGLYcerin (NITROSTAT) 0.4 MG sublingual tablet For chest pain place 1 tablet under the tongue every 5 minutes for 3 doses. If symptoms persist 5 minutes after 1st dose call 911. 12/10/21  Yes Garcia Kaufman MD   rosuvastatin (CRESTOR) 40 MG tablet Take 1 tablet (40 mg) by mouth daily 9/28/21  Yes Yamilet Knowles MD   Continuous Blood Gluc Sensor (FREESTYLE KATIE 14 DAY SENSOR) [facility] 1 Device every 14 days Change sensor as directed every 14 days 12/10/21   Garcia Kaufman MD

## 2022-01-15 NOTE — PLAN OF CARE
Problem: Adjustment to Illness (Heart Failure)  Goal: Optimal Coping  1/15/2022 0211 by Eliz Bhatia RN  Outcome: No Change  Problem: Cardiac Output Decreased (Heart Failure)  Goal: Optimal Cardiac Output  Problem: Cardiac Output Decreased (Heart Failure)  Goal: Optimal Cardiac Output  1/15/2022 0211 by Eliz Bhatia RN  Outcome: No Change     Problem: Chest Pain  Goal: Resolution of Chest Pain Symptoms  1/15/2022 0211 by Eliz Bhatia RN  Outcome: Improving  Intervention: Manage Acute Chest Pain  Recent Flowsheet Documentation  Taken 1/14/2022 1933 by Eliz Bhatia RN  Chest Pain Intervention: cardiac monitoring continued  Pt is alert and oriented x 4, denies chest tightness, has a little SOB with activity. He c/o feeling weak because of diarrhea. BG at midnight was 329, offered to give the rest of the Lantus  dose and he agreed. Offered to check BG in 2 hours to make him comfortable about taking Lantus. BG now is 260. Pt is sleeping comfortably. HR in the 60's to 70's, NSR with inverted TW, with slight ST elevation in V2 lead. Will continue to monitor.

## 2022-01-15 NOTE — PLAN OF CARE
Problem: Chest Pain  Goal: Resolution of Chest Pain Symptoms  Outcome: Improving     Problem: Dysrhythmia (Heart Failure)  Goal: Stable Heart Rate and Rhythm  Outcome: No Change     Problem: Fluid Imbalance (Heart Failure)  Goal: Fluid Balance  Outcome: Improving    No c/o pain.  Heparin drip 1000 units/hr.  Antixa 0.43 recheck in am.  Mag 1.7 gave 2 IV bumps.  Recheck in am.  K+ 3.9.  Recheck bmp in am.  Blood sugars 146 & 331.  Gave sliding scale and carb count at lunch 8 units total.  Pt said no chest tightness today he was feeling better.   Dr. Rich said Monday there will be a cardiology meeting on what to do for the pt.  Pt said he got diarrhea form the PO mag so doctor orderd IV.  Pt had no bm today.

## 2022-01-16 NOTE — PROGRESS NOTES
Impression and Plan     Impression:   1. Acute on chronic heart failure with reduced LVEF - currently appears compensated.  2. Ischemic cardiomyopathy - CABG has been recommended, though the patient had declined. He continues to decline CABG and is very steadfast in that decision. He is willing to undergo PCI for revascularization.  3. New left ventricular mural thrombus  4. Coronary artery disease with NSTEMI on 8/1/2021 - chest pain has improved. Presented with unstable anginal symptoms.  5. Insulin-dependent diabetes - poorly controlled. Discussed need for tight glucose control.  6. Essential hypertension  7. hyperlipidemia    Plan:    Continue IV heparin    Will need to review angiogram and discuss PCI options with interventional cardiology on Monday.    NPO after midnight in anticipation of possible angiogram.    Primary Cardiologist: Dr. Yamilet Knowles MD    Subjective     Feeling better. Discussed CABG with family and he still does not want open heart surgery. He is willing to have complex PCI however. Chest pain has improved. No shortness of breath.    Cardiac Diagnostics   Telemetry (personally reviewed): sinus rhythm    Echocardiogram 1/14/2022 (results reviewed):   1. The left ventricle is mildly enlarged. Left ventricular systolic  performance is moderately reduced. The ejection fraction is estimated to be  30%.  2. There is severe hypokinesis to akinesis of the posterior, lateral, basal  inferior, and apical segments. The the mid to distal inferior segments and  anterior wall are moderately hypokinetic. There is preservation of the basal  anteroseptal and septal segments.  3. There is a 1.2 x 1.4 cm apical mural thrombus.  4. There is trace aortic insufficiency.  5. There is moderate mitral insufficiency.  6. There is mild to moderate tricuspid insufficiency.  7. Normal right ventricular size with low normal right ventricular systolic  performance.  8. There is moderate left atrial enlargement.  9.  "Right ventricular systolic pressure relative to right atrial pressure is  mildly increased. The pulmonary artery pressure is estimated to be 45-50 mmHg  plus right atrial pressure (IVC is of normal caliber).     When compared to the prior real-time echocardiogram dated 2 August 2021, and  apical mural thrombus is now noted. Otherwise, the findings are fairly similar  on both examinations. Specifically, left ventricular systolic performance and  regional wall motion appears fairly similar on both examinations.     Cardiac Cath 8/1/2021 (results reviewed):   Left main with mild disease  LAD with a 60% proximal stenosis, along diagonal vessel with a 70 to 80% proximal stenosis.  The mid LAD has a 90% stenosis in the distal LAD is subtotally occluded with severe diffuse disease  Left circumflex is nondominant with a 90% proximal stenosis leading into a large first obtuse marginal which has ostial involvement.  The mid circumflex is occluded with a second obtuse marginal filling via faint left to left collaterals.  Right coronary artery is large and dominant with an 80% distal stenosis.  There appeared to be 2 parallel PDA's with the more distal one having an 80% ostial stenosis.  The R MINDY has a 70% stenosis.     LVEF 25% with inferoapical hypokinesis more pronounced  EDP 15mmHg      Physical Examination       BP 97/64   Pulse 63   Temp 97.8  F (36.6  C) (Oral)   Resp 18   Ht 1.575 m (5' 2\")   Wt 50.8 kg (112 lb)   SpO2 95%   BMI 20.49 kg/m        [unfilled]        Intake/Output Summary (Last 24 hours) at 1/15/2022 0832  Last data filed at 1/15/2022 0612  Gross per 24 hour   Intake 782.84 ml   Output 3000 ml   Net -2217.16 ml       General: pleasant male. No acute distress. Laying comfortably flat in bed.  HENT: external ears normal. Nares patent. Mucous membranes moist.  Eyes: perrla, extraocular muscles intact. No scleral icterus.   Neck: No JVD  Lungs: clear to auscultation  COR: regular rate and rhythm, No " murmurs, rubs, or gallops  Abd: nondistended, BS present  Extrem: No edema         Imaging      No new imaging.    Lab Results   Lab Results   Component Value Date    CHOL 176 08/02/2021    HDL 53 08/02/2021    TRIG 70 08/02/2021    BUN 17 01/16/2022     (L) 01/16/2022    CO2 27 01/16/2022       Lab Results   Component Value Date    WBC 7.1 01/16/2022    HGB 12.9 (L) 01/16/2022    HCT 37.5 (L) 01/16/2022    MCV 93 01/16/2022     01/16/2022       Lab Results   Component Value Date    TROPONINI 0.06 01/14/2022    BNP 2,494 (H) 01/13/2022    INR 0.96 08/01/2021               Current Inpatient Scheduled Medications   Scheduled Meds:    aspirin  81 mg Oral Daily     carvedilol  6.25 mg Oral BID w/meals     clopidogrel  75 mg Oral Daily     furosemide  40 mg Intravenous Daily     insulin aspart   Subcutaneous Daily with breakfast     insulin aspart   Subcutaneous Daily with lunch     insulin aspart   Subcutaneous Daily with supper     insulin aspart  1-7 Units Subcutaneous TID AC     insulin glargine  10 Units Subcutaneous At Bedtime     isosorbide mononitrate  10 mg Oral Daily     rosuvastatin  40 mg Oral QPM     sodium chloride (PF)  3 mL Intracatheter Q8H     spironolactone  25 mg Oral Daily     Continuous Infusions:    heparin 1,000 Units/hr (01/15/22 1047)            Medications Prior to Admission   Prior to Admission medications    Medication Sig Start Date End Date Taking? Authorizing Provider   aspirin (ASA) 81 MG EC tablet Take 1 tablet (81 mg) by mouth daily 12/10/21  Yes Garcia Kaufman MD   carvedilol (COREG) 3.125 MG tablet Take 1 tablet (3.125 mg) by mouth 2 times daily (with meals) 9/28/21  Yes Yamilet Knowles MD   clopidogrel (PLAVIX) 75 MG tablet Take 1 tablet (75 mg) by mouth daily 11/8/21  Yes Garcia Kaufman MD   furosemide (LASIX) 20 MG tablet Take 1 tablet (20 mg) by mouth daily 9/28/21  Yes Yamilet Knowles MD   ibuprofen (ADVIL/MOTRIN) 200 MG tablet Take 200 mg by mouth every 8 hours as  needed for mild pain   Yes Unknown, Entered By History   insulin lispro (HUMALOG KWIKPEN) 100 UNIT/ML (1 unit dial) KWIKPEN Inject 8-10 Units Subcutaneous 3 times daily (before meals) 12/10/21  Yes Garcia Kaufman MD   isosorbide mononitrate (ISMO/MONOKET) 10 MG tablet Take 1 tablet (10 mg) by mouth daily 9/30/21  Yes Yamilet Knowles MD   LANTUS SOLOSTAR 100 UNIT/ML soln INJECT 24 UNITS UNDER THE SKIN AT BEDTIME  Patient taking differently: 20-24 Units INJECT 24 UNITS UNDER THE SKIN AT BEDTIME 11/8/21  Yes Garcia Kaufman MD   nitroGLYcerin (NITROSTAT) 0.4 MG sublingual tablet For chest pain place 1 tablet under the tongue every 5 minutes for 3 doses. If symptoms persist 5 minutes after 1st dose call 911. 12/10/21  Yes Garcia Kaufman MD   rosuvastatin (CRESTOR) 40 MG tablet Take 1 tablet (40 mg) by mouth daily 9/28/21  Yes Yamilet Knowles MD   Continuous Blood Gluc Sensor (FREESTYLE KATIE 14 DAY SENSOR) [facility] 1 Device every 14 days Change sensor as directed every 14 days 12/10/21   Garcia Kaufman MD

## 2022-01-16 NOTE — PLAN OF CARE
"  Problem: Chest Pain  Goal: Resolution of Chest Pain Symptoms  Outcome: No Change  Intervention: Manage Acute Chest Pain  Recent Flowsheet Documentation  Taken 1/16/2022 0345 by Alyce Stevenson RN  Chest Pain Intervention: cardiac monitoring continued  Taken 1/16/2022 0000 by Alyce Stevenson RN  Chest Pain Intervention: cardiac monitoring continued   He continues to have his intermittent left sided \"tightness.\"  He states it is not pain.  His heparin is infusing at 1000 unit(s) or 10ml/hr.   His anti-Xa this morning was 0.45.  Problem: Hyperglycemia  Goal: Blood Glucose Level Within Targeted Range  Outcome: No Change   His HS pcx  was 111.  He had received 10 units of lantus so his sugar was rechecked at 0145.  It was 78.  He drank some orange juice, and this morning his serum glucose [id number].  "

## 2022-01-16 NOTE — PROGRESS NOTES
[facility]    Medicine Progress Note - Hospitalist Service       Date of Admission:  1/13/2022    Assessment & Plan            Av Fernando is a 61 year old male admitted on 1/13/2022. He came to the emergency department for evaluation of shortness of breath, chest tightness, cough and nausea that started 1 day prior to arrival. On echo here found to have cardiac thrombus as well     1.  Acute on chronic systolic congestive heart failure  -Ischemic cardiomyopathy, echocardiogram August 2021 showed EF 30-35%, hypokinetic to akinetic walls, moderate mitral and tricuspid regurgitation  -BNP 2494  -IV furosemide--helping  -Monitor I's and O's, daily weight  1. The left ventricle is mildly enlarged. Left ventricular systolic  performance is moderately reduced. The ejection fraction is estimated to be  30%.  2. There is severe hypokinesis to akinesis of the posterior, lateral, basal  inferior, and apical segments. The the mid to distal inferior segments and  anterior wall are moderately hypokinetic. There is preservation of the basal  anteroseptal and septal segments.  3. There is a 1.2 x 1.4 cm apical mural thrombus.  4. There is trace aortic insufficiency.  5. There is moderate mitral insufficiency.  6. There is mild to moderate tricuspid insufficiency.  7. Normal right ventricular size with low normal right ventricular systolic  performance.  8. There is moderate left atrial enlargement.  9. Right ventricular systolic pressure relative to right atrial pressure is  mildly increased. The pulmonary artery pressure is estimated to be 45-50 mmHg  plus right atrial pressure (IVC is of normal caliber).        2.  Coronary artery disease, multivessel-concern [place]  -History of STEMI in August 2021, patient left AMA  -aspirin, Plavix, carvedilol, isosorbide mononitrate, rosuvastatin  -Nitroglycerin as needed  -hep gtt per cards and will need ongoing anticoagulation  -cards to eval on Monday for possible  "intervention--will make npo tonight incase cath tomorrow     3.Cardiac thrombus  -likely related to severe CHF  -hep gtt, and will need ongoing anticoagulation     4.  Acute kidney injury  -Baseline creatinine 0.9-1.1  -Likely prerenal in the setting of acute CHF  -Monitor renal function--back up now 1.56, related to diurese and also has some loose stools overnight  -cards is adjusting lasix     5.  Hyponatremia, mild  -Likely secondary to volume overload  -trend NA, 135-->133, due to lasix     6.  Type 2 diabetes mellitus with long-term insulin  -Monitor with insulin sliding scale  -will cut back on lantus with npo tongiht     7.  Chronic anemia  -Stable hemoglobin without signs of acute blood loss     8.hypomag  -iv mag given on 1/15, I suspect po mag gave him loose stools  -stool better now     Social--he left AMA last time he was here. I stressed importance of being here and having cardiac eval with CHF now         Diet: Combination Diet Moderate Consistent Carb (60 g CHO per Meal) Diet; Low Saturated Fat Na <2400mg Diet  NPO for Medical/Clinical Reasons Except for: Meds    DVT Prophylaxis: hep gtt  Bustamante Catheter: Not present  Central Lines: None  Code Status: Full Code      Disposition Plan   Expected Discharge: 01/19/2022     Anticipated discharge location:[facility] need to decide on plan     The patient's care was discussed with the Care Coordinator/ and Patient.  I called son to update him at 1525    Joanna Yancey MD  Hospitalist Service  [facility]  Securely message with the [employer] Web Console (learn more here)  Text page via "[employer] Paging/Directory        ______________________________________________________________________    Interval History   He declined   Stools are better  No abd pain  No cp, but notes at times \"tightness\"      Data reviewed today: I reviewed all medications, new labs and imaging results over the last 24 hours. I personally reviewed " no images or EKG's today.    Physical Exam   Vital Signs: Temp: 98.3  F (36.8  C) Temp src: Oral BP: 94/63 Pulse: 60   Resp: 18 SpO2: 98 % O2 Device: None (Room air)    Weight: 112 lbs 0 oz  Constitutional: awake, alert, cooperative and no apparent distress  Eyes: sclera clear  Respiratory: no increased work of breathing, moderate air exchange, no retractions and clear to auscultation  Cardiovascular: Normal apical impulse, regular rate and rhythm, normal S1 and S2, no S3 or S4, and no murmur noted  GI: normal bowel sounds, soft, non-distended and non-tender  Skin: no bruising or bleeding  Musculoskeletal: no lower extremity pitting edema present  Neurologic: Mental Status Exam:  Level of Alertness:   awake  Neuropsychiatric: General: normal, calm and normal eye contact    Data   Recent Labs   Lab 01/16/22  1128 01/16/22  0756 01/16/22  0425 01/15/22  0535 01/15/22  0222 01/14/22  1332 01/14/22  0941   WBC  --   --  7.1  --  7.4  --  5.5   HGB  --   --  12.9*  --  13.5  --  12.7*   MCV  --   --  93  --  92  --  95   PLT  --   --  167  --  152  --  146*   NA  --   --  133*  --  135*  --  137   POTASSIUM  --   --  4.4  --  3.9  --  4.0   CHLORIDE  --   --  99  --  102  --  104   CO2  --   --  27  --  23  --  25   BUN  --   --  17  --  21  --  12   CR  --   --  1.56*  --  1.54*  --  1.23   ANIONGAP  --   --  7  --  10  --  8   LISA  --   --  8.4*  --  9.0  --  9.0   * 169* 197*   < > 252*   < > 150*    < > = values in this interval not displayed.     No results found for this or any previous visit (from the past 24 hour(s)).

## 2022-01-16 NOTE — PLAN OF CARE
Problem: Chest Pain  Goal: Resolution of Chest Pain Symptoms  Outcome: No Change     Problem: Adjustment to Illness (Heart Failure)  Goal: Optimal Coping  Outcome: No Change     Problem: Cardiac Output Decreased (Heart Failure)  Goal: Optimal Cardiac Output  Outcome: No Change     Problem: Dysrhythmia (Heart Failure)  Goal: Stable Heart Rate and Rhythm  Outcome: No Change   Pt is alert and oriented x 4, has SOB with activity, chest tightness is improving. He is c/o minimal abdominal pain due to diarrhea, had one small loose BM tonight. He is on Heparin at 1000 units/ hr, next Anti XA in AM. HR in the 60's, NSR with slight ST elevation in V2 lead, with inverted TW. Will continue to monitor.

## 2022-01-16 NOTE — CONSULTS
Care Management Initial Consult    General Information  Assessment completed with: Patient,    Type of CM/SW Visit: Initial Assessment    Primary Care Provider verified and updated as needed: Yes   Readmission within the last 30 days: no previous admission in last 30 days         Advance Care Planning: Advance Care Planning Reviewed:  (Thinks he has something that states his son-Jocelyn is primary)          Communication Assessment  Patient's communication style: spoken language (English or Bilingual)    Hearing Difficulty or Deaf: no   Wear Glasses or Blind: yes    Cognitive  Cognitive/Neuro/Behavioral: WDL                      Living Environment:   People in home: child(rupert), adult,grandchild(rupert),spouse     Current living Arrangements: house      Able to return to prior arrangements: yes       Family/Social Support:  Care provided by: spouse/significant other,child(rupert)  Provides care for: no one, unable/limited ability to care for self  Marital Status:   Wife,Children          Description of Support System:           Current Resources:   Patient receiving home care services: No     Community Resources: None  Equipment currently used at home: cane, straight  Supplies currently used at home: None    Employment/Financial:  Employment Status: unemployed        Financial Concerns:             Lifestyle & Psychosocial Needs:  Social Determinants of Health     Tobacco Use: Low Risk      Smoking Tobacco Use: Never Smoker     Smokeless Tobacco Use: Never Used   Alcohol Use: Not on file   Financial Resource Strain: Not on file   Food Insecurity: Not on file   Transportation Needs: Not on file   Physical Activity: Not on file   Stress: Not on file   Social Connections: Not on file   Intimate Partner Violence: Not on file   Depression: Not at risk     PHQ-2 Score: 0   Housing Stability: Not on file                 Additional Information:  Assessment completed with patient. Patient's primary language is Hmong, he does  speak English and declines .  Patient lives in a house with spouse and extended family. He requires some assist with ADLs and assistance with IADLs. He uses a cane to ambulate when outside the home and he drives. Currently he receives no home services.  He is thinking about applying for PCA services. He states his oldest son is Pheng and would be his decision maker should he be unable to make his own decisions.  His stated discharge goal is to return home.    Final discharge plan pending progression and recommendations.    Elvie Butler RN

## 2022-01-16 NOTE — PLAN OF CARE
Problem: Chest Pain  Goal: Resolution of Chest Pain Symptoms  Outcome: Improving    Heparin drip 1000 unit(s)/hr.  Anti xa 0.45 so rate stays the same and recheck in am.  Had shower.  Blood sugars 169 & 173.  Gave sliding scale and  carb count at lunch.   Pt did not eat breakfast.  K+ 4.4 recheck in the am.  Mag 2.3 recheck in am. BP 97/63.  Doctor said to give coreg.

## 2022-01-17 NOTE — PLAN OF CARE
Problem: Chest Pain  Goal: Resolution of Chest Pain Symptoms  Outcome: Improving     Problem: Adjustment to Illness (Heart Failure)  Goal: Optimal Coping  Outcome: Improving     Pt did not have angio today.  Pt is having a MRI stress test tomorrow at 1100.   Gave am meds late.  Blood sugars 115 & 119.  Pt ate 2 trays of food at lunch.  Gave carb count insulin 3 units for the second tray only.  Heparin drip 1000 unit(s)/hr.   K+, Mag, Anti xa labs recheck in am. Bp 101/68.

## 2022-01-17 NOTE — PROGRESS NOTES
[facility] was introduced to the patient and his daughter today including our role in the home management of their heart failure.  Heart Failure Education Materials were shared today with the patient. He has a scale at home, but does not weigh himself every day. Encouraged to do daily weights and track these.  Introduction to the expectations including daily weight tracking using the Daily Weight Log.    Home B/P monitoring as appropriate( to bring in home monitoring cuff to the clinic appointment for verification)  Low Sodium diet of 2000mg or less daily, review of label reading, aim for fresh and avoid processed items. He does not eat processed foods. He is in the habit of salting his food while cooking or at the table. Encouraged to eliminate salt and use alternative seasonings, partial list provided.    Daily Fluid restriction of 2000ml  Considerations. He is not one to drink a lot of fluids. He states that he drinks about 3-4 cups total fluids daily including hot/warm water, one cup of milk daily and sometimes half cup of juice.  Exercise importance as able explained. He has some concerns that he has poor tolerance to activity. He has in the past been instructed to try to obtain disability forms for the provider to complete on his behalf. He will have his son assist him with online printing of forms.    Monitor and report s/s of heart failure. How to report these changes.  Follow up appointments and testing expectations. Advised that f/u appt will appear on AVS at the time of discharge.    Arleen Zapata RN BSN, CHFN        [facility]   [phone] [facility]   [facility]   [address] [place] [address]

## 2022-01-17 NOTE — PLAN OF CARE
Problem: Adjustment to Illness (Heart Failure)  Goal: Optimal Coping  Outcome: No Change     Problem: Cardiac Output Decreased (Heart Failure)  Goal: Optimal Cardiac Output  1/17/2022 0131 by Eliz Bhatia RN  Outcome: No Change    Problem: Fluid Imbalance (Heart Failure)  Goal: Fluid Balance  Outcome: No Change     Problem: Hyperglycemia  Goal: Blood Glucose Level Within Targeted Range  Outcome: No Change     Problem: Chest Pain  Goal: Resolution of Chest Pain Symptoms  1/17/2022 0131 by Eliz Bhatia RN  Outcome: Improving    Pt is alert and oriented x 4, denies chest pain, still has minimal SOB with activity. Lung sounds clear, diminished,  SpO2 97 % to 98% on RA. Pt is on NPO post midnight for  possible coronary angiogram today.  He ate about 80 % of Turkey sandwich and drank 240 ml apple juice per pt's report before midnight. He received 10 units Lantus last night around 2123 H, BG at 0130 H was 192 when checked. Pt is sleeping comfortably. Heparin drip at 1000 units/ hr. HR in the high 50's to  60's, NSR with inverted TW, with slight ST elevation in V2 lead with occasional PVC's. Bilateral groin clipped in preparation for possible angiogram today. Will continue to monitor.

## 2022-01-17 NOTE — PROGRESS NOTES
[facility]    Medicine Progress Note - Hospitalist Service       Date of Admission:  1/13/2022    Assessment & Plan            Av Fernando is a 61 year old male admitted on 1/13/2022. He came to the emergency department for evaluation of shortness of breath, chest tightness, cough and nausea that started 1 day prior to arrival. On echo here found to have cardiac thrombus as well     1.  Acute on chronic systolic congestive heart failure  -Ischemic cardiomyopathy, echocardiogram August 2021 showed EF 30-35%, hypokinetic to akinetic walls, moderate mitral and tricuspid regurgitation  -BNP 2494  -IV furosemide--helping, able to lay flat now  -Monitor I's and O's, daily weight  1. The left ventricle is mildly enlarged. Left ventricular systolic  performance is moderately reduced. The ejection fraction is estimated to be  30%.  2. There is severe hypokinesis to akinesis of the posterior, lateral, basal  inferior, and apical segments. The the mid to distal inferior segments and  anterior wall are moderately hypokinetic. There is preservation of the basal  anteroseptal and septal segments.  3. There is a 1.2 x 1.4 cm apical mural thrombus.  4. There is trace aortic insufficiency.  5. There is moderate mitral insufficiency.  6. There is mild to moderate tricuspid insufficiency.  7. Normal right ventricular size with low normal right ventricular systolic  performance.  8. There is moderate left atrial enlargement.  9. Right ventricular systolic pressure relative to right atrial pressure is  mildly increased. The pulmonary artery pressure is estimated to be 45-50 mmHg  plus right atrial pressure (IVC is of normal caliber).        2.  Coronary artery disease, multivessel-concern [place]  -History of STEMI in August 2021, patient left AMA  -aspirin, Plavix, carvedilol, isosorbide mononitrate, rosuvastatin  -Nitroglycerin as needed  -hep gtt per cards and will need ongoing anticoagulation  -cards to eval on  Monday for possible intervention--he is npo      3.Cardiac thrombus  -likely related to severe CHF  -hep gtt, and will need ongoing anticoagulation     4.  Acute kidney injury  -Baseline creatinine 0.9-1.1  -Likely prerenal in the setting of acute CHF  -Monitor renal function--back up now 1.3, related to diurese   -[facility] is adjusting lasix     5.  Hyponatremia, mild  -Likely secondary to volume overload  -trend NA, 135, due to lasix     6.  Type 2 diabetes mellitus with long-term insulin  -Monitor with insulin sliding scale  -lower dose lantus with npo   -sliding scale    7.  Chronic anemia  -Stable hemoglobin without signs of acute blood loss     8.hypomag  -iv mag given on 1/15, I suspect po mag gave him loose stools  -stool better now    9.Malnutrition  -now npo for cards     Social--he left AMA last time he was here. I stressed importance of being here and having cardiac eval with CHF now           Diet: NPO for Medical/Clinical Reasons Except for: Meds  Snacks/Supplements Adult: Glucerna; Between Meals  Snacks/Supplements Adult: Gelatein sugar-free; Between Meals    DVT Prophylaxis: hep gtt  Bustamante Catheter: Not present  Central Lines: None  Code Status: Full Code      Disposition Plan   Expected Discharge: 01/19/2022     Anticipated discharge location: [facility] work up  The patient's care was discussed with the Patient. I called son to update at 1502    Joanna Yancey MD  Hospitalist Service  [facility]  Securely message with the Vocera Web Console (learn more here)  Text page via [employer] Paging/Directory        Clinically Significant Risk Factors Present on Admission     ______________________________________________________________________    Interval History --he declined   He feels ok  No loose stools  Able to lay flat  Denied cp, but agreed to chest tightness often    Data reviewed today: I reviewed all medications, new labs and imaging results over the last 24 hours. I  personally reviewed no images or EKG's today.    Physical Exam   Vital Signs: Temp: 97.9  F (36.6  C) Temp src: Oral BP: 96/58 Pulse: 60   Resp: 16 SpO2: 96 % O2 Device: None (Room air)    Weight: 114 lbs 4.8 oz  Constitutional: awake, fatigued, alert, cooperative and no apparent distress  Eyes: sclera clear  Respiratory: No increased work of breathing, good air exchange, clear to auscultation bilaterally, no crackles or wheezing  Cardiovascular: Normal apical impulse, regular rate and rhythm, normal S1 and S2, no S3 or S4, and no murmur noted  GI: normal bowel sounds, soft, non-distended and non-tender  Skin: no bruising or bleeding  Musculoskeletal: no lower extremity pitting edema present  Neurologic: Mental Status Exam:  Level of Alertness:   awake  Neuropsychiatric: General: normal, calm and normal eye contact    Data   Recent Labs   Lab 01/17/22  0537 01/17/22  0129 01/16/22  2035 01/16/22  0756 01/16/22  0425 01/15/22  0535 01/15/22  0222   WBC 4.9  --   --   --  7.1  --  7.4   HGB 12.4*  --   --   --  12.9*  --  13.5   MCV 93  --   --   --  93  --  92     --   --   --  167  --  152   *  --   --   --  133*  --  135*   POTASSIUM 4.3  --   --   --  4.4  --  3.9   CHLORIDE 102  --   --   --  99  --  102   CO2 25  --   --   --  27  --  23   BUN 18  --   --   --  17  --  21   CR 1.30  --   --   --  1.56*  --  1.54*   ANIONGAP 8  --   --   --  7  --  10   LISA 8.1*  --   --   --  8.4*  --  9.0    192* 100*   < > 197*   < > 252*    < > = values in this interval not displayed.     No results found for this or any previous visit (from the past 24 hour(s)).

## 2022-01-17 NOTE — PLAN OF CARE
Problem: Chest Pain  Goal: Resolution of Chest Pain Symptoms  Outcome: No Change     Problem: Adjustment to Illness (Heart Failure)  Goal: Optimal Coping  Outcome: No Change     Problem: Cardiac Output Decreased (Heart Failure)  Goal: Optimal Cardiac Output  Outcome: No Change     Problem: Dysrhythmia (Heart Failure)  Goal: Stable Heart Rate and Rhythm  Outcome: No Change     Problem: Fluid Imbalance (Heart Failure)  Goal: Fluid Balance  Outcome: No Change   Pt is alert and oriented x 4, denies chest pain, still  has SOB with activity. Lung sounds clear, diminished,98 % on RA. He refused to watch the angiogram video when offered tonight. Heparin drip at 1000 units/ hr, anti Xa recheck in AM. BG at HS was 100. Lantus 10 units given Dr. Bc barrow. Pt finished his Vanilla Glucerna. Offered apple juice and turkey before midnight. HR in the mid 50's to 60's, Sinus adair/ NSR with inverted TW, with ST elevation in V2 lead. SBP in the 90's, Carvedilol held tonight per parameters. Instructed pt to be NPO post midnight for possible  Coronary angiogram in AM. Will continue to monitor.

## 2022-01-17 NOTE — PROGRESS NOTES
HEART CARE NOTE          Assessment/Recommendations     1. HFrEF/ICM c/b ADHF  Assessment / Plan    Near euvolemia on physical exam; denies HF symptoms    GDMT as detailed below    Current Pharmacotherapy AHA Guideline-Directed Medical Therapy   Lisinopril - on hold given ELIZABETH and borderline BP Lisinopril 20 mg twice daily   Carvedilol 6.25 mg twice daily Carvedilol 25 mg twice daily   Spironolactone 25 mg Spironolactone 25 mg once daily   Hydralazine NA Hydralazine 100 mg three times daily   Isosorbide mononitrate 10 mg daily Isosorbide dinitrate 40 mg three times daily   SGLT2 inhibitor:not started Dapagliflozin or Empagliflozin 10 mg daily     2. CAD  Assessment / Plan    Hx of STEMI 8/21 - at which time the pateint left AMA    Known severe multivessel disease - patient declines recommended CABG    Continue ASA, carvedilol, clopidogrel, high intensity rosuvastatin, isosorbide mononitrate, and spironolactone    Denies current chest pain, but he does state that he has intermittent chest discomfort with ambulation - unclear if relieved by rest    Will get cardiac MRI with viability prior to proceeding with coronary angiogram +/- PCI     3. Valvular heart disease  Assessment / Plan    Moderate MR and TR - continue oral afterload reduction    4. DM2  Assessment / Plan    Management per primary team      History of Present Illness/Subjective    Mr. Av Fernando is a 61 year old male with a PMHx significant for multivessel coronary artery disease status post STEMI in August 2021, recommended cardiothoracic surgery evaluation, however patient left AMA because family member had passed from bypass surgery.  Also past medical history of ischemic cardiomyopathy, type 2 diabetes, dyslipidemia, anemia who presents in ADHF with additional complaints of chest pain.    Today, Mr. Fernando denies any acute events or complaints; denies current chest pain or HF symptons    ECG: Personally reviewed. normal EKG, normal sinus rhythm,  "occasional PVC noted, unifocal.    ECHO (personnaly Reviewed):  1. The left ventricle is mildly enlarged. Left ventricular systolic  performance is moderately reduced. The ejection fraction is estimated to be  30%.  2. There is severe hypokinesis to akinesis of the posterior, lateral, basal  inferior, and apical segments. The the mid to distal inferior segments and  anterior wall are moderately hypokinetic. There is preservation of the basal  anteroseptal and septal segments.  3. There is a 1.2 x 1.4 cm apical mural thrombus.  4. There is trace aortic insufficiency.  5. There is moderate mitral insufficiency.  6. There is mild to moderate tricuspid insufficiency.  7. Normal right ventricular size with low normal right ventricular systolic  performance.  8. There is moderate left atrial enlargement.  9. Right ventricular systolic pressure relative to right atrial pressure is  mildly increased. The pulmonary artery pressure is estimated to be 45-50 mmHg  plus right atrial pressure (IVC is of normal caliber).     When compared to the prior real-time echocardiogram dated 2 August 2021, and  apical mural thrombus is now noted. Otherwise, the findings are fairly similar  on both examinations. Specifically, left ventricular systolic performance and  regional wall motion appears fairly similar on both examinations.        Physical Examination Review of Systems   BP 96/63 (BP Location: Right arm, Patient Position: Semi-Armando's)   Pulse 65   Temp 98  F (36.7  C) (Oral)   Resp 20   Ht 1.575 m (5' 2\")   Wt 51.8 kg (114 lb 4.8 oz)   SpO2 98%   BMI 20.91 kg/m    Body mass index is 20.91 kg/m .  Wt Readings from Last 3 Encounters:   01/17/22 51.8 kg (114 lb 4.8 oz)   11/29/21 54.5 kg (120 lb 1 oz)   11/08/21 55.4 kg (122 lb 1.6 oz)     General Appearance:   no distress, normal body habitus   ENT/Mouth: membranes moist, no oral lesions or bleeding gums.      EYES:  no scleral icterus, normal conjunctivae   Neck: no carotid " bruits or thyromegaly   Chest/Lungs:   lungs are clear to auscultation, no rales or wheezing, equal chest wall expansion    Cardiovascular:   Regular. Normal first and second heart sounds with no rubs, or gallops; the carotid, radial and posterior tibial pulses are intact, no JVD or LE edema bilaterally    Abdomen:  no organomegaly, masses, bruits, or tenderness; bowel sounds are present   Extremities: no cyanosis or clubbing   Skin: no xanthelasma, warm.    Neurologic: alert and oriented x3, calm      Psychiatric: alert and oriented x3, calm     A complete 10 systems ROS was reviewed  And is negative except what is listed in the HPI.          Medical History  Surgical History Family History Social History   Past Medical History:   Diagnosis Date     Congestive heart failure (H) 08/2021    ischemic CM with LVEF 30-35%     Coronary artery disease 08/2021    STEMI with multivessel CAD on angiography     Diabetes mellitus (H)      Hyperlipidemia      Hypertension      Myocardial infarction (H) 08/2021    inferior STEMI    Past Surgical History:   Procedure Laterality Date     CV CORONARY ANGIOGRAM N/A 8/1/2021    Procedure: Coronary Angiogram;  Surgeon: Deidre Lopes MD;  Location: [facility] CV     CV LEFT VENTRICULOGRAM N/A 8/1/2021    Procedure: Left Ventriculogram;  Surgeon: Deidre Lopes MD;  Location: [facility] CATH LAB CV     OTHER SURGICAL HISTORY      LEG TRAUMA    no family history of premature coronary artery disease Social History     Socioeconomic History     Marital status:      Spouse name: Not on file     Number of children: Not on file     Years of education: Not on file     Highest education level: Not on file   Occupational History     Not on file   Tobacco Use     Smoking status: Never Smoker     Smokeless tobacco: Never Used   Substance and Sexual Activity     Alcohol use: No     Drug use: No     Sexual activity: Yes     Partners: Female     Birth control/protection: None   Other  Topics Concern     Parent/sibling w/ CABG, MI or angioplasty before 65F 55M? Not Asked   Social History Narrative     Not on file     Social Determinants of Health     Financial Resource Strain: Not on file   Food Insecurity: Not on file   Transportation Needs: Not on file   Physical Activity: Not on file   Stress: Not on file   Social Connections: Not on file   Intimate Partner Violence: Not on file   Housing Stability: Not on file           Lab Results    Chemistry/lipid CBC Cardiac Enzymes/BNP/TSH/INR   Lab Results   Component Value Date    CHOL 176 08/02/2021    HDL 53 08/02/2021    TRIG 70 08/02/2021    BUN 17 01/16/2022     (L) 01/16/2022    CO2 27 01/16/2022    Lab Results   Component Value Date    WBC 7.1 01/16/2022    HGB 12.9 (L) 01/16/2022    HCT 37.5 (L) 01/16/2022    MCV 93 01/16/2022     01/16/2022    Lab Results   Component Value Date    TROPONINI 0.06 01/14/2022    BNP 2,494 (H) 01/13/2022    INR 0.96 08/01/2021     Lab Results   Component Value Date    TROPONINI 0.06 01/14/2022          Weight:    Wt Readings from Last 3 Encounters:   01/17/22 51.8 kg (114 lb 4.8 oz)   11/29/21 54.5 kg (120 lb 1 oz)   11/08/21 55.4 kg (122 lb 1.6 oz)       Allergies  Allergies   Allergen Reactions     Januvia [Sitagliptin] Unknown     HEADACHE     Trulicity [Dulaglutide] Dizziness     Weak and muscle weak         Surgical History  Past Surgical History:   Procedure Laterality Date     CV CORONARY ANGIOGRAM N/A 8/1/2021    Procedure: Coronary Angiogram;  Surgeon: Deidre Lopes MD;  Location: [facility] CATH LAB CV     CV LEFT VENTRICULOGRAM N/A 8/1/2021    Procedure: Left Ventriculogram;  Surgeon: Deidre Lopes MD;  Location: [facility] CATH LAB CV     OTHER SURGICAL HISTORY      LEG TRAUMA       Social History  Tobacco:   History   Smoking Status     Never Smoker   Smokeless Tobacco     Never Used    Alcohol:   Social History    Substance and Sexual Activity      Alcohol use: No   Illicit Drugs:    History   Drug Use No       Family History  No family history on file.       Dougie Gupta MD on 1/17/2022      cc: Garcia Kaufman

## 2022-01-18 NOTE — PLAN OF CARE
Problem: Adult Inpatient Plan of Care  Goal: Absence of Hospital-Acquired Illness or Injury  Outcome: Improving  Intervention: Identify and Manage Fall Risk  Recent Flowsheet Documentation  Taken 1/17/2022 1630 by Anali Scott RN  Safety Promotion/Fall Prevention:   assistive device/personal items within reach   nonskid shoes/slippers when out of bed   patient and family education   room organization consistent   safety round/check completed  Intervention: Prevent Skin Injury  Recent Flowsheet Documentation  Taken 1/17/2022 1548 by Anali Scott RN  Body Position: position changed independently  Goal: Optimal Comfort and Wellbeing  Outcome: Improving  Goal: Readiness for Transition of Care  Outcome: Improving     Problem: Chest Pain  Goal: Resolution of Chest Pain Symptoms  Outcome: Improving  Intervention: Manage Acute Chest Pain  Recent Flowsheet Documentation  Taken 1/17/2022 1630 by Anali Scott RN  Chest Pain Intervention: activity minimized     Problem: Adjustment to Illness (Heart Failure)  Goal: Optimal Coping  Outcome: Improving     Problem: Dysrhythmia (Heart Failure)  Goal: Stable Heart Rate and Rhythm  Outcome: Improving     Problem: Hyperglycemia  Goal: Blood Glucose Level Within Targeted Range  Outcome: Improving   Pt a/o x4, pleasant, cooperative. Reported chest tightness 2/10 when moving around or deep breathing--declined prn med for this. Pt up independently to bathroom, gait steady. Voiding adequately. Blood sugar 180 prior to supper. Pt received sliding scale insulin & per carb ct. Tele tracing normal sinus, sinus adair w/ inverted t wave. Heparin gtt continues infusing as ordered. Reminded pt to use call light for needs. Continue to monitor pt.

## 2022-01-18 NOTE — PROGRESS NOTES
Hospitalist Progress Note    Assessment/Plan  61-year-old male with past medical history of multivessel CAD, STEMI in 8/2021, recommended CV surgery evaluation however left AMA, ischemic cardiomyopathy, DM 2, dyslipidemia, anemia who presented for evaluation of dyspnea and chest tightness.  Found to have decompensated CHF, severe cardiomyopathy LVEF 30%, new left ventricular mural thrombus.  Patient responded well to IV diuresis.  He was started on IV heparin given evidence of LV thrombus and underwent cardiac MRI with viability study to determine if he is a candidate for PCI    1.  Acute on chronic systolic CHF.  Responding well to IV diuresis, weight is down 16 pounds since admission.  Continue IV furosemide 40 mg every 12 hours, monitor daily weights, I's and O's.  2.  Severe ischemic cardiomyopathy LVEF 30%.  Diuresis as above.  Patient is on metoprolol and spironolactone.  ACE inhibitor is on hold given acute kidney injury  3.  Known multivessel CAD.  Continue aspirin, statin, nitrate, beta-blocker.  MRI viability study shows inducible myocardial ischemia in mid to distal inferoseptal segments, mid to distal anterior segments, mid inferolateral segment; almost transmural infarct in the inferolateral walls.    4.  LV mural thrombus.  Continue IV heparin for now, will need ongoing anticoagulation upon discharge  5.  Acute kidney injury.  Likely in the setting of CHF.  Monitor closely  6.  Hyponatremia.  Likely due to hypervolemia.  Improved  7.  DM2 with long-term insulin use.  Continue current dose of insulin, monitor blood sugars closely    Barriers to Discharge: possible coronary angiography +PCI,  IV heparin    Anticipated discharge date/Disposition: 2 to 3 days    Subjective  Patient denies having any chest pain.  Reports that breathing has improved.  Just returned from the cardiac MRI    Objective    Vital signs in last 24 hours  Temp:  [98  F (36.7  C)-98.6  F (37  C)] 98.4  F (36.9  C)  Pulse:  [57-76]  59  Resp:  [18-22] 20  BP: ()/(48-74) 110/67  SpO2:  [96 %-99 %] 97 % @LASTSAO2(12)@ O2 Device: None (Room air)    Weight:   Wt Readings from Last 3 Encounters:   01/18/22 49.8 kg (109 lb 12.8 oz)   11/29/21 54.5 kg (120 lb 1 oz)   11/08/21 55.4 kg (122 lb 1.6 oz)      Weight change: -2.041 kg (-4 lb 8 oz)    Intake/Output last 3 shifts  I/O last 3 completed shifts:  In: 150 [P.O.:150]  Out: 1000 [Urine:1000]  Body mass index is 20.08 kg/m .    Physical Exam    General Appearance:    Alert, cooperative, no distress, appears stated age   Lungs:     Clear bilaterally    Cardiovascular:    Regular rate ands rhythm.  Nornal S1, S2.  No murmur, rub or gallop.  No edema   Abdomen:     Soft, non-tender, bowel sounds active all four quadrants,     no masses, no organomegaly   Neurologic:   Awake, alert, oriented x 3.  Grossly nonfocal      Pertinent Labs   Lab Results: personally reviewed.   Recent Labs   Lab 01/18/22  0532 01/17/22  0537 01/16/22  0425    135* 133*   CO2 27 25 27   BUN 18 18 17     Recent Labs   Lab 01/18/22  0532 01/17/22  0537 01/16/22  0425   WBC 4.5 4.9 7.1   HGB 13.9 12.4* 12.9*   HCT 41.4 36.8* 37.5*    170 167     Recent Labs   Lab 01/14/22  0941 01/13/22  1731   TROPONINI 0.06 0.07     Invalid input(s): POCGLUFGR    Medications  Current Facility-Administered Medications   Medication     acetaminophen (TYLENOL) tablet 650 mg    Or     acetaminophen (TYLENOL) Suppository 650 mg     aminophylline 50 mg     aspirin EC tablet 81 mg     clopidogrel (PLAVIX) tablet 75 mg     glucose gel 15-30 g    Or     dextrose 50 % injection 25-50 mL    Or     glucagon injection 1 mg     glucose gel 15-30 g    Or     dextrose 50 % injection 25-50 mL    Or     glucagon injection 1 mg     furosemide (LASIX) injection 40 mg     heparin infusion 25,000 units in D5W 250 mL ANTICOAGULANT     hydrALAZINE (APRESOLINE) injection 10 mg     insulin aspart (NovoLOG) injection (RAPID ACTING)     insulin aspart  (NovoLOG) injection (RAPID ACTING)     insulin aspart (NovoLOG) injection (RAPID ACTING)     insulin aspart (NovoLOG) injection (RAPID ACTING)     insulin aspart (NovoLOG) injection (RAPID ACTING)     insulin glargine (LANTUS PEN) injection 10 Units     isosorbide mononitrate (ISMO/MONOKET) half-tab 10 mg     lidocaine (LMX4) cream     lidocaine 1 % 0.1-1 mL     melatonin tablet 1 mg     metoprolol succinate ER (TOPROL-XL) 24 hr half-tab 12.5 mg     multivitamin, therapeutic (THERA-VIT) tablet 1 tablet     ondansetron (ZOFRAN-ODT) ODT tab 4 mg    Or     ondansetron (ZOFRAN) injection 4 mg     rosuvastatin (CRESTOR) tablet 40 mg     sodium chloride (PF) 0.9% PF flush 3 mL     sodium chloride (PF) 0.9% PF flush 3 mL     spironolactone (ALDACTONE) tablet 25 mg     thiamine (B-1) tablet 100 mg       Pertinent Radiology   Radiology Results: Personally reviewed   Results for orders placed or performed during the hospital encounter of 01/13/22   XR Chest 2 Views    Impression    IMPRESSION: Small bilateral pleural effusions with adjacent atelectasis/infiltrates, right greater than left. No definite pneumothorax. Right midlung fissural thickening/fluid. Mild cardiomegaly with mild central vascular congestion and suspected mild   interstitial edema.   MR Myocardium  Overread    Impression    IMPRESSION:  1. No significant incidental extracardiac findings.  2. Please refer to cardiologist's dictation for the cardiac MRI report.   XR Chest 2 Views    Impression    IMPRESSION: Marked improvement with near complete resolution of the previous pleural effusions and infiltrates in the lower lungs since 01/13/2022. This rapid improvement would favor resolving edema.   XR Skull 1/3 Views    Impression    IMPRESSION: No radiopaque foreign body. No contraindication to MRI on this exam         Advanced Care Planning:  Discharge Planning discussed with patient and RN       Arlet Salazar MD  Internal Medicine  Hospitalist  1/18/2022

## 2022-01-18 NOTE — PROGRESS NOTES
HEART CARE NOTE          Assessment/Recommendations     1. HFrEF/ICM c/b ADHF  Assessment / Plan    Near euvolemia on physical exam; denies HF symptoms; no changes to regimen     GDMT as detailed below     Current Pharmacotherapy AHA Guideline-Directed Medical Therapy   Lisinopril - on hold given ELIZABETH and borderline BP Lisinopril 20 mg twice daily   Carvedilol 6.25 mg twice daily Carvedilol 25 mg twice daily   Spironolactone 25 mg Spironolactone 25 mg once daily   Hydralazine NA Hydralazine 100 mg three times daily   Isosorbide mononitrate 10 mg daily Isosorbide dinitrate 40 mg three times daily   SGLT2 inhibitor:not started Dapagliflozin or Empagliflozin 10 mg daily      2. CAD  Assessment / Plan    Hx of STEMI 8/21 - at which time the pateint left AMA    Known severe multivessel disease - patient declines recommended CABG    Continue ASA, carvedilol, clopidogrel, high intensity rosuvastatin, isosorbide mononitrate, and spironolactone    Denies current chest pain, but he does state that he has intermittent chest discomfort with ambulation - unclear if relieved by rest    Will get cardiac MRI with viability prior to proceeding with coronary angiogram +/- PCI      3. Valvular heart disease  Assessment / Plan    Moderate MR and TR - continue oral afterload reduction     4. DM2  Assessment / Plan    Management per primary team    History of Present Illness/Subjective      Mr. Av Fernando is a 61 year old male with a PMHx significant for multivessel coronary artery disease status post STEMI in August 2021, recommended cardiothoracic surgery evaluation, however patient left AMA because family member had passed from bypass surgery.  Also past medical history of ischemic cardiomyopathy, type 2 diabetes, dyslipidemia, anemia who presents in ADHF with additional complaints of chest pain.     Today, Mr. Fernando denies any acute events or complaints; denies current chest pain or HF symptons     ECG: Personally reviewed. normal  "EKG, normal sinus rhythm, occasional PVC noted, unifocal.     ECHO (personnaly Reviewed):  1. The left ventricle is mildly enlarged. Left ventricular systolic  performance is moderately reduced. The ejection fraction is estimated to be  30%.  2. There is severe hypokinesis to akinesis of the posterior, lateral, basal  inferior, and apical segments. The the mid to distal inferior segments and  anterior wall are moderately hypokinetic. There is preservation of the basal  anteroseptal and septal segments.  3. There is a 1.2 x 1.4 cm apical mural thrombus.  4. There is trace aortic insufficiency.  5. There is moderate mitral insufficiency.  6. There is mild to moderate tricuspid insufficiency.  7. Normal right ventricular size with low normal right ventricular systolic  performance.  8. There is moderate left atrial enlargement.  9. Right ventricular systolic pressure relative to right atrial pressure is  mildly increased. The pulmonary artery pressure is estimated to be 45-50 mmHg  plus right atrial pressure (IVC is of normal caliber).     When compared to the prior real-time echocardiogram dated 2 August 2021, and  apical mural thrombus is now noted. Otherwise, the findings are fairly similar  on both examinations. Specifically, left ventricular systolic performance and  regional wall motion appears fairly similar on both examinations.          Physical Examination Review of Systems   /63 (BP Location: Right arm)   Pulse 60   Temp 98  F (36.7  C) (Axillary)   Resp 18   Ht 1.575 m (5' 2\")   Wt 51.8 kg (114 lb 4.8 oz)   SpO2 96%   BMI 20.91 kg/m    Body mass index is 20.91 kg/m .  Wt Readings from Last 3 Encounters:   01/17/22 51.8 kg (114 lb 4.8 oz)   11/29/21 54.5 kg (120 lb 1 oz)   11/08/21 55.4 kg (122 lb 1.6 oz)     General Appearance:   no distress, normal body habitus   ENT/Mouth: membranes moist, no oral lesions or bleeding gums.      EYES:  no scleral icterus, normal conjunctivae   Neck: no carotid " bruits or thyromegaly   Chest/Lungs:   lungs are clear to auscultation, no rales or wheezing, equal chest wall expansion    Cardiovascular:   Regular. Normal first and second heart sounds with no murmurs, rubs, or gallops; the carotid, radial and posterior tibial pulses are intact, no JVD or LE edema bilaterally    Abdomen:  no organomegaly, masses, bruits, or tenderness; bowel sounds are present   Extremities: no cyanosis or clubbing   Skin: no xanthelasma, warm.    Neurologic: normal gait, normal  bilateral, no tremors     Psychiatric: alert and oriented x3, calm     A complete 10 systems ROS was reviewed  And is negative except what is listed in the HPI.          Medical History  Surgical History Family History Social History   Past Medical History:   Diagnosis Date     Congestive heart failure (H) 08/2021    ischemic CM with LVEF 30-35%     Coronary artery disease 08/2021    STEMI with multivessel CAD on angiography     Diabetes mellitus (H)      Hyperlipidemia      Hypertension      Myocardial infarction (H) 08/2021    inferior STEMI    Past Surgical History:   Procedure Laterality Date     CV CORONARY ANGIOGRAM N/A 8/1/2021    Procedure: Coronary Angiogram;  Surgeon: Deidre Lopes MD;  Location: [facility] CATH LAB CV     CV LEFT VENTRICULOGRAM N/A 8/1/2021    Procedure: Left Ventriculogram;  Surgeon: Deidre Lopes MD;  Location: [facility] CATH LAB CV     OTHER SURGICAL HISTORY      LEG TRAUMA    no family history of premature coronary artery disease Social History     Socioeconomic History     Marital status:      Spouse name: Not on file     Number of children: Not on file     Years of education: Not on file     Highest education level: Not on file   Occupational History     Not on file   Tobacco Use     Smoking status: Never Smoker     Smokeless tobacco: Never Used   Substance and Sexual Activity     Alcohol use: No     Drug use: No     Sexual activity: Yes     Partners: Female     Birth  control/protection: None   Other Topics Concern     Parent/sibling w/ CABG, MI or angioplasty before 65F 55M? Not Asked   Social History Narrative     Not on file     Social Determinants of Health     Financial Resource Strain: Not on file   Food Insecurity: Not on file   Transportation Needs: Not on file   Physical Activity: Not on file   Stress: Not on file   Social Connections: Not on file   Intimate Partner Violence: Not on file   Housing Stability: Not on file           Lab Results    Chemistry/lipid CBC Cardiac Enzymes/BNP/TSH/INR   Lab Results   Component Value Date    CHOL 176 08/02/2021    HDL 53 08/02/2021    TRIG 70 08/02/2021    BUN 18 01/17/2022     (L) 01/17/2022    CO2 25 01/17/2022    Lab Results   Component Value Date    WBC 4.9 01/17/2022    HGB 12.4 (L) 01/17/2022    HCT 36.8 (L) 01/17/2022    MCV 93 01/17/2022     01/17/2022    Lab Results   Component Value Date    TROPONINI 0.06 01/14/2022    BNP 2,494 (H) 01/13/2022    INR 0.96 08/01/2021     Lab Results   Component Value Date    TROPONINI 0.06 01/14/2022          Weight:    Wt Readings from Last 3 Encounters:   01/17/22 51.8 kg (114 lb 4.8 oz)   11/29/21 54.5 kg (120 lb 1 oz)   11/08/21 55.4 kg (122 lb 1.6 oz)       Allergies  Allergies   Allergen Reactions     Januvia [Sitagliptin] Unknown     HEADACHE     Trulicity [Dulaglutide] Dizziness     Weak and muscle weak         Surgical History  Past Surgical History:   Procedure Laterality Date     CV CORONARY ANGIOGRAM N/A 8/1/2021    Procedure: Coronary Angiogram;  Surgeon: Deidre Lopes MD;  Location: [facility] CATH LAB CV     CV LEFT VENTRICULOGRAM N/A 8/1/2021    Procedure: Left Ventriculogram;  Surgeon: Deidre Lopes MD;  Location: [facility] CATH LAB CV     OTHER SURGICAL HISTORY      LEG TRAUMA       Social History  Tobacco:   History   Smoking Status     Never Smoker   Smokeless Tobacco     Never Used    Alcohol:   Social History    Substance and Sexual Activity       Alcohol use: No   Illicit Drugs:   History   Drug Use No       Family History  No family history on file.       Dougie Gupta MD on 1/18/2022      cc: Garcia Kaufman

## 2022-01-18 NOTE — PLAN OF CARE
Problem: Chest Pain  Goal: Resolution of Chest Pain Symptoms  Outcome: Improving     Problem: Adult Inpatient Plan of Care  Goal: Readiness for Transition of Care  Outcome: No Change     Pt A/O x 4, up independently in the room. Denies chest pain. C/o some chest tightness with ambulation that resolves with rest. Cardiac MRI completed this afternoon. Heparin restarted after procedure at previous rate of 1000 units/hr. Anti Xa scheduled for 2000. VSS. Tele - NSR with inverted T waves in lead II.

## 2022-01-19 NOTE — PLAN OF CARE
Problem: Adjustment to Illness (Heart Failure)  Goal: Optimal Coping  Outcome: No Change     Problem: Dysrhythmia (Heart Failure)  Goal: Stable Heart Rate and Rhythm  Outcome: No Change     Problem: Chest Pain  Goal: Resolution of Chest Pain Symptoms  Outcome: Improving   Pt is alert and oriented x 4, denies chest tightness, still has a little bit of SOB with activity. Lung sounds clear, diminished, on RA. HR in the high 50's to 60's, Sinus adair/ NSR with inverted TW, with elevated ST in V2 lead. Cardiac MRI done early this afternoon, result will be discussed by the cardiologist in AM . He is on Heparin drip at 1000 units/ hr, next anti XA in am at 0300 H.  He is independent in the room. Will continue to monitor.

## 2022-01-19 NOTE — PLAN OF CARE
Occupational Therapy Discharge Summary    Reason for therapy discharge:    Discharged to home.    Progress towards therapy goal(s). See goals on Care Plan in [facility] electronic health record for goal details.  Goals met    Therapy recommendation(s):    No further therapy is recommended.

## 2022-01-19 NOTE — PROGRESS NOTES
HEART CARE NOTE          Assessment/Recommendations     1. HFrEF/ICM c/b ADHF + LV thromus  Assessment / Plan    Near euvolemia on physical exam; denies HF symptoms; no changes to regimen     GDMT as detailed below; transition to apixaban for LV thrombus; will get CT abdomen and pelvis while admitted to evaluate access for impella support once thrombus has resolved (will repeat echo in 4-6 weeks)     Current Pharmacotherapy AHA Guideline-Directed Medical Therapy   Lisinopril - on hold given ELIZABETH and borderline BP Lisinopril 20 mg twice daily   Carvedilol 6.25 mg twice daily Carvedilol 25 mg twice daily   Spironolactone 25 mg Spironolactone 25 mg once daily   Hydralazine NA Hydralazine 100 mg three times daily   Isosorbide mononitrate 10 mg daily Isosorbide dinitrate 40 mg three times daily   SGLT2 inhibitor:not started Dapagliflozin or Empagliflozin 10 mg daily      2. CAD  Assessment / Plan    Hx of STEMI 8/21 - at which time the pateint left AMA    Known severe multivessel disease - patient declines recommended CABG    Continue ASA, carvedilol, clopidogrel, high intensity rosuvastatin, isosorbide mononitrate, and spironolactone    Denies current chest pain, but he does state that he has intermittent chest discomfort with ambulation - unclear if relieved by rest    Cardiac MRI significant inducible ischemia in the mid to distal inferoseptal segments, mid-distal anterior segments and mid inferolateral segment. There is almost transmural infarction in the inferolateral walls while the rest of the myocardium is viable; plan for impella assisted PCI once LV thrombus has resolved     3. Valvular heart disease  Assessment / Plan    Moderate MR and TR - continue oral afterload reduction     4. DM2  Assessment / Plan    Management per primary team    Ok for discharge from a cardiology standpoint. Cardiology team will sign-off for now. Please do not hesitate to consult us again if new questions or concerns arise. Follow-up  appointment will be arranged by CORE/HF clinic.       History of Present Illness/Subjective      Mr. Av Fernando is a 61 year old male with a PMHx significant for multivessel coronary artery disease status post STEMI in August 2021, recommended cardiothoracic surgery evaluation, however patient left AMA because family member had passed from bypass surgery.  Also past medical history of ischemic cardiomyopathy, type 2 diabetes, dyslipidemia, anemia who presents in ADHF with additional complaints of chest pain.     Today, Mr. Fernando denies any acute events or complaints; denies current chest pain or HF symptons     ECG: Personally reviewed. normal EKG, normal sinus rhythm, occasional PVC noted, unifocal.     ECHO (personnaly Reviewed):  1. The left ventricle is mildly enlarged. Left ventricular systolic  performance is moderately reduced. The ejection fraction is estimated to be  30%.  2. There is severe hypokinesis to akinesis of the posterior, lateral, basal  inferior, and apical segments. The the mid to distal inferior segments and  anterior wall are moderately hypokinetic. There is preservation of the basal  anteroseptal and septal segments.  3. There is a 1.2 x 1.4 cm apical mural thrombus.  4. There is trace aortic insufficiency.  5. There is moderate mitral insufficiency.  6. There is mild to moderate tricuspid insufficiency.  7. Normal right ventricular size with low normal right ventricular systolic  performance.  8. There is moderate left atrial enlargement.  9. Right ventricular systolic pressure relative to right atrial pressure is  mildly increased. The pulmonary artery pressure is estimated to be 45-50 mmHg  plus right atrial pressure (IVC is of normal caliber).     When compared to the prior real-time echocardiogram dated 2 August 2021, and  apical mural thrombus is now noted. Otherwise, the findings are fairly similar  on both examinations. Specifically, left ventricular systolic performance  "and  regional wall motion appears fairly similar on both examinations.          Physical Examination Review of Systems   BP 99/65 (BP Location: Right arm, Patient Position: Supine)   Pulse 59   Temp 97.6  F (36.4  C) (Oral)   Resp 20   Ht 1.575 m (5' 2\")   Wt 50.2 kg (110 lb 11.2 oz)   SpO2 97%   BMI 20.25 kg/m    Body mass index is 20.25 kg/m .  Wt Readings from Last 3 Encounters:   01/19/22 50.2 kg (110 lb 11.2 oz)   11/29/21 54.5 kg (120 lb 1 oz)   11/08/21 55.4 kg (122 lb 1.6 oz)     General Appearance:   no distress, normal body habitus   ENT/Mouth: membranes moist, no oral lesions or bleeding gums.      EYES:  no scleral icterus, normal conjunctivae   Neck: no carotid bruits or thyromegaly   Chest/Lungs:   lungs are clear to auscultation, no rales or wheezing, equal chest wall expansion    Cardiovascular:   Regular. Normal first and second heart sounds with no rubs, or gallops; the carotid, radial and posterior tibial pulses are intact, no JVD or LE edema bilaterally    Abdomen:  no organomegaly, masses, bruits, or tenderness; bowel sounds are present   Extremities: no cyanosis or clubbing   Skin: no xanthelasma, warm.    Neurologic: normal gait, normal  bilateral, no tremors     Psychiatric: alert and oriented x3, calm     A complete 10 systems ROS was reviewed  And is negative except what is listed in the HPI.          Medical History  Surgical History Family History Social History   Past Medical History:   Diagnosis Date     Congestive heart failure (H) 08/2021    ischemic CM with LVEF 30-35%     Coronary artery disease 08/2021    STEMI with multivessel CAD on angiography     Diabetes mellitus (H)      Hyperlipidemia      Hypertension      Myocardial infarction (H) 08/2021    inferior STEMI    Past Surgical History:   Procedure Laterality Date     CV CORONARY ANGIOGRAM N/A 8/1/2021    Procedure: Coronary Angiogram;  Surgeon: Deidre Lopes MD;  Location: [facility] CATH LAB CV     CV LEFT " VENTRICULOGRAM N/A 8/1/2021    Procedure: Left Ventriculogram;  Surgeon: Deidre Lopes MD;  Location: [facility] CV     OTHER SURGICAL HISTORY      LEG TRAUMA    no family history of premature coronary artery disease Social History     Socioeconomic History     Marital status:      Spouse name: Not on file     Number of children: Not on file     Years of education: Not on file     Highest education level: Not on file   Occupational History     Not on file   Tobacco Use     Smoking status: Never Smoker     Smokeless tobacco: Never Used   Substance and Sexual Activity     Alcohol use: No     Drug use: No     Sexual activity: Yes     Partners: Female     Birth control/protection: None   Other Topics Concern     Parent/sibling w/ CABG, MI or angioplasty before 65F 55M? Not Asked   Social History Narrative     Not on file     Social Determinants of Health     Financial Resource Strain: Not on file   Food Insecurity: Not on file   Transportation Needs: Not on file   Physical Activity: Not on file   Stress: Not on file   Social Connections: Not on file   Intimate Partner Violence: Not on file   Housing Stability: Not on file           Lab Results    Chemistry/lipid CBC Cardiac Enzymes/BNP/TSH/INR   Lab Results   Component Value Date    CHOL 176 08/02/2021    HDL 53 08/02/2021    TRIG 70 08/02/2021    BUN 27 (H) 01/19/2022     01/19/2022    CO2 23 01/19/2022    Lab Results   Component Value Date    WBC 5.0 01/19/2022    HGB 13.6 01/19/2022    HCT 39.5 (L) 01/19/2022    MCV 93 01/19/2022     01/19/2022    Lab Results   Component Value Date    TROPONINI 0.06 01/14/2022    BNP 2,494 (H) 01/13/2022    INR 0.96 08/01/2021     Lab Results   Component Value Date    TROPONINI 0.06 01/14/2022          Weight:    Wt Readings from Last 3 Encounters:   01/19/22 50.2 kg (110 lb 11.2 oz)   11/29/21 54.5 kg (120 lb 1 oz)   11/08/21 55.4 kg (122 lb 1.6 oz)       Allergies  Allergies   Allergen Reactions      Januvia [Sitagliptin] Unknown     HEADACHE     Trulicity [Dulaglutide] Dizziness     Weak and muscle weak         Surgical History  Past Surgical History:   Procedure Laterality Date     CV CORONARY ANGIOGRAM N/A 8/1/2021    Procedure: Coronary Angiogram;  Surgeon: Deidre Lopes MD;  Location: [facility] CATH LAB CV     CV LEFT VENTRICULOGRAM N/A 8/1/2021    Procedure: Left Ventriculogram;  Surgeon: Deidre Lopes MD;  Location: [facility] CATH LAB CV     OTHER SURGICAL HISTORY      LEG TRAUMA       Social History  Tobacco:   History   Smoking Status     Never Smoker   Smokeless Tobacco     Never Used    Alcohol:   Social History    Substance and Sexual Activity      Alcohol use: No   Illicit Drugs:   History   Drug Use No       Family History  No family history on file.       Dougie Gupta MD on 1/19/2022      cc: Garcia Kaufman

## 2022-01-19 NOTE — PLAN OF CARE
Alert and oriented. On telemetry. On Heparin Gtt. Had cardiac MRI to determine viability for angiogram. Ambulates to bathroom. Vitals stable. Sleeping in between cares. Will continue to monitor.    Yousuf Jensen RN    Problem: Adult Inpatient Plan of Care  Goal: Absence of Hospital-Acquired Illness or Injury  Outcome: No Change  Intervention: Identify and Manage Fall Risk  Recent Flowsheet Documentation  Taken 1/19/2022 0030 by Yousuf Jensen, RN  Safety Promotion/Fall Prevention:   activity supervised   nonskid shoes/slippers when out of bed   patient and family education  Goal: Optimal Comfort and Wellbeing  Outcome: No Change     Problem: Sleep Disordered Breathing (Heart Failure)  Goal: Effective Breathing Pattern During Sleep  Outcome: Improving

## 2022-01-19 NOTE — PROGRESS NOTES
Patient discharged home at 1406. Family picked him up at the front. Discharge instructions reviewed and copy given to him. Verbalized understanding. All personal belongings sent with him. Stable.

## 2022-01-19 NOTE — PROGRESS NOTES
Care Management Discharge Note    Discharge Date: 01/19/2022       Discharge Disposition: Home    Discharge Services: None    Discharge DME: None    Discharge Transportation: family or friend will provide    Private pay costs discussed: Not applicable    PAS Confirmation Code:  Not applicable   Patient/family educated on Medicare website which has current facility and service quality ratings:  (not applicable)    Education Provided on the Discharge Plan:  yes  Persons Notified of Discharge Plans: patient   Patient/Family in Agreement with the Plan: yes    Handoff Referral Completed: not applicable     Additional Information:      SW reviewed Pt's chart.  Pt had cardiac MRI done on 1/18 and may need an angiogram pending results. CM following care progression to assist as needed with discharge planning when appropriate.     11:21 AM  SW met with Pt to discuss discharge services.  Pt requests to connect with the [facility] to receive PCA services.  Pt reports that he is on MA.  SW provided phone number for Pt to request an assessment through [facility] for services.  Pt reports understanding of process to initiate PCA services.      NARCISO Gonzalez

## 2022-01-20 NOTE — PROGRESS NOTES
Clinic Care Coordination Contact  [facility]/Voicemail    : Shanna ID #: Language Line Agency: [phone]    Clinical Data: Care Coordinator Outreach  Outreach attempted x 1.  Left message on patient's voicemail with call back information and requested return call.  Plan: Care Coordinator will try to reach patient again in 1-2 business days.    TAVIA Taylor  [phone]  [facility]

## 2022-01-20 NOTE — DISCHARGE SUMMARY
[facility] [place] MEDICINE  DISCHARGE SUMMARY     Primary Care Physician: Garica Kaufman  Admission Date: 1/13/2022   Discharge Provider: Arlet Salazar MD Discharge Date: 1/19/2022   Diet:   Active Diet and Nourishment Order   Procedures     Diet       Code Status: Prior   Activity: DCACTIVITY: Activity as tolerated        Condition at Discharge: Stable     REASON FOR PRESENTATION(See Admission Note for Details)     Dyspnea    PRINCIPAL & ACTIVE DISCHARGE DIAGNOSES     Principal Problem:    Acute on chronic systolic congestive heart failure (H)  Active Problems:    Type 2 diabetes mellitus with microalbuminuria, with long-term current use of insulin (H)    Coronary artery disease of native artery with stable angina pectoris (H)    ELIZABETH (acute kidney injury) (H)  Left ventricle mural thrombus    PENDING LABS     Unresulted Labs Ordered in the Past 30 Days of this Admission     No orders found from 12/14/2021 to 1/14/2022.            PROCEDURES ( this hospitalization only)          RECOMMENDATIONS TO OUTPATIENT PROVIDER FOR F/U VISIT     Follow-up Appointments     Follow-up and recommended labs and tests       Follow up with primary care provider, Garcia Kaufman, within 7 days for   hospital follow- up.  No follow up labs or test are needed.  Follow up with Cardiology to have a repeat echocardiogram (ultrasound of   your heart)             DISPOSITION     Home    SUMMARY OF HOSPITAL COURSE:      Av Fernando is a 61-year-old male with past medical history of multivessel CAD, STEMI in 8/2021, who was recommended CV surgery evaluation however left AMA, ischemic cardiomyopathy, valvular heart disease, DM 2, dyslipidemia, anemia who presented to [facility] for evaluation of dyspnea and chest tightness.  He was found to have decompensated CHF, severe cardiomyopathy LVEF 30% and new left ventricular mural thrombus.  Patient responded well to IV diuresis.  He was started on IV  heparin given evidence of LV thrombus and underwent cardiac MRI with viability study to determine if he is a candidate for PCI.  Cardiac MRI demonstrated significant inducible ischemia in the mid to distal anteroseptal segments, mid  Distal anterior segments and mid inferolateral segment; almost transmural infarction in the inferolateral walls.  Plan is for PCI once LV thrombus has resolved.  Patient will need repeat echocardiogram in 4 to 6 weeks and outpatient follow-up with cardiology.    Discharge Medications with Med changes:     Discharge Medication List as of 1/19/2022  1:51 PM      START taking these medications    Details   Apixaban Starter Pack (ELIQUIS DVT/PE STARTER PACK) 5 MG TBPK Take 10 mg by mouth 2 times daily for 7 days, THEN 5 mg 2 times daily for 23 days., Disp-74 each, R-0, E-Prescribe      bumetanide (BUMEX) 2 MG tablet Take 1 tablet (2 mg) by mouth daily, Disp-30 tablet, R-0, E-Prescribe      metoprolol succinate ER (TOPROL-XL) 25 MG 24 hr tablet Take 0.5 tablets (12.5 mg) by mouth daily, Disp-30 tablet, R-0, E-Prescribe      spironolactone (ALDACTONE) 25 MG tablet Take 1 tablet (25 mg) by mouth daily, Disp-30 tablet, R-0, E-Prescribe         CONTINUE these medications which have NOT CHANGED    Details   aspirin (ASA) 81 MG EC tablet Take 1 tablet (81 mg) by mouth daily, Disp-90 tablet, R-3, E-Prescribe      clopidogrel (PLAVIX) 75 MG tablet Take 1 tablet (75 mg) by mouth daily, Disp-90 tablet, R-3, E-Prescribe      insulin lispro (HUMALOG KWIKPEN) 100 UNIT/ML (1 unit dial) KWIKPEN Inject 8-10 Units Subcutaneous 3 times daily (before meals), Disp-15 mL, R-11, Historical      isosorbide mononitrate (ISMO/MONOKET) 10 MG tablet Take 1 tablet (10 mg) by mouth daily, Disp-90 tablet, R-1, E-Prescribe      LANTUS SOLOSTAR 100 UNIT/ML soln INJECT 24 UNITS UNDER THE SKIN AT BEDTIME, Disp-15 mL, R-11, NATALIA, E-PrescribeIf Lantus is not covered by insurance, may substitute Basaglar at same dose and  frequency.        nitroGLYcerin (NITROSTAT) 0.4 MG sublingual tablet For chest pain place 1 tablet under the tongue every 5 minutes for 3 doses. If symptoms persist 5 minutes after 1st dose call 911., Disp-25 tablet, R-1, E-Prescribe      rosuvastatin (CRESTOR) 40 MG tablet Take 1 tablet (40 mg) by mouth daily, Disp-90 tablet, R-3, E-Prescribe      Continuous Blood Gluc Sensor ([employer] [place] [place] 14 DAY SENSOR) MISC 1 Device every 14 days Change sensor as directed every 14 days, Disp-2 each, R-11, E-Prescribe         STOP taking these medications       carvedilol (COREG) 3.125 MG tablet Comments:   Reason for Stopping:         furosemide (LASIX) 20 MG tablet Comments:   Reason for Stopping:         ibuprofen (ADVIL/MOTRIN) 200 MG tablet Comments:   Reason for Stopping:                     Rationale for medication changes:      Furosemide switched to bumetanide for better bioavailability  Spironolactone added given severe cardiomyopathy  Apixaban started for LV ventricular thrombus  Patient is discharged on aspirin, Plavix and apixaban per cardiology recommendations        Consults       CORE CLINIC EVALUATION IP CONSULT  OCCUPATIONAL THERAPY ADULT IP CONSULT  NUTRITION SERVICES ADULT IP CONSULT  CARE MANAGEMENT / SOCIAL WORK IP CONSULT  CARDIOLOGY IP CONSULT  PHARMACY IP CONSULT  PHARMACY IP CONSULT  PHARMACY TEST CLAIM IP CONSULT    Immunizations given this encounter     Most Recent Immunizations   Administered Date(s) Administered     COVID-19,PF,Pfizer (12+ Yrs) 05/08/2021     HepA-Adult 12/13/2016     Typhoid IM 12/13/2016           Anticoagulation Information      Recent INR results: No results for input(s): INR in the last 168 hours.  Warfarin doses (if applicable) or name of other anticoagulant: Eliquis initiated this admission      SIGNIFICANT IMAGING FINDINGS     Results for orders placed or performed during the hospital encounter of 01/13/22   XR Chest 2 Views    Impression    IMPRESSION: Small bilateral  pleural effusions with adjacent atelectasis/infiltrates, right greater than left. No definite pneumothorax. Right midlung fissural thickening/fluid. Mild cardiomegaly with mild central vascular congestion and suspected mild   interstitial edema.   MR Myocardium  Overread    Impression    IMPRESSION:  1. No significant incidental extracardiac findings.  2. Please refer to cardiologist's dictation for the cardiac MRI report.   XR Chest 2 Views    Impression    IMPRESSION: Marked improvement with near complete resolution of the previous pleural effusions and infiltrates in the lower lungs since 01/13/2022. This rapid improvement would favor resolving edema.   XR Skull 1/3 Views    Impression    IMPRESSION: No radiopaque foreign body. No contraindication to MRI on this exam   CT Abdomen Pelvis w/o Contrast    Impression    IMPRESSION:   1.  Moderate amount stool in colon.  2.  No bowel obstruction.  3.  Coronary atherosclerosis.         SIGNIFICANT LABORATORY FINDINGS     Most Recent 3 CBC's:Recent Labs   Lab Test 01/19/22  0256 01/18/22  0532 01/17/22  0537   WBC 5.0 4.5 4.9   HGB 13.6 13.9 12.4*   MCV 93 95 93    192 170     Most Recent 3 BMP's:Recent Labs   Lab Test 01/19/22  1028 01/19/22  0756 01/19/22  0256 01/18/22  0753 01/18/22  0532 01/17/22  0824 01/17/22  0537   NA  --   --  136  --  137  --  135*   POTASSIUM  --   --  4.6  --  5.0  --  4.3   CHLORIDE  --   --  102  --  102  --  102   CO2  --   --  23  --  27  --  25   BUN  --   --  27*  --  18  --  18   CR  --   --  1.38*  --  1.41*  --  1.30   ANIONGAP  --   --  11  --  8  --  8   LISA  --   --  9.2  --  8.7  --  8.1*   * 126* 129*   < > 116   < > 113    < > = values in this interval not displayed.     Most Recent 2 LFT's:Recent Labs   Lab Test 08/09/21  1636 08/01/21  1637   AST 22 51*   ALT 17 21   ALKPHOS 73 94   BILITOTAL 0.3 0.6     Most Recent 3 INR's:Recent Labs   Lab Test 08/01/21  1637   INR 0.96           Discharge Orders         Reason for your hospital stay    Acute hear failure, coronary artery disease     Follow-up and recommended labs and tests     Follow up with primary care provider, Garcia Kaufman, within 7 days for hospital follow- up.  No follow up labs or test are needed.  Follow up with Cardiology to have a repeat echocardiogram (ultrasound of your heart)     Activity    Your activity upon discharge: activity as tolerated     Diet    Follow this diet upon discharge: Orders Placed This Encounter      Snacks/Supplements Adult: Glucerna; Between Meals      Snacks/Supplements Adult: Gelatein sugar-free; Between Meals      Fluid restriction 1800 ML FLUID      Combination Diet Regular Diet; Moderate Consistent Carb (60 g CHO per Meal) Diet; Low Saturated Fat Diet, Low Saturated Fat Na <2400mg Diet       Examination   Physical Exam   Temp:  [97.4  F (36.3  C)-98  F (36.7  C)] 97.4  F (36.3  C)  Pulse:  [56-63] 63  Resp:  [16-20] 16  BP: ()/(63-66) 112/66  SpO2:  [97 %-98 %] 98 %  Wt Readings from Last 1 Encounters:   01/19/22 50.2 kg (110 lb 11.2 oz)       General: No acute distress  CV: Regular rate and rhythm.  Normal S1-S2.  No murmur  Lungs: Clear  Extremities: No edema  Neuro: Awake and alert, grossly nonfocal      Please see EMR for more detailed significant labs, imaging, consultant notes etc.    IArlet MD, personally saw the patient today and spent greater than 30 minutes discharging this patient.    Arlet Salazar MD  [facility]    CC:Garcia Kaufman

## 2022-01-22 PROBLEM — I51.3 MURAL THROMBUS OF HEART: Status: ACTIVE | Noted: 2022-01-01

## 2022-01-22 PROBLEM — R05.9 COUGH: Status: RESOLVED | Noted: 2021-11-08 | Resolved: 2022-01-01

## 2022-01-22 PROBLEM — R06.00 DYSPNEA, UNSPECIFIED TYPE: Status: RESOLVED | Noted: 2021-08-01 | Resolved: 2022-01-01

## 2022-01-22 PROBLEM — R07.9 CHEST PAIN, UNSPECIFIED TYPE: Status: RESOLVED | Noted: 2021-08-01 | Resolved: 2022-01-01

## 2022-01-27 PROBLEM — I25.5 ISCHEMIC CARDIOMYOPATHY: Status: ACTIVE | Noted: 2022-01-01

## 2022-01-27 NOTE — PATIENT INSTRUCTIONS
Av Fernando,    It was a pleasure to see you today at [facility].     My recommendations after this visit include:  - Please follow up with Dr Gupta in 6 weeks   - Please follow up with Deborah Cai in 3 weeks  - I have checked labs and will contact you with results  - Please schedule an echocardiogram in 4-6 weeks  - Continue current medications    Deborah Cai CNP      What is the [facility] Failure [facility]?     The [facility] is a heart failure specialty clinic within Heart Care.  You will work with your cardiologist, nurse practitioner, and nurses to carefully adjust medications and learn how to live with heart failure.  The Heart Failure Program will help you:      Better understand your chronic heart condition    Feel better and avoid hospital stays    Monitoring for Symptoms      Call the Heart Failure Phone Line ([phone]) if you have any of these symptoms:     Increased shortness of breath/shortness of breath at rest    Waking up at night with difficulty breathing    Unable to lie down for sleep due to symptoms or needing to sit upright for sleep    Weight gain of 2 pounds a day for 2 days in a row OR 5 pounds in 1 week    Increased swelling in your ankles or legs    Dizziness or lightheadedness    Medications       Take your medications as prescribed    Bring all your medications in their original bottles to every appointment    Avoid non-steroidal anti-inflammatory medications (Advil, Aleve, Ibuprofen, Naprosyn, Naproxen, Celebrex)    Do not stop taking your medications or begin taking over-the-counter or herbal medications without first talking to your doctor or nurse practitioner    Diet and Lifestyle       Limit sodium/salt to 2000 mg daily   o Read food labels for sodium content  o Do not add salt when cooking or add salt at the table    Weigh yourself every day and record in your daily weight log   o Call if you gain 2  pounds a day for 2 days in a row OR 5 pounds in 1 week  o Bring daily weight log to every appointment    Stay active, pace yourself, listen to your body, and rest when tired    Elevate your legs if they are swollen. Ask about using compression/support stockings    Stop smoking    Lose weight if you are overweight    Avoid drinking alcohol or limit amount    Stay updated on your immunizations including flu and pneumonia vaccines

## 2022-01-27 NOTE — LETTER
1/27/2022    Garcia Kaufman MD  [address]  [place] [address]    RE: Av Fernando       Dear Colleague,     I had the pleasure of seeing Av Fernando in the [facility].        Assessment/Recommendations   Assessment:    1.  Ischemic cardiomyopathy with systolic dysfunction, LVEF 26%, NYHA class II: Compensated.  He states his dyspnea on exertion has improved since hospitalization.  He denies any other acute heart failure symptoms.  He does complain of fatigue and low energy.  His weight has decreased to 108 pounds.  He states his appetite is low.  He is following a low-sodium diet.  2.  CAD: Denies chest pain.  Status post STEMI in August 2021.  Coronary angiogram showed multivessel disease.  He was supposed to meet with cardiothoracic surgery to discuss possible CABG but unfortunately he left AMA.  Plan is to proceed with PCI once LV thrombus has resolved.  He continues aspirin, clopidogrel, Crestor and isosorbide.  3.  LV thrombus: He was started on Eliquis for anticoagulation.  Recheck echocardiogram in 4 weeks    Plan:  1.   Heart failure medications:  - Beta blocker therapy with Metroprolol succinate 12.5 mg twice a day  - Aldosterone blocker therapy with 25 mg daily.    - Diuretic therapy with Bumex 2 mg daily  2.  Continue current medications.  Unable to titrate due to hypotension  3.  BMP pending  4.  Schedule echocardiogram in 4 weeks to reassess LV thrombus  5.  Continue daily weights and low-salt diet    Av Fernando will follow up with Dr. Gupta in 6 weeks and in the heart failure clinic in 3 weeks.     History of Present Illness/Subjective    Mr. Av Fernando is a 61 year old male seen at [facility] heart failure clinic today for continued follow-up.  His wife accompanies him today.  He follows up for ischemic cardiomyopathy with systolic dysfunction.  He was recently hospitalized with shortness of breath.  He was diuresed and symptoms improved.  He had an  echocardiogram and a cardiac stress MRI which showed an ejection fraction of 26% and LV mural thrombus.  He was started on Eliquis for anticoagulation.  It was recommended he follow-up with an echocardiogram in 4 to 6 weeks to determine if thrombus has resolved.  Once resolved will plan for PCI.  He had a STEMI in August 2021 and was found to have multivessel disease.  He was supposed to meet with cardiothoracic surgery to discuss CABG but he left AMA prior.  He has a past medical history significant for CAD, hyperlipidemia, STEMI, LV thrombus, PVD, renal insufficiency, diabetes.    Today, he complains of fatigue, weakness and low appetite.  He states his dyspnea on exertion has improved.  He denies orthopnea, PND, edema or chest pain.  He denies lightheadedness, orthopnea, PND, palpitations, chest pain, abdominal fullness/bloating and lower extremity edema.      He is monitoring home weights which have decreased from 112 pounds to 108 pounds.  He is following a low sodium diet.      Cardiac stress MRI: 1/18/2022  FINAL IMPRESSION   ==========================================================================================================     1.  Pharmacological Regadenoson stress cardiac MRI is positive for inducible myocardial ischemia in mid to  distal inferoseptal segments, mid to distal anterior segments, mid inferolateral segment. These findings  are indicative multiple vessel disease.  2.  Pharmacological Regadenoson stress ECG is non-diagnostic for inducible myocardial ischemia due to  baseline ECG abnormality.  3.  There is almost transmural myocardial infarction in inferolateral walls. The rest myocardium is viable.  4.  There is an apical mural thrombus formation (1.5 x 1.2 cm).  It is very mild mobile.  5.  Left ventricle is mildly enlarged. LV wall thickness is normal.  There is severe global hypokinesis  with akinesis in inferolateral wall, apex and distal anteroseptal segment. The calculated left  ventricular  systolic ejection fraction is 26%.   T1 mapping value is 1029 ms.  6.  RV cavity size is normal. RV systolic function is normal.  7.  Moderately enlarged left atrium.  8.  Moderate mitral valve regurgitation.     Physical Examination Review of Systems   /68 (BP Location: Left arm, Patient Position: Sitting, Cuff Size: Adult Regular)   Pulse 68   Resp 16   Wt 49.4 kg (109 lb)   SpO2 97%   BMI 19.94 kg/m    Body mass index is 19.94 kg/m .  Wt Readings from Last 3 Encounters:   01/27/22 49.4 kg (109 lb)   01/19/22 50.2 kg (110 lb 11.2 oz)   11/29/21 54.5 kg (120 lb 1 oz)       General Appearance:   no acute distress   ENT/Mouth: membranes moist, no oral lesions or bleeding gums.      EYES:  no scleral icterus, normal conjunctivae   Neck: no carotid bruits or thyromegaly   Chest/Lungs:   lungs are clear to auscultation, no rales or wheezing, equal chest wall expansion    Cardiovascular:   Regular. Normal first and second heart sounds with no murmurs, rubs, or gallops; Jugular venous pressure normal, no edema bilaterally    Abdomen:  no organomegaly, masses, bruits, or tenderness; bowel sounds are present   Extremities: no cyanosis or clubbing   Skin: no xanthelasma, warm.    Neurologic: normal  bilateral, no tremors     Psychiatric: alert and oriented x3    Enc Vitals  BP: 102/68  Pulse: 68  Resp: 16  SpO2: 97 %  Weight: 49.4 kg (109 lb)                                         Medical History  Surgical History Family History Social History   Past Medical History:   Diagnosis Date     Congestive heart failure (H) 08/2021    ischemic CM with LVEF 30-35%     Coronary artery disease 08/2021    STEMI with multivessel CAD on angiography     Diabetes mellitus (H)      Hyperlipidemia      Hypertension      Myocardial infarction (H) 08/2021    inferior STEMI    Past Surgical History:   Procedure Laterality Date     CV CORONARY ANGIOGRAM N/A 8/1/2021    Procedure: Coronary Angiogram;  Surgeon: Moses  MD Deidre;  Location: [facility] CATH LAB CV     CV LEFT VENTRICULOGRAM N/A 8/1/2021    Procedure: Left Ventriculogram;  Surgeon: Deidre Lopes MD;  Location: [facility] CATH LAB CV     OTHER SURGICAL HISTORY      LEG TRAUMA    No family history on file. Social History     Socioeconomic History     Marital status:      Spouse name: Not on file     Number of children: Not on file     Years of education: Not on file     Highest education level: Not on file   Occupational History     Not on file   Tobacco Use     Smoking status: Never Smoker     Smokeless tobacco: Never Used   Substance and Sexual Activity     Alcohol use: No     Drug use: No     Sexual activity: Yes     Partners: Female     Birth control/protection: None   Other Topics Concern     Parent/sibling w/ CABG, MI or angioplasty before 65F 55M? Not Asked   Social History Narrative     Not on file     Social Determinants of Health     Financial Resource Strain: Not on file   Food Insecurity: Not on file   Transportation Needs: Not on file   Physical Activity: Not on file   Stress: Not on file   Social Connections: Not on file   Intimate Partner Violence: Not on file   Housing Stability: Not on file          Medications  Allergies   Current Outpatient Medications   Medication Sig Dispense Refill     Apixaban Starter Pack (ELIQUIS DVT/PE STARTER PACK) 5 MG TBPK Take 10 mg by mouth 2 times daily for 7 days, THEN 5 mg 2 times daily for 23 days. 74 each 0     aspirin (ASA) 81 MG EC tablet Take 1 tablet (81 mg) by mouth daily 90 tablet 3     bumetanide (BUMEX) 2 MG tablet Take 1 tablet (2 mg) by mouth daily 90 tablet 1     clopidogrel (PLAVIX) 75 MG tablet Take 1 tablet (75 mg) by mouth daily 90 tablet 3     Continuous Blood Gluc Sensor ([employer]YLE KATEI 14 DAY SENSOR) [facility] [place] Device every 14 days Change sensor as directed every 14 days 2 each 11     insulin lispro (HUMALOG KWIKPEN) 100 UNIT/ML (1 unit dial) KWIKPEN Inject 8-10 Units Subcutaneous 3 times  daily (before meals) 15 mL 11     isosorbide mononitrate (ISMO/MONOKET) 10 MG tablet Take 1 tablet (10 mg) by mouth daily 90 tablet 1     LANTUS SOLOSTAR 100 UNIT/ML soln INJECT 24 UNITS UNDER THE SKIN AT BEDTIME (Patient taking differently: 20-24 Units INJECT 24 UNITS UNDER THE SKIN AT BEDTIME) 15 mL 11     metoprolol succinate ER (TOPROL-XL) 25 MG 24 hr tablet Take 0.5 tablets (12.5 mg) by mouth daily 45 tablet 3     nitroGLYcerin (NITROSTAT) 0.4 MG sublingual tablet For chest pain place 1 tablet under the tongue every 5 minutes for 3 doses. If symptoms persist 5 minutes after 1st dose call 911. 25 tablet 1     rosuvastatin (CRESTOR) 40 MG tablet Take 1 tablet (40 mg) by mouth daily 90 tablet 3     spironolactone (ALDACTONE) 25 MG tablet Take 1 tablet (25 mg) by mouth daily 90 tablet 1    Allergies   Allergen Reactions     Januvia [Sitagliptin] Unknown     HEADACHE     Trulicity [Dulaglutide] Dizziness     Weak and muscle weak         Lab Results    Chemistry/lipid CBC Cardiac Enzymes/BNP/TSH/INR   Lab Results   Component Value Date    CHOL 176 08/02/2021    HDL 53 08/02/2021    TRIG 70 08/02/2021    BUN 27 (H) 01/19/2022     01/19/2022    CO2 23 01/19/2022    Lab Results   Component Value Date    WBC 5.0 01/19/2022    HGB 13.6 01/19/2022    HCT 39.5 (L) 01/19/2022    MCV 93 01/19/2022     01/19/2022    Lab Results   Component Value Date    TROPONINI 0.06 01/14/2022    BNP 2,494 (H) 01/13/2022    INR 0.96 08/01/2021             This note has been dictated using voice recognition software. Any grammatical, typographical, or context distortions are unintentional and inherent to the software    30 minutes spent on the date of encounter doing chart review, review of outside records, review of test results, interpretation with above tests, patient visit, documentation and discussion with family.        Hospital Follow-up Visit:    Hospital/Nursing Home/IP Rehab Facility: [facility]  Hospital  Date of Admission: 1/13/2022  Date of Discharge: 1/19/2022  Reason(s) for Admission: acute heart failure      Was your hospitalization related to COVID-19? No   Problems taking medications regularly:  None  Medication changes since discharge: None  Problems adhering to non-medication therapy:  None    Summary of hospitalization:  [facility] discharge summary reviewed  Diagnostic Tests/Treatments reviewed.  Follow up needed: none  Other Healthcare Providers Involved in Patient s Care:         None  Update since discharge: improved.   Post Discharge Medication Reconciliation: discharge medications reconciled, continue medications without change.  Plan of care communicated with patient and family                      Thank you for allowing me to participate in the care of your patient.      Sincerely,     ASHANTI Palma CNP     [facility]  cc:   No referring provider defined for this encounter.

## 2022-01-27 NOTE — PROGRESS NOTES
Assessment/Recommendations   Assessment:    1.  Ischemic cardiomyopathy with systolic dysfunction, LVEF 26%, NYHA class II: Compensated.  He states his dyspnea on exertion has improved since hospitalization.  He denies any other acute heart failure symptoms.  He does complain of fatigue and low energy.  His weight has decreased to 108 pounds.  He states his appetite is low.  He is following a low-sodium diet.  2.  CAD: Denies chest pain.  Status post STEMI in August 2021.  Coronary angiogram showed multivessel disease.  He was supposed to meet with cardiothoracic surgery to discuss possible CABG but unfortunately he left AMA.  Plan is to proceed with PCI once LV thrombus has resolved.  He continues aspirin, clopidogrel, Crestor and isosorbide.  3.  LV thrombus: He was started on Eliquis for anticoagulation.  Recheck echocardiogram in 4 weeks    Plan:  1.   Heart failure medications:  - Beta blocker therapy with Metroprolol succinate 12.5 mg twice a day  - Aldosterone blocker therapy with 25 mg daily.    - Diuretic therapy with Bumex 2 mg daily  2.  Continue current medications.  Unable to titrate due to hypotension  3.  BMP pending  4.  Schedule echocardiogram in 4 weeks to reassess LV thrombus  5.  Continue daily weights and low-salt diet    Av Fernando will follow up with Dr. Gupta in 6 weeks and in the heart failure clinic in 3 weeks.     History of Present Illness/Subjective    Mr. Av Fernando is a 61 year old male seen at [facility] heart failure clinic today for continued follow-up.  His wife accompanies him today.  He follows up for ischemic cardiomyopathy with systolic dysfunction.  He was recently hospitalized with shortness of breath.  He was diuresed and symptoms improved.  He had an echocardiogram and a cardiac stress MRI which showed an ejection fraction of 26% and LV mural thrombus.  He was started on Eliquis for anticoagulation.  It was recommended he follow-up with an echocardiogram  in 4 to 6 weeks to determine if thrombus has resolved.  Once resolved will plan for PCI.  He had a STEMI in August 2021 and was found to have multivessel disease.  He was supposed to meet with cardiothoracic surgery to discuss CABG but he left AMA prior.  He has a past medical history significant for CAD, hyperlipidemia, STEMI, LV thrombus, PVD, renal insufficiency, diabetes.    Today, he complains of fatigue, weakness and low appetite.  He states his dyspnea on exertion has improved.  He denies orthopnea, PND, edema or chest pain.  He denies lightheadedness, orthopnea, PND, palpitations, chest pain, abdominal fullness/bloating and lower extremity edema.      He is monitoring home weights which have decreased from 112 pounds to 108 pounds.  He is following a low sodium diet.      Cardiac stress MRI: 1/18/2022  FINAL IMPRESSION   ==========================================================================================================     1.  Pharmacological Regadenoson stress cardiac MRI is positive for inducible myocardial ischemia in mid to  distal inferoseptal segments, mid to distal anterior segments, mid inferolateral segment. These findings  are indicative multiple vessel disease.  2.  Pharmacological Regadenoson stress ECG is non-diagnostic for inducible myocardial ischemia due to  baseline ECG abnormality.  3.  There is almost transmural myocardial infarction in inferolateral walls. The rest myocardium is viable.  4.  There is an apical mural thrombus formation (1.5 x 1.2 cm).  It is very mild mobile.  5.  Left ventricle is mildly enlarged. LV wall thickness is normal.  There is severe global hypokinesis  with akinesis in inferolateral wall, apex and distal anteroseptal segment. The calculated left ventricular  systolic ejection fraction is 26%.   T1 mapping value is 1029 ms.  6.  RV cavity size is normal. RV systolic function is normal.  7.  Moderately enlarged left atrium.  8.  Moderate mitral valve  regurgitation.     Physical Examination Review of Systems   /68 (BP Location: Left arm, Patient Position: Sitting, Cuff Size: Adult Regular)   Pulse 68   Resp 16   Wt 49.4 kg (109 lb)   SpO2 97%   BMI 19.94 kg/m    Body mass index is 19.94 kg/m .  Wt Readings from Last 3 Encounters:   01/27/22 49.4 kg (109 lb)   01/19/22 50.2 kg (110 lb 11.2 oz)   11/29/21 54.5 kg (120 lb 1 oz)       General Appearance:   no acute distress   ENT/Mouth: membranes moist, no oral lesions or bleeding gums.      EYES:  no scleral icterus, normal conjunctivae   Neck: no carotid bruits or thyromegaly   Chest/Lungs:   lungs are clear to auscultation, no rales or wheezing, equal chest wall expansion    Cardiovascular:   Regular. Normal first and second heart sounds with no murmurs, rubs, or gallops; Jugular venous pressure normal, no edema bilaterally    Abdomen:  no organomegaly, masses, bruits, or tenderness; bowel sounds are present   Extremities: no cyanosis or clubbing   Skin: no xanthelasma, warm.    Neurologic: normal  bilateral, no tremors     Psychiatric: alert and oriented x3    Enc Vitals  BP: 102/68  Pulse: 68  Resp: 16  SpO2: 97 %  Weight: 49.4 kg (109 lb)                                         Medical History  Surgical History Family History Social History   Past Medical History:   Diagnosis Date     Congestive heart failure (H) 08/2021    ischemic CM with LVEF 30-35%     Coronary artery disease 08/2021    STEMI with multivessel CAD on angiography     Diabetes mellitus (H)      Hyperlipidemia      Hypertension      Myocardial infarction (H) 08/2021    inferior STEMI    Past Surgical History:   Procedure Laterality Date     CV CORONARY ANGIOGRAM N/A 8/1/2021    Procedure: Coronary Angiogram;  Surgeon: Deidre Lopes MD;  Location: [facility] CV     CV LEFT VENTRICULOGRAM N/A 8/1/2021    Procedure: Left Ventriculogram;  Surgeon: Deidre Lopes MD;  Location: [facility] CATH [facility] CV     OTHER SURGICAL  HISTORY      LEG TRAUMA    No family history on file. Social History     Socioeconomic History     Marital status:      Spouse name: Not on file     Number of children: Not on file     Years of education: Not on file     Highest education level: Not on file   Occupational History     Not on file   Tobacco Use     Smoking status: Never Smoker     Smokeless tobacco: Never Used   Substance and Sexual Activity     Alcohol use: No     Drug use: No     Sexual activity: Yes     Partners: Female     Birth control/protection: None   Other Topics Concern     Parent/sibling w/ CABG, MI or angioplasty before 65F 55M? Not Asked   Social History Narrative     Not on file     Social Determinants of Health     Financial Resource Strain: Not on file   Food Insecurity: Not on file   Transportation Needs: Not on file   Physical Activity: Not on file   Stress: Not on file   Social Connections: Not on file   Intimate Partner Violence: Not on file   Housing Stability: Not on file          Medications  Allergies   Current Outpatient Medications   Medication Sig Dispense Refill     Apixaban Starter Pack (ELIQUIS DVT/PE STARTER PACK) 5 MG TBPK Take 10 mg by mouth 2 times daily for 7 days, THEN 5 mg 2 times daily for 23 days. 74 each 0     aspirin (ASA) 81 MG EC tablet Take 1 tablet (81 mg) by mouth daily 90 tablet 3     bumetanide (BUMEX) 2 MG tablet Take 1 tablet (2 mg) by mouth daily 90 tablet 1     clopidogrel (PLAVIX) 75 MG tablet Take 1 tablet (75 mg) by mouth daily 90 tablet 3     Continuous Blood Gluc Sensor (FREESTYLE KATIE 14 DAY SENSOR) [facility] 1 Device every 14 days Change sensor as directed every 14 days 2 each 11     insulin lispro (HUMALOG KWIKPEN) 100 UNIT/ML (1 unit dial) KWIKPEN Inject 8-10 Units Subcutaneous 3 times daily (before meals) 15 mL 11     isosorbide mononitrate (ISMO/MONOKET) 10 MG tablet Take 1 tablet (10 mg) by mouth daily 90 tablet 1     LANTUS SOLOSTAR 100 UNIT/ML soln INJECT 24 UNITS UNDER THE SKIN AT  BEDTIME (Patient taking differently: 20-24 Units INJECT 24 UNITS UNDER THE SKIN AT BEDTIME) 15 mL 11     metoprolol succinate ER (TOPROL-XL) 25 MG 24 hr tablet Take 0.5 tablets (12.5 mg) by mouth daily 45 tablet 3     nitroGLYcerin (NITROSTAT) 0.4 MG sublingual tablet For chest pain place 1 tablet under the tongue every 5 minutes for 3 doses. If symptoms persist 5 minutes after 1st dose call 911. 25 tablet 1     rosuvastatin (CRESTOR) 40 MG tablet Take 1 tablet (40 mg) by mouth daily 90 tablet 3     spironolactone (ALDACTONE) 25 MG tablet Take 1 tablet (25 mg) by mouth daily 90 tablet 1    Allergies   Allergen Reactions     Januvia [Sitagliptin] Unknown     HEADACHE     Trulicity [Dulaglutide] Dizziness     Weak and muscle weak         Lab Results    Chemistry/lipid CBC Cardiac Enzymes/BNP/TSH/INR   Lab Results   Component Value Date    CHOL 176 08/02/2021    HDL 53 08/02/2021    TRIG 70 08/02/2021    BUN 27 (H) 01/19/2022     01/19/2022    CO2 23 01/19/2022    Lab Results   Component Value Date    WBC 5.0 01/19/2022    HGB 13.6 01/19/2022    HCT 39.5 (L) 01/19/2022    MCV 93 01/19/2022     01/19/2022    Lab Results   Component Value Date    TROPONINI 0.06 01/14/2022    BNP 2,494 (H) 01/13/2022    INR 0.96 08/01/2021             This note has been dictated using voice recognition software. Any grammatical, typographical, or context distortions are unintentional and inherent to the software    30 minutes spent on the date of encounter doing chart review, review of outside records, review of test results, interpretation with above tests, patient visit, documentation and discussion with family.        Hospital Follow-up Visit:    Hospital/Nursing Home/IP Rehab Facility: [facility]  Date of Admission: 1/13/2022  Date of Discharge: 1/19/2022  Reason(s) for Admission: acute heart failure      Was your hospitalization related to COVID-19? No   Problems taking medications regularly:   None  Medication changes since discharge: None  Problems adhering to non-medication therapy:  None    Summary of hospitalization:  [facility] discharge summary reviewed  Diagnostic Tests/Treatments reviewed.  Follow up needed: none  Other Healthcare Providers Involved in Patient s Care:         None  Update since discharge: improved.   Post Discharge Medication Reconciliation: discharge medications reconciled, continue medications without change.  Plan of care communicated with patient and family

## 2022-01-27 NOTE — TELEPHONE ENCOUNTER
Received call from [facility] for critical lab of GLUCOSE 605.     Huddled with Deborah, she recommends patient be seen in nearest ER right away. Patient should not drive himself there.     Called pt with [facility]  ID# [id number].     Relayed Critical Lab of Glucose 605. Advised pt to go to the closest ER now.     Pt states that he doesn't feel that blood test is accurate because he feels fine Pt states that he already has a lot of medical bills and doesn't want to go to the ER again for treatment of his diabetes. Pt does not have own glucometer, because of insurance reasons, to check hiss BG.     Writer explained to patient that with a glucose this high is is essential that he be seen in the ER right away for treatment; forgoing treatment could result in a coma and death. Pt refused to go to ER.     Requested to speak to patients wife Rosemarie Trejo. Explained critical lab and recommendation to go to ER now. She will take patient to [facility] ER now. Confirmed that patient will not drive himself, wife will drive him to ER.    Routing as FYI to pt PCP Dr. Kaufman. Pt states that he cannot afford glucometer and has very little insight into diabetes dx. He feels his BG is high because he hasn't eaten enough today. A diabetic educator would be very helpful.        Nena Ramirez RN

## 2022-01-27 NOTE — ED PROVIDER NOTES
EMERGENCY DEPARTMENT ENCOUNTER      NAME: Av Fernando  AGE: 61 year old male  YOB: 1960  MRN: [id number]  EVALUATION DATE & TIME: No admission date for patient encounter.    PCP: Garcia Kaufman    ED PROVIDER: Nikki Bartlett M.D.      Chief Complaint   Patient presents with     Hyperglycemia         FINAL IMPRESSION:  1. Hyperglycemia          ED COURSE & MEDICAL DECISION MAKING:    ED Course as of 01/27/22 2116   Thu Jan 27, 2022   1755 Pt with hyperglycemia with glucose 363 with no DKA with Co2 289 and anion gap 8 reassuringly (normal range for him in general), will d/w paitent situation now   1815 Pt neurovascularly intact, normal VS and with hyperglycemia with h/o no glucometer or ability to monitor his glucose in outpatient setting, pt amenable to one extra dose insulin SQ here and received 1L NS bolus with slight dehydration with bUN 48 and creatinine 1.97, declines further testing at this time, amenable to f/u with PMD to investigate ways to get ahold of a glucometer as we have no glucometer available to give him at this time. Patient discharged after being provided with extensive anticipatory guidance and given return precautions, importance of PMD follow-up emphasized.    1834 Glucose improved 232 prior to discharge on glucometer which is very much reassuring       Pertinent Labs & Imaging studies reviewed. (See chart for details)    N95 worn  A face shield was worn also  COVID PPE      At the conclusion of the encounter I discussed the results of all of the tests and the disposition. The questions were answered. The patient or family acknowledged understanding and was agreeable with the care plan.     MEDICATIONS GIVEN IN THE EMERGENCY:  Medications   0.9% sodium chloride BOLUS (0 mLs Intravenous Stopped 1/27/22 1818)   insulin aspart (NovoLOG) injection (RAPID ACTING) (10 Units Subcutaneous Given 1/27/22 1914)       NEW PRESCRIPTIONS STARTED AT TODAY'S ER VISIT  Discharge Medication List as  of 1/27/2022  7:16 PM             =================================================================    HPI    : Yes(PHONE) language: Dominguez Fernando is a 61 year old male with PMHx of coronary artery disease, diabetes mellitus type 2,stents, Eliquis, ischemic cardiopathy, EF of 25%, and diuretic therapy who presents to the ED today via private car with complaint of hyperglycemia.     Patient reports that his glucometer broke at the beginning of 2021 and he has not gotten a new one ever since. He has moves from dosing his insulin on his sliding scale to random administration. Patient remarks that when he foes with a higher dose he feels tired so he sticks with lower doses in general. He has an appointment with his primary care provider on Friday(2/4/22) with hopes of changing his medical insurance plan. Patient would like [employer] assistant to afford his medical devices. He has no plan to get a new glucometer tonight but is willing to speak with his primary care provider about this. Patient went to see his cardiologist today and screening labs found him to have a glucose of over 600. Nurse called him while at home today and advised that he come into the ED for further evaluation. He is currently asymptomatic and says he feels completely well. Patient denies recent illness, cough, fever, nausea, vomiting, diarrhea, increased thirst, urinary symptoms, rash, or any other symptoms at this time. He does not know his baseline glucose.     REVIEW OF SYSTEMS   All other systems reviewed and are negative except as noted above in HPI.    PAST MEDICAL HISTORY:  Past Medical History:   Diagnosis Date     Congestive heart failure (H) 08/2021    ischemic CM with LVEF 30-35%     Coronary artery disease 08/2021    STEMI with multivessel CAD on angiography     Diabetes mellitus (H)      Hyperlipidemia      Hypertension      Myocardial infarction (H) 08/2021    inferior STEMI       PAST SURGICAL HISTORY:  Past Surgical  History:   Procedure Laterality Date     CV CORONARY ANGIOGRAM N/A 8/1/2021    Procedure: Coronary Angiogram;  Surgeon: Deidre Lopes MD;  Location: [facility] CATH LAB CV     CV LEFT VENTRICULOGRAM N/A 8/1/2021    Procedure: Left Ventriculogram;  Surgeon: Deidre Lopes MD;  Location: [facility] CATH LAB CV     OTHER SURGICAL HISTORY      LEG TRAUMA       CURRENT MEDICATIONS:    Apixaban Starter Pack (ELIQUIS DVT/PE STARTER PACK) 5 MG TBPK  aspirin (ASA) 81 MG EC tablet  bumetanide (BUMEX) 2 MG tablet  clopidogrel (PLAVIX) 75 MG tablet  Continuous Blood Gluc Sensor (FREESTYLE KATIE 14 DAY SENSOR) MISC  insulin lispro (HUMALOG KWIKPEN) 100 UNIT/ML (1 unit dial) KWIKPEN  isosorbide mononitrate (ISMO/MONOKET) 10 MG tablet  LANTUS SOLOSTAR 100 UNIT/ML soln  metoprolol succinate ER (TOPROL-XL) 25 MG 24 hr tablet  nitroGLYcerin (NITROSTAT) 0.4 MG sublingual tablet  rosuvastatin (CRESTOR) 40 MG tablet  spironolactone (ALDACTONE) 25 MG tablet        ALLERGIES:  Allergies   Allergen Reactions     Januvia [Sitagliptin] Unknown     HEADACHE     Trulicity [Dulaglutide] Dizziness     Weak and muscle weak       FAMILY HISTORY:  No family history on file.    SOCIAL HISTORY:   Social History     Socioeconomic History     Marital status:      Spouse name: Not on file     Number of children: Not on file     Years of education: Not on file     Highest education level: Not on file   Occupational History     Not on file   Tobacco Use     Smoking status: Never Smoker     Smokeless tobacco: Never Used   Substance and Sexual Activity     Alcohol use: No     Drug use: No     Sexual activity: Yes     Partners: Female     Birth control/protection: None   Other Topics Concern     Parent/sibling w/ CABG, MI or angioplasty before 65F 55M? Not Asked   Social History Narrative     Not on file     Social Determinants of Health     Financial Resource Strain: Not on file   Food Insecurity: Not on file   Transportation Needs: Not on file    Physical Activity: Not on file   Stress: Not on file   Social Connections: Not on file   Intimate Partner Violence: Not on file   Housing Stability: Not on file       VITALS:  Patient Vitals for the past 24 hrs:   BP Temp Temp src Pulse Resp SpO2 Weight   01/27/22 1917 128/69 -- -- 58 16 98 % --   01/27/22 1512 132/81 98  F (36.7  C) Oral 63 16 98 % 49 kg (108 lb)       PHYSICAL EXAM    GENERAL: Awake, alert.  In no acute distress.   HEENT: Normocephalic, atraumatic.  Pupils equal, round and reactive.  Conjunctiva normal.  EOMI.  NECK: No stridor or apparent deformity.  PULMONARY: Symmetrical breath sounds without distress.  Lungs clear to auscultation bilaterally without wheezes, rhonchi or rales.  CARDIO: Regular rate and rhythm.  No significant murmur, rub or gallop.  Radial pulses strong and symmetrical.  ABDOMINAL: Abdomen soft, non-distended and non-tender to palpation.  No CVAT, no palpable hepatosplenomegaly.  EXTREMITIES: No lower extremity swelling or edema.    NEURO: Alert and oriented to person, place and time.  Cranial nerves grossly intact.  No focal motor deficit.  PSYCH: Normal mood and affect  SKIN: No rashes      LAB:  All pertinent labs reviewed and interpreted.  Results for orders placed or performed during the hospital encounter of 01/27/22   Basic metabolic panel   Result Value Ref Range    Sodium 133 (L) 136 - 145 mmol/L    Potassium 4.7 3.5 - 5.0 mmol/L    Chloride 96 (L) 98 - 107 mmol/L    Carbon Dioxide (CO2) 29 22 - 31 mmol/L    Anion Gap 8 5 - 18 mmol/L    Urea Nitrogen 48 (H) 8 - 22 mg/dL    Creatinine 1.97 (H) 0.70 - 1.30 mg/dL    Calcium 10.0 8.5 - 10.5 mg/dL    Glucose 363 (H) 70 - 125 mg/dL    GFR Estimate 38 (L) >60 mL/min/1.73m2   Glucose by meter   Result Value Ref Range    GLUCOSE BY METER POCT 481 (H) 70 - 99 mg/dL   Result Value Ref Range    Troponin I 0.02 0.00 - 0.29 ng/mL   CBC with platelets and differential   Result Value Ref Range    WBC Count 5.4 4.0 - 11.0 10e3/uL     RBC Count 4.62 4.40 - 5.90 10e6/uL    Hemoglobin 14.8 13.3 - 17.7 g/dL    Hematocrit 42.5 40.0 - 53.0 %    MCV 92 78 - 100 fL    MCH 32.0 26.5 - 33.0 pg    MCHC 34.8 31.5 - 36.5 g/dL    RDW 11.8 10.0 - 15.0 %    Platelet Count 209 150 - 450 10e3/uL    % Neutrophils 51 %    % Lymphocytes 40 %    % Monocytes 7 %    % Eosinophils 1 %    % Basophils 1 %    % Immature Granulocytes 0 %    NRBCs per 100 WBC 0 <1 /100    Absolute Neutrophils 2.8 1.6 - 8.3 10e3/uL    Absolute Lymphocytes 2.2 0.8 - 5.3 10e3/uL    Absolute Monocytes 0.4 0.0 - 1.3 10e3/uL    Absolute Eosinophils 0.1 0.0 - 0.7 10e3/uL    Absolute Basophils 0.1 0.0 - 0.2 10e3/uL    Absolute Immature Granulocytes 0.0 <=0.4 10e3/uL    Absolute NRBCs 0.0 10e3/uL   Extra Red Top Tube   Result Value Ref Range    Hold Specimen JIC    Extra Green Top (Lithium Heparin) Tube   Result Value Ref Range    Hold Specimen JIC    Extra Purple Top Tube   Result Value Ref Range    Hold Specimen JIC              I, Demetris Abernathy, am serving as a scribe to document services personally performed by Dr. Nikki Bartlett based on my observation and the provider's statements to me. I, Nikki Bartlett MD attest that Demetris Abernathy is acting in a scribe capacity, has observed my performance of the services and has documented them in accordance with my direction.       Nikki Bartlett MD  01/27/22 2117

## 2022-01-27 NOTE — ED NOTES
ED Provider In Triage Note  [facility]  Encounter Date: Jan 27, 2022    Chief Complaint   Patient presents with     Hyperglycemia       Brief HPI:   Av Fernando is a 61 year old male presenting to the Emergency Department with a chief complaint of elevated blood sugars.  Patient reports that he was in clinic today and primary care sent him to the emergency department due to severely high blood glucose.  Patient denies additional complaints.    Brief Physical Exam:  /81   Pulse 63   Temp 98  F (36.7  C) (Oral)   Resp 16   Wt 49 kg (108 lb)   SpO2 98%   BMI 19.75 kg/m    General: Non-toxic appearing  HEENT: Atraumatic  Resp: No respiratory distress  Abdomen: Non-peritoneal  Neuro: Alert, oriented, answers questions appropriately  Psych: Behavior appropriate      Plan Initiated in Triage:  Orders Placed This Encounter     Basic metabolic panel       PIT Dispo:   Return to lobby while awaiting workup and ED bed availability    DANO LOZANO DO on 1/27/2022 at 3:11 PM    Patient was evaluated by the Physician in Triage due to a limitation of available rooms in the Emergency Department. A plan of care was discussed based on the information obtained on the initial evaluation and patient was consuled to return back to the Emergency Department lobby after this initial evalutaiton until results were obtained or a room became available in the Emergency Department. Patient was counseled not to leave prior to receiving the results of their workup.        Dano Lozano DO  01/27/22 1513

## 2022-01-27 NOTE — ED TRIAGE NOTES
Patient arrives to triage from clinic due to high blood glucose.  Patient denies symptomatic concerns.  Patient was recently admitted here last week due to shortness of breath, denies shortness of breath at this time.  Patient is alert and oriented x4.

## 2022-02-04 NOTE — PROGRESS NOTES
PCA    Laundry  Cooking  Cleaning  Transportation (preferably)   Shopping   Help at times bed and chair transfer     Name:              OLVIN RICK                                   :                  1960                                  Scan Date:   2022 11:54:34                                   Electronically signed by Flory Orlando  16:19:25     VITALS   ==========================================================================================================     HEIGHT: 62.01 in    (157.50 cm)  WEIGHT: 109.79 lbs    (49.80 kgs)  BSA: 1.48 m^2     FINAL IMPRESSION   ==========================================================================================================     1.  Pharmacological Regadenoson stress cardiac MRI is positive for inducible myocardial ischemia in mid to  distal inferoseptal segments, mid to distal anterior segments, mid inferolateral segment. These findings  are indicative multiple vessel disease.  2.  Pharmacological Regadenoson stress ECG is non-diagnostic for inducible myocardial ischemia due to  baseline ECG abnormality.  3.  There is almost transmural myocardial infarction in inferolateral walls. The rest myocardium is viable.  4.  There is an apical mural thrombus formation (1.5 x 1.2 cm).  It is very mild mobile.  5.  Left ventricle is mildly enlarged. LV wall thickness is normal.  There is severe global hypokinesis  with akinesis in inferolateral wall, apex and distal anteroseptal segment. The calculated left ventricular  systolic ejection fraction is 26%.   T1 mapping value is 1029 ms.  6.  RV cavity size is normal. RV systolic function is normal.  7.  Moderately enlarged left atrium.  8.  Moderate mitral valve regurgitation        Cardiac MRI demonstrated significant inducible ischemia in the mid to distal anteroseptal segments, mid  Distal anterior segments and mid inferolateral segment; almost transmural infarction in the inferolateral walls.  Plan  is for PCI once LV thrombus has resolved.  Patient will need repeat echocardiogram in 4 to 6 weeks and outpatient follow-up with cardiology.    Discharge Medication List as of 1/19/2022  1:51 PM             START taking these medications     Details   Apixaban Starter Pack (ELIQUIS DVT/PE STARTER PACK) 5 MG TBPK Take 10 mg by mouth 2 times daily for 7 days, THEN 5 mg 2 times daily for 23 days., Disp-74 each, R-0, E-Prescribe       bumetanide (BUMEX) 2 MG tablet Take 1 tablet (2 mg) by mouth daily, Disp-30 tablet, R-0, E-Prescribe       metoprolol succinate ER (TOPROL-XL) 25 MG 24 hr tablet Take 0.5 tablets (12.5 mg) by mouth daily, Disp-30 tablet, R-0, E-Prescribe       spironolactone (ALDACTONE) 25 MG tablet Take 1 tablet (25 mg) by mouth daily, Disp-30 tablet, R-0, E-Prescribe                 CONTINUE these medications which have NOT CHANGED     Details   aspirin (ASA) 81 MG EC tablet Take 1 tablet (81 mg) by mouth daily, Disp-90 tablet, R-3, E-Prescribe       clopidogrel (PLAVIX) 75 MG tablet Take 1 tablet (75 mg) by mouth daily, Disp-90 tablet, R-3, E-Prescribe       insulin lispro (HUMALOG KWIKPEN) 100 UNIT/ML (1 unit dial) KWIKPEN Inject 8-10 Units Subcutaneous 3 times daily (before meals), Disp-15 mL, R-11, Historical       isosorbide mononitrate (ISMO/MONOKET) 10 MG tablet Take 1 tablet (10 mg) by mouth daily, Disp-90 tablet, R-1, E-Prescribe       LANTUS SOLOSTAR 100 UNIT/ML soln INJECT 24 UNITS UNDER THE SKIN AT BEDTIME, Disp-15 mL, R-11, NATALIA, E-PrescribeIf Lantus is not covered by insurance, may substitute Basaglar at same dose and frequency.         nitroGLYcerin (NITROSTAT) 0.4 MG sublingual tablet For chest pain place 1 tablet under the tongue every 5 minutes for 3 doses. If symptoms persist 5 minutes after 1st dose call 911., Disp-25 tablet, R-1, E-Prescribe       rosuvastatin (CRESTOR) 40 MG tablet Take 1 tablet (40 mg) by mouth daily, Disp-90 tablet, R-3, E-Prescribe       Continuous Blood Gluc  "Sensor (FREESTYLE KATIE 14 DAY SENSOR) [facility] 1 Device every 14 days Change sensor as directed every 14 days, Disp-2 each, R-11, E-Prescribe                STOP taking these medications         carvedilol (COREG) 3.125 MG tablet Comments:   Reason for Stopping:            furosemide (LASIX) 20 MG tablet Comments:   Reason for Stopping:            ibuprofen (ADVIL/MOTRIN) 200 MG tablet Comments:   Reason for Stopping:                ASSESSMENT & PLAN    Coronary artery disease of native artery with stable angina pectoris (H)  Stopped Carvedilol   StoppedFurosemide 20 mg 1 daily    Metoprolol ER Succinate  12.5 mg BID  this is to take the pressure off your heart     Bumetanide 2 mg 1 DDaily   This is to help to keep the water levels lower in your circulation again to take the pressure off your heart     Added:  Spironolactone 25 mg Daily  For heart support     Increased dose:  Isosorbide mononitrate 10 mg 2 Twice daily 7:30 AM/ 5:00 PM   This is to help keep the heart arteries flowing freely     Clopidogrel 75 mg 1 daily this is a blood thinner  Aspirin 81 mg 1 daily this is a blood thinner     Did not have  Rosuvastatin 40 mg 1 at bedtime this is to help prevent buildup of plaque or stuff in the arteries of the heart     Last NTG 2 weeks Triggered  \"hard time to breathe\"   Have used one yesterday   3 days ago   \"at night Chest was tight>> NTG >> 30 minutes better\"  Went to kitchen hot water >> even better        Walking 1/2 mile every day?? Cannot do it anymore \"can't breathe\"          Av was seen today for hospital f/u.    Diagnoses and all orders for this visit:    Brain Tumor    Acute on chronic systolic congestive heart failure (H)  -     bumetanide (BUMEX) 2 MG tablet; Take 1 tablet (2 mg) by mouth daily  -     Comprehensive metabolic panel (BMP + Alb, Alk Phos, ALT, AST, Total. Bili, TP); Future    Coronary artery disease of native artery of native heart with stable angina pectoris (H)  -     metoprolol " succinate ER (TOPROL-XL) 25 MG 24 hr tablet; Take 0.5 tablets (12.5 mg) by mouth daily  -     Comprehensive metabolic panel (BMP + Alb, Alk Phos, ALT, AST, Total. Bili, TP); Future    Coronary artery disease of native artery with stable angina pectoris, unspecified whether native or transplanted heart (H)  -     aspirin (ASA) 81 MG EC tablet; Take 1 tablet (81 mg) by mouth daily  -     Comprehensive metabolic panel (BMP + Alb, Alk Phos, ALT, AST, Total. Bili, TP); Future    Systolic congestive heart failure, unspecified HF chronicity (H)  -     spironolactone (ALDACTONE) 25 MG tablet; Take 1 tablet (25 mg) by mouth daily  -     Comprehensive metabolic panel (BMP + Alb, Alk Phos, ALT, AST, Total. Bili, TP); Future    ST elevation myocardial infarction (STEMI), unspecified artery (H)  -     clopidogrel (PLAVIX) 75 MG tablet; Take 1 tablet (75 mg) by mouth daily  -     isosorbide mononitrate (ISMO/MONOKET) 10 MG tablet; Take 2 tablets (20 mg) by mouth 2 times daily  -     rosuvastatin (CRESTOR) 40 MG tablet; Take 1 tablet (40 mg) by mouth daily  -     Comprehensive metabolic panel (BMP + Alb, Alk Phos, ALT, AST, Total. Bili, TP); Future    Status post coronary angiogram  -     clopidogrel (PLAVIX) 75 MG tablet; Take 1 tablet (75 mg) by mouth daily  -     Comprehensive metabolic panel (BMP + Alb, Alk Phos, ALT, AST, Total. Bili, TP); Future    Type 2 diabetes mellitus with microalbuminuria, with long-term current use of insulin (H)  -     insulin lispro (HUMALOG KWIKPEN) 100 UNIT/ML (1 unit dial) KWIKPEN; Inject 8-10 Units Subcutaneous 3 times daily (before meals)  -     LANTUS SOLOSTAR 100 UNIT/ML soln; Inject 20-24 Units Subcutaneous At Bedtime INJECT 24 UNITS UNDER THE SKIN AT BEDTIME  -     Comprehensive metabolic panel (BMP + Alb, Alk Phos, ALT, AST, Total. Bili, TP); Future        Patient Instructions   Thank you for coming in Av    Next steps    We have adjusted your medications on the chart    If you are  feeling more short of breath or chest heaviness tightness it is not going away and not responsive to nitroglycerin, this would be a trip to the hospital for further evaluation for your heart    The medications are listed      Return in about 1 month (around 3/4/2022).       PATIENT HEALTH QUESTIONNAIRE-9 (PHQ - 9)    Over the last 2 weeks, how often have you been bothered by any of the following problems?    1. Little interest or pleasure in doing things -      2. Feeling down, depressed, or hopeless -      3. Trouble falling or staying asleep, or sleeping too much -     4. Feeling tired or having little energy -      5. Poor appetite or overeating -      6. Feeling bad about yourself - or that you are a failure or have let yourself or your family down -      7. Trouble concentrating on things, such as reading the newspaper or watching television -     8. Moving or speaking so slowly that other people could have noticed? Or the opposite - being so fidgety or restless that you have been moving around a lot more than usual     9. Thoughts that you would be better off dead or of hurting  yourself in some way     Total Score:       If you checked off any problems, how difficult have these problems made it for you to do your work, take care of things at home, or get along with other people?      Developed by Tahir Lock, Laura Dela Cruz, Rufino Valerio and colleagues, with an educational demetrio from [employer] No permission required to reproduce, translate, display or distribute. permission required to reproduce, translate, display or distribute.    CHIEF COMPLAINT: Av Fernando had concerns including Hospital F/U (ER visit on 1/27 hypergylcemia).    Andreafski: 1.............. SUBJECTIVE:  Av Fernando is a 61 year old male had concerns including Hospital F/U (ER visit on 1/27 hypergylcemia).    1. Brain Tumor    2. Acute on chronic systolic congestive heart failure (H)    3. Coronary artery disease of native  artery of native heart with stable angina pectoris (H)    4. Coronary artery disease of native artery with stable angina pectoris, unspecified whether native or transplanted heart (H)    5. Systolic congestive heart failure, unspecified HF chronicity (H)    6. ST elevation myocardial infarction (STEMI), unspecified artery (H)    7. Status post coronary angiogram    8. Type 2 diabetes mellitus with microalbuminuria, with long-term current use of insulin (H)          Allergies   Allergen Reactions     Januvia [Sitagliptin] Unknown     HEADACHE     Trulicity [Dulaglutide] Dizziness     Weak and muscle weak                         SOCIAL: He  reports that he has never smoked. He has never used smokeless tobacco. He reports that he does not drink alcohol and does not use drugs.    REVIEW OF SYSTEMS:   Family history not pertinent to chief complaint or presenting problem    Review of systems otherwise negative as requested from patient, except   Those positive ROS outlined and discussed in [place].      VITALS:  Vitals:    02/04/22 1351   BP: 98/66   Pulse: 76   Weight: 52.3 kg (115 lb 5 oz)     Wt Readings from Last 3 Encounters:   02/04/22 52.3 kg (115 lb 5 oz)   01/27/22 49 kg (108 lb)   01/27/22 49.4 kg (109 lb)     Body mass index is 21.09 kg/m .    Physical Exam:  No jugular venous distention  Cardiac S1-S2 regular sinus exam not appreciated S3 or 4  Lungs clear  No lower extremity edema  No tremor  Full range of affect  Alert responsive         I spent 30 minutes with this patient.  This includes pre-visit, intra-visit and post visit work an evaluation with regards to Av was seen today for hospital f/u.    Diagnoses and all orders for this visit:    Brain Tumor    Acute on chronic systolic congestive heart failure (H)  -     bumetanide (BUMEX) 2 MG tablet; Take 1 tablet (2 mg) by mouth daily  -     Comprehensive metabolic panel (BMP + Alb, Alk Phos, ALT, AST, Total. Bili, TP); Future    Coronary artery disease of  native artery of native heart with stable angina pectoris (H)  -     metoprolol succinate ER (TOPROL-XL) 25 MG 24 hr tablet; Take 0.5 tablets (12.5 mg) by mouth daily  -     Comprehensive metabolic panel (BMP + Alb, Alk Phos, ALT, AST, Total. Bili, TP); Future    Coronary artery disease of native artery with stable angina pectoris, unspecified whether native or transplanted heart (H)  -     aspirin (ASA) 81 MG EC tablet; Take 1 tablet (81 mg) by mouth daily  -     Comprehensive metabolic panel (BMP + Alb, Alk Phos, ALT, AST, Total. Bili, TP); Future    Systolic congestive heart failure, unspecified HF chronicity (H)  -     spironolactone (ALDACTONE) 25 MG tablet; Take 1 tablet (25 mg) by mouth daily  -     Comprehensive metabolic panel (BMP + Alb, Alk Phos, ALT, AST, Total. Bili, TP); Future    ST elevation myocardial infarction (STEMI), unspecified artery (H)  -     clopidogrel (PLAVIX) 75 MG tablet; Take 1 tablet (75 mg) by mouth daily  -     isosorbide mononitrate (ISMO/MONOKET) 10 MG tablet; Take 2 tablets (20 mg) by mouth 2 times daily  -     rosuvastatin (CRESTOR) 40 MG tablet; Take 1 tablet (40 mg) by mouth daily  -     Comprehensive metabolic panel (BMP + Alb, Alk Phos, ALT, AST, Total. Bili, TP); Future    Status post coronary angiogram  -     clopidogrel (PLAVIX) 75 MG tablet; Take 1 tablet (75 mg) by mouth daily  -     Comprehensive metabolic panel (BMP + Alb, Alk Phos, ALT, AST, Total. Bili, TP); Future    Type 2 diabetes mellitus with microalbuminuria, with long-term current use of insulin (H)  -     insulin lispro (HUMALOG KWIKPEN) 100 UNIT/ML (1 unit dial) KWIKPEN; Inject 8-10 Units Subcutaneous 3 times daily (before meals)  -     LANTUS SOLOSTAR 100 UNIT/ML soln; Inject 20-24 Units Subcutaneous At Bedtime INJECT 24 UNITS UNDER THE SKIN AT BEDTIME  -     Comprehensive metabolic panel (BMP + Alb, Alk Phos, ALT, AST, Total. Bili, TP); Future        Garcia Kaufman MD  [facility]  [facility] [address]  [phone]

## 2022-02-04 NOTE — PATIENT INSTRUCTIONS
Thank you for coming in Av    Next steps    We have adjusted your medications on the chart    If you are feeling more short of breath or chest heaviness tightness it is not going away and not responsive to nitroglycerin, this would be a trip to the hospital for further evaluation for your heart    The medications are listed    Continue Plavix/clopidogrel 75 mg 1 daily this is a blood thinner  Continue Eliquis a apixaban 5 mg twice daily until it is gone as a blood thinner  When this is finished restart aspirin 81 mg daily    Bumetanide/Bumex 2 mg 1 daily is to release water from your system to offload the heart  Metoprolol succinate ER 25 mg  0.5 tablets or one half of a tablet twice daily is to help take the pressure of the heart  Spironolactone 25 mg 1 daily is to help decrease the amount of water in the heart and circulation  Rosuvastatin I have refilled his disclosure cholesterol    The medications you are to stop which are not in your possession today  Work carvedilol, furosemide, ibuprofen    The plan is then to have you see the cardiologist  Repeat your echocardiogram to make sure there is not a clot in the big chamber of your heart  And consider doing an intervention that will open up the blood vessels called a percutaneous intervention or angiography with possible procedure to open up a blood vessel    Keep working on your blood sugars    Your neck step is to see cardiology  We are checking your kidney liver test today    [place]

## 2022-02-04 NOTE — LETTER
February 7, 2022      Av Fernando  [address] [facility] [address]        Dear ,    We are writing to inform you of your test results.    This looks okay Av    I would like to see you back after your angiogram  Remember that you are on a blood thinner to help prevent blood clots    All the medications serve a purpose and allowing your heart to beat more effectively and efficiently    I look forward to seeing you after angiography and hopefully successful intervention    DanL    Resulted Orders   Comprehensive metabolic panel (BMP + Alb, Alk Phos, ALT, AST, Total. Bili, TP)   Result Value Ref Range    Sodium 139 136 - 145 mmol/L    Potassium 4.7 3.5 - 5.0 mmol/L    Chloride 103 98 - 107 mmol/L    Carbon Dioxide (CO2) 25 22 - 31 mmol/L    Anion Gap 11 5 - 18 mmol/L    Urea Nitrogen 40 (H) 8 - 22 mg/dL    Creatinine 1.82 (H) 0.70 - 1.30 mg/dL    Calcium 9.4 8.5 - 10.5 mg/dL    Glucose 127 (H) 70 - 125 mg/dL    Alkaline Phosphatase 55 45 - 120 U/L    AST 26 0 - 40 U/L    ALT 20 0 - 45 U/L    Protein Total 7.2 6.0 - 8.0 g/dL    Albumin 3.4 (L) 3.5 - 5.0 g/dL    Bilirubin Total 0.3 0.0 - 1.0 mg/dL    GFR Estimate 42 (L) >60 mL/min/1.73m2      Comment:      Effective December 21, 2021 eGFRcr in adults is calculated using the 2021 CKD-EPI creatinine equation which includes age and gender ( et al., NEJM, DOI: 10.1056/DALDob4063409)       If you have any questions or concerns, please call the clinic at the number listed above.       Sincerely,      Garcia Kaufman MD

## 2022-02-15 PROBLEM — I50.20 HEART FAILURE WITH REDUCED EJECTION FRACTION (H): Status: ACTIVE | Noted: 2022-01-01

## 2022-02-15 NOTE — PROGRESS NOTES
Assessment/Recommendations   Assessment:    1.  Heart failure with reduced ejection fraction, ischemic cardiomyopathy, LVEF 26%, NYHA class II: Compensated.  He denies any acute heart failure symptoms.  He has mild fatigue and dyspnea on exertion.  His weight has been stable and he continues to try to follow a low-sodium diet.  He has an echocardiogram scheduled next week.  2.  CAD: Denies chest pain.  Status post STEMI in August 2021.  Coronary angiogram showed multivessel disease.  He was supposed to meet with cardiothoracic surgery to discuss possible CABG but unfortunately he left AMA.  Plan is to proceed with PCI once LV thrombus has resolved.  He continues clopidogrel, Crestor and isosorbide.  3.  LV thrombus: He continues Eliquis.  Echocardiogram scheduled February 21 to reassess thrombus    Plan:  1.   Heart failure medications:  - Beta blocker therapy with metoprolol succinate 12.5 mg twice a day  - Aldosterone blocker therapy with spironolactone 25 mg daily.  Last BMP was 2/4/2022 and was stable.  - Diuretic therapy with Bumex 2 mg daily   2.  Echocardiogram scheduled February 21  3.  Continue daily weights and low-sodium diet    Av Fernando will follow up with Dr. Gupta February 28 and in the heart failure clinic in 6 weeks.     History of Present Illness/Subjective    Mr. Av Fernando is a 61 year old male seen at [facility] heart failure clinic today for continued follow-up. He follows up for heart failure with reduced ejection fraction and ischemic cardiomyopathy with systolic dysfunction.  He was hospitalized with shortness of breath in January.  He was diuresed and symptoms improved.  He had an echocardiogram and a cardiac stress MRI which showed an ejection fraction of 26% and LV mural thrombus.  He was started on Eliquis for anticoagulation.  It was recommended he follow-up with an echocardiogram in 4 to 6 weeks to determine if thrombus has resolved.  Once resolved will plan for PCI.   He had a STEMI in August 2021 and was found to have multivessel disease.  He was supposed to meet with cardiothoracic surgery to discuss CABG but he left AMA prior.  He has a past medical history significant for CAD, hyperlipidemia, STEMI, LV thrombus, PVD, renal insufficiency, diabetes.    Today, he states he is doing well.  He has mild fatigue and dyspnea on exertion.  He denies lightheadedness, shortness of breath, orthopnea, PND, palpitations, chest pain, abdominal fullness/bloating and lower extremity edema.      He is monitoring home weights which are stable between 110-115pounds.  He is following a low sodium diet.  He states his cousin passed away last night due to congestive heart failure.        Physical Examination Review of Systems   BP (!) 88/62 (BP Location: Left arm, Patient Position: Sitting, Cuff Size: Adult Regular)   Pulse 62   Resp 16   Wt 52.6 kg (116 lb)   BMI 21.22 kg/m    Body mass index is 21.22 kg/m .  Wt Readings from Last 3 Encounters:   02/15/22 52.6 kg (116 lb)   02/04/22 52.3 kg (115 lb 5 oz)   01/27/22 49 kg (108 lb)       General Appearance:   no acute distress   ENT/Mouth: membranes moist, no oral lesions or bleeding gums.      EYES:  no scleral icterus, normal conjunctivae   Neck: no carotid bruits or thyromegaly   Chest/Lungs:   lungs are clear to auscultation, no rales or wheezing, equal chest wall expansion    Cardiovascular:   Regular. Normal first and second heart sounds with no murmurs, rubs, or gallops; Jugular venous pressure normal, no edema bilaterally    Abdomen:  no organomegaly, masses, bruits, or tenderness; bowel sounds are present   Extremities: no cyanosis or clubbing   Skin: no xanthelasma, warm.    Neurologic: normal  bilateral, no tremors     Psychiatric: alert and oriented x3                                              Medical History  Surgical History Family History Social History   Past Medical History:   Diagnosis Date     Congestive heart failure  (H) 08/2021    ischemic CM with LVEF 30-35%     Coronary artery disease 08/2021    STEMI with multivessel CAD on angiography     Diabetes mellitus (H)      Hyperlipidemia      Hypertension      LV (left ventricular) mural thrombus 01/2022     Myocardial infarction (H) 08/2021    inferior STEMI    Past Surgical History:   Procedure Laterality Date     CV CORONARY ANGIOGRAM N/A 8/1/2021    Procedure: Coronary Angiogram;  Surgeon: Deidre Lopes MD;  Location: [facility] CATH LAB CV     CV LEFT VENTRICULOGRAM N/A 8/1/2021    Procedure: Left Ventriculogram;  Surgeon: Deidre Lopes MD;  Location: [facility] CATH LAB CV     OTHER SURGICAL HISTORY      LEG TRAUMA    History reviewed. No pertinent family history. Social History     Socioeconomic History     Marital status:      Spouse name: Not on file     Number of children: Not on file     Years of education: Not on file     Highest education level: Not on file   Occupational History     Not on file   Tobacco Use     Smoking status: Never Smoker     Smokeless tobacco: Never Used   Substance and Sexual Activity     Alcohol use: No     Drug use: No     Sexual activity: Yes     Partners: Female     Birth control/protection: None   Other Topics Concern     Parent/sibling w/ CABG, MI or angioplasty before 65F 55M? Not Asked   Social History Narrative     Not on file     Social Determinants of Health     Financial Resource Strain: Not on file   Food Insecurity: Not on file   Transportation Needs: Not on file   Physical Activity: Not on file   Stress: Not on file   Social Connections: Not on file   Intimate Partner Violence: Not on file   Housing Stability: Not on file          Medications  Allergies   Current Outpatient Medications   Medication Sig Dispense Refill     apixaban ANTICOAGULANT (ELIQUIS ANTICOAGULANT) 5 MG tablet Take 5 mg by mouth 2 times daily       bumetanide (BUMEX) 2 MG tablet Take 1 tablet (2 mg) by mouth daily 90 tablet 3     clopidogrel (PLAVIX) 75  MG tablet Take 1 tablet (75 mg) by mouth daily 90 tablet 3     Continuous Blood Gluc Sensor (FREESTYLE KATIE 14 DAY SENSOR) MISC 1 Device every 14 days Change sensor as directed every 14 days 2 each 11     insulin lispro (HUMALOG KWIKPEN) 100 UNIT/ML (1 unit dial) KWIKPEN Inject 8-10 Units Subcutaneous 3 times daily (before meals) 30 mL 3     isosorbide mononitrate (ISMO/MONOKET) 10 MG tablet Take 2 tablets (20 mg) by mouth 2 times daily 360 tablet 3     LANTUS SOLOSTAR 100 UNIT/ML soln Inject 20-24 Units Subcutaneous At Bedtime INJECT 24 UNITS UNDER THE SKIN AT BEDTIME 15 mL 3     metoprolol succinate ER (TOPROL-XL) 25 MG 24 hr tablet Take 0.5 tablets (12.5 mg) by mouth daily 45 tablet 3     nitroGLYcerin (NITROSTAT) 0.4 MG sublingual tablet For chest pain place 1 tablet under the tongue every 5 minutes for 3 doses. If symptoms persist 5 minutes after 1st dose call 911. 25 tablet 1     rosuvastatin (CRESTOR) 40 MG tablet Take 1 tablet (40 mg) by mouth daily 90 tablet 3     aspirin (ASA) 81 MG EC tablet Take 1 tablet (81 mg) by mouth daily (Patient not taking: Reported on 2/15/2022) 90 tablet 3     spironolactone (ALDACTONE) 25 MG tablet Take 1 tablet (25 mg) by mouth daily (Patient not taking: Reported on 2/15/2022) 90 tablet 3    Allergies   Allergen Reactions     Januvia [Sitagliptin] Unknown     HEADACHE     Trulicity [Dulaglutide] Dizziness     Weak and muscle weak         Lab Results    Chemistry/lipid CBC Cardiac Enzymes/BNP/TSH/INR   Lab Results   Component Value Date    CHOL 176 08/02/2021    HDL 53 08/02/2021    TRIG 70 08/02/2021    BUN 40 (H) 02/04/2022     02/04/2022    CO2 25 02/04/2022    Lab Results   Component Value Date    WBC 5.4 01/27/2022    HGB 14.8 01/27/2022    HCT 42.5 01/27/2022    MCV 92 01/27/2022     01/27/2022    Lab Results   Component Value Date    TROPONINI 0.02 01/27/2022    BNP 2,494 (H) 01/13/2022    INR 0.96 08/01/2021             This note has been dictated using  voice recognition software. Any grammatical, typographical, or context distortions are unintentional and inherent to the software    30 minutes spent on the date of encounter doing chart review, review of outside records, review of test results, patient visit and documentation.

## 2022-02-15 NOTE — LETTER
2/15/2022    Garcia Kaufman MD  [address]  [place] [address]    RE: Av Fernando       Dear Colleague,     I had the pleasure of seeing Av Fernando in the [facility].        Assessment/Recommendations   Assessment:    1.  Heart failure with reduced ejection fraction, ischemic cardiomyopathy, LVEF 26%, NYHA class II: Compensated.  He denies any acute heart failure symptoms.  He has mild fatigue and dyspnea on exertion.  His weight has been stable and he continues to try to follow a low-sodium diet.  He has an echocardiogram scheduled next week.  2.  CAD: Denies chest pain.  Status post STEMI in August 2021.  Coronary angiogram showed multivessel disease.  He was supposed to meet with cardiothoracic surgery to discuss possible CABG but unfortunately he left AMA.  Plan is to proceed with PCI once LV thrombus has resolved.  He continues clopidogrel, Crestor and isosorbide.  3.  LV thrombus: He continues Eliquis.  Echocardiogram scheduled February 21 to reassess thrombus    Plan:  1.   Heart failure medications:  - Beta blocker therapy with metoprolol succinate 12.5 mg twice a day  - Aldosterone blocker therapy with spironolactone 25 mg daily.  Last BMP was 2/4/2022 and was stable.  - Diuretic therapy with Bumex 2 mg daily   2.  Echocardiogram scheduled February 21  3.  Continue daily weights and low-sodium diet    Av Fernando will follow up with Dr. Gupta February 28 and in the heart failure clinic in 6 weeks.     History of Present Illness/Subjective    Mr. Av Fernando is a 61 year old male seen at [facility] heart failure clinic today for continued follow-up. He follows up for heart failure with reduced ejection fraction and ischemic cardiomyopathy with systolic dysfunction.  He was hospitalized with shortness of breath in January.  He was diuresed and symptoms improved.  He had an echocardiogram and a cardiac stress MRI which showed an ejection fraction of 26% and LV mural  thrombus.  He was started on Eliquis for anticoagulation.  It was recommended he follow-up with an echocardiogram in 4 to 6 weeks to determine if thrombus has resolved.  Once resolved will plan for PCI.  He had a STEMI in August 2021 and was found to have multivessel disease.  He was supposed to meet with cardiothoracic surgery to discuss CABG but he left AMA prior.  He has a past medical history significant for CAD, hyperlipidemia, STEMI, LV thrombus, PVD, renal insufficiency, diabetes.    Today, he states he is doing well.  He has mild fatigue and dyspnea on exertion.  He denies lightheadedness, shortness of breath, orthopnea, PND, palpitations, chest pain, abdominal fullness/bloating and lower extremity edema.      He is monitoring home weights which are stable between 110-115pounds.  He is following a low sodium diet.  He states his cousin passed away last night due to congestive heart failure.        Physical Examination Review of Systems   BP (!) 88/62 (BP Location: Left arm, Patient Position: Sitting, Cuff Size: Adult Regular)   Pulse 62   Resp 16   Wt 52.6 kg (116 lb)   BMI 21.22 kg/m    Body mass index is 21.22 kg/m .  Wt Readings from Last 3 Encounters:   02/15/22 52.6 kg (116 lb)   02/04/22 52.3 kg (115 lb 5 oz)   01/27/22 49 kg (108 lb)       General Appearance:   no acute distress   ENT/Mouth: membranes moist, no oral lesions or bleeding gums.      EYES:  no scleral icterus, normal conjunctivae   Neck: no carotid bruits or thyromegaly   Chest/Lungs:   lungs are clear to auscultation, no rales or wheezing, equal chest wall expansion    Cardiovascular:   Regular. Normal first and second heart sounds with no murmurs, rubs, or gallops; Jugular venous pressure normal, no edema bilaterally    Abdomen:  no organomegaly, masses, bruits, or tenderness; bowel sounds are present   Extremities: no cyanosis or clubbing   Skin: no xanthelasma, warm.    Neurologic: normal  bilateral, no tremors      Psychiatric: alert and oriented x3                                              Medical History  Surgical History Family History Social History   Past Medical History:   Diagnosis Date     Congestive heart failure (H) 08/2021    ischemic CM with LVEF 30-35%     Coronary artery disease 08/2021    STEMI with multivessel CAD on angiography     Diabetes mellitus (H)      Hyperlipidemia      Hypertension      LV (left ventricular) mural thrombus 01/2022     Myocardial infarction (H) 08/2021    inferior STEMI    Past Surgical History:   Procedure Laterality Date     CV CORONARY ANGIOGRAM N/A 8/1/2021    Procedure: Coronary Angiogram;  Surgeon: Deidre Lopes MD;  Location: [facility] CATH LAB CV     CV LEFT VENTRICULOGRAM N/A 8/1/2021    Procedure: Left Ventriculogram;  Surgeon: Deidre Lopes MD;  Location: [facility] CATH LAB CV     OTHER SURGICAL HISTORY      LEG TRAUMA    History reviewed. No pertinent family history. Social History     Socioeconomic History     Marital status:      Spouse name: Not on file     Number of children: Not on file     Years of education: Not on file     Highest education level: Not on file   Occupational History     Not on file   Tobacco Use     Smoking status: Never Smoker     Smokeless tobacco: Never Used   Substance and Sexual Activity     Alcohol use: No     Drug use: No     Sexual activity: Yes     Partners: Female     Birth control/protection: None   Other Topics Concern     Parent/sibling w/ CABG, MI or angioplasty before 65F 55M? Not Asked   Social History Narrative     Not on file     Social Determinants of Health     Financial Resource Strain: Not on file   Food Insecurity: Not on file   Transportation Needs: Not on file   Physical Activity: Not on file   Stress: Not on file   Social Connections: Not on file   Intimate Partner Violence: Not on file   Housing Stability: Not on file          Medications  Allergies   Current Outpatient Medications   Medication Sig Dispense  Refill     apixaban ANTICOAGULANT (ELIQUIS ANTICOAGULANT) 5 MG tablet Take 5 mg by mouth 2 times daily       bumetanide (BUMEX) 2 MG tablet Take 1 tablet (2 mg) by mouth daily 90 tablet 3     clopidogrel (PLAVIX) 75 MG tablet Take 1 tablet (75 mg) by mouth daily 90 tablet 3     Continuous Blood Gluc Sensor (FREESTYLE KATIE 14 DAY SENSOR) MISC 1 Device every 14 days Change sensor as directed every 14 days 2 each 11     insulin lispro (HUMALOG KWIKPEN) 100 UNIT/ML (1 unit dial) KWIKPEN Inject 8-10 Units Subcutaneous 3 times daily (before meals) 30 mL 3     isosorbide mononitrate (ISMO/MONOKET) 10 MG tablet Take 2 tablets (20 mg) by mouth 2 times daily 360 tablet 3     LANTUS SOLOSTAR 100 UNIT/ML soln Inject 20-24 Units Subcutaneous At Bedtime INJECT 24 UNITS UNDER THE SKIN AT BEDTIME 15 mL 3     metoprolol succinate ER (TOPROL-XL) 25 MG 24 hr tablet Take 0.5 tablets (12.5 mg) by mouth daily 45 tablet 3     nitroGLYcerin (NITROSTAT) 0.4 MG sublingual tablet For chest pain place 1 tablet under the tongue every 5 minutes for 3 doses. If symptoms persist 5 minutes after 1st dose call 911. 25 tablet 1     rosuvastatin (CRESTOR) 40 MG tablet Take 1 tablet (40 mg) by mouth daily 90 tablet 3     aspirin (ASA) 81 MG EC tablet Take 1 tablet (81 mg) by mouth daily (Patient not taking: Reported on 2/15/2022) 90 tablet 3     spironolactone (ALDACTONE) 25 MG tablet Take 1 tablet (25 mg) by mouth daily (Patient not taking: Reported on 2/15/2022) 90 tablet 3    Allergies   Allergen Reactions     Januvia [Sitagliptin] Unknown     HEADACHE     Trulicity [Dulaglutide] Dizziness     Weak and muscle weak         Lab Results    Chemistry/lipid CBC Cardiac Enzymes/BNP/TSH/INR   Lab Results   Component Value Date    CHOL 176 08/02/2021    HDL 53 08/02/2021    TRIG 70 08/02/2021    BUN 40 (H) 02/04/2022     02/04/2022    CO2 25 02/04/2022    Lab Results   Component Value Date    WBC 5.4 01/27/2022    HGB 14.8 01/27/2022    HCT 42.5  01/27/2022    MCV 92 01/27/2022     01/27/2022    Lab Results   Component Value Date    TROPONINI 0.02 01/27/2022    BNP 2,494 (H) 01/13/2022    INR 0.96 08/01/2021          This note has been dictated using voice recognition software. Any grammatical, typographical, or context distortions are unintentional and inherent to the software    30 minutes spent on the date of encounter doing chart review, review of outside records, review of test results, patient visit and documentation.

## 2022-02-15 NOTE — PATIENT INSTRUCTIONS
Av Fernando,    It was a pleasure to see you today at [facility] HEART [facility].     My recommendations after this visit include:  - Please follow up with Dr Gupta February 28   - Please follow up with Deborah Cai in 6 weeks  - Echocardiogram scheduled February 21  - Continue current medications    Deborah Cai, CNP

## 2022-02-16 NOTE — PROGRESS NOTES
Known history of diabetes  Known history of coronary vascular disease  Ischemic cardiomyopathy  Reduced ejection fraction most recently 26% by MRI cardiac     MRN:                  [id number]                                  Name:              OLVIN RICK                                   :                  1960                                  Scan Date:   2022 11:54:34                                   Electronically signed by Flory Orlando  16:19:25     VITALS   ==========================================================================================================     HEIGHT: 62.01 in    (157.50 cm)  WEIGHT: 109.79 lbs    (49.80 kgs)  BSA: 1.48 m^2     FINAL IMPRESSION   ==========================================================================================================     1.  Pharmacological Regadenoson stress cardiac MRI is positive for inducible myocardial ischemia in mid to  distal inferoseptal segments, mid to distal anterior segments, mid inferolateral segment. These findings  are indicative multiple vessel disease.  2.  Pharmacological Regadenoson stress ECG is non-diagnostic for inducible myocardial ischemia due to  baseline ECG abnormality.  3.  There is almost transmural myocardial infarction in inferolateral walls. The rest myocardium is viable.  4.  There is an apical mural thrombus formation (1.5 x 1.2 cm).  It is very mild mobile.  5.  Left ventricle is mildly enlarged. LV wall thickness is normal.  There is severe global hypokinesis  with akinesis in inferolateral wall, apex and distal anteroseptal segment. The calculated left ventricular  systolic ejection fraction is 26%.   T1 mapping value is 1029 ms.  6.  RV cavity size is normal. RV systolic function is normal.  7.  Moderately enlarged left atrium.  8.  Moderate mitral valve regurgitation.      Angiography results from 2021 below    Conclusion    61-year-old male with multiple risk factors for  atherosclerosis, but no documented prior cardiac history, presenting with 1 week of anginal symptoms as well as 2-3 episodes of syncope concerning for a cardiac arrhythmic etiology.  Presented to the ER today with chest pain and shortness of breath with EKG showing borderline inferior current of injury.     Urgent coronary angiography showed:     Left main with mild disease  LAD with a 60% proximal stenosis, along diagonal vessel with a 70 to 80% proximal stenosis.  The mid LAD has a 90% stenosis in the distal LAD is subtotally occluded with severe diffuse disease  Left circumflex is nondominant with a 90% proximal stenosis leading into a large first obtuse marginal which has ostial involvement.  The mid circumflex is occluded with a second obtuse marginal filling via faint left to left collaterals.  Right coronary artery is large and dominant with an 80% distal stenosis.  There appeared to be 2 parallel PDA's with the more distal one having an 80% ostial stenosis.  The R MINDY has a 70% stenosis.     LVEF 25% with inferoapical hypokinesis more pronounced  EDP 15mmHg    Most recent cardiology visit encounter 2/15/2022  Assessment:    1.  Heart failure with reduced ejection fraction, ischemic cardiomyopathy, LVEF 26%, NYHA class II: Compensated.  He denies any acute heart failure symptoms.  He has mild fatigue and dyspnea on exertion.  His weight has been stable and he continues to try to follow a low-sodium diet.  He has an echocardiogram scheduled next week.  2.  CAD: Denies chest pain.  Status post STEMI in August 2021.  Coronary angiogram showed multivessel disease.  He was supposed to meet with cardiothoracic surgery to discuss possible CABG but unfortunately he left AMA.  Plan is to proceed with PCI once LV thrombus has resolved.  He continues clopidogrel, Crestor and isosorbide.  3.  LV thrombus: He continues Eliquis.  Echocardiogram scheduled February 21 to reassess thrombus     Plan:  1.   Heart failure  medications:  - Beta blocker therapy with metoprolol succinate 12.5 mg twice a day  - Aldosterone blocker therapy with spironolactone 25 mg daily.  Last BMP was 2/4/2022 and was stable.  - Diuretic therapy with Bumex 2 mg daily   2.  Echocardiogram scheduled February 21  3.  Continue daily weights and low-sodium diet     Av Fernando will follow up with Dr. Gupta February 28 and in the heart failure clinic in 6 weeks.

## 2022-02-23 NOTE — TELEPHONE ENCOUNTER
Called pt with [facility]  Natalia; Unable to reach pt or leave a message.     Called pt emergency contact Rosemarie Trejo; Unable to reach her or leave message.     Called pt other emergency contact, son, Tony. Tony speaks english. Relayed echo results and Dr. Gupta's recommendation to stop taking eliquis as clot has resolved.  Tony verbalized understanding and will have pt stop taking eliquis today.     Reviewed Dr. Gupta's recommendation to proceed with CA with possible PCI. Tony believes his father will be agreeable to procedure but wants to talk it over with him. Writer offered HF RN direct line and to further explain to pt about the procedure and any questions he might have via [facility] . Tony verbalized understanding and will call back HF RN line tomorrow.     Nena Ramirez RN

## 2022-02-23 NOTE — TELEPHONE ENCOUNTER
----- Message from Dougie Gupta MD sent at 2/22/2022  8:38 AM CST -----  He can hold apixaban and proceed with coronary angiogram. I don't think he needs to have a consult visit.    ~ Jennifer  ----- Message -----  From: Deborah Cai, APRN CNP  Sent: 2/22/2022   7:55 AM CST  To: Dougie Gupta MD, [facility],   please inform patient of echocardiogram results. LVEF has not improved much. No thrombus is noted.    Jennifer,   LV thrombus not noted, can  he stop eliquis? Also there was mention of proceeding with PCI once LV thrombus resolved. Should he meet with interventionalist or just proceed?  Thank you

## 2022-02-23 NOTE — TELEPHONE ENCOUNTER
Received call back from patients son Santog; pt will stop taking Eliquis now and is agreeable to Coronary Angiogram with possible PCI.     Case Request ordered.     Will message [facility] to schedule pt.     Nena Ramirez RN

## 2022-02-23 NOTE — TELEPHONE ENCOUNTER
Spoke to referring provider- Dr. Galvez in internal medicine     Pt seen today in IM and new vision loss noted yesterday in left eye.    Pt not able to count fingers on exam today    No red reflux    No eye pain or other eye symptoms    Pt states h/o diabetes with retinopathy at past eye exams-- last exam about 3 years ago and lost f/u secondary to insurance issues.    Vision loss constant/persistant    Scheduled with Dr. Diez at 1230 (offered 0800 or AM appt and patient unable--has another appt)    Pt aware of date/time/location/duration at [facility] and has main clinic number    Ernie Sanchez RN 5:00 PM 02/23/22

## 2022-02-24 NOTE — PROGRESS NOTES
Assessment & Plan     Coronary artery disease of native artery of native heart with stable angina pectoris (H)  EF 30% CURRENTLY EUVOLEMIC AND ASYMPTOMATIC   - metoprolol succinate ER (TOPROL-XL) 25 MG 24 hr tablet  Dispense: 45 tablet; Refill: 3    Sudden visual loss of left eye  Sudden , painless , uniocular loss of vision persistent over 24 hours with opacification of the red reflex in a person with retinopathy .  Not amaurosis fugax, TIA or temporal arteritis, systemic disease, optic neuritis to merit ER visit or prednisone  . Most likely retinal detachment , vitreous hemorrhage requiring urgent ophthalmology consultation . He doesn't have an ophthalmologist so did speak to [facility] triage and they have added him on 8 am tomorrow morning . Did urge him about the seriousness of his vision loss   - Adult Eye Referral    Type 2 diabetes mellitus with microalbuminuria, with long-term current use of insulin (H)  Poorly controlled with endorgan disease , h/o diabetic retinopathy                    Return if symptoms worsen or fail to improve, for Follow up, next scheduled appointment.    Aaliyah Galvez MD  [facility]    Jose Ley is a 61 year yr old  presents for the following health issues sudden loss of vision for over 24 hours left eye . Painless, no redness , and persistent . He can only see bits of yellowish color , zero visual acuity left eye . He does not wear contacts. He woke up the night after getting a resting echocardiogram and he said it was more shadowy black and then almost complete loss . When he rotates his eye to the left and right he can see yellow light . Right eye is normal . Prior h/o diabetic retinopathy , he said he has no ophthalmologist because of lack of insurance and because the last one sent him a bill which he couldn't pay and was sent to collections .   he denies, headache , weakness, numbness , or trauma . He is of hmong ethnicity but is  a good historian in english     [facility]       Patient Active Problem List   Diagnosis     Peripheral Neuropathy     Renal Insufficiency     Arthralgias In Multiple Sites     Delayed Post-traumatic Stress Disorder     Brain Tumor     Hypercholesterolemia     Carpal Tunnel Syndrome     Type 2 diabetes mellitus with microalbuminuria, with long-term current use of insulin (H)     Cervical radiculopathy at C6     Vitamin D deficiency     Brachial neuritis or radiculitis     Peripheral vascular disease (H)     ST elevation myocardial infarction (STEMI), unspecified artery (H)     Syncope, unspecified syncope type     Status post coronary angiogram     ACS (acute coronary syndrome) (H)  8/2021      Coronary artery disease of native artery with stable angina pectoris (H)     Chronic midline low back pain with right-sided sciatica     Acute on chronic systolic congestive heart failure (H)     ELIZABETH (acute kidney injury) (H)     Mural thrombus of heart 1/2022      Ischemic cardiomyopathy     Heart failure with reduced ejection fraction (H)     Current Outpatient Medications   Medication     apixaban ANTICOAGULANT (ELIQUIS ANTICOAGULANT) 5 MG tablet     bumetanide (BUMEX) 2 MG tablet     clopidogrel (PLAVIX) 75 MG tablet     Continuous Blood Gluc Sensor ([place]YLE KATIE 14 DAY SENSOR) MISC     insulin aspart (NOVOLOG FLEXPEN) 100 UNIT/ML pen     insulin lispro (HUMALOG KWIKPEN) 100 UNIT/ML (1 unit dial) KWIKPEN     isosorbide mononitrate (ISMO/MONOKET) 10 MG tablet     metoprolol succinate ER (TOPROL-XL) 25 MG 24 hr tablet     nitroGLYcerin (NITROSTAT) 0.4 MG sublingual tablet     rosuvastatin (CRESTOR) 40 MG tablet     LANTUS SOLOSTAR 100 UNIT/ML soln     No current facility-administered medications for this visit.         Review of Systems   Constitutional, HEENT, cardiovascular, pulmonary, gi and gu systems are negative, except as otherwise noted.      Objective    /68 (BP Location: Left arm, Patient Position: Sitting, Cuff  Size: Adult Regular)   Pulse 63   Wt 52.2 kg (115 lb)   SpO2 97%   BMI 21.03 kg/m    Body mass index is 21.03 kg/m .  Physical Exam   GENERAL: healthy, alert and no distress  NECK: no adenopathy, no asymmetry, masses, or scars and thyroid normal to palpation  RESP: lungs clear to auscultation - no rales, rhonchi or wheezes  CV: regular rate and rhythm, normal S1 S2, no S3 or S4, no murmur, click or rub, no peripheral edema and peripheral pulses strong  Eyes:   Pupillary reflex is present . But not brisk   Eyelids, conjunctiva are normal , digital pressure appears normal   With undilated fundoscopic exam , there is almost complete opacifaction of the red reflex in left eye .   visual acuity : with right eye closed he cannot see my fingers , just a yellowish shadow  Comparative Visual filed exam could not be assessed as he cannot see at all in left eye   He has full ROM of eye 'cranial nerves are normal   No carotid bruit

## 2022-02-24 NOTE — PROGRESS NOTES
I have confirmed the patient's and reviewed Past Medical History, Past Surgical History, Social History, Family History, Problem List, Medication List and agree with Tech note.        CC -   Acute vision loss left eye     INTERVAL HISTORY - Initial visit    PMH -   Av Fernando is a  61 year old year-old patient with history of       Two days ago, noticed significant decline in vision in his left eye.   Saw his PCP yesterday, and was urgently referred here today.     This has never happened before.    Last A1C 10.5 12/10/2021     PAST OCULAR SURGERY  None  PRP hx?     RETINAL IMAGING:  OCT 2.24.22  OD - normal contour, no srf no irf   OS - no view     B-Scan US  Left eye - vitreous hemorrhage, no trd or rrd       ASSESSMENT & PLAN    1. Diabetic retinopathy associated with type 2 diabetes mellitus (H)        # proliferative diabetic retinopathy Right eye   - no PRP no hx of injections   - return to clinic 4 weeks likely FA and possible PRP    # vitreous hemorrhage left eye  # proliferative diabetic retinopathy left eye   - today no view   - Bscan with no retinal detachment   - hx of PRP? No view to confirm    - monitor for now, RTC 3-4 weeks plan for FA if view, and possible PRP visually significant avastin       # cortical cataracts both eyes   - visually significant   - recommend removal once diabetic retinopathy under better control         return to clinic: 3-4 weeks DFE, possible FA if view ot left, and possible PRP       Tressa Babcock MD  Ophthalmology Resident PGY3  [facility]     ATTESTATION:  I have seen and examined the patient with Dr. Tressa Babcock and agree with the findings in this note, as well as the interpretations of the diagnostic tests.    Eula Andino MD PhD.  Professor & Chair

## 2022-02-24 NOTE — TELEPHONE ENCOUNTER
Av Fernando  [id number] [address]  [phone] (home)     Primary cardiologist:  Benson  PCP:  Garcia Kaufman  Procedure:  Cornonary Angiogram with Possible Percutaneous Coronary Intervention   H&P completed by:  2-15-22  Case MD:  ANDREA or MA  Admit date and time:  3-4-22 @ 9am   Case start time:  11am   Ordering MD:  TAB   Diagnosis:  CAD  Anticoagulation: None  CPAP: NO  Bypass Grafts: No  Renal Issues: No  Allergies: Trulicity and Janivia  Diabetic?: Yes  Device?: No  Type:  NO    Angiogram Teaching    Reason for Visit:  Patient seen for pre-procedure education in preparation for: NA  Telephone call to discuss pre-procedure education in preparation for: CA with possible PCI     Procedure Prep:  Cardiologist note dated: 2-15-22  EKG results obtained, dated: 1-27-22  Pertinent test results obtained - Viewable in Epic, dated: 2-24-22  Hemogram results obtained: 1-27-22  Basic Metabolic Panel results obtained: 1-27-22  Lipid Profile results obtained: 8-2-21    Patient Education  Explained indications/risks for diagnostic evaluation, including one or more of the following:  coronary angiogram  Explained indications/risks for therapeutic interventions, including one or more of the following: PTCA.  These risks are in addition to baseline risks associated with a Diagnostic Evaluation.  Patient states understanding of procedure and risks and agrees to proceed.    Pre-procedure instructions  Patient instructed to be NPO after midnight.  Patient instructed to arrange for transportation home following procedure.  Patient instructed to have a responsible adult with them for 24 hours post-procedure.  Post-procedure follow up process.  Conscious sedation discussed.  The patient was sent the pre-procedure letter (If requested) on 2-24-22    Pre-procedure medication instructions  Patient instructed on antiplatelet medication.  Continue medications as scheduled, with a small amount of water on the day of  the procedure unless indicated.  Patient instructed to take 325 mg of Aspirin am of procedure: NO  Other medication: instructed to hold HOLD Lantus and Bumex AM day of procedure. Take HALF normal Lantus dose evening before prodedure. .    *PATIENTS RECORDS AVAILABLE IN [facility] UNLESS OTHERWISE INDICATED*    *Order set was entered on this date: 2-24-22      Patient Active Problem List   Diagnosis     Peripheral Neuropathy     Renal Insufficiency     Arthralgias In Multiple Sites     Delayed Post-traumatic Stress Disorder     Brain Tumor     Hypercholesterolemia     Carpal Tunnel Syndrome     Type 2 diabetes mellitus with microalbuminuria, with long-term current use of insulin (H)     Cervical radiculopathy at C6     Vitamin D deficiency     Brachial neuritis or radiculitis     Peripheral vascular disease (H)     ST elevation myocardial infarction (STEMI), unspecified artery (H)     Syncope, unspecified syncope type     Status post coronary angiogram     ACS (acute coronary syndrome) (H)  8/2021      Coronary artery disease of native artery with stable angina pectoris (H)     Chronic midline low back pain with right-sided sciatica     Acute on chronic systolic congestive heart failure (H)     ELIZABETH (acute kidney injury) (H)     Mural thrombus of heart 1/2022      Ischemic cardiomyopathy     Heart failure with reduced ejection fraction (H)       Current Outpatient Medications   Medication Sig Dispense Refill     apixaban ANTICOAGULANT (ELIQUIS ANTICOAGULANT) 5 MG tablet Take 5 mg by mouth 2 times daily       bumetanide (BUMEX) 2 MG tablet Take 1 tablet (2 mg) by mouth daily 90 tablet 3     clopidogrel (PLAVIX) 75 MG tablet Take 1 tablet (75 mg) by mouth daily 90 tablet 3     Continuous Blood Gluc Sensor (FREESTYLE KATIE 14 DAY SENSOR) MIS 1 Device every 14 days Change sensor as directed every 14 days 2 each 11     insulin aspart (NOVOLOG FLEXPEN) 100 UNIT/ML pen Inject 8-10 Units Subcutaneous 2 times daily (with meals)        insulin lispro (HUMALOG KWIKPEN) 100 UNIT/ML (1 unit dial) KWIKPEN Inject 8-10 Units Subcutaneous 3 times daily (before meals) 30 mL 3     isosorbide mononitrate (ISMO/MONOKET) 10 MG tablet Take 2 tablets (20 mg) by mouth 2 times daily 360 tablet 3     LANTUS SOLOSTAR 100 UNIT/ML soln Inject 20-24 Units Subcutaneous At Bedtime INJECT 24 UNITS UNDER THE SKIN AT BEDTIME (Patient not taking: Reported on 2/23/2022) 15 mL 3     metoprolol succinate ER (TOPROL-XL) 25 MG 24 hr tablet Take 0.5 tablets (12.5 mg) by mouth daily 45 tablet 3     nitroGLYcerin (NITROSTAT) 0.4 MG sublingual tablet For chest pain place 1 tablet under the tongue every 5 minutes for 3 doses. If symptoms persist 5 minutes after 1st dose call 911. 25 tablet 1     rosuvastatin (CRESTOR) 40 MG tablet Take 1 tablet (40 mg) by mouth daily 90 tablet 3       Allergies   Allergen Reactions     Januvia [Sitagliptin] Unknown     HEADACHE     Trulicity [Dulaglutide] Dizziness     Weak and muscle weak       Plan  2-24-22  Patient ready for procedure    Nena Ramirez RN

## 2022-02-24 NOTE — TELEPHONE ENCOUNTER
Called pt with [facility] ; reviewed CA+PCI teaching in detail.     Confirmed with pt that he should check temperature AM of procedure; if it is greater than 100 to contact [facility] at [phone].     Reviewed with pt to HOLD Bumex day of procedure and HOLD insulin day of procedure. Pt to take HALF of Lantus night before procedure. Pt verbalized understanding; he has previously had CA and is very familiar with procedure.     Nena Ramirez RN

## 2022-02-24 NOTE — TELEPHONE ENCOUNTER
----- Message from Mary Coy sent at 2/24/2022 10:35 AM CST -----  Regarding: RE: CA +PCI scheduling  Julian Barrett,     I was able to schedule the pt for his procedure on 3/4. The recap is below, let me know if you have any questions.       CORS POSS PCI WITH MY/MA     9AM ADMIT ON 3/4    H&P ON 2/28 WITH TSB     ALERTS: SHELLONG  NEEDED, PT STOPPED ANTICOAG YESTERDAY     SERVICES NOTIFIED ON 2/24    COVID TESTING ON 2/28 IN [facility]    THANK YOU!   -MARY  ----- Message -----  From: Nena Ramirez RN  Sent: 2/23/2022   3:38 PM CST  To: Mary Coy  Subject: CA +PCI scheduling                               Hi Mary-    I need to schedule Av for Coronary Angio with possible PCI.     Pt is diabetic and is stopping his Eliquis today so no anticoagulations. Pt has had PCI in August and is very familiar with the procedure. Pt is Hmong speaking.     Thanks!     Nena Fernando, Av    Male, 61 year old, 1960    MRN:   [id number]  Phone:   [phone]  Needs : Dominguez

## 2022-02-24 NOTE — NURSING NOTE
"Chief Complaints and History of Present Illnesses   Patient presents with     Vision Loss Left Eye     Chief Complaint(s) and History of Present Illness(es)     Vision Loss Left Eye     Laterality: left eye    Onset: sudden    Onset: days ago    Severity: complete loss of vision    Frequency: constantly    Context: distance vision, mid-range vision and near vision    Associated symptoms: floaters (possibly the \"a lot of dirt\" in OS).  Negative for eye pain and flashes    Treatments tried: no treatments    Response to treatment: no improvement    Pain scale: 0/10              Comments     Patient noted he woke up 2 days ago with complete blackness in his left eye. \"I try to move my left eye around to see if I can see anything, but all I could see was a lot of dirt around my eyeball. I saw my regular internist yesterday, and my doctor said there was no red reflex, and the back of my eye was completely dark brown.\"  Patient noted he had an Echocardiogram on 2/21/22 for his heart. Patient noted he got heart failure from the COVID vaccine.   Patient notes his right eye is only slightly blurry.   Hx Diabetic Retinopathy.     Last BS: 193 this morning   Lab Results       Component                Value               Date                       A1C                      10.4                12/10/2021                 A1C                      10.4                08/02/2021                 A1C                      12.5                05/12/2021                 A1C                      13.4                07/20/2020                 A1C                      13.0                11/12/2019              ESTEBAN Loo 12:53 PM 02/24/2022                  "

## 2022-02-24 NOTE — TELEPHONE ENCOUNTER
[facility] Call Center    Phone Message    May a detailed message be left on voicemail: no     Reason for Call: Appointment Intake    Referring Provider Name: АЛЕКСАНДР COLBY  Diagnosis and/or Symptoms: Sudden visual loss of left     sending to clinic for triage due to red flag symptom and protocols.      Please review  Action Taken: Message routed to:  Other: Eye    Travel Screening: Not Applicable

## 2022-02-28 NOTE — H&P (VIEW-ONLY)
HEART CARE NOTE          Assessment/Recommendations     1. HFrEF/ICM c/b ADHF + LV thromus  Assessment / Plan    Near euvolemia on physical exam; denies HF symptoms; no changes to regimen at this time    GDMT as detailed below - repeat echo negative for LV thrombus    Impella support assisted PCI scheduled for 34/22 with Dr. Fuentes; will plan to continue apixaban and resume apixaban after PCI given that LV systolic function remains depressed at this time      Current Pharmacotherapy AHA Guideline-Directed Medical Therapy   Lisinopril - on hold given ELIZABETH and borderline BP Lisinopril 20 mg twice daily   Metoprolol 12.5 mg daily Carvedilol 25 mg twice daily   Spironolactone 25 mg daily Spironolactone 25 mg once daily   Hydralazine NA Hydralazine 100 mg three times daily   Isosorbide mononitrate 20 mg daily Isosorbide dinitrate 40 mg three times daily   SGLT2 inhibitor:not started Dapagliflozin or Empagliflozin 10 mg daily      2. CAD - stable  Assessment / Plan    Hx of STEMI 8/21 - at which time the pateint left AMA    Known severe multivessel disease - patient declines recommended CABG    Continue ASA, carvedilol, clopidogrel, high intensity rosuvastatin, isosorbide mononitrate, and spironolactone    Denies current chest pain    Cardiac MRI significant inducible ischemia in the mid to distal inferoseptal segments, mid-distal anterior segments and mid inferolateral segment. There is almost transmural infarction in the inferolateral walls while the rest of the myocardium is viable; plan for impella assisted PCI as LV thrombus has resolved     3. Valvular heart disease  Assessment / Plan    Moderate MR and TR - continue oral afterload reduction     4. DM2  Assessment / Plan    Management per PMD    History of Present Illness/Subjective      Mr. Av Fernando is a 61 year old male with a PMHx significant for multivessel coronary artery disease status post STEMI in August 2021, recommended cardiothoracic surgery  "evaluation, however patient left AMA because family member had passed from bypass surgery, type 2 diabetes, dyslipidemia, anemia who presents to [facility] to establish care.     Today, Mr. Fernando denies any acute events or complaints; denies current chest pain or HF symptons     ECG: Personally reviewed. normal EKG, normal sinus rhythm, occasional PVC noted, unifocal.     ECHO (personnaly Reviewed):  1. The left ventricle is mildly enlarged. Left ventricular systolic  performance is moderately reduced. The ejection fraction is estimated to be  30%.  2. There is severe hypokinesis to akinesis of the posterior, lateral, basal  inferior, and apical segments. The the mid to distal inferior segments and  anterior wall are moderately hypokinetic. There is preservation of the basal  anteroseptal and septal segments.  3. There is a 1.2 x 1.4 cm apical mural thrombus.  4. There is trace aortic insufficiency.  5. There is moderate mitral insufficiency.  6. There is mild to moderate tricuspid insufficiency.  7. Normal right ventricular size with low normal right ventricular systolic  performance.  8. There is moderate left atrial enlargement.  9. Right ventricular systolic pressure relative to right atrial pressure is  mildly increased. The pulmonary artery pressure is estimated to be 45-50 mmHg  plus right atrial pressure (IVC is of normal caliber).     When compared to the prior real-time echocardiogram dated 2 August 2021, and  apical mural thrombus is now noted. Otherwise, the findings are fairly similar  on both examinations. Specifically, left ventricular systolic performance and  regional wall motion appears fairly similar on both examinations.          Physical Examination Review of Systems   BP 92/66 (BP Location: Right arm, Patient Position: Sitting, Cuff Size: Adult Regular)   Pulse 72   Resp 20   Ht 1.575 m (5' 2\")   Wt 52.8 kg (116 lb 6.4 oz)   BMI 21.29 kg/m    Body mass index is 21.29 kg/m .  Wt Readings " from Last 3 Encounters:   02/28/22 52.8 kg (116 lb 6.4 oz)   02/23/22 52.2 kg (115 lb)   02/15/22 52.6 kg (116 lb)     General Appearance:   no distress, normal body habitus   ENT/Mouth: membranes moist, no oral lesions or bleeding gums.      EYES:  no scleral icterus, normal conjunctivae   Neck: no carotid bruits or thyromegaly   Chest/Lungs:   lungs are clear to auscultation, no rales or wheezing, equal chest wall expansion    Cardiovascular:   Regular. Normal first and second heart sounds with no murmurs, rubs, or gallops; the carotid, radial and posterior tibial pulses are intact, no JVD or LE edema bilaterally    Abdomen:  no organomegaly, masses, bruits, or tenderness; bowel sounds are present   Extremities: no cyanosis or clubbing   Skin: no xanthelasma, warm.    Neurologic: normal gait, normal  bilateral, no tremors     Psychiatric: alert and oriented x3, calm     A complete 10 systems ROS was reviewed  And is negative except what is listed in the HPI.          Medical History  Surgical History Family History Social History   Past Medical History:   Diagnosis Date     Congestive heart failure (H) 08/2021    ischemic CM with LVEF 30-35%     Coronary artery disease 08/2021    STEMI with multivessel CAD on angiography     Diabetes mellitus (H)      Hyperlipidemia      Hypertension      LV (left ventricular) mural thrombus 01/2022     Myocardial infarction (H) 08/2021    inferior STEMI    Past Surgical History:   Procedure Laterality Date     CV CORONARY ANGIOGRAM N/A 8/1/2021    Procedure: Coronary Angiogram;  Surgeon: Deidre Lopes MD;  Location: [facility] CATH [facility] CV     CV LEFT VENTRICULOGRAM N/A 8/1/2021    Procedure: Left Ventriculogram;  Surgeon: Deidre Lopes MD;  Location: [facility] CATH LAB CV     OTHER SURGICAL HISTORY      LEG TRAUMA    no family history of premature coronary artery disease Social History     Socioeconomic History     Marital status:      Spouse name: Not on file      Number of children: Not on file     Years of education: Not on file     Highest education level: Not on file   Occupational History     Not on file   Tobacco Use     Smoking status: Never Smoker     Smokeless tobacco: Never Used   Substance and Sexual Activity     Alcohol use: No     Drug use: No     Sexual activity: Yes     Partners: Female     Birth control/protection: None   Other Topics Concern     Parent/sibling w/ CABG, MI or angioplasty before 65F 55M? Not Asked   Social History Narrative     Not on file     Social Determinants of Health     Financial Resource Strain: Not on file   Food Insecurity: Not on file   Transportation Needs: Not on file   Physical Activity: Not on file   Stress: Not on file   Social Connections: Not on file   Intimate Partner Violence: Not on file   Housing Stability: Not on file           Lab Results    Chemistry/lipid CBC Cardiac Enzymes/BNP/TSH/INR   Lab Results   Component Value Date    CHOL 176 08/02/2021    HDL 53 08/02/2021    TRIG 70 08/02/2021    BUN 40 (H) 02/04/2022     02/04/2022    CO2 25 02/04/2022    Lab Results   Component Value Date    WBC 5.4 01/27/2022    HGB 14.8 01/27/2022    HCT 42.5 01/27/2022    MCV 92 01/27/2022     01/27/2022    Lab Results   Component Value Date    TROPONINI 0.02 01/27/2022    BNP 2,494 (H) 01/13/2022    INR 0.96 08/01/2021     Lab Results   Component Value Date    TROPONINI 0.02 01/27/2022          Weight:    Wt Readings from Last 3 Encounters:   02/28/22 52.8 kg (116 lb 6.4 oz)   02/23/22 52.2 kg (115 lb)   02/15/22 52.6 kg (116 lb)       Allergies  Allergies   Allergen Reactions     Januvia [Sitagliptin] Unknown     HEADACHE     Trulicity [Dulaglutide] Dizziness     Weak and muscle weak         Surgical History  Past Surgical History:   Procedure Laterality Date     CV CORONARY ANGIOGRAM N/A 8/1/2021    Procedure: Coronary Angiogram;  Surgeon: Deidre Lopes MD;  Location: [facility] CATH LAB CV     CV LEFT VENTRICULOGRAM  N/A 8/1/2021    Procedure: Left Ventriculogram;  Surgeon: Deidre Lopes MD;  Location: [facility] LAB CV     OTHER SURGICAL HISTORY      LEG TRAUMA       Social History  Tobacco:   History   Smoking Status     Never Smoker   Smokeless Tobacco     Never Used    Alcohol:   Social History    Substance and Sexual Activity      Alcohol use: No   Illicit Drugs:   History   Drug Use No       Family History  No family history on file.       Dougie Gupta MD on 2/28/2022      cc: Garcia Kaufman

## 2022-02-28 NOTE — LETTER
2/28/2022    Garcia Kaufman MD  [address]  [place] [address]    RE: Av Fernando       Dear Colleague,     I had the pleasure of seeing Av Fernando in the [facility].    HEART CARE NOTE          Assessment/Recommendations     1. HFrEF/ICM c/b ADHF + LV thromus  Assessment / Plan    Near euvolemia on physical exam; denies HF symptoms; no changes to regimen at this time    GDMT as detailed below - repeat echo negative for LV thrombus    Impella support assisted PCI scheduled for 34/22 with Dr. Fuentes; will plan to continue apixaban and resume apixaban after PCI given that LV systolic function remains depressed at this time      Current Pharmacotherapy AHA Guideline-Directed Medical Therapy   Lisinopril - on hold given ELIZABETH and borderline BP Lisinopril 20 mg twice daily   Metoprolol 12.5 mg daily Carvedilol 25 mg twice daily   Spironolactone 25 mg daily Spironolactone 25 mg once daily   Hydralazine NA Hydralazine 100 mg three times daily   Isosorbide mononitrate 20 mg daily Isosorbide dinitrate 40 mg three times daily   SGLT2 inhibitor:not started Dapagliflozin or Empagliflozin 10 mg daily      2. CAD - stable  Assessment / Plan    Hx of STEMI 8/21 - at which time the pateint left AMA    Known severe multivessel disease - patient declines recommended CABG    Continue ASA, carvedilol, clopidogrel, high intensity rosuvastatin, isosorbide mononitrate, and spironolactone    Denies current chest pain    Cardiac MRI significant inducible ischemia in the mid to distal inferoseptal segments, mid-distal anterior segments and mid inferolateral segment. There is almost transmural infarction in the inferolateral walls while the rest of the myocardium is viable; plan for impella assisted PCI as LV thrombus has resolved     3. Valvular heart disease  Assessment / Plan    Moderate MR and TR - continue oral afterload reduction     4. DM2  Assessment / Plan    Management per PMD    History of  Present Illness/Subjective      Mr. Av Fernando is a 61 year old male with a PMHx significant for multivessel coronary artery disease status post STEMI in August 2021, recommended cardiothoracic surgery evaluation, however patient left AMA because family member had passed from bypass surgery, type 2 diabetes, dyslipidemia, anemia who presents to [facility] to establish care.     Today, Mr. Fernando denies any acute events or complaints; denies current chest pain or HF symptons     ECG: Personally reviewed. normal EKG, normal sinus rhythm, occasional PVC noted, unifocal.     ECHO (personnaly Reviewed):  1. The left ventricle is mildly enlarged. Left ventricular systolic  performance is moderately reduced. The ejection fraction is estimated to be  30%.  2. There is severe hypokinesis to akinesis of the posterior, lateral, basal  inferior, and apical segments. The the mid to distal inferior segments and  anterior wall are moderately hypokinetic. There is preservation of the basal  anteroseptal and septal segments.  3. There is a 1.2 x 1.4 cm apical mural thrombus.  4. There is trace aortic insufficiency.  5. There is moderate mitral insufficiency.  6. There is mild to moderate tricuspid insufficiency.  7. Normal right ventricular size with low normal right ventricular systolic  performance.  8. There is moderate left atrial enlargement.  9. Right ventricular systolic pressure relative to right atrial pressure is  mildly increased. The pulmonary artery pressure is estimated to be 45-50 mmHg  plus right atrial pressure (IVC is of normal caliber).     When compared to the prior real-time echocardiogram dated 2 August 2021, and  apical mural thrombus is now noted. Otherwise, the findings are fairly similar  on both examinations. Specifically, left ventricular systolic performance and  regional wall motion appears fairly similar on both examinations.          Physical Examination Review of Systems   BP 92/66 (BP Location:  "Right arm, Patient Position: Sitting, Cuff Size: Adult Regular)   Pulse 72   Resp 20   Ht 1.575 m (5' 2\")   Wt 52.8 kg (116 lb 6.4 oz)   BMI 21.29 kg/m    Body mass index is 21.29 kg/m .  Wt Readings from Last 3 Encounters:   02/28/22 52.8 kg (116 lb 6.4 oz)   02/23/22 52.2 kg (115 lb)   02/15/22 52.6 kg (116 lb)     General Appearance:   no distress, normal body habitus   ENT/Mouth: membranes moist, no oral lesions or bleeding gums.      EYES:  no scleral icterus, normal conjunctivae   Neck: no carotid bruits or thyromegaly   Chest/Lungs:   lungs are clear to auscultation, no rales or wheezing, equal chest wall expansion    Cardiovascular:   Regular. Normal first and second heart sounds with no murmurs, rubs, or gallops; the carotid, radial and posterior tibial pulses are intact, no JVD or LE edema bilaterally    Abdomen:  no organomegaly, masses, bruits, or tenderness; bowel sounds are present   Extremities: no cyanosis or clubbing   Skin: no xanthelasma, warm.    Neurologic: normal gait, normal  bilateral, no tremors     Psychiatric: alert and oriented x3, calm     A complete 10 systems ROS was reviewed  And is negative except what is listed in the HPI.          Medical History  Surgical History Family History Social History   Past Medical History:   Diagnosis Date     Congestive heart failure (H) 08/2021    ischemic CM with LVEF 30-35%     Coronary artery disease 08/2021    STEMI with multivessel CAD on angiography     Diabetes mellitus (H)      Hyperlipidemia      Hypertension      LV (left ventricular) mural thrombus 01/2022     Myocardial infarction (H) 08/2021    inferior STEMI    Past Surgical History:   Procedure Laterality Date     CV CORONARY ANGIOGRAM N/A 8/1/2021    Procedure: Coronary Angiogram;  Surgeon: Deidre Lopes MD;  Location: [facility]     CV LEFT VENTRICULOGRAM N/A 8/1/2021    Procedure: Left Ventriculogram;  Surgeon: Deidre Lopes MD;  Location: [facility] "     OTHER SURGICAL HISTORY      LEG TRAUMA    no family history of premature coronary artery disease Social History     Socioeconomic History     Marital status:      Spouse name: Not on file     Number of children: Not on file     Years of education: Not on file     Highest education level: Not on file   Occupational History     Not on file   Tobacco Use     Smoking status: Never Smoker     Smokeless tobacco: Never Used   Substance and Sexual Activity     Alcohol use: No     Drug use: No     Sexual activity: Yes     Partners: Female     Birth control/protection: None   Other Topics Concern     Parent/sibling w/ CABG, MI or angioplasty before 65F 55M? Not Asked   Social History Narrative     Not on file     Social Determinants of Health     Financial Resource Strain: Not on file   Food Insecurity: Not on file   Transportation Needs: Not on file   Physical Activity: Not on file   Stress: Not on file   Social Connections: Not on file   Intimate Partner Violence: Not on file   Housing Stability: Not on file           Lab Results    Chemistry/lipid CBC Cardiac Enzymes/BNP/TSH/INR   Lab Results   Component Value Date    CHOL 176 08/02/2021    HDL 53 08/02/2021    TRIG 70 08/02/2021    BUN 40 (H) 02/04/2022     02/04/2022    CO2 25 02/04/2022    Lab Results   Component Value Date    WBC 5.4 01/27/2022    HGB 14.8 01/27/2022    HCT 42.5 01/27/2022    MCV 92 01/27/2022     01/27/2022    Lab Results   Component Value Date    TROPONINI 0.02 01/27/2022    BNP 2,494 (H) 01/13/2022    INR 0.96 08/01/2021     Lab Results   Component Value Date    TROPONINI 0.02 01/27/2022          Weight:    Wt Readings from Last 3 Encounters:   02/28/22 52.8 kg (116 lb 6.4 oz)   02/23/22 52.2 kg (115 lb)   02/15/22 52.6 kg (116 lb)       Allergies  Allergies   Allergen Reactions     Januvia [Sitagliptin] Unknown     HEADACHE     Trulicity [Dulaglutide] Dizziness     Weak and muscle weak         Surgical History  Past Surgical  History:   Procedure Laterality Date     CV CORONARY ANGIOGRAM N/A 8/1/2021    Procedure: Coronary Angiogram;  Surgeon: Deidre Lopes MD;  Location: [facility] CATH LAB CV     CV LEFT VENTRICULOGRAM N/A 8/1/2021    Procedure: Left Ventriculogram;  Surgeon: Deidre Lopes MD;  Location: [facility] CATH LAB CV     OTHER SURGICAL HISTORY      LEG TRAUMA       Social History  Tobacco:   History   Smoking Status     Never Smoker   Smokeless Tobacco     Never Used    Alcohol:   Social History    Substance and Sexual Activity      Alcohol use: No   Illicit Drugs:   History   Drug Use No       Family History  No family history on file.       Dougie Gupta MD on 2/28/2022      cc: Garcia Kaufman    Thank you for allowing me to participate in the care of your patient.      Sincerely,     Dougie Gupta MD     [facility]  cc:   Garcia Kaufman MD  [address] [place] [address]

## 2022-02-28 NOTE — PATIENT INSTRUCTIONS
Thank you for allowing the Heart Care clinic to be a part of your care. Please pay close attention to the following medications as they have been changed during this visit.    1.) Please resume apixaban (Eloquis) 5 mg two times daily. Please take your last dose on Wednesday (3/2/22) night in preparation for your coronary angiogram

## 2022-02-28 NOTE — PROGRESS NOTES
HEART CARE NOTE          Assessment/Recommendations     1. HFrEF/ICM c/b ADHF + LV thromus  Assessment / Plan    Near euvolemia on physical exam; denies HF symptoms; no changes to regimen at this time    GDMT as detailed below - repeat echo negative for LV thrombus    Impella support assisted PCI scheduled for 34/22 with Dr. Fuentes; will plan to continue apixaban and resume apixaban after PCI given that LV systolic function remains depressed at this time      Current Pharmacotherapy AHA Guideline-Directed Medical Therapy   Lisinopril - on hold given ELIZABETH and borderline BP Lisinopril 20 mg twice daily   Metoprolol 12.5 mg daily Carvedilol 25 mg twice daily   Spironolactone 25 mg daily Spironolactone 25 mg once daily   Hydralazine NA Hydralazine 100 mg three times daily   Isosorbide mononitrate 20 mg daily Isosorbide dinitrate 40 mg three times daily   SGLT2 inhibitor:not started Dapagliflozin or Empagliflozin 10 mg daily      2. CAD - stable  Assessment / Plan    Hx of STEMI 8/21 - at which time the pateint left AMA    Known severe multivessel disease - patient declines recommended CABG    Continue ASA, carvedilol, clopidogrel, high intensity rosuvastatin, isosorbide mononitrate, and spironolactone    Denies current chest pain    Cardiac MRI significant inducible ischemia in the mid to distal inferoseptal segments, mid-distal anterior segments and mid inferolateral segment. There is almost transmural infarction in the inferolateral walls while the rest of the myocardium is viable; plan for impella assisted PCI as LV thrombus has resolved     3. Valvular heart disease  Assessment / Plan    Moderate MR and TR - continue oral afterload reduction     4. DM2  Assessment / Plan    Management per PMD    History of Present Illness/Subjective      Mr. Av Fernando is a 61 year old male with a PMHx significant for multivessel coronary artery disease status post STEMI in August 2021, recommended cardiothoracic surgery  "evaluation, however patient left AMA because family member had passed from bypass surgery, type 2 diabetes, dyslipidemia, anemia who presents to [facility] to establish care.     Today, Mr. Fernando denies any acute events or complaints; denies current chest pain or HF symptons     ECG: Personally reviewed. normal EKG, normal sinus rhythm, occasional PVC noted, unifocal.     ECHO (personnaly Reviewed):  1. The left ventricle is mildly enlarged. Left ventricular systolic  performance is moderately reduced. The ejection fraction is estimated to be  30%.  2. There is severe hypokinesis to akinesis of the posterior, lateral, basal  inferior, and apical segments. The the mid to distal inferior segments and  anterior wall are moderately hypokinetic. There is preservation of the basal  anteroseptal and septal segments.  3. There is a 1.2 x 1.4 cm apical mural thrombus.  4. There is trace aortic insufficiency.  5. There is moderate mitral insufficiency.  6. There is mild to moderate tricuspid insufficiency.  7. Normal right ventricular size with low normal right ventricular systolic  performance.  8. There is moderate left atrial enlargement.  9. Right ventricular systolic pressure relative to right atrial pressure is  mildly increased. The pulmonary artery pressure is estimated to be 45-50 mmHg  plus right atrial pressure (IVC is of normal caliber).     When compared to the prior real-time echocardiogram dated 2 August 2021, and  apical mural thrombus is now noted. Otherwise, the findings are fairly similar  on both examinations. Specifically, left ventricular systolic performance and  regional wall motion appears fairly similar on both examinations.          Physical Examination Review of Systems   BP 92/66 (BP Location: Right arm, Patient Position: Sitting, Cuff Size: Adult Regular)   Pulse 72   Resp 20   Ht 1.575 m (5' 2\")   Wt 52.8 kg (116 lb 6.4 oz)   BMI 21.29 kg/m    Body mass index is 21.29 kg/m .  Wt Readings " from Last 3 Encounters:   02/28/22 52.8 kg (116 lb 6.4 oz)   02/23/22 52.2 kg (115 lb)   02/15/22 52.6 kg (116 lb)     General Appearance:   no distress, normal body habitus   ENT/Mouth: membranes moist, no oral lesions or bleeding gums.      EYES:  no scleral icterus, normal conjunctivae   Neck: no carotid bruits or thyromegaly   Chest/Lungs:   lungs are clear to auscultation, no rales or wheezing, equal chest wall expansion    Cardiovascular:   Regular. Normal first and second heart sounds with no murmurs, rubs, or gallops; the carotid, radial and posterior tibial pulses are intact, no JVD or LE edema bilaterally    Abdomen:  no organomegaly, masses, bruits, or tenderness; bowel sounds are present   Extremities: no cyanosis or clubbing   Skin: no xanthelasma, warm.    Neurologic: normal gait, normal  bilateral, no tremors     Psychiatric: alert and oriented x3, calm     A complete 10 systems ROS was reviewed  And is negative except what is listed in the HPI.          Medical History  Surgical History Family History Social History   Past Medical History:   Diagnosis Date     Congestive heart failure (H) 08/2021    ischemic CM with LVEF 30-35%     Coronary artery disease 08/2021    STEMI with multivessel CAD on angiography     Diabetes mellitus (H)      Hyperlipidemia      Hypertension      LV (left ventricular) mural thrombus 01/2022     Myocardial infarction (H) 08/2021    inferior STEMI    Past Surgical History:   Procedure Laterality Date     CV CORONARY ANGIOGRAM N/A 8/1/2021    Procedure: Coronary Angiogram;  Surgeon: Deidre Lopes MD;  Location: [facility] CATH [facility] CV     CV LEFT VENTRICULOGRAM N/A 8/1/2021    Procedure: Left Ventriculogram;  Surgeon: Deidre Lopes MD;  Location: [facility] CATH LAB CV     OTHER SURGICAL HISTORY      LEG TRAUMA    no family history of premature coronary artery disease Social History     Socioeconomic History     Marital status:      Spouse name: Not on file      Number of children: Not on file     Years of education: Not on file     Highest education level: Not on file   Occupational History     Not on file   Tobacco Use     Smoking status: Never Smoker     Smokeless tobacco: Never Used   Substance and Sexual Activity     Alcohol use: No     Drug use: No     Sexual activity: Yes     Partners: Female     Birth control/protection: None   Other Topics Concern     Parent/sibling w/ CABG, MI or angioplasty before 65F 55M? Not Asked   Social History Narrative     Not on file     Social Determinants of Health     Financial Resource Strain: Not on file   Food Insecurity: Not on file   Transportation Needs: Not on file   Physical Activity: Not on file   Stress: Not on file   Social Connections: Not on file   Intimate Partner Violence: Not on file   Housing Stability: Not on file           Lab Results    Chemistry/lipid CBC Cardiac Enzymes/BNP/TSH/INR   Lab Results   Component Value Date    CHOL 176 08/02/2021    HDL 53 08/02/2021    TRIG 70 08/02/2021    BUN 40 (H) 02/04/2022     02/04/2022    CO2 25 02/04/2022    Lab Results   Component Value Date    WBC 5.4 01/27/2022    HGB 14.8 01/27/2022    HCT 42.5 01/27/2022    MCV 92 01/27/2022     01/27/2022    Lab Results   Component Value Date    TROPONINI 0.02 01/27/2022    BNP 2,494 (H) 01/13/2022    INR 0.96 08/01/2021     Lab Results   Component Value Date    TROPONINI 0.02 01/27/2022          Weight:    Wt Readings from Last 3 Encounters:   02/28/22 52.8 kg (116 lb 6.4 oz)   02/23/22 52.2 kg (115 lb)   02/15/22 52.6 kg (116 lb)       Allergies  Allergies   Allergen Reactions     Januvia [Sitagliptin] Unknown     HEADACHE     Trulicity [Dulaglutide] Dizziness     Weak and muscle weak         Surgical History  Past Surgical History:   Procedure Laterality Date     CV CORONARY ANGIOGRAM N/A 8/1/2021    Procedure: Coronary Angiogram;  Surgeon: Deidre Lopes MD;  Location: [facility] CATH LAB CV     CV LEFT VENTRICULOGRAM  N/A 8/1/2021    Procedure: Left Ventriculogram;  Surgeon: Deidre Lopes MD;  Location: [facility] LAB CV     OTHER SURGICAL HISTORY      LEG TRAUMA       Social History  Tobacco:   History   Smoking Status     Never Smoker   Smokeless Tobacco     Never Used    Alcohol:   Social History    Substance and Sexual Activity      Alcohol use: No   Illicit Drugs:   History   Drug Use No       Family History  No family history on file.       Dougie Gupta MD on 2/28/2022      cc: Garcia Kaufman

## 2022-03-01 NOTE — TELEPHONE ENCOUNTER
Patient is calling and states that last week  left eye had blood over the eyeball.  Today there is still in the eyeball.  Last week started on Feb 22 nd.  Patient denies eye swelling.  Patient has slight pain in eye.  Denies fever cough and shortness of breath.  Patient is requesting an eye referral.  Patient went to [facility] but is requesting a referrall in [facility].  Please phone Av with referral.  Patient states that he was seen by Eula Diez and was told to come back.  Av states that his eye is getting worse and is requesting to be seen by an eye MD in [facility].      COVID 19 Nurse Triage Plan/Patient Instructions    Please be aware that novel coronavirus (COVID-19) may be circulating in the community. If you develop symptoms such as fever, cough, or SOB or if you have concerns about the presence of another infection including coronavirus (COVID-19), please contact your health care provider or visit [email or web address].     Disposition/Instructions    In-Person Visit with provider recommended. Reference Visit Selection Guide.    Thank you for taking steps to prevent the spread of this virus.  o Limit your contact with others.  o Wear a simple mask to cover your cough.  o Wash your hands well and often.    Resources    [facility]: About COVID-19: [email or web address]    CDC: What to Do If You're Sick: www.cdc.gov/coronavirus/2019-ncov/about/steps-when-sick.html    CDC: Ending Home Isolation: www.cdc.gov/coronavirus/2019-ncov/hcp/disposition-in-home-patients.html     CDC: Caring for Someone: www.cdc.gov/coronavirus/2019-ncov/if-you-are-sick/care-for-someone.html     [facility]: Interim Guidance for Hospital Discharge to Home: www.[email or web address].[email or web address].[email or web address]/diseases/coronavirus/hcp/hospdischarge.pdf    [facility] clinical trials (COVID-19 research studies): [email or web address]-trials     Below are the COVID-19 hotlines  at the [place] Department of Health ([facility]). Interpreters are available.   o For health questions: Call 553-113-7884 or 1-448.584.6454 (7 a.m. to 7 p.m.)  o For questions about schools and childcare: Call 779-886-5207 or 1-331.357.2302 (7 a.m. to 7 p.m.)                       Reason for Disposition    Eye pain present > 24 hours    Additional Information    Negative: Complete loss of vision in one or both eyes    Negative: Severe eye pain    Negative: Eyelids are very swollen (shut or almost) and fever    Negative: Eyelid (outer) is very red and fever    Negative: Foreign body sensation ('feels like something is in there') and irrigation didn't help    Negative: Vomiting    Negative: Ulcer or sore seen on the cornea (clear center part of the eye)    Negative: Recent eye surgery and increasing eye pain    Negative: Blurred vision and new or worsening    Negative: Patient sounds very sick or weak to the triager    Negative: Eye pain/discomfort and more than mild    Negative: Eyelids are very swollen (shut or almost) and no fever    Negative: Painful rash near eye and multiple small blisters grouped together    Negative: Pain followed bright light exposure from welding    Negative: Looking at light causes severe pain (i.e., photophobia)    Negative: Yellow or green pus occurs    Protocols used: EYE PAIN-A-[place]

## 2022-03-02 NOTE — TELEPHONE ENCOUNTER
Patient went to the ER several days ago and was diagnosed with a burst vein.  He needs to follow up with ophthalmology.  Patient was given the phone number to [facility].

## 2022-03-02 NOTE — TELEPHONE ENCOUNTER
If worse to ER     Urgent referral entered for Ophthalmology    Likely a subconjunctival hemorrhage >> but should have a visit because prefer that he stays on his blood tinners    DanL

## 2022-03-04 NOTE — PROGRESS NOTES
Discharged to home with follow up and continuation of current medications. Pt states he feels comfortable with this plan for now.

## 2022-03-04 NOTE — PROGRESS NOTES
Brief CV progress note:    Av Fernando is a 61 year old  male with hx of multi-vessel CAD with prior STEMI, who presented for coronary angiogram with possible PCI. Mr. Fernando has been previously recommended to have CABG, however he is not interested in this option. Case discussed with Dr. Fuentes. Due to lack of symptoms and toleration of current medical management, complex PCI will not be pursued at this time. Patient will follow up with Dr. Fuentes in 1 month and our team will re-evaluate at that time if PCI would be a future option for him.       Elinor Navas, CNP

## 2022-03-04 NOTE — PROGRESS NOTES
"Pt. Is a 61M w/ CAD s/p STEMI 8/21 when he was referred for but refused CABG evaluation AMA, HFrEF LVEF 30's, previous LV thrombus, HL, DM who presents today for possible direct PCI.    He was recently admitted for ADHF and angina, and medically optimized. A Viability assessment showed near-transmural inferolateral scar but viability elsewhere. Since medical optimization during latest admission, his angina has \"completely\" resolved, and he is able to walk for 2 blocks without symptoms. LV thrombus has resolved on echo.    We discussed today that while LAD territory appears to have viability, there is no sufficient epicardial coronary target for PCI required to have sufficient outflow. Both Lcx and dRCA are potentially feasible for PCI, but don't appear to have viability.   His preference based on how well he has been feeling lately would be to continue medical therapy rather than be explosed to the risk of potential PCI, and I think it is a reasonable position.  That said, I suggested that we see each other in follow up again in 1 mos in clinic, to again go over options and decide whether an attempt at PCI may be reasonable.      Sherif Fuentes MD  Interventional Cardiology    "

## 2022-03-22 NOTE — PROGRESS NOTES
Assessment/Recommendations   Assessment:    1.  Heart failure with reduced ejection fraction, ischemic cardiomyopathy, LVEF 30%, NYHA class II: Compensated.  He denies acute heart failure symptoms.  He has mild fatigue and dyspnea on exertion.  He is able to walk 1-2 blocks easily.  His weight has been stable.  2.  LV thrombus: This has resolved based on echocardiogram February 21, 2022.  Dr. Gupta recommended that he continue Eliquis  3.  CAD: Denies chest pain.  Status post STEMI in August 2021.  He had coronary angiogram at that time which showed multivessel disease.  He left AMA before he could meet with CV surgery team.  He met with Dr. Fuentes early March for possible PCI but this is on hold due to his symptoms improving and high risk intervention.  He continues clopidogrel, Crestor and isosorbide    Plan:  1.   Heart failure medications:  - Beta blocker therapy with metoprolol succinate 12.5 mg daily  - Aldosterone blocker therapy with spironolactone 25 mg daily.  Last BMP was 3/3/2022 and was stable.  - Diuretic therapy with Bumex 2 mg daily  2.  Continue current medications.  Unable to titrate due to hypotension  3.  Continue monitoring weights and low-salt diet  4.  Encourage regular activity    Av Fernando will follow up with Dr. Fuentes April 7, Dr. Gupta in 2 months and in the heart failure clinic in 3 months.     History of Present Illness/Subjective    Mr. Av Fernando is a 61 year old male seen at [facility] heart failure clinic today for continued follow-up.  His family member accompanies him today.  He follows up for heart failure with reduced ejection fraction and ischemic cardiomyopathy.  He had an echocardiogram February 21, 2022 which showed ejection fraction of 30% which has not improved much.  LV thrombus has resolved but Dr. Gupta recommended he continue the Eliquis.  He had a STEMI in August 2021 and was found to have multivessel disease.  He was scheduled for  "possible PCI early March but this was canceled due to his symptoms improving with medical management and high risk intervention.  He meets with Dr. Fuentes April 7 to discuss further.  He was supposed to meet with CV team but left AMA.  He has a past medical history significant for CAD, hyperlipidemia, STEMI, LV thrombus, PVD, renal insufficiency and diabetes.    Today, he states he is doing well.  He denies any acute heart failure symptoms.  He has mild fatigue and dyspnea on exertion.  He denies lightheadedness, orthopnea, PND, palpitations, chest pain, abdominal fullness/bloating and lower extremity edema.      He is monitoring home weights which are stable.  He is following a low sodium diet.  He participates in regular physical activity including walking.      ECHOCARDIOGRAM: 2/21/2022   Interpretation Summary     There is no thrombus seen in the left ventricle.  The left ventricle is normal in size with normal left ventricular wall  thickness.  The visual ejection fraction is estimated at 30%.  There is hypokinesis of the entire inferior wall, mid to apical anterolateral  wall, and apical septum. There is akinesis of the basal and mid posterior  wall.  Normal right ventricle size and systolic function.  No hemodynamically significant valve disease is identified.     Compared to the prior study of 1/14/22, there has been an interval resolution  of the left ventricular apical thrombus.     Physical Examination Review of Systems   BP 90/62 (BP Location: Right arm, Patient Position: Sitting, Cuff Size: Adult Regular)   Pulse 62   Resp 16   Ht 1.575 m (5' 2\")   Wt 53.5 kg (118 lb)   BMI 21.58 kg/m    Body mass index is 21.58 kg/m .  Wt Readings from Last 3 Encounters:   03/22/22 53.5 kg (118 lb)   03/04/22 52.8 kg (116 lb 6.4 oz)   02/28/22 52.8 kg (116 lb 6.4 oz)       General Appearance:   no acute distress   ENT/Mouth: membranes moist, no oral lesions or bleeding gums.      EYES:  no scleral icterus, " "normal conjunctivae   Neck: no carotid bruits or thyromegaly   Chest/Lungs:   lungs are clear to auscultation, no rales or wheezing, equal chest wall expansion    Cardiovascular:   Regular. Normal first and second heart sounds with no murmurs, rubs, or gallops; Jugular venous pressure normal, no edema bilaterally    Abdomen:  no organomegaly, masses, bruits, or tenderness; bowel sounds are present   Extremities: no cyanosis or clubbing   Skin: no xanthelasma, warm.    Neurologic: normal  bilateral, no tremors     Psychiatric: alert and oriented x3    Enc Vitals  BP: 90/62  Pulse: 62  Resp: 16  Weight: 53.5 kg (118 lb) (No Shoes)  Height: 157.5 cm (5' 2\")                                         Medical History  Surgical History Family History Social History   Past Medical History:   Diagnosis Date     Congestive heart failure (H) 08/2021    ischemic CM with LVEF 30-35%     Coronary artery disease 08/2021    STEMI with multivessel CAD on angiography     Diabetes mellitus (H)      Hyperlipidemia      Hypertension      LV (left ventricular) mural thrombus 01/2022     Myocardial infarction (H) 08/2021    inferior STEMI    Past Surgical History:   Procedure Laterality Date     CV CORONARY ANGIOGRAM N/A 8/1/2021    Procedure: Coronary Angiogram;  Surgeon: Deidre Lopes MD;  Location: [facility] CATH LAB CV     CV LEFT VENTRICULOGRAM N/A 8/1/2021    Procedure: Left Ventriculogram;  Surgeon: Deidre Lopes MD;  Location: [facility] CATH LAB CV     OTHER SURGICAL HISTORY      LEG TRAUMA    No family history on file. Social History     Socioeconomic History     Marital status:      Spouse name: Not on file     Number of children: Not on file     Years of education: Not on file     Highest education level: Not on file   Occupational History     Not on file   Tobacco Use     Smoking status: Never Smoker     Smokeless tobacco: Never Used   Substance and Sexual Activity     Alcohol use: No     Drug use: No     Sexual " activity: Yes     Partners: Female     Birth control/protection: None   Other Topics Concern     Parent/sibling w/ CABG, MI or angioplasty before 65F 55M? Not Asked   Social History Narrative     Not on file     Social Determinants of Health     Financial Resource Strain: Not on file   Food Insecurity: Not on file   Transportation Needs: Not on file   Physical Activity: Not on file   Stress: Not on file   Social Connections: Not on file   Intimate Partner Violence: Not on file   Housing Stability: Not on file          Medications  Allergies   Current Outpatient Medications   Medication Sig Dispense Refill     bumetanide (BUMEX) 2 MG tablet Take 1 tablet (2 mg) by mouth daily 90 tablet 3     clopidogrel (PLAVIX) 75 MG tablet Take 1 tablet (75 mg) by mouth daily 90 tablet 3     Continuous Blood Gluc Sensor (FREESTYLE KATIE 14 DAY SENSOR) MISC 1 Device every 14 days Change sensor as directed every 14 days 2 each 11     ELIQUIS ANTICOAGULANT 2.5 MG tablet Take 2.5 mg by mouth 2 times daily 2.5 mg in morning and 5 mg in evening       insulin aspart (NOVOLOG FLEXPEN) 100 UNIT/ML pen Inject 8-10 Units Subcutaneous 2 times daily (with meals)       insulin lispro (HUMALOG KWIKPEN) 100 UNIT/ML (1 unit dial) KWIKPEN Inject 8-10 Units Subcutaneous 3 times daily (before meals) 30 mL 3     isosorbide mononitrate (ISMO/MONOKET) 10 MG tablet Take 2 tablets (20 mg) by mouth 2 times daily 360 tablet 3     LANTUS SOLOSTAR 100 UNIT/ML soln Inject 20-24 Units Subcutaneous At Bedtime INJECT 24 UNITS UNDER THE SKIN AT BEDTIME 15 mL 3     metoprolol succinate ER (TOPROL-XL) 25 MG 24 hr tablet Take 0.5 tablets (12.5 mg) by mouth daily 45 tablet 3     nitroGLYcerin (NITROSTAT) 0.4 MG sublingual tablet For chest pain place 1 tablet under the tongue every 5 minutes for 3 doses. If symptoms persist 5 minutes after 1st dose call 911. 25 tablet 1     rosuvastatin (CRESTOR) 40 MG tablet Take 1 tablet (40 mg) by mouth daily 90 tablet 3      spironolactone (ALDACTONE) 25 MG tablet Take 1 tablet (25 mg) by mouth daily      Allergies   Allergen Reactions     Januvia [Sitagliptin] Unknown     HEADACHE     Trulicity [Dulaglutide] Dizziness     Weak and muscle weak         Lab Results    Chemistry/lipid CBC Cardiac Enzymes/BNP/TSH/INR   Lab Results   Component Value Date    CHOL 176 08/02/2021    HDL 53 08/02/2021    TRIG 70 08/02/2021    BUN 20 03/04/2022     03/04/2022    CO2 27 03/04/2022    Lab Results   Component Value Date    WBC 4.9 03/04/2022    HGB 13.0 (L) 03/04/2022    HCT 38.6 (L) 03/04/2022    MCV 94 03/04/2022     (L) 03/04/2022    Lab Results   Component Value Date    TROPONINI 0.02 01/27/2022    BNP 2,494 (H) 01/13/2022    INR 0.96 08/01/2021             This note has been dictated using voice recognition software. Any grammatical, typographical, or context distortions are unintentional and inherent to the software    30 minutes spent on the date of encounter doing chart review, review of outside records, review of test results, patient visit, documentation and discussion with family.

## 2022-03-22 NOTE — PATIENT INSTRUCTIONS
Av Fernando,    It was a pleasure to see you today at [facility] HEART [facility].     My recommendations after this visit include:  - Please follow up with Dr Fuentes April 7   - Please follow up with Dr Gupta in 2 months  - Please follow up with Deborah Cai in 3 months  - Continue current medications    Deborah Cai, CNP

## 2022-03-22 NOTE — LETTER
3/22/2022    Garcia Kaufman MD  [address]  [place] [address]    RE: Av Fernando       Dear Colleague,     I had the pleasure of seeing Av Fernando in the [facility].        Assessment/Recommendations   Assessment:    1.  Heart failure with reduced ejection fraction, ischemic cardiomyopathy, LVEF 30%, NYHA class II: Compensated.  He denies acute heart failure symptoms.  He has mild fatigue and dyspnea on exertion.  He is able to walk 1-2 blocks easily.  His weight has been stable.  2.  LV thrombus: This has resolved based on echocardiogram February 21, 2022.  Dr. Gupta recommended that he continue Eliquis  3.  CAD: Denies chest pain.  Status post STEMI in August 2021.  He had coronary angiogram at that time which showed multivessel disease.  He left AMA before he could meet with CV surgery team.  He met with Dr. Fuentes early March for possible PCI but this is on hold due to his symptoms improving and high risk intervention.  He continues clopidogrel, Crestor and isosorbide    Plan:  1.   Heart failure medications:  - Beta blocker therapy with metoprolol succinate 12.5 mg daily  - Aldosterone blocker therapy with spironolactone 25 mg daily.  Last BMP was 3/3/2022 and was stable.  - Diuretic therapy with Bumex 2 mg daily  2.  Continue current medications.  Unable to titrate due to hypotension  3.  Continue monitoring weights and low-salt diet  4.  Encourage regular activity    Av Fernando will follow up with Dr. Fuentes April 7, Dr. Gupta in 2 months and in the heart failure clinic in 3 months.     History of Present Illness/Subjective    Mr. Av Fernando is a 61 year old male seen at [facility] heart failure clinic today for continued follow-up.  His family member accompanies him today.  He follows up for heart failure with reduced ejection fraction and ischemic cardiomyopathy.  He had an echocardiogram February 21, 2022 which showed ejection fraction of 30% which  "has not improved much.  LV thrombus has resolved but Dr. Gupta recommended he continue the Eliquis.  He had a STEMI in August 2021 and was found to have multivessel disease.  He was scheduled for possible PCI early March but this was canceled due to his symptoms improving with medical management and high risk intervention.  He meets with Dr. Fuentes April 7 to discuss further.  He was supposed to meet with CV team but left AMA.  He has a past medical history significant for CAD, hyperlipidemia, STEMI, LV thrombus, PVD, renal insufficiency and diabetes.    Today, he states he is doing well.  He denies any acute heart failure symptoms.  He has mild fatigue and dyspnea on exertion.  He denies lightheadedness, orthopnea, PND, palpitations, chest pain, abdominal fullness/bloating and lower extremity edema.      He is monitoring home weights which are stable.  He is following a low sodium diet.  He participates in regular physical activity including walking.      ECHOCARDIOGRAM: 2/21/2022   Interpretation Summary     There is no thrombus seen in the left ventricle.  The left ventricle is normal in size with normal left ventricular wall  thickness.  The visual ejection fraction is estimated at 30%.  There is hypokinesis of the entire inferior wall, mid to apical anterolateral  wall, and apical septum. There is akinesis of the basal and mid posterior  wall.  Normal right ventricle size and systolic function.  No hemodynamically significant valve disease is identified.     Compared to the prior study of 1/14/22, there has been an interval resolution  of the left ventricular apical thrombus.     Physical Examination Review of Systems   BP 90/62 (BP Location: Right arm, Patient Position: Sitting, Cuff Size: Adult Regular)   Pulse 62   Resp 16   Ht 1.575 m (5' 2\")   Wt 53.5 kg (118 lb)   BMI 21.58 kg/m    Body mass index is 21.58 kg/m .  Wt Readings from Last 3 Encounters:   03/22/22 53.5 kg (118 lb)   03/04/22 52.8 " "kg (116 lb 6.4 oz)   02/28/22 52.8 kg (116 lb 6.4 oz)       General Appearance:   no acute distress   ENT/Mouth: membranes moist, no oral lesions or bleeding gums.      EYES:  no scleral icterus, normal conjunctivae   Neck: no carotid bruits or thyromegaly   Chest/Lungs:   lungs are clear to auscultation, no rales or wheezing, equal chest wall expansion    Cardiovascular:   Regular. Normal first and second heart sounds with no murmurs, rubs, or gallops; Jugular venous pressure normal, no edema bilaterally    Abdomen:  no organomegaly, masses, bruits, or tenderness; bowel sounds are present   Extremities: no cyanosis or clubbing   Skin: no xanthelasma, warm.    Neurologic: normal  bilateral, no tremors     Psychiatric: alert and oriented x3    Enc Vitals  BP: 90/62  Pulse: 62  Resp: 16  Weight: 53.5 kg (118 lb) (No Shoes)  Height: 157.5 cm (5' 2\")                                         Medical History  Surgical History Family History Social History   Past Medical History:   Diagnosis Date     Congestive heart failure (H) 08/2021    ischemic CM with LVEF 30-35%     Coronary artery disease 08/2021    STEMI with multivessel CAD on angiography     Diabetes mellitus (H)      Hyperlipidemia      Hypertension      LV (left ventricular) mural thrombus 01/2022     Myocardial infarction (H) 08/2021    inferior STEMI    Past Surgical History:   Procedure Laterality Date     CV CORONARY ANGIOGRAM N/A 8/1/2021    Procedure: Coronary Angiogram;  Surgeon: Deidre Lopes MD;  Location: [facility] CV     CV LEFT VENTRICULOGRAM N/A 8/1/2021    Procedure: Left Ventriculogram;  Surgeon: Deidre Lopes MD;  Location: [facility] LAB CV     OTHER SURGICAL HISTORY      LEG TRAUMA    No family history on file. Social History     Socioeconomic History     Marital status:      Spouse name: Not on file     Number of children: Not on file     Years of education: Not on file     Highest education level: Not on file "   Occupational History     Not on file   Tobacco Use     Smoking status: Never Smoker     Smokeless tobacco: Never Used   Substance and Sexual Activity     Alcohol use: No     Drug use: No     Sexual activity: Yes     Partners: Female     Birth control/protection: None   Other Topics Concern     Parent/sibling w/ CABG, MI or angioplasty before 65F 55M? Not Asked   Social History Narrative     Not on file     Social Determinants of Health     Financial Resource Strain: Not on file   Food Insecurity: Not on file   Transportation Needs: Not on file   Physical Activity: Not on file   Stress: Not on file   Social Connections: Not on file   Intimate Partner Violence: Not on file   Housing Stability: Not on file          Medications  Allergies   Current Outpatient Medications   Medication Sig Dispense Refill     bumetanide (BUMEX) 2 MG tablet Take 1 tablet (2 mg) by mouth daily 90 tablet 3     clopidogrel (PLAVIX) 75 MG tablet Take 1 tablet (75 mg) by mouth daily 90 tablet 3     Continuous Blood Gluc Sensor (BridgeLuxYLE KATIE 14 DAY SENSOR) MIS 1 Device every 14 days Change sensor as directed every 14 days 2 each 11     ELIQUIS ANTICOAGULANT 2.5 MG tablet Take 2.5 mg by mouth 2 times daily 2.5 mg in morning and 5 mg in evening       insulin aspart (NOVOLOG FLEXPEN) 100 UNIT/ML pen Inject 8-10 Units Subcutaneous 2 times daily (with meals)       insulin lispro (HUMALOG KWIKPEN) 100 UNIT/ML (1 unit dial) KWIKPEN Inject 8-10 Units Subcutaneous 3 times daily (before meals) 30 mL 3     isosorbide mononitrate (ISMO/MONOKET) 10 MG tablet Take 2 tablets (20 mg) by mouth 2 times daily 360 tablet 3     LANTUS SOLOSTAR 100 UNIT/ML soln Inject 20-24 Units Subcutaneous At Bedtime INJECT 24 UNITS UNDER THE SKIN AT BEDTIME 15 mL 3     metoprolol succinate ER (TOPROL-XL) 25 MG 24 hr tablet Take 0.5 tablets (12.5 mg) by mouth daily 45 tablet 3     nitroGLYcerin (NITROSTAT) 0.4 MG sublingual tablet For chest pain place 1 tablet under the  tongue every 5 minutes for 3 doses. If symptoms persist 5 minutes after 1st dose call 911. 25 tablet 1     rosuvastatin (CRESTOR) 40 MG tablet Take 1 tablet (40 mg) by mouth daily 90 tablet 3     spironolactone (ALDACTONE) 25 MG tablet Take 1 tablet (25 mg) by mouth daily      Allergies   Allergen Reactions     Januvia [Sitagliptin] Unknown     HEADACHE     Trulicity [Dulaglutide] Dizziness     Weak and muscle weak         Lab Results    Chemistry/lipid CBC Cardiac Enzymes/BNP/TSH/INR   Lab Results   Component Value Date    CHOL 176 08/02/2021    HDL 53 08/02/2021    TRIG 70 08/02/2021    BUN 20 03/04/2022     03/04/2022    CO2 27 03/04/2022    Lab Results   Component Value Date    WBC 4.9 03/04/2022    HGB 13.0 (L) 03/04/2022    HCT 38.6 (L) 03/04/2022    MCV 94 03/04/2022     (L) 03/04/2022    Lab Results   Component Value Date    TROPONINI 0.02 01/27/2022    BNP 2,494 (H) 01/13/2022    INR 0.96 08/01/2021             This note has been dictated using voice recognition software. Any grammatical, typographical, or context distortions are unintentional and inherent to the software    30 minutes spent on the date of encounter doing chart review, review of outside records, review of test results, patient visit, documentation and discussion with family.

## 2022-04-05 NOTE — PATIENT INSTRUCTIONS
Keep Follow up   4/7/2022 10:20 AM Sherif Fuentes MD [facility] HEART CARE [facility] [id number] [facility]     Thank you for coming in Av    I am so happy to hear you are less symptomatic    You are on multiple medications to support your heart    You do have or have had an ejection fraction or pump of the heart that has been somewhat compromised    Talk to Dr Fuentes about things that could be done to help support this is well as to prevent a possible arrhythmia related collapse  One of the medications you could discussed  Entresto  One of the devices that can help revive your heart if you were to have an arrhythmia or funny heartbeat that could cause you to collapse is a defibrillator device    Continue your present medications    We will look to see if Dwayne Jones has some test trips for you    Keep walking    You may very well benefit from stent placement via angiography so please continue to consider this to help improve blood flow to the heart    By far away exercise is consistently helpful to improve circulation    Continue to eat plenty of fruits and vegetables  Limit sugar  Ensure you are getting sleep    Hydrate your body with water    Follow-up with Dr Fuentes in 2 days

## 2022-04-05 NOTE — PROGRESS NOTES
"  Walking 1-2 blocks  History of ejection fraction 30%  Three-vessel cardiac disease  Considerations  Defibrillator  Entresto    No low blood sugars  Uses long-acting short acting insulin client    Possible has had a history of left ventricular thrombosis currently on Eliquis  Clinical's she does not have insurance      Subjective   Av is a 61 year old who presents for the following health issues has  HPI       Diabetes Follow-up    How often are you checking your blood sugar? Usually checking daily. This week checked once every 3 days. Ran out of test strips. Purchases test strips out of pocket; very expensive.    Freestyle Marcus is not covered by insurance       What concerns do you have today about your diabetes? None     Do you have any of these symptoms? (Select all that apply) Feet is getting better, right arm still gets numb and shaking     BP Readings from Last 2 Encounters:   04/05/22 102/65   03/22/22 90/62     Hemoglobin A1C (%)   Date Value   12/10/2021 10.4 (H)   08/02/2021 10.4 (H)     LDL Cholesterol Calculated (mg/dL)   Date Value   08/02/2021 109   05/12/2021 159 (H)     LDL Cholesterol Direct (mg/dl)   Date Value   03/13/2017 160 (H)   07/11/2014 155 (H)               Review of Systems   Improved stamina  Improved breathing  No lower extremity edema  No sores on his feet  Feeling more vibrant more stamina        Objective    /65   Pulse 64   Temp 97.3  F (36.3  C) (Oral)   Ht 1.575 m (5' 2\")   Wt 54 kg (119 lb)   SpO2 99%   BMI 21.77 kg/m    Body mass index is 21.77 kg/m .  Physical Exam   Lungs are clear  Cardiac S1-S2 I do not appreciate a gallop  Lower extremities without edema  Full range of affect  Normal monofilament testing no sores on his feet palpable distal pulses    Recent Results (from the past 240 hour(s))   Hemoglobin A1c    Collection Time: 04/05/22 12:12 PM   Result Value Ref Range    Hemoglobin A1C 9.6 (H) 0.0 - 5.6 %   Comprehensive metabolic panel (BMP + Alb, Alk " Phos, ALT, AST, Total. Bili, TP)    Collection Time: 04/05/22 12:12 PM   Result Value Ref Range    Sodium 138 136 - 145 mmol/L    Potassium 4.1 3.5 - 5.0 mmol/L    Chloride 104 98 - 107 mmol/L    Carbon Dioxide (CO2) 26 22 - 31 mmol/L    Anion Gap 8 5 - 18 mmol/L    Urea Nitrogen 14 8 - 22 mg/dL    Creatinine 1.21 0.70 - 1.30 mg/dL    Calcium 9.5 8.5 - 10.5 mg/dL    Glucose 189 (H) 70 - 125 mg/dL    Alkaline Phosphatase 61 45 - 120 U/L    AST 27 0 - 40 U/L    ALT 24 0 - 45 U/L    Protein Total 7.4 6.0 - 8.0 g/dL    Albumin 3.6 3.5 - 5.0 g/dL    Bilirubin Total 0.5 0.0 - 1.0 mg/dL    GFR Estimate 68 >60 mL/min/1.73m2       Electronically signed by Flory Orlando 2022-Jan-18 16:19:25     VITALS   ==========================================================================================================     HEIGHT: 62.01 in    (157.50 cm)  WEIGHT: 109.79 lbs    (49.80 kgs)  BSA: 1.48 m^2     FINAL IMPRESSION   ==========================================================================================================     1.  Pharmacological Regadenoson stress cardiac MRI is positive for inducible myocardial ischemia in mid to  distal inferoseptal segments, mid to distal anterior segments, mid inferolateral segment. These findings  are indicative multiple vessel disease.  2.  Pharmacological Regadenoson stress ECG is non-diagnostic for inducible myocardial ischemia due to  baseline ECG abnormality.  3.  There is almost transmural myocardial infarction in inferolateral walls. The rest myocardium is viable.  4.  There is an apical mural thrombus formation (1.5 x 1.2 cm).  It is very mild mobile.  5.  Left ventricle is mildly enlarged. LV wall thickness is normal.  There is severe global hypokinesis  with akinesis in inferolateral wall, apex and distal anteroseptal segment. The calculated left ventricular  systolic ejection fraction is 26%.   T1 mapping value is 1029 ms.  6.  RV cavity size is normal. RV systolic function is  normal.  7.  Moderately enlarged left atrium.  8.  Moderate mitral valve regurgitation.        Bandolz   Assessment:    1.  Heart failure with reduced ejection fraction, ischemic cardiomyopathy, LVEF 30%, NYHA class II: Compensated.  He denies acute heart failure symptoms.  He has mild fatigue and dyspnea on exertion.  He is able to walk 1-2 blocks easily.  His weight has been stable.  2.  LV thrombus: This has resolved based on echocardiogram February 21, 2022.  Dr. Gupta recommended that he continue Eliquis  3.  CAD: Denies chest pain.  Status post STEMI in August 2021.  He had coronary angiogram at that time which showed multivessel disease.  He left AMA before he could meet with CV surgery team.  He met with Dr. Fuentes early March for possible PCI but this is on hold due to his symptoms improving and high risk intervention.  He continues clopidogrel, Crestor and isosorbide     Plan:  1.   Heart failure medications:  - Beta blocker therapy with metoprolol succinate 12.5 mg daily  - Aldosterone blocker therapy with spironolactone 25 mg daily.  Last BMP was 3/3/2022 and was stable.  - Diuretic therapy with Bumex 2 mg daily  2.  Continue current medications.  Unable to titrate due to hypotension  3.  Continue monitoring weights and low-salt diet  4.  Encourage regular activity     Av Fernando will follow up with Dr. Fuentes April 7, Dr. Gupta in 2 months and in the heart failure clinic in 3 months.         Total time with Av  30 minutes  Previsit intravisit post visit    Plan  Follow through with Dwayne Jones  Consider the institution of Entresto

## 2022-04-07 NOTE — PROGRESS NOTES
HEART CARE ENCOUNTER CONSULTATON NOTE      [facility]  [phone]      Assessment/Recommendations   Assessment/Plan: Pt. Is a 61M w/ CAD s/p STEMI 8/21 when he was referred for but refused CABG evaluation AMA, HFrEF LVEF 30's, previous LV thrombus, HL, DM who presents today for evaluation of his CAD.     # CAD - multi-vessel disease, was offered CABG but refused last year, then presented for PCI with me but stated that his angina had since resolved  - anatomic situtation is complicated by the fact that based on the viability assessment, there is a near-transmural inferolateral scar but viability elsewhere. Since medical optimization during latest admission, his angina has essentially resolved, and he is able to walk for 1- 2 blocks without symptoms; LV thrombus has resolved as well  - we discussed again today that while LAD territory appears to have viability, there is no sufficient epicardial coronary target for PCI required to have sufficient outflow. Both Lcx and dRCA are potentially feasible for PCI, but don't appear to have viability  - his strong preference based on how well he has been feeling lately would be to continue medical therapy rather than be explosed to the risk of potential PCI, and I still think it is a reasonable position.  - he would like to continue current therapy but if things gt worse, he will contact us and we can pursue a repeat angio +/- PCI  - in the meantime, continue apixaban/clopidogrel/metop/isosorbide and rosuva    # LV thrombus - resolved  - we discussed that he may be able to come off anticoagulation at this point, but he would feel more conofrtable sticking to this regiment for at least a couple of mos  - once done with anticoagulation, would got to DAPT (ASA.clopidogrle) and continue indefinitely      Follow up w/  as scheduled, w/ me as needed            History of Present Illness/Subjective    HPI: Av Fernando is a 61 year old male w/ CAD s/p  "STEMI 8/21 when he was referred for but refused CABG evaluation AMA, HFrEF LVEF 30's, previous LV thrombus, HL, DM who presents today for evaluation of his CAD.    He was recently admitted for ADHF and angina, and medically optimized. A viability assessment showed near-transmural inferolateral scar but viability elsewhere. Since medical optimization during latest admission, his angina has nearly \"completely\" resolved, and he is able to walk for 2 blocks without symptoms. LV thrombus has resolved on echo. He gets rare (once every couple of weeks after eating a heavy meal) chest discomfort and SOB, resolved w/ NTG.        Recent Echocardiogram Results:  Interpretation Summary     There is no thrombus seen in the left ventricle.  The left ventricle is normal in size with normal left ventricular wall  thickness.  The visual ejection fraction is estimated at 30%.  There is hypokinesis of the entire inferior wall, mid to apical anterolateral  wall, and apical septum. There is akinesis of the basal and mid posterior  wall.  Normal right ventricle size and systolic function.  No hemodynamically significant valve disease is identified.     Compared to the prior study of 1/14/22, there has been an interval resolution  of the left ventricular apical thrombus.    Recent Coronary Angiogram Results 08/21:  61-year-old male with multiple risk factors for atherosclerosis, but no documented prior cardiac history, presenting with 1 week of anginal symptoms as well as 2-3 episodes of syncope concerning for a cardiac arrhythmic etiology.  Presented to the ER today with chest pain and shortness of breath with EKG showing borderline inferior current of injury.     Urgent coronary angiography showed:     Left main with mild disease  LAD with a 60% proximal stenosis, along diagonal vessel with a 70 to 80% proximal stenosis.  The mid LAD has a 90% stenosis in the distal LAD is subtotally occluded with severe diffuse disease  Left circumflex " is nondominant with a 90% proximal stenosis leading into a large first obtuse marginal which has ostial involvement.  The mid circumflex is occluded with a second obtuse marginal filling via faint left to left collaterals.  Right coronary artery is large and dominant with an 80% distal stenosis.  There appeared to be 2 parallel PDA's with the more distal one having an 80% ostial stenosis.  The R MINDY has a 70% stenosis.     LVEF 25% with inferoapical hypokinesis more pronounced  EDP 15mmHg     The angiographic films were carefully reviewed.  The patient was chest pain-free at this point.  Given the severe multivessel disease, resolution of symptoms, cardiomyopathy and diabetes, it was felt that a multidisciplinary evaluation including surgical consultation would be beneficial prior to any decisions regarding revascularization.         Physical Examination  Review of Systems   Vitals: BP 92/60 (BP Location: Left arm, Patient Position: Sitting, Cuff Size: Adult Small)   Pulse 60   Resp 12   Wt 53 kg (116 lb 12.8 oz)   BMI 21.36 kg/m    BMI= Body mass index is 21.36 kg/m .  Wt Readings from Last 3 Encounters:   04/07/22 53 kg (116 lb 12.8 oz)   04/05/22 54 kg (119 lb)   03/22/22 53.5 kg (118 lb)       General Appearance:   no distress, normal body habitus   ENT/Mouth: membranes moist, no oral lesions or bleeding gums.      EYES:  no scleral icterus, normal conjunctivae   Neck: no carotid bruits or thyromegaly   Chest/Lungs:   lungs are clear to auscultation, no rales or wheezing, no sternal scar, equal chest wall expansion    Cardiovascular:   Regular. Normal first and second heart sounds with no murmurs, rubs, or gallops; the carotid, radial and posterior tibial pulses are intact, Jugular venous pressure 6, no edema bilaterally    Abdomen:  no organomegaly, masses, bruits, or tenderness; bowel sounds are present   Extremities: no cyanosis or clubbing   Skin: no xanthelasma, warm.    Neurologic: normal   bilateral, no tremors     Psychiatric: alert and oriented x3, calm        Please refer above for cardiac ROS details.        Medical History  Surgical History Family History Social History   Past Medical History:   Diagnosis Date     Congestive heart failure (H) 08/2021    ischemic CM with LVEF 30-35%     Coronary artery disease 08/2021    STEMI with multivessel CAD on angiography     Diabetes mellitus (H)      Hyperlipidemia      Hypertension      LV (left ventricular) mural thrombus 01/2022     Myocardial infarction (H) 08/2021    inferior STEMI     Past Surgical History:   Procedure Laterality Date     CV CORONARY ANGIOGRAM N/A 8/1/2021    Procedure: Coronary Angiogram;  Surgeon: Deidre Lopes MD;  Location: [facility] CATH LAB CV     CV LEFT VENTRICULOGRAM N/A 8/1/2021    Procedure: Left Ventriculogram;  Surgeon: Deidre Lopes MD;  Location: [facility] CATH LAB CV     OTHER SURGICAL HISTORY      LEG TRAUMA     No family history on file.     Social History     Socioeconomic History     Marital status:      Spouse name: Not on file     Number of children: Not on file     Years of education: Not on file     Highest education level: Not on file   Occupational History     Not on file   Tobacco Use     Smoking status: Never Smoker     Smokeless tobacco: Never Used   Substance and Sexual Activity     Alcohol use: No     Drug use: No     Sexual activity: Yes     Partners: Female     Birth control/protection: None   Other Topics Concern     Parent/sibling w/ CABG, MI or angioplasty before 65F 55M? Not Asked   Social History Narrative     Not on file     Social Determinants of Health     Financial Resource Strain: Not on file   Food Insecurity: Not on file   Transportation Needs: Not on file   Physical Activity: Not on file   Stress: Not on file   Social Connections: Not on file   Intimate Partner Violence: Not on file   Housing Stability: Not on file           Medications  Allergies   Current Outpatient  Medications   Medication Sig Dispense Refill     blood glucose (ACCU-CHEK GUIDE) test strip Use to test blood sugar 3 times daily or as directed. 100 strip 11     bumetanide (BUMEX) 2 MG tablet Take 1 tablet (2 mg) by mouth daily 90 tablet 3     clopidogrel (PLAVIX) 75 MG tablet Take 1 tablet (75 mg) by mouth daily 90 tablet 3     ELIQUIS ANTICOAGULANT 2.5 MG tablet Take 2.5 mg by mouth 2 times daily 2.5 mg in morning and 5 mg in evening       insulin aspart (NOVOLOG FLEXPEN) 100 UNIT/ML pen Inject 8-10 Units Subcutaneous 2 times daily (with meals)       insulin lispro (HUMALOG KWIKPEN) 100 UNIT/ML (1 unit dial) KWIKPEN Inject 8-10 Units Subcutaneous 3 times daily (before meals) 30 mL 3     isosorbide mononitrate (ISMO/MONOKET) 10 MG tablet Take 2 tablets (20 mg) by mouth 2 times daily 360 tablet 3     LANTUS SOLOSTAR 100 UNIT/ML soln Inject 20-24 Units Subcutaneous At Bedtime INJECT 24 UNITS UNDER THE SKIN AT BEDTIME 15 mL 3     metoprolol succinate ER (TOPROL-XL) 25 MG 24 hr tablet Take 0.5 tablets (12.5 mg) by mouth daily 45 tablet 3     nitroGLYcerin (NITROSTAT) 0.4 MG sublingual tablet For chest pain place 1 tablet under the tongue every 5 minutes for 3 doses. If symptoms persist 5 minutes after 1st dose call 911. 25 tablet 1     rosuvastatin (CRESTOR) 40 MG tablet Take 1 tablet (40 mg) by mouth daily 90 tablet 3     spironolactone (ALDACTONE) 25 MG tablet Take 1 tablet (25 mg) by mouth daily       Continuous Blood Gluc Sensor (FREESTYLE KATIE 14 DAY SENSOR) [facility] 1 Device every 14 days Change sensor as directed every 14 days (Patient not taking: Reported on 4/7/2022) 2 each 11       Allergies   Allergen Reactions     Januvia [Sitagliptin] Unknown     HEADACHE     Trulicity [Dulaglutide] Dizziness     Weak and muscle weak          Lab Results    Chemistry/lipid CBC Cardiac Enzymes/BNP/TSH/INR   Recent Labs   Lab Test 08/02/21 0418   CHOL 176   HDL 53      TRIG 70     Recent Labs   Lab Test 08/02/21 0418  05/12/21  0934 03/13/17  1719    159* 160*     Recent Labs   Lab Test 04/05/22  1212      POTASSIUM 4.1   CHLORIDE 104   CO2 26   *   BUN 14   CR 1.21   GFRESTIMATED 68   LISA 9.5     Recent Labs   Lab Test 04/05/22  1212 03/04/22  1029 02/04/22  1450   CR 1.21 1.18 1.82*     Recent Labs   Lab Test 04/05/22  1212 12/10/21  1520 08/02/21  0418   A1C 9.6* 10.4* 10.4*          Recent Labs   Lab Test 03/04/22  1029   WBC 4.9   HGB 13.0*   HCT 38.6*   MCV 94   *     Recent Labs   Lab Test 03/04/22  1029 01/27/22  1705 01/19/22  0256   HGB 13.0* 14.8 13.6    Recent Labs   Lab Test 01/27/22  1705 01/14/22  0941 01/13/22  1731   TROPONINI 0.02 0.06 0.07     Recent Labs   Lab Test 01/13/22  1731 09/28/21  0852 08/01/21  1600   BNP 2,494*  --  963*   NTBNP  --  3,508*  --      No results for input(s): TSH in the last 84256 hours.  Recent Labs   Lab Test 08/01/21  1637   INR 0.96        Sherif Fuentes MD

## 2022-04-07 NOTE — LETTER
4/7/2022    Garcia Kaufman MD  [address]  [place] [address]    RE: Av Abdullahi       Dear Colleague,     I had the pleasure of seeing Av Fernando in the [facility].    HEART CARE ENCOUNTER CONSULTATON NOTE      [facility]  [phone]      Assessment/Recommendations   Assessment/Plan: Pt. Is a 61M w/ CAD s/p STEMI 8/21 when he was referred for but refused CABG evaluation AMA, HFrEF LVEF 30's, previous LV thrombus, HL, DM who presents today for evaluation of his CAD.     # CAD - multi-vessel disease, was offered CABG but refused last year, then presented for PCI with me but stated that his angina had since resolved  - anatomic situtation is complicated by the fact that based on the viability assessment, there is a near-transmural inferolateral scar but viability elsewhere. Since medical optimization during latest admission, his angina has essentially resolved, and he is able to walk for 1- 2 blocks without symptoms; LV thrombus has resolved as well  - we discussed again today that while LAD territory appears to have viability, there is no sufficient epicardial coronary target for PCI required to have sufficient outflow. Both Lcx and dRCA are potentially feasible for PCI, but don't appear to have viability  - his strong preference based on how well he has been feeling lately would be to continue medical therapy rather than be explosed to the risk of potential PCI, and I still think it is a reasonable position.  - he would like to continue current therapy but if things gt worse, he will contact us and we can pursue a repeat angio +/- PCI  - in the meantime, continue apixaban/clopidogrel/metop/isosorbide and rosuva    # LV thrombus - resolved  - we discussed that he may be able to come off anticoagulation at this point, but he would feel more conofrtable sticking to this regiment for at least a couple of mos  - once done with anticoagulation, would got to DAPT  "(ASA.[email or web address]) and continue indefinitely      Follow up w/  as scheduled, w/ me as needed            History of Present Illness/Subjective    HPI: Av Fernando is a 61 year old male w/ CAD s/p STEMI 8/21 when he was referred for but refused CABG evaluation AMA, HFrEF LVEF 30's, previous LV thrombus, HL, DM who presents today for evaluation of his CAD.    He was recently admitted for ADHF and angina, and medically optimized. A viability assessment showed near-transmural inferolateral scar but viability elsewhere. Since medical optimization during latest admission, his angina has nearly \"completely\" resolved, and he is able to walk for 2 blocks without symptoms. LV thrombus has resolved on echo. He gets rare (once every couple of weeks after eating a heavy meal) chest discomfort and SOB, resolved w/ NTG.        Recent Echocardiogram Results:  Interpretation Summary     There is no thrombus seen in the left ventricle.  The left ventricle is normal in size with normal left ventricular wall  thickness.  The visual ejection fraction is estimated at 30%.  There is hypokinesis of the entire inferior wall, mid to apical anterolateral  wall, and apical septum. There is akinesis of the basal and mid posterior  wall.  Normal right ventricle size and systolic function.  No hemodynamically significant valve disease is identified.     Compared to the prior study of 1/14/22, there has been an interval resolution  of the left ventricular apical thrombus.    Recent Coronary Angiogram Results 08/21:  61-year-old male with multiple risk factors for atherosclerosis, but no documented prior cardiac history, presenting with 1 week of anginal symptoms as well as 2-3 episodes of syncope concerning for a cardiac arrhythmic etiology.  Presented to the ER today with chest pain and shortness of breath with EKG showing borderline inferior current of injury.     Urgent coronary angiography showed:     Left main with mild disease  LAD " with a 60% proximal stenosis, along diagonal vessel with a 70 to 80% proximal stenosis.  The mid LAD has a 90% stenosis in the distal LAD is subtotally occluded with severe diffuse disease  Left circumflex is nondominant with a 90% proximal stenosis leading into a large first obtuse marginal which has ostial involvement.  The mid circumflex is occluded with a second obtuse marginal filling via faint left to left collaterals.  Right coronary artery is large and dominant with an 80% distal stenosis.  There appeared to be 2 parallel PDA's with the more distal one having an 80% ostial stenosis.  The R MINDY has a 70% stenosis.     LVEF 25% with inferoapical hypokinesis more pronounced  EDP 15mmHg     The angiographic films were carefully reviewed.  The patient was chest pain-free at this point.  Given the severe multivessel disease, resolution of symptoms, cardiomyopathy and diabetes, it was felt that a multidisciplinary evaluation including surgical consultation would be beneficial prior to any decisions regarding revascularization.         Physical Examination  Review of Systems   Vitals: BP 92/60 (BP Location: Left arm, Patient Position: Sitting, Cuff Size: Adult Small)   Pulse 60   Resp 12   Wt 53 kg (116 lb 12.8 oz)   BMI 21.36 kg/m    BMI= Body mass index is 21.36 kg/m .  Wt Readings from Last 3 Encounters:   04/07/22 53 kg (116 lb 12.8 oz)   04/05/22 54 kg (119 lb)   03/22/22 53.5 kg (118 lb)       General Appearance:   no distress, normal body habitus   ENT/Mouth: membranes moist, no oral lesions or bleeding gums.      EYES:  no scleral icterus, normal conjunctivae   Neck: no carotid bruits or thyromegaly   Chest/Lungs:   lungs are clear to auscultation, no rales or wheezing, no sternal scar, equal chest wall expansion    Cardiovascular:   Regular. Normal first and second heart sounds with no murmurs, rubs, or gallops; the carotid, radial and posterior tibial pulses are intact, Jugular venous pressure 6, no  edema bilaterally    Abdomen:  no organomegaly, masses, bruits, or tenderness; bowel sounds are present   Extremities: no cyanosis or clubbing   Skin: no xanthelasma, warm.    Neurologic: normal  bilateral, no tremors     Psychiatric: alert and oriented x3, calm        Please refer above for cardiac ROS details.        Medical History  Surgical History Family History Social History   Past Medical History:   Diagnosis Date     Congestive heart failure (H) 08/2021    ischemic CM with LVEF 30-35%     Coronary artery disease 08/2021    STEMI with multivessel CAD on angiography     Diabetes mellitus (H)      Hyperlipidemia      Hypertension      LV (left ventricular) mural thrombus 01/2022     Myocardial infarction (H) 08/2021    inferior STEMI     Past Surgical History:   Procedure Laterality Date     CV CORONARY ANGIOGRAM N/A 8/1/2021    Procedure: Coronary Angiogram;  Surgeon: Deidre Lopes MD;  Location: [facility] CATH LAB CV     CV LEFT VENTRICULOGRAM N/A 8/1/2021    Procedure: Left Ventriculogram;  Surgeon: Deidre Lopes MD;  Location: [facility] CATH LAB CV     OTHER SURGICAL HISTORY      LEG TRAUMA     No family history on file.     Social History     Socioeconomic History     Marital status:      Spouse name: Not on file     Number of children: Not on file     Years of education: Not on file     Highest education level: Not on file   Occupational History     Not on file   Tobacco Use     Smoking status: Never Smoker     Smokeless tobacco: Never Used   Substance and Sexual Activity     Alcohol use: No     Drug use: No     Sexual activity: Yes     Partners: Female     Birth control/protection: None   Other Topics Concern     Parent/sibling w/ CABG, MI or angioplasty before 65F 55M? Not Asked   Social History Narrative     Not on file     Social Determinants of Health     Financial Resource Strain: Not on file   Food Insecurity: Not on file   Transportation Needs: Not on file   Physical Activity: Not  on file   Stress: Not on file   Social Connections: Not on file   Intimate Partner Violence: Not on file   Housing Stability: Not on file           Medications  Allergies   Current Outpatient Medications   Medication Sig Dispense Refill     blood glucose (ACCU-CHEK GUIDE) test strip Use to test blood sugar 3 times daily or as directed. 100 strip 11     bumetanide (BUMEX) 2 MG tablet Take 1 tablet (2 mg) by mouth daily 90 tablet 3     clopidogrel (PLAVIX) 75 MG tablet Take 1 tablet (75 mg) by mouth daily 90 tablet 3     ELIQUIS ANTICOAGULANT 2.5 MG tablet Take 2.5 mg by mouth 2 times daily 2.5 mg in morning and 5 mg in evening       insulin aspart (NOVOLOG FLEXPEN) 100 UNIT/ML pen Inject 8-10 Units Subcutaneous 2 times daily (with meals)       insulin lispro (HUMALOG KWIKPEN) 100 UNIT/ML (1 unit dial) KWIKPEN Inject 8-10 Units Subcutaneous 3 times daily (before meals) 30 mL 3     isosorbide mononitrate (ISMO/MONOKET) 10 MG tablet Take 2 tablets (20 mg) by mouth 2 times daily 360 tablet 3     LANTUS SOLOSTAR 100 UNIT/ML soln Inject 20-24 Units Subcutaneous At Bedtime INJECT 24 UNITS UNDER THE SKIN AT BEDTIME 15 mL 3     metoprolol succinate ER (TOPROL-XL) 25 MG 24 hr tablet Take 0.5 tablets (12.5 mg) by mouth daily 45 tablet 3     nitroGLYcerin (NITROSTAT) 0.4 MG sublingual tablet For chest pain place 1 tablet under the tongue every 5 minutes for 3 doses. If symptoms persist 5 minutes after 1st dose call 911. 25 tablet 1     rosuvastatin (CRESTOR) 40 MG tablet Take 1 tablet (40 mg) by mouth daily 90 tablet 3     spironolactone (ALDACTONE) 25 MG tablet Take 1 tablet (25 mg) by mouth daily       Continuous Blood Gluc Sensor (FREESTYLE KATIE 14 DAY SENSOR) MISC 1 Device every 14 days Change sensor as directed every 14 days (Patient not taking: Reported on 4/7/2022) 2 each 11       Allergies   Allergen Reactions     Januvia [Sitagliptin] Unknown     HEADACHE     Trulicity [Dulaglutide] Dizziness     Weak and muscle weak           Lab Results    Chemistry/lipid CBC Cardiac Enzymes/BNP/TSH/INR   Recent Labs   Lab Test 08/02/21  0418   CHOL 176   HDL 53      TRIG 70     Recent Labs   Lab Test 08/02/21  0418 05/12/21  0934 03/13/17  1719    159* 160*     Recent Labs   Lab Test 04/05/22  1212      POTASSIUM 4.1   CHLORIDE 104   CO2 26   *   BUN 14   CR 1.21   GFRESTIMATED 68   LISA 9.5     Recent Labs   Lab Test 04/05/22  1212 03/04/22  1029 02/04/22  1450   CR 1.21 1.18 1.82*     Recent Labs   Lab Test 04/05/22  1212 12/10/21  1520 08/02/21  0418   A1C 9.6* 10.4* 10.4*          Recent Labs   Lab Test 03/04/22  1029   WBC 4.9   HGB 13.0*   HCT 38.6*   MCV 94   *     Recent Labs   Lab Test 03/04/22  1029 01/27/22  1705 01/19/22  0256   HGB 13.0* 14.8 13.6    Recent Labs   Lab Test 01/27/22  1705 01/14/22  0941 01/13/22  1731   TROPONINI 0.02 0.06 0.07     Recent Labs   Lab Test 01/13/22  1731 09/28/21  0852 08/01/21  1600   BNP 2,494*  --  963*   NTBNP  --  3,508*  --      No results for input(s): TSH in the last 97534 hours.  Recent Labs   Lab Test 08/01/21  1637   INR 0.96        Sherif Fuentes MD    Thank you for allowing me to participate in the care of your patient.    Sincerely,   Sherif Fuentes MD   [facility]  cc: No referring provider defined for this encounter.

## 2022-04-07 NOTE — PROGRESS NOTES
Clinical Pharmacy Consult:                                                    Av Fernando is a 61 year old male called for a clinical pharmacist consult.  He was referred to me from Dr. Kaufman.     Reason for Consult: Glucose testing supplies    Discussion: Av was in the office yesterday and he continues to struggle with being able to afford the glucose test strips.  We had provided him with an Accu-Chek guide glucose meter kit yesterday however his insurance is continuing to say that they are not able to cover these.  I spoke with the pharmacy and his insurance is saying that he does have a Medicare plan.  However he believes he does not have a Medicare plan only a Medicaid plan.  He has provided me the phone number with his formerly [facility] .  I have called Anali and Traci and left a voicemail to call back to discuss further.  States that he has tried to call them to clarify however continues to have ongoing difficulties.  No matter what glucose testing supplies we tried to send to the pharmacy, this will continue to be a problem if he has a Medicare plan we do not know about.  I will wait to hear back from his [facility] caseworkers, hopefully will be able to get this resolved.  Once he is able to get testing supplies I will schedule follow-up to help manage his blood sugars.  In addition to seeing his PCP yesterday he also saw his cardiologist today.    [facility] : [phone] Laly Briones   [facility]: [phone]    Plan:  1.  Clarify insurance coverage in order to get glucose testing supplies    Dwayne Jones, PharmD, BCACP  Medication Management (MTM) Pharmacist  [facility]

## 2022-04-07 NOTE — PATIENT INSTRUCTIONS
It is great to see you today!    Based on ou discussion, I understand that you would prefer to continue medical therapy at this point, and I think it is reasonable  That said, if CP recurs, let me know, and we can always re-look with an angiogram    In the meantime, continue current meds    You can come off the apixaban any time now, but just remember to go to aspirin + clopidogrel then        Follow up w/  as scheduled, w/ me as needed

## 2022-04-25 PROBLEM — R07.9 CHEST PAIN, UNSPECIFIED TYPE: Status: ACTIVE | Noted: 2022-01-01

## 2022-04-25 PROBLEM — I21.4 NSTEMI (NON-ST ELEVATED MYOCARDIAL INFARCTION) (H): Status: ACTIVE | Noted: 2022-01-01

## 2022-04-25 NOTE — PHARMACY-ADMISSION MEDICATION HISTORY
Pharmacy Note - Admission Medication History    Pertinent Provider Information: Per pt, Cardiologist wants Eliquis as 2.5 mg in the AM and 5 mg in the PM, however, pt is only taking 2.5 mg BID for insurance purposes. Pt also reports taking less isosorbide than prescribed but no clear reason.      ______________________________________________________________________    Prior To Admission (PTA) med list completed and updated in EMR.       PTA Med List   Medication Sig Note Last Dose     bumetanide (BUMEX) 2 MG tablet Take 1 tablet (2 mg) by mouth daily  4/24/2022 at AM     clopidogrel (PLAVIX) 75 MG tablet Take 1 tablet (75 mg) by mouth daily  4/24/2022 at AM     ELIQUIS ANTICOAGULANT 2.5 MG tablet Take 2.5 mg by mouth 2 times daily 2.5 mg in morning and 5 mg in evening (per Cardiologist) 4/25/2022: Pt reports Cardiologist wants him to take 2.5 mg AM and 5 mg PM. Insurance would not pay for both strengths at the same time so pt is taking only 2.5 mg BID.  4/24/2022 at AM     insulin lispro (HUMALOG KWIKPEN) 100 UNIT/ML (1 unit dial) KWIKPEN Inject 8-10 Units Subcutaneous 3 times daily (before meals)  4/24/2022 at Unknown time     isosorbide mononitrate (ISMO/MONOKET) 10 MG tablet Take 2 tablets (20 mg) by mouth 2 times daily (Patient taking differently: Take 10 mg by mouth daily) 4/25/2022: Pt is prescribed 20 mg BID but reports only taking 10 mg once daily. Does not appear that he was told to do this by his Cardiologist so unclear why he is taking it this way.  4/24/2022 at AM     LANTUS SOLOSTAR 100 UNIT/ML soln Inject 20-24 Units Subcutaneous At Bedtime INJECT 24 UNITS UNDER THE SKIN AT BEDTIME (Patient taking differently: Inject 24 Units Subcutaneous At Bedtime INJECT 24 UNITS UNDER THE SKIN AT BEDTIME)  4/23/2022 at PM     metoprolol succinate ER (TOPROL-XL) 25 MG 24 hr tablet Take 0.5 tablets (12.5 mg) by mouth daily  4/24/2022 at AM     nitroGLYcerin (NITROSTAT) 0.4 MG sublingual tablet For chest pain place 1  tablet under the tongue every 5 minutes for 3 doses. If symptoms persist 5 minutes after 1st dose call 911.  Unknown at Unknown time     rosuvastatin (CRESTOR) 40 MG tablet Take 1 tablet (40 mg) by mouth daily  4/24/2022 at AM     spironolactone (ALDACTONE) 25 MG tablet Take 1 tablet (25 mg) by mouth daily  4/24/2022 at AM       Information source(s): Patient and CareEverywhere/SureScripts  Method of interview communication: in-person    Summary of Changes to PTA Med List  New: N/A  Discontinued: N/A  Changed: isosorbide dosing    Patient was asked about OTC/herbal products specifically.  PTA med list reflects this.    In the past week, patient estimated taking medication this percent of the time:  greater than 90%.    Allergies were reviewed, assessed, and updated with the patient.      Patient does not use any multi-dose medications prior to admission.    The information provided in this note is only as accurate as the sources available at the time of the update(s).    Thank you for the opportunity to participate in the care of this patient.    Flavio Blake [facility]  4/25/2022 8:47 AM

## 2022-04-25 NOTE — PLAN OF CARE
Problem: Plan of Care - These are the overarching goals to be used throughout the patient stay.    Goal: Plan of Care Review/Shift Note  Description: The Plan of Care Review/Shift note should be completed every shift.  The Outcome Evaluation is a brief statement about your assessment that the patient is improving, declining, or no change.  This information will be displayed automatically on your shift note.  Outcome: Ongoing, Progressing  Flowsheets (Taken 4/25/2022 1431)  Plan of Care Reviewed With: patient   Goal Outcome Evaluation:    Plan of Care Reviewed With: patient               A/O x4    Chest pain 3/10, denies need for prn.     Tele: NSR    Cards seen, pt declining intervention    Heparin gtt infusing at 650units/hr, PTT 70 (within goal range), recheck in 6 hours (1715)    Bumex gtt infusing at 2mg/hr ( 8ml/hr)    Voiding in urinal.    Tolerating diet.    Up with assist of 1    VSS

## 2022-04-25 NOTE — ED NOTES
Pt given warm blkt and updated on plan. Wife sent home. Phone # written on white board and reassured we will update her if/when pt gets transferred or assigned a bed.

## 2022-04-25 NOTE — H&P
[facility]    History and Physical - Hospitalist Service       Date of Admission:  4/24/2022    Assessment & Plan      Av Fernando is a 61 year old male admitted on 4/24/2022. He came for evaluation of chest pain and shortness of breath.    1.  Non-STEMI  2.  Multivessel coronary artery disease  Patient had a STEMI in 8/2021, left AMA and declined CABG; cardiac MRI 1/18/2022 showed significant inducible ischemia in the mid to distal inferoseptal segments, mid distal anterior segments and mid inferolateral segment, patient was to have Impella assisted PCI once LV thrombus resolved.  Echo on 2/21/2022 showed interval resolution of the LV apical thrombus.  Evaluated by interventional cardiologist on 4/7/2022, discussion regarding LAD territory appeared to have viability but no sufficient target for PCI and that circumflex and RCA were potentially feasible for PCI but did not appear to have viability.  However, patient elected medical management because angina symptoms were resolved.  Continue IV heparin drip  Continue PTA clopidogrel, isosorbide, metoprolol, rosuvastatin  Cardiology consult for evaluation and further recommendations    3.  Acute on chronic systolic congestive heart failure  History of ischemic cardiomyopathy, EF 30%  Continue Bumex, spironolactone  Monitor I's and O's, daily weight    4.  Acute kidney injury  Likely in the setting of acute CHF  Monitor volume status and renal function  Avoid nephrotoxins    5.  Hyponatremia  Likely secondary to hypervolemia  Monitor electrolytes    6.  Type 2 diabetes mellitus with long-term insulin  Hold PTA insulin while nothing by mouth  Monitor with insulin sliding scale and hypoglycemia protocol    7.  Hyperlipidemia  Continue rosuvastatin    8.  Anemia  Stable hemoglobin without signs of acute blood loss         Diet: NPO for Medical/Clinical Reasons Except for: Meds    DVT Prophylaxis: IV heparin drip  Bustamante Catheter: Not  present  Central Lines: None  Cardiac Monitoring: ACTIVE order. Indication: Acute decompensated heart failure (48 hours)  Code Status: Full Code      Clinically Significant Risk Factors Present on Admission               # Coagulation Defect: home medication list includes an anticoagulant medication  # Platelet Defect: home medication list includes an antiplatelet medication       Disposition Plan   Expected Discharge:  after at least 2 midnights  Anticipated discharge location:  Awaiting care coordination huddle  Delays:    Non-STEMI       The patient's care was discussed with the Patient.    Zoltan Ellington MD  Hospitalist Service  [facility]  Securely message with the Vocera Web Console (learn more here)  Text page via [facility] Paging/Directory         ______________________________________________________________________    Chief Complaint   Chest pain, shortness of breath    History is obtained from the patient, electronic health record and emergency department physician    History of Present Illness   Av Fernando is a 61 year old male who came to the emergency department for evaluation of above chief complaint.  Past medical history of multivessel coronary artery disease, STEMI, ischemic cardiomyopathy, CHF, LV thrombus, hypertension, hyperlipidemia, type 2 diabetes, peripheral neuropathy, vitamin D deficiency.  Patient had a STEMI in 8/2021, coronary angiogram showed multivessel coronary artery disease and was recommended CABG but left AMA.  He was admitted to this hospital in 1/2022 for acute CHF and found to have an apical LV clot requiring anticoagulation.  A cardiac MRI on 1/18/2022 showed inducible myocardial ischemia and patient was recommended PCI after resolution of LV clot.  He had a consult with interventional cardiologist and discussion was had about area of viability on the LAD territory but no sufficient target for PCI.  Patient elected to continue medical management due to  resolution of angina.  He reports taking all of his medications as prescribed.  On 4/23/2022 he complained of epigastric abdominal pain then on 4/24/2022 complaint of pain across the chest with radiation to the throat with associated shortness of breath.  He felt that it was difficult to take a deep breath but denied fevers, chills, edema or palpitations.  Work-up in the ED showed elevated troponin and pulmonary edema congestive heart failure.  He is diuresing and feeling much better.    Review of Systems    The 10 point Review of Systems is negative other than noted in the HPI or here.     Past Medical History    I have reviewed this patient's medical history and updated it with pertinent information if needed.   Past Medical History:   Diagnosis Date     Congestive heart failure (H) 08/2021    ischemic CM with LVEF 30-35%     Coronary artery disease 08/2021    STEMI with multivessel CAD on angiography     Diabetes mellitus (H)      Hyperlipidemia      Hypertension      LV (left ventricular) mural thrombus 01/2022     Myocardial infarction (H) 08/2021    inferior STEMI       Past Surgical History   I have reviewed this patient's surgical history and updated it with pertinent information if needed.  Past Surgical History:   Procedure Laterality Date     CV CORONARY ANGIOGRAM N/A 8/1/2021    Procedure: Coronary Angiogram;  Surgeon: Deidre Lopes MD;  Location: [facility] CATH LAB CV     CV LEFT VENTRICULOGRAM N/A 8/1/2021    Procedure: Left Ventriculogram;  Surgeon: Deidre Lopes MD;  Location: [facility] CATH LAB CV     OTHER SURGICAL HISTORY      LEG TRAUMA       Social History   I have reviewed this patient's social history and updated it with pertinent information if needed.  Social History     Tobacco Use     Smoking status: Never Smoker     Smokeless tobacco: Never Used   Substance Use Topics     Alcohol use: No     Drug use: No       Family History     Family history reviewed and noncontributory to this  hospitalization    Prior to Admission Medications   Prior to Admission Medications   Prescriptions Last Dose Informant Patient Reported? Taking?   Continuous Blood Gluc Sensor (FREESTYLE KATIE 14 DAY SENSOR) [facility]   No No   Si Device every 14 days Change sensor as directed every 14 days   Patient not taking: Reported on 2022   ELIQUIS ANTICOAGULANT 2.5 MG tablet   Yes No   Sig: Take 2.5 mg by mouth 2 times daily 2.5 mg in morning and 5 mg in evening   LANTUS SOLOSTAR 100 UNIT/ML soln   No No   Sig: Inject 20-24 Units Subcutaneous At Bedtime INJECT 24 UNITS UNDER THE SKIN AT BEDTIME   blood glucose (ACCU-CHEK GUIDE) test strip   No No   Sig: Use to test blood sugar 3 times daily or as directed.   bumetanide (BUMEX) 2 MG tablet   No No   Sig: Take 1 tablet (2 mg) by mouth daily   clopidogrel (PLAVIX) 75 MG tablet   No No   Sig: Take 1 tablet (75 mg) by mouth daily   insulin aspart (NOVOLOG FLEXPEN) 100 UNIT/ML pen   Yes No   Sig: Inject 8-10 Units Subcutaneous 2 times daily (with meals)   insulin lispro (HUMALOG KWIKPEN) 100 UNIT/ML (1 unit dial) KWIKPEN   No No   Sig: Inject 8-10 Units Subcutaneous 3 times daily (before meals)   isosorbide mononitrate (ISMO/MONOKET) 10 MG tablet   No No   Sig: Take 2 tablets (20 mg) by mouth 2 times daily   metoprolol succinate ER (TOPROL-XL) 25 MG 24 hr tablet   No No   Sig: Take 0.5 tablets (12.5 mg) by mouth daily   nitroGLYcerin (NITROSTAT) 0.4 MG sublingual tablet   No No   Sig: For chest pain place 1 tablet under the tongue every 5 minutes for 3 doses. If symptoms persist 5 minutes after 1st dose call 911.   rosuvastatin (CRESTOR) 40 MG tablet   No No   Sig: Take 1 tablet (40 mg) by mouth daily   spironolactone (ALDACTONE) 25 MG tablet   Yes No   Sig: Take 1 tablet (25 mg) by mouth daily      Facility-Administered Medications: None     Allergies   Allergies   Allergen Reactions     Januvia [Sitagliptin] Unknown     HEADACHE     Trulicity [Dulaglutide] Dizziness     Weak  and muscle weak       Physical Exam   Vital Signs: Temp: 98.1  F (36.7  C) Temp src: Temporal BP: 93/57 Pulse: 73   Resp: 20 SpO2: 97 % O2 Device: None (Room air)    Weight: 116 lbs 0 oz    Constitutional: awake and alert  ENT: normocepalic, without obvious abnormality, atramatic  Respiratory: no increased work of breathing, good air exchange and crackles diffuse  Cardiovascular: regular rate and rhythm and normal S1 and S2  GI: normal bowel sounds and soft  Skin: no bruising or bleeding and no redness, warmth, or swelling  Musculoskeletal: no lower extremity pitting edema present  there is no redness, warmth, or swelling of the joints  Neurologic: Mental Status Exam:  Level of Alertness:   awake  Orientation:   person, place, time  Motor Exam:  moves all extremities well and symmetrically    Data   Data reviewed today: I reviewed all medications, new labs and imaging results over the last 24 hours. I personally reviewed the EKG tracing showing Sinus rhythm at 80 bpm, nonspecific ST waves lateral leads unchanged from March 2022.    Recent Labs   Lab 04/24/22 2212 04/24/22 2209   WBC 5.7  --    HGB 12.6*  --    MCV 94  --    *  --    INR 1.24*  --    *  --    POTASSIUM 4.6  --    CHLORIDE 102  --    CO2 21*  --    BUN 38*  --    CR 1.80*  --    ANIONGAP 12  --    LISA 9.0  --    * 190*   ALBUMIN 3.6  --    PROTTOTAL 6.9  --    BILITOTAL 1.0  --    ALKPHOS 77  --    ALT 52*  --    AST 88*  --    LIPASE 24  --      Recent Results (from the past 24 hour(s))   XR Chest Port 1 View    Narrative    EXAM: XR CHEST PORT 1 VIEW  LOCATION: [facility]  DATE/TIME: 4/24/2022 10:21 PM    INDICATION: chest pain  COMPARISON: 01/18/2022.      Impression    IMPRESSION: Chronic bandlike scarring/atelectasis of the right midlung. Mild perihilar scarring on the left. Prominent interstitial markings consistent with mild congestion and edema. There may be a superimposed infiltrate of the lower  right lung. No   significant pleural fluid. No pneumothorax. Stable borderline cardiomegaly. Mild calcified plaque of the aortic arch.

## 2022-04-25 NOTE — CONSULTS
HEART CARE NOTE        Thank you, Dr. Mosnon, for asking the [facility] team to see Av Abdullahi to evaluate HFrEF c/b ADHF.      Assessment/Recommendations     1. HFrEF/ICM c/b ADHF   Assessment / Plan    Hypervolemic on physical exam - inadequate diuresis on current regimen; will start bumetanide gtt after bolus    GDMT as detailed below - repeat echo negative for LV thrombus    Will refer for ICD pending revascularization strategy     Current Pharmacotherapy AHA Guideline-Directed Medical Therapy   Lisinopril - on hold given ELIZABETH and borderline BP Lisinopril 20 mg twice daily   Metoprolol 12.5 mg daily Carvedilol 25 mg twice daily   Spironolactone 25 mg daily Spironolactone 25 mg once daily   Hydralazine NA Hydralazine 100 mg three times daily   Isosorbide mononitrate 20 mg daily Isosorbide dinitrate 40 mg three times daily   SGLT2 inhibitor:not started Dapagliflozin or Empagliflozin 10 mg daily      2. Severe multivessel CAD c/b NSTEMI  Assessment / Plan    Hx of STEMI 8/21 - at which time the pateint left AMA    Known severe multivessel disease - patient declines recommended CABG    Continue ASA, carvedilol, clopidogrel, high intensity rosuvastatin, isosorbide mononitrate, and spironolactone    Denies current chest pain    Cardiac MRI significant inducible ischemia in the mid to distal inferoseptal segments, mid-distal anterior segments and mid inferolateral segment. There is almost transmural infarction in the inferolateral walls while the rest of the myocardium is viable; plan for impella assisted PCI initially on hold given patient's absence of symptoms and adequate functional capacity at the time    Today, patient declines coronary angiogram; would no like to pursue intervention; would instead like to return to home to \[facility]\"" to pursue herbal remedy; reviewed lab results and known coronary anatomy with patient and the risks associated with holding off on potential intervention; patient voiced  comprehension, but continued to decline further cardiac intervention at this.      3. Valvular heart disease  Assessment / Plan    Moderate MR and TR - continue oral afterload reduction     4. DM2  Assessment / Plan    Management per PMD    Clinically Significant Risk Factors Present on Admission               # Coagulation Defect: home medication list includes an anticoagulant medication  # Platelet Defect: home medication list includes an antiplatelet medication       Fluid & Electrolyte Disorders: Fluid overload, unspecified, Other fluid overload and Other disorders of electrolyte and fluid balance, not elsewhere classified    Nephrology: Acute kidney failure, unspecified  CKD POA List: Stage 3a (GFR 45-59)         History of Present Illness/Subjective      Mr. Av Fernando is a 61 year old male with a PMHx significant for multivessel coronary artery disease status post STEMI in August 2021 recommended cardiothoracic surgery evaluation, however patient left AMA because family member had passed from bypass surgery, HFrEF, type 2 diabetes, dyslipidemia, anemia who presents in the setting of NSTEMI c/b ADHF     Today, Mr. Fernando denies any acute events or complaints; denies current chest pain or HF symptoms; Management plan as detailed above.       ECG: Personally reviewed. normal sinus rhythm, nonspecific ST and T waves changes.    ECHO (personnaly Reviewed):   There is no thrombus seen in the left ventricle.  The left ventricle is normal in size with normal left ventricular wall  thickness.  The visual ejection fraction is estimated at 30%.  There is hypokinesis of the entire inferior wall, mid to apical anterolateral  wall, and apical septum. There is akinesis of the basal and mid posterior  wall.  Normal right ventricle size and systolic function.  No hemodynamically significant valve disease is identified.     Compared to the prior study of 1/14/22, there has been an interval resolution  of the left ventricular  "apical thrombus.        Physical Examination Review of Systems   /69   Pulse 72   Temp 98.1  F (36.7  C) (Temporal)   Resp 15   Ht 1.575 m (5' 2\")   Wt 52.6 kg (116 lb)   SpO2 96%   BMI 21.22 kg/m    Body mass index is 21.22 kg/m .  Wt Readings from Last 3 Encounters:   04/24/22 52.6 kg (116 lb)   04/07/22 53 kg (116 lb 12.8 oz)   04/05/22 54 kg (119 lb)     General Appearance:   no distress, normal body habitus   ENT/Mouth: membranes moist, no oral lesions or bleeding gums.      EYES:  no scleral icterus, normal conjunctivae   Neck: no carotid bruits or thyromegaly   Chest/Lungs:   lungs are clear to auscultation, no rales or wheezing, equal chest wall expansion    Cardiovascular:   Regular. Normal first and second heart sounds with + FLORA; no rubs, or gallops; the carotid, radial and posterior tibial pulses are intact, + JVD and LE edema bilaterally    Abdomen:  no organomegaly, masses, bruits, or tenderness; bowel sounds are present   Extremities: no cyanosis or clubbing   Skin: no xanthelasma, warm.    Neurologic: alert and oriented x3, calm     Psychiatric: alert and oriented x3, calm     A complete 10 systems ROS was reviewed  And is negative except what is listed in the HPI.          Medical History  Surgical History Family History Social History   Past Medical History:   Diagnosis Date     Congestive heart failure (H) 08/2021    ischemic CM with LVEF 30-35%     Coronary artery disease 08/2021    STEMI with multivessel CAD on angiography     Diabetes mellitus (H)      Hyperlipidemia      Hypertension      LV (left ventricular) mural thrombus 01/2022     Myocardial infarction (H) 08/2021    inferior STEMI    Past Surgical History:   Procedure Laterality Date     CV CORONARY ANGIOGRAM N/A 8/1/2021    Procedure: Coronary Angiogram;  Surgeon: Deidre Lopes MD;  Location: [facility] CATH LAB CV     CV LEFT VENTRICULOGRAM N/A 8/1/2021    Procedure: Left Ventriculogram;  Surgeon: Deidre Lopes MD;  " Location: [facility] LAB CV     OTHER SURGICAL HISTORY      LEG TRAUMA    no family history of premature coronary artery disease Social History     Socioeconomic History     Marital status:      Spouse name: Not on file     Number of children: Not on file     Years of education: Not on file     Highest education level: Not on file   Occupational History     Not on file   Tobacco Use     Smoking status: Never Smoker     Smokeless tobacco: Never Used   Substance and Sexual Activity     Alcohol use: No     Drug use: No     Sexual activity: Yes     Partners: Female     Birth control/protection: None   Other Topics Concern     Parent/sibling w/ CABG, MI or angioplasty before 65F 55M? Not Asked   Social History Narrative     Not on file     Social Determinants of Health     Financial Resource Strain: Not on file   Food Insecurity: Not on file   Transportation Needs: Not on file   Physical Activity: Not on file   Stress: Not on file   Social Connections: Not on file   Intimate Partner Violence: Not on file   Housing Stability: Not on file           Lab Results    Chemistry/lipid CBC Cardiac Enzymes/BNP/TSH/INR   Lab Results   Component Value Date    CHOL 176 08/02/2021    HDL 53 08/02/2021    TRIG 70 08/02/2021    BUN 38 (H) 04/24/2022     (L) 04/24/2022    CO2 21 (L) 04/24/2022    Lab Results   Component Value Date    WBC 5.7 04/24/2022    HGB 12.6 (L) 04/24/2022    HCT 36.2 (L) 04/24/2022    MCV 94 04/24/2022     (L) 04/24/2022    Lab Results   Component Value Date    TROPONINI 8.33 (HH) 04/24/2022    BNP 3,288 (H) 04/24/2022    INR 1.24 (H) 04/24/2022     Lab Results   Component Value Date    TROPONINI 8.33 (HH) 04/24/2022          Weight:    Wt Readings from Last 3 Encounters:   04/24/22 52.6 kg (116 lb)   04/07/22 53 kg (116 lb 12.8 oz)   04/05/22 54 kg (119 lb)       Allergies  Allergies   Allergen Reactions     Januvia [Sitagliptin] Unknown     HEADACHE     Trulicity [Dulaglutide] Dizziness      Weak and muscle weak         Surgical History  Past Surgical History:   Procedure Laterality Date     CV CORONARY ANGIOGRAM N/A 8/1/2021    Procedure: Coronary Angiogram;  Surgeon: Deidre Lopes MD;  Location: [facility] CATH LAB CV     CV LEFT VENTRICULOGRAM N/A 8/1/2021    Procedure: Left Ventriculogram;  Surgeon: Deidre Lopes MD;  Location: [facility] CATH LAB CV     OTHER SURGICAL HISTORY      LEG TRAUMA       Social History  Tobacco:   History   Smoking Status     Never Smoker   Smokeless Tobacco     Never Used    Alcohol:   Social History    Substance and Sexual Activity      Alcohol use: No   Illicit Drugs:   History   Drug Use No       Family History  History reviewed. No pertinent family history.       Dougie Gupta MD on 4/25/2022      cc: Garcia Kaufman,

## 2022-04-25 NOTE — PLAN OF CARE
Problem: Plan of Care - These are the overarching goals to be used throughout the patient stay.    Goal: Absence of Hospital-Acquired Illness or Injury  Intervention: Identify and Manage Fall Risk  Recent Flowsheet Documentation  Taken 4/25/2022 1800 by Cristel Garner RN  Safety Promotion/Fall Prevention: nonskid shoes/slippers when out of bed  Intervention: Prevent Skin Injury  Recent Flowsheet Documentation  Taken 4/25/2022 1800 by Cristel Garner RN  Body Position: position changed independently  Taken 4/25/2022 1700 by Cristel Garner RN  Body Position: position changed independently  Intervention: Prevent and Manage VTE (Venous Thromboembolism) Risk  Recent Flowsheet Documentation  Taken 4/25/2022 1800 by Cristel Garner RN  Activity Management:    ambulated to bathroom    ambulated in room  Taken 4/25/2022 1700 by Cristel Garner RN  Activity Management: ambulated to bathroom     Problem: Chest Pain  Goal: Resolution of Chest Pain Symptoms  Outcome: Ongoing, Progressing     Problem: Dysrhythmia  Goal: Normalized Cardiac Rhythm  Outcome: Ongoing, Progressing     Problem: Hyperglycemia  Goal: Blood Glucose Level Within Targeted Range  Outcome: Ongoing, Not Progressing   Goal Outcome Evaluation:  Admitted this 61 year old male with PMH of multivessel chronic artery disease EF 30%. Came to ER due to chest pain, SOB. History of right sided hemiparesis.   Patient is alert, oriented X 4. English speaking Hmong.  Denied pain, able to take 100% of his dinner.    Received patient with Heparin drip @ 650 units/hour. ptt is therapeutic next recheck francois. AM.   Bumetanide is running @ 2 mg/hour. Patient with good urine output.

## 2022-04-25 NOTE — PROGRESS NOTES
Occupational Therapy/Cardiac Rehab  Met with patient to explain therapy services and order for evaluation. Patient declined need for evaluation during this stay. Plans to return home with family    Maranda DUMONT, OTR/L, CLT 4/25/2022 , 2:55 PM

## 2022-04-25 NOTE — CONSULTS
Care Management Initial Consult    General Information  Assessment completed with: Opal Av  Type of CM/SW Visit: Initial Assessment    Primary Care Provider verified and updated as needed: Yes   Readmission within the last 30 days: no previous admission in last 30 days      Reason for Consult: discharge planning  Advance Care Planning: Advance Care Planning Reviewed: no concerns identified          Communication Assessment  Patient's communication style: spoken language (English or Bilingual) (Speaks Hmong and also English)    Hearing Difficulty or Deaf: no   Wear Glasses or Blind: yes                 Living Environment:   People in home: spouse, child(rupert), adult, grandchild(rupert)     Current living Arrangements: house      Able to return to prior arrangements: yes       Family/Social Support:  Care provided by: self  Provides care for: no one  Marital Status:   Wife, Children          Description of Support System: Supportive, Involved    Support Assessment: Adequate family and caregiver support, Adequate social supports, Patient communicates needs well met    Current Resources:   Patient receiving home care services: No     Community Resources: [facility] Worker, [facility]  Equipment currently used at home: cane, straight, glucometer  Supplies currently used at home: Diabetic Supplies    Employment/Financial:  Employment Status: disabled        Financial Concerns:     Referral to Financial Worker: No       Lifestyle & Psychosocial Needs:  Social Determinants of Health     Tobacco Use: Low Risk      Smoking Tobacco Use: Never Smoker     Smokeless Tobacco Use: Never Used   Alcohol Use: Not on file   Financial Resource Strain: Not on file   Food Insecurity: Not on file   Transportation Needs: Not on file   Physical Activity: Not on file   Stress: Not on file   Social Connections: Not on file   Intimate Partner Violence: Not on file   Depression: Not at risk     PHQ-2 Score: 2   Housing Stability: Not  "on file       Functional Status:  Prior to admission patient needed assistance:   Dependent ADLs:: Ambulation-cane, Independent  Dependent IADLs:: Cleaning, Cooking, Laundry, Shopping, Meal Preparation (\"family helps\")  Assesssment of Functional Status: At functional baseline    Mental Health Status:          Chemical Dependency Status:                Values/Beliefs:  Spiritual, Cultural Beliefs, Tenriism Practices, Values that affect care:                 Additional Information:  Av lives in a house with his wife, son, and other extended family.     He is independent with most ADLs. He gets help with \"housekeeping, laundry, meal prep, and shopping.\" He does still \"sometimes drive\".    He is aware that \"he could set up PCA help with his [facility] program in the future if he needs it\".    Unknown discharge needs at this time.    Family to transport at discharge.    [facility] Care Manager: [phone].    Marcella Edwards RN      "

## 2022-04-25 NOTE — ED PROVIDER NOTES
NAME: Av Fernando  AGE: 61 year old male  YOB: 1960  MRN: [id number]  EVALUATION DATE & TIME: 4/24/2022 10:29 PM    PCP: Garcia Kaufman    ED PROVIDER: Dipak Monson M.D.    Chief Complaint   Patient presents with     Abdominal Pain     Chest Pain     Pt reports throat feels tight and hard to breathe, denies SOB.     FINAL IMPRESSION:  1. Chest pain, unspecified type    2. Acute on chronic systolic congestive heart failure (H)    3. NSTEMI (non-ST elevated myocardial infarction) (H)    4. Orthopnea      MEDICAL DECISION MAKING:      10:34 PM I met with the patient, obtained history, performed an initial exam, and discussed options and plan for diagnostics and treatment here in the ED.   11:00 PM patient discussed with lab for positive troponin.  Call to cardiology, Dr. Trejo and patient recommended for heparin and admission for trending of troponin with repeat discussion of cardiac interventions.  11:10 PM we discussed findings with patient and daughter and recommendations from cardiology.  Patient agreed to admission and does not wish procedures as he stated previously.  We will plan for admission.  12:52 AM patient plan for admission however no beds available at [facility] and will board in the ER.  Patient discussed with hospitalist, Dr. Ellington.    Patient was clinically assessed and consented to treatment. After assessment, medical decision making and workup were discussed with the patient. The patient was agreeable to plan for testing, workup, and treatment.  Pertinent Labs & Imaging studies reviewed. (See chart for details)     Av Fernando is a 61 year old male who presents with abdominal/chest pain, dyspnea.   Differential diagnosis includes but not limited to acute coronary syndrome, CHF exacerbation, cholecystitis, biliary colic, pancreatitis, pneumonia.  Patient does have significant past medical history of coronary artery disease and has refused bypass surgery in the past.  Patient  reports he is trying to manage medically and does not wish surgery.  He does have history of heart failure with this and has required diuresis including Bumex at home.  I suspect similar problems based on presentation and with crackles bilaterally as well as his pain.  He did report more epigastric pain which could be just referred from cardiac but will plan to check labs, EKG and chest x-ray.  EKG did not show acute elevation criteria for  STEMI.  Labs were obtained and showed elevated troponin at 8.33.  This was discussed with cardiology, Dr. Trejo and patient does reiterate that he does not wish surgery or intervention for this.  Given the elevation that would place patient on anticoagulation per cardiology recommendations and also diuresis as this is not just likely demand ischemia but possibly new cardiac injury.  Patient does have a history of similar in August of last year and is agreeable to admission.  Heparin will be started and patient blood pressure stable at this time.  Extra nasal cannula oxygen and with diuresis with Bumex 1 mg he did have improvement in the pain with more comfortable breathing.  I did discuss with him again and he again did not wish intervention for the multivessel disease.  I did discuss with him cardiology speak with him in the morning about possible limited angiography to try to evaluate damage and symptoms.  Patient unsure if he would want this but definitely does not want open heart surgery.  Patient feeling better after the initial interventions and further labs show stable CBC but elevated BNP and metabolic panel with slight elevation of creatinine compared to prior.  Unfortunately no beds were available at [facility].  Multiple phone calls to [facility] were unsuccessful for locating a cardiac telemetry bed.  Outside the system within the area were unsuccessful for any bed placement and patient will need to board in the ER for admission here at [facility].   Patient was discussed with Dr. Ellington from the hospitalist service for admission here.    40 minutes of critical care time    MEDICATIONS GIVEN IN THE EMERGENCY:  Medications   ondansetron (ZOFRAN) injection 4 mg (4 mg Intravenous Given 4/24/22 2303)   heparin infusion 25,000 units in D5W 250 mL ANTICOAGULANT (650 Units/hr Intravenous Rate/Dose Verify 4/25/22 0141)   bumetanide (BUMEX) injection 1 mg (1 mg Intravenous Given 4/24/22 2303)       NEW PRESCRIPTIONS STARTED AT TODAY'S ER VISIT:  New Prescriptions    No medications on file          =================================================================    HPI    Patient information was obtained from: Patient    Use of : N/A       Av Fernando is a 61 year old male with a past medical history of DM type 2, CAD, MI, HTN, HLD, and CHF, who presents via private auto for evaluation of abdominal pain.  He reports 3 days of epigastric to chest pain.  He reports shortness of breath especially when he lays down with this but no leg swelling.  No palpitations, no nausea or vomiting, no lightheadedness.  The pain does not radiate anywhere except for from the epigastrium up into his central chest.  He reports a history of coronary disease and CHF but does not wish any surgeries for this and states that he has been admitted multiple times for his heart but has refused to have open heart surgery.  He is try to manage him medically with his primary doctor.  He also has a history of diabetes and has not been checking his blood sugar for the last 3 days.    REVIEW OF SYSTEMS   Review of Systems   Respiratory: Positive for shortness of breath. Negative for cough and wheezing.    Cardiovascular: Positive for chest pain. Negative for palpitations and leg swelling.   Gastrointestinal: Positive for abdominal pain. Negative for abdominal distention, nausea and vomiting.   Neurological: Negative for light-headedness.   All other systems reviewed and are negative.       PAST  MEDICAL HISTORY:  Past Medical History:   Diagnosis Date     Congestive heart failure (H) 08/2021    ischemic CM with LVEF 30-35%     Coronary artery disease 08/2021    STEMI with multivessel CAD on angiography     Diabetes mellitus (H)      Hyperlipidemia      Hypertension      LV (left ventricular) mural thrombus 01/2022     Myocardial infarction (H) 08/2021    inferior STEMI       PAST SURGICAL HISTORY:  Past Surgical History:   Procedure Laterality Date     CV CORONARY ANGIOGRAM N/A 8/1/2021    Procedure: Coronary Angiogram;  Surgeon: Deidre Lopes MD;  Location: [facility] CATH LAB CV     CV LEFT VENTRICULOGRAM N/A 8/1/2021    Procedure: Left Ventriculogram;  Surgeon: Deidre Lopes MD;  Location: [facility] CATH LAB CV     OTHER SURGICAL HISTORY      LEG TRAUMA       CURRENT MEDICATIONS:      Current Facility-Administered Medications:      heparin infusion 25,000 units in D5W 250 mL ANTICOAGULANT, 0-5,000 Units/hr, Intravenous, Continuous, Dipak Monson MD, Last Rate: 6.5 mL/hr at 04/25/22 0141, 650 Units/hr at 04/25/22 0141     ondansetron (ZOFRAN) injection 4 mg, 4 mg, Intravenous, Q30 Min PRN, Dipak Monson MD, 4 mg at 04/24/22 2303    Current Outpatient Medications:      blood glucose (ACCU-CHEK GUIDE) test strip, Use to test blood sugar 3 times daily or as directed., Disp: 100 strip, Rfl: 11     bumetanide (BUMEX) 2 MG tablet, Take 1 tablet (2 mg) by mouth daily, Disp: 90 tablet, Rfl: 3     clopidogrel (PLAVIX) 75 MG tablet, Take 1 tablet (75 mg) by mouth daily, Disp: 90 tablet, Rfl: 3     Continuous Blood Gluc Sensor (FREESTYLE KATIE 14 DAY SENSOR) [facility], 1 Device every 14 days Change sensor as directed every 14 days (Patient not taking: Reported on 4/7/2022), Disp: 2 each, Rfl: 11     ELIQUIS ANTICOAGULANT 2.5 MG tablet, Take 2.5 mg by mouth 2 times daily 2.5 mg in morning and 5 mg in evening, Disp: , Rfl:      insulin aspart (NOVOLOG FLEXPEN) 100 UNIT/ML pen, Inject 8-10  "Units Subcutaneous 2 times daily (with meals), Disp: , Rfl:      insulin lispro (HUMALOG KWIKPEN) 100 UNIT/ML (1 unit dial) KWIKPEN, Inject 8-10 Units Subcutaneous 3 times daily (before meals), Disp: 30 mL, Rfl: 3     isosorbide mononitrate (ISMO/MONOKET) 10 MG tablet, Take 2 tablets (20 mg) by mouth 2 times daily, Disp: 360 tablet, Rfl: 3     LANTUS SOLOSTAR 100 UNIT/ML soln, Inject 20-24 Units Subcutaneous At Bedtime INJECT 24 UNITS UNDER THE SKIN AT BEDTIME, Disp: 15 mL, Rfl: 3     metoprolol succinate ER (TOPROL-XL) 25 MG 24 hr tablet, Take 0.5 tablets (12.5 mg) by mouth daily, Disp: 45 tablet, Rfl: 3     nitroGLYcerin (NITROSTAT) 0.4 MG sublingual tablet, For chest pain place 1 tablet under the tongue every 5 minutes for 3 doses. If symptoms persist 5 minutes after 1st dose call 911., Disp: 25 tablet, Rfl: 1     rosuvastatin (CRESTOR) 40 MG tablet, Take 1 tablet (40 mg) by mouth daily, Disp: 90 tablet, Rfl: 3     spironolactone (ALDACTONE) 25 MG tablet, Take 1 tablet (25 mg) by mouth daily, Disp: , Rfl:     ALLERGIES:  Allergies   Allergen Reactions     Januvia [Sitagliptin] Unknown     HEADACHE     Trulicity [Dulaglutide] Dizziness     Weak and muscle weak       FAMILY HISTORY:  History reviewed. No pertinent family history.    SOCIAL HISTORY:   Social History     Socioeconomic History     Marital status:    Tobacco Use     Smoking status: Never Smoker     Smokeless tobacco: Never Used   Substance and Sexual Activity     Alcohol use: No     Drug use: No     Sexual activity: Yes     Partners: Female     Birth control/protection: None       PHYSICAL EXAM:    Vitals: /69   Pulse 80   Temp 98.1  F (36.7  C) (Temporal)   Resp 20   Ht 1.575 m (5' 2\")   Wt 52.6 kg (116 lb)   SpO2 96%   BMI 21.22 kg/m     Physical Exam  Vitals and nursing note reviewed.   Constitutional:       General: He is not in acute distress.     Appearance: He is well-developed and normal weight. He is not ill-appearing, " toxic-appearing or diaphoretic.   HENT:      Head: Normocephalic and atraumatic.   Eyes:      General: No scleral icterus.  Cardiovascular:      Rate and Rhythm: Normal rate and regular rhythm.      Heart sounds: Murmur heard.   Pulmonary:      Effort: Pulmonary effort is normal.      Breath sounds: No decreased air movement or transmitted upper airway sounds. Examination of the right-lower field reveals rales. Examination of the left-lower field reveals rales. Rales present. No decreased breath sounds.   Abdominal:      General: Abdomen is flat.      Palpations: Abdomen is soft.      Tenderness: There is no abdominal tenderness. There is no right CVA tenderness, left CVA tenderness, guarding or rebound.      Hernia: No hernia is present.   Musculoskeletal:      Right lower leg: No edema.      Left lower leg: No edema.   Skin:     General: Skin is warm and dry.      Capillary Refill: Capillary refill takes less than 2 seconds.      Coloration: Skin is not cyanotic or pale.   Neurological:      General: No focal deficit present.      Mental Status: He is alert.   Psychiatric:         Behavior: Behavior normal.        LAB:  All pertinent labs reviewed and interpreted.  Labs Ordered and Resulted from Time of ED Arrival to Time of ED Departure   INR - Abnormal       Result Value    INR 1.24 (*)    TROPONIN I - Abnormal    Troponin I 8.33 (*)    B-TYPE NATRIURETIC PEPTIDE ([facility] ONLY) - Abnormal    BNP 3,288 (*)    COMPREHENSIVE METABOLIC PANEL - Abnormal    Sodium 135 (*)     Potassium 4.6      Chloride 102      Carbon Dioxide (CO2) 21 (*)     Anion Gap 12      Urea Nitrogen 38 (*)     Creatinine 1.80 (*)     Calcium 9.0      Glucose 191 (*)     Alkaline Phosphatase 77      AST 88 (*)     ALT 52 (*)     Protein Total 6.9      Albumin 3.6      Bilirubin Total 1.0      GFR Estimate 42 (*)    CBC WITH PLATELETS AND DIFFERENTIAL - Abnormal    WBC Count 5.7      RBC Count 3.86 (*)     Hemoglobin 12.6 (*)     Hematocrit  36.2 (*)     MCV 94      MCH 32.6      MCHC 34.8      RDW 12.7      Platelet Count 141 (*)     % Neutrophils 62      % Lymphocytes 27      % Monocytes 9      % Eosinophils 1      % Basophils 1      % Immature Granulocytes 0      NRBCs per 100 WBC 0      Absolute Neutrophils 3.6      Absolute Lymphocytes 1.6      Absolute Monocytes 0.5      Absolute Eosinophils 0.0      Absolute Basophils 0.0      Absolute Immature Granulocytes 0.0      Absolute NRBCs 0.0     GLUCOSE BY METER - Abnormal    GLUCOSE BY METER POCT 190 (*)    PARTIAL THROMBOPLASTIN TIME - Normal    aPTT 32     MAGNESIUM - Normal    Magnesium 2.0     LIPASE - Normal    Lipase 24     COVID-19 VIRUS (CORONAVIRUS) BY PCR - Normal    SARS CoV2 PCR Negative     HEPARIN UNFRACTIONATED ANTI XA LEVEL       RADIOLOGY:  XR Chest Port 1 View   Final Result   IMPRESSION: Chronic bandlike scarring/atelectasis of the right midlung. Mild perihilar scarring on the left. Prominent interstitial markings consistent with mild congestion and edema. There may be a superimposed infiltrate of the lower right lung. No    significant pleural fluid. No pneumothorax. Stable borderline cardiomegaly. Mild calcified plaque of the aortic arch.        EKG:   Performed at: 10:17 PM on April 24, 2022.  Impression: Normal sinus rhythm with nonspecific ST and T wave flattening compared to prior, no acute STEMI criteria.  Rate: 80 bpm   Rhythm: Normal sinus rhythm  QRS Interval: 90 ms  QTc Interval: 470 ms  Comparison: Comparison to prior EKG from March 4, 2022 similar ST abnormality and no acute change.  I have independently reviewed and interpreted the EKG(s) documented above.     PROCEDURES:   Procedures       I, Marcella Henry, am serving as a scribe to document services personally performed by Dr. Dipak Monson  based on my observation and the provider's statements to me. I, Dipak Monson MD attest that Marcella Henry is acting in a scribe capacity, has observed my performance of the  services and has documented them in accordance with my direction.      Dipak Monson M.D.  Emergency Medicine  [facility] Emergency Department       Dipak Monson MD  04/25/22 5614

## 2022-04-25 NOTE — PROGRESS NOTES
"Daily Progress Note        CODE STATUS:  Full Code    04/25/22  Assessment/Plan:  Av Fernando is a 61 year old male admitted on 4/24/2022. He came for evaluation of chest pain and shortness of breath.     1.  Non-STEMI  2.  Multivessel coronary artery disease  Patient had a STEMI in 8/2021, left AMA and declined CABG; cardiac MRI 1/18/2022 showed significant inducible ischemia in the mid to distal inferoseptal segments, mid distal anterior segments and mid inferolateral segment, patient was to have Impella assisted PCI once LV thrombus resolved.  Echo on 2/21/2022 showed interval resolution of the LV apical thrombus.  Evaluated by interventional cardiologist on 4/7/2022, discussion regarding LAD territory appeared to have viability but no sufficient target for PCI and that circumflex and RCA were potentially feasible for PCI but did not appear to have viability.  However, patient elected medical management because angina symptoms were resolved.  Troponin of 8.33-->10.49 noted. Continue IV heparin drip  Continue PTA clopidogrel, isosorbide, metoprolol, rosuvastatin    Cardiology consulted for evaluation and further recommendations. Per cardiology- \"Patient declines coronary angiogram; would no like to pursue intervention; would instead like to return to home to [place] to pursue herbal remedy; reviewed lab results and known coronary anatomy with patient and the risks associated with holding off on potential intervention; patient voiced comprehension, but continued to decline further cardiac intervention at this.\"       3.  Acute on chronic systolic congestive heart failure  History of ischemic cardiomyopathy, EF 30%  Chest xray with mild congestion. BNP at >3K.  Cardiology started on bumex gtt  Monitor I's and O's, daily weight     4.  Acute kidney injury  Likely in the setting of acute CHF  Monitor volume status and renal function  Avoid nephrotoxins     5.  Hyponatremia  Likely secondary to hypervolemia  Monitor " electrolytes     6.  Type 2 diabetes mellitus with long-term insulin  Resume lantus from tonight if eating. Ordered   Monitor with insulin sliding scale and hypoglycemia protocol     7.  Hyperlipidemia  Continue rosuvastatin     8.  Anemia  Stable hemoglobin without signs of acute blood loss      Disposition; 2-3 days  Barrier to discharge; NSTEMI, acute CHF.      LOS: 0 days     Subjective:  Interval History: Patient admitted this morning by my colleague. Med rec reviewed and done. No acute issues since seen by my colleague. Feeling better.    Review of Systems:   As mentioned in subjective.    Patient Active Problem List   Diagnosis     Peripheral Neuropathy     Renal Insufficiency     Arthralgias In Multiple Sites     Delayed Post-traumatic Stress Disorder     Brain Tumor     Hypercholesterolemia     Carpal Tunnel Syndrome     Type 2 diabetes mellitus with microalbuminuria, with long-term current use of insulin (H)     Cervical radiculopathy at C6     Vitamin D deficiency     Brachial neuritis or radiculitis     Peripheral vascular disease (H)     ST elevation myocardial infarction (STEMI), unspecified artery (H)     Syncope, unspecified syncope type     Status post coronary angiogram     ACS (acute coronary syndrome) (H)  8/2021      Coronary artery disease of native artery with stable angina pectoris (H)     Chronic midline low back pain with right-sided sciatica     Acute on chronic systolic congestive heart failure (H)     ELIZABETH (acute kidney injury) (H)     Mural thrombus of heart 1/2022      Ischemic cardiomyopathy     Heart failure with reduced ejection fraction (H)     NSTEMI (non-ST elevated myocardial infarction) (H)     Chest pain, unspecified type       Scheduled Meds:    clopidogrel  75 mg Oral Daily     famotidine  20 mg Oral BID     insulin aspart  1-6 Units Subcutaneous Q4H [facility]     insulin aspart  1-6 Units Subcutaneous Q4H     insulin glargine  24 Units Subcutaneous At Bedtime     isosorbide  mononitrate  20 mg Oral BID     metoprolol succinate ER  12.5 mg Oral Daily     rosuvastatin  40 mg Oral Daily     sodium chloride (PF)  3 mL Intracatheter Q8H     spironolactone  25 mg Oral Daily     Continuous Infusions:    bumetanide 2 mg/hr (04/25/22 0838)     Continuing ACE inhibitor/ARB/ARNI from home medication list OR ACE inhibitor/ARB order already placed during this visit       - MEDICATION INSTRUCTIONS -       - MEDICATION INSTRUCTIONS -       heparin 650 Units/hr (04/25/22 0843)     - MEDICATION INSTRUCTIONS -       - MEDICATION INSTRUCTIONS -       PRN Meds:.acetaminophen **OR** acetaminophen, Continuing ACE inhibitor/ARB/ARNI from home medication list OR ACE inhibitor/ARB order already placed during this visit, - MEDICATION INSTRUCTIONS -, - MEDICATION INSTRUCTIONS -, glucose **OR** dextrose **OR** glucagon, HOLD MEDICATION, HOLD MEDICATION, lidocaine 4%, lidocaine (buffered or not buffered), - MEDICATION INSTRUCTIONS -, melatonin, nitroGLYcerin, ondansetron **OR** ondansetron, - MEDICATION INSTRUCTIONS -, prochlorperazine **OR** prochlorperazine **OR** prochlorperazine, sodium chloride (PF)    Objective:  Vital signs in last 24 hours:  Temp:  [97.9  F (36.6  C)-98.1  F (36.7  C)] 97.9  F (36.6  C)  Pulse:  [69-84] 72  Resp:  [13-30] 16  BP: ()/(53-79) 98/66  SpO2:  [93 %-100 %] 96 %        Intake/Output Summary (Last 24 hours) at 4/25/2022 0917  Last data filed at 4/25/2022 0509  Gross per 24 hour   Intake --   Output 1050 ml   Net -1050 ml       Physical Exam:    General: Not in obvious distress.  HEENT: NC, AT   Chest: Bilateral crackles  Heart: S1S2 normal, regular. No M/R/G  Abdomen: Soft. NT, ND. Bowel sounds- active.  Extremities: No legs swelling  Neuro: Alert and awake, grossly non-focal      Lab Results:(I have personally reviewed the results)    Recent Results (from the past 24 hour(s))   Glucose by meter    Collection Time: 04/24/22 10:09 PM   Result Value Ref Range    GLUCOSE BY  METER POCT 190 (H) 70 - 99 mg/dL   INR    Collection Time: 04/24/22 10:12 PM   Result Value Ref Range    INR 1.24 (H) 0.90 - 1.15   Partial thromboplastin time    Collection Time: 04/24/22 10:12 PM   Result Value Ref Range    aPTT 32 22 - 38 Seconds   Troponin I    Collection Time: 04/24/22 10:12 PM   Result Value Ref Range    Troponin I 8.33 (HH) 0.00 - 0.29 ng/mL   Magnesium    Collection Time: 04/24/22 10:12 PM   Result Value Ref Range    Magnesium 2.0 1.8 - 2.6 mg/dL   B-Type Natriuretic Peptide ([facility] Only)    Collection Time: 04/24/22 10:12 PM   Result Value Ref Range    BNP 3,288 (H) 0 - 53 pg/mL   Lipase    Collection Time: 04/24/22 10:12 PM   Result Value Ref Range    Lipase 24 0 - 52 U/L   Comprehensive metabolic panel    Collection Time: 04/24/22 10:12 PM   Result Value Ref Range    Sodium 135 (L) 136 - 145 mmol/L    Potassium 4.6 3.5 - 5.0 mmol/L    Chloride 102 98 - 107 mmol/L    Carbon Dioxide (CO2) 21 (L) 22 - 31 mmol/L    Anion Gap 12 5 - 18 mmol/L    Urea Nitrogen 38 (H) 8 - 22 mg/dL    Creatinine 1.80 (H) 0.70 - 1.30 mg/dL    Calcium 9.0 8.5 - 10.5 mg/dL    Glucose 191 (H) 70 - 125 mg/dL    Alkaline Phosphatase 77 45 - 120 U/L    AST 88 (H) 0 - 40 U/L    ALT 52 (H) 0 - 45 U/L    Protein Total 6.9 6.0 - 8.0 g/dL    Albumin 3.6 3.5 - 5.0 g/dL    Bilirubin Total 1.0 0.0 - 1.0 mg/dL    GFR Estimate 42 (L) >60 mL/min/1.73m2   CBC with platelets and differential    Collection Time: 04/24/22 10:12 PM   Result Value Ref Range    WBC Count 5.7 4.0 - 11.0 10e3/uL    RBC Count 3.86 (L) 4.40 - 5.90 10e6/uL    Hemoglobin 12.6 (L) 13.3 - 17.7 g/dL    Hematocrit 36.2 (L) 40.0 - 53.0 %    MCV 94 78 - 100 fL    MCH 32.6 26.5 - 33.0 pg    MCHC 34.8 31.5 - 36.5 g/dL    RDW 12.7 10.0 - 15.0 %    Platelet Count 141 (L) 150 - 450 10e3/uL    % Neutrophils 62 %    % Lymphocytes 27 %    % Monocytes 9 %    % Eosinophils 1 %    % Basophils 1 %    % Immature Granulocytes 0 %    NRBCs per 100 WBC 0 <1 /100    Absolute  Neutrophils 3.6 1.6 - 8.3 10e3/uL    Absolute Lymphocytes 1.6 0.8 - 5.3 10e3/uL    Absolute Monocytes 0.5 0.0 - 1.3 10e3/uL    Absolute Eosinophils 0.0 0.0 - 0.7 10e3/uL    Absolute Basophils 0.0 0.0 - 0.2 10e3/uL    Absolute Immature Granulocytes 0.0 <=0.4 10e3/uL    Absolute NRBCs 0.0 10e3/uL   Extra Red Top Tube    Collection Time: 04/24/22 10:13 PM   Result Value Ref Range    Hold Specimen [facility]    Asymptomatic COVID-19 Virus (Coronavirus) by PCR Nasopharyngeal    Collection Time: 04/25/22 12:33 AM    Specimen: Nasopharyngeal; Swab   Result Value Ref Range    SARS CoV2 PCR Negative Negative   Troponin I    Collection Time: 04/25/22  7:03 AM   Result Value Ref Range    Troponin I 10.49 (HH) 0.00 - 0.29 ng/mL   Basic metabolic panel    Collection Time: 04/25/22  7:03 AM   Result Value Ref Range    Sodium 138 136 - 145 mmol/L    Potassium 4.0 3.5 - 5.0 mmol/L    Chloride 105 98 - 107 mmol/L    Carbon Dioxide (CO2) 23 22 - 31 mmol/L    Anion Gap 10 5 - 18 mmol/L    Urea Nitrogen 38 (H) 8 - 22 mg/dL    Creatinine 1.62 (H) 0.70 - 1.30 mg/dL    Calcium 8.5 8.5 - 10.5 mg/dL    Glucose 134 (H) 70 - 125 mg/dL    GFR Estimate 48 (L) >60 mL/min/1.73m2   Glucose by meter    Collection Time: 04/25/22  8:08 AM   Result Value Ref Range    GLUCOSE BY METER POCT 144 (H) 70 - 99 mg/dL       All laboratory and imaging data in the past 24 hours reviewed  Serum Glucose range:   Recent Labs   Lab 04/25/22  0808 04/25/22  0703 04/24/22 2212 04/24/22 2209   * 134* 191* 190*     ABG: No lab results found in last 7 days.  CBC:   Recent Labs   Lab 04/24/22 2212   WBC 5.7   HGB 12.6*   HCT 36.2*   MCV 94   *   NEUTROPHIL 62   LYMPH 27   MONOCYTE 9   EOSINOPHIL 1     Chemistry:   Recent Labs   Lab 04/25/22  0703 04/24/22 2212    135*   POTASSIUM 4.0 4.6   CHLORIDE 105 102   CO2 23 21*   BUN 38* 38*   CR 1.62* 1.80*   GFRESTIMATED 48* 42*   LISA 8.5 9.0   MAG  --  2.0   PROTTOTAL  --  6.9   ALBUMIN  --  3.6   AST  --  88*    ALT  --  52*   ALKPHOS  --  77   BILITOTAL  --  1.0     Coags:  Recent Labs   Lab 04/24/22  2212   INR 1.24*   PTT 32     Cardiac Markers:  Recent Labs   Lab 04/25/22  0703   TROPONINI 10.49*          XR Chest Port 1 View    Result Date: 4/24/2022  EXAM: XR CHEST PORT 1 VIEW LOCATION: [facility] DATE/TIME: 4/24/2022 10:21 PM INDICATION: chest pain COMPARISON: 01/18/2022.     IMPRESSION: Chronic bandlike scarring/atelectasis of the right midlung. Mild perihilar scarring on the left. Prominent interstitial markings consistent with mild congestion and edema. There may be a superimposed infiltrate of the lower right lung. No significant pleural fluid. No pneumothorax. Stable borderline cardiomegaly. Mild calcified plaque of the aortic arch.      Latest radiology report personally reviewed.    Note created using dragon voice recognition software so sounds alike errors may have escaped editing.      04/25/2022   Henry Woods MD  Hospitalist, [facility]  Pager: [phone]

## 2022-04-25 NOTE — ED TRIAGE NOTES
Pt has abdominal pain that started yesterday afternoon. This afternoon the pain began to increase significantly around 1800. With additional pain now being experienced in his chest and throat. Pain 8/10.

## 2022-04-26 NOTE — PLAN OF CARE
Problem: Plan of Care - These are the overarching goals to be used throughout the patient stay.    Goal: Plan of Care Review/Shift Note  Description: The Plan of Care Review/Shift note should be completed every shift.  The Outcome Evaluation is a brief statement about your assessment that the patient is improving, declining, or no change.  This information will be displayed automatically on your shift note.  Outcome: Ongoing, Progressing     Problem: Plan of Care - These are the overarching goals to be used throughout the patient stay.    Goal: Absence of Hospital-Acquired Illness or Injury  Intervention: Identify and Manage Fall Risk  Recent Flowsheet Documentation  Taken 4/26/2022 0008 by Bess Pratt, RN  Safety Promotion/Fall Prevention:   patient and family education   nonskid shoes/slippers when out of bed   lighting adjusted   fall prevention program maintained   clutter free environment maintained     Problem: Chest Pain  Goal: Resolution of Chest Pain Symptoms  Outcome: Ongoing, Progressing     Problem: Dysrhythmia  Goal: Normalized Cardiac Rhythm  Outcome: Ongoing, Progressing   Goal Outcome Evaluation:      Patient denies chest pain at this time. Refused teaching/education. Rhythm=NSR with PVC. Heparin gtt running at 650 units/hr, bumex gt running at 2 mg's/hr, just voided 850 urine clear yellow. Patient uses urinal at bed side. Iq=642/66.

## 2022-04-26 NOTE — CONSULTS
NUTRITION EDUCATION      REASON FOR ASSESSMENT:  Low sodium diet education    NUTRITION HISTORY:  Information obtained from pt    Pt states he has had recent diet instruction on the low sodium diet and has all of the written materials at home.  He declines further education at this time.  Encouraged him to ask questions if they arise.    CURRENT DIET:  Moderate consistent CHO (60 g CHO per meal)    INTERVENTIONS:    Nutrition Prescription:  Consistent CHO will add 2 gram Na diet to current diet rx    Goals:      Meet nutritional needs      Adhere to 2 gram Na consistent CHO diet         Follow Up/Monitoring:       Follow for LOS

## 2022-04-26 NOTE — CARE PLAN
2200 - Patient blood sugar 233. Refused 2200 dose of Lantus 24 units to be given. Listened to patient and his rationale. Explained dose and length of action. Held medication.

## 2022-04-26 NOTE — PROGRESS NOTES
HEART CARE NOTE          Assessment/Recommendations     1. HFrEF/ICM c/b ADHF   Assessment / Plan    Near euvolemia on physical exam - will transition to oral diuretic regimen and continue to monitor    GDMT as detailed below - repeat echo negative for LV thrombus    Will refer for ICD pending revascularization strategy     Current Pharmacotherapy AHA Guideline-Directed Medical Therapy   Lisinopril - on hold given ELIZABETH and borderline BP Lisinopril 20 mg twice daily   Metoprolol 12.5 mg daily Carvedilol 25 mg twice daily   Spironolactone 25 mg daily Spironolactone 25 mg once daily   Hydralazine NA Hydralazine 100 mg three times daily   Isosorbide mononitrate 20 mg daily Isosorbide dinitrate 40 mg three times daily   SGLT2 inhibitor:not started Dapagliflozin or Empagliflozin 10 mg daily      2. Severe multivessel CAD c/b NSTEMI  Assessment / Plan    Hx of STEMI 8/21 - at which time the pateint left AMA    Known severe multivessel disease - patient declines recommended CABG    Continue ASA, carvedilol, clopidogrel, high intensity rosuvastatin, isosorbide mononitrate, and spironolactone    Denies current chest pain    Cardiac MRI significant inducible ischemia in the mid to distal inferoseptal segments, mid-distal anterior segments and mid inferolateral segment. There is almost transmural infarction in the inferolateral walls while the rest of the myocardium is viable; plan for impella assisted PCI initially on hold given patient's absence of symptoms and adequate functional capacity at the time    Patient declines coronary angiogram; would not like to pursue intervention; would instead like to return to home/[place] to pursue herbal remedy; I've reviewed lab results and known coronary anatomy with patient and the risks associated with holding off on potential intervention; patient voiced comprehension, but continued to decline further cardiac intervention during our prior discussion     3. Valvular heart  "disease  Assessment / Plan    Moderate MR and TR - continue oral afterload reduction     4. DM2  Assessment / Plan    Management per PMD    History of Present Illness/Subjective      Mr. Av Fernando is a 61 year old male with a PMHx significant for multivessel coronary artery disease status post STEMI in August 2021 recommended cardiothoracic surgery evaluation, however patient left AMA because family member had passed from bypass surgery, HFrEF, type 2 diabetes, dyslipidemia, anemia who presents in the setting of NSTEMI c/b ADHF     Today, Mr. Fernando denies any acute events or complaints; denies current chest pain or HF symptoms; Management plan as detailed above.        ECG: Personally reviewed. normal sinus rhythm, nonspecific ST and T waves changes.     ECHO (personnaly Reviewed):   There is no thrombus seen in the left ventricle.  The left ventricle is normal in size with normal left ventricular wall  thickness.  The visual ejection fraction is estimated at 30%.  There is hypokinesis of the entire inferior wall, mid to apical anterolateral  wall, and apical septum. There is akinesis of the basal and mid posterior  wall.  Normal right ventricle size and systolic function.  No hemodynamically significant valve disease is identified.     Compared to the prior study of 1/14/22, there has been an interval resolution  of the left ventricular apical thrombus.          Physical Examination Review of Systems   /66 (BP Location: Right arm, Patient Position: Semi-Armando's, Cuff Size: Adult Small)   Pulse 72   Temp 98.8  F (37.1  C) (Oral)   Resp 16   Ht 1.575 m (5' 2\")   Wt 51.5 kg (113 lb 8 oz)   SpO2 94%   BMI 20.76 kg/m    Body mass index is 20.76 kg/m .  Wt Readings from Last 3 Encounters:   04/26/22 51.5 kg (113 lb 8 oz)   04/07/22 53 kg (116 lb 12.8 oz)   04/05/22 54 kg (119 lb)     General Appearance:   no distress, normal body habitus   ENT/Mouth: membranes moist, no oral lesions or bleeding gums.    "   EYES:  no scleral icterus, normal conjunctivae   Neck: no carotid bruits or thyromegaly   Chest/Lungs:   lungs are clear to auscultation, no rales or wheezing, equal chest wall expansion    Cardiovascular:   Regular. Normal first and second heart sounds with no murmurs, rubs, or gallops; the carotid, radial and posterior tibial pulses are intact, no JVD or LE edema bilaterally    Abdomen:  no organomegaly, masses, bruits, or tenderness; bowel sounds are present   Extremities: no cyanosis or clubbing   Skin: no xanthelasma, warm.    Neurologic: alert and oriented x3, calm     Psychiatric: alert and oriented x3, calm     A complete 10 systems ROS was reviewed  And is negative except what is listed in the HPI.          Medical History  Surgical History Family History Social History   Past Medical History:   Diagnosis Date     Congestive heart failure (H) 08/2021    ischemic CM with LVEF 30-35%     Coronary artery disease 08/2021    STEMI with multivessel CAD on angiography     Diabetes mellitus (H)      Hyperlipidemia      Hypertension      LV (left ventricular) mural thrombus 01/2022     Myocardial infarction (H) 08/2021    inferior STEMI    Past Surgical History:   Procedure Laterality Date     CV CORONARY ANGIOGRAM N/A 8/1/2021    Procedure: Coronary Angiogram;  Surgeon: Deidre Lopes MD;  Location: [facility] CATH LAB CV     CV LEFT VENTRICULOGRAM N/A 8/1/2021    Procedure: Left Ventriculogram;  Surgeon: Deidre Lopes MD;  Location: [facility] CATH LAB CV     OTHER SURGICAL HISTORY      LEG TRAUMA    no family history of premature coronary artery disease Social History     Socioeconomic History     Marital status:      Spouse name: Not on file     Number of children: Not on file     Years of education: Not on file     Highest education level: Not on file   Occupational History     Not on file   Tobacco Use     Smoking status: Never Smoker     Smokeless tobacco: Never Used   Substance and Sexual Activity      Alcohol use: No     Drug use: No     Sexual activity: Yes     Partners: Female     Birth control/protection: None   Other Topics Concern     Parent/sibling w/ CABG, MI or angioplasty before 65F 55M? Not Asked   Social History Narrative     Not on file     Social Determinants of Health     Financial Resource Strain: Not on file   Food Insecurity: Not on file   Transportation Needs: Not on file   Physical Activity: Not on file   Stress: Not on file   Social Connections: Not on file   Intimate Partner Violence: Not on file   Housing Stability: Not on file           Lab Results    Chemistry/lipid CBC Cardiac Enzymes/BNP/TSH/INR   Lab Results   Component Value Date    CHOL 176 08/02/2021    HDL 53 08/02/2021    TRIG 70 08/02/2021    BUN 38 (H) 04/25/2022     04/25/2022    CO2 23 04/25/2022    Lab Results   Component Value Date    WBC 5.1 04/25/2022    HGB 11.3 (L) 04/25/2022    HCT 32.8 (L) 04/25/2022    MCV 96 04/25/2022     (L) 04/25/2022    Lab Results   Component Value Date    TROPONINI 10.49 (HH) 04/25/2022    BNP 3,288 (H) 04/24/2022    INR 1.24 (H) 04/24/2022     Lab Results   Component Value Date    TROPONINI 10.49 (HH) 04/25/2022          Weight:    Wt Readings from Last 3 Encounters:   04/26/22 51.5 kg (113 lb 8 oz)   04/07/22 53 kg (116 lb 12.8 oz)   04/05/22 54 kg (119 lb)       Allergies  Allergies   Allergen Reactions     Januvia [Sitagliptin] Unknown     HEADACHE     Trulicity [Dulaglutide] Dizziness     Weak and muscle weak         Surgical History  Past Surgical History:   Procedure Laterality Date     CV CORONARY ANGIOGRAM N/A 8/1/2021    Procedure: Coronary Angiogram;  Surgeon: Deidre Lopes MD;  Location: [facility] CATH LAB CV     CV LEFT VENTRICULOGRAM N/A 8/1/2021    Procedure: Left Ventriculogram;  Surgeon: Deidre Lopes MD;  Location: [facility] CATH LAB CV     OTHER SURGICAL HISTORY      LEG TRAUMA       Social History  Tobacco:   History   Smoking Status     Never Smoker    Smokeless Tobacco     Never Used    Alcohol:   Social History    Substance and Sexual Activity      Alcohol use: No   Illicit Drugs:   History   Drug Use No       Family History  History reviewed. No pertinent family history.       Dougie Gupta MD on 4/26/2022      cc: Garcia Kaufman

## 2022-04-26 NOTE — PROGRESS NOTES
"Hospitalist Progress Note    Assessment/Plan  Av Fernando is a 61 year old male admitted on 4/24/2022. He came for evaluation of chest pain and shortness of breath.     1.  Non-STEMI  2.  Multivessel coronary artery disease  Patient had a STEMI in 8/2021, left AMA and declined CABG; cardiac MRI 1/18/2022 showed significant inducible ischemia in the mid to distal inferoseptal segments, mid distal anterior segments and mid inferolateral segment, patient was to have Impella assisted PCI once LV thrombus resolved.  Echo on 2/21/2022 showed interval resolution of the LV apical thrombus.  Evaluated by interventional cardiologist on 4/7/2022, discussion regarding LAD territory appeared to have viability but no sufficient target for PCI and that circumflex and RCA were potentially feasible for PCI but did not appear to have viability.  However, patient elected medical management because angina symptoms were resolved.  IV heparin drip discontinued today.  Will resume Eliquis per Cardiology due to lowe LV systolic function   Continue PTA clopidogrel, isosorbide, metoprolol, rosuvastatin    Cardiology consulted for evaluation and further recommendations. Per cardiology- \"Patient declines coronary angiogram; would no like to pursue intervention; would instead like to return to home to [place] to pursue herbal remedy; reviewed lab results and known coronary anatomy with patient and the risks associated with holding off on potential intervention; patient voiced comprehension, but continued to decline further cardiac intervention at this.\"        3.  Acute on chronic systolic congestive heart failure  History of ischemic cardiomyopathy, EF 30%  Chest xray with mild congestion. BNP at >3K.  Diuresed 3 lb, bumex gtt transitioned to oral Bumex today   Monitor I's and O's, daily weights     4.  Acute kidney injury  Likely in the setting of acute CHF  Monitor volume status and renal function  Avoid nephrotoxins     5. "  Hyponatremia  Likely secondary to hypervolemia  Monitor electrolytes     6.  Type 2 diabetes mellitus with long-term insulin with hyperglycemia   Continue PTA LAntus   Add mealtime CHO coverage   Monitor with insulin sliding scale and hypoglycemia protocol     7.  Hyperlipidemia  Continue rosuvastatin     8.  Anemia  Stable hemoglobin without signs of acute blood loss      Barriers to Discharge: Adjusting cardiac medications    Anticipated discharge date/Disposition: Home tomorrow    Subjective  Patient does speak some English and declined    Patient reports mild chest discomfort but overall chest pain and dyspnea has improved significantly since he was admitted to the hospital.  No edema.    Objective    Vital signs in last 24 hours  Temp:  [97.4  F (36.3  C)-98.8  F (37.1  C)] 97.4  F (36.3  C)  Pulse:  [57-80] 67  Resp:  [16-18] 18  BP: ()/(62-68) 105/64  SpO2:  [94 %-99 %] 96 % @LASTSAO2(12)@ O2 Device: None (Room air)    Weight:   Wt Readings from Last 3 Encounters:   04/26/22 51.5 kg (113 lb 8 oz)   04/07/22 53 kg (116 lb 12.8 oz)   04/05/22 54 kg (119 lb)      Weight change: 0.272 kg (9.6 oz)    Intake/Output last 3 shifts  I/O last 3 completed shifts:  In: 711 [P.O.:420; I.V.:291]  Out: 3500 [Urine:3500]  Body mass index is 20.76 kg/m .    Physical Exam    General Appearance:    Alert, cooperative, no distress   Lungs:     Respirations unlabored    Cardiovascular:    Regular rate ands rhythm.  Nornal S1, S2.  No murmur, rub or gallop.  No edema   Abdomen:     Soft, non-tender, bowel sounds active all four quadrants   Neurologic:   Awake, alert, oriented x 3.  Grossly nonfocal      Pertinent Labs   Lab Results: personally reviewed.   Recent Labs   Lab 04/25/22  0703 04/24/22 2212    135*   CO2 23 21*   BUN 38* 38*   ALBUMIN  --  3.6   ALKPHOS  --  77   ALT  --  52*   AST  --  88*     Recent Labs   Lab 04/25/22  0703 04/24/22 2212   WBC 5.1 5.7   HGB 11.3* 12.6*   HCT 32.8* 36.2*    * 141*     Recent Labs   Lab 04/25/22  0703 04/24/22  2212   TROPONINI 10.49* 8.33*     Invalid input(s): POCGLUFGR    Medications  Current Facility-Administered Medications   Medication     acetaminophen (TYLENOL) tablet 650 mg    Or     acetaminophen (TYLENOL) Suppository 650 mg     apixaban ANTICOAGULANT (ELIQUIS) tablet 2.5 mg    And     apixaban ANTICOAGULANT (ELIQUIS) tablet 5 mg     bumetanide (BUMEX) tablet 2 mg     clopidogrel (PLAVIX) tablet 75 mg     Continuing ACE inhibitor/ARB/ARNI from home medication list OR ACE inhibitor/ARB order already placed during this visit     Continuing beta blocker from home medication list OR beta blocker order already placed during this visit     Continuing statin from home medication list OR statin order already placed during this visit     glucose gel 15-30 g    Or     dextrose 50 % injection 25-50 mL    Or     glucagon injection 1 mg     famotidine (PEPCID) tablet 20 mg     HOLD: nitroGLYcerin IF     HOLD: nitroGLYcerin IF     [START ON 4/27/2022] insulin aspart (NovoLOG) injection (RAPID ACTING)     insulin aspart (NovoLOG) injection (RAPID ACTING)     insulin glargine (LANTUS PEN) injection 24 Units     isosorbide mononitrate (ISMO/MONOKET) tablet 20 mg     lidocaine (LMX4) cream     lidocaine 1 % 0.1-1 mL     medication instruction     melatonin tablet 1 mg     metoprolol succinate ER (TOPROL-XL) 24 hr half-tab 12.5 mg     nitroGLYcerin (NITROSTAT) sublingual tablet 0.4 mg     ondansetron (ZOFRAN-ODT) ODT tab 4 mg    Or     ondansetron (ZOFRAN) injection 4 mg     Patient is already receiving anticoagulation with heparin, enoxaparin (LOVENOX), warfarin (COUMADIN)  or other anticoagulant medication     prochlorperazine (COMPAZINE) injection 10 mg    Or     prochlorperazine (COMPAZINE) tablet 10 mg    Or     prochlorperazine (COMPAZINE) suppository 25 mg     rosuvastatin (CRESTOR) tablet 40 mg     sodium chloride (PF) 0.9% PF flush 3 mL     sodium chloride (PF)  0.9% PF flush 3 mL     spironolactone (ALDACTONE) tablet 25 mg       Pertinent Radiology   Radiology Results: Personally reviewed   Results for orders placed or performed during the hospital encounter of 04/24/22   XR Chest Port 1 View    Impression    IMPRESSION: Chronic bandlike scarring/atelectasis of the right midlung. Mild perihilar scarring on the left. Prominent interstitial markings consistent with mild congestion and edema. There may be a superimposed infiltrate of the lower right lung. No   significant pleural fluid. No pneumothorax. Stable borderline cardiomegaly. Mild calcified plaque of the aortic arch.         Advanced Care Planning:  Discharge Planning discussed with patient and family at bedside       Arlet Salazar MD  Internal Medicine Hospitalist  4/26/2022

## 2022-04-26 NOTE — PLAN OF CARE
Problem: Chest Pain  Goal: Resolution of Chest Pain Symptoms  Outcome: Ongoing, Progressing   Goal Outcome Evaluation:    Encouraged to verbalize of any worsening chest pain

## 2022-04-27 NOTE — DISCHARGE SUMMARY
[facility] MEDICINE  DISCHARGE SUMMARY     Primary Care Physician: Garcia Kaufman  Admission Date: 4/24/2022   Discharge Provider: Arlet Salazar MD Discharge Date: 4/27/2022   Diet:   Active Diet and Nourishment Order   Procedures     Diet    Cardiac    Code Status: Full code    Activity: DCACTIVITY: Activity as tolerated        Condition at Discharge: Stable     REASON FOR PRESENTATION(See Admission Note for Details)     Chest pain, shortness of breath    PRINCIPAL & ACTIVE DISCHARGE DIAGNOSES     Principal Problem:    NSTEMI (non-ST elevated myocardial infarction) (H)  Active Problems:    Type 2 diabetes mellitus with microalbuminuria, with long-term current use of insulin (H)    Coronary artery disease of native artery with stable angina pectoris (H)    Acute on chronic systolic congestive heart failure (H)    ELIZABETH (acute kidney injury) (H)    Chest pain, unspecified type  Ischemic cardiomyopathy  GERD.    Essential hypertension  Hyperlipidemia    PENDING LABS     Unresulted Labs Ordered in the Past 30 Days of this Admission     No orders found from 3/25/2022 to 4/25/2022.            PROCEDURES ( this hospitalization only)          RECOMMENDATIONS TO OUTPATIENT PROVIDER FOR F/U VISIT     Follow-up Appointments     Follow-up and recommended labs and tests       Follow up with primary care provider, Garcia Kaufman, within 7 days for   hospital follow- up.  The following labs/tests are recommended: BMP.    Follow up with Cardiology               DISPOSITION     Home    SUMMARY OF HOSPITAL COURSE:      Av Fernando is a 61-year-old male with past medical history of CAD, STEMI in 8/2021 who declined CABG and left AMA,   ischemic cardiomyopathy, chronic systolic CHF, LV thrombus in 1/2022, DM 2, anemia who presented to [facility] for evaluation of chest pain and shortness of breath.  He was found to have non-STEMI and acute on chronic systolic CHF.  Patient was  diuresed ~ 3lb and transitioned back to oral diuretics.  He was initially on IV heparin.  He declined coronary angiography at this time.  Chest pain and dyspnea improved.  Patient is now planning to follow-up with cardiology Dr. Fuentes to discuss options for PCI.      Discharge Medications with Med changes:     Discharge Medication List as of 4/27/2022 10:27 AM      START taking these medications    Details   famotidine (PEPCID) 20 MG tablet Take 1 tablet (20 mg) by mouth daily, Disp-30 tablet, R-0, E-Prescribe         CONTINUE these medications which have CHANGED    Details   bumetanide (BUMEX) 2 MG tablet Take 1 tablet (2 mg) by mouth daily, Disp-30 tablet, R-0, E-Prescribe      clopidogrel (PLAVIX) 75 MG tablet Take 1 tablet (75 mg) by mouth daily, Disp-30 tablet, R-0, E-Prescribe      ELIQUIS ANTICOAGULANT 2.5 MG tablet 2.5 mg in morning and 5 mg in evening (per Cardiologist), Disp-90 tablet, R-0, NATALIA, E-Prescribe      insulin lispro (HUMALOG KWIKPEN) 100 UNIT/ML (1 unit dial) KWIKPEN Inject 8-10 Units Subcutaneous 3 times daily (before meals), Disp-30 mL, R-0, Local Print      isosorbide mononitrate (ISMO/MONOKET) 20 MG tablet Take 1 tablet (20 mg) by mouth 2 times daily, Disp-30 tablet, R-0, E-Prescribe      LANTUS SOLOSTAR 100 UNIT/ML soln Inject 24 Units Subcutaneous At Bedtime INJECT 24 UNITS UNDER THE SKIN AT BEDTIME, Disp-9 mL, R-0, NATALIA, Local PrintIf Lantus is not covered by insurance, may substitute Basaglar or Semglee or other insulin glargine product per insurance preference at [facility] dose and frequency.        metoprolol succinate ER (TOPROL-XL) 25 MG 24 hr tablet Take 0.5 tablets (12.5 mg) by mouth daily, Disp-15 tablet, R-0, E-Prescribe      nitroGLYcerin (NITROSTAT) 0.4 MG sublingual tablet For chest pain place 1 tablet under the tongue every 5 minutes for 3 doses. If symptoms persist 5 minutes after 1st dose call 911., Disp-25 tablet, R-0, E-Prescribe      rosuvastatin (CRESTOR) 40 MG tablet  Take 1 tablet (40 mg) by mouth daily, Disp-30 tablet, R-0, E-Prescribe      spironolactone (ALDACTONE) 25 MG tablet Take 1 tablet (25 mg) by mouth daily, Disp-30 tablet, R-0, E-Prescribe         CONTINUE these medications which have NOT CHANGED    Details   blood glucose (ACCU-CHEK GUIDE) test strip Use to test blood sugar 3 times daily or as directed., Disp-100 strip, R-11, E-Prescribe      Continuous Blood Gluc Sensor (FREESTYLE KATIE 14 DAY SENSOR) MISC 1 Device every 14 days Change sensor as directed every 14 days, Disp-2 each, R-11, E-Prescribe                   Rationale for medication changes:      Scheduled famotidine started for GERD symptoms        Consults       PHARMACY IP CONSULT  PHARMACY IP CONSULT  CARDIOLOGY IP CONSULT  CORE CLINIC EVALUATION IP CONSULT  OCCUPATIONAL THERAPY ADULT IP CONSULT  NUTRITION SERVICES ADULT IP CONSULT  CARE MANAGEMENT / SOCIAL WORK IP CONSULT  CARDIAC REHAB IP CONSULT  PHARMACY IP CONSULT  PHARMACY IP CONSULT  SMOKING CESSATION PROGRAM IP CONSULT    Immunizations given this encounter     Most Recent Immunizations   Administered Date(s) Administered     COVID-19,PF,Pfizer (12+ Yrs) 05/08/2021     HepA-Adult 12/13/2016     Typhoid IM 12/13/2016           Anticoagulation Information      Recent INR results:   Recent Labs   Lab 04/24/22  2212   INR 1.24*     Warfarin doses (if applicable) or name of other anticoagulant: Eliquis      SIGNIFICANT IMAGING FINDINGS     Results for orders placed or performed during the hospital encounter of 04/24/22   XR Chest Port 1 View    Impression    IMPRESSION: Chronic bandlike scarring/atelectasis of the right midlung. Mild perihilar scarring on the left. Prominent interstitial markings consistent with mild congestion and edema. There may be a superimposed infiltrate of the lower right lung. No   significant pleural fluid. No pneumothorax. Stable borderline cardiomegaly. Mild calcified plaque of the aortic arch.       SIGNIFICANT LABORATORY  FINDINGS     Most Recent 3 CBC's:Recent Labs   Lab Test 04/27/22  0437 04/25/22  0703 04/24/22  2212   WBC 5.0 5.1 5.7   HGB 12.6* 11.3* 12.6*   MCV 92 96 94    133* 141*     Most Recent 3 BMP's:Recent Labs   Lab Test 04/27/22  0810 04/27/22  0539 04/26/22  2104 04/25/22  0808 04/25/22  0703 04/24/22  2212   NA  --  137  --   --  138 135*   POTASSIUM  --  3.9  --   --  4.0 4.6   CHLORIDE  --  96*  --   --  105 102   CO2  --  28  --   --  23 21*   BUN  --  36*  --   --  38* 38*   CR  --  1.74*  --   --  1.62* 1.80*   ANIONGAP  --  13  --   --  10 12   LISA  --  9.1  --   --  8.5 9.0   * 165* 221*   < > 134* 191*    < > = values in this interval not displayed.         Discharge Orders        Reason for your hospital stay    NSTEMI, acute CHF     Follow-up and recommended labs and tests     Follow up with primary care provider, Garcia Kaufman, within 7 days for hospital follow- up.  The following labs/tests are recommended: BMP.    Follow up with Cardiology     Activity    Your activity upon discharge: activity as tolerated     Diet    Follow this diet upon discharge: Orders Placed This Encounter      Combination Diet Moderate Consistent Carb (60 g CHO per Meal) Diet; 2 gm NA Diet       Examination   Physical Exam   Temp:  [98  F (36.7  C)-98.4  F (36.9  C)] 98  F (36.7  C)  Pulse:  [60-73] 60  Resp:  [16-20] 18  BP: (89-98)/(53-69) 89/58  SpO2:  [96 %-98 %] 97 %  Wt Readings from Last 1 Encounters:   04/27/22 51.7 kg (114 lb)     General: No acute distress  CV: Regular rate and rhythm.  Normal S1 and S2, no murmur  Lungs: Clear  Abdomen: Soft, nontender  Extremities: No edema  Neuro: Awake and alert, grossly nonfocal      Please see EMR for more detailed significant labs, imaging, consultant notes etc.    I, Arlet Salazar MD, personally saw the patient today and spent greater than 30 minutes discharging this patient.    Arlet Salazar MD  [facility]    CC:Garcia Kaufman  E

## 2022-04-27 NOTE — DISCHARGE INSTRUCTIONS
Please read & follow the Guide for Patients with Heart failure folder that I explained & sending you with.

## 2022-04-27 NOTE — PROGRESS NOTES
"I was called by nursing assistant that pt was having chest pain.  But when assessed pt was calm and was up throwing away a pice of paper in garbage.  He states the pain is more like heartburn.  Although we talked and he does sometimes take up to 2 \",\"nitroglycerin\" a day for chest pain he is able to  distinguish the difference.   He said he will tell me if he feels he needs the \"nitroglycerin\".    Page out to  For  Heart burn medication.   "

## 2022-04-27 NOTE — PLAN OF CARE
"Goal Outcome Evaluation:    Chest pain- No further complaints of chest pain this evening / night shift.   Pt. States he is aware how to use \"nitroglycerin\", and he sometimes uses 1 to 2 a day and some days not at all.                      "

## 2022-04-27 NOTE — PLAN OF CARE
"  Problem: Plan of Care - These are the overarching goals to be used throughout the patient stay.    Goal: Plan of Care Review/Shift Note  Description: The Plan of Care Review/Shift note should be completed every shift.  The Outcome Evaluation is a brief statement about your assessment that the patient is improving, declining, or no change.  This information will be displayed automatically on your shift note.  Outcome: Met  Goal: Patient-Specific Goal (Individualized)  Description: You can add care plan individualizations to a care plan. Examples of Individualization might be:  \"Parent requests to be called daily at 9am for status\", \"I have a hard time hearing out of my right ear\", or \"Do not touch me to wake me up as it startles me\".  Outcome: Met  Goal: Absence of Hospital-Acquired Illness or Injury  Outcome: Met  Intervention: Prevent Skin Injury  Recent Flowsheet Documentation  Taken 4/27/2022 0812 by Cristel Garner RN  Body Position: position changed independently  Intervention: Prevent and Manage VTE (Venous Thromboembolism) Risk  Recent Flowsheet Documentation  Taken 4/27/2022 0812 by Cristel Garner RN  Activity Management: activity adjusted per tolerance  Goal: Optimal Comfort and Wellbeing  Outcome: Met  Intervention: Monitor Pain and Promote Comfort  Recent Flowsheet Documentation  Taken 4/27/2022 0812 by Cristel Garner RN  Pain Management Interventions:   emotional support   medication (see MAR)  Goal: Readiness for Transition of Care  Outcome: Met     Problem: Chest Pain  Goal: Resolution of Chest Pain Symptoms  Outcome: Met     Problem: Dysrhythmia  Goal: Normalized Cardiac Rhythm  Outcome: Met     Problem: Hyperglycemia  Goal: Blood Glucose Level Within Targeted Range  Outcome: Met     Problem: Adjustment to Illness (Heart Failure)  Goal: Optimal Coping  Outcome: Met     Problem: Cardiac Output Decreased (Heart Failure)  Goal: Optimal Cardiac Output  Outcome: Met     Problem: Dysrhythmia (Heart " Failure)  Goal: Stable Heart Rate and Rhythm  Outcome: Met     Problem: Fluid Imbalance (Heart Failure)  Goal: Fluid Balance  Outcome: Met     Problem: Functional Ability Impaired (Heart Failure)  Goal: Optimal Functional Ability  Outcome: Met  Intervention: Optimize Functional Ability  Recent Flowsheet Documentation  Taken 4/27/2022 0812 by Cristel Garner RN  Activity Management: activity adjusted per tolerance     Problem: Oral Intake Inadequate (Heart Failure)  Goal: Optimal Nutrition Intake  Outcome: Met     Problem: Respiratory Compromise (Heart Failure)  Goal: Effective Oxygenation and Ventilation  Outcome: Met  Intervention: Promote Airway Secretion Clearance  Recent Flowsheet Documentation  Taken 4/27/2022 0900 by Cristel Garner RN  Cough And Deep Breathing: done independently per patient     Problem: Sleep Disordered Breathing (Heart Failure)  Goal: Effective Breathing Pattern During Sleep  Outcome: Met   Goal Outcome Evaluation:    Patient complained of indigestion/heartburn. Given Maalox @ 0830, patient stated it helped.  Discharge - AVS printed & explained to the patient.  Re- educated this patient regarding the Heart failure guide for patients. He acknowledged that he understood & will weight himself everyday & read more. The heart Failure folder was sent with him. Left the unit pain free, alert/oriented.

## 2022-04-27 NOTE — PROGRESS NOTES
HEART CARE NOTE          Assessment/Recommendations     1. HFrEF/ICM c/b ADHF   Assessment / Plan    Tolerating oral diuretic regimen - changes at this timec - continue to monitor    GDMT as detailed below - repeat echo negative for LV thrombus    Will need ICD given persistent decline in LVSF - will arrange as outpatient     Current Pharmacotherapy AHA Guideline-Directed Medical Therapy   Lisinopril - on hold given ELIZABETH and borderline BP Lisinopril 20 mg twice daily   Metoprolol 12.5 mg daily Carvedilol 25 mg twice daily   Spironolactone 25 mg daily Spironolactone 25 mg once daily   Hydralazine NA Hydralazine 100 mg three times daily   Isosorbide mononitrate 20 mg daily Isosorbide dinitrate 40 mg three times daily   SGLT2 inhibitor:not started Dapagliflozin or Empagliflozin 10 mg daily      2. Severe multivessel CAD c/b NSTEMI  Assessment / Plan    Hx of STEMI 8/21 - at which time the pateint left AMA    Known severe multivessel disease - patient declines recommended CABG    Continue ASA, carvedilol, clopidogrel, high intensity rosuvastatin, isosorbide mononitrate, and spironolactone    Denies current chest pain    Cardiac MRI significant inducible ischemia in the mid to distal inferoseptal segments, mid-distal anterior segments and mid inferolateral segment. There is almost transmural infarction in the inferolateral walls while the rest of the myocardium is viable; plan for impella assisted PCI initially on hold given patient's absence of symptoms and adequate functional capacity at the time    Patient declines coronary angiogram; would not like to pursue intervention; would instead like to return to home/[place] to pursue herbal remedy; I've reviewed lab results and known coronary anatomy with patient and the risks associated with holding off on potential intervention at which time patient voiced comprehension; He continues to decline further cardiac intervention.     3. Valvular heart disease  Assessment /  "Plan    Moderate MR and TR - continue oral afterload reduction     4. DM2  Assessment / Plan    Management per PMD     Cardiology team will sign-off for now. Please do not hesitate to consult us again if new questions or concerns arise. Follow-up appointment will be arranged by CORE/HF clinic.       History of Present Illness/Subjective      Mr. Av Fernando is a 61 year old male with a PMHx significant for multivessel coronary artery disease status post STEMI in August 2021 recommended cardiothoracic surgery evaluation, however patient left AMA because family member had passed from bypass surgery, HFrEF, type 2 diabetes, dyslipidemia, anemia who presents in the setting of NSTEMI c/b ADHF     Today, Mr. Fernando denies any acute events or complaints; denies current chest pain or HF symptoms; Management plan as detailed above.        ECG: Personally reviewed. normal sinus rhythm, nonspecific ST and T waves changes.     ECHO (personnaly Reviewed):   There is no thrombus seen in the left ventricle.  The left ventricle is normal in size with normal left ventricular wall  thickness.  The visual ejection fraction is estimated at 30%.  There is hypokinesis of the entire inferior wall, mid to apical anterolateral  wall, and apical septum. There is akinesis of the basal and mid posterior  wall.  Normal right ventricle size and systolic function.  No hemodynamically significant valve disease is identified.     Compared to the prior study of 1/14/22, there has been an interval resolution  of the left ventricular apical thrombus.          Physical Examination Review of Systems   BP 98/61 (BP Location: Left arm)   Pulse 69   Temp 98  F (36.7  C) (Oral)   Resp 16   Ht 1.575 m (5' 2\")   Wt 51.7 kg (114 lb)   SpO2 96%   BMI 20.85 kg/m    Body mass index is 20.85 kg/m .  Wt Readings from Last 3 Encounters:   04/27/22 51.7 kg (114 lb)   04/07/22 53 kg (116 lb 12.8 oz)   04/05/22 54 kg (119 lb)     General Appearance:   no distress, " normal body habitus   ENT/Mouth: membranes moist, no oral lesions or bleeding gums.      EYES:  no scleral icterus, normal conjunctivae   Neck: no carotid bruits or thyromegaly   Chest/Lungs:   lungs are clear to auscultation, no rales or wheezing, equal chest wall expansion    Cardiovascular:   Regular. Normal first and second heart sounds with no murmurs, rubs, or gallops; the carotid, radial and posterior tibial pulses are intact, no JVD or LE edema bilaterally    Abdomen:  no organomegaly, masses, bruits, or tenderness; bowel sounds are present   Extremities: no cyanosis or clubbing   Skin: no xanthelasma, warm.    Neurologic: alert and oriented x3, calm     Psychiatric: alert and oriented x3, calm     A complete 10 systems ROS was reviewed  And is negative except what is listed in the HPI.          Medical History  Surgical History Family History Social History   Past Medical History:   Diagnosis Date     Congestive heart failure (H) 08/2021    ischemic CM with LVEF 30-35%     Coronary artery disease 08/2021    STEMI with multivessel CAD on angiography     Diabetes mellitus (H)      Hyperlipidemia      Hypertension      LV (left ventricular) mural thrombus 01/2022     Myocardial infarction (H) 08/2021    inferior STEMI    Past Surgical History:   Procedure Laterality Date     CV CORONARY ANGIOGRAM N/A 8/1/2021    Procedure: Coronary Angiogram;  Surgeon: Deidre Lopes MD;  Location: [facility] CATH LAB CV     CV LEFT VENTRICULOGRAM N/A 8/1/2021    Procedure: Left Ventriculogram;  Surgeon: Deidre Lopes MD;  [facility]: [facility] CV     OTHER SURGICAL HISTORY      LEG TRAUMA    no family history of premature coronary artery disease Social History     Socioeconomic History     Marital status:      Spouse name: Not on file     Number of children: Not on file     Years of education: Not on file     Highest education level: Not on file   Occupational History     Not on file   Tobacco Use     Smoking  status: Never Smoker     Smokeless tobacco: Never Used   Substance and Sexual Activity     Alcohol use: No     Drug use: No     Sexual activity: Yes     Partners: Female     Birth control/protection: None   Other Topics Concern     Parent/sibling w/ CABG, MI or angioplasty before 65F 55M? Not Asked   Social History Narrative     Not on file     Social Determinants of Health     Financial Resource Strain: Not on file   Food Insecurity: Not on file   Transportation Needs: Not on file   Physical Activity: Not on file   Stress: Not on file   Social Connections: Not on file   Intimate Partner Violence: Not on file   Housing Stability: Not on file           Lab Results    Chemistry/lipid CBC Cardiac Enzymes/BNP/TSH/INR   Lab Results   Component Value Date    CHOL 176 08/02/2021    HDL 53 08/02/2021    TRIG 70 08/02/2021    BUN 38 (H) 04/25/2022     04/25/2022    CO2 23 04/25/2022    Lab Results   Component Value Date    WBC 5.0 04/27/2022    HGB 12.6 (L) 04/27/2022    HCT 35.8 (L) 04/27/2022    MCV 92 04/27/2022     04/27/2022    Lab Results   Component Value Date    TROPONINI 10.49 (HH) 04/25/2022    BNP 3,288 (H) 04/24/2022    INR 1.24 (H) 04/24/2022     Lab Results   Component Value Date    TROPONINI 10.49 (HH) 04/25/2022          Weight:    Wt Readings from Last 3 Encounters:   04/27/22 51.7 kg (114 lb)   04/07/22 53 kg (116 lb 12.8 oz)   04/05/22 54 kg (119 lb)       Allergies  Allergies   Allergen Reactions     Januvia [Sitagliptin] Unknown     HEADACHE     Trulicity [Dulaglutide] Dizziness     Weak and muscle weak         Surgical History  Past Surgical History:   Procedure Laterality Date     CV CORONARY ANGIOGRAM N/A 8/1/2021    Procedure: Coronary Angiogram;  Surgeon: Deidre Lopes MD;  Location: [facility] CATH LAB CV     CV LEFT VENTRICULOGRAM N/A 8/1/2021    Procedure: Left Ventriculogram;  Surgeon: Deidre Lopes MD;  Location: [facility] CATH LAB CV     OTHER SURGICAL HISTORY      LEG TRAUMA        Social History  Tobacco:   History   Smoking Status     Never Smoker   Smokeless Tobacco     Never Used    Alcohol:   Social History    Substance and Sexual Activity      Alcohol use: No   Illicit Drugs:   History   Drug Use No       Family History  History reviewed. No pertinent family history.       Dougie Gupta MD on 4/27/2022      cc: Garcia Kaufman

## 2022-04-28 NOTE — PROGRESS NOTES
"Clinic Care Coordination Contact  [facility]: Post-Discharge Note  SITUATION                                                      Admission:    Admission Date: 04/24/22   Reason for Admission: Chest pain, shortness of breath  Discharge:   Discharge Date: 04/27/22  Discharge Diagnosis: NSTEMI, acute CHF    BACKGROUND                                                      Per hospital discharge summary and inpatient provider notes:  Av Fernando is a 61-year-old male with past medical history of CAD, STEMI in 8/2021 who declined CABG and left AMA,   ischemic cardiomyopathy, chronic systolic CHF, LV thrombus in 1/2022, DM 2, anemia who presented to [facility] for evaluation of chest pain and shortness of breath.  He was found to have non-STEMI and acute on chronic systolic CHF.  Patient was diuresed ~ 3lb and transitioned back to oral diuretics.  He was initially on IV heparin.  He declined coronary angiography at this time.  Chest pain and dyspnea improved.  Patient is now planning to follow-up with cardiology Dr. Fuentes to discuss options for PCI.      ASSESSMENT           Discharge Assessment  How are you doing now that you are home?: \" I'm doing well\"  How are your symptoms? (Red Flag symptoms escalate to triage hotline per guidelines): Unchanged;Improved  Do you feel your condition is stable enough to be safe at home until your provider visit?: Yes  Does the patient have their discharge instructions? : Yes  Does the patient have questions regarding their discharge instructions? : No  Were you started on any new medications or were there changes to any of your previous medications? : Yes  Does the patient have all of their medications?: Yes  Do you have questions regarding any of your medications? : No  Do you have all of your needed medical supplies or equipment (DME)?  (i.e. oxygen tank, CPAP, cane, etc.): Yes  Discharge follow-up appointment scheduled within 14 calendar days? : Yes  Discharge " Follow Up Appointment Date: 05/05/22  Discharge Follow Up Appointment Scheduled with?: Specialty Care Provider    Post-op ([facility]A Only)  If the patient had a surgery or procedure, do they have any questions for a nurse?: No             PLAN                                                      Outpatient Plan:     Follow up with primary care provider, Garcia Kaufman, within 7 days for hospital follow- up.  The following labs/tests are recommended: BMP.    Follow up with Cardiology          Activity     Your activity upon discharge: activity as tolerated          Diet     Follow this diet upon discharge: Orders Placed This Encounter      Combination Diet Moderate Consistent Carb (60 g CHO per Meal) Diet; 2 gm NA Diet          Future Appointments   Date Time Provider Department Center   5/5/2022  1:20 PM Dougie Gupta MD [facility]   6/7/2022  8:30 AM Deborah Cai APRN CNP [facility]         For any urgent concerns, please contact our 24 hour nurse triage line: [phone] ([phone])         Amanda Anderson MA

## 2022-04-29 NOTE — PROGRESS NOTES
Contact   Chart Review     Situation: Patient chart reviewed by .    Background: patient working with [facility] and is having trouble getting his diabetic supplies due to pharmacy saying he had Medicare insurance. Patient says that he only has MA.  [facility] has left messages for [facility] EA workers and has not gotten a call back.     Assessment: Called main number at [facility] and she will send emergency email to his two providers asking them to contact writer. Was transferred to worker's  [phone] [place] Briones     Plan/Recommendations: work with [facility] workers to find resolution to his insurance problems.  Will reach out to [facility] if no call back in one week.    VM from [place], [facility].  As patient only has Medicare Part A and Medical Assistance, things are complicated.  This means that anything that should be covered by Medicare part A or D is not covered by MA.  He should call Senior linkage Line and get enrolled in Part D plan.      If he is having trouble getting things covered under Medicare Part B, like test strips, lancets, he needs to contact the MA recipient help desk as those should be covered by his MA.  She is happy to explain more if there are other questions- [phone].      Will route to [facility] for action.      Plan - no further outreach scheduled.     Nickie Vallecillo,   [facility] [address]  [phone]

## 2022-05-04 PROBLEM — I50.9 ACUTE ON CHRONIC CONGESTIVE HEART FAILURE, UNSPECIFIED HEART FAILURE TYPE (H): Status: ACTIVE | Noted: 2022-01-01

## 2022-05-04 PROBLEM — I50.9 ACUTE ON CHRONIC CONGESTIVE HEART FAILURE, UNSPECIFIED HEART FAILURE TYPE (H): Status: RESOLVED | Noted: 2022-01-01 | Resolved: 2022-01-01

## 2022-05-04 PROBLEM — I73.9 PERIPHERAL VASCULAR DISEASE (H): Status: ACTIVE | Noted: 2020-07-20

## 2022-05-04 PROBLEM — N18.30 CKD (CHRONIC KIDNEY DISEASE) STAGE 3, GFR 30-59 ML/MIN (H): Status: ACTIVE | Noted: 2022-01-01

## 2022-05-04 PROBLEM — R79.89 ELEVATED TROPONIN: Status: ACTIVE | Noted: 2022-01-01

## 2022-05-04 NOTE — ED PROVIDER NOTES
EMERGENCY DEPARTMENT ENCOUNTER      NAME: Av Fernando  AGE: 61 year old male  YOB: 1960  MRN: [id number]  EVALUATION DATE & TIME: 2022 12:58 PM    PCP: Garcia Kaufman    ED PROVIDER: Derick Deleon M.D.      Chief Complaint   Patient presents with     Chest Pain         FINAL IMPRESSION:  1. Chest pain, unspecified type    2. Acute on chronic congestive heart failure, unspecified heart failure type (H)    3. Elevated troponin          ED COURSE & MEDICAL DECISION MAKIN:09 PM I met with the patient for the initial interview and physical examination. Discussed plan for treatment and workup in the ED. PPE worn: Eye protection, N95 mask, gloves.  1:29 PM Resident Dr. Sulema Valerio discussed the case with cardiology, Dr. Grier.    1:52 PM Per resident, patient is amenable to transfer.      Pertinent Labs & Imaging studies reviewed. (See chart for details)  61 year old male presents to the Emergency Department for evaluation of shortness of breath. Patient appears non toxic with stable vitals signs, patient is afebrile with no tachycardia or hypoxia, no increased work of breathing. Overall exam is benign.  Lungs are clear, abdomen is benign.  Again the patient denies any chest pain whatsoever, reviewed the medical record, discharged on 2022 for NSTEMI, ischemic cardiomyopathy.  Patient had elevated troponin during last hospitalization.  Here today initial labs significant for elevated troponin and BNP though troponin much improved when compared to prior and I suspect it is likely related to recent NSTEMI, again he denies any active chest pain at this time.  Also noted elevated BNP of over 4000, rest of labs showed no acute concerning findings.  ECG showed no acute concerning findings.  Plain films significant for increased small bilateral pleural effusions.  COVID is pending.  Patient was started on Lasix.  Spoke with on-call cardiology, Dr. Grier who was very helpful.  At this time  cardiology recommends aspirin, heparin infusion and admission, Dr. Grier will see the patient at the bedside in the emergency department.  Unfortunately there are no current beds available here at [facility].  Patient will be placed in the transfer process while we look for beds elsewhere.  Patient is amenable to transfer if a bed becomes available elsewhere.  Patient was signed out to the oncoming afternoon physician.    At the conclusion of the encounter I discussed the results of all of the tests and the disposition. The questions were answered and return precautions provided. The patient or family acknowledged understanding and was agreeable with the care plan.         MEDICATIONS GIVEN IN THE EMERGENCY:  Medications   heparin infusion 25,000 units in D5W 250 mL ANTICOAGULANT (600 Units/hr Intravenous New Bag 5/4/22 1418)   furosemide (LASIX) injection 20 mg (20 mg Intravenous Given 5/4/22 1446)   aspirin (ASA) chewable tablet 81 mg (81 mg Oral Given 5/4/22 6973)       NEW PRESCRIPTIONS STARTED AT TODAY'S ER VISIT  New Prescriptions    No medications on file            =================================================================    HPI    Patient information was obtained from: Patient     Use of Intrepreter: N/A (offered but patient and family declined  service)        Av Fernando is a 61 year old male with a pertinent medical history of hypertension, hyperlipidemia, CAD, NSTEMI, ischemic cardiomyopathy, peripheral vascular disease, and DM II who presents to this ED by walk in for evaluation of shortness of breath.     Per chart review, patient was admitted at [facility] 4/24/2022 - 4/27/2022 after presenting to the ED for evaluation of chest pain and shortness of breath, found to have non-STEMI and acute on chronic systolic CHF. Patient was diuresed ~3 lb and transitioned back to oral diuretics. He was initially on IV heparin. He declined coronary angiography at this time.  "Chest pain and dyspnea improved. Plan for follow-up with cardiology, Dr. Fuentes, to discuss options for PCI.      Patient states that he was discharged from the hospital last Wednesday (7 days ago) and he was feeling okay initially. However, a few days after returning home he started to have increased shortness of breath. He reports that his shortness of breath worsened after 6 PM last night. His shortness of breath was particularly worse when he was laying down flat, and the patient states that he \"almost passed out a couple of times\" because he was having a hard time breathing. He denies any chest pain. He states that he took a nitroglycerin this morning as well as his regular medications around 8 AM. He reports that he did not take aspirin today. He otherwise denies fever, abdominal pain, vomiting, changes in bowel or bladder habits, blood in stools, hematuria, or additional symptoms or complaints at this time. Patient notes that he has a history of heart failure and states that he has declined surgery in the past.     Social history: Nonsmoker. Denies alcohol use.        REVIEW OF SYSTEMS   Constitutional:  Denies fever, chills  Respiratory:  Reports shortness of breath. Denies productive cough   Cardiovascular:  Denies chest pain, palpitations  GI:  Denies abdominal pain, nausea, vomiting, or change in bowel or bladder habits   Musculoskeletal:  Denies any new muscle/joint swelling  Skin:  Denies rash   Neurologic:  Denies focal weakness  All systems negative except as marked.     PAST MEDICAL HISTORY:  Past Medical History:   Diagnosis Date     Congestive heart failure (H) 08/2021    ischemic CM with LVEF 30-35%     Coronary artery disease 08/2021    STEMI with multivessel CAD on angiography     Diabetes mellitus (H)      Hyperlipidemia      Hypertension      LV (left ventricular) mural thrombus 01/2022     Myocardial infarction (H) 08/2021    inferior STEMI       PAST SURGICAL HISTORY:  Past Surgical " History:   Procedure Laterality Date     CV CORONARY ANGIOGRAM N/A 8/1/2021    Procedure: Coronary Angiogram;  Surgeon: Deidre Lopes MD;  Location: [facility] CATH LAB CV     CV LEFT VENTRICULOGRAM N/A 8/1/2021    Procedure: Left Ventriculogram;  Surgeon: Deidre Lopes MD;  Location: [facility] CATH LAB CV     OTHER SURGICAL HISTORY      LEG TRAUMA         CURRENT MEDICATIONS:    Prior to Admission medications    Medication Sig Start Date End Date Taking? Authorizing Provider   blood glucose (ACCU-CHEK GUIDE) test strip Use to test blood sugar 3 times daily or as directed. 4/5/22   Garcia Kaufman MD   bumetanide (BUMEX) 2 MG tablet Take 1 tablet (2 mg) by mouth daily 4/27/22   Arlet Salazar MD   clopidogrel (PLAVIX) 75 MG tablet Take 1 tablet (75 mg) by mouth daily 4/27/22   Arlet Salazar MD   Continuous Blood Gluc Sensor (7 Billion PeopleSTYLE KATIE 14 DAY SENSOR) MISC 1 Device every 14 days Change sensor as directed every 14 days  Patient not taking: Reported on 4/7/2022 12/10/21   Garcia Kaufman MD   ELIQUIS ANTICOAGULANT 2.5 MG tablet 2.5 mg in morning and 5 mg in evening (per Cardiologist) 4/27/22   Arlet Salazar MD   famotidine (PEPCID) 20 MG tablet Take 1 tablet (20 mg) by mouth daily 4/28/22   Arlet Salazar MD   insulin lispro (HUMALOG KWIKPEN) 100 UNIT/ML (1 unit dial) KWIKPEN Inject 8-10 Units Subcutaneous 3 times daily (before meals) 4/27/22   Arlet Salazar MD   isosorbide mononitrate (ISMO/MONOKET) 20 MG tablet Take 1 tablet (20 mg) by mouth 2 times daily 4/27/22   Arlet Salazar MD   LANTUS SOLOSTAR 100 UNIT/ML soln Inject 24 Units Subcutaneous At Bedtime INJECT 24 UNITS UNDER THE SKIN AT BEDTIME 4/27/22   Arlet Salazar MD   metoprolol succinate ER (TOPROL-XL) 25 MG 24 hr tablet Take 0.5 tablets (12.5 mg) by mouth daily 4/27/22   Arlet Salazar MD   nitroGLYcerin (NITROSTAT) 0.4 MG sublingual tablet For chest pain place 1 tablet under the tongue every 5 minutes  for 3 doses. If symptoms persist 5 minutes after 1st dose call 911. 4/27/22   Arlet Salazar MD   rosuvastatin (CRESTOR) 40 MG tablet Take 1 tablet (40 mg) by mouth daily 4/27/22   Arlet Salazar MD   spironolactone (ALDACTONE) 25 MG tablet Take 1 tablet (25 mg) by mouth daily 4/27/22   Arlet Salazar MD        ALLERGIES:  Allergies   Allergen Reactions     Januvia [Sitagliptin] Unknown     HEADACHE     Trulicity [Dulaglutide] Dizziness     Weak and muscle weak       FAMILY HISTORY:  History reviewed. No pertinent family history.    SOCIAL HISTORY:   Social History     Socioeconomic History     Marital status:    Tobacco Use     Smoking status: Never Smoker     Smokeless tobacco: Never Used   Substance and Sexual Activity     Alcohol use: No     Drug use: No     Sexual activity: Yes     Partners: Female     Birth control/protection: None       VITALS:  Patient Vitals for the past 24 hrs:   BP Temp Pulse Resp SpO2 Weight   05/04/22 1345 93/66 -- 75 23 100 % --   05/04/22 1330 102/76 -- 74 9 99 % --   05/04/22 1315 104/78 -- 78 15 100 % --   05/04/22 1308 103/69 -- 72 (!) 32 100 % --   05/04/22 1300 103/69 -- 75 -- 100 % --   05/04/22 1215 106/77 97.2  F (36.2  C) 84 18 100 % 51.7 kg (114 lb)        PHYSICAL EXAM    Constitutional:  Awake, alert, in no apparent distress  HENT:  Normocephalic, Atraumatic. Bilateral external ears normal. Oropharynx moist. Nose normal. Neck- Normal range of motion with no guarding, No midline cervical tenderness, Supple, No stridor.   Eyes:  PERRL, EOMI with no signs of entrapment, Conjunctiva normal, No discharge.   Respiratory:  Normal breath sounds, No respiratory distress, No wheezing.    Cardiovascular:  Normal heart rate, Normal rhythm, No appreciable rubs or gallops.   GI:  Soft, No tenderness, No distension, No palpable masses  Musculoskeletal:  Intact distal pulses, No edema. Good range of motion in all major joints. No tenderness to palpation or major  deformities noted.  Integument:  Warm, Dry, No erythema, No rash.   Neurologic:  Alert & oriented, Normal motor function, Normal sensory function, No focal deficits noted.   Psychiatric:  Affect normal, Judgment normal, Mood normal.     LAB:  All pertinent labs reviewed and interpreted.  Results for orders placed or performed during the hospital encounter of 05/04/22   Chest XR,  PA & LAT    Impression    IMPRESSION: Increased small bilateral pleural effusions, larger on the right. Moderate right and mild left basilar volume loss and atelectasis. Interstitial prominence and vascular indistinctness compatible with pulmonary edema. Mild airway thickening,   also presumed from edema. Mild cardiac silhouette enlargement.       CBC (+ platelets, no diff)   Result Value Ref Range    WBC Count 4.9 4.0 - 11.0 10e3/uL    RBC Count 4.41 4.40 - 5.90 10e6/uL    Hemoglobin 14.4 13.3 - 17.7 g/dL    Hematocrit 41.8 40.0 - 53.0 %    MCV 95 78 - 100 fL    MCH 32.7 26.5 - 33.0 pg    MCHC 34.4 31.5 - 36.5 g/dL    RDW 13.6 10.0 - 15.0 %    Platelet Count 164 150 - 450 10e3/uL   Basic metabolic panel   Result Value Ref Range    Sodium 136 136 - 145 mmol/L    Potassium 4.1 3.5 - 5.0 mmol/L    Chloride 99 98 - 107 mmol/L    Carbon Dioxide (CO2) 25 22 - 31 mmol/L    Anion Gap 12 5 - 18 mmol/L    Urea Nitrogen 28 (H) 8 - 22 mg/dL    Creatinine 1.60 (H) 0.70 - 1.30 mg/dL    Calcium 9.5 8.5 - 10.5 mg/dL    Glucose 282 (H) 70 - 125 mg/dL    GFR Estimate 49 (L) >60 mL/min/1.73m2   Result Value Ref Range    INR 1.28 (H) 0.85 - 1.15   Result Value Ref Range    Troponin I 0.73 (HH) 0.00 - 0.29 ng/mL   Result Value Ref Range    Magnesium 1.9 1.8 - 2.6 mg/dL   B-Type Natriuretic Peptide ([facility] Only)   Result Value Ref Range    BNP 4,308 (H) 0 - 53 pg/mL       RADIOLOGY:  Chest XR,  PA & LAT   Final Result   IMPRESSION: Increased small bilateral pleural effusions, larger on the right. Moderate right and mild left basilar volume loss and  atelectasis. Interstitial prominence and vascular indistinctness compatible with pulmonary edema. Mild airway thickening,    also presumed from edema. Mild cardiac silhouette enlargement.                   EKG:    Normal sinus rhythm, no specific ST acute ischemic changes, no concerning dysrhythmias, did note QTC of 508 ms, when compared to ECG of April 24, 2022 no specific ST acute ischemic changes appreciated  I have independently reviewed and interpreted the EKG(s) documented above.    PROCEDURES:          I, Yaneth Rogers, am serving as a scribe to document services personally performed by Derick Deleon MD, based on my observation and the provider's statements to me. I, Derick Deleon MD attest that Yaneth Rogers is acting in a scribe capacity, has observed my performance of the services and has documented them in accordance with my direction.    Derick Deleon M.D.  Emergency Medicine  [facility] EMERGENCY DEPARTMENT  [address] [place] [id number] [phone]  Dept: [phone]     Derick Deleon MD  05/04/22 0450       Derick Deleon MD  05/04/22 9258

## 2022-05-04 NOTE — CONSULTS
Care Management Initial Consult    General Information  Assessment completed with: Patient, Spouse or significant other, pt and wife Jasmin [phone]  Type of CM/SW Visit: Initial Assessment    Primary Care Provider verified and updated as needed: Yes   Readmission within the last 30 days: no previous admission in last 30 days   Return Category: Progression of disease  Reason for Consult: discharge planning  Advance Care Planning: Advance Care Planning Reviewed: verified with patient     General Information Comments: lives w/wife, independent at baseline and has right sided weakness    Communication Assessment  Patient's communication style:  (Hmong)             Cognitive  Cognitive/Neuro/Behavioral:                        Living Environment:   People in home: child(rupert), adult, spouse     Current living Arrangements: house      Able to return to prior arrangements: yes       Family/Social Support:  Care provided by: self  Provides care for: no one  Marital Status:   Wife, Children          Description of Support System: Supportive, Involved    Support Assessment: Adequate family and caregiver support, Adequate social supports    Current Resources:   Patient receiving home care services: No     Community Resources: [facility] Programs, [facility] Worker  Equipment currently used at home: none  Supplies currently used at home: None    Employment/Financial:  Employment Status: disabled        Financial Concerns: No concerns identified   Referral to Financial Worker: No       Lifestyle & Psychosocial Needs:  Social Determinants of Health     Tobacco Use: Low Risk      Smoking Tobacco Use: Never Smoker     Smokeless Tobacco Use: Never Used   Alcohol Use: Not on file   Financial Resource Strain: Not on file   Food Insecurity: Not on file   Transportation Needs: Not on file   Physical Activity: Not on file   Stress: Not on file   Social Connections: Not on file   Intimate Partner Violence: Not on file   Depression: Not at  risk     PHQ-2 Score: 2   Housing Stability: Not on file       Functional Status:  Prior to admission patient needed assistance:   Dependent ADLs:: Independent  Dependent IADLs:: Independent  Assesssment of Functional Status: Not at baseline with ADL Functioning    Mental Health Status:  Mental Health Status: No Current Concerns       Chemical Dependency Status:                Values/Beliefs:  Spiritual, Cultural Beliefs, Hoahaoism Practices, Values that affect care:                 Additional Information:  CLAUDIA assessed, lives w/wife Jasmin and children, she can transport at discharge, no svcs at this time and independent w/some right sided weakness.      Carlota Manning RN

## 2022-05-04 NOTE — Clinical Note
Max pressure = 8 nikki. Total duration = 12 seconds.     Max pressure = 8 nikki. Total duration = 20 seconds.    Balloon reinflated a second time: Max pressure = 8 nikki. Total duration = 20 seconds.  Balloon reinflated a third time: Max pressure = 8 nikki. Total duration = 12 seconds.  Balloon reinflated a fourth time: Max pressure = 12 nikki. Total duration = 10 seconds.

## 2022-05-04 NOTE — Clinical Note
The first balloon was inserted into the left anterior descending and middle left anterior descending.Max pressure = 6 nikki. Total duration = 13 seconds.     Max pressure = 6 nikki. Total duration = 11 seconds.    Balloon reinflated a second time: Max pressure = 6 nikki. Total duration = 11 seconds.

## 2022-05-04 NOTE — PHARMACY-ADMISSION MEDICATION HISTORY
Pharmacy Note - Admission Medication History    Pertinent Provider Information: None     ______________________________________________________________________    Prior To Admission (PTA) med list completed and updated in EMR.       PTA Med List   Medication Sig Last Dose     aspirin 81 MG EC tablet Take 81 mg by mouth daily 5/3/2022 at Unknown time     bumetanide (BUMEX) 2 MG tablet Take 1 tablet (2 mg) by mouth daily 5/4/2022 at Unknown time     clopidogrel (PLAVIX) 75 MG tablet Take 1 tablet (75 mg) by mouth daily 5/4/2022 at Unknown time     ELIQUIS ANTICOAGULANT 2.5 MG tablet 2.5 mg in morning and 5 mg in evening (per Cardiologist) 5/4/2022 at am     insulin lispro (HUMALOG KWIKPEN) 100 UNIT/ML (1 unit dial) KWIKPEN Inject 8-10 Units Subcutaneous 3 times daily (before meals) 5/4/2022 at am     isosorbide mononitrate (ISMO/MONOKET) 20 MG tablet Take 1 tablet (20 mg) by mouth 2 times daily 5/4/2022 at am     LANTUS SOLOSTAR 100 UNIT/ML soln Inject 24 Units Subcutaneous At Bedtime INJECT 24 UNITS UNDER THE SKIN AT BEDTIME 5/3/2022 at Unknown time     metoprolol succinate ER (TOPROL-XL) 25 MG 24 hr tablet Take 0.5 tablets (12.5 mg) by mouth daily 5/4/2022 at Unknown time     nitroGLYcerin (NITROSTAT) 0.4 MG sublingual tablet For chest pain place 1 tablet under the tongue every 5 minutes for 3 doses. If symptoms persist 5 minutes after 1st dose call 911. 5/4/2022 at Unknown time     rosuvastatin (CRESTOR) 40 MG tablet Take 1 tablet (40 mg) by mouth daily 5/4/2022 at Unknown time     spironolactone (ALDACTONE) 25 MG tablet Take 1 tablet (25 mg) by mouth daily 5/4/2022 at Unknown time       Information source(s): Patient and CareEverywhere/SureScripts  Method of interview communication: in-person    Summary of Changes to PTA Med List  New: Aspirin  Discontinued: None  Changed: None    Patient was asked about OTC/herbal products specifically.  PTA med list reflects this.    In the past week, patient estimated taking  medication this percent of the time:  greater than 90%.    Allergies were reviewed, assessed, and updated with the patient.      Patient does not use any multi-dose medications prior to admission.    The information provided in this note is only as accurate as the sources available at the time of the update(s).    Thank you for the opportunity to participate in the care of this patient.    Niecy Nunez, [facility]  5/4/2022 1:50 PM

## 2022-05-04 NOTE — ED PROVIDER NOTES
"EMERGENCY DEPARTMENT SIGN OUT NOTE        ED COURSE AND MEDICAL DECISION MAKING  Patient was signed out to me by Dr Derick Deleon at 2:00PM.     In brief, Av Fernando is a 61 year old male who initially presented with chest pain. Patient states that he was discharged from the hospital last Wednesday (7 days ago) and he was feeling okay initially. However, a few days after returning home he started to have increased shortness of breath. He reports that his shortness of breath worsened after 6 PM last night. His shortness of breath was particularly worse when he was laying down flat, and the patient states that he \"almost passed out a couple of times\" because he was having a hard time breathing. He denies any chest pain. He states that he took a nitroglycerin this morning as well as his regular medications around 8 AM. He reports that he did not take aspirin today. He otherwise denies fever, abdominal pain, vomiting, changes in bowel or bladder habits, blood in stools, hematuria, or additional symptoms or complaints at this time. Patient notes that he has a history of heart failure and states that he has declined surgery in the past.     At time of sign out, disposition was pending transfer or admission.    ED Course as of 05/04/22 1649   Wed May 04, 2022   1405 Pt with chest pain with NSTEMI given heparin per cardiology Dr Grier with normal EKG and signed out to me pending admission bed availability to cardiac tele, VS WNL, pain resolved - no bed availability here in [facility] available at this time   1643 Per charge RN hospitalist paged to board patient as cardiac tele   1647 Patient admitted to hospitalist Dr Sibley to cardiac telemetry     Critical Care time was 45 minutes for this patient excluding procedures.      FINAL IMPRESSION    1. Chest pain, unspecified type    2. Acute on chronic congestive heart failure, unspecified heart failure type (H)    3. Elevated troponin        ED MEDS  Medications "   heparin infusion 25,000 units in D5W 250 mL ANTICOAGULANT (600 Units/hr Intravenous New Bag 5/4/22 1418)   clopidogrel (PLAVIX) tablet 75 mg (has no administration in time range)   furosemide (LASIX) injection 40 mg (has no administration in time range)   spironolactone (ALDACTONE) half-tab 12.5 mg (has no administration in time range)   metoprolol succinate ER (TOPROL XL) 24 hr tablet 25 mg (has no administration in time range)   atorvastatin (LIPITOR) tablet 10 mg (has no administration in time range)   aspirin EC tablet 81 mg (has no administration in time range)   isosorbide dinitrate (ISORDIL) tablet 10 mg (has no administration in time range)   furosemide (LASIX) injection 20 mg (20 mg Intravenous Given 5/4/22 1446)   aspirin (ASA) chewable tablet 81 mg (81 mg Oral Given 5/4/22 1453)       LAB  Labs Ordered and Resulted from Time of ED Arrival to Time of ED Departure   BASIC METABOLIC PANEL - Abnormal       Result Value    Sodium 136      Potassium 4.1      Chloride 99      Carbon Dioxide (CO2) 25      Anion Gap 12      Urea Nitrogen 28 (*)     Creatinine 1.60 (*)     Calcium 9.5      Glucose 282 (*)     GFR Estimate 49 (*)    INR - Abnormal    INR 1.28 (*)    TROPONIN I - Abnormal    Troponin I 0.73 (*)    B-TYPE NATRIURETIC PEPTIDE ([facility] ONLY) - Abnormal    BNP 4,308 (*)    CBC WITH PLATELETS - Normal    WBC Count 4.9      RBC Count 4.41      Hemoglobin 14.4      Hematocrit 41.8      MCV 95      MCH 32.7      MCHC 34.4      RDW 13.6      Platelet Count 164     MAGNESIUM - Normal    Magnesium 1.9     CBC WITH PLATELETS   COVID-19 VIRUS (CORONAVIRUS) BY PCR   MAGNESIUM   PARTIAL THROMBOPLASTIN TIME   PARTIAL THROMBOPLASTIN TIME           RADIOLOGY    Chest XR,  PA & LAT   Final Result   IMPRESSION: Increased small bilateral pleural effusions, larger on the right. Moderate right and mild left basilar volume loss and atelectasis. Interstitial prominence and vascular indistinctness compatible with pulmonary  edema. Mild airway thickening,    also presumed from edema. Mild cardiac silhouette enlargement.                DISCHARGE MEDS  New Prescriptions    No medications on file       Nikki Bartlett MD  [facility] EMERGENCY DEPARTMENT  [facility] [facility] [id number] [phone]     Nikki Bartlett MD  05/04/22 4051

## 2022-05-04 NOTE — ED NOTES
[facility] ED Handoff Report    ED Chief Complaint:     ED Diagnosis:  (R07.9) Chest pain, unspecified type  Comment:   Plan: Case Request: Coronary Angiogram, Percutaneous         Coronary Intervention, Cardiac Catheterization            (I50.9) Acute on chronic congestive heart failure, unspecified heart failure type (H)  Comment:   Plan:     (R77.8) Elevated troponin  Comment:   Plan:        PMH:    Past Medical History:   Diagnosis Date     Congestive heart failure (H) 08/2021    ischemic CM with LVEF 30-35%     Coronary artery disease 08/2021    STEMI with multivessel CAD on angiography     Diabetes mellitus (H)      Hyperlipidemia      Hypertension      LV (left ventricular) mural thrombus 01/2022     Myocardial infarction (H) 08/2021    inferior STEMI        Code Status:  Prior     Falls Risk: No Band: Not applicable    Current Living Situation/Residence: lives with a significant other     Elimination Status: Continent: Yes     Activity Level: SBA  Patients Preferred Language:  English     Needed: No    Vital Signs:  BP 98/74   Pulse 71   Temp 97.2  F (36.2  C)   Resp 15   Wt 51.7 kg (114 lb)   SpO2 96%   BMI 20.85 kg/m       Cardiac Rhythm: NSR with inverted t waves    Pain Score: 0/10    Is the Patient Confused:  No    Last Food or Drink: 05/04/22     Focused Assessment:  Pt A/o x4. C/o sob at rest. BNP high. Lasix given diuresing well. Possible angiogram with stents on Friday for CAD  multivessil . Bp soft. Isordil and spironolactone not given yet, monitoring Bp. Plavix given. Heparin gtt at 600units/hour. Next PTT at 1930.      Tests Performed: Done: Labs and Imaging    Treatments Provided:      Family Dynamics/Concerns: No    Family Updated On Visitor Policy: Yes    Plan of Care Communicated to Family: Yes    Who Was Updated about Plan of Care: Bandar Alva Checklist Done and Signed by Patient: No    Medications sent with patient:     Covid: asymptomatic ,  pending    Additional Information:     RN: Kenneth Pandey RN   5/4/2022 4:20 PM

## 2022-05-04 NOTE — CONSULTS
Thank you, Dr. Franklin ref. provider found, for asking the [facility] Heart [facility] team to see Mr. Av Fernando to evaluate       Assessment/Recommendations   Assessment/Plan:  1. CHF - ischemic CM - continue spironolactone but increase bid, increase furosemide iv, track BMP, cont metoprolol, would benefit from SGLT2 inhibitor (consider after recovery), track EF post   2. CAD multivessel - discussed with interventionalist with plan for PCI Friday, stop eliquis, cont iv heparin, start clopidogrel, cont imdur and metoprolol (titrate up), high dose statin  3. LV thrombus - present on MRI 1/2022 but not on echo on 2/21/22, will hold eliquis while on heparin, plan to discuss with team re restarting.      Followed by Dr. Fuentes in Cardiology     History of Present Illness/Subjective    Mr. Av Fernando is a 61 year old male with complex hx of ischemic CM (EF 26% by MRI 1/2022 and 30% echo 2/2022), LV thrombus (seen on MRI and not on echo), severe CAD three vessel with apical  with faint collaterals to small distal LAD preventing proper site for graft, HTN, DM, PAD followed in Cardiology by Dr. Fuentes presents with chest pressure/tightness (no pain) and heart failure with PND/orthopnea, no edema, dyspnea on exertion.  No syncopal events, GI/ bleeding, on medical therapy.  He claims to have had a brain tumor that was cured with medical therapy in [facility] but unable to travel to [facility] to have his heart condition cured, now open to revascularization.  Case discussed with CT surgery who felt no targets available in LAD territory, would advise percuaneous approach.          Physical Examination Review of Systems   BP 93/66   Pulse 75   Temp 97.2  F (36.2  C)   Resp 23   Wt 51.7 kg (114 lb)   SpO2 100%   BMI 20.85 kg/m    Body mass index is 20.85 kg/m .  Wt Readings from Last 3 Encounters:   05/04/22 51.7 kg (114 lb)   04/27/22 51.7 kg (114 lb)   04/07/22 53 kg (116 lb 12.8 oz)     No intake or output data in the  24 hours ending 05/04/22 1444  General Appearance:   no distress, normal body habitus   ENT/Mouth: membranes moist, no oral lesions or bleeding gums.      EYES:  no scleral icterus, normal conjunctivae   Neck: no carotid bruits or thyromegaly   Chest/Lungs:   lungs are rales 1/ 2 way upo auscultation, no rales or wheezing,  sternal scar, equal chest wall expansion    Cardiovascular:   Regular. Normal first and second heart sounds with no murmurs, rubs, or gallops; the carotid, radial and posterior tibial pulses are intact, Jugular venous pressure , edema bilaterally    Abdomen:  no organomegaly, masses, bruits, or tenderness; bowel sounds are present   Extremities: no cyanosis or clubbing   Skin: no xanthelasma, warm.    Neurologic: normal  bilateral, no tremors     Psychiatric: alert and oriented x3, calm     Review of Systems - 12 points nega other than above      Medical History  Surgical History Family History Social History   Past Medical History:   Diagnosis Date     Congestive heart failure (H) 08/2021    ischemic CM with LVEF 30-35%     Coronary artery disease 08/2021    STEMI with multivessel CAD on angiography     Diabetes mellitus (H)      Hyperlipidemia      Hypertension      LV (left ventricular) mural thrombus 01/2022     Myocardial infarction (H) 08/2021    inferior STEMI    Past Surgical History:   Procedure Laterality Date     CV CORONARY ANGIOGRAM N/A 8/1/2021    Procedure: Coronary Angiogram;  Surgeon: Deidre Lopes MD;  Location: [facility] CV     CV LEFT VENTRICULOGRAM N/A 8/1/2021    Procedure: Left Ventriculogram;  Surgeon: Deidre Lopes MD;  Location: [facility] CV     OTHER SURGICAL HISTORY      LEG TRAUMA    History reviewed. No pertinent family history. Social History     Socioeconomic History     Marital status:      Spouse name: Not on file     Number of children: Not on file     Years of education: Not on file     Highest education level: Not on file    Occupational History     Not on file   Tobacco Use     Smoking status: Never Smoker     Smokeless tobacco: Never Used   Substance and Sexual Activity     Alcohol use: No     Drug use: No     Sexual activity: Yes     Partners: Female     Birth control/protection: None   Other Topics Concern     Parent/sibling w/ CABG, MI or angioplasty before 65F 55M? Not Asked   Social History Narrative     Not on file     Social Determinants of Health     Financial Resource Strain: Not on file   Food Insecurity: Not on file   Transportation Needs: Not on file   Physical Activity: Not on file   Stress: Not on file   Social Connections: Not on file   Intimate Partner Violence: Not on file   Housing Stability: Not on file          Medications  Allergies   Scheduled Meds:    aspirin  81 mg Oral Once     furosemide  20 mg Intravenous Once     Continuous Infusions:    heparin 600 Units/hr (05/04/22 1418)     PRN Meds:. Allergies   Allergen Reactions     Januvia [Sitagliptin] Unknown     HEADACHE     Trulicity [Dulaglutide] Dizziness     Weak and muscle weak         Lab Results    Chemistry/lipid CBC Cardiac Enzymes/BNP/TSH/INR   Lab Results   Component Value Date    CHOL 176 08/02/2021    HDL 53 08/02/2021    TRIG 70 08/02/2021    BUN 28 (H) 05/04/2022     05/04/2022    CO2 25 05/04/2022    Lab Results   Component Value Date    WBC 4.9 05/04/2022    HGB 14.4 05/04/2022    HCT 41.8 05/04/2022    MCV 95 05/04/2022     05/04/2022    Lab Results   Component Value Date    TROPONINI 0.73 (HH) 05/04/2022    BNP 4,308 (H) 05/04/2022    INR 1.28 (H) 05/04/2022              Jabier Grier MD  Interventional Cardiology  [facility]

## 2022-05-04 NOTE — ED PROVIDER NOTES
ED Triage Provider Note  [facility]  Encounter Date: May 4, 2022    History:  Chief Complaint   Patient presents with     Chest Pain     Av Fernando is a 61 year old male who presents to the ED with CAD, presents with chest pain and shortness of breath since last night, worse laying down.  Pain is 8-9.  No improvement with nitroglycerin.    Review of Systems:  No fever, no nausea/vomiting    Exam:  /77   Pulse 84   Temp 97.2  F (36.2  C)   Resp 18   Wt 51.7 kg (114 lb)   SpO2 100%   BMI 20.85 kg/m    General: No acute distress. Appears stated age.   Cardio: Regular rate, extremities well perfused  Resp: Incresed work of breathing, bilateral crackles  Neuro: Alert. CN II-XII grossly intact. Grossly intact strength.     Medical Decision Making:  Patient arriving to the ED with problem as above. A medical screening exam was performed. Lab and xray orders initiated from Triage. The patient is appropriate to wait in triage.      Ken Aranda MD on 5/4/2022 at 12:13 PM      Lab/Imaging Results:  Results for orders placed or performed during the hospital encounter of 05/04/22   CBC (+ platelets, no diff)   Result Value Ref Range    WBC Count 4.9 4.0 - 11.0 10e3/uL    RBC Count 4.41 4.40 - 5.90 10e6/uL    Hemoglobin 14.4 13.3 - 17.7 g/dL    Hematocrit 41.8 40.0 - 53.0 %    MCV 95 78 - 100 fL    MCH 32.7 26.5 - 33.0 pg    MCHC 34.4 31.5 - 36.5 g/dL    RDW 13.6 10.0 - 15.0 %    Platelet Count 164 150 - 450 10e3/uL   Basic metabolic panel   Result Value Ref Range    Sodium 136 136 - 145 mmol/L    Potassium 4.1 3.5 - 5.0 mmol/L    Chloride 99 98 - 107 mmol/L    Carbon Dioxide (CO2) 25 22 - 31 mmol/L    Anion Gap 12 5 - 18 mmol/L    Urea Nitrogen 28 (H) 8 - 22 mg/dL    Creatinine 1.60 (H) 0.70 - 1.30 mg/dL    Calcium 9.5 8.5 - 10.5 mg/dL    Glucose 282 (H) 70 - 125 mg/dL    GFR Estimate 49 (L) >60 mL/min/1.73m2   Result Value Ref Range    INR 1.28 (H) 0.85 - 1.15   Result Value Ref  Range    Troponin I 0.73 () 0.00 - 0.29 ng/mL   Result Value Ref Range    Magnesium 1.9 1.8 - 2.6 mg/dL       Interventions:  Medications - No data to display    Diagnosis:  1. Chest pain, unspecified type    2. Acute on chronic congestive heart failure, unspecified heart failure type (H)         Ken Aranda MD  05/04/22 2468

## 2022-05-05 NOTE — PROVIDER NOTIFICATION
Pt has not voided this shift. NA bladder scanned patient for 524cc, pt states no urge to go to the bathroom. Dr. Mandujano text paged and updated. Awaiting new orders.     1528: Pt voided 475cc on his own, No straight cath necessary. Will pass on continue to monitor OP.    MYLES ROUSE RN

## 2022-05-05 NOTE — PLAN OF CARE
Goal Outcome Evaluation:         Heart Failure Care Map  GOALS TO BE MET BEFORE DISCHARGE:    1. Decrease congestion and/or edema with diuretic therapy to achieve near optimal volume status.     Dyspnea improved: Yes   Edema improved: Yes, satisfactory for discharge.        Net I/O and Weights since admission:   04/05 0700 - 05/05 0659  In: -   Out: 1500 [Urine:1500]  Net: -1500     Vitals:    05/04/22 1215 05/04/22 1748 05/05/22 0109   Weight: 51.7 kg (114 lb) 53.8 kg (118 lb 11.2 oz) 53.9 kg (118 lb 14.4 oz)       2.  O2 sats > 90% on room air, or at prior home O2 therapy level.      Able to wean O2 this shift to keep sats above 90%?: Yes, satisfactory for discharge.   Does patient use Home O2? No          Current oxygenation status:   SpO2: 99 %     O2 Device: None (Room air),      3.  Tolerates ambulation and mobility near baseline.     Ambulation: Yes, satisfactory for discharge.   Times patient ambulated with staff this shift: 0    Please review the Heart Failure Care Map for additional HF goal outcomes.    Asif Jarquin RN  5/5/2022     Pt continues on Heparin drip at 600 units/hr with next PTT next AM.  Pt denied any chest pain.  Pt to go for Angiogram with PCI tomorrow 5/6.

## 2022-05-05 NOTE — PROGRESS NOTES
[facility]    Medicine Progress Note - Hospitalist Service    Date of Admission:  5/4/2022    Assessment & Plan        Av Fernando is a 61 year old male with history of multivessel CAD, prior STEMI who declined CABG, ischemic cardiomyopathy, chronic systolic CHF with LV thrombus January 2022, DM2 and chronic anemia admitted on 5/4/2022 acute on chronic systolic CHF.     Cardiology still recommended furosemide, spironolactone and metoprolol with plan to pursue PCI tomorrow 5/6/2020 for further work-up of myocardial ischemia.  Echocardiogram 2/21/2026 showed regional wall motion abnormalities with LVEF of 30% without LV thrombus.      Multivessel CAD  Prior STEMI- declined CABG  Ischemic cardiomyopathy  Echocardiogram 2/21/2026 showed regional wall motion abnormalities with LVEF of 30% without LV thrombus.  Aspirin 81 mg daily  Atorvastatin 80 mg daily  Plavix 75 mg daily  Continue heparin drip  Nitroglycerin ointment  PCI tomorrow 5/6/2022 as per cardiologist  Cardiology [id number] assistance    Acute on chronic systolic CHF  LV thrombus   Echocardiogram 2/21/2026 showed regional wall motion abnormalities with LVEF of 30% without LV thrombus.  Furosemide 40 mg daily  Metoprolol succinate 25 mg twice daily  Spironolactone 12.5 mg daily  Monitor daily weights  Intake and output monitoring  Telemetry  Cardiology [id number] assistance       CKD 3 stage III CKD  GFR appears to be at baseline  Monitor BMP  Avoid nephrotoxins    Type 2 diabetes  Glucose is controlled  Continue Lantus 24 units at bedtime  Continue insulin sliding scale     Hyperlipidemia  Continue atorvastatin 80 mg daily    Hypomagnesemia  Give magnesium sulfate 2 g IV  Monitor magnesium level    Chronic anemia   Monitor hemoglobin level       Diet: Moderate Consistent Carb (60 g CHO per Meal) Diet  Room Service    DVT Prophylaxis: Heparin  Bustamante Catheter: Not present  Central Lines: None  Cardiac Monitoring:  ACTIVE order. Indication: AMI (NSTEMI/ STEMI) (48 hours)  Code Status: Full Code      Disposition Plan   Expected Discharge: 05/08/2022     Anticipated discharge location:  Awaiting care coordination huddle  Delays:    Pending PCI, cardiology recommendation       The patient's care was discussed with the Patient.    Jarad Mandujano MD  Hospitalist Service  [facility]  Securely message with the Vocera Web Console (learn more here)  Text page via [employer] Paging/Directory         Clinically Significant Risk Factors Present on Admission                 ______________________________________________________________________    Interval History   Shortness of breath has subsided.  He denies chest pain, dizziness or palpitation.  He hopes to be discharged from the hospital by Saturday in order to attend his birthday party scheduled for Sunday, 5/8/2022. [birth year].    Data reviewed today: I reviewed all medications, new labs and imaging results over the last 24 hours. I personally reviewed no images or EKG's today.    Physical Exam   Vital Signs: Temp: 97.7  F (36.5  C) Temp src: Oral BP: 91/59 Pulse: 64   Resp: 19 SpO2: 95 % O2 Device: None (Room air)    Weight: 118 lbs 14.4 oz  Physical Exam  General appearance: Awake, Alert, Cooperative, not in any obvious distress and appears stated age   HEENT: Normocephalic, atraumatic, conjunctiva clear without icterus and ears without discharge  Lungs: Mild bibasilar rales  Cardiovascular: Regular Rate and Rythm, normal apical impulse, normal S1 and S2, no lower extremity edema bilaterally   Abdomen: Soft, non-tender and Non-distended, active bowel sounds  Skin: Skin color, texture normal and bruising or bleeding. No rashes or lesions over face, neck, arms and legs, turgor normal.  Musculoskeletal: No bony deformities or joint tenderness. Normal ROM upon flexion & extension.   Neurologic: Alert & Oriented X 3, Facial symmetry preserved and upper  & lower extremities moving well with symmetry  Psychiatric: Calm, normal eye contact and normal affect      Data   Recent Labs   Lab 05/05/22  1133 05/05/22  0748 05/05/22  0408 05/04/22  1807 05/04/22  1705 05/04/22  1234   WBC  --   --   --   --  5.0 4.9   HGB  --   --   --   --  11.5* 14.4   MCV  --   --   --   --  95 95   PLT  --   --   --   --  152 164   INR  --   --   --   --   --  1.28*   NA  --   --  140  --   --  136   POTASSIUM  --   --  3.6  --   --  4.1   CHLORIDE  --   --  102  --   --  99   CO2  --   --  26  --   --  25   BUN  --   --  28*  --   --  28*   CR  --   --  1.48*  --   --  1.60*   ANIONGAP  --   --  12  --   --  12   LISA  --   --  8.7  --   --  9.5   * 134* 162*   < >  --  282*    < > = values in this interval not displayed.

## 2022-05-05 NOTE — H&P
[facility] Internal Medicine Admission History and Physical    [address]  [id number] [facility] [address]  : 1960  Admission Date/Time: 2022 12:58 PM    Primary Care Provider / Referring Physician: Garcia Kaufman  [facility] Attending Physician:  Otto Sibley DO     Assessment:     Principal Problem:    Chest pain, unspecified type  Active Problems:    Arthralgias In Multiple Sites    Type 2 diabetes mellitus with microalbuminuria, with long-term current use of insulin (H)    Cervical radiculopathy at C6    Peripheral vascular disease (H)    Coronary artery disease of native artery with stable angina pectoris (H)    Acute on chronic systolic congestive heart failure (H)    Mural thrombus of heart 2022     Ischemic cardiomyopathy    NSTEMI (non-ST elevated myocardial infarction) (H)    Elevated troponin    CKD (chronic kidney disease) stage 3, GFR 30-59 ml/min (H)      Plan:    61 year old male with history of multivessel CAD, prior STEMI who declined CABG and left AMA, ischemic cardiomyopathy, chronic systolic CHF with LV thrombus 2022, DM2 and chronic anemia who presents with chest pressure and intermittent shortness of breath and found to have acute on chronic systolic CHF      Chest pressure and shortness of breath: Noted is history of multivessel CAD, ischemic cardiomyopathy, chronic systolic CHF with LV thrombus 2022 and recent hospitalization last month for NSTEMI.   Records indicate that at time of prior STEMI and  the patient declined CABG and left AMA.  Echo on 2022 showed interval resolution of the LV apical thrombus.   He was recently hospitalized -2022 with NSTEMI and acute on chronic systolic CHF.  He was diuresed and declined coronary angiography at that time  He now presents with recurrent chest pain and found to have mild troponin elevation of 0.7  -- Unclear if troponin elevation represents recent recovery from recent NSTEMI  -- Cardiology  recommends admission and initiation of IV heparin  -- Cardiology to decide on coronary angiography this hospitalization  -- Defer diuresis to cardiology  --Continue current IV Lasix and spironolactone per cardiology  --Continue IV heparin  --Continue aspirin, Plavix, Lipitor, Imdur and metoprolol      Acute on chronic systolic congestive heart failure: noted is History of ischemic cardiomyopathy, EF 30%.  Chest xray on re-admission shows increased small bilateral pleural effusions with vascular congestion and pulmonary edema.. BNP is 4300, up from 3200   --Continue diuresis and management as above per cardiology       CKD 3: GFR appears baseline, trend with diuresis       DM2:  --Continue home Lantus and mealtime insulin, add medium intensity sliding scale insulin as well      Hyperlipidemia: Continue home statin      Chronic anemia:   --Hemoglobin mildly down from baseline and could be delusional related to volume overload  --trend while on IV heparin, platelets noted to be stable.  No reports of clinical blood loss         DVT PPX: On IV heparin    Code status: Full code      Otto Sibley D.O.          _____________________________________________________________  CHIEF COMPLAINT:    Shortness of breath    HPI: 61 year old male with history of multivessel CAD, prior STEMI who declined CABG and left AMA, ischemic cardiomyopathy, chronic systolic CHF with LV thrombus January 2022, DM2 and chronic anemia who presents with chest pressure and shortness of breath.  Patient recently discharged from the hospital where he declined coronary intervention for NSTEMI.  He now returns with increasing shortness of breath, orthopnea, FAGAN and intermittent chest pressure.  He endorses lightheadedness related to dyspnea.  Remainder of ROS negative. Denies any other exacerbating or improving factors.    .      ALLERGIES/SENSITIVITIES:   Allergies   Allergen Reactions     Januvia [Sitagliptin] Unknown     HEADACHE     Trulicity  [Dulaglutide] Dizziness     Weak and muscle weak       Medications Prior to Admission   Medication Sig Dispense Refill Last Dose     aspirin 81 MG EC tablet Take 81 mg by mouth daily   5/3/2022 at Unknown time     bumetanide (BUMEX) 2 MG tablet Take 1 tablet (2 mg) by mouth daily 30 tablet 0 5/4/2022 at Unknown time     clopidogrel (PLAVIX) 75 MG tablet Take 1 tablet (75 mg) by mouth daily 30 tablet 0 5/4/2022 at Unknown time     insulin lispro (HUMALOG KWIKPEN) 100 UNIT/ML (1 unit dial) KWIKPEN Inject 8-10 Units Subcutaneous 3 times daily (before meals) 30 mL 0 5/4/2022 at am     isosorbide mononitrate (ISMO/MONOKET) 20 MG tablet Take 1 tablet (20 mg) by mouth 2 times daily 30 tablet 0 5/4/2022 at am     LANTUS SOLOSTAR 100 UNIT/ML soln Inject 24 Units Subcutaneous At Bedtime INJECT 24 UNITS UNDER THE SKIN AT BEDTIME 9 mL 0 5/3/2022 at Unknown time     metoprolol succinate ER (TOPROL-XL) 25 MG 24 hr tablet Take 0.5 tablets (12.5 mg) by mouth daily 15 tablet 0 5/4/2022 at Unknown time     nitroGLYcerin (NITROSTAT) 0.4 MG sublingual tablet For chest pain place 1 tablet under the tongue every 5 minutes for 3 doses. If symptoms persist 5 minutes after 1st dose call 911. 25 tablet 0 5/4/2022 at Unknown time     rosuvastatin (CRESTOR) 40 MG tablet Take 1 tablet (40 mg) by mouth daily 30 tablet 0 5/4/2022 at Unknown time     spironolactone (ALDACTONE) 25 MG tablet Take 1 tablet (25 mg) by mouth daily 30 tablet 0 5/4/2022 at Unknown time     blood glucose (ACCU-CHEK GUIDE) test strip Use to test blood sugar 3 times daily or as directed. 100 strip 11      Continuous Blood Gluc Sensor (FREESTYLE KATIE 14 DAY SENSOR) [facility] 1 Device every 14 days Change sensor as directed every 14 days (Patient not taking: Reported on 4/7/2022) 2 each 11      famotidine (PEPCID) 20 MG tablet Take 1 tablet (20 mg) by mouth daily 30 tablet 0        Past Medical History:   Diagnosis Date     Congestive heart failure (H) 08/2021    ischemic CM with  LVEF 30-35%     Coronary artery disease 08/2021    STEMI with multivessel CAD on angiography     Diabetes mellitus (H)      Hyperlipidemia      Hypertension      LV (left ventricular) mural thrombus 01/2022     Myocardial infarction (H) 08/2021    inferior STEMI       Past Surgical History:   Procedure Laterality Date     CV CORONARY ANGIOGRAM N/A 8/1/2021    Procedure: Coronary Angiogram;  Surgeon: Deidre Lopes MD;  Location: [facility] CATH LAB CV     CV LEFT VENTRICULOGRAM N/A 8/1/2021    Procedure: Left Ventriculogram;  Surgeon: Deidre Lopes MD;  Location: [facility] CATH LAB CV     OTHER SURGICAL HISTORY      LEG TRAUMA       REVIEW OF SYSTEMS:   Constitutional: no fever, chills, or sweats  Eyes: No visual disturbance or irritation  ENT: No nose or throat congestion or pain  Respiratory: No wheezes, cough, or pain with breathing  Cardiovascular: No palpitations  Gastrointestinal: No nausea, vomiting, diarrhea, dyspepsia, or pain  Genitourinary: No urgency, frequency, or dysuria  Integument/breast: No rash, pruritis, or lesion  Hematologic/lymphatic: No bleeding or unusual bruising  Musculoskeletal: No joint swelling, pain, or unusual back pain  Neurological: No headache, arm or leg numbness or weakness, or gait disturbance  Psychiatric: No anxiety, or depression, or hallucinations  Endocrine: No appetite disturbance, sleep disturbance, or unusual weight loss  Allergic/Immunologic: No hives, allergic swelling or wheeze or rhinitis  All other systems on reveiw are negative.    Social History     Socioeconomic History     Marital status:      Spouse name: Not on file     Number of children: Not on file     Years of education: Not on file     Highest education level: Not on file   Occupational History     Not on file   Tobacco Use     Smoking status: Never Smoker     Smokeless tobacco: Never Used   Substance and Sexual Activity     Alcohol use: No     Drug use: No     Sexual activity: Yes     Partners:  "Female     Birth control/protection: None   Other Topics Concern     Parent/sibling w/ CABG, MI or angioplasty before 65F 55M? Not Asked   Social History Narrative     Not on file     Social Determinants of Health     Financial Resource Strain: Not on file   Food Insecurity: Not on file   Transportation Needs: Not on file   Physical Activity: Not on file   Stress: Not on file   Social Connections: Not on file   Intimate Partner Violence: Not on file   Housing Stability: Not on file        History reviewed. No pertinent family history.    PHYSICAL EXAM:  General Appearance: In no acute distress  BP 96/62   Pulse 76   Temp 97.8  F (36.6  C) (Oral)   Resp 20   Ht 1.575 m (5' 2\")   Wt 53.8 kg (118 lb 11.2 oz)   SpO2 93%   BMI 21.71 kg/m    EYES: Clear, without inflammation   HEENT: Without congestion or inflammation  RESPIRATORY: Respirations nonlabored  CARDIOVASCULAR: No le edema bilat.  ABDOMEN: soft and non-tender  RECTAL: deferred  GENITOURINARY: deferred  NEUROLOGIC: No focal arm or leg  weakness, speech is clear      Labs Reviewed:   Recent Results (from the past 24 hour(s))   CBC (+ platelets, no diff)    Collection Time: 05/04/22 12:34 PM   Result Value Ref Range    WBC Count 4.9 4.0 - 11.0 10e3/uL    RBC Count 4.41 4.40 - 5.90 10e6/uL    Hemoglobin 14.4 13.3 - 17.7 g/dL    Hematocrit 41.8 40.0 - 53.0 %    MCV 95 78 - 100 fL    MCH 32.7 26.5 - 33.0 pg    MCHC 34.4 31.5 - 36.5 g/dL    RDW 13.6 10.0 - 15.0 %    Platelet Count 164 150 - 450 10e3/uL   Basic metabolic panel    Collection Time: 05/04/22 12:34 PM   Result Value Ref Range    Sodium 136 136 - 145 mmol/L    Potassium 4.1 3.5 - 5.0 mmol/L    Chloride 99 98 - 107 mmol/L    Carbon Dioxide (CO2) 25 22 - 31 mmol/L    Anion Gap 12 5 - 18 mmol/L    Urea Nitrogen 28 (H) 8 - 22 mg/dL    Creatinine 1.60 (H) 0.70 - 1.30 mg/dL    Calcium 9.5 8.5 - 10.5 mg/dL    Glucose 282 (H) 70 - 125 mg/dL    GFR Estimate 49 (L) >60 mL/min/1.73m2   INR    Collection Time: " 05/04/22 12:34 PM   Result Value Ref Range    INR 1.28 (H) 0.85 - 1.15   Troponin I    Collection Time: 05/04/22 12:34 PM   Result Value Ref Range    Troponin I 0.73 (HH) 0.00 - 0.29 ng/mL   Magnesium    Collection Time: 05/04/22 12:34 PM   Result Value Ref Range    Magnesium 1.9 1.8 - 2.6 mg/dL   B-Type Natriuretic Peptide ([facility] Only)    Collection Time: 05/04/22 12:34 PM   Result Value Ref Range    BNP 4,308 (H) 0 - 53 pg/mL   Asymptomatic COVID-19 Virus (Coronavirus) by PCR Nasopharyngeal    Collection Time: 05/04/22  4:34 PM    Specimen: Nasopharyngeal; Swab   Result Value Ref Range    SARS CoV2 PCR Negative Negative   CBC with platelets    Collection Time: 05/04/22  5:05 PM   Result Value Ref Range    WBC Count 5.0 4.0 - 11.0 10e3/uL    RBC Count 3.55 (L) 4.40 - 5.90 10e6/uL    Hemoglobin 11.5 (L) 13.3 - 17.7 g/dL    Hematocrit 33.7 (L) 40.0 - 53.0 %    MCV 95 78 - 100 fL    MCH 32.4 26.5 - 33.0 pg    MCHC 34.1 31.5 - 36.5 g/dL    RDW 13.5 10.0 - 15.0 %    Platelet Count 152 150 - 450 10e3/uL   Magnesium    Collection Time: 05/04/22  5:05 PM   Result Value Ref Range    Magnesium 1.8 1.8 - 2.6 mg/dL   Extra Blue Top Tube    Collection Time: 05/04/22  5:05 PM   Result Value Ref Range    Hold Specimen JIC    Glucose by meter    Collection Time: 05/04/22  6:07 PM   Result Value Ref Range    GLUCOSE BY METER POCT 142 (H) 70 - 99 mg/dL   Troponin I    Collection Time: 05/04/22  6:36 PM   Result Value Ref Range    Troponin I 0.69 (HH) 0.00 - 0.29 ng/mL   PTT    Collection Time: 05/04/22  8:11 PM   Result Value Ref Range    aPTT 59 (H) 22 - 38 Seconds   Extra Red Top Tube    Collection Time: 05/04/22  8:11 PM   Result Value Ref Range    Hold Specimen JIC    Glucose by meter    Collection Time: 05/04/22  9:15 PM   Result Value Ref Range    GLUCOSE BY METER POCT 284 (H) 70 - 99 mg/dL

## 2022-05-05 NOTE — PLAN OF CARE
Problem: Plan of Care - These are the overarching goals to be used throughout the patient stay.    Goal: Optimal Comfort and Wellbeing  Outcome: Ongoing, Progressing     Heparin drip running at 600 Units/hr. PTT 59 at 2111. PTT recheck at 0400. Vitally stable. No symptoms. Denies pain. Sinus rhythm w/rare PVCs on tele. IV Lasix given. Output 600 ml.    Pt consumed 55 carbs for dinner. Novolog pen arrived late to unit. 8 units for Novolog for dinner given at 2145 (okay per house officer).

## 2022-05-05 NOTE — PROGRESS NOTES
Pt with low BP and IV lasix due.  MD Monroy notified and asked to place parameters with lasix.  Lasix held for SBP <100. BP was 94/67.

## 2022-05-05 NOTE — PLAN OF CARE
Problem: Plan of Care - These are the overarching goals to be used throughout the patient stay.    Goal: Plan of Care Review/Shift Note  Outcome: Ongoing, Progressing  Discussed POC with pt. Pt aware of plan for angiogram tomorrow at 1200.     Problem: Hyperglycemia  Goal: Blood Glucose Level Within Targeted Range  Outcome: Ongoing, Progressing  Pt's BG this morning was 134. Pt refused 8 units scheduled novolog with breakfast. He states at home he only takes the 8 units if his BG is over 200, otherwise he only takes 4 units. Updated Dr. Mandujano to discuss with pt.    Pt remains on heparin ggt at 600 units/hour. Ptt recheck tomorrow morning.    MYLES ROUSE RN

## 2022-05-05 NOTE — PROGRESS NOTES
"Some dyspnea now clear    Current Facility-Administered Medications   Medication     acetaminophen (TYLENOL) tablet 650 mg    Or     acetaminophen (TYLENOL) Suppository 650 mg     aspirin EC tablet 81 mg     atorvastatin (LIPITOR) tablet 80 mg     calcium carbonate (TUMS) chewable tablet 1,000 mg     clopidogrel (PLAVIX) tablet 75 mg     glucose gel 15-30 g    Or     dextrose 50 % injection 25-50 mL    Or     glucagon injection 1 mg     famotidine (PEPCID) tablet 20 mg     furosemide (LASIX) injection 40 mg     heparin infusion 25,000 units in D5W 250 mL ANTICOAGULANT     hydrALAZINE (APRESOLINE) injection 10 mg     insulin aspart (NovoLOG) injection (RAPID ACTING)     insulin aspart (NovoLOG) injection (RAPID ACTING)     insulin aspart (NovoLOG) injection (RAPID ACTING)     insulin glargine (LANTUS PEN) injection 24 Units     isosorbide dinitrate (ISORDIL) tablet 10 mg     melatonin tablet 3 mg     metoprolol succinate ER (TOPROL XL) 24 hr tablet 25 mg     Patient is already receiving anticoagulation with heparin, enoxaparin (LOVENOX), warfarin (COUMADIN)  or other anticoagulant medication     prochlorperazine (COMPAZINE) injection 10 mg    Or     prochlorperazine (COMPAZINE) tablet 10 mg    Or     prochlorperazine (COMPAZINE) suppository 25 mg     spironolactone (ALDACTONE) half-tab 12.5 mg     Past Medical History:   Diagnosis Date     Congestive heart failure (H) 08/2021    ischemic CM with LVEF 30-35%     Coronary artery disease 08/2021    STEMI with multivessel CAD on angiography     Diabetes mellitus (H)      Hyperlipidemia      Hypertension      LV (left ventricular) mural thrombus 01/2022     Myocardial infarction (H) 08/2021    inferior STEMI        Review of Systems: 12 points negative other than above    BP 96/63 (BP Location: Right arm, Cuff Size: Adult Small)   Pulse 82   Temp 97.7  F (36.5  C) (Oral)   Resp 18   Ht 1.575 m (5' 2\")   Wt 53.9 kg (118 lb 14.4 oz)   SpO2 99%   BMI 21.75 kg/m  "   JVP<7cm, carotids normal  Lungs clear  Cor RRR no c,r,m  Abs soft +BS, no mass  Ext no c,c,e    Lab Results   Component Value Date    HGB 11.5 (L) 05/04/2022     Lab Results   Component Value Date     05/04/2022     No results found for: CREATININE  No components found for: K     Cr 1.48 K 3.6      Imp/plan:  1. Ischemic CM with symptoms of dyspnea concerning for ischemia as lng s are now clear - not a surgical candidate, plan PCI tomorrow, start NTG paste and stop isordil, on asp/clopidogrel/statin, track Cr may have to stage given kidney failure - will defer to CI (he requested Dr. Fuentes)  2. CM - change furosemide to po daily as lungs are clear, cont spironolactone daily given lower BP, metoprolol, hold hydralazine given lower blood pressure  3. LV thrombus - none on last echo, continue eliquis post procedure and if EF improves consider stopping    Follow up BMP/CBC in AM  Jabier Grier MD  Interventional Cardiology   [facility]  [phone]

## 2022-05-05 NOTE — SIGNIFICANT EVENT
Pt felt weak and diaphoretic.  Blood sugar check 44, orange juice and ban cracker given.  Re check 80.  Pt still felt weak and requested more juice and crackers.  Re check 118. Pt felt much better.  Hospitalist MD Monroy text paged event.

## 2022-05-05 NOTE — PROVIDER NOTIFICATION
Novolog insulin pen received at 2100 from pharmacy. Pt consumed meal at 1800.  at HS. Per jose Patino to give mealtime Novolog dose (8 units) in addition to Lantus at HS.

## 2022-05-05 NOTE — PROGRESS NOTES
Care Management Follow Up    Length of Stay (days): 1    Expected Discharge Date:   To be determined.        Concerns to be Addressed:   Lasix IV every 6 hours; Heparin drip; planning angio for PCI on 5/6/2022.   Patient plan of care discussed at interdisciplinary rounds: Yes    Anticipated Discharge Disposition:  Home.     Anticipated Discharge Services:  To be determined.   Anticipated Discharge DME:  To be determined.     Patient/family educated on Medicare website which has current facility and service quality ratings:  Yes  Education Provided on the Discharge Plan:  Per team  Patient/Family in Agreement with the Plan:  Yes    Referrals Placed by CM/SW:  None  Private pay costs discussed: Not applicable     Additional Information:  Patient is  and lives with his wife Jasmin and their children. He is independent with activities of daily living at baseline. Goal is home with family transport but CM will follow along.     Anali Callejas RN

## 2022-05-06 NOTE — PROGRESS NOTES
"Pt c/o intermittent, sharp \"chest pain\" but points to epigastric region. States pain does not radiate. Pain now improving without intervention and is mild, tolerable. Pt otherwise resting comfortably in bed, on bedrest.  "

## 2022-05-06 NOTE — PROGRESS NOTES
Nutrition Therapy    Diet education consult noted.  RD will see pt after transfer to patient unit.

## 2022-05-06 NOTE — PROGRESS NOTES
Heart Failure Care Map  GOALS TO BE MET BEFORE DISCHARGE:    1. Decrease congestion and/or edema with diuretic therapy to achieve near optimal volume status.     Dyspnea improved: No, further care required to meet this goal. Please explain Angio scheduled for today    Edema improved: Yes, satisfactory for discharge.        Net I/O and Weights since admission:   04/06 0700 - 05/06 0659  In: 1297.9 [P.O.:1200; I.V.:97.9]  Out: 2175 [Urine:2175]  Net: -877.1     Vitals:    05/04/22 1215 05/04/22 1748 05/05/22 0109   Weight: 51.7 kg (114 lb) 53.8 kg (118 lb 11.2 oz) 53.9 kg (118 lb 14.4 oz)       2.  O2 sats > 90% on room air, or at prior home O2 therapy level.      Able to wean O2 this shift to keep sats above 90%?: Yes, satisfactory for discharge.   Does patient use Home O2? No          Current oxygenation status:   SpO2: 99 %     O2 Device: None (Room air),      3.  Tolerates ambulation and mobility near baseline.     Ambulation: No, further care required to meet this goal. Please explain IND   Times patient ambulated with staff this shift: 2    Please review the Heart Failure Care Map for additional HF goal outcomes.    Yousuf Jensen RN  5/6/2022

## 2022-05-06 NOTE — PRE-PROCEDURE
GENERAL PRE-PROCEDURE:   Procedure:  Coronary angiogram with possible PCI  Date/Time:  5/6/2022 10:34 AM    Written consent obtained?: Yes    Risks and benefits: Risks, benefits and alternatives were discussed    Consent given by:  Patient  Patient states understanding of procedure being performed: Yes    Patient's understanding of procedure matches consent: Yes    Procedure consent matches procedure scheduled: Yes    Expected level of sedation:  Moderate  Appropriately NPO:  Yes  ASA Class:  3 (multi-vessel CAD with hx of STEMI, ICM, LV thrombus, HTN, HLD, DM Type II, PAD)  Mallampati  :  Grade 1- soft palate, uvula, tonsillar pillars, and posterior pharyngeal wall visible  Lungs:  Lungs clear with good breath sounds bilaterally  Heart:  Normal heart sounds and rate  History & Physical reviewed:  History and physical reviewed and no updates needed  Statement of review:  I have reviewed the lab findings, diagnostic data, medications, and the plan for sedation

## 2022-05-06 NOTE — PLAN OF CARE
Problem: Plan of Care - These are the overarching goals to be used throughout the patient stay.    Goal: Absence of Hospital-Acquired Illness or Injury  Outcome: Met  Intervention: Identify and Manage Fall Risk  Recent Flowsheet Documentation  Taken 5/6/2022 0014 by Yousuf Jensen RN  Safety Promotion/Fall Prevention:   activity supervised   clutter free environment maintained   fall prevention program maintained   nonskid shoes/slippers when out of bed   patient and family education   room organization consistent   safety round/check completed    Goal: Optimal Comfort and Wellbeing  Outcome: Met     Problem: Gas Exchange Impaired  Goal: Optimal Gas Exchange  Outcome: Met   Goal Outcome Evaluation:        Pt. Alert and oriented. Continues on telemetry. Continues on Heparin drip. Vital signs stable. Prepping for angio today. Declined to watch the video. Has had a angio before. Denies pain. Will shower and change linens this morning.     Yousuf Jensen, RN

## 2022-05-06 NOTE — PROGRESS NOTES
Patient was kept comfortable during post-procedure stay.VSS. Pt reports mild intermittent pain to epigastric region that is now improved, as well as some general discomfort to IV sites in left arm, patent and saline locked. Right femoral access site remains dry & free from signs of bleeding. Reviewed activity restrictions and bedrest with pt. Able to ask questions. Verbalized no concerns. Belongings remain in pt's rm 304. Report called to Eulogio and Transferred to P3 in stable condition.

## 2022-05-06 NOTE — PROGRESS NOTES
Pt C/O mid sternal chest pain rated 5/10 along with feeling SOB. RA sats 94%.3L N/C, 0.4 NTG given SL. Stat ECG done. Pt received immediate relief after a few minutes, pain down to a 2. States he feels much better.

## 2022-05-06 NOTE — PROGRESS NOTES
Care Management Follow Up    Length of Stay (days): 2    Expected Discharge Date: 05/08/2022 To be determined.        Concerns to be Addressed:    Heparin drip; planning angio for PCI on 5/6/2022. Supplemental oxygen.   Patient plan of care discussed at interdisciplinary rounds: Yes    Anticipated Discharge Disposition:  Home.     Anticipated Discharge Services:  To be determined.   Anticipated Discharge DME:  To be determined.     Patient/family educated on Medicare website which has current facility and service quality ratings:  Yes  Education Provided on the Discharge Plan:  Per team  Patient/Family in Agreement with the Plan:  Yes    Referrals Placed by CM/SW:  None  Private pay costs discussed: Not applicable     Additional Information:  Patient is  and lives with his wife Jasmin and their children. He is independent with activities of daily living at baseline. Goal is home with family transport but CM will follow along.     Anali Callejas RN

## 2022-05-06 NOTE — PROGRESS NOTES
[facility]    Medicine Progress Note - Hospitalist Service    Date of Admission:  5/4/2022    Assessment & Plan        Av Fernando is a 61 year old male with history of multivessel CAD, prior STEMI who declined CABG, ischemic cardiomyopathy, chronic systolic CHF with LV thrombus January 2022, DM2 and chronic anemia admitted on 5/4/2022 acute on chronic systolic CHF.     Cardiology recommended furosemide, spironolactone and metoprolol with plan to perform C today 5/6/2020 for further work-up of myocardial ischemia.  Echocardiogram 2/21/2026 showed regional wall motion abnormalities with LVEF of 30% without LV thrombus.      Multivessel CAD  Prior STEMI- declined CABG  Ischemic cardiomyopathy  Echocardiogram 2/21/2026 showed regional wall motion abnormalities with LVEF of 30% without LV thrombus.  Aspirin 81 mg daily  Atorvastatin 80 mg daily  Plavix 75 mg daily  Continue heparin drip  Nitroglycerin ointment  PCI scheduled for 5/6/2022   Cardiology pyswzq-ab-fgmfqllyhz assistance    Acute on chronic systolic CHF  LV thrombus   Echocardiogram 2/21/2026 showed regional wall motion abnormalities with LVEF of 30% without LV thrombus.  Furosemide 40 mg daily  Metoprolol succinate 25 mg twice daily  Spironolactone 12.5 mg daily  Monitor daily weights  Intake and output monitoring  Telemetry  Cardiology qlyxze-rp-zpvzbvzood assistance       CKD 3 stage III CKD  GFR appears to be at baseline  Monitor BMP  Avoid nephrotoxins    Type 2 diabetes  Glucose is controlled  Continue Lantus 24 units at bedtime  Continue insulin sliding scale     Hyperlipidemia  Continue atorvastatin 80 mg daily    Hypomagnesemia  Replaced.   Monitor magnesium level    Chronic anemia   Monitor hemoglobin level       Diet: Room Service  Low Saturated Fat Na <2400 mg    DVT Prophylaxis: Heparin  Bustamante Catheter: Not present  Central Lines: None  Cardiac Monitoring: ACTIVE order. Indication: Post- PCI/Angiogram (24 hours)  Code  Status: Full Code      Disposition Plan   Expected Discharge: 05/08/2022     Anticipated discharge location:  Awaiting care coordination huddle  Delays:    Pending PCI, cardiology recommendation       The patient's care was discussed with the Patient.    Jarad Mandujano MD  Hospitalist Service  [facility]  Securely message with the Vocera Web Console (learn more here)  Text page via [employer] Paging/Directory         Clinically Significant Risk Factors Present on Admission                 ______________________________________________________________________    Interval History   He complains of SOB; worse today. He also endorsed orthopnea. He denies chest pain, dizziness or palpitation. Scheduled for [facility] today  Data reviewed today: I reviewed all medications, new labs and imaging results over the last 24 hours. I personally reviewed no images or EKG's today.    Physical Exam   Vital Signs: Temp: (!) 96  F (35.6  C) Temp src: Oral BP: 98/57 Pulse: 57   Resp: 22 SpO2: 95 % O2 Device: None (Room air) Oxygen Delivery: 2 LPM  Weight: 120 lbs 14.4 oz  Physical Exam  General appearance: Awake, Alert, Cooperative, not in any obvious distress and appears stated age   HEENT: Normocephalic, atraumatic, conjunctiva clear without icterus and ears without discharge  Lungs:bibasilar rales  Cardiovascular: Regular Rate and Rythm, normal apical impulse, normal S1 and S2, no lower extremity edema bilaterally   Abdomen: Soft, non-tender and Non-distended, active bowel sounds  Skin: Skin color, texture normal and bruising or bleeding. No rashes or lesions over face, neck, arms and legs, turgor normal.  Musculoskeletal: No bony deformities or joint tenderness. Normal ROM upon flexion & extension.   Neurologic: Alert & Oriented X 3, Facial symmetry preserved and upper & lower extremities moving well with symmetry  Psychiatric: Calm, normal eye contact and normal affect      Data   Recent Labs   Lab  05/06/22  0808 05/06/22  0422 05/06/22  0337 05/05/22  0748 05/05/22  0408 05/04/22  1807 05/04/22  1705 05/04/22  1234   WBC  --   --   --   --   --   --  5.0 4.9   HGB  --   --   --   --   --   --  11.5* 14.4   MCV  --   --   --   --   --   --  95 95   PLT  --   --   --   --   --   --  152 164   INR  --   --   --   --   --   --   --  1.28*   NA  --  141  --   --  140  --   --  136   POTASSIUM  --  3.8  --   --  3.6  --   --  4.1   CHLORIDE  --  105  --   --  102  --   --  99   CO2  --  31  --   --  26  --   --  25   BUN  --  22  --   --  28*  --   --  28*   CR  --  1.44*  --   --  1.48*  --   --  1.60*   ANIONGAP  --  5  --   --  12  --   --  12   LISA  --  8.9  --   --  8.7  --   --  9.5   GLC 91 82 119*   < > 162*   < >  --  282*    < > = values in this interval not displayed.

## 2022-05-06 NOTE — DISCHARGE INSTRUCTIONS
Interventional Cardiology  Coronary Angiogram/Angioplasty/Stent/Atherectomy Discharge Instructions -   Femoral Approach    The instructions below are to help you understand how to take care of yourself. There is also information about when to call the doctor or emergency services    **Do not stop your aspirin or platelet inhibitor unless directed by your Cardiologist.  These medications help to prevent platelets in your blood from sticking together and forming a clot.  Examples of these medications are:  Ticagrelor (Brilinta), Clopidigrel (Plavix), Prasugrel (Effient)          For the first 72 hours after your procedure:  No driving for 24 hours  Do not lift more than 15 pounds.  If you usually lift 50 pounds or more daily, please contact your cardiologist  Avoid any hard work or tiring activities, this includes, yard work, jogging, biking, sexual activity  During the day get up and walk around every 2 hours  You may return to work after 72 hours if you are feeling well and your job does not involve heavy lifting          Groin Site / Wound Care / Bleeding    You may take off the dressing on your groin the day after your procedure  Keep the area dry and clean  It is ok to shower with regular soap.  Pat dry, do not rub  You do not need to use a bandage  No tub/pool or hot tub for 1 week  If your groin starts to bleed or begins to swell suddenly after leaving the hospital, lie flat and apply firm pressure above the site for 15 minutes.  If bleeding continues, call 9-1-1  Bruising around the groin area is normal.  It may take 2-3 weeks for this to go away.  It is normal for the bruised area to turn green and/or yellow as it is healing.  A small lump may also be present and may last 2-3 months.  Your leg may be sore or stiff for a few days.  You may take Tylenol or a pain medicine recommended by your doctor  If you have a fever over 100.4, that lasts more than one day - call your cardiologist.      Our Cardiac Rehab  staff may visit briefly with you while your in the hospital.  If they miss you, someone will contact you after you are home.  You are encouraged to enroll in an Outpatient Cardiac Rehab Program     No elective dental work for 6 weeks after having a stent.     No driving for 24 hours      Your Procedural Physician was: Dr. Sherif Fuentes  the phone number is: [phone]      [facility]:  [phone]  If you are calling after hours, please listen to the entire voicemail, a live  will answer at the end of the message

## 2022-05-06 NOTE — PROGRESS NOTES
Chest pain 1/10 after nitroglycerin.  Site stable.  Unable to get appointment with Dr Fuentes due to lack of openings.  Left message for his nurse in clinic.  Family has no questions.

## 2022-05-07 NOTE — PROVIDER NOTIFICATION
Patient concerned about hypoglycemic episode last night Reports  told him Lantus would be reduced to 12units.  Please adjust MAR if appropriate. BP trend low, 86/61 and 93/60.    Text page sent to crosscover Dr. Woods.    Addendum - verbal order to reduce Lantus to 12 units for tonight.  Continue to monitor BP.

## 2022-05-07 NOTE — SIGNIFICANT EVENT
Significant Event Note    Time of event: 7:08 AM May 7, 2022    Description of event:  Notified patient was asking to leave AMA.    Patient was frustrated with the amount of time it took for staff to respond to call lights overnight.  He wants to leave this morning.  Discussed importance of remaining in the hospital to allow for his primary team to evaluate and discharge him when appropriate.  Charge nurse was present.  It was recommended he speak with the patient advocate    Plan:  He is agreeable to remain in the hospital until he can discuss his care with his primary team    Dipak Johnson MD PGY-1  [facility]   House Officer

## 2022-05-07 NOTE — SIGNIFICANT EVENT
"Significant Event Note    Time of event: 10:09 PM May 6, 2022    Description of event:  Notified of increased shortness of breath.    Briefly, Av is a 62-year-old male with history of multivessel CAD prior STEMI, ischemic cardiomyopathy, chronic systolic CHF.  He was admitted for chronic systolic underwent coronary angiogram today, no stenting done.    No stent increased cough, Tessalon pearls were ordered earlier in the evening.  No complaints of worsening shortness of breath.  Feels like he cannot get a deep breath in.  No chest pain.  No palpitations.  Was able to sit up in bed with some symptomatic improvement.  O2 saturations were normal on room air.  Supplemental oxygen placed for comfort.    BP 96/61 (BP Location: Right arm, Patient Position: Semi-Armando's)   Pulse 74   Temp 97.6  F (36.4  C) (Oral)   Resp 16   Ht 1.575 m (5' 2\")   Wt 54.8 kg (120 lb 14.4 oz)   SpO2 100%   BMI 22.11 kg/m        Exam  General -appears uncomfortable.  Nontoxic.  HEENT -no stridor  Heart -regular rate and rhythm.  Lungs -mostly comfortable respiratory effort.  Does have intermittent episodes of mild distress.  No accessory muscle use.  Coarse breath sounds in the upper lobes bilaterally.  Good air movement.  Lower lobes with diminished breath sounds and crackles.  Extremities -no lower extremity edema.  No swelling or pain.    Assessment plan:  Shortness of breath.  No new oxygen needs.  He is intermittently breathing comfortably.  With diffuse crackles at the base, could consider some pulmonary edema.  Also possibly some atelectasis following coronary angiogram today.  -Chest x-ray  -Supplemental oxygen for comfort    Discussed with: Cross cover physician.      1:12 AM  Chest xray showing bilateral pleural effusions and right basiler infiltrate or scarring. Was placed on oxi mask briefly for comfort. Now sleeping comfortably on room air. O2 saturations in the upper 90's. No signs or symptoms concerning for pneumonia. " Continue to monitor respiratory status.    Dipak Johnson MD PGY-1  [facility]   House Officer

## 2022-05-07 NOTE — PLAN OF CARE
Cardiac Rehab Discharge Summary    Reason for therapy discharge:    Discharged to home.    Progress towards therapy goal(s). See goals on Care Plan in [facility] electronic health record for goal details.  Goals met    Therapy recommendation(s):    Continue home exercise program.    Maranda DUMONT, OTR/L, CLT 5/7/2022 , 11:02 AM

## 2022-05-07 NOTE — SIGNIFICANT EVENT
"Significant Event Note    Time of event: 4:08 AM May 7, 2022    Description of event:  Notified patient becoming more agitated.    Patient was found walking in the hallway.  He feels he is not getting the help that he needs.  He was upset that it took so long for nursing staff to attend to him.  He was able to be redirected back to bed.  He denies any chest pain, abdominal pain.  Is experiencing some nausea.  No dizziness or lightheadedness.  Feels a little sweaty.      Still feeling intermittently short of breath.  Per chart review, this is been going on for most of the day    Blood glucose found to be 63.        /83 (BP Location: Right arm)   Pulse 75   Temp 97.3  F (36.3  C) (Oral)   Resp 20   Ht 1.575 m (5' 2\")   Wt 54.8 kg (120 lb 14.4 oz)   SpO2 99%   BMI 22.11 kg/m  '  Exam  Appears slightly diaphoretic on exam.  He is alert and oriented, visibly upset.  Heart -regular rate and rhythm.  Lungs  - comfortable respiratory effort.  Good air movement bilaterally, crackles at the bases.  Abdomen -nondistended.  Soft.  Nontender.    Plan:  Agitation and diaphoresis likely in the setting of hypoglycemia.  He is alert and oriented.  Treated with D50.  Remains hemodynamically stable.  Normal O2 saturation.  Nasal cannula was again placed for comfort.      4:32 AM  Repeat blood glucose 160.    4:52 AM  Resting comfortably in bed.       Dipak Johnson MD PGY-1  [facility]   House Officer     "

## 2022-05-07 NOTE — PROVIDER NOTIFICATION
Chest x-ray resulted.  Still c/o shortness of breath, slightly improves with sitting up.    Text page sent to house officer.

## 2022-05-07 NOTE — PLAN OF CARE
"Goal Outcome Evaluation: Assumed care 2300 to 0730. A&O x 4. Assist x 1 with gait belt. Tele is NSR. C/O heartburn, offered Tums, patient declined stating \"I'm allergic to Tums.\" Up to toilet. Call light within reach, able to make needs known. Bed alarm on for safety. First assessed pt at 0015. House resident checked in with pt a short time later. RN offered meds to pt at 0228. Around 0345 RN was at bedside, pt became agitated, ripped off his pulse oximeter and attempted to leave the unit. Pt stated \"No one here is doing anything for me.\" Blood glucose was checked and found to be 63. Pt given orange juice and IV push of D50. House resident was at bedside. Vitally stable with O2 sat 98% on room air. 2L NC applied for comfort. Provider suggested raising the head of the bed, pt refused choosing to lie flat on his back. Blood glucose was 160 on recheck, pt sleeping comfortably in bed. Pt up to toilet at 0645. Pt states he wants to leave AMA and requested the doctor come to bedside. House resident notified and was at pt bedside at 0653.    Problem: Hyperglycemia  Goal: Blood Glucose Level Within Targeted Range  Outcome: Ongoing, Progressing     Problem: Gas Exchange Impaired  Goal: Optimal Gas Exchange  Intervention: Optimize Oxygenation and Ventilation  Recent Flowsheet Documentation  Taken 5/7/2022 0015 by CHAYO TORRES  Head of Bed (HOB) Positioning: HOB at 20-30 degrees     "

## 2022-05-07 NOTE — PLAN OF CARE
Goal Outcome Evaluation:    Plan of Care Reviewed With: patient     Overall Patient Progress: improving    Outcome Evaluation: A&Ox4.  Denied chest pain throughout shift.  Developed intermittent epigastric pain at end of shift.  Began having episodes of coughing, extreme shortness of breath, and sense of doom followed by feeling exhausted.  See provider notification and house officer notes.  BP trending low - SBP high 80s to 90s.  Sinus rhythm with ST elevation in V2.    Problem: Hyperglycemia  Goal: Blood Glucose Level Within Targeted Range  Outcome: Ongoing, Progressing     Problem: Gas Exchange Impaired  Goal: Optimal Gas Exchange  Outcome: Ongoing, Not Progressing     Heart Failure Care Map  GOALS TO BE MET BEFORE DISCHARGE:    1. Decrease congestion and/or edema with diuretic therapy to achieve near optimal volume status.     Dyspnea improved: No, further care required to meet this goal. Please explain Developed episodes of severe dyspnea with sense of doom followed by extreme exhaustion.  Supplemental O2 for comfort.  Improves with coaching and repositioning.  House officer following.   Edema improved: Yes, satisfactory for discharge.        Net I/O and Weights since admission:   04/07 0700 - 05/07 0659  In: 2593.9 [P.O.:1880; I.V.:713.9]  Out: 3675 [Urine:3675]  Net: -1081.1     Vitals:    05/04/22 1215 05/04/22 1748 05/05/22 0109 05/06/22 0338   Weight: 51.7 kg (114 lb) 53.8 kg (118 lb 11.2 oz) 53.9 kg (118 lb 14.4 oz) 54.8 kg (120 lb 14.4 oz)       2.  O2 sats > 90% on room air, or at prior home O2 therapy level.      Able to wean O2 this shift to keep sats above 90%?: No, further care required to meet this goal. Please explain - SpO2 96% on room air, however due to dyspnea using oxymask 4 LPM   Does patient use Home O2? No          Current oxygenation status:   SpO2: 100 %     O2 Device: Oxymask, Oxygen Delivery: 4 LPM    3.  Tolerates ambulation and mobility near baseline.     Ambulation: Yes,  satisfactory for discharge.   Times patient ambulated with staff this shift: 1    Please review the Heart Failure Care Map for additional HF goal outcomes.    Otilio Hyde RN  5/7/2022

## 2022-05-07 NOTE — PROVIDER NOTIFICATION
"Sudden sense of doom \"I feel like I'm going to die.\"  Exacerbated coughing, shortness of breath.  Improved with repositioning, coaching, and nasal cannula.  Extremely exhausted.  VS stable, no tele changes.    House officer paged.  "

## 2022-05-07 NOTE — PROVIDER NOTIFICATION
Pt states he is leaving AMA. Please come to the unit. Thanks! Dot RN [phone]    House resident notified    Provider came to bedside.

## 2022-05-07 NOTE — PLAN OF CARE
Problem: Plan of Care - These are the overarching goals to be used throughout the patient stay.    Goal: Plan of Care Review/Shift Note  Description: The Plan of Care Review/Shift note should be completed every shift.  The Outcome Evaluation is a brief statement about your assessment that the patient is improving, declining, or no change.  This information will be displayed automatically on your shift note.  Outcome: Adequate for Care Transition  Flowsheets (Taken 5/7/2022 1046)  Plan of Care Reviewed With:   patient   spouse   son   Goal Outcome Evaluation:    Plan of Care Reviewed With: patient, spouse, son        Reviewed discontinue medications and instructions. Will follow up with primary and cardiology

## 2022-05-07 NOTE — PROGRESS NOTES
Care Management Discharge Note    Discharge Date: 05/07/2022       Discharge Disposition: Home    Discharge Services:      Discharge DME:      Discharge Transportation:      Private pay costs discussed: Not applicable    PAS Confirmation Code:    Patient/family educated on Medicare website which has current facility and service quality ratings:      Education Provided on the Discharge Plan:    Persons Notified of Discharge Plans: patient   Patient/Family in Agreement with the Plan:      Handoff Referral Completed: Yes    Additional Information:  Home with no care management needs        Anali Taylor RN

## 2022-05-07 NOTE — PROGRESS NOTES
Pt wants to leave    Current Facility-Administered Medications   Medication    acetaminophen (TYLENOL) Suppository 650 mg    acetaminophen (TYLENOL) tablet 650 mg    aspirin EC tablet 81 mg    atorvastatin (LIPITOR) tablet 80 mg    benzonatate (TESSALON) capsule 100 mg    calcium carbonate (TUMS) chewable tablet 1,000 mg    clopidogrel (PLAVIX) tablet 75 mg    Continuing beta blocker from home medication list OR beta blocker order already placed during this visit    glucose gel 15-30 g    Or    dextrose 50 % injection 25-50 mL    Or    glucagon injection 1 mg    famotidine (PEPCID) tablet 20 mg    furosemide (LASIX) tablet 40 mg    HOLD: Metformin and metformin containing medications on day of procedure and 48 hours after IV contrast given for patients with acute kidney injury or severe chronic kidney disease (stage IV or stage V; i.e., eGFR less than 30)    hydrALAZINE (APRESOLINE) injection 10 mg    insulin aspart (NovoLOG) injection (RAPID ACTING)    insulin aspart (NovoLOG) injection (RAPID ACTING)    insulin aspart (NovoLOG) injection (RAPID ACTING)    insulin glargine (LANTUS PEN) injection 24 Units    melatonin tablet 3 mg    metoprolol (LOPRESSOR) injection 5 mg    metoprolol succinate ER (TOPROL XL) 24 hr tablet 25 mg    nitroGLYcerin (NITRO-BID) 2 % ointment 7.5 mg    nitroGLYcerin (NITROSTAT) sublingual tablet 0.4 mg    ondansetron (ZOFRAN ODT) ODT tab 4 mg    Or    ondansetron (ZOFRAN) injection 4 mg    oxyCODONE (ROXICODONE) tablet 5 mg    Or    oxyCODONE (ROXICODONE) tablet 10 mg    Patient is already receiving anticoagulation with heparin, enoxaparin (LOVENOX), warfarin (COUMADIN)  or other anticoagulant medication    Percutaneous Coronary Intervention orders placed (this is information for BPA alerting)    prochlorperazine (COMPAZINE) injection 10 mg    Or    prochlorperazine (COMPAZINE) tablet 10 mg    Or    prochlorperazine (COMPAZINE) suppository 25 mg    spironolactone (ALDACTONE) half-tab 12.5  "mg     Past Medical History:   Diagnosis Date    Congestive heart failure (H) 08/2021    ischemic CM with LVEF 30-35%    Coronary artery disease 08/2021    STEMI with multivessel CAD on angiography    Diabetes mellitus (H)     Hyperlipidemia     Hypertension     LV (left ventricular) mural thrombus 01/2022    Myocardial infarction (H) 08/2021    inferior STEMI        Review of Systems: 12 points negative other than above    /75 (BP Location: Left arm)   Pulse 68   Temp 97.4  F (36.3  C) (Oral)   Resp 18   Ht 1.575 m (5' 2\")   Wt 54.8 kg (120 lb 14.4 oz)   SpO2 99%   BMI 22.11 kg/m    JVP<7cm, carotids normal  Lungs clear  Cor RRR no c,r,m  Abs soft +BS, no mass  Ext no c,c,e    Lab Results   Component Value Date    HGB 11.3 (L) 05/07/2022     Lab Results   Component Value Date     (L) 05/07/2022     No results found for: CREATININE  No components found for: K    Imp/plan:  CAD s/p limited PCI to focal midLAD with residual severe dz in multiple vessel but feels better - did not want to wiat to be seen by myself and leaving.   Follow up with Dr. Alfredo Grier MD  Interventional Cardiology   [facility]  [phone]    "

## 2022-05-07 NOTE — DISCHARGE SUMMARY
[facility]  Hospitalist Discharge Summary      Date of Admission:  5/4/2022  Date of Discharge:  5/7/2022  Discharging Provider: Jarad Mandujano MD  Discharge Service: Hospitalist Service    Discharge Diagnoses   Multivessel CAD  Prior STEMI- declined CABG  Ischemic cardiomyopathy  Acute on chronic systolic CHF  LV thrombus   ELIZABETH on stage III CKD; likely cardiorenal      Follow-ups Needed After Discharge   Follow-up Appointments     Follow-up and recommended labs and tests       Follow up with primary care provider, Garcia Kaufman, within 7 days for   hospital follow- up.  The following labs/tests are recommended: BMP,   monitor daily weights and adjust dose of diuretics as needed.  Follow-up   on the cardiology clinic as arranged for possible staged PCI.             Unresulted Labs Ordered in the Past 30 Days of this Admission     No orders found from 4/4/2022 to 5/5/2022.        Discharge Disposition   Discharged to home  Condition at discharge: Stable      Hospital Course   Av Fernando is a 61 year old male with history of multivessel CAD, prior STEMI who declined CABG, ischemic cardiomyopathy, chronic systolic CHF with LV thrombus, type 2 diabetes and chronic anemia admitted on 5/4/2022 acute on chronic systolic CHF.      Echocardiogram 2/21/2026 showed regional wall motion abnormalities with LVEF of 30% without LV thrombus.  He received IV furosemide as recommended by cardiologist and subsequently transitioned to p.o. furosemide.  Cardiologist also adjusted CHF regimen. LHC 5/6/2022 revealed severe 99% LAD occlusion for which percutaneous transluminal angioplasty was performed, mid left circumflex 100% occlusion and a 95% RCA occlusion.  Cardiologist recommended antiplatelet therapy, aggressive risk factor modification with plan to pursue staged PCI for RCA occlusion and consider VAD/transplant candidacy assessment.     Renal function improved with diuretic therapy.  Symptoms have  improved and patient is eager to go home today.      Consultations This Hospital Stay   PHARMACY IP CONSULT  PHARMACY IP CONSULT  SOCIAL WORK IP CONSULT  NUTRITION SERVICES ADULT IP CONSULT  CARDIAC REHAB IP CONSULT  PHARMACY IP CONSULT  PHARMACY IP CONSULT  SMOKING CESSATION PROGRAM IP CONSULT    Code Status   Full Code    Time Spent on this Encounter   I, Jarad Mandujano MD, personally saw the patient today and spent greater than 30 minutes discharging this patient.       Jarad Mandujano MD  [facility]  [address] [place] [id number]  Phone: [phone]  Fax: [phone]  ______________________________________________________________________    Physical Exam   Vital Signs: Temp: 97.4  F (36.3  C) Temp src: Oral BP: 108/75 Pulse: 68   Resp: 18 SpO2: 99 % O2 Device: None (Room air) Oxygen Delivery: 4 LPM  Weight: 120 lbs 14.4 oz    General appearance: Awake, Alert, Cooperative, not in any obvious distress and appears stated age   HEENT: Normocephalic, atraumatic, conjunctiva clear without icterus and ears without discharge  Lungs:bibasilar rales  Cardiovascular: Regular Rate and Rythm, normal apical impulse, normal S1 and S2, no lower extremity edema bilaterally   Abdomen: Soft, non-tender and Non-distended, active bowel sounds  Skin: Skin color, texture normal and bruising or bleeding. No rashes or lesions over face, neck, arms and legs, turgor normal.  Musculoskeletal: No bony deformities or joint tenderness. Normal ROM upon flexion & extension.   Neurologic: Alert & Oriented X 3, Facial symmetry preserved and upper & lower extremities moving well with symmetry  Psychiatric: Calm, normal eye contact and normal affect            Primary Care Physician   Garcia Kaufman    Discharge Orders      Primary Care - Care Coordination Referral      Reason for your hospital stay    Multivessel CAD  Prior STEMI- declined CABG  Ischemic cardiomyopathy   Acute on  chronic systolic CHF  LV thrombus     Follow-up and recommended labs and tests     Follow up with primary care provider, Garcia Kaufman, within 7 days for hospital follow- up.  The following labs/tests are recommended: BMP, monitor daily weights and adjust dose of diuretics as needed.  Follow-up on the cardiology clinic as arranged for possible staged PCI.     Activity    Your activity upon discharge: activity as tolerated     Diet    Follow this diet upon discharge: Orders Placed This Encounter      Room Service      Low Saturated Fat Na <2400 mg       Significant Results and Procedures   Results for orders placed or performed during the hospital encounter of 05/04/22   Chest XR,  PA & LAT    Narrative    EXAM: XR CHEST 2 VW  LOCATION: [facility]  DATE/TIME: 5/4/2022 1:47 PM    INDICATION: Dyspnea.  COMPARISON: 04/24/2022.      Impression    IMPRESSION: Increased small bilateral pleural effusions, larger on the right. Moderate right and mild left basilar volume loss and atelectasis. Interstitial prominence and vascular indistinctness compatible with pulmonary edema. Mild airway thickening,   also presumed from edema. Mild cardiac silhouette enlargement.       XR Chest Port 1 View    Narrative    EXAM: XR CHEST PORT 1 VIEW  LOCATION: [facility]  DATE/TIME: 5/6/2022 10:20 PM    INDICATION: Shortness of breath  COMPARISON: 5/4/2022.    FINDINGS: The heart is enlarged. There is no pulmonary edema. The thoracic aorta is calcified. There are bilateral pleural effusions, right larger than left. Right basilar infiltrate or scarring. No pneumothorax.      Impression    IMPRESSION: Bilateral pleural effusions. Right basilar infiltrate.   Cardiac Catheterization    Addendum: 5/6/2022    Av Fernando is a 62 year old old male with CAD s/p STEMI 8/21 when he was referred for but refused CABG evaluation AMA, HFrEF LVEF 30's, previous LV thrombus, HL, DM who presents today for  evaluation of his CAD/NSTEMI in the setting of ADHF.      - known multi-vessel severe disease, was offered CABG but refused last year, then presented for PCI with me but stated that his angina had since resolved, few months later had NSTEMI/ADHF, ultimately seeking an intervention now  - anatomic situtation is complicated by the fact that based on the viability assessment, there is a near-transmural inferolateral scar but viability elsewhere  - as LAD territory appears to have viability, and on today's angio there was more of a vessel evident, we proceeded w/ PTA to mid-distal vessel - no stent due to still limited outflow. Apical LAD now gets collateral flow from AM of RCA  - he can follow up with me in 2-4 weeks, and we can decide whether to pursue PCI to RCA next, depending on symptoms, as both Lcx and dRCA are potentially feasible for PCI, but don't appear to have viability  - in the meantime, continue ASA/clopidogrel/metop rosuva  - consider VAD/transplant candidacy assessment   - continue aggressive risk factor modification          Findings:  LM:no obstruction  LAD:severe 99% lesion prior to take off of a small diffusely diseased diagonal branch in mid-distal vessel. [place] fills via collaterals from RCA AM branch mostly - this is new compared to prior angio  Lcx:mid-vessel subtotal occlusion v.  w/ bridging collaterals, 100% occlusion distal vessel  RCA:dominant, mid-distal 95% Ca2+ lesion, PL branch w/ severe diffuse disease    LVEDP:17    Access:  R Radial artery    PCI:  LMT was engaged w/ a 6F EBU 3.5 Guide catheter and the lesion in LAD was wired w/ a Forte wire, then ballooned w/ a 2.0x12mm Emerge balloon at 6-12 nikki inflations. There was improved flow into distal diagonal branches, but no flow into apical vessel, suggesting another , however wire appeared to be not luminal, so we chose to stop, since apical vessel receives collateral flow from RCA AM. Final angiography showed no dissection or  perforation and a OLGA 3 flow.    Closure:   Manual              Narrative    Av Fernando is a 62 year old old male with CAD s/p STEMI 8/21 when he was   referred for but refused CABG evaluation AMA, HFrEF LVEF 30's, previous LV   thrombus, HL, DM who presents today for evaluation of his CAD/NSTEMI in   the setting of ADHF.      - known multi-vessel severe disease, was offered CABG but refused last   year, then presented for PCI with me but stated that his angina had since   resolved, few months later had NSTEMI/ADHF, ultimately seeking an   intervention now  - anatomic situtation is complicated by the fact that based on the   viability assessment, there is a near-transmural inferolateral scar but   viability elsewhere  - as LAD territory appears to have viability, and on today's angio there   was more of a vessel evident, we proceeded w/ PTA to mid-distal vessel -   no stent due to still limited outflow. Apical LAD now gets collateral flow   from AM of RCA  - he can follow up with me in 2-4 weeks, and we can decide whether to   pursue PCI to RCA next, depending on symptoms, as both Lcx and dRCA are   potentially feasible for PCI, but don't appear to have viability  - in the meantime, continue ASA/clopidogrel/metop rosuva  - consider VAD/transplant candidacy assessment   - continue aggressive risk factor modification          Findings:  LM:no obstruction  LAD:severe 99% lesion prior to take off of a small diffusely diseased   diagonal branch in mid-distal vessel. [place] fills via collaterals from RCA   AM branch mostly - this is new compared to prior angio  Lcx:mid-vessel subtotal occlusion v.  w/ bridging collaterals, 100%   occlusion distal vessel  RCA:dominant, mid-distal 95% Ca2+ lesion, PL branch w/ severe diffuse   disease    LVEDP:17    Access:  R Radial artery    PCI:  LMT was engaged w/ a 6F EBU 3.5 Guide catheter and the lesion in LAD was   wired w/ a Forte wire, then ballooned w/ a 2.0x12mm Emerge  balloon at 6-12   nikki inflations. There was improved flow into distal diagonal branches, but   no flow into apical vessel, suggesting another , however wire appeared   to be not luminal, so we chose to stop, since apical vessel receives   collateral flow from RCA AM. Final angiography showed no dissection or   perforation and a OLGA 3 flow.    Closure:   Manual             Discharge Medications   Current Discharge Medication List      START taking these medications    Details   !! apixaban ANTICOAGULANT (ELIQUIS) 2.5 MG tablet Take 1 tablet (2.5 mg) by mouth every morning  Qty: 30 tablet, Refills: 0    Associated Diagnoses: Mural thrombus of heart      !! apixaban ANTICOAGULANT (ELIQUIS) 5 MG tablet Take 1 tablet (5 mg) by mouth every evening  Qty: 30 tablet, Refills: 0    Associated Diagnoses: Mural thrombus of heart      atorvastatin (LIPITOR) 80 MG tablet Take 1 tablet (80 mg) by mouth daily  Qty: 90 tablet, Refills: 3    Associated Diagnoses: ST elevation myocardial infarction (STEMI), unspecified artery (H)      furosemide (LASIX) 40 MG tablet Take 1 tablet (40 mg) by mouth daily  Qty: 30 tablet, Refills: 0    Associated Diagnoses: Ischemic cardiomyopathy; Mural thrombus of heart; ELIZABETH (acute kidney injury) (H); ACS (acute coronary syndrome) (H); Status post coronary angiogram       !! - Potential duplicate medications found. Please discuss with provider.      CONTINUE these medications which have CHANGED    Details   aspirin (ASA) 81 MG EC tablet Take 1 tablet (81 mg) by mouth daily Start tomorrow.  Qty: 30 tablet, Refills: 3    Associated Diagnoses: ST elevation myocardial infarction (STEMI), unspecified artery (H)      clopidogrel (PLAVIX) 75 MG tablet Take 1 tablet (75 mg) by mouth daily Dose to start tomorrow.  Qty: 90 tablet, Refills: 3    Associated Diagnoses: ST elevation myocardial infarction (STEMI), unspecified artery (H)      metoprolol succinate ER (TOPROL XL) 25 MG 24 hr tablet Take 1 tablet (25  mg) by mouth 2 times daily  Qty: 60 tablet, Refills: 0    Associated Diagnoses: Ischemic cardiomyopathy; Mural thrombus of heart; ELIZABETH (acute kidney injury) (H); ACS (acute coronary syndrome) (H); Status post coronary angiogram      spironolactone (ALDACTONE) 25 MG tablet Take 0.5 tablets (12.5 mg) by mouth daily  Qty: 30 tablet, Refills: 0    Associated Diagnoses: Heart failure with reduced ejection fraction (H)         CONTINUE these medications which have NOT CHANGED    Details   insulin lispro (HUMALOG KWIKPEN) 100 UNIT/ML (1 unit dial) KWIKPEN Inject 8-10 Units Subcutaneous 3 times daily (before meals)  Qty: 30 mL, Refills: 0    Associated Diagnoses: Type 2 diabetes mellitus with microalbuminuria, with long-term current use of insulin (H)      isosorbide mononitrate (ISMO/MONOKET) 20 MG tablet Take 1 tablet (20 mg) by mouth 2 times daily  Qty: 30 tablet, Refills: 0    Associated Diagnoses: ST elevation myocardial infarction (STEMI), unspecified artery (H)      LANTUS SOLOSTAR 100 UNIT/ML soln Inject 24 Units Subcutaneous At Bedtime INJECT 24 UNITS UNDER THE SKIN AT BEDTIME  Qty: 9 mL, Refills: 0    Comments: If Lantus is not covered by insurance, may substitute Basaglar or Semglee or other insulin glargine product per insurance preference at same dose and frequency.    Associated Diagnoses: Type 2 diabetes mellitus with microalbuminuria, with long-term current use of insulin (H)      nitroGLYcerin (NITROSTAT) 0.4 MG sublingual tablet For chest pain place 1 tablet under the tongue every 5 minutes for 3 doses. If symptoms persist 5 minutes after 1st dose call 911.  Qty: 25 tablet, Refills: 0    Associated Diagnoses: Coronary artery disease of native artery with stable angina pectoris, unspecified whether native or transplanted heart (H)      blood glucose (ACCU-CHEK GUIDE) test strip Use to test blood sugar 3 times daily or as directed.  Qty: 100 strip, Refills: 11    Associated Diagnoses: Type 2 diabetes mellitus  with microalbuminuria, with long-term current use of insulin (H)      Continuous Blood Gluc Sensor (FREESTYLE KATIE 14 DAY SENSOR) MISC 1 Device every 14 days Change sensor as directed every 14 days  Qty: 2 each, Refills: 11    Associated Diagnoses: Type 2 diabetes mellitus with microalbuminuria, with long-term current use of insulin (H)      famotidine (PEPCID) 20 MG tablet Take 1 tablet (20 mg) by mouth daily  Qty: 30 tablet, Refills: 0    Associated Diagnoses: Gastroesophageal reflux disease without esophagitis         STOP taking these medications       bumetanide (BUMEX) 2 MG tablet Comments:   Reason for Stopping:         rosuvastatin (CRESTOR) 40 MG tablet Comments:   Reason for Stopping:             Allergies   Allergies   Allergen Reactions     Januvia [Sitagliptin] Unknown     HEADACHE     Trulicity [Dulaglutide] Dizziness     Weak and muscle weak

## 2022-05-09 NOTE — PROGRESS NOTES
Clinic Care Coordination Contact  [facility]: Post-Discharge Note  SITUATION                                                      Admission:    Admission Date: 05/04/22   Reason for Admission: chest pain, shortness of breath  Discharge:   Discharge Date: 05/07/22  Discharge Diagnosis: multivessel CAD, acute on chronic CHF, ischemic cardiomyopathy    BACKGROUND                                                      Per hospital discharge summary and inpatient provider notes:  Av Fernando is a 61 year old male with history of multivessel CAD, prior STEMI who declined CABG, ischemic cardiomyopathy, chronic systolic CHF with LV thrombus, type 2 diabetes and chronic anemia admitted on 5/4/2022 acute on chronic systolic CHF.      Echocardiogram 2/21/2026 showed regional wall motion abnormalities with LVEF of 30% without LV thrombus.  He received IV furosemide as recommended by cardiologist and subsequently transitioned to p.o. furosemide.  Cardiologist also adjusted CHF regimen. C 5/6/2022 revealed severe 99% LAD occlusion for which percutaneous transluminal angioplasty was performed, mid left circumflex 100% occlusion and a 95% RCA occlusion.  Cardiologist recommended antiplatelet therapy, aggressive risk factor modification with plan to pursue staged PCI for RCA occlusion and consider VAD/transplant candidacy assessment.      Renal function improved with diuretic therapy.  Symptoms have improved and patient is eager to go home today.       ASSESSMENT      Enrollment  Primary Care Care Coordination Status: Potential    Discharge Assessment  How are you doing now that you are home?: I not doing well. I continue to have shortness of breath and some chest pain.  How are your symptoms? (Red Flag symptoms escalate to triage hotline per guidelines): Unchanged  Do you feel your condition is stable enough to be safe at home until your provider visit?: No (see comment) (Patient said he was going to back to the  [facility].)  Does the patient have their discharge instructions? : Yes  Does the patient have questions regarding their discharge instructions? : No  Were you started on any new medications or were there changes to any of your previous medications? : Yes  Does the patient have all of their medications?: Yes  Do you have questions regarding any of your medications? : Yes (see comment) (Patient unclear about medications.)  Do you have all of your needed medical supplies or equipment (DME)?  (i.e. oxygen tank, CPAP, cane, etc.): Yes  Discharge follow-up appointment scheduled within 14 calendar days? : Yes  Discharge Follow Up Appointment Date: 05/16/22  Discharge Follow Up Appointment Scheduled with?: Primary Care Provider         Post-op (Clinicians Only)  Did the patient have surgery or a procedure: Yes  Incision: closed  Drainage: No  Bleeding: none  Fever: No  Chills: No  Redness: No  Warmth: No  Swelling: No  Incision site pain: No  Closure: other  Eating & Drinking:  (eating and drinking little.)  PO Intake: regular diet  Bowel Function: normal  Date of last BM: 05/09/22  Urinary Status: voiding without complaint/concerns    Care Management       Care Mgmt General Assessment      CCC RN spoke with patient today. He stated he was not doing well, he continues to have shortness of breath and chest pain. Upset with the care he received at [facility], so said he would not go there again. He agreed to be evaluated at [facility] ED today. He did not want writer to call 911, he said he would have his wife or son take him there today. Patient did not appear to understand the medications prescribed to him at [facility]. He reported he was just going to take his old medication first until they are gone before he starts any new medications. Writer tried to explain to him the reason for the new medications, but he appeared confused. Patient has a follow up with PCP on 5/1/6/22. Writer added him to her care team to follow  up again after ED visit.                    PLAN                                                      Outpatient Plan:  Home    Future Appointments   Date Time Provider Department Center   5/9/2022 12:00 PM [facility] CCC RN [facility]   5/16/2022  7:50 AM [facility]   5/16/2022 10:20 AM [facility]         For any urgent concerns, please contact our 24 hour nurse triage line: [phone] ([phone])         David J Myhre, RN

## 2022-05-10 NOTE — PROGRESS NOTES
Clinic Care Coordination Contact  [facility]/Voicemail    : Judy ID #: [id number] Agency: Language Line    Clinical Data: Care Coordinator Outreach  Outreach attempted x 2.  Left message on patient's voicemail with call back information and requested return call.  Plan: Care Coordinator will do no further outreaches at this time.    TAVIA Taylor  [phone]  [facility]       [facility]  Advanced Endoscopy Service  Consult Note    Patient Name: Karoline Justice  MRN: [id number]  Admission Date: 5/5/2022  Hospital Length of Stay: 5 days  Code Status: Full Code   Attending Provider: Ansley Simeon MD   Consulting Provider: Neto Smith MD  Primary Care Physician: Dee Dee Oconnor MD  Principal Problem:Bladder mass    Inpatient consult to Advanced Endoscopy Service (AES)  Consult performed by: Neto Smith MD  Consult ordered by: Chuy Min DO        Subjective:     HPI: Karoline Justice is a 76 y.o. female with history of DM who presented to [facility] with 2 weeks of weakness, back pain and fevers found to have a UTI and a bladder mass with hydronephrosis (pending path) s/p cystoscopy with TURBT, R ureteral stent placement and L nephrostomy tube today. AES is consulted for choledocholithiasis with moderate intra/extrahepatic biliary dilatation.     Patient denies abdominal pain, nausea, vomiting. She has not had issues related to gallstones in the past. No blood thinners.    Tmax 100.3 on 5/6. WBC in the 20Ks. Tbili, AlkPhos, AST and ALT normal. On ceftriaxone for Strep viridans UTI.  CT a/p non-contrast 5/5 with multiple gallbladder stones, moderate intra/extrahepatic biliary dilatation, 4mm stone in common duct in addition to renal / bladder findings.      Past Medical History:   Diagnosis Date    Diabetes mellitus     Pneumonia due to COVID-19 virus 8/6/2021       Past Surgical History:   Procedure Laterality Date    CYSTOSCOPY N/A 5/9/2022    Procedure: CYSTOSCOPY;  Surgeon: Adrianna Gutierrez MD;  Location: [address];  Service: Urology;  Laterality: N/A;    RETROGRADE PYELOGRAPHY Right 5/9/2022    Procedure: PYELOGRAM, RETROGRADE;  Surgeon: Adrianna Gutierrez MD;  Location: [facility] OR [address] [place] [address];  Service: Urology;  Laterality: Right;       Family History   Problem Relation Age of Onset    Diabetes Mother     Stomach cancer Mother     Heart attack  Father     Diabetes Daughter     Thyroid disease Daughter     Diabetes Son     Kidney cancer Son        Social History     Socioeconomic History    Marital status:    Tobacco Use    Smoking status: Never Smoker    Smokeless tobacco: Never Used   Substance and Sexual Activity    Alcohol use: No    Drug use: No       No current facility-administered medications on file prior to encounter.     Current Outpatient Medications on File Prior to Encounter   Medication Sig Dispense Refill    acetaminophen (TYLENOL) 500 MG tablet Take 500 mg by mouth every 6 (six) hours as needed for Pain.      calcium carbonate (TUMS) 200 mg calcium (500 mg) chewable tablet Take 1 tablet by mouth 2 (two) times daily with meals.      LEVEMIR FLEXTOUCH U-100 INSULN 100 unit/mL (3 mL) InPn pen Inject 24 Units into the skin every morning.         Review of patient's allergies indicates:  No Known Allergies    Review of Systems:   Constitutional: + fevers  Eyes: no visual changes   ENT: no sore throat or dysphagia  Respiratory: no cough or shortness of breath   Cardiovascular: no chest pain or palpitations   Gastrointestinal: as per HPI  Hematologic/Lymphatic: no easy bruising or lymphadenopathy   Musculoskeletal: no arthralgias or myalgias   Neurological: no change in mental status  Behavioral/Psych: no change in mood    Objective:     Vitals:    05/10/22 0731   BP: 136/60   Pulse: 70   Resp: 18   Temp: 97.5 °F (36.4 °C)       General: Alert and Oriented, no distress  HEENT: Normocephalic, Atraumatic. No scleral icterus.  Resp:  Effort normal  Cardiac: RRR  Abdomen: Normoactive bowel sounds. Non-distended. Soft. Non-tender. No peritoneal signs.  Extremities: No peripheral edema.   Neurologic: No gross neurological Deficits  Psych: Calm, cooperative. Normal mood and affect.    Significant Labs:  Recent Labs   Lab 05/08/22  0412 05/09/22  0804 05/10/22  0352   HGB 7.0* 7.3* 6.9*       Lab Results   Component Value Date    WBC  21.19 (H) 05/10/2022    HGB 6.9 (L) 05/10/2022    HCT 21.7 (L) 05/10/2022    MCV 83 05/10/2022     05/10/2022       Lab Results   Component Value Date     (L) 05/10/2022    K 3.8 05/10/2022     05/10/2022    CO2 19 (L) 05/10/2022    BUN 31 (H) 05/10/2022    CREATININE 2.0 (H) 05/10/2022    CALCIUM 7.8 (L) 05/10/2022    ANIONGAP 11 05/10/2022    ESTGFRAFRICA 27.4 (A) 05/10/2022    EGFRNONAA 23.7 (A) 05/10/2022       Lab Results   Component Value Date    ALT 13 05/10/2022    AST 11 05/10/2022    ALKPHOS 135 05/10/2022    BILITOT 0.3 05/10/2022       Lab Results   Component Value Date    INR 1.2 05/05/2022       Significant Imaging:  Reviewed pertinent radiology findings.       Assessment/Plan:     Karoline Justice is a 76 y.o. female with history of DM who presented to [facility] with 2 weeks of weakness, back pain and fevers found to have a UTI and a bladder mass with hydronephrosis (pending path) s/p cystoscopy with TURBT, R ureteral stent placement and L nephrostomy tube today. AES is consulted for choledocholithiasis with moderate intra/extrahepatic biliary dilatation.     Problem List:  1. Choledocholithiasis    Normal LFTs. No evidence of cholangitis. ERCP is non-urgent. Patient would also like to wait for ERCP as she is asymptomatic from standpoint of gallstones.    Plan:  - outpatient ERCP      Thank you for involving us in the care of Karoline Justice. Please call with any additional questions, concerns or changes in the patient's clinical status.    Neto Smith MD  Gastroenterology Fellow PGY VI   [facility]

## 2022-05-11 NOTE — PROGRESS NOTES
Clinic Care Coordination Contact  [facility] RN spoke with patient today to follow up on recent concerns after hospital discharged on 5/7/22. He reported he did not go to [facility] on Monday as he said he did not think there was anything that could be done for him. He stated his breathing has gotten better. Patient reported he does have some edema in his legs, however. He reported he is taking his medications every day. Patient reported his son sets up his medications and he is taking his medications daily. Patient said he would go to the ED if he his breathing gets worse. Patient does have a follow up with PCP on 5/16/22 and plans to attend this appointment. Declined enrollment in [facility] as he said he feels he has enough support at home.  He is aware he can be referred back to [facility] by his PCP if needs or concerns should arise.

## 2022-05-12 NOTE — TELEPHONE ENCOUNTER
I spoke with Av, he has been working with his case workers who are supposed to be helping work on his insurance issue.  Until he gets a part D program or discusses with medical assistance to cover his testing supplies, we will not be able to provide any additional assistance.  This is a very complicated insurance situation..  He will be following up with PCP early next week.    -------------------------------------------------------   Contact   Chart Review      Situation: Patient chart reviewed by .     Background: patient working with [facility] and is having trouble getting his diabetic supplies due to pharmacy saying he had Medicare insurance. Patient says that he only has MA.  [facility] has left messages for [facility] workers and has not gotten a call back.      Assessment: Called main number at [facility] and she will send emergency email to his two providers asking them to contact writer. Was transferred to [facility]'s  [phone] Laly Briones      Plan/Recommendations: work with [facility] workers to find resolution to his insurance problems.  Will reach out to [facility] if no call back in one week.     VM from [facility].  As patient only has Medicare Part A and Medical Assistance, things are complicated.  This means that anything that should be covered by Medicare part A or D is not covered by MA.  He should call Senior linkage Line and get enrolled in Part D plan.       If he is having trouble getting things covered under Medicare Part B, like test strips, lancets, he needs to contact the MA recipient help desk as those should be covered by his MA.  She is happy to explain more if there are other questions- [phone].       Will route to [facility] for action.       Plan - no further outreach scheduled.      Nickie Vallecillo,   [facility]  [phone]

## 2022-05-16 PROBLEM — N18.30 CKD (CHRONIC KIDNEY DISEASE) STAGE 3, GFR 30-59 ML/MIN (H): Status: RESOLVED | Noted: 2022-01-01 | Resolved: 2022-01-01

## 2022-05-16 PROBLEM — N17.9 AKI (ACUTE KIDNEY INJURY) (H): Status: RESOLVED | Noted: 2022-01-01 | Resolved: 2022-01-01

## 2022-05-16 NOTE — ASSESSMENT & PLAN NOTE
May 4   May 6 th Stent     Atorvastatin 80 mg HS   Plavix 75   ASA 81 mg   Eliquis 2.5 / 5     Furosemide 40 mg   Isosorbide 20 mg BID   Metoprolol 25 mg BID   Spironolactone 12.5 mg Daily       Discharge Disposition      Discharged to home  Condition at discharge: Stable              Hospital Course     Av Fernando is a 61 year old male with history of multivessel CAD, prior STEMI who declined CABG, ischemic cardiomyopathy, chronic systolic CHF with LV thrombus, type 2 diabetes and chronic anemia admitted on 5/4/2022 acute on chronic systolic CHF.      Echocardiogram 2/21/2026 showed regional wall motion abnormalities with LVEF of 30% without LV thrombus.  He received IV furosemide as recommended by cardiologist and subsequently transitioned to p.o. furosemide.  Cardiologist also adjusted CHF regimen. LHC 5/6/2022 revealed severe 99% LAD occlusion for which percutaneous transluminal angioplasty was performed, mid left circumflex 100% occlusion and a 95% RCA occlusion.  Cardiologist recommended antiplatelet therapy, aggressive risk factor modification with plan to pursue staged PCI for RCA occlusion and consider VAD/transplant candidacy assessment.      Renal function improved with diuretic therapy.  Symptoms have improved and patient is eager to go home today

## 2022-05-16 NOTE — PROGRESS NOTES
Hospital follow-up      ASSESSMENT & PLAN    Coronary artery disease of native artery with stable angina pectoris (H)  May 4   May 6 th Stent     Atorvastatin 80 mg HS   Plavix 75   ASA 81 mg   Eliquis 2.5 / 5     Furosemide 40 mg   Isosorbide 20 mg BID   Metoprolol 25 mg BID   Spironolactone 12.5 mg Daily       Discharge Disposition      Discharged to home  Condition at discharge: Stable              Hospital Course     Av Fernando is a 61 year old male with history of multivessel CAD, prior STEMI who declined CABG, ischemic cardiomyopathy, chronic systolic CHF with LV thrombus, type 2 diabetes and chronic anemia admitted on 5/4/2022 acute on chronic systolic CHF.      Echocardiogram 2/21/2026 showed regional wall motion abnormalities with LVEF of 30% without LV thrombus.  He received IV furosemide as recommended by cardiologist and subsequently transitioned to p.o. furosemide.  Cardiologist also adjusted CHF regimen. LHC 5/6/2022 revealed severe 99% LAD occlusion for which percutaneous transluminal angioplasty was performed, mid left circumflex 100% occlusion and a 95% RCA occlusion.  Cardiologist recommended antiplatelet therapy, aggressive risk factor modification with plan to pursue staged PCI for RCA occlusion and consider VAD/transplant candidacy assessment.      Renal function improved with diuretic therapy.  Symptoms have improved and patient is eager to go home today      Av was seen today for hospital f/u.    Diagnoses and all orders for this visit:    Screening for HIV (human immunodeficiency virus)    Screen for colon cancer    CKD (chronic kidney disease) stage 3, GFR 30-59 ml/min (H)    Type 2 diabetes mellitus with microalbuminuria, with long-term current use of insulin (H)    Coronary artery disease of native artery of native heart with stable angina pectoris (H)        Patient Instructions   Thank you for coming in Av    I am sorry it was such a troubling hospitalization    It looks like   Alfredo plans on doing a staged stenting procedure of your arteries    As you showed me the pictures it looks like he intends to go after the right coronary artery and perhaps even the left circumflex artery, this was 100% blocked but sometimes they can open this up and place a stent    Your medications are there to help support your heart    We reviewed these today    Aspirin 81 mg to thin your blood once daily    Atorvastatin 80 mg at bedtime to lower your cholesterol and help stabilize plaque buildup and even reduce it    Clopidogrel/Plavix 75 mg 1 daily as a blood thinner    Furosemide 40 mg 1 daily as a water pill to help take the fluid off of the lungs and lower extremities    Metoprolol ER 25 mg 1 twice daily to help slow and stabilize your heart function    Spironolactone 25 mg 1/2 tablet daily or 12.5 mg daily as a water pill    Isosorbide mononitrate 20 mg 1 tablet twice daily to help relax the arteries and help blood flow    It looks like your Eliquis has been discontinued by have asked Dr Fuentes to let me know if this should be continued or discontinued      Continue to stay moderately active    Let us know if there is any change in the chest pain I am glad to hear there is none at rest            Return in about 1 week (around 5/23/2022).       PATIENT HEALTH QUESTIONNAIRE-9 (PHQ - 9)    Over the last 2 weeks, how often have you been bothered by any of the following problems?    1. Little interest or pleasure in doing things -      2. Feeling down, depressed, or hopeless -      3. Trouble falling or staying asleep, or sleeping too much -     4. Feeling tired or having little energy -      5. Poor appetite or overeating -      6. Feeling bad about yourself - or that you are a failure or have let yourself or your family down -      7. Trouble concentrating on things, such as reading the newspaper or watching television -     8. Moving or speaking so slowly that other people could have noticed? Or  the opposite - being so fidgety or restless that you have been moving around a lot more than usual     9. Thoughts that you would be better off dead or of hurting  yourself in some way     Total Score:       If you checked off any problems, how difficult have these problems made it for you to do your work, take care of things at home, or get along with other people?      Developed by Tahir Lock, Laura Dela Cruz, Rufino Valerio and colleagues, with an educational demetrio from [employer] No permission required to reproduce, translate, display or distribute. permission required to reproduce, translate, display or distribute.    CHIEF COMPLAINT: Av Fernando had concerns including Hospital F/U ([facility]; 5/4-5/7; Multivessel CAD; Prior STEMI/).    [place]: 1.............. SUBJECTIVE:  Av Fernando is a 62 year old male had concerns including Hospital F/U ([facility]; 5/4-5/7; Multivessel CAD; Prior STEMI/).    1. Screening for HIV (human immunodeficiency virus)    2. Screen for colon cancer    3. CKD (chronic kidney disease) stage 3, GFR 30-59 ml/min (H)    4. Type 2 diabetes mellitus with microalbuminuria, with long-term current use of insulin (H)    5. Coronary artery disease of native artery of native heart with stable angina pectoris (H)          Allergies   Allergen Reactions     Januvia [Sitagliptin] Unknown     HEADACHE     Trulicity [Dulaglutide] Dizziness     Weak and muscle weak                         SOCIAL: He  reports that he has never smoked. He has never used smokeless tobacco. He reports that he does not drink alcohol and does not use drugs.    REVIEW OF SYSTEMS:   Family history not pertinent to chief complaint or presenting problem    Review of systems otherwise negative as requested from patient, except   Those positive ROS outlined and discussed in [place].      VITALS:  Vitals:    05/16/22 1015   BP: 104/70   BP Location: Left arm   Patient Position: Sitting   Cuff Size: Adult Regular    Pulse: 80   SpO2: 98%   Weight: 53.7 kg (118 lb 4.8 oz)     Wt Readings from Last 3 Encounters:   05/16/22 53.7 kg (118 lb 4.8 oz)   05/06/22 54.8 kg (120 lb 14.4 oz)   04/27/22 51.7 kg (114 lb)     Body mass index is 21.64 kg/m .    Physical Exam:  Lungs no crackles  Cardiac S1-S2 regular sinus  I do not appreciate a gallop  No jugular venous distention  Trace edema lower extremities he does have an absent right dorsalis pedis pulse with a strong left dorsalis pedis pulse  Calves are supple    Recent Results (from the past 240 hour(s))   Activated clotting time celite, POCT    Collection Time: 05/06/22 11:49 AM   Result Value Ref Range    Activated Clotting Time (Celite) POCT 156 (H) 74 - 150 seconds   Activated clotting time celite, POCT    Collection Time: 05/06/22 12:02 PM   Result Value Ref Range    Activated Clotting Time (Celite) POCT 220 (H) 74 - 150 seconds   Activated clotting time celite, POCT    Collection Time: 05/06/22 12:25 PM   Result Value Ref Range    Activated Clotting Time (Celite) POCT 245 (H) 74 - 150 seconds   ECG 12-lead, with Muse    Collection Time: 05/06/22  1:17 PM   Result Value Ref Range    Systolic Blood Pressure  mmHg    Diastolic Blood Pressure  mmHg    Ventricular Rate 54 BPM    Atrial Rate 54 BPM    ND Interval 188 ms    QRS Duration 88 ms     ms    QTc 455 ms    P Axis 38 degrees    R AXIS 14 degrees    T Axis 81 degrees    Interpretation ECG       Sinus bradycardia  Possible Left atrial enlargement  Low voltage QRS  Borderline ECG  When compared with ECG of 04-MAY-2022 12:21,  Vent. rate has decreased BY  30 BPM  Nonspecific T wave abnormality no longer evident in Inferior leads  Nonspecific T wave abnormality, worse in Lateral leads  QT has shortened  Confirmed by WILLIAM BRAXTON MD LOC:JN ([id number]) on 5/6/2022 2:50:40 PM     Activated clotting time celite, POCT    Collection Time: 05/06/22  1:58 PM   Result Value Ref Range    Activated Clotting Time (Celite) POCT 220 (H) 74 -  150 seconds   ECG 12-LEAD WITH MUSE (LHE)    Collection Time: 05/06/22  2:24 PM   Result Value Ref Range    Systolic Blood Pressure  mmHg    Diastolic Blood Pressure  mmHg    Ventricular Rate 60 BPM    Atrial Rate 60 BPM    KY Interval 188 ms    QRS Duration 92 ms     ms    QTc 466 ms    P Axis 44 degrees    R AXIS 14 degrees    T Axis 52 degrees    Interpretation ECG       Sinus rhythm  Normal ECG  When compared with ECG of 06-MAY-2022 13:17,  No significant change was found  Confirmed by WILLIAM BRAXTON MD LOC: ([id number]) on 5/6/2022 2:50:51 PM     Activated clotting time celite, POCT    Collection Time: 05/06/22  3:25 PM   Result Value Ref Range    Activated Clotting Time (Celite) POCT 186 (H) 74 - 150 seconds   Glucose by meter    Collection Time: 05/06/22  4:18 PM   Result Value Ref Range    GLUCOSE BY METER POCT 175 (H) 70 - 99 mg/dL   Activated clotting time celite, POCT    Collection Time: 05/06/22  4:18 PM   Result Value Ref Range    Activated Clotting Time (Celite) POCT 177 (H) 74 - 150 seconds   Glucose by meter    Collection Time: 05/06/22  5:42 PM   Result Value Ref Range    GLUCOSE BY METER POCT 163 (H) 70 - 99 mg/dL   Magnesium    Collection Time: 05/06/22  6:01 PM   Result Value Ref Range    Magnesium 2.1 1.8 - 2.6 mg/dL   Glucose by meter    Collection Time: 05/06/22  9:30 PM   Result Value Ref Range    GLUCOSE BY METER POCT 227 (H) 70 - 99 mg/dL   Glucose by meter    Collection Time: 05/07/22  3:53 AM   Result Value Ref Range    GLUCOSE BY METER POCT 63 (L) 70 - 99 mg/dL   Glucose by meter    Collection Time: 05/07/22  4:15 AM   Result Value Ref Range    GLUCOSE BY METER POCT 160 (H) 70 - 99 mg/dL   CBC with platelets    Collection Time: 05/07/22  5:07 AM   Result Value Ref Range    WBC Count 4.9 4.0 - 11.0 10e3/uL    RBC Count 3.50 (L) 4.40 - 5.90 10e6/uL    Hemoglobin 11.3 (L) 13.3 - 17.7 g/dL    Hematocrit 34.4 (L) 40.0 - 53.0 %    MCV 98 78 - 100 fL    MCH 32.3 26.5 - 33.0 pg    MCHC 32.8 31.5  - 36.5 g/dL    RDW 14.1 10.0 - 15.0 %    Platelet Count 135 (L) 150 - 450 10e3/uL   Partial thromboplastin time    Collection Time: 05/07/22  5:07 AM   Result Value Ref Range    aPTT 30 22 - 38 Seconds   Basic metabolic panel    Collection Time: 05/07/22  5:07 AM   Result Value Ref Range    Sodium 138 136 - 145 mmol/L    Potassium 3.5 3.5 - 5.0 mmol/L    Chloride 107 98 - 107 mmol/L    Carbon Dioxide (CO2) 22 22 - 31 mmol/L    Anion Gap 9 5 - 18 mmol/L    Urea Nitrogen 16 8 - 22 mg/dL    Creatinine 1.18 0.70 - 1.30 mg/dL    Calcium 8.5 8.5 - 10.5 mg/dL    Glucose 152 (H) 70 - 125 mg/dL    GFR Estimate 70 >60 mL/min/1.73m2   Glucose by meter    Collection Time: 05/07/22  8:03 AM   Result Value Ref Range    GLUCOSE BY METER POCT 123 (H) 70 - 99 mg/dL   ECG 12-lead, with Muse    Collection Time: 05/07/22  9:39 AM   Result Value Ref Range    Systolic Blood Pressure  mmHg    Diastolic Blood Pressure  mmHg    Ventricular Rate 83 BPM    Atrial Rate 83 BPM    ME Interval 172 ms    QRS Duration 94 ms     ms    QTc 481 ms    P Axis 54 degrees    R AXIS 23 degrees    T Axis -26 degrees    Interpretation ECG       Sinus rhythm with occasional Premature ventricular complexes  Possible Left atrial enlargement  Low voltage QRS  Nonspecific ST and T wave abnormality  Prolonged QT  Abnormal ECG  When compared with ECG of 06-MAY-2022 14:24,  Premature ventricular complexes are now Present  Nonspecific T wave abnormality now evident in Inferior leads  Nonspecific T wave abnormality now evident in Lateral leads  Confirmed by AV RAHMAN MD LOC:WW ([id number]) on 5/7/2022 1:28:26 PM            I spent 30  minutes with this patient.  This includes pre-visit, intra-visit and post visit work an evaluation with regards to Av was seen today for hospital f/u.    Diagnoses and all orders for this visit:    Screening for HIV (human immunodeficiency virus)    Screen for colon cancer    CKD (chronic kidney disease) stage 3, GFR 30-59  ml/min (H)    Type 2 diabetes mellitus with microalbuminuria, with long-term current use of insulin (H)    Coronary artery disease of native artery of native heart with stable angina pectoris (H)        Garcia Kaufman MD  [facility] [id number] [phone]

## 2022-05-16 NOTE — PATIENT INSTRUCTIONS
Thank you for coming in Av    I am sorry it was such a troubling hospitalization    It looks like Dr Fuentes plans on doing a staged stenting procedure of your arteries    As you showed me the pictures it looks like he intends to go after the right coronary artery and perhaps even the left circumflex artery, this was 100% blocked but sometimes they can open this up and place a stent    Your medications are there to help support your heart    We reviewed these today    Aspirin 81 mg to thin your blood once daily    Atorvastatin 80 mg at bedtime to lower your cholesterol and help stabilize plaque buildup and even reduce it    Clopidogrel/Plavix 75 mg 1 daily as a blood thinner    Furosemide 40 mg 1 daily as a water pill to help take the fluid off of the lungs and lower extremities    Metoprolol ER 25 mg 1 twice daily to help slow and stabilize your heart function    Spironolactone 25 mg 1/2 tablet daily or 12.5 mg daily as a water pill    Isosorbide mononitrate 20 mg 1 tablet twice daily to help relax the arteries and help blood flow    It looks like your Eliquis has been discontinued by have asked Dr Fuentes to let me know if this should be continued or discontinued      Continue to stay moderately active    Let us know if there is any change in the chest pain I am glad to hear there is none at rest    It was good to see your kidney function is stabilized    I will asked Dr Fuentes to contact you about a staged angioplasty  Whether to continue or discontinue Eliquis    DanL

## 2022-05-17 NOTE — TELEPHONE ENCOUNTER
"Phone call to patient to follow-up on angiogram completed 5/6/22 by Dr. Fuentes. Dr. Mccallum cath reports states \"he can follow up with me in 2-4 weeks, and we can decide whether to pursue PCI to RCA next, depending on symptoms, as both Lcx and dRCA are potentially feasible for PCI, but don't appear to have viability.\" He states he saw his PCP yesterday and would like to get scheduled for the procedure ASAP. Informed patient that Dr. Mccallum would like to see him in clinic beforehand to go over procedure expectations and determine if Av would like to move forward. Patient states he does not want to wait until June 9th (Dr. Fuentes next available in clinic appt) to discuss procedure and wants to be scheduled directly for procedure. Reiterated once again that it was Dr. Fuentes's hope to see patient in clinic beforehand, but will route to him to determine if appropriate to schedule directly. Patient verbalized understanding, no further questions or concerns at this time. [facility]     ----- Message from ASHANTI Mccloud CNP sent at 5/17/2022 10:43 AM CDT -----  Julian Mckenna,  Patient was no show today.  Since patient had no intervention and already plan to schedule him with Dr. Fuentes in [facility], he doesn't need to reschedule with me unless he prefers to. I am happy to see him.   Thank you!  CY        "

## 2022-05-18 NOTE — TELEPHONE ENCOUNTER
Patient LV thrombus occurred 1/14/2022. Since it has been over three months since thrombus and patient had been on anticoagulation during that duration, per Dr. Fuentes ok for patient to discontinue eliquis, but remain on DAPT. Will discontinue from patient med list. [facility]    ----- Message -----  From: Sherif Fuentes MD  Sent: 5/18/2022   8:45 AM CDT  To: Clarisa Anne, RN    I think it is okay, as long as he had 3 mos of anticoagulation for LV thrombus (I expect he has).    Thx!  Sherif      ----- Message -----  From: Clarisa Anne, RIO  Sent: 5/18/2022   8:42 AM CDT  To: Sherif Fuentes MD    ----- Message from Clarisa Anne RN sent at 5/18/2022  8:42 AM CDT -----  Dr. Fuentes,     Can you please clarify? Patient is already on DAPT and has reported no longer taking eliquis. Is this ok?

## 2022-05-18 NOTE — TELEPHONE ENCOUNTER
Phone call to patient with assistance of Oscar  with recommendations per Dr. Fuentes. Patient to meet with Dr. Fuentes in clinic to discuss possible intervention, informed him we are unable to schedule him directly for procedure. Patient verbalized understanding, call transferred to scheduling to arrange for appt date with Dr. Fuentes. No further questions at this time. Patient now scheduled for in clinic visit with Dr. Fuentes on June 9th. [facility]     ----- Message -----  From: Sherif Fuentes MD  Sent: 5/18/2022   8:18 AM CDT  To: RIO Triana,    Okay to be off apixaban and go to DAPT after 3 mos.  Also, I agree, I think we should meet in clinic.    Thx!  Sherif  ----- Message -----  From: Clarisa Anne RN  Sent: 5/17/2022  11:13 AM CDT  To: Sherif Fuentes MD    ----- Message from Clarisa Anne RN sent at 5/17/2022 11:13 AM CDT -----  Dr. Fuentes,    Patient no showed to their appt with Nova today. I reached out to him and he wants to proceed directly with PCI and skip in-clinic appt with you (next available 6/9). I encouraged him to follow-up with you and he declined multiple times. Ok to schedule for PCI with you directly?    Also, I saw that he reported to his PCP yesterday that his eliquis was discontinued? His discharge summary from 5/7/22 stated for him to start eliquis, but listed both 5 mg and 2.5 mg. If patient is to continue eliquis, which strength? Thank you!    Clarisa REYNOLDS RN

## 2022-06-09 NOTE — PROGRESS NOTES
HEART CARE ENCOUNTER CONSULTATON NOTE      [facility]  [phone]      Assessment/Recommendations   Assessment/Plan: Pt. is a 62 year old old male with CAD s/p STEMI 8/21 when he was referred for but refused CABG evaluation AMA, HFrEF LVEF 30's, previous LV thrombus, HL, DM who presents today for f/u of CAD.     # CAD- known multi-vessel severe disease, was offered CABG but refused last year, then presented for PCI with me but stated that his angina had since resolved, few months later had NSTEMI/ADHF, ultimately seeking an intervention   - anatomic situtation is complicated by the fact that based on the viability assessment, there is a near-transmural inferolateral scar but viability elsewhere  - as LAD territory appeared to have viability, and on recent's angio there was more of a vessel evident, we proceeded w/ PTA to mid-distal vessel - no stent due to still limited outflow. Apical LAD now gets collateral flow from AM of RCA  - as is angina has improved, and likelihood of LVEF improvement is very low, although both Lcx and dRCA are potentially feasible for PCI, but don't appear to have viability, I am hesitant to offer many more high risk PCI's at this point  - instead, we will re-check an echo and re-present the case in the cath conference, pursue advanced HF options work up with  in parallel - consider VAD/transplant candidacy assessment   - in the meantime, continue ASA/clopidogrel/metop rosuva  - continue aggressive risk factor modification           History of Present Illness/Subjective    HPI: Av Fernando is a 62 year old male w/ CAD s/p STEMI 8/21 when he was referred for but refused CABG evaluation AMA, HFrEF LVEF 30's, previous LV thrombus, HL, DM who presents today for f/u of CAD.    Since our PCI to LAD he has had no more CP. Had at least one more admission for ADHF, but the CP is much improved. Breathing is better also, when he isn't in ADHF. HE presents today to  discuss potential utility of further revascularization.    Recent Echocardiogram Results:  There is no thrombus seen in the left ventricle.  The left ventricle is normal in size with normal left ventricular wall  thickness.  The visual ejection fraction is estimated at 30%.  There is hypokinesis of the entire inferior wall, mid to apical anterolateral  wall, and apical septum. There is akinesis of the basal and mid posterior  wall.  Normal right ventricle size and systolic function.  No hemodynamically significant valve disease is identified.     Compared to the prior study of 1/14/22, there has been an interval resolution  of the left ventricular apical thrombus.    Recent Coronary Angiogram Results:   - known multi-vessel severe disease, was offered CABG but refused last year, then presented for PCI with me but stated that his angina had since resolved, few months later had NSTEMI/ADHF, ultimately seeking an intervention now  - anatomic situtation is complicated by the fact that based on the viability assessment, there is a near-transmural inferolateral scar but viability elsewhere  - as LAD territory appears to have viability, and on today's angio there was more of a vessel evident, we proceeded w/ PTA to mid-distal vessel - no stent due to still limited outflow. Apical LAD now gets collateral flow from AM of RCA  - he can follow up with me in 2-4 weeks, and we can decide whether to pursue PCI to RCA next, depending on symptoms, as both Lcx and dRCA are potentially feasible for PCI, but don't appear to have viability  - in the meantime, continue ASA/clopidogrel/metop rosuva  - consider VAD/transplant candidacy assessment   - continue aggressive risk factor modification         Physical Examination  Review of Systems   Vitals: BP (!) 86/58 (BP Location: Left arm, Patient Position: Sitting, Cuff Size: Adult Small)   Pulse 80   Resp 16   Wt 50.8 kg (112 lb 1.6 oz)   SpO2 98%   BMI 20.50 kg/m    BMI= Body mass  index is 20.5 kg/m .  Wt Readings from Last 3 Encounters:   06/09/22 50.8 kg (112 lb 1.6 oz)   05/16/22 53.7 kg (118 lb 4.8 oz)   05/06/22 54.8 kg (120 lb 14.4 oz)       General Appearance:   no distress, normal body habitus   ENT/Mouth: membranes moist, no oral lesions or bleeding gums.      EYES:  no scleral icterus, normal conjunctivae   Neck: no carotid bruits or thyromegaly   Chest/Lungs:   lungs are clear to auscultation, no rales or wheezing, no sternal scar, equal chest wall expansion    Cardiovascular:   Regular. Normal first and second heart sounds with no murmurs, rubs, or gallops; the carotid, radial and posterior tibial pulses are intact, Jugular venous pressure 7, no edema bilaterally    Abdomen:  no organomegaly, masses, bruits, or tenderness; bowel sounds are present   Extremities: no cyanosis or clubbing   Skin: no xanthelasma, warm.    Neurologic: normal  bilateral, no tremors     Psychiatric: alert and oriented x3, calm        Please refer above for cardiac ROS details.        Medical History  Surgical History Family History Social History   Past Medical History:   Diagnosis Date     Congestive heart failure (H) 08/2021    ischemic CM with LVEF 30-35%     Coronary artery disease 08/2021    STEMI with multivessel CAD on angiography     Diabetes mellitus (H)      Hyperlipidemia      Hypertension      LV (left ventricular) mural thrombus 01/2022     Myocardial infarction (H) 08/2021    inferior STEMI     Past Surgical History:   Procedure Laterality Date     CV CORONARY ANGIOGRAM N/A 8/1/2021    Procedure: Coronary Angiogram;  Surgeon: Deidre Lopes MD;  Location: [facility] CV     CV CORONARY ANGIOGRAM N/A 5/6/2022    Procedure: Coronary Angiogram;  Surgeon: Sherif Fuentes MD;  Location: [facility] CATH [facility] CV     CV LEFT HEART CATH N/A 5/6/2022    Procedure: Left Heart Catheterization;  Surgeon: Sherif Fuentes MD;  Location: [facility] CV     CV LEFT VENTRICULOGRAM N/A  8/1/2021    Procedure: Left Ventriculogram;  Surgeon: Deidre Lopes MD;  Location: [facility] CATH LAB CV     CV PCI N/A 5/6/2022    Procedure: Percutaneous Coronary Intervention;  Surgeon: Sherif Fuentes MD;  Location: [facility] LAB CV     OTHER SURGICAL HISTORY      LEG TRAUMA     No family history on file.     Social History     Socioeconomic History     Marital status:      Spouse name: Not on file     Number of children: Not on file     Years of education: Not on file     Highest education level: Not on file   Occupational History     Not on file   Tobacco Use     Smoking status: Never Smoker     Smokeless tobacco: Never Used   Substance and Sexual Activity     Alcohol use: No     Drug use: No     Sexual activity: Yes     Partners: Female     Birth control/protection: None   Other Topics Concern     Parent/sibling w/ CABG, MI or angioplasty before 65F 55M? Not Asked   Social History Narrative     Not on file     Social Determinants of Health     Financial Resource Strain: Not on file   Food Insecurity: Not on file   Transportation Needs: Not on file   Physical Activity: Not on file   Stress: Not on file   Social Connections: Not on file   Intimate Partner Violence: Not on file   Housing Stability: Not on file           Medications  Allergies   Current Outpatient Medications   Medication Sig Dispense Refill     aspirin (ASA) 81 MG EC tablet Take 1 tablet (81 mg) by mouth daily Start tomorrow. 30 tablet 3     atorvastatin (LIPITOR) 80 MG tablet Take 1 tablet (80 mg) by mouth daily 90 tablet 3     blood glucose (ACCU-CHEK GUIDE) test strip Use to test blood sugar 3 times daily or as directed. 100 strip 11     clopidogrel (PLAVIX) 75 MG tablet Take 1 tablet (75 mg) by mouth daily Dose to start tomorrow. 90 tablet 3     famotidine (PEPCID) 20 MG tablet Take 1 tablet (20 mg) by mouth daily 30 tablet 0     furosemide (LASIX) 40 MG tablet Take 1 tablet (40 mg) by mouth daily 30 tablet 0     insulin  lispro (HUMALOG KWIKPEN) 100 UNIT/ML (1 unit dial) KWIKPEN Inject 8-10 Units Subcutaneous 3 times daily (before meals) 30 mL 0     isosorbide mononitrate (ISMO/MONOKET) 20 MG tablet Take 1 tablet (20 mg) by mouth 2 times daily 30 tablet 0     LANTUS SOLOSTAR 100 UNIT/ML soln Inject 20 Units Subcutaneous At Bedtime 9 mL 0     metoprolol succinate ER (TOPROL XL) 25 MG 24 hr tablet Take 1 tablet (25 mg) by mouth 2 times daily 60 tablet 0     nitroGLYcerin (NITROSTAT) 0.4 MG sublingual tablet For chest pain place 1 tablet under the tongue every 5 minutes for 3 doses. If symptoms persist 5 minutes after 1st dose call 911. 25 tablet 0     spironolactone (ALDACTONE) 25 MG tablet Take 0.5 tablets (12.5 mg) by mouth daily (Patient taking differently: Take 25 mg by mouth daily) 30 tablet 0     apixaban ANTICOAGULANT (ELIQUIS) 2.5 MG tablet Take 2.5 mg by mouth daily       Continuous Blood Gluc Sensor (FREESTYLE KATIE 14 DAY SENSOR) [facility] 1 Device every 14 days Change sensor as directed every 14 days (Patient not taking: Reported on 6/9/2022) 2 each 11       Allergies   Allergen Reactions     Januvia [Sitagliptin] Unknown     HEADACHE     Trulicity [Dulaglutide] Dizziness     Weak and muscle weak          Lab Results    Chemistry/lipid CBC Cardiac Enzymes/BNP/TSH/INR   Recent Labs   Lab Test 05/06/22  0422   CHOL 125   HDL 56   LDL 60   TRIG 43     Recent Labs   Lab Test 05/06/22  0422 08/02/21  0418 05/12/21  0934   LDL 60 109 159*     Recent Labs   Lab Test 05/07/22  0803 05/07/22  0507   NA  --  138   POTASSIUM  --  3.5   CHLORIDE  --  107   CO2  --  22   * 152*   BUN  --  16   CR  --  1.18   GFRESTIMATED  --  70   LISA  --  8.5     Recent Labs   Lab Test 05/07/22  0507 05/06/22  0422 05/05/22  0408   CR 1.18 1.44* 1.48*     Recent Labs   Lab Test 04/05/22  1212 12/10/21  1520 08/02/21  0418   A1C 9.6* 10.4* 10.4*          Recent Labs   Lab Test 05/07/22  0507   WBC 4.9   HGB 11.3*   HCT 34.4*   MCV 98   *      Recent Labs   Lab Test 05/07/22  0507 05/04/22  1705 05/04/22  1234   HGB 11.3* 11.5* 14.4    Recent Labs   Lab Test 05/04/22  2340 05/04/22  1836 05/04/22  1234   TROPONINI 0.71* 0.69* 0.73*     Recent Labs   Lab Test 05/04/22  1234 04/24/22  2212 01/13/22  1731 09/28/21  0852   BNP 4,308* 3,288* 2,494*  --    NTBNP  --   --   --  3,508*     No results for input(s): TSH in the last 36742 hours.  Recent Labs   Lab Test 05/04/22  1234 04/24/22  2212 08/01/21  1637   INR 1.28* 1.24* 0.96        Sherif Fuentes MD

## 2022-06-09 NOTE — PATIENT INSTRUCTIONS
It is great to see you today and learn that after our intervention your chest pain has improved!    As for fixing the other vessels with high risk procedures, since there is no obvious viability in heart muscle in those territories, I would like to repeat an echo, and re-discuss your case in a multi-specialty conference first    In the meantime, let's have you further work up for advanced options with

## 2022-06-09 NOTE — LETTER
6/9/2022    Garcia Kaufman MD  [address]  [place] [address]    RE: Av Fernando       Dear Colleague,     I had the pleasure of seeing Av Fernando in the [facility].    HEART CARE ENCOUNTER CONSULTATON NOTE      [facility]  [phone]      Assessment/Recommendations   Assessment/Plan: Pt. is a 62 year old old male with CAD s/p STEMI 8/21 when he was referred for but refused CABG evaluation AMA, HFrEF LVEF 30's, previous LV thrombus, HL, DM who presents today for f/u of CAD.     # CAD- known multi-vessel severe disease, was offered CABG but refused last year, then presented for PCI with me but stated that his angina had since resolved, few months later had NSTEMI/ADHF, ultimately seeking an intervention   - anatomic situtation is complicated by the fact that based on the viability assessment, there is a near-transmural inferolateral scar but viability elsewhere  - as LAD territory appeared to have viability, and on recent's angio there was more of a vessel evident, we proceeded w/ PTA to mid-distal vessel - no stent due to still limited outflow. Apical LAD now gets collateral flow from AM of RCA  - as is angina has improved, and likelihood of LVEF improvement is very low, although both Lcx and dRCA are potentially feasible for PCI, but don't appear to have viability, I am hesitant to offer many more high risk PCI's at this point  - instead, we will re-check an echo and re-present the case in the cath conference, pursue advanced HF options work up with  in parallel - consider VAD/transplant candidacy assessment   - in the meantime, continue ASA/clopidogrel/metop rosuva  - continue aggressive risk factor modification           History of Present Illness/Subjective    HPI: Av Fernando is a 62 year old male w/ CAD s/p STEMI 8/21 when he was referred for but refused CABG evaluation AMA, HFrEF LVEF 30's, previous LV thrombus, HL, DM who presents today  for f/u of CAD.    Since our PCI to LAD he has had no more CP. Had at least one more admission for ADHF, but the CP is much improved. Breathing is better also, when he isn't in ADHF. HE presents today to discuss potential utility of further revascularization.    Recent Echocardiogram Results:  There is no thrombus seen in the left ventricle.  The left ventricle is normal in size with normal left ventricular wall  thickness.  The visual ejection fraction is estimated at 30%.  There is hypokinesis of the entire inferior wall, mid to apical anterolateral  wall, and apical septum. There is akinesis of the basal and mid posterior  wall.  Normal right ventricle size and systolic function.  No hemodynamically significant valve disease is identified.     Compared to the prior study of 1/14/22, there has been an interval resolution  of the left ventricular apical thrombus.    Recent Coronary Angiogram Results:   - known multi-vessel severe disease, was offered CABG but refused last year, then presented for PCI with me but stated that his angina had since resolved, few months later had NSTEMI/ADHF, ultimately seeking an intervention now  - anatomic situtation is complicated by the fact that based on the viability assessment, there is a near-transmural inferolateral scar but viability elsewhere  - as LAD territory appears to have viability, and on today's angio there was more of a vessel evident, we proceeded w/ PTA to mid-distal vessel - no stent due to still limited outflow. Apical LAD now gets collateral flow from AM of RCA  - he can follow up with me in 2-4 weeks, and we can decide whether to pursue PCI to RCA next, depending on symptoms, as both Lcx and dRCA are potentially feasible for PCI, but don't appear to have viability  - in the meantime, continue ASA/clopidogrel/metop rosuva  - consider VAD/transplant candidacy assessment   - continue aggressive risk factor modification         Physical Examination  Review of  Systems   Vitals: BP (!) 86/58 (BP Location: Left arm, Patient Position: Sitting, Cuff Size: Adult Small)   Pulse 80   Resp 16   Wt 50.8 kg (112 lb 1.6 oz)   SpO2 98%   BMI 20.50 kg/m    BMI= Body mass index is 20.5 kg/m .  Wt Readings from Last 3 Encounters:   06/09/22 50.8 kg (112 lb 1.6 oz)   05/16/22 53.7 kg (118 lb 4.8 oz)   05/06/22 54.8 kg (120 lb 14.4 oz)       General Appearance:   no distress, normal body habitus   ENT/Mouth: membranes moist, no oral lesions or bleeding gums.      EYES:  no scleral icterus, normal conjunctivae   Neck: no carotid bruits or thyromegaly   Chest/Lungs:   lungs are clear to auscultation, no rales or wheezing, no sternal scar, equal chest wall expansion    Cardiovascular:   Regular. Normal first and second heart sounds with no murmurs, rubs, or gallops; the carotid, radial and posterior tibial pulses are intact, Jugular venous pressure 7, no edema bilaterally    Abdomen:  no organomegaly, masses, bruits, or tenderness; bowel sounds are present   Extremities: no cyanosis or clubbing   Skin: no xanthelasma, warm.    Neurologic: normal  bilateral, no tremors     Psychiatric: alert and oriented x3, calm        Please refer above for cardiac ROS details.        Medical History  Surgical History Family History Social History   Past Medical History:   Diagnosis Date     Congestive heart failure (H) 08/2021    ischemic CM with LVEF 30-35%     Coronary artery disease 08/2021    STEMI with multivessel CAD on angiography     Diabetes mellitus (H)      Hyperlipidemia      Hypertension      LV (left ventricular) mural thrombus 01/2022     Myocardial infarction (H) 08/2021    inferior STEMI     Past Surgical History:   Procedure Laterality Date     CV CORONARY ANGIOGRAM N/A 8/1/2021    Procedure: Coronary Angiogram;  Surgeon: Deidre Lopes MD;  Location: [facility] CATH LAB CV     CV CORONARY ANGIOGRAM N/A 5/6/2022    Procedure: Coronary Angiogram;  Surgeon: Sherif Fuentes MD;   Location: [facility] CATH LAB CV     CV LEFT HEART CATH N/A 5/6/2022    Procedure: Left Heart Catheterization;  Surgeon: Sherif Fuentes MD;  Location: [facility] CATH LAB CV     CV LEFT VENTRICULOGRAM N/A 8/1/2021    Procedure: Left Ventriculogram;  Surgeon: Deidre Lopes MD;  Location: [facility] CATH LAB CV     CV PCI N/A 5/6/2022    Procedure: Percutaneous Coronary Intervention;  Surgeon: Sherif Fuentes MD;  Location: [facility] CATH LAB CV     OTHER SURGICAL HISTORY      LEG TRAUMA     No family history on file.     Social History     Socioeconomic History     Marital status:      Spouse name: Not on file     Number of children: Not on file     Years of education: Not on file     Highest education level: Not on file   Occupational History     Not on file   Tobacco Use     Smoking status: Never Smoker     Smokeless tobacco: Never Used   Substance and Sexual Activity     Alcohol use: No     Drug use: No     Sexual activity: Yes     Partners: Female     Birth control/protection: None   Other Topics Concern     Parent/sibling w/ CABG, MI or angioplasty before 65F 55M? Not Asked   Social History Narrative     Not on file     Social Determinants of Health     Financial Resource Strain: Not on file   Food Insecurity: Not on file   Transportation Needs: Not on file   Physical Activity: Not on file   Stress: Not on file   Social Connections: Not on file   Intimate Partner Violence: Not on file   Housing Stability: Not on file           Medications  Allergies   Current Outpatient Medications   Medication Sig Dispense Refill     aspirin (ASA) 81 MG EC tablet Take 1 tablet (81 mg) by mouth daily Start tomorrow. 30 tablet 3     atorvastatin (LIPITOR) 80 MG tablet Take 1 tablet (80 mg) by mouth daily 90 tablet 3     blood glucose (ACCU-CHEK GUIDE) test strip Use to test blood sugar 3 times daily or as directed. 100 strip 11     clopidogrel (PLAVIX) 75 MG tablet Take 1 tablet (75 mg) by mouth daily Dose to start  tomorrow. 90 tablet 3     famotidine (PEPCID) 20 MG tablet Take 1 tablet (20 mg) by mouth daily 30 tablet 0     furosemide (LASIX) 40 MG tablet Take 1 tablet (40 mg) by mouth daily 30 tablet 0     insulin lispro (HUMALOG KWIKPEN) 100 UNIT/ML (1 unit dial) KWIKPEN Inject 8-10 Units Subcutaneous 3 times daily (before meals) 30 mL 0     isosorbide mononitrate (ISMO/MONOKET) 20 MG tablet Take 1 tablet (20 mg) by mouth 2 times daily 30 tablet 0     LANTUS SOLOSTAR 100 UNIT/ML soln Inject 20 Units Subcutaneous At Bedtime 9 mL 0     metoprolol succinate ER (TOPROL XL) 25 MG 24 hr tablet Take 1 tablet (25 mg) by mouth 2 times daily 60 tablet 0     nitroGLYcerin (NITROSTAT) 0.4 MG sublingual tablet For chest pain place 1 tablet under the tongue every 5 minutes for 3 doses. If symptoms persist 5 minutes after 1st dose call 911. 25 tablet 0     spironolactone (ALDACTONE) 25 MG tablet Take 0.5 tablets (12.5 mg) by mouth daily (Patient taking differently: Take 25 mg by mouth daily) 30 tablet 0     apixaban ANTICOAGULANT (ELIQUIS) 2.5 MG tablet Take 2.5 mg by mouth daily       Continuous Blood Gluc Sensor (FREESTYLE KATIE 14 DAY SENSOR) MIS 1 Device every 14 days Change sensor as directed every 14 days (Patient not taking: Reported on 6/9/2022) 2 each 11       Allergies   Allergen Reactions     Januvia [Sitagliptin] Unknown     HEADACHE     Trulicity [Dulaglutide] Dizziness     Weak and muscle weak          Lab Results    Chemistry/lipid CBC Cardiac Enzymes/BNP/TSH/INR   Recent Labs   Lab Test 05/06/22  0422   CHOL 125   HDL 56   LDL 60   TRIG 43     Recent Labs   Lab Test 05/06/22  0422 08/02/21  0418 05/12/21  0934   LDL 60 109 159*     Recent Labs   Lab Test 05/07/22  0803 05/07/22  0507   NA  --  138   POTASSIUM  --  3.5   CHLORIDE  --  107   CO2  --  22   * 152*   BUN  --  16   CR  --  1.18   GFRESTIMATED  --  70   LISA  --  8.5     Recent Labs   Lab Test 05/07/22  0507 05/06/22  0422 05/05/22  0408   CR 1.18 1.44*  1.48*     Recent Labs   Lab Test 04/05/22  1212 12/10/21  1520 08/02/21  0418   A1C 9.6* 10.4* 10.4*          Recent Labs   Lab Test 05/07/22  0507   WBC 4.9   HGB 11.3*   HCT 34.4*   MCV 98   *     Recent Labs   Lab Test 05/07/22  0507 05/04/22  1705 05/04/22  1234   HGB 11.3* 11.5* 14.4    Recent Labs   Lab Test 05/04/22  2340 05/04/22  1836 05/04/22  1234   TROPONINI 0.71* 0.69* 0.73*     Recent Labs   Lab Test 05/04/22  1234 04/24/22  2212 01/13/22  1731 09/28/21  0852   BNP 4,308* 3,288* 2,494*  --    NTBNP  --   --   --  3,508*     No results for input(s): TSH in the last 82394 hours.  Recent Labs   Lab Test 05/04/22  1234 04/24/22  2212 08/01/21  1637   INR 1.28* 1.24* 0.96        Sherif Fuentes MD              Thank you for allowing me to participate in the care of your patient.      Sincerely,     Sherif Fuentes MD     [facility]  cc:   Sherif Fuentes MD  [address] [place] [address]

## 2022-06-09 NOTE — TELEPHONE ENCOUNTER
Reason for Call:  Other call back    Detailed comments: Pt declined home care at this time  Patient will followup with his cardiologist    Phone Number Patient can be reached at: Other phone number:  [phone]*    Best Time: n/a    Can we leave a detailed message on this number? YES    Call taken on 6/9/2022 at 2:17 PM by Maia Irving

## 2022-07-07 NOTE — TELEPHONE ENCOUNTER
----- Message from Sherif Fuentes MD sent at 7/7/2022 12:21 PM CDT -----  Mal,    We dicussed his case in cath conference and agree to offer him PCI to RCA if he is interested.  Would be a likely roto or [employer] case.    Thx!  Sherif

## 2022-07-12 NOTE — TELEPHONE ENCOUNTER
Dr. Fuentes,  I was reviewing Av's chart to see if he came to his HF appt with Dr. Gupta today and noted that he did end up presenting to [facility] for abdominal pain on 7/10 after our discussion with him on 7/7. While in the ER and preparing for discharge, he went into cardiac arrest. ROSC achieved after x1 of epi only.  He is intubated and in the ICU- there may be plan for angiogram today but this is all via care everywhere and I cannot see any report of that from today. This is just an FYI unless you have anything further.   Thanks,  Mal

## 2022-07-12 NOTE — TELEPHONE ENCOUNTER
Sherif Fuentes MD  You; [facility] Procedure  Pool - Lhe 5 days ago     MY    Thanks a ton, sounds reasonable!     Sherif    Message text       You  Sherif Fuentes MD; [facility] Procedure  Pool - Lhe 5 days ago     [facility] Dr. Fuentes,   I was working on getting Av set up for his staged intervention with you. During our conversation with an interpretor, he was reporting inability to sleep at night and poor appetite + chest pains at night. He refused Er eval. He also says that he adjusts his diuretics/takes additional doses on his own and still takes 2.5 mg of Eliquis at bedtime. He has not been following with [facility] clinic like he should be and it sounds like he could benefit from a visit with them and straightening out of his medicines. I got him in to see Dr. Gupta next week and strongly encouraged ER eval over the weekend should his symptoms persist.     Thanks,   Jim Pineda,   Staged PCI with MY- info in my documentation!   Thank you so much,   Jim

## 2022-07-12 NOTE — TELEPHONE ENCOUNTER
Noted that patient did not show up for HF [facility] appt today with Dr. Gupta. Chart reviewed and he is inpatient at [facility] - he went with reports of abdominal pain and noted to went into cardiac arrest in the ER stability room. They achieved ROSC with epi and he is noted to be intubated and sedated in the ICU. Cardiology is following.     Noted that patient did follow through with instructions to go in should his symptoms worsen as discussed during phone discussion on 7/7. FYI to Dr. Fuentes; unsure if there are plans for any intervention while there.       7/11 ICU Note:  I was asked to see this patient at the request of Dr. Hill for evaluation of cardiac arrest.     HPI:   61 yo gentleman with history of multivessel CAD, LVEF 30%. Came to ED wit concerns of LLQ abdominal discomfort and non-bloody emesis. Has a history of chronic chest discomfort - not a concern for him this time. Given some IVF, zofran, dilaudid, benadryl, reglan with improvement. Plans were in the works for discharge from ED when wife called out for help. Suddenly unresponsive. Pulseless. (not on monitor as was about to be discharged). CPR started. Epi x 1 given. ROSC achieved. He woke up, was confused, but able to follow commands. He remained a bit agitated and had tenuous respiratory status - intubated.  CT head - no acute change. 1.5cm calcified mass in the lateral left parietal lobe

## 2022-07-18 NOTE — TELEPHONE ENCOUNTER
Sherif Fuentes MD Caswell, Mallory J, RN; Miriam Coy  Caller: Unspecified (1 week ago)  Sounds reasonable.     Thx!           Noted- msg also to procedure  to cancel this and then request for /rep. -[facility]

## 2022-07-18 NOTE — TELEPHONE ENCOUNTER
Pt noted to have bypass 7/15 at [facility] and will be recovering there. Update to remove case request. -[facility]        RAE Pineda and Dr. Fuentes,  OK to remove this case request/procedure on 7/21 as he had bypass?  Thanks,  Mal

## 2022-07-21 NOTE — PROGRESS NOTES
Clinic Care Coordination Contact  CCC RN spoke with patient's wife today. She stated her  is still in [facility] after cardiac arrest on 7/10/22. She stated he was currently having a scan done as he continues to have chest pain. Patient's wife said she and her  had not received the letter regarding Dr. Kaufman's departure from the Clinic. She said they said they would like to follow him as she said her  has a good relationship with him. Encouraged her to look at the Internet to find information about Dr. Kaufman's new Clinic. Patient's wife said they were not interesting finding a new PCP at the [facility]. Writer will remove himself from his Care Team.

## 2022-07-27 NOTE — TELEPHONE ENCOUNTER
"Nurse Triage SBAR    Is this a 2nd Level Triage? YES, LICENSED PRACTITIONER REVIEW IS REQUIRED    Situation: Pt calling with foot/ankle swelling that is preventing him from walking d/t pain and difficulty getting up on feet. He is currently at home alone and states that he is disabled and cannot get anywhere     Background: Released on Monday from [facility] after \"having problems with my heart\"- chart shows he had a cardiac arrest on 7/10/22    Assessment: states his feet are very swollen. there is an indent in his feet if he presses down on them but states he doesn't know for how many seconds \"it stays like that for a while\", swelling is just to the ankles but seems to be getting worse. Reports fluid weeping from feet as well     Denies CP or difficulty breathing       Protocol Recommended Disposition:   Go to ED/UCC now (or to office with PCP approval)    Recommendation: Advised that message would be routed to clinic     Routed to provider    Does the patient meet one of the following criteria for ADS visit consideration? 16+ years old, with an [facility] PCP     TIP  Providers, please consider if this condition is appropriate for management at one of our Acute and Diagnostic Services sites.     If patient is a good candidate, please use dotphrase <dot>triageresponse and select Refer to ADS to document.    Karlee Baeza RN [place] [facility] July 27, 2022 11:49 AM        Reason for Disposition    Can't walk or can barely stand (new onset)    Additional Information    Negative: Sounds like a life-threatening emergency to the triager    Negative: Chest pain    Negative: Small area of swelling and followed an insect bite to the area    Negative: Followed a knee injury    Negative: Ankle or foot injury    Negative: Pregnant with leg swelling or edema    Negative: Difficulty breathing at rest    Negative: Entire foot is cool or blue in comparison to other side    Negative: SEVERE swelling (e.g., swelling " extends above knee, entire leg is swollen, weeping fluid)    Negative: Thigh or calf pain and only 1 side and present > 1 hour    Negative: Thigh, calf, or ankle swelling in only one leg    Negative: Thigh, calf, or ankle swelling in both legs, but one side is definitely more swollen (Exception: longstanding difference between legs)    Negative: Cast on leg or ankle and has increasing pain    Protocols used: LEG SWELLING AND EDEMA-A-OH

## 2022-07-27 NOTE — TELEPHONE ENCOUNTER
Provider Response to 2nd Level Triage Request    I have reviewed the RN documentation. My recommendation is:  Refer to Urgent Care or ED. Needs face-to-face evaluation    Clinton Hayden MD on 7/27/2022 at 12:49 PM

## 2022-08-08 NOTE — TELEPHONE ENCOUNTER
General Call    Contacts       Type Contact Phone/Fax    08/08/2022 02:17 PM CDT Phone (Incoming) Noemi [phone]     Home care        Reason for Call:  Noemi from home care was told by patient's daughter Rosmery that Dr. Burgess will take care of home care orders. Please advised and call noemi back as they want to go out to see patient tomorrow and unable to go until verify.       Could we send this information to you in [facility] or would you prefer to receive a phone call?:   Patient would prefer a phone call   Okay to leave a detailed message?: Yes at Other phone number:   Noemi- [phone]

## 2022-08-08 NOTE — TELEPHONE ENCOUNTER
Patient's daughter calling to ask how to change primary care provider for patient since his primary is no longer with [place]. Provider did provide recommendations in a letter to the patient. Advised changing the primary at his next office visit.       Clemencia Antonio RN  08/08/22 1:23 PM  [facility] Nurse Advisor      Reason for Disposition    Information only question and nurse able to answer    Protocols used: INFORMATION ONLY CALL - NO TRIAGE-A-OH

## 2022-08-09 NOTE — TELEPHONE ENCOUNTER
Order/Referral Request- Dr Osorio per other phone note.    Who is requesting: Home care nurseMary    Orders being requested: Delay start of care to Friday 7/12 per pts request and available.    When are orders needed by: ASAP    Could we send this information to you in [facility] or would you prefer to receive a phone call?:   Phone call [phone] to Home

## 2022-08-12 NOTE — TELEPHONE ENCOUNTER
FYI - Status Update    Who is Calling: Physical Therapy Kenney    Update: Kenney went out to see patient but patient said he is still in pain and would rather go to the hospital. Kenney is wondering if that is an option?     Does caller want a call/response back: Yes     Could we send this information to you in [facility] or would you prefer to receive a phone call?:   Patient would prefer a phone call   Okay to leave a detailed message?: Yes at Other phone number:  ken - [phone]

## 2022-08-15 NOTE — TELEPHONE ENCOUNTER
TELEPHONE CALL -    Reason for call-   Occupational therapist looking for verbal OK from Pt s PCP/Clinic [facility]  Nirmala OT from [facility]# [phone]  confidential VM   Need verbal Ok for Orders to continued with OT  Visits for:  1 week  X 1   2 week  X 2  1 week X 2   Starting today     PCP  Stefano Thapa MD  [facility]  Note sent to Care team at the clinic   Makayla Kohli RN [place] Nurse Advisor,  5:05 PM 8/15/2022

## 2022-08-15 NOTE — TELEPHONE ENCOUNTER
The Home Care/Assisted Living/Nursing Facility is calling regarding an established patient.  Has the patient seen Home Care in the past or is currently residing in Assisted Living or Nursing Facility? Yes. (see encounter 8/8/22 telephone - Alanis will follow for home care orders)     Nirmala calling from [facility] requesting the following orders that are within the Home Care, Assisted Living or Nursing Home Eval and Treatment standing order and can be signed as standing order signature required by RN.    Preferred Call Back Number: [phone]    PT/OT/Speech Therapy     Orders to continued with OT  Visits for:  1 week  X 1   2 week  X 2  1 week X 2   Starting today      Any additional Orders:  Are there any orders requested, not stated above, that are outside of the standing order and must be routed to a licensed practitioner for approval?    No    Writer has verified [employer] will send fax to have orders signed.

## 2022-08-30 NOTE — TELEPHONE ENCOUNTER
Alia called with recs. Pt is already on their way via ambulance to [facility].    Otto PAINTER RN  BSN

## 2022-08-30 NOTE — TELEPHONE ENCOUNTER
[facility] Call Center    Phone Message    May a detailed message be left on voicemail: no     Reason for Call: Other: Alia called from [facility] to report Pt Av is experiencing Weakness, shortness of breath, edema in both feet and fluid in the lungs. She would like to send the Pt to the hospital but she would like to touch base with Deborah as Pt has an appointment to see her today.      Action Taken: Other: [facility]    Travel Screening: Not Applicable

## 2022-08-30 NOTE — TELEPHONE ENCOUNTER
I guess if nurse believes he is that symptomatic and needs to be in the ED due to symptoms I would recommend he go there.  Thank you.

## 2022-09-06 NOTE — PROGRESS NOTES
Contact   Chart Review     Situation: Patient chart reviewed by .    Background: multiple hospitalizations    Assessment: Identified for outreach.     Plan/Recommendations: Delegated to CHW to do outreach to patient.    Nickie Vallecillo,   [facility]  [phone]

## 2022-09-08 NOTE — PROGRESS NOTES
Clinic Care Coordination Contact  Community Health Worker Initial Outreach    CHW called/spoke with patient via "[employer]  #[id number]    CHW Initial Information Gathering:  Referral Source: Care Team  Preferred Hospital: [facility], [place]  [phone]  Preferred Urgent Care: [facility] - [place], [phone]  Current living arrangement:: I live in a private home with family  Type of residence:: Private home - stairs  Community Resources: None  Informal Support system:: Family, Spouse, Children  No PCP office visit in Past Year: No  Transportation means:: Family, Regular car  CHW Additional Questions  If ED/Hospital discharge, follow-up appointment scheduled as recommended?: Yes  Medication changes made following ED/Hospital discharge?: N/A  MyChart active?: Yes  Patient sent Social Determinants of Health questionnaire?: Yes    Patient accepts CC: Yes. Patient scheduled for assessment with CC RN on Friday 9/9/22 at 10:00am. Patient noted desire to discuss Call with cooper , pt admitted at [facility] 8/30-9/4..   Patient is recovering at home, has family support in home with his wife and children.  Av states he is still very sore, no follow up visits scheduled.  Patient has mediation questions, possible MSU, patient states he will make f/ui with PCP on his own- Assist with scheduling appt if patient desires.    Eunice LATIF  Community Health Worker  [facility]  Clinic Care Coordination  [facility] [email or web address]  [email or web address]  Office: [phone]

## 2022-09-12 PROBLEM — N28.9 RENAL INSUFFICIENCY SYNDROME: Status: ACTIVE | Noted: 2022-01-01

## 2022-09-12 PROBLEM — E87.1 HYPONATREMIA: Status: ACTIVE | Noted: 2022-01-01

## 2022-09-12 PROBLEM — I46.9 CARDIAC ARREST (H): Status: ACTIVE | Noted: 2022-01-01

## 2022-09-12 PROBLEM — I50.43 CHF (CONGESTIVE HEART FAILURE), NYHA CLASS I, ACUTE ON CHRONIC, COMBINED (H): Status: ACTIVE | Noted: 2022-01-01

## 2022-09-12 PROBLEM — I25.10 CORONARY ARTERY DISEASE INVOLVING NATIVE CORONARY ARTERY OF NATIVE HEART, UNSPECIFIED WHETHER ANGINA PRESENT: Status: ACTIVE | Noted: 2021-01-01

## 2022-09-12 NOTE — PROGRESS NOTES
"  Hospital Follow-up Visit:    Hospital/Nursing Home/IP Rehab Facility: [facility] and ***  Date of Admission: 08/30/2022  Date of Discharge: 09/04/2022  Reason(s) for Admission: SOB. Dx: CHF (congestive heart failure), NYHA class I, acute on chronic, diastolic (HCC)    ELIZABETH (acute kidney injury) (HCC)      Was your hospitalization related to COVID-19? No   Problems taking medications regularly:  None  Medication changes since discharge:   Torsemide 40 mg   Metoprolol succinate 12.5mg (was previously 25 mg BID)   Spironolactone 25 mg (was previously 12.5 mg)  Lantus SubQ Pen 8 units at bedtime (was previously 20 units)   Eliquis 5mg (patient to take BID for 90 days, then reduce to once daily)  Humalog SubQ pen 3-7 units TID before meals (was prior 8-10 units)      Problems adhering to non-medication therapy:  None    Summary of hospitalization:  [facility] discharge summary reviewed  Diagnostic Tests/Treatments reviewed.  Follow up needed: {NONE DEFAULTED:918645::\"none\"}  Other Healthcare Providers Involved in Patient s Care:         {those currently involved after discharge:848512::\"None\"}  Update since discharge: {IMPROVED DEFAULT:534159::\"improved.\"} {TIP  Include information from family/caregivers, SNF, Care Coordination :052687}      {TIP  Click the link below to document, then refresh to pull in response :956070}  Post Medication Reconciliation Status:        Plan of care communicated with {Communicate Plan to:360670::\"patient\"}     {Reference  Coding guidelines- Moderate Complexity F2F/Video within 7 - 14 days of discharge 8635115, High Complexity F2F/Video within 7 days 5599324 or fnpitq27 days 9413308 :514097}      {additonal problems for provider to add (Optional):205674}    "

## 2022-09-12 NOTE — PATIENT INSTRUCTIONS
Refill senna and potassium per request    Clarify other medications and provide refresh prescriptions to [facility] on [address]    Labs reviewed    Hospital discharge reviewed    Okay to continue driving with diabetic driving form

## 2022-09-12 NOTE — PROGRESS NOTES
Av is a 62 year old male being evaluated via a billable phone visit, and would like to be contacted via the following  Home number on file [phone] (home)    ASSESSMENT and PLAN:  1. CHF (congestive heart failure), NYHA class I, acute on chronic, combined (H)  Clarify medicines and refill.  Hospital discharge summary reviewed across check  - potassium chloride ER (KLOR-CON M) 20 MEQ CR tablet; Take 1 tablet (20 mEq) by mouth daily  Dispense: 90 tablet; Refill: 3  - Torsemide 40 MG TABS; Take 40 mg by mouth daily  Dispense: 90 tablet; Refill: 3  - spironolactone (ALDACTONE) 25 MG tablet; Take 1 tablet (25 mg) by mouth daily  Dispense: 90 tablet; Refill: 3  - metoprolol succinate ER (TOPROL XL) 25 MG 24 hr tablet; Take 0.5 tablets (12.5 mg) by mouth daily  Dispense: 45 tablet; Refill: 3  - apixaban ANTICOAGULANT (ELIQUIS ANTICOAGULANT) 5 MG tablet; Take 1 tablet (5 mg) by mouth 2 times daily  Dispense: 180 tablet; Refill: 3    2. Chronic idiopathic constipation  Okay to refill  - STIMULANT LAXATIVE 8.6-50 MG tablet; Take 2 tablets by mouth daily  Dispense: 180 tablet; Refill: 3    3. Type 2 diabetes mellitus with microalbuminuria, with long-term current use of insulin (H)  Okay to refill.  Okay to continue driving.  Labs up-to-date  - insulin glargine (LANTUS PEN) 100 UNIT/ML pen; Inject 6 Units Subcutaneous At Bedtime  Dispense: 15 mL; Refill: 3  - insulin lispro (HUMALOG KWIKPEN) 100 UNIT/ML (1 unit dial) KWIKPEN; Inject 3-7 Units Subcutaneous 3 times daily (before meals)  Dispense: 15 mL; Refill: 1     Patient Instructions   Refill senna and potassium per request    Clarify other medications and provide refresh prescriptions to [facility]    Labs reviewed    Hospital discharge reviewed    Okay to continue driving with diabetic driving form            Return in about 4 months (around 1/12/2023) for using a video visit.       CHIEF COMPLAINT:  Chief Complaint   Patient presents with  "    Hospital F/U       HISTORY OF PRESENT ILLNESS:  Av is a 62 year old male contacting the clinic today via phone for request for medicine refill.  He requests senna and potassium.  He was recently in the hospital at [facility] on August 30 for heart failure.  Medications have been adjusted quite a bit.  Medication list reviewed.  He no longer takes isosorbide or Plavix.  He has been initiated on Eliquis.  Furosemide has been upgraded to torsemide and Crestor has been changed to atorvastatin.    Insulin doses are clarified.  Last A1c was in April.  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  It is not clear whether he has a defibrillator or any arrhythmias but he may based on cardiac function.  Review of care everywhere shows echocardiogram of 27% and an ICD defibrillator report indicating that he may have had arrhythmias.  That issue regarding driving would have to be deferred to his cardiologist    REVIEW OF SYSTEMS:  Hoarse voice.  No pain    PFSH:  Social History     Social History Narrative     Not on file     Wants to continue driving    TOBACCO USE:  History   Smoking Status     Never Smoker   Smokeless Tobacco     Never Used       VITALS:  There were no vitals filed for this visit.  There were no vitals taken for this visit. Estimated body mass index is 20.5 kg/m  as calculated from the following:    Height as of 5/4/22: 1.575 m (5' 2\").    Weight as of 6/9/22: 50.8 kg (112 lb 1.6 oz).    PHYSICAL EXAM:  (observations via Phone)  Speaks slowly with hoarse voice.  Moderate accent    MEDICATIONS  Current Outpatient Medications   Medication Sig Dispense Refill     apixaban ANTICOAGULANT (ELIQUIS ANTICOAGULANT) 5 MG tablet Take 1 tablet (5 mg) by mouth 2 times daily 180 tablet 3     aspirin (ASA) 81 MG EC tablet Take 1 tablet (81 mg) by mouth daily Start tomorrow. 30 tablet 3     atorvastatin (LIPITOR) 80 MG tablet Take 1 tablet (80 mg) by mouth daily 90 tablet " 3     blood glucose (ACCU-CHEK GUIDE) test strip Use to test blood sugar 3 times daily or as directed. 100 strip 11     famotidine (PEPCID) 20 MG tablet Take 1 tablet (20 mg) by mouth daily 30 tablet 0     insulin glargine (LANTUS PEN) 100 UNIT/ML pen Inject 6 Units Subcutaneous At Bedtime 15 mL 3     insulin lispro (HUMALOG KWIKPEN) 100 UNIT/ML (1 unit dial) KWIKPEN Inject 3-7 Units Subcutaneous 3 times daily (before meals) 15 mL 1     magnesium oxide (MAG-OX) 400 MG tablet        metoprolol succinate ER (TOPROL XL) 25 MG 24 hr tablet Take 0.5 tablets (12.5 mg) by mouth daily 45 tablet 3     nitroGLYcerin (NITROSTAT) 0.4 MG sublingual tablet For chest pain place 1 tablet under the tongue every 5 minutes for 3 doses. If symptoms persist 5 minutes after 1st dose call 911. 25 tablet 0     pantoprazole (PROTONIX) 40 MG EC tablet Take 40 mg by mouth daily       potassium chloride ER (KLOR-CON M) 20 MEQ CR tablet Take 1 tablet (20 mEq) by mouth daily 90 tablet 3     spironolactone (ALDACTONE) 25 MG tablet Take 1 tablet (25 mg) by mouth daily 90 tablet 3     STIMULANT LAXATIVE 8.6-50 MG tablet Take 2 tablets by mouth daily 180 tablet 3     Torsemide 40 MG TABS Take 40 mg by mouth daily 90 tablet 3     Continuous Blood Gluc Sensor (FREESTYLE KATIE 14 DAY SENSOR) [facility] 1 Device every 14 days Change sensor as directed every 14 days (Patient not taking: No sig reported) 2 each 11       Notes summarized: Hospital discharge summary  Labs, x-rays, cardiology, GI tests reviewed: Last A1c 9.6 in April Recent Labs   Lab Test 05/07/22  0507 05/06/22  0422 05/05/22  0408 05/04/22  1705 04/24/22  2212 04/05/22  1212 01/13/22  1731 12/10/21  1520   HGB 11.3*  --   --  11.5*   < >  --    < >  --    WBC 4.9  --   --  5.0   < >  --    < >  --     141   < >  --    < > 138   < > 133*   POTASSIUM 3.5 3.8   < >  --    < > 4.1   < > 5.3*   CR 1.18 1.44*   < >  --    < > 1.21   < > 1.65*   A1C  --   --   --   --   --  9.6*  --  10.4*    <  > = values in this interval not displayed.     Lab Results   Component Value Date    QUZRH17ZJS Negative 05/04/2022    OQPQD92SYR Negative 04/25/2022    HYBES25TBY Negative 02/28/2022     Lab Results   Component Value Date    CHOL 125 05/06/2022     New orders: No orders of the defined types were placed in this encounter.      Independent review of:  Supplemental history by:      Patient would like to receive their AVS by [facility]    Enoch Cheng MD  [facility]    Phone Start Time: 4:47 PM  Phone End time:  5:06 PM  Conversation plus orders: 19 minutes  Dictation time:  3 minutes    The visit lasted a total of 22 minutes

## 2022-09-12 NOTE — TELEPHONE ENCOUNTER
1. What is the nature of the form? Diabetic DMV    2. Has patient/parent signed form if applicable? Yes    3. Where should completed form go? Patient will     4. When was patient's last appointment with PCP? Within one year    5. If further questions or when form is completed, is it okay to leave detailed message on patient's phone? YES     Patient came into clinic today to drop off form. Patient states that he missed a ED/Hosp follow up with Dr. Thapa and needs some medications refilled. Patient did make a virtual visit with Dr. Cheng 9/12/22 to discuss concerns. Patient did state that he would need the form to be completed by this week if possible. Please call patient when forms are completed.

## 2022-09-15 NOTE — TELEPHONE ENCOUNTER
The Home Care/Assisted Living/Nursing Facility is calling regarding an established patient.  Has the patient seen Home Care in the past or is currently residing in Assisted Living or Nursing Facility? Yes.     Yuli calling from [facility] requesting the following orders that are within the Home Care, Assisted Living or Nursing Home Eval and Treatment standing order and can be signed as standing order signature required by RN.    Preferred Call Back Number: [phone]    Home Care Visits Continuation   Delay order to see patient on 09/16/2022     Resumption of care post hospitalization    Any additional Orders:  Are there any orders requested, not stated above, that are outside of the standing order and must be routed to a licensed practitioner for approval?    No    Writer has verified Request[employer] will send fax to have orders signed.

## 2022-09-15 NOTE — TELEPHONE ENCOUNTER
Order/Referral Request    Who is requesting: [facility]    Orders being requested:   Delay order to see patient on 09/16/2022    Resumption of care post hospitalization    Reason service is needed/diagnosis: n/a    When are orders needed by: ASAP    Has this been discussed with Provider: No    Does patient have a preference on a Group/Provider/Facility? n/a    Does patient have an appointment scheduled?: No    Where to send orders: N/A    Could we send this information to you in [facility] or would you prefer to receive a phone call?:   No preference   Okay to leave a detailed message?: Yes at Other phone number:  [phone]

## 2022-09-21 NOTE — TELEPHONE ENCOUNTER
"Routing refill request to provider for review/approval because:  Duplicate ot requesting too soon    Last Written Prescription Date:  9/12/2022  Last Fill Quantity: 180,  # refills: 3   Last office visit provider:  9/12/2022     Requested Prescriptions   Pending Prescriptions Disp Refills     STIMULANT LAXATIVE 8.6-50 MG tablet 180 tablet 3     Sig: Take 2 tablets by mouth daily       Laxatives Protocol Passed - 9/19/2022 10:04 AM        Passed - Patient is age 6 or older        Passed - Recent (12 mo) or future (30 days) visit within the authorizing provider's specialty     Patient has had an office visit with the authorizing provider or a provider within the authorizing providers department within the previous 12 mos or has a future within next 30 days. See \"Patient Info\" tab in inbasket, or \"Choose Columns\" in Meds & Orders section of the refill encounter.              Passed - Medication is active on med list             Clarisa Izquierdo RN 09/20/22 10:56 PM  "

## 2022-09-25 NOTE — TELEPHONE ENCOUNTER
Nurse Triage SBAR        Situation: RIO Veliz from [facility] calling to get orders for Home Care    Background: Seen yesterday for Resumption of Care. Was Discharged from [facility] from admit 8/30 for heart failure. Home care requests following orders:    Skilled Nurse:  1x /week x 1                            2x /week x 2 weeks                            3 prn visits  OT eval and treat for ADL and equipment needs  ( Patient declined PT and Home Health Aide)      Assessment: N/A      Recommendation:  Verbal Order given per Protocol                                    Home care nurse will Fax request on Mon      Routed to provider for orders                    LEAH CULLEN RN [facility] 9/25/2022 8:49 AM

## 2022-09-28 NOTE — PROGRESS NOTES
This is a recent snapshot of the patient's [facility] medical record.  For current drug dose and complete information and questions, call [phone] or In Basket pool, fv home infusion ([id number])  CSN Number:  [id number]

## 2022-09-29 NOTE — PROGRESS NOTES
This is a recent snapshot of the patient's [facility] medical record.  For current drug dose and complete information and questions, call [phone] or In Basket pool, fv home infusion ([id number])  CSN Number:  [id number]

## 2022-09-29 NOTE — PROGRESS NOTES
This is a recent snapshot of the patient's [facility] medical record.  For current drug dose and complete information and questions, call [phone] or In Basket pool, fv home infusion ([id number])  CSN Number:  [id number]

## 2022-09-30 NOTE — TELEPHONE ENCOUNTER
General Call      Reason for Call:  Katie calling with [facility] for verbal orders    OT- delay OT eval to 10/3 due to availability of therapists.    She will need a call back please    Please call [phone]  Ok to leave detailed msg

## 2022-09-30 NOTE — PROGRESS NOTES
This is a recent snapshot of the patient's [facility] medical record.  For current drug dose and complete information and questions, call [phone] or In Basket pool, fv home infusion ([id number])  CSN Number:  [id number]

## 2022-09-30 NOTE — TELEPHONE ENCOUNTER
The Home Care/Assisted Living/Nursing Facility is calling regarding an established patient.  Has the patient seen Home Care in the past or is currently residing in Assisted Living or Nursing Facility? Yes.     Katie calling from [employer] requesting the following orders that are within the Home Care, Assisted Living or Nursing Home Eval and Treatment standing order and can be signed as standing order signature required by RN.    Preferred Call Back Number: [phone]    PT/OT/Speech Therapy   OT- delay OT eval to 10/3 due to availability of therapists.    Any additional Orders:  Are there any orders requested, not stated above, that are outside of the standing order and must be routed to a licensed practitioner for approval?    No    Writer has verified Requestor will send fax to have orders signed.  Left secure voicemail as requested

## 2022-10-06 NOTE — PROGRESS NOTES
This is a recent snapshot of the patient's [facility] medical record.  For current drug dose and complete information and questions, call [phone] or In Basket pool, fv home infusion ([id number])  CSN Number:  [id number]

## 2022-10-06 NOTE — TELEPHONE ENCOUNTER
Spoke with Roselia and informed her of the recommendation from Dr. Thapa. She stated understanding and will update the clinic with further changes in patient condition.

## 2022-10-06 NOTE — TELEPHONE ENCOUNTER
New Medication Request        What medication are you requesting?: Cough Medication     Reason for medication request: Patient was seen today by nurse and had an elevated temp of 102.4 with a heart rate of 110. Patient stated he started having chills, fever, and a cough yesterday. Covid test was negative. Nurse advised patient to keep pushing fluid and to take tylenol or ibuprofen for fever. If patient does not feel better in 48 hrs patients need to go in to be seen.     Have you taken this medication before?: No    Controlled Substance Agreement on file:   CSA -- Patient Level:    CSA: None found at the patient level.         Patient offered an appointment? No    Preferred Pharmacy:   [id number] - [place], [place] - [address]  [address] [place] [address]  Phone: [phone] Fax: [phone]      Could we send this information to you in [employer] or would you prefer to receive a phone call?:   Patient would prefer a phone call   Okay to leave a detailed message?: Yes at Other phone number:  lashe - [phone]

## 2022-10-11 PROBLEM — B02.30 HERPES ZOSTER OF EYE: Status: ACTIVE | Noted: 2022-01-01

## 2022-10-11 PROBLEM — N18.32 STAGE 3B CHRONIC KIDNEY DISEASE (H): Status: ACTIVE | Noted: 2022-01-01

## 2022-10-11 PROBLEM — I50.9 ACUTE CONGESTIVE HEART FAILURE, UNSPECIFIED HEART FAILURE TYPE (H): Status: ACTIVE | Noted: 2022-01-01

## 2022-10-11 PROBLEM — J96.01 ACUTE RESPIRATORY FAILURE WITH HYPOXIA (H): Status: ACTIVE | Noted: 2022-01-01

## 2022-10-11 PROBLEM — J18.9 PNEUMONIA OF BOTH LOWER LOBES DUE TO INFECTIOUS ORGANISM: Status: ACTIVE | Noted: 2022-01-01

## 2022-10-11 PROBLEM — A41.9 SEVERE SEPSIS (H): Status: ACTIVE | Noted: 2022-01-01

## 2022-10-11 PROBLEM — G40.909 SEIZURE DISORDER (H): Status: ACTIVE | Noted: 2022-01-01

## 2022-10-11 PROBLEM — E87.20 LACTIC ACID ACIDOSIS: Status: ACTIVE | Noted: 2022-01-01

## 2022-10-11 PROBLEM — R65.20 SEVERE SEPSIS (H): Status: ACTIVE | Noted: 2022-01-01

## 2022-10-11 PROBLEM — B33.8 RSV (RESPIRATORY SYNCYTIAL VIRUS INFECTION): Status: ACTIVE | Noted: 2022-01-01

## 2022-10-11 NOTE — ED NOTES
Expected Patient Referral to ED  2:05 PM    Referring Clinic/Provider:  DR Kaufman    Reason for referral/Clinical facts:  Typically walking, talking, upbeat. Recent shingles of face. Hospitalized with IV acyclovir. Yesterday in clinic didn't look well. Barely able to talk. CXR shows infiltrates, effusion (unclear if new). Mild ST changes on ECG yesterday.  [facility] saw him yesterday and adjusted diuretic and sent him home. Cr 1.46, Sodium 126. . On anticoagulation for a fib. Concern for decompensated heart failure and inability to manage at home.     Recommendations provided:  Send to ED for further evaluation    Caller was informed that this institution does possess the capabilities and/or resources to provide for patient and should be transferred to our facility.      Brenda Lackey MD  [facility] EMERGENCY DEPARTMENT  [address] [place] [id number] [phone]       Brenda Lackey MD  10/11/22 1898

## 2022-10-11 NOTE — PROGRESS NOTES
This is a recent snapshot of the patient's [facility] medical record.  For current drug dose and complete information and questions, call [phone] or In Basket pool, fv home infusion ([id number])  CSN Number:  [id number]

## 2022-10-11 NOTE — ED PROVIDER NOTES
EMERGENCY DEPARTMENT ENCOUNTER      NAME: Av Fernando  AGE: 62 year old male  YOB: 1960  MRN: [id number]  EVALUATION DATE & TIME: 10/11/2022  4:42 PM    PCP: Stefano Thapa    ED PROVIDER: Brenda Lackey M.D.      Chief Complaint   Patient presents with     Shortness of Breath     Generalized Weakness     FINAL IMPRESSION:  1. RSV (respiratory syncytial virus infection)    2. Acute congestive heart failure, unspecified heart failure type (H)    3. Pneumonia of both lower lobes due to infectious organism    4. Severe sepsis (H)    5. Urinary retention      ED COURSE & MEDICAL DECISION MAKING:    Pertinent Labs & Imaging studies reviewed. (See chart for details)  ED Course as of 10/11/22 2212   Tue Oct 11, 2022   1730 Patient is a 62-year-old male who comes in today for evaluation of worsening shortness of breath and a fever today.  He has had poor oral intake.  He has been generally weak.  He was seen yesterday at [facility] and not admitted to the hospital.  They were seen in clinic yesterday and he did not look well and Dr. Kaufman called us today and was very concerned about the patient being in decompensated heart failure and not able to manage at home.  Patient is a history of chronic A. fib and is on anticoagulation.  When I evaluated him I am concerned about a possible infection as well.  He definitely needs admission to the hospital since he is requiring 3 L of oxygen which is new for him.  He is allergic to Tylenol so we will give him some ibuprofen to try to control his temperature a little bit better.  I offered them transfer to another hospital and told him it was unlikely that we would get a bed tonight but they want to stay here at [facility] even if they do not get a bed until tomorrow or the next day.  His physical exam showed fairly clear lung sounds bilaterally.  He may be had some crackles in his left lung base but nothing significant.  I am going to scan through both his chest  and abdomen to further evaluate the fever today.  Will order some IV fluids as well and labs have been collected.  I did order blood cultures and lactic acid as well and those were collected by the nurse.   1836 Sodium has dropped a little bit since yesterday and creatinine is little bit better.  Troponin is 42 and will need to be rechecked.  We are still waiting for patient to go down to imaging.   1956 Patient did test positive for RSV.  His BNP is quite elevated as well.  Certainly the RSV which could be causing the elevated temperature and the hypoxia with the elevated lactate I do wonder about pneumonia so we will add on some Rocephin and azithromycin to cover him for now while we wait for his imaging to come back.   2122 Patient's chest CT shows bibasilar infection versus edema.  With the fever he has already been started on antibiotics.  I will work on getting him admitted to the hospital.   2145 Patient feels like he has to pee but cannot pee.  We will put in a Bustamante catheter because he is retaining over 300 mL of urine   2145 I updated the patient on the plan for admission.  He is in agreement.   2154 I discussed the case with Dr. Woods with the hospitalist service.  He agreed with admission to the hospital.  Patient will be admitted to a cardiac telemetry inpatient bed.     5:25 PM I met with patient for initial interview and encounter. PPE worn includes surgical cap, exam gloves and N95 mask.     At the conclusion of the encounter I discussed  the results of all of the tests and the disposition with patient.   All questions were answered.  The patient acknowledged understanding and was involved in the decision making regarding the overall care plan.      35 minutes of critical care time     MEDICATIONS GIVEN IN THE EMERGENCY:  Medications   acetaminophen (TYLENOL) tablet 650 mg (650 mg Oral Not Given 10/11/22 1934)   0.9% sodium chloride BOLUS (0 mLs Intravenous Stopped 10/11/22 1807)   cefTRIAXone  "(ROCEPHIN) 2 g vial to attach to  ml bag for ADULTS or NS 50 ml bag for PEDS (2 g Intravenous New Bag 10/11/22 2024)   azithromycin (ZITHROMAX) 500 mg in sodium chloride 0.9 % 250 mL intermittent infusion (500 mg Intravenous New Bag 10/11/22 2104)   iopamidol (ISOVUE-370) solution 80 mL (80 mLs Intravenous Given 10/11/22 2025)     =================================================================    HPI    Triage Note: Patient hmong speaking  For the past couple days patient has had shortness of breath with increased weakness with a decreased appetite. Patient smells of foul blood.   Patient was seen at [facility] last night, but family report \"they did nothing\"       Patient is a diabetic and has a pacemaker   BS is triage was: 303  Patient was 84% in triage  3L is 95%    Patient information was obtained from: Patient    Use of : N/A     Per Chart Review, patient was seen at [facility] ED on 10/10 for evaluation of itching. Patient reported feeling like his \"heart has water\" and \"worms crawling under his skin.\" Patient felt really itchy and wants something to relieve the itchiness. He stated taking water pills and but need to  his new prescription. Of note, patient was treated for shingles recently. Patient reported to having scalp itching from his recent shingles. He stated taking a dose of Benadryl, no relief. It appeared that the shingles have mostly resolved, it appears that he is suffering from postherpetic neuralgia. Patient states he is taking gabapentin but it also is not helping. Patient dose of Vistaril which she states helps mildly. Patient was discharged home. Patient presents to [place] ED for evaluation of shortness of breath and generalized weakness.     Av Fernando is a 62 year old male who presents to ED via walk in for evaluation of shortness of breath and generalized weakness. Patient reports shortness of breath for a couple of days and increased " weakness with decreased appetite. Patient is on O2 tank and is usually not on O2. Patient states history of heart failure. He complains about a mild cough and fever. Patient denies any other complaints or concerns.       REVIEW OF SYSTEMS   Except as stated in the HPI all other systems reviewed and are negative.    PAST MEDICAL HISTORY:  Past Medical History:   Diagnosis Date     Congestive heart failure (H) 08/2021    ischemic CM with LVEF 30-35%     Coronary artery disease 08/2021    STEMI with multivessel CAD on angiography     Hyperlipidemia      Hypertension      LV (left ventricular) mural thrombus 01/2022     Myocardial infarction (H) 08/2021    inferior STEMI     Type 2 diabetes, HbA1C goal < 8% (H)        PAST SURGICAL HISTORY:  Past Surgical History:   Procedure Laterality Date     CV CORONARY ANGIOGRAM N/A 8/1/2021    Procedure: Coronary Angiogram;  Surgeon: Deidre Lopes MD;  Location: [facility] CV     CV CORONARY ANGIOGRAM N/A 5/6/2022    Procedure: Coronary Angiogram;  Surgeon: Sherif Fuentes MD;  Location: [facility] CATH LAB CV     CV LEFT HEART CATH N/A 5/6/2022    Procedure: Left Heart Catheterization;  Surgeon: Sherif Fuentes MD;  Location: [facility] LAB CV     CV LEFT VENTRICULOGRAM N/A 8/1/2021    Procedure: Left Ventriculogram;  Surgeon: Deidre Lopes MD;  Location: [facility] LAB CV     CV PCI N/A 5/6/2022    Procedure: Percutaneous Coronary Intervention;  Surgeon: Sherif Fuentes MD;  Location: [facility] CATH LAB CV     OTHER SURGICAL HISTORY      LEG TRAUMA       CURRENT MEDICATIONS:      Current Facility-Administered Medications:      acetaminophen (TYLENOL) tablet 650 mg, 650 mg, Oral, Once, Aleksandr Moore,     Current Outpatient Medications:      apixaban ANTICOAGULANT (ELIQUIS ANTICOAGULANT) 5 MG tablet, Take 1 tablet (5 mg) by mouth 2 times daily, Disp: 180 tablet, Rfl: 3     aspirin (ASA) 81 MG EC tablet, Take 1 tablet (81 mg) by mouth daily Start  tomorrow., Disp: 30 tablet, Rfl: 3     atorvastatin (LIPITOR) 80 MG tablet, Take 1 tablet (80 mg) by mouth daily, Disp: 90 tablet, Rfl: 3     gabapentin (NEURONTIN) 100 MG capsule, Take 1 capsule (100 mg) by mouth 3 times daily, Disp: , Rfl:      hydrOXYzine (ATARAX) 25 MG tablet, Take 1 tablet (25 mg) by mouth 4 times daily as needed for itching, Disp: , Rfl:      insulin glargine (LANTUS PEN) 100 UNIT/ML pen, Inject 6 Units Subcutaneous At Bedtime (Patient taking differently: Inject 8 Units Subcutaneous At Bedtime), Disp: 15 mL, Rfl: 3     insulin lispro (HUMALOG KWIKPEN) 100 UNIT/ML (1 unit dial) KWIKPEN, Inject 3-7 Units Subcutaneous 3 times daily (before meals), Disp: 15 mL, Rfl: 1     levETIRAcetam (KEPPRA) 500 MG tablet, Take 1 tablet (500 mg) by mouth 2 times daily, Disp: , Rfl:      metoprolol succinate ER (TOPROL XL) 25 MG 24 hr tablet, Take 0.5 tablets (12.5 mg) by mouth daily, Disp: 45 tablet, Rfl: 3     nitroGLYcerin (NITROSTAT) 0.4 MG sublingual tablet, For chest pain place 1 tablet under the tongue every 5 minutes for 3 doses. If symptoms persist 5 minutes after 1st dose call 911., Disp: 25 tablet, Rfl: 0     oxyCODONE (ROXICODONE) 5 MG tablet, Take 1 tablet (5 mg) by mouth every 4 hours as needed for severe pain, Disp: , Rfl:      pantoprazole (PROTONIX) 40 MG EC tablet, Take 40 mg by mouth daily, Disp: , Rfl:      spironolactone (ALDACTONE) 25 MG tablet, Take 1 tablet (25 mg) by mouth daily, Disp: 90 tablet, Rfl: 3     STIMULANT LAXATIVE 8.6-50 MG tablet, Take 2 tablets by mouth daily, Disp: 180 tablet, Rfl: 3     Torsemide 40 MG TABS, Take 40 mg by mouth daily (Patient taking differently: Take 40 mg by mouth 2 times daily), Disp: 90 tablet, Rfl: 3     blood glucose (ACCU-CHEK GUIDE) test strip, Use to test blood sugar 3 times daily or as directed., Disp: 100 strip, Rfl: 11     Continuous Blood Gluc Sensor (FREESTYLE KATIE 14 DAY SENSOR) MISC, 1 Device every 14 days Change sensor as directed every  "14 days (Patient not taking: No sig reported), Disp: 2 each, Rfl: 11     potassium chloride ER (KLOR-CON M) 20 MEQ CR tablet, Take 1 tablet (20 mEq) by mouth daily (Patient not taking: Reported on 10/11/2022), Disp: 90 tablet, Rfl: 3    ALLERGIES:  Allergies   Allergen Reactions     Dulaglutide Dizziness     Weak and muscle weak  Other reaction(s): Dizziness  Weak and muscle weak     Januvia [Sitagliptin] Unknown     HEADACHE       FAMILY HISTORY:  No family history on file.    SOCIAL HISTORY:   Social History     Socioeconomic History     Marital status:    Tobacco Use     Smoking status: Never     Smokeless tobacco: Never   Substance and Sexual Activity     Alcohol use: No     Drug use: No     Sexual activity: Yes     Partners: Female     Birth control/protection: None       PHYSICAL EXAM    VITAL SIGNS: BP 99/69   Pulse 109   Temp (!) 102.4  F (39.1  C) (Oral)   Resp 19   Ht 1.575 m (5' 2\")   Wt 49 kg (108 lb)   SpO2 95%   BMI 19.75 kg/m     GENERAL: Awake, Alert, answering questions, appears ill  HEENT: Normal cephalic, Atraumatic, bilateral external ears normal, No scleral icterus, mask in place  NECK: No obvious swelling or abnormality, No stridor  PULMONARY: Patient appears in mild respiratory distress, crackles in the left lung base  CARDIOVASCULAR: Regular rate and rhythm, Distal pulses present and normal.  ABDOMINAL: Soft, Nondistended, Nontender, No flank tenderness, No palpable masses  BACK: No tenderness.  EXTREMITIES: Moves all extremities spontaneously, warm, no edema, No major deformities  NEURO: No facial droop, normal motor function, Normal speech   PSYCH: Normal mood and affect  SKIN: No rashes on visualized skin, dry, warm     LAB:  All pertinent labs reviewed and interpreted.  Results for orders placed or performed during the hospital encounter of 10/11/22   CT Chest Pulmonary Embolism w Contrast    Impression    IMPRESSION:  1.  No PE.  2.  Extensive lower lung predominant " pulmonary opacities can be seen with edema, aspiration, or infection. Bronchial wall thickening can be seen with edema and bronchitis.  3.  Cardiomegaly.  4.  Bladder wall thickening may be from underdistention or cystitis.    CT Abdomen Pelvis w Contrast    Impression    IMPRESSION:  1.  No PE.  2.  Extensive lower lung predominant pulmonary opacities can be seen with edema, aspiration, or infection. Bronchial wall thickening can be seen with edema and bronchitis.  3.  Cardiomegaly.  4.  Bladder wall thickening may be from underdistention or cystitis.    Result Value Ref Range    INR 1.10 0.85 - 1.15   Partial thromboplastin time   Result Value Ref Range    aPTT 30 22 - 38 Seconds   Comprehensive metabolic panel   Result Value Ref Range    Sodium 126 (L) 136 - 145 mmol/L    Potassium 5.1 3.4 - 5.3 mmol/L    Chloride 87 (L) 98 - 107 mmol/L    Carbon Dioxide (CO2) 23 22 - 29 mmol/L    Anion Gap 16 (H) 7 - 15 mmol/L    Urea Nitrogen 41.4 (H) 8.0 - 23.0 mg/dL    Creatinine 1.38 (H) 0.67 - 1.17 mg/dL    Calcium 8.7 (L) 8.8 - 10.2 mg/dL    Glucose 278 (H) 70 - 99 mg/dL    Alkaline Phosphatase 120 40 - 129 U/L    AST 46 10 - 50 U/L    ALT 16 10 - 50 U/L    Protein Total 7.2 6.4 - 8.3 g/dL    Albumin 2.7 (L) 3.5 - 5.2 g/dL    Bilirubin Total 1.5 (H) <=1.2 mg/dL    GFR Estimate 58 (L) >60 mL/min/1.73m2   Result Value Ref Range    Magnesium 2.0 1.7 - 2.3 mg/dL   Result Value Ref Range    Troponin T, High Sensitivity 42 (H) <=22 ng/L   Nt probnp inpatient   Result Value Ref Range    N terminal Pro BNP Inpatient 41,466 (H) 0 - 900 pg/mL   Symptomatic; Unknown Influenza A/B & SARS-CoV2 (COVID-19) Virus PCR Multiplex Nasopharyngeal    Specimen: Nasopharyngeal; Swab   Result Value Ref Range    Influenza A PCR Negative Negative    Influenza B PCR Negative Negative    RSV PCR Positive (A) Negative    SARS CoV2 PCR Negative Negative   UA with Microscopic reflex to Culture    Specimen: Urine, Midstream   Result Value Ref Range     Color Urine Yellow Colorless, Straw, Light Yellow, Yellow    Appearance Urine Clear Clear    Glucose Urine Negative Negative mg/dL    Bilirubin Urine Negative Negative    Ketones Urine Negative Negative mg/dL    Specific Gravity Urine 1.016 1.001 - 1.030    Blood Urine 0.2 mg/dL (A) Negative    pH Urine 6.0 5.0 - 7.0    Protein Albumin Urine 300 (A) Negative mg/dL    Urobilinogen Urine <2.0 <2.0 mg/dL    Nitrite Urine Negative Negative    Leukocyte Esterase Urine Negative Negative    Bacteria Urine Few (A) None Seen /HPF    RBC Urine <1 <=2 /HPF    WBC Urine <1 <=5 /HPF    Hyaline Casts Urine 10 (H) <=2 /LPF   Glucose by meter   Result Value Ref Range    GLUCOSE BY METER POCT 303 (H) 70 - 99 mg/dL   CBC with platelets and differential   Result Value Ref Range    WBC Count      RBC Count 3.44 (L) 4.40 - 5.90 10e6/uL    Hemoglobin 10.4 (L) 13.3 - 17.7 g/dL    Hematocrit 30.5 (L) 40.0 - 53.0 %    MCV 89 78 - 100 fL    MCH 30.2 26.5 - 33.0 pg    MCHC 34.1 31.5 - 36.5 g/dL    RDW 16.5 (H) 10.0 - 15.0 %    Platelet Count 169 150 - 450 10e3/uL   Lactic acid whole blood   Result Value Ref Range    Lactic Acid 3.1 (H) 0.7 - 2.0 mmol/L   Extra Red Top Tube   Result Value Ref Range    Hold Specimen [facility]        RADIOLOGY:  CT Abdomen Pelvis w Contrast   Final Result   Addendum (preliminary) 1 of 1   CORRECTION: No central, lobar, or segmental PE. The subsegmental vessels    in the left lower lobe are not adequately assessed on this study.       Final   IMPRESSION:   1.  No PE.   2.  Extensive lower lung predominant pulmonary opacities can be seen with edema, aspiration, or infection. Bronchial wall thickening can be seen with edema and bronchitis.   3.  Cardiomegaly.   4.  Bladder wall thickening may be from underdistention or cystitis.       CT Chest Pulmonary Embolism w Contrast   Final Result   Addendum (preliminary) 1 of 1   CORRECTION: No central, lobar, or segmental PE. The subsegmental vessels    in the left lower lobe  are not adequately assessed on this study.       Final   IMPRESSION:   1.  No PE.   2.  Extensive lower lung predominant pulmonary opacities can be seen with edema, aspiration, or infection. Bronchial wall thickening can be seen with edema and bronchitis.   3.  Cardiomegaly.   4.  Bladder wall thickening may be from underdistention or cystitis.           EKG:    Date and time: October 11, 2022 at 1651  Rate: 108 bpm  Rhythm: Sinus tachycardia  LA interval: 150 ms  QRS interval: 90 ms  QT/QTc: 352/471 ms  ST changes or T wave changes: No acute ST or T wave abnormality  Change from prior ECG: No significant change from prior  I have independently reviewed and interpreted this EKG.     PROCEDURES:     Critical Care     Performed by: Dr Brenda Lackey  Authorized by: Dr Brenda Lackey  Total critical care time: 35 minutes  Critical care was necessary to treat or prevent imminent or life-threatening deterioration of the following conditions:  Severe sepsis and hypoxia requiring oxygen and IV antibiotics and admission to the hospital  Critical care was time spent personally by me on the following activities: development of treatment plan with patient or surrogate, discussions with consultants, examination of patient, evaluation of patient's response to treatment, obtaining history from patient or surrogate, ordering and performing treatments and interventions, ordering and review of laboratory studies, ordering and review of radiographic studies, re-evaluation of patient's condition and monitoring for potential decompensation.  Critical care time was exclusive of separately billable procedures and treating other patients.        I, Lindsay Bella, am serving as a scribe to document services personally performed by Dr. Lackey based on my observation and the provider's statements to me. IBrenda MD attest that Lindsay Bella is acting in a scribe capacity, has observed my performance of the services and has documented  them in accordance with my direction.    Brenda Lackey M.D.  Emergency Medicine  [facility] EMERGENCY DEPARTMENT  [facility] [facility] [id number] [phone]  Dept: [phone]       Brenda Lackey MD  10/11/22 5886

## 2022-10-11 NOTE — ED TRIAGE NOTES
"Patient hmong speaking  For the past couple days patient has had shortness of breath with increased weakness with a decreased appetite. Patient smells of foul blood.   Patient was seen at [facility] last night, but family report \"they did nothing\"       Patient is a diabetic and has a pacemaker   BS is triage was: 303  Patient was 84% in triage  3L is 95%      "

## 2022-10-12 NOTE — PROGRESS NOTES
Nutrition Therapy    Consult for diet education noted.  Pt rooming in the ED currently.  RD to see patient in next 1-2 days after transfer to patient unit.

## 2022-10-12 NOTE — H&P
Admission History and Physical   Av Fernando, 1960, [id number]  [facility]  Severe sepsis (H) [A41.9, R65.20]  PCP:Stefano Thapa, [phone]   Admitting provider: Tatiana Miranda MD.    Code status:  Full Code          Extended Emergency Contact Information  Primary Emergency Contact: Rosemarie Coy   [place]  Mobile Phone: [phone]  Relation: Cousin  Secondary Emergency Contact: MARTIN FERNANDO  Mobile Phone: [phone]  Relation: Son       Assessment and Plan  Principal Problem:    Severe sepsis (H)  Active Problems:    Type 2 diabetes, HbA1C goal < 8% (H)    Coronary artery disease involving native coronary artery of native heart, unspecified whether angina present    CHF (congestive heart failure), NYHA class I, acute on chronic, combined (H)    Ischemic cardiomyopathy    Hyponatremia    RSV (respiratory syncytial virus infection)    Pneumonia of both lower lobes due to infectious organism    Seizure disorder (H)    Herpes zoster of eye    Stage 3b chronic kidney disease (H)    Acute respiratory failure with hypoxia (H)    Lactic acid acidosis    Av Fernando is a 62 year old male presenting with SOB and weakness    Acute respiratory failure multifactorial with RSV, CHF, underlying PNA  - droplet isolation  - supplemental O2  - alb MDI and flutter valve  - blood cx pending x2  - change abx to vanco and zosyn and will have ID see in am   - managing CHF    Acute on chronic CHF/ICCM  6/2022 echo ejection fraction is 25-30%.  There is inferolateral wall akinesis. apical dyskinesis. moderate anterior wall hypokinesis.Distal inferior akinesis.Mildly decreased right ventricular systolic function. mod-severe to severe (3-4+) mitral regurgitation. The right ventricular systolic pressure is approximated at 48mmHg plus the right atrial pressure. Compared to the prior study dated 2/21/2022, there are changes as noted. There is now severe mitral regurgitation and moderate tricuspid regurgitation  with moderate to severe pulmonary HTN.  - CHF protocol daily weights and strict I/Os  - IV lasix 40mg Q8hrs with softer pressures, holding torsemide and spirolactone for now   - cards to see  - hold on repeating echo   - RSV and possible PNA pushed over?    Sepsis from RSV and underlying PNA  - blood cx pending  - broadened abx to zosyn and vanco   - ID to see     Lactic acidosis bumped up again   - diuresing  - treating as above  - recheck in am     CAD multivessel CAD, prior STEMI who declined CABG,  with LV thrombus history  -on eliquis   - trending trops  - telemetry  - continue home meds asa, statin, BB  - cards to see     Recent ophthalmic HSV per patient completed treatment with home IV infusion and no recurrence on exam    Seizure history   - continue keppra     DM  - home lantus 8 units at bedtime  - novolog sliding scale    CKD 3b  - stable trend labs while diuresing     hyponatremia may be from fluid overload  - diuresing  - trend labs     COVID-19 PCR Results    COVID-19 PCR Results 8/1/21 1/13/22 2/28/22 4/25/22 5/4/22 10/11/22   SARS CoV2 PCR Negative Negative Negative Negative Negative Negative      Comments are available for some flowsheets but are not being displayed.         COVID-19 Antibody Results, Testing for Immunity    COVID-19 Antibody Results, Testing for Immunity   No data to display.           VTE prophylaxis:  DOAC  DIET: Orders Placed This Encounter      Combination Diet Moderate Consistent Carb (60 g CHO per Meal) Diet; 2 gm NA Diet    Drains/Lines: none  Weight bearing status: WBAT  Disposition/Barriers to discharge: pending improvement and specialists recs > 2 midnights   Code Status:Full Code    HPI: Av Fernando is a 62 year old old male with h/o diabetes, chronic afib on anticoagulation, and has a pacemaker presented with SOB and weakness. Patient was 84% in triage and placed on 3L is 95%      Per Chart Review, patient was seen at [facility] ED on 10/10 for  "evaluation of itching. Patient reported feeling like his \"heart has water\" and \"worms crawling under his skin.\" Patient felt really itchy and wants something to relieve the itchiness. He stated taking water pills and but need to  his new prescription. Of note, patient was treated for shingles recently. Patient reported to having scalp itching from his recent shingles. He stated taking a dose of Benadryl, no relief. It appeared that the shingles have mostly resolved, it appears that he is suffering from postherpetic neuralgia. Patient states he is taking gabapentin but it also is not helping. Patient dose of Vistaril which she states helps mildly. Patient was discharged home. Patient presents to [place] ED for evaluation of shortness of breath and generalized weakness.        Past Medical History:   Diagnosis Date     Congestive heart failure (H) 08/2021    ischemic CM with LVEF 30-35%     Coronary artery disease 08/2021    STEMI with multivessel CAD on angiography     Hyperlipidemia      Hypertension      LV (left ventricular) mural thrombus 01/2022     Myocardial infarction (H) 08/2021    inferior STEMI     Type 2 diabetes, HbA1C goal < 8% (H)      Past Surgical History:   Procedure Laterality Date     CV CORONARY ANGIOGRAM N/A 8/1/2021    Procedure: Coronary Angiogram;  Surgeon: Deidre Lopes MD;  Location: [facility]     CV CORONARY ANGIOGRAM N/A 5/6/2022    Procedure: Coronary Angiogram;  Surgeon: Sherif Fuentes MD;  Location: [facility] CV     CV LEFT HEART CATH N/A 5/6/2022    Procedure: Left Heart Catheterization;  Surgeon: Sherif Fuentes MD;  Location: [facility]     CV LEFT VENTRICULOGRAM N/A 8/1/2021    Procedure: Left Ventriculogram;  Surgeon: Deidre Lopes MD;  Location: [facility]     CV PCI N/A 5/6/2022    Procedure: Percutaneous Coronary Intervention;  Surgeon: Sherif Fuentes MD;  Location: [facility] CV     OTHER SURGICAL HISTORY   "    LEG TRAUMA     No family history on file.  Social History     Socioeconomic History     Marital status:      Spouse name: Not on file     Number of children: Not on file     Years of education: Not on file     Highest education level: Not on file   Occupational History     Not on file   Tobacco Use     Smoking status: Never     Smokeless tobacco: Never   Substance and Sexual Activity     Alcohol use: No     Drug use: No     Sexual activity: Yes     Partners: Female     Birth control/protection: None   Other Topics Concern     Parent/sibling w/ CABG, MI or angioplasty before 65F 55M? Not Asked   Social History Narrative     Not on file     Social Determinants of Health     Financial Resource Strain: Not on file   Food Insecurity: Not on file   Transportation Needs: Not on file   Physical Activity: Not on file   Stress: Not on file   Social Connections: Not on file   Intimate Partner Violence: Not on file   Housing Stability: Not on file     Allergies   Allergen Reactions     Dulaglutide Dizziness     Weak and muscle weak  Other reaction(s): Dizziness  Weak and muscle weak     Januvia [Sitagliptin] Unknown     HEADACHE       PRIOR TO ADMISSION MEDICATIONS   (Not in a hospital admission)       REVIEW OF SYSTEMS:  12 point reviewed pertinent negatives and positives in HPI all others negative     PHYSICAL EXAM  Temp:  [99.4  F (37.4  C)-102.4  F (39.1  C)] 99.4  F (37.4  C)  Pulse:  [] 94  Resp:  [16-21] 17  BP: ()/(59-74) 99/60  SpO2:  [95 %-100 %] 100 %  Wt Readings from Last 1 Encounters:   10/11/22 49 kg (108 lb)     Body mass index is 19.75 kg/m .  Physical Exam  Constitutional:       Appearance: He is ill-appearing.      Comments: DIMITRY   HENT:      Head: Normocephalic and atraumatic.      Nose: Nose normal.      Mouth/Throat:      Mouth: Mucous membranes are dry.      Pharynx: Oropharynx is clear.      Comments: Missing multiple teeth  Eyes:      Extraocular Movements: Extraocular movements  intact.      Conjunctiva/sclera: Conjunctivae normal.      Pupils: Pupils are equal, round, and reactive to light.      Comments: No evidence of vesicles   Cardiovascular:      Rate and Rhythm: Regular rhythm. Tachycardia present.      Pulses: Normal pulses.   Pulmonary:      Effort: Pulmonary effort is normal.      Comments: Unlabored, able to complete full sentences  Crackles bilaterally at bases   Abdominal:      General: Bowel sounds are normal. There is no distension.      Palpations: Abdomen is soft.      Tenderness: There is no abdominal tenderness. There is no guarding.   Musculoskeletal:         General: Normal range of motion.      Comments: B/l pretibial edema, per patient slightly better   Skin:     General: Skin is warm and dry.      Capillary Refill: Capillary refill takes less than 2 seconds.      Comments: No vesicles   Neurological:      General: No focal deficit present.      Mental Status: He is alert and oriented to person, place, and time. Mental status is at baseline.         PERTINENT LABS and RADIOLOGY   Results for orders placed or performed during the hospital encounter of 10/11/22   CT Chest Pulmonary Embolism w Contrast    Impression    IMPRESSION:  1.  No PE.  2.  Extensive lower lung predominant pulmonary opacities can be seen with edema, aspiration, or infection. Bronchial wall thickening can be seen with edema and bronchitis.  3.  Cardiomegaly.  4.  Bladder wall thickening may be from underdistention or cystitis.    CT Abdomen Pelvis w Contrast    Impression    IMPRESSION:  1.  No PE.  2.  Extensive lower lung predominant pulmonary opacities can be seen with edema, aspiration, or infection. Bronchial wall thickening can be seen with edema and bronchitis.  3.  Cardiomegaly.  4.  Bladder wall thickening may be from underdistention or cystitis.        Recent Labs   Lab 10/11/22  1937 10/11/22  1719 10/11/22  1640 10/10/22  1044   WBC 5.0  --   --   --    HGB 10.4*  --   --   --    MCV 89   --   --   --      --   --   --    INR  --  1.10  --   --    NA  --  126*  --  130*   POTASSIUM  --  5.1  --  4.4   CHLORIDE  --  87*  --  90*   CO2  --  23  --  24   BUN  --  41.4*  --  38.5*   CR  --  1.38*  --  1.42*   ANIONGAP  --  16*  --  16*   LISA  --  8.7*  --  8.8   GLC  --  278* 303* 294*   ALBUMIN  --  2.7*  --  3.1*   PROTTOTAL  --  7.2  --  6.3*   BILITOTAL  --  1.5*  --  1.3*   ALKPHOS  --  120  --  127   ALT  --  16  --  19   AST  --  46  --  26     EKG:sinus tachycardia no acute ST-T wave changes     Echo 6/21/2022  Severe biatrial enlargement.  The left ventricle is mildly dilated.  The visual ejection fraction is 25-30%.  There is inferolateral wall akinesis.  There is apical dyskinesis.  There is moderate anterior wall hypokinesis.  Distal inferior akinesis.  Mildly decreased right ventricular systolic function  There is mod-severe to severe (3-4+) mitral regurgitation.  There is mild (1+) aortic regurgitation.  There is moderate (2+) tricuspid regurgitation.  The right ventricular systolic pressure is approximated at 48mmHg plus the right atrial pressure.  Compared to the prior study dated 2/21/2022, there are changes as noted. There is now severe mitral regurgitation and moderate tricuspid regurgitation with moderate to severe pulmonary HTN.      Tatiana Miranda MD  [facility]  [phone]

## 2022-10-12 NOTE — CONSULTS
HEART CARE NOTE        Thank you, Dr. Miranda, for asking the [facility] team to see Av Fernando to evaluate ADHF.      Assessment/Recommendations     1. HFrEF/ICM c/b ADHF   Assessment / Plan    Hypervolemic on physical exam; endorses progressive dyspnea, PND and LE edema; elevated lactate like related to low output state; will give IV albumin bolus and resume IV diuresis; low threshold to start low dose inotrope for hemodynamic support - will hold BBlocker for now    GDMT as detailed below - repeat echo negative for LV thrombus    Will need ICD given persistent decline in LVSF - will arrange as outpatient     Current Pharmacotherapy AHA Guideline-Directed Medical Therapy   Lisinopril - on hold given ELIZABETH and borderline BP Lisinopril 20 mg twice daily   Metoprolol 12.5 mg daily - on hold Carvedilol 25 mg twice daily   Spironolactone 25 mg daily - on hold Spironolactone 25 mg once daily   Hydralazine NA Hydralazine 100 mg three times daily   Isosorbide mononitrate 20 mg daily - on hold Isosorbide dinitrate 40 mg three times daily   SGLT2 inhibitor:not started Dapagliflozin or Empagliflozin 10 mg daily      2. Severe multivessel CAD c/b NSTEMI  Assessment / Plan    Hx of STEMI 8/21 - at which time the pateint left AMA    Known severe multivessel disease - patient declines recommended CABG    Continue ASA, high intensity rosuvastatin; denies current chest pain or anginal equivalents    Hx of cardiac MRI significant inducible ischemia in the mid to distal inferoseptal segments, mid-distal anterior segments and mid inferolateral segment. There is almost transmural infarction in the inferolateral walls while the rest of the myocardium is viable; plan for impella assisted PCI initially on hold given patient's absence of symptoms and adequate functional capacity at the time; patient later declined coronary angiogram outright upon readmission; he would not like to pursue intervention; reported that he would  instead like to return to home/[place] to pursue herbal remedy;  Lab results and known coronary anatomy were reviewed at that time with patient and as well as the risks associated with holding off on potential intervention at which time patient voiced comprehension.     3. Sepsis 2/2 RSV and underlying PNA  Assessment / Plan    Supportive care and management per primary team    3. Acute respiratory failure  Assessment / Plan    2/2  RSV, PNA, ADHF; course c/b sepsis    Supportive care per primary team/ID; IV diuresis as above    3. Valvular heart disease  Assessment / Plan    Moderate MR and TR - continue oral afterload reduction     4. DM2  Assessment / Plan    Management per primary team      Clinically Significant Risk Factors Present on Admission         # Hyponatremia: Na = 126 mmol/L (Ref range: 136 - 145 mmol/L) on admission, will monitor as appropriate     # Hypoalbuminemia: Albumin = 2.7 g/dL (Ref range: 3.5 - 5.2 g/dL) on admission, will monitor as appropriate   # Coagulation Defect: home medication list includes an anticoagulant medication        Fluid & Electrolyte Disorders: Volume depletion, unspecified, Other fluid overload and Other disorders of electrolyte and fluid balance, not elsewhere classified    Nephrology: Acute kidney failure, unspecified      History of Present Illness/Subjective    Mr. Av Fernando is a 62 year old male with a PMHx significant for ( per Dr. Miranda's note) h/o diabetes, chronic afib on anticoagulation, and has a pacemaker presented with SOB and weakness. Patient was 84% in triage and placed on 3L is 95%    Today, Mr. Fernando was somnolent, but was responsive to some questions; He endorses orthopnea, progressive dyspnea and LE edema; Management plan as detailed above    ECG: Personally reviewed. sinus tachycardia.    ECHO (personnaly Reviewed):   Severe biatrial enlargement.  The left ventricle is mildly dilated.  The visual ejection fraction is 25-30%.  There is inferolateral  "wall akinesis.  There is apical dyskinesis.  There is moderate anterior wall hypokinesis.  Distal inferior akinesis.  Mildly decreased right ventricular systolic function  There is mod-severe to severe (3-4+) mitral regurgitation.  There is mild (1+) aortic regurgitation.  There is moderate (2+) tricuspid regurgitation.  The right ventricular systolic pressure is approximated at 48mmHg plus the  right atrial pressure.  Compared to the prior study dated 2/21/2022, there are changes as noted. There  is now severe mitral regurgitation and moderate tricuspid regurgitation with  moderate to severe pulmonary HTN.        Physical Examination Review of Systems   BP 99/60   Pulse 94   Temp 99.4  F (37.4  C) (Oral)   Resp 17   Ht 1.575 m (5' 2\")   Wt 49 kg (108 lb)   SpO2 100%   BMI 19.75 kg/m    Body mass index is 19.75 kg/m .  Wt Readings from Last 3 Encounters:   10/11/22 49 kg (108 lb)   06/09/22 50.8 kg (112 lb 1.6 oz)   05/16/22 53.7 kg (118 lb 4.8 oz)     General Appearance:   normal body habitus   ENT/Mouth: membranes moist, no oral lesions or bleeding gums.      EYES:  no scleral icterus, normal conjunctivae   Neck: no carotid bruits or thyromegaly   Chest/Lungs:   lungs are clear to auscultation, no rales or wheezing, equal chest wall expansion    Cardiovascular:   Regular. Normal first and second heart sounds with no murmurs, rubs, or gallops; the carotid, radial and posterior tibial pulses are intact, + JVD and LE edema bilaterally    Abdomen:  no organomegaly, masses, bruits, or tenderness; bowel sounds are present   Extremities: no cyanosis or clubbing   Skin: no xanthelasma, warm.    Neurologic: somnolent     Psychiatric: somnolent     A complete 10 systems ROS was reviewed  And is negative except what is listed in the HPI.          Medical History  Surgical History Family History Social History   Past Medical History:   Diagnosis Date     Congestive heart failure (H) 08/2021    ischemic CM with LVEF " 30-35%     Coronary artery disease 08/2021    STEMI with multivessel CAD on angiography     Hyperlipidemia      Hypertension      LV (left ventricular) mural thrombus 01/2022     Myocardial infarction (H) 08/2021    inferior STEMI     Type 2 diabetes, HbA1C goal < 8% (H)     Past Surgical History:   Procedure Laterality Date     CV CORONARY ANGIOGRAM N/A 8/1/2021    Procedure: Coronary Angiogram;  Surgeon: Deidre Lopes MD;  Location: [facility] CV     CV CORONARY ANGIOGRAM N/A 5/6/2022    Procedure: Coronary Angiogram;  Surgeon: Sherif Fuentes MD;  Location: [facility]     CV LEFT HEART CATH N/A 5/6/2022    Procedure: Left Heart Catheterization;  Surgeon: Sherif Fuentes MD;  Location: [facility]     CV LEFT VENTRICULOGRAM N/A 8/1/2021    Procedure: Left Ventriculogram;  Surgeon: Deidre Lopes MD;  Location: [facility]     CV PCI N/A 5/6/2022    Procedure: Percutaneous Coronary Intervention;  Surgeon: Sherif Fuentes MD;  Location: [facility] CV     OTHER SURGICAL HISTORY      LEG TRAUMA    no family history of premature coronary artery disease Social History     Socioeconomic History     Marital status:      Spouse name: Not on file     Number of children: Not on file     Years of education: Not on file     Highest education level: Not on file   Occupational History     Not on file   Tobacco Use     Smoking status: Never     Smokeless tobacco: Never   Substance and Sexual Activity     Alcohol use: No     Drug use: No     Sexual activity: Yes     Partners: Female     Birth control/protection: None   Other Topics Concern     Parent/sibling w/ CABG, MI or angioplasty before 65F 55M? Not Asked   Social History Narrative     Not on file     Social Determinants of Health     Financial Resource Strain: Not on file   Food Insecurity: Not on file   Transportation Needs: Not on file   Physical Activity: Not on file   Stress: Not on file   Social Connections:  Not on file   Intimate Partner Violence: Not on file   Housing Stability: Not on file           Lab Results    Chemistry/lipid CBC Cardiac Enzymes/BNP/TSH/INR   Lab Results   Component Value Date    CHOL 125 05/06/2022    HDL 56 05/06/2022    TRIG 43 05/06/2022    BUN 41.4 (H) 10/11/2022     (L) 10/11/2022    CO2 23 10/11/2022    Lab Results   Component Value Date    WBC 5.0 10/11/2022    HGB 10.4 (L) 10/11/2022    HCT 30.5 (L) 10/11/2022    MCV 89 10/11/2022     10/11/2022    Lab Results   Component Value Date    TROPONINI 0.71 (HH) 05/04/2022    BNP 4,308 (H) 05/04/2022    INR 1.10 10/11/2022     Lab Results   Component Value Date    TROPONINI 0.71 (HH) 05/04/2022          Weight:    Wt Readings from Last 3 Encounters:   10/11/22 49 kg (108 lb)   06/09/22 50.8 kg (112 lb 1.6 oz)   05/16/22 53.7 kg (118 lb 4.8 oz)       Allergies  Allergies   Allergen Reactions     Dulaglutide Dizziness     Weak and muscle weak  Other reaction(s): Dizziness  Weak and muscle weak     Januvia [Sitagliptin] Unknown     HEADACHE         Surgical History  Past Surgical History:   Procedure Laterality Date     CV CORONARY ANGIOGRAM N/A 8/1/2021    Procedure: Coronary Angiogram;  Surgeon: Deidre Lopes MD;  Location: [facility] CV     CV CORONARY ANGIOGRAM N/A 5/6/2022    Procedure: Coronary Angiogram;  Surgeon: Sherif Fuentes MD;  Location: [facility] CV     CV LEFT HEART CATH N/A 5/6/2022    Procedure: Left Heart Catheterization;  Surgeon: Sherif Fuentes MD;  Location: [facility] CV     CV LEFT VENTRICULOGRAM N/A 8/1/2021    Procedure: Left Ventriculogram;  Surgeon: Deidre Lopes MD;  Location: [facility] C[facility] CV     CV PCI N/A 5/6/2022    Procedure: Percutaneous Coronary Intervention;  Surgeon: Sherif Fuentes MD;  Location: [facility] CATH LAB CV     OTHER SURGICAL HISTORY      LEG TRAUMA       Social History  Tobacco:   History   Smoking Status     Never   Smokeless Tobacco     Never     Alcohol:   Social History    Substance and Sexual Activity      Alcohol use: No   Illicit Drugs:   History   Drug Use No       Family History  No family history on file.       Dougie Gupta MD on 10/12/2022      cc: Stefano Thapa,

## 2022-10-12 NOTE — PHARMACY-VANCOMYCIN DOSING SERVICE
Pharmacy Vancomycin Initial Note  Date of Service 2022  Patient's  1960  62 year old, male    Indication: Healthcare-Associated Pneumonia and Sepsis    Current estimated CrCl = Estimated Creatinine Clearance: 38.5 mL/min (A) (based on SCr of 1.38 mg/dL (H)).    Creatinine for last 3 days  10/10/2022: 10:44 AM Creatinine 1.42 mg/dL  10/11/2022:  5:19 PM Creatinine 1.38 mg/dL    Recent Vancomycin Level(s) for last 3 days  No results found for requested labs within last 72 hours.      Vancomycin IV Administrations (past 72 hours)                   vancomycin (VANCOCIN) 1000 mg in dextrose 5% 200 mL PREMIX (mg) 1,000 mg New Bag 10/12/22 0122                Nephrotoxins and other renal medications (From now, onward)    Start     Dose/Rate Route Frequency Ordered Stop    10/12/22 0506  piperacillin-tazobactam (ZOSYN) 3.375 g vial to attach to  mL bag        Note to Pharmacy: Extended infusion dosing to start 6 hours after initial infusion.   [facility].    3.375 g  over 240 Minutes Intravenous EVERY 8 HOURS 10/11/22 2306      10/11/22 2300  furosemide (LASIX) injection 40 mg         40 mg  over 1-3 Minutes Intravenous EVERY 8 HOURS 10/11/22 2248            Contrast Orders - past 72 hours (72h ago, onward)    Start     Dose/Rate Route Frequency Stop    10/11/22 2030  iopamidol (ISOVUE-370) solution 80 mL         80 mL Intravenous ONCE 10/11/22 2025          [employer] Prediction of Planned Initial Vancomycin Regimen  Loading dose: 1000 mg IV on 10/12/22 at 01:22  Regimen: 500 mg IV every 12 hours.  Start time: 0800 on 10/12/2022  Exposure target: AUC24 (range)400-600 mg/L.hr   AUC24,ss: 523 mg/L.hr  Probability of AUC24 > 400: 79 %  Ctrough,ss: 18.0 mg/L  Probability of Ctrough,ss > 20: 39 %  Probability of nephrotoxicity (Lodise JEREMIAS ): 14 %          Plan:  1. Start vancomycin  500 mg IV q12h. This regimen is to start at 08:00 (~6.5 hours after loading dose).  This timing will minimize sub-therapeutic intervals  2. Vancomycin monitoring method: AUC  3. Vancomycin therapeutic monitoring goal: 400-600 mg*h/L  4. Pharmacy will check vancomycin levels as appropriate in 1-3 Days.    5. Serum creatinine levels will be ordered a minimum of twice weekly.      Jannette Oquendo, [facility]

## 2022-10-12 NOTE — CONSULTS
Consultation - INFECTIOUS DISEASE CONSULTATION  Av Fernando,  1960, MRN [id number]      Severe sepsis (H) [A41.9, R65.20]    PCP: Stefano Thapa, [phone]   Code status:  Full Code               Chief Complaint: Severe sepsis (H)     Assessment:  Av Fernando is a 62 year old old male with   1. DM II with good control.   2. History of Ischemic heart disease/STEMI  3. History of CHF with EF at 20-25%.  4.  Sick contact with daughter, granddaughter, and wife.  They have had fever, cough, shortness of breath.  5. Acute hypoxic respiratory failure. Multifactorial with RSV infection and CHF exacerbation. Positive nasal swab for RSV PCR on 10/11. Influenza A/B and COVID negative on 10/11. Pro-BNP 41,466. Fissures filled with fluid on CT/chest.  On room air  6. Sepsis with Lactic acid 6.7. .  Secondary to RSV infection.  7. ELIZABETH. Creatinine at 1.42 on admission from normal 0.79 on 10/3. Creatinine increasing.     Recommendations:   Discontinue IV Vanco and Zosyn  Supportive care.  Monitor off antibiotic.  Diuretics per primary team.    Discussed with the patient, nursing staff.    Thank you for letting us be part of the patient care team. We will follow.    Sheri Singh MD,MD  [facility].   [facility]  Office Telephone [phone].  Fax [phone]  [facility] paging    HPI:    Av Fernando is a 62 year old old male. History is provided by the patient, chart review.    ID is asked to see this patient for sepsis.  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  Sick contact with granddaughter, daughter, wife with respiratory symptoms and fever.  Patient also started to have fever, chills, shortness of breath, and cough around the same time.  He became progressively weak and is now admitted.  Found to have RSV.  CT scan of the chest is negative for PE along with bilateral  infiltrate.  Very high BNP.  Started initially on ceftriaxone and azithromycin.  Changed to vancomycin and Zosyn.  Patient currently on room air.        ===========================================    Medical History  Past Medical History:   Diagnosis Date     Congestive heart failure (H) 08/2021    ischemic CM with LVEF 30-35%     Coronary artery disease 08/2021    STEMI with multivessel CAD on angiography     Hyperlipidemia      Hypertension      LV (left ventricular) mural thrombus 01/2022     Myocardial infarction (H) 08/2021    inferior STEMI     Type 2 diabetes, HbA1C goal < 8% (H)         Surgical History  Past Surgical History:   Procedure Laterality Date     CV CORONARY ANGIOGRAM N/A 8/1/2021    Procedure: Coronary Angiogram;  Surgeon: Deidre Lopes MD;  Location: [facility] CATH LAB CV     CV CORONARY ANGIOGRAM N/A 5/6/2022    Procedure: Coronary Angiogram;  Surgeon: Sherif Fuentes MD;  Location: [facility] CATH LAB CV     CV LEFT HEART CATH N/A 5/6/2022    Procedure: Left Heart Catheterization;  Surgeon: Sherif Fuentes MD;  Location: [facility] CATH LAB CV     CV LEFT VENTRICULOGRAM N/A 8/1/2021    Procedure: Left Ventriculogram;  Surgeon: Deidre Lopes MD;  Location: [facility] CATH LAB CV     CV PCI N/A 5/6/2022    Procedure: Percutaneous Coronary Intervention;  Surgeon: Sherif Fuentes MD;  Location: [facility] CATH LAB CV     OTHER SURGICAL HISTORY      LEG TRAUMA            Social History  Reviewed, and he  reports that he has never smoked. He has never used smokeless tobacco. He reports that he does not drink alcohol and does not use drugs.        Family History  No family history of recurrent infections.    Psychosocial Needs  Social History     Social History Narrative     Not on file     Additional psychosocial needs reviewed per nursing assessment.       Allergies   Allergen Reactions     Dulaglutide Dizziness     Weak and muscle weak  Other reaction(s): Dizziness  Weak and muscle weak      Januvia [Sitagliptin] Unknown     HEADACHE        (Not in a hospital admission)       Review of Systems:  Negative except for findings in the HPI Physical Exam:  Temp:  [99.4  F (37.4  C)-102.4  F (39.1  C)] 99.4  F (37.4  C)  Pulse:  [] 90  Resp:  [16-27] 26  BP: ()/(59-74) 103/61  SpO2:  [95 %-100 %] 96 %      Gen: Pleasant in no acute distress.  HEENT: NCAT. EOMI. PERRL.  Neck: No bruit, JVD or thyromegaly.  Lungs: Clear to ascultation bilat with no crackles or wheezes.  Card: RRR. NSR. No RMG. Peripheral pulses present and symmetric. No edema.  Abd: Soft NT ND. No mass. Normal bowel sounds.  Skin: No rash.  Extr: No edema.  Neuro: Alert and oriented to place time and person.          ============================    Pertinent Labs  personally reviewed.   Recent Labs   Lab 10/12/22  0704 10/11/22  1937   WBC 5.7 5.0   HGB 10.5* 10.4*   HCT 33.0* 30.5*   * 169       Recent Labs   Lab 10/12/22  0704 10/11/22  1719 10/10/22  1044   * 126* 130*   CO2 21* 23 24   BUN 46.8* 41.4* 38.5*   ALBUMIN 2.5* 2.7* 3.1*   ALKPHOS  --  120 127   ALT  --  16 19   AST  --  46 26       MICROBIOLOGY DATA:  Personally reviewed      Pertinent Radiology  personally reviewed.    Study Result    Narrative & Impression   EXAM: CT ABDOMEN PELVIS W CONTRAST, CT CHEST PULMONARY EMBOLISM W CONTRAST  LOCATION: [facility]  DATE/TIME: 10/11/2022 8:27 PM     INDICATION: fever  COMPARISON: 1/19/2022 and 11/5/2021   TECHNIQUE: CT chest pulmonary angiogram and routine CT abdomen pelvis with IV contrast. Arterial phase through the chest and venous phase through the abdomen and pelvis. Multiplanar reformats and MIP reconstructions were performed. Dose reduction   techniques were used.   CONTRAST: isovue 370  80ml     FINDINGS:  ANGIOGRAM CHEST: The main pulmonary artery is 32 mm in diameter, which suggests pulmonary hypertension. No central, lobar, or segmental PE. Subsegmental vessels in the left lower  lobe are not well assessed due to lack of contrast opacification.  Thoracic   aorta is negative for dissection.     LUNGS AND PLEURA: Bronchial wall thickening is present. There are numerous lower lung predominant centrilobular pulmonary nodules which are confluent in some areas. There is mild bronchiectasis in the superior segment of the left lower lobe.      MEDIASTINUM/AXILLAE: The heart is enlarged. A right lower paratracheal lymph node is 15 mm. Otherwise there are prominent and mildly enlarged mediastinal and hilar lymph nodes. There is a left chest wall ICD.      CORONARY ARTERY CALCIFICATION: Moderate.     HEPATOBILIARY: Normal.     PANCREAS: Normal.     SPLEEN: Normal.     ADRENAL GLANDS: Normal.     KIDNEYS/BLADDER: Wall thickening of the urinary bladder.      BOWEL: Normal.     LYMPH NODES: Normal.     VASCULATURE: Atherosclerotic disease.      PELVIC ORGANS: Normal.     MUSCULOSKELETAL: Normal.                                                                         IMPRESSION:  1.  No PE.  2.  Extensive lower lung predominant pulmonary opacities can be seen with edema, aspiration, or infection. Bronchial wall thickening can be seen with edema and bronchitis.  3.  Cardiomegaly.  4.  Bladder wall thickening may be from underdistention or cystitis.      Addendum    CORRECTION: No central, lobar, or segmental PE. The subsegmental vessels in the left lower lobe are not adequately assessed on this study.    Addended by Fahrner, Lester, MD on 10/11/2022  8:57 PM     Study Result    Narrative & Impression   EXAM: CT ABDOMEN PELVIS W CONTRAST, CT CHEST PULMONARY EMBOLISM W CONTRAST  LOCATION: [facility]  DATE/TIME: 10/11/2022 8:27 PM     INDICATION: fever  COMPARISON: 1/19/2022 and 11/5/2021   TECHNIQUE: CT chest pulmonary angiogram and routine CT abdomen pelvis with IV contrast. Arterial phase through the chest and venous phase through the abdomen and pelvis. Multiplanar reformats and  MIP reconstructions were performed. Dose reduction   techniques were used.   CONTRAST: isovue 370  80ml     FINDINGS:  ANGIOGRAM CHEST: The main pulmonary artery is 32 mm in diameter, which suggests pulmonary hypertension. No central, lobar, or segmental PE. Subsegmental vessels in the left lower lobe are not well assessed due to lack of contrast opacification.  Thoracic   aorta is negative for dissection.     LUNGS AND PLEURA: Bronchial wall thickening is present. There are numerous lower lung predominant centrilobular pulmonary nodules which are confluent in some areas. There is mild bronchiectasis in the superior segment of the left lower lobe.      MEDIASTINUM/AXILLAE: The heart is enlarged. A right lower paratracheal lymph node is 15 mm. Otherwise there are prominent and mildly enlarged mediastinal and hilar lymph nodes. There is a left chest wall ICD.      CORONARY ARTERY CALCIFICATION: Moderate.     HEPATOBILIARY: Normal.     PANCREAS: Normal.     SPLEEN: Normal.     ADRENAL GLANDS: Normal.     KIDNEYS/BLADDER: Wall thickening of the urinary bladder.      BOWEL: Normal.     LYMPH NODES: Normal.     VASCULATURE: Atherosclerotic disease.      PELVIC ORGANS: Normal.     MUSCULOSKELETAL: Normal.                                                                         IMPRESSION:  1.  No PE.  2.  Extensive lower lung predominant pulmonary opacities can be seen with edema, aspiration, or infection. Bronchial wall thickening can be seen with edema and bronchitis.  3.  Cardiomegaly.  4.  Bladder wall thickening may be from underdistention or cystitis.           independent

## 2022-10-12 NOTE — PROGRESS NOTES
RCAT Treatment Plan    Patient Score: 7  Patient Acuity: 4      Clinical Indication for Therapy: productive cough    Therapy Ordered: Aerobika BID    Assessment Summary: Endorses shortness of breath.  Dyspnea on exertion.  At rest respiratory effort is normal.  BS with coarse crackles bilaterally.  On RA. Pt has a strong productive cough.  Demonstrates effective use of Aerobika.  Educated on use.  RT will continue to follow BID.  RCAT reassess Q72h or as needed.    Anali Tariq, RT  10/12/2022

## 2022-10-12 NOTE — PROGRESS NOTES
Care Management Note    Length of Stay (days): 1    Expected Discharge Date:  To be determined.      Concerns to be Addressed:   Alteration in cardiopulmonary status, work up in progress, cultures pending: IV Lasix every 8 hours, IV Vancomycin and IV Zosyn.   Patient plan of care discussed at interdisciplinary rounds: Yes    Anticipated Discharge Disposition:  Discharge goal is home pending response to treatment, medical needs and mobility closer to discharge.      Anticipated Discharge Services:  To be determined by destination, patient/family preferences, medical needs and mobility status closer to the time of discharge.  Anticipated Discharge DME:  To be determined.     Patient/family educated on Medicare website which has current facility and service quality ratings:  NA  Education Provided on the Discharge Plan:  Per team  Patient/Family in Agreement with the Plan:  yes    Referrals Placed by CM/SW:  None  Private pay costs discussed: Not applicable     Additional Information:  Per chart review, patient is  and lives with his wife Jasmin and their children. He is independent with activities of daily living at baseline. Anticipated goal is home with family transport but CM will follow along.     Anali Callejas RN

## 2022-10-12 NOTE — PHARMACY-ADMISSION MEDICATION HISTORY
Pharmacy Note - Admission Medication History    Pertinent Provider Information: None     ______________________________________________________________________    Prior To Admission (PTA) med list completed and updated in EMR.       PTA Med List   Medication Sig Last Dose     apixaban ANTICOAGULANT (ELIQUIS ANTICOAGULANT) 5 MG tablet Take 1 tablet (5 mg) by mouth 2 times daily 10/10/2022     aspirin (ASA) 81 MG EC tablet Take 1 tablet (81 mg) by mouth daily Start tomorrow. 10/10/2022     atorvastatin (LIPITOR) 80 MG tablet Take 1 tablet (80 mg) by mouth daily 10/10/2022     gabapentin (NEURONTIN) 100 MG capsule Take 1 capsule (100 mg) by mouth 3 times daily 10/10/2022     hydrOXYzine (ATARAX) 25 MG tablet Take 1 tablet (25 mg) by mouth 4 times daily as needed for itching 10/11/2022 at AM     insulin glargine (LANTUS PEN) 100 UNIT/ML pen Inject 6 Units Subcutaneous At Bedtime (Patient taking differently: Inject 8 Units Subcutaneous At Bedtime) 10/10/2022     insulin lispro (HUMALOG KWIKPEN) 100 UNIT/ML (1 unit dial) KWIKPEN Inject 3-7 Units Subcutaneous 3 times daily (before meals) 10/10/2022     levETIRAcetam (KEPPRA) 500 MG tablet Take 1 tablet (500 mg) by mouth 2 times daily 10/10/2022     metoprolol succinate ER (TOPROL XL) 25 MG 24 hr tablet Take 0.5 tablets (12.5 mg) by mouth daily 10/10/2022     nitroGLYcerin (NITROSTAT) 0.4 MG sublingual tablet For chest pain place 1 tablet under the tongue every 5 minutes for 3 doses. If symptoms persist 5 minutes after 1st dose call 911.      oxyCODONE (ROXICODONE) 5 MG tablet Take 1 tablet (5 mg) by mouth every 4 hours as needed for severe pain      pantoprazole (PROTONIX) 40 MG EC tablet Take 40 mg by mouth daily 10/10/2022     spironolactone (ALDACTONE) 25 MG tablet Take 1 tablet (25 mg) by mouth daily 10/10/2022     STIMULANT LAXATIVE 8.6-50 MG tablet Take 2 tablets by mouth daily 10/10/2022     Torsemide 40 MG TABS Take 40 mg by mouth daily (Patient taking differently:  Take 40 mg by mouth 2 times daily) 10/11/2022 at x1 AM       Information source(s): Family member, CareEverywhere/SureScripts and List provided by family member  Method of interview communication: in-person    Summary of Changes to PTA Med List  New: Gabapentin, hydroxyzine, levetiracetam, oxycodone  Discontinued: Famotidine, magnesium, potassium (marked potassium as not taking)  Changed: Lantus from 6 units to 8 units daily, torsemide from daily to BID     Patient was asked about OTC/herbal products specifically.  PTA med list reflects this.    In the past week, patient estimated taking medication this percent of the time:  Unable to assess    Patient does not use any multi-dose medications prior to admission.    The information provided in this note is only as accurate as the sources available at the time of the update(s).    Thank you for the opportunity to participate in the care of this patient.    Dot Seymour, [facility]  10/11/2022 8:15 PM

## 2022-10-12 NOTE — PROGRESS NOTES
[facility]    Medicine Progress Note - Hospitalist Service    Date of Admission:  10/11/2022    Assessment & Plan        Av Fernando is a 62 year old male presenting with SOB, cough,  and weakness     Severe sepsis from RSV-improving  Lactic acidosis due to sepsis and low output state-tachypnea  Fever secondary to RSV  -Lactate initially elevated at 6.7, repeat 3.5 after 1 L bolus, will repeat in the morning  -Has been breathing in the 20s to 30s since admission, some concern for exhaustion as he is very lethargic on exam  -ABG 10/12: pH 7.47, PCO2 32, PO2 85  - blood cx pending  -Antibiotics discontinued  - ID consulted, appreciate assistance: Discontinued empiric antibiotics  -Ibuprofen for fever as he states he is allergic to Tylenol  -Droplet precaution    Shortness of breath  Acute hypoxic respiratory failure  - supplemental O2 as needed-has not been having hypoxia today  but has been being given 2 L O2 possibly due to tachypnea  -On exam this morning was off O2 and sating in the high 90s on RA  -Had hypoxia on arrival at 84%  - alb MDI and flutter valve  - managing CHF     Acute on chronic systolic heart failure  Ischemic cardiomyopathy  - 6/2022 echo ejection fraction is 25-30%.  There is inferolateral wall akinesis. apical dyskinesis. moderate anterior wall hypokinesis.Distal inferior akinesis.Mildly decreased right ventricular systolic function. mod-severe to severe (3-4+) mitral regurgitation. The right ventricular systolic pressure is approximated at 48mmHg plus the right atrial pressure. Compared to the prior study dated 2/21/2022, there are changes as noted. There is now severe mitral regurgitation and moderate tricuspid regurgitation with moderate to severe pulmonary HTN.  - CHF protocol daily weights and strict I/Os  - IV lasix 40mg Q8hrs with softer pressures, holding torsemide and spirolactone for now   - Cardiology consulted, appreciate assistance, giving IV albumin and  resuming IV diuresis and holding beta-blocker, will need ICD given persistent decline in LV SF which will be arranged outpatient    Acute kidney injury in the setting of sepsis and low output cardiac failure on CKD stage IIIb?  -Baseline difficult to determine creatinine last checked was normal, on 10/3 was 0.79, has recent history of AKIs  -Creatinine 1.42 on admission, now 1.67  -We will avoid nephrotoxic agents howeverHaving to use ibuprofen for fevers due to patient's allergy to Tylenol  -We will continue to monitor     CAD multivessel   -prior STEMI who declined CABG,  with LV thrombus history  - on eliquis   -High-sensitivity troponin 42 on admission  - telemetry  - continue home meds asa, statin, holding BB as above     Seizure history   - continue keppra      Diabetes mellitus type 2 on long-term insulin  - home lantus 8 units at bedtime  - novolog sliding scale     Hyponatremia-resolved  -Will monitor     Recent ophthalmic HSV per patient completed treatment with home IV infusion and no recurrence on exam, does not need precautions for this       Diet: Combination Diet Moderate Consistent Carb (60 g CHO per Meal) Diet; 2 gm NA Diet  Fluid restriction 1500 ML FLUID    DVT Prophylaxis: DOAC  Bustamante Catheter: Not present  Central Lines: None  Cardiac Monitoring: ACTIVE order. Indication: Acute decompensated heart failure (48 hours)  Code Status: Full Code      Disposition Plan     Expected Discharge Date: 10/13/2022                The patient's care was discussed with the Bedside Nurse and Patient.    Mary Watkins MD  Hospitalist Service  [facility]  Securely message with the Vocera Web Console (learn more here)  Text page via [employer] Paging/Directory         Clinically Significant Risk Factors Present on Admission         # Hyponatremia: Na = 128 mmol/L (Ref range: 136 - 145 mmol/L) on admission, will monitor as appropriate     # Hypoalbuminemia: Albumin = 2.5 g/dL (Ref range: 3.5  - 5.2 g/dL) on admission, will monitor as appropriate  # Acute Kidney Injury, unspecified: based on a >150% or 0.3 mg/dL increase in creatinine on admission compared to past 90 day average, will monitor renal function  # Coagulation Defect: home medication list includes an anticoagulant medication    # Acute systolic heart failure: last echo with EF <40% and receiving IV diuretics         ______________________________________________________________________    Interval History   Mr. Fernando was very lethargic on exam today.  He was saturating well initially today without oxygen but was having significant tachypnea likely due to acidosis.  He has been using 2 L on and off throughout the day but has not had any hypoxia and is saturating in the 100%.  He looks sick and he does not feel well.  He did have contact with a couple of family members who are also sick likely with RSV.  Besides generalized malaise he does not have any other complaints.  However this afternoon he was complaining of itchiness and pain in his head.  He stated that he was allergic to Tylenol per nursing.  We will have to use ibuprofen for fever.    Data reviewed today: I reviewed all medications, new labs and imaging results over the last 24 hours. I personally reviewed no images or EKG's today.    Physical Exam   Vital Signs: Temp: 97.6  F (36.4  C) Temp src: Oral BP: 92/63 Pulse: 90   Resp: 25 SpO2: 100 % O2 Device: Nasal cannula Oxygen Delivery: 2 LPM  Weight: 107 lbs 15.98 oz  General Appearance: Awake, lethargic, ill appearing  Respiratory: Crackles at the bilateral bases posteriorly, no wheeze noted  Cardiovascular: RRR, no murmur noted  GI: soft, nontender, non distended, normal bowel sounds  Skin: no jaundice, no rash      Data   Recent Labs   Lab 10/12/22  1733 10/12/22  1152 10/12/22  0958 10/12/22  0704 10/11/22  1937 10/11/22  1719 10/11/22  1640 10/10/22  1044   WBC  --   --   --  5.7 5.0  --   --   --    HGB  --   --   --  10.5* 10.4*   --   --   --    MCV  --   --   --  93 89  --   --   --    PLT  --   --   --  138* 169  --   --   --    INR  --   --   --   --   --  1.10  --   --    NA  --   --   --  128*  --  126*  --  130*   POTASSIUM  --   --   --  4.6  --  5.1  --  4.4   CHLORIDE  --   --   --  91*  --  87*  --  90*   CO2  --   --   --  21*  --  23  --  24   BUN  --   --   --  46.8*  --  41.4*  --  38.5*   CR  --   --   --  1.67*  --  1.38*  --  1.42*   ANIONGAP  --   --   --  16*  --  16*  --  16*   LISA  --   --   --  8.2*  --  8.7*  --  8.8   * 244* 312* 358*  --  278*   < > 294*   ALBUMIN  --   --   --  2.5*  --  2.7*  --  3.1*   PROTTOTAL  --   --   --   --   --  7.2  --  6.3*   BILITOTAL  --   --   --   --   --  1.5*  --  1.3*   ALKPHOS  --   --   --   --   --  120  --  127   ALT  --   --   --   --   --  16  --  19   AST  --   --   --   --   --  46  --  26    < > = values in this interval not displayed.     Recent Results (from the past 24 hour(s))   CT Chest Pulmonary Embolism w Contrast    Addendum: 10/11/2022    CORRECTION: No central, lobar, or segmental PE. The subsegmental vessels in the left lower lobe are not adequately assessed on this study.       Narrative    EXAM: CT ABDOMEN PELVIS W CONTRAST, CT CHEST PULMONARY EMBOLISM W CONTRAST  LOCATION: [facility]  DATE/TIME: 10/11/2022 8:27 PM    INDICATION: fever  COMPARISON: 1/19/2022 and 11/5/2021   TECHNIQUE: CT chest pulmonary angiogram and routine CT abdomen pelvis with IV contrast. Arterial phase through the chest and venous phase through the abdomen and pelvis. Multiplanar reformats and MIP reconstructions were performed. Dose reduction   techniques were used.   CONTRAST: isovue 370  80ml    FINDINGS:  ANGIOGRAM CHEST: The main pulmonary artery is 32 mm in diameter, which suggests pulmonary hypertension. No central, lobar, or segmental PE. Subsegmental vessels in the left lower lobe are not well assessed due to lack of contrast opacification.   Thoracic   aorta is negative for dissection.    LUNGS AND PLEURA: Bronchial wall thickening is present. There are numerous lower lung predominant centrilobular pulmonary nodules which are confluent in some areas. There is mild bronchiectasis in the superior segment of the left lower lobe.     MEDIASTINUM/AXILLAE: The heart is enlarged. A right lower paratracheal lymph node is 15 mm. Otherwise there are prominent and mildly enlarged mediastinal and hilar lymph nodes. There is a left chest wall ICD.     CORONARY ARTERY CALCIFICATION: Moderate.    HEPATOBILIARY: Normal.    PANCREAS: Normal.    SPLEEN: Normal.    ADRENAL GLANDS: Normal.    KIDNEYS/BLADDER: Wall thickening of the urinary bladder.     BOWEL: Normal.    LYMPH NODES: Normal.    VASCULATURE: Atherosclerotic disease.     PELVIC ORGANS: Normal.    MUSCULOSKELETAL: Normal.        Impression    IMPRESSION:  1.  No PE.  2.  Extensive lower lung predominant pulmonary opacities can be seen with edema, aspiration, or infection. Bronchial wall thickening can be seen with edema and bronchitis.  3.  Cardiomegaly.  4.  Bladder wall thickening may be from underdistention or cystitis.    CT Abdomen Pelvis w Contrast    Addendum: 10/11/2022    CORRECTION: No central, lobar, or segmental PE. The subsegmental vessels in the left lower lobe are not adequately assessed on this study.       Narrative    EXAM: CT ABDOMEN PELVIS W CONTRAST, CT CHEST PULMONARY EMBOLISM W CONTRAST  LOCATION: [facility]  DATE/TIME: 10/11/2022 8:27 PM    INDICATION: fever  COMPARISON: 1/19/2022 and 11/5/2021   TECHNIQUE: CT chest pulmonary angiogram and routine CT abdomen pelvis with IV contrast. Arterial phase through the chest and venous phase through the abdomen and pelvis. Multiplanar reformats and MIP reconstructions were performed. Dose reduction   techniques were used.   CONTRAST: isovue 370  80ml    FINDINGS:  ANGIOGRAM CHEST: The main pulmonary artery is 32 mm in  diameter, which suggests pulmonary hypertension. No central, lobar, or segmental PE. Subsegmental vessels in the left lower lobe are not well assessed due to lack of contrast opacification.  Thoracic   aorta is negative for dissection.    LUNGS AND PLEURA: Bronchial wall thickening is present. There are numerous lower lung predominant centrilobular pulmonary nodules which are confluent in some areas. There is mild bronchiectasis in the superior segment of the left lower lobe.     MEDIASTINUM/AXILLAE: The heart is enlarged. A right lower paratracheal lymph node is 15 mm. Otherwise there are prominent and mildly enlarged mediastinal and hilar lymph nodes. There is a left chest wall ICD.     CORONARY ARTERY CALCIFICATION: Moderate.    HEPATOBILIARY: Normal.    PANCREAS: Normal.    SPLEEN: Normal.    ADRENAL GLANDS: Normal.    KIDNEYS/BLADDER: Wall thickening of the urinary bladder.     BOWEL: Normal.    LYMPH NODES: Normal.    VASCULATURE: Atherosclerotic disease.     PELVIC ORGANS: Normal.    MUSCULOSKELETAL: Normal.        Impression    IMPRESSION:  1.  No PE.  2.  Extensive lower lung predominant pulmonary opacities can be seen with edema, aspiration, or infection. Bronchial wall thickening can be seen with edema and bronchitis.  3.  Cardiomegaly.  4.  Bladder wall thickening may be from underdistention or cystitis.      Medications       albuterol  2 puff Inhalation 4x Daily     apixaban ANTICOAGULANT  5 mg Oral BID     aspirin  81 mg Oral Daily     atorvastatin  80 mg Oral Daily     furosemide  40 mg Intravenous Q8H     gabapentin  100 mg Oral TID     insulin aspart  3 Units Subcutaneous TID w/meals     insulin aspart  1-9 Units Subcutaneous TID AC     insulin glargine  8 Units Subcutaneous At Bedtime     levETIRAcetam  500 mg Oral BID     [Held by provider] metoprolol succinate ER  12.5 mg Oral Daily     pantoprazole  40 mg Oral Daily

## 2022-10-12 NOTE — PROGRESS NOTES
"Pt is on precautions for RSV and is receiving IV ABX. This writer was in the room assisting the pt when the  called to say Family was here to visit. This writer was unsure of the protocol. ER RN stated that d/t RSV being very contagious visitors are not a good idea. The daughter called when this writer was in the room assisting the patient. This writer was unable to take the call at that time and would call her back when I was out of the room. Then the son called. This writer was still in the pt's room. This writer called the daughter back first and explained the situation. She was then going to call her brother and update him. A few minutes after this writer hung up with the daughter, the son called again very irate and stated the police were outside. This writer took the phone call and again explained the situation. Son calmed down, listened to the writer and asked multiple questions which this writer answered. This writer informed son that pt started on IV ABX last night and they would be able to come and visit tomorrow. The family was very concerned as his father (the patient) told his family he was dying. The son said his mother was crying and they were \"freaking\" out not knowing what was happening. This writer told the family they could call anytime for an update. Son appreciative for the information and update.  "

## 2022-10-12 NOTE — ED NOTES
Pt slept on and off throughout the shift. Up working with OT, declined working with PT as he doesn't feel good. Productive cough noted. IVF infusing per order. Denies pain.

## 2022-10-12 NOTE — PROGRESS NOTES
10/12/22 1108   Appointment Info   Signing Clinician's Name / Credentials (OT) Nickie Holt OTR/L   Living Environment   People in Home spouse;child(rupert), dependent   Current Living Arrangements house   Home Accessibility stairs to enter home   Number of Stairs, Main Entrance 5   Stair Railings, Main Entrance railings on both sides of stairs;railings safe and in good condition   Transportation Anticipated family or friend will provide   Self-Care   Usual Activity Tolerance fair   Current Activity Tolerance poor   Regular Exercise No   Equipment Currently Used at Home walker, rolling   Fall history within last six months no   Activity/Exercise/Self-Care Comment Pt's wife assists with LB drsg, some grooming and SBA with functional mobility with FWW. Pt states he has been at this level of function since post op in July. Dependent for IADL   General Information   Onset of Illness/Injury or Date of Surgery 10/11/22   Referring Physician Tatiana Miranda   Patient/Family Therapy Goal Statement (OT) none stated   Additional Occupational Profile Info/Pertinent History of Current Problem Av Fernando is a 62 year old old male with h/o diabetes, chronic afib on anticoagulation, and has a pacemaker presented with SOB and weakness. Diagnosed with severe sepsis, CHF, ARF, B lower lobe pneumonia   Performance Patterns (Routines, Roles, Habits) Pt lives with wife, 8 y.o. dtr, 16 y.o. son. Sedentary in recent months since pacemaker placement in July. Pt states he had home health services for a few weeks after but was not improving.   Existing Precautions/Restrictions no known precautions/restrictions   Cognitive Status Examination   Orientation Status orientation to person, place and time   Visual Perception   Visual Impairment/Limitations blurry vision   Impact of Vision Impairment on Function (Vision) recent blurry vision. Impairs reading large and small print   Posture   Posture not impaired   Range of Motion Comprehensive    General Range of Motion no range of motion deficits identified   Strength Comprehensive (MMT)   Comment, General Manual Muscle Testing (MMT) Assessment generalized weakness throughout UE's 3/5   Coordination   Upper Extremity Coordination No deficits were identified   Bed Mobility   Bed Mobility supine-sit;sit-supine   Supine-Sit Harper (Bed Mobility) minimum assist (75% patient effort)   Sit-Supine [place] (Bed Mobility) contact guard   Assistive Device (Bed Mobility) bed rails   Transfers   Transfers sit-stand transfer   Sit-Stand Transfer   Sit-Stand [place] (Transfers) minimum assist (75% patient effort)   Assistive Device (Sit-Stand Transfers) walker, front-wheeled   Sit/Stand Transfer Comments pt JACOBO joness jin   Balance   Balance Assessment sit to stand balance   Balance Comments CG to Min A   Clinical Impression   Criteria for Skilled Therapeutic Interventions Met (OT) Yes, treatment indicated   OT Diagnosis decreased ADL, weakness   OT Problem List-Impairments impacting ADL problems related to;activity tolerance impaired;balance;mobility;strength   Assessment of Occupational Performance 3-5 Performance Deficits   Identified Performance Deficits toileting, grooming, transfers, bed mobility   Planned Therapy Interventions (OT) ADL retraining;bed mobility training;strengthening;transfer training   Clinical Decision Making Complexity (OT) moderate complexity   Anticipated Equipment Needs Upon Discharge (OT) shower chair;other (see comments)  (grabneeded bars in bathroom - toilet and shower. Pt states lack of funding has prevented him from obtaining AE.)   Risk & Benefits of therapy have been explained evaluation/treatment results reviewed;patient   OT Total Evaluation Time   OT Eval, Moderate Complexity Minutes (58354) 18   OT Goals   Therapy Frequency (OT) Daily   OT Predicted Duration/Target Date for Goal Attainment 10/18/22   OT Goals Transfers;Toilet Transfer/Toileting   OT: Transfer  Supervision/stand-by assist   OT: Toilet Transfer/Toileting Supervision/stand-by assist   Self-Care/Home Management   Self-Care/Home Mgmt/ADL, Compensatory, Meal Prep Minutes (53080) 25   Symptoms Noted During/After Treatment (Meal Preparation/Planning Training) fatigue   Treatment Detail/Skilled Intervention Tfr bed<>commode with cues for tech using FWW and CG-min A for bal d/t LE weakness. Pt able to complete hyg. seated with s/u. Mod A for clothing mgmt. Edu pt on stoplight for symptoms with CHF and daily wts. Handout given. Pt verbalized understanding.   OT Discharge Planning   OT Plan Tfrs, toileting, act jose., UE ex.   OT Discharge Recommendation (DC Rec) home with assist;home with home care occupational therapy;Transitional Care Facility   OT Rationale for DC Rec Pt requires 24 hr assist for functional mobility close tranfrs at this time. Would need commode. If able to enter home and wife able to provide, pt could return home otherwise TCU recommended d/t significant weakness.   Total Session Time   Timed Code Treatment Minutes 25   Total Session Time (sum of timed and untimed services) 43

## 2022-10-13 NOTE — PROGRESS NOTES
"   10/13/22 0745   Appointment Info   Signing Clinician's Name / Credentials (PT) Suri Samano, PT, DPT       Present no   Language Hmong   Living Environment   People in Home spouse;child(rupert), dependent   Current Living Arrangements house   Home Accessibility stairs to enter home   Number of Stairs, Main Entrance 5   Stair Railings, Main Entrance railings safe and in good condition   Living Environment Comments Pt able to live on main level for all ADLs. Pt does not have shower, only tub.   Self-Care   Equipment Currently Used at Home walker, rolling   Fall history within last six months no   Activity/Exercise/Self-Care Comment Pt states spouse assists with dressing and bathing.   General Information   Onset of Illness/Injury or Date of Surgery 10/11/22   Referring Physician Tatiana Miranda MD   Patient/Family Therapy Goals Statement (PT) None stated.   Pertinent History of Current Problem (include personal factors and/or comorbidities that impact the POC) Per H&P: \"62 year old male presenting with SOB and weakness\". Pt admitted with RSV.   Existing Precautions/Restrictions fall;other (see comments)  (contact, droplet)   Range of Motion (ROM)   Range of Motion ROM deficits secondary to weakness   Strength (Manual Muscle Testing)   Strength (Manual Muscle Testing) Deficits observed during functional mobility   Strength Comments General weakness, unable to perform AROM through full ROM in supine.   Bed Mobility   Bed Mobility supine-sit   Supine-Sit [place] (Bed Mobility) moderate assist (50% patient effort);verbal cues;nonverbal cues (demo/gesture)   Bed Mobility Limitations decreased ability to use arms for pushing/pulling;decreased ability to use legs for bridging/pushing   Impairments Contributing to Impaired Bed Mobility decreased strength;impaired balance   Transfers   Transfers sit-stand transfer;bed-chair transfer   Transfer Safety Concerns Noted decreased weight-shifting " ability   Impairments Contributing to Impaired Transfers impaired balance;decreased strength   Bed-Chair Transfer   Assistive Device (Bed-Chair Transfers) other (see comments)  (uses HHA)   Bed-Chair [place] (Transfers) minimum assist (75% patient effort);verbal cues   Sit-Stand Transfer   Sit-Stand [place] (Transfers) minimum assist (75% patient effort);verbal cues   Assistive Device (Sit-Stand Transfers) other (see comments)  (uses HHA)   Gait/Stairs (Locomotion)   [place] Level (Gait) minimum assist (75% patient effort);verbal cues   Assistive Device (Gait) other (see comments)  (HHA)   Distance in Feet (Required for LE Total Joints) 3' gurney to commode   Pattern (Gait) step-to   Deviations/Abnormal Patterns (Gait) ileana decreased;gait speed decreased  (shuffling gait pattern, short step lengths)   Clinical Impression   Criteria for Skilled Therapeutic Intervention Yes, treatment indicated   PT Diagnosis (PT) impaired functional mobility   Influenced by the following impairments decreased strength, impaired balance   Functional limitations due to impairments transfers, ambulation   Clinical Presentation (PT Evaluation Complexity) Evolving/Changing   Clinical Presentation Rationale Pt presents as medically diagnosed.   Clinical Decision Making (Complexity) moderate complexity   Planned Therapy Interventions (PT) balance training;bed mobility training;gait training;home exercise program;neuromuscular re-education;patient/family education;strengthening;transfer training   Risk & Benefits of therapy have been explained evaluation/treatment results reviewed;participants voiced agreement with care plan;participants included;patient   PT Total Evaluation Time   PT Valerieal, Moderate Complexity Minutes (58245) 15   Physical Therapy Goals   PT Frequency Daily   PT Predicted Duration/Target Date for Goal Attainment 10/20/22   PT Goals Bed Mobility;Transfers;Gait   PT: Bed Mobility Supervision/stand-by  assist;Supine to/from sit   PT: Transfers Supervision/stand-by assist;Sit to/from stand;Assistive device   PT: Gait Supervision/stand-by assist;Assistive device;Rolling walker;50 feet   Interventions   Interventions Quick Adds Therapeutic Activity   Therapeutic Activity   Therapeutic Activities: dynamic activities to improve functional performance Minutes (25183) 10   Symptoms Noted During/After Treatment Fatigue;Shortness of breath   Treatment Detail/Skilled Intervention Pt supine in bed at start of session. Requires encouragement to participate. States he feels too weak and tired to transfer. Pt requests to use commode. Supine > sit, mod assist of 1 at trunk and LEs. Sit <> stand with HHA and FWW, min assist of 1. Pivots to/from bed, cues for hand placement for safety and attention to direction. BP monitored throughout, vital signs stable. Pt supine in bed at end of session, call light in reach.   PT Discharge Planning   PT Plan gait with FWW and chair follow, LE strengthening, trial daily, may not be able to tolerate   PT Discharge Recommendation (DC Rec) Transitional Care Facility;home with assist   PT Rationale for DC Rec Patient's strength and endurance are below baseline. Recommend TCU at discharge. If patient unable to discharge to TCU, will need 24 hour supervision/assist, assist of 1 with all mobility and use of FWW, wheelchair for longer distances, and home PT.   PT Brief overview of current status Supine > sit, mod assist of 1, sit > supine min assist of 1. Sit <> stand, pivot transfers with FWW or HHA, min assist of 1. Unable to ambulate more than during pivot transfer this session.   Total Session Time   Timed Code Treatment Minutes 10   Total Session Time (sum of timed and untimed services) 25     Suri Samano, PT  10/13/2022

## 2022-10-13 NOTE — CONSULTS
CRITICAL CARE CONSULT:    Assessment/Plan:  62 y Dominguez HUTCHINS with a history of ICM EF 20% s/p ICD, HTN HLD, DM, who presented with SOB and weakness, hypoxemia and shock/hypotension. RSV+ pneumonia and decompensated CHF/cardiogenic shock.    CV:  -Severe biventricular heart failure with EF 25% and moderate-severe MR and moderate-severe pulmonary HTN with decompensated CHF.  - admitted with shock, lactic 6.7 --> 1.3. I don't think this is septic shock, which would not be expected from a viral PNA, unless another bacterial source is identified. Seems more consistent with cardiogenic shock with cardiorenal.  - I/O +1L and up 1 kg and BNP 41,000    Agree with inotrope mediated diuresis. On dopamine for MAP > 65 per cards.     Cardiology following    Ordered for lasix q8H    RESP:  RSV pneumonia. Looks fairly dense on imaging.   However is on room air  Continue to monitor    RENAL:  ELIZABETH, suspect cardiorenal  Diuresis ordered  Monitor I/O and lytes    ID:  RSV+. Negative U/A, Bcx pending. No leukocytosis  ID following  Monitor off abx.  At risk for superimposed bacterial PNA.  Will check procal    GI:  GERD - home PPI  DM diet    NEURO:  No acute issues  On home neurontin    HEMATOLOGIC:  Stable counts. Plts run low at baseline possibly due to advanced CHF    ENDOCRINE:  Hx DM2  Sliding scale + lantus + mealtime      ICU Checklist:  Feeding:  Oral diet    Lines/Tubes:  PICC (10/13)    Prophylaxis:    Thromboembolic: eliquis     Stress Ulcer: PPI    Restraints? no    DISPOSITION: ICU    CODE STATUS: Full Code    Total Critical Care Time : 50 minutes    Upon evaluation, this patient is critically ill with a high probability of imminent life threatening deterioration which required my direct personal management.      Elinor Montano MD  Pulmonary and Critical Care Medicine  [facility]  Office: [phone]    Clinically Significant Risk Factors Present on Admission                     --------------------------    CCx:  SOB    HPI:   62 y H[place] M with a history of ICM EF 20% s/p ICD, HTN HLD, DM, who presented with SOB and weakness sating 84%. Has had fevers and sick contacts and tested positive for RSV. He was also found to be hypotensive with lactic of 6.7 and BNP >40,000, with ELIZABETH. No leukocytosis. Due to soft BP he has started on dopamine by cardiology. He was initially given lasix with poor response, this was stopped and then resumed today.    Admitting to ICU with dopamine infusion.         ROS:  A 12-system review was obtained and was negative with the exception of the symptoms endorsed in the history of present illness.    Past Medical History:  Past Medical History:   Diagnosis Date     Congestive heart failure (H) 08/2021    ischemic CM with LVEF 30-35%     Coronary artery disease 08/2021    STEMI with multivessel CAD on angiography     Hyperlipidemia      Hypertension      LV (left ventricular) mural thrombus 01/2022     Myocardial infarction (H) 08/2021    inferior STEMI     Type 2 diabetes, HbA1C goal < 8% (H)      Past Surgical History:  Past Surgical History:   Procedure Laterality Date     CV CORONARY ANGIOGRAM N/A 8/1/2021    Procedure: Coronary Angiogram;  Surgeon: Deidre Lopes MD;  Location: [facility] CV     CV CORONARY ANGIOGRAM N/A 5/6/2022    Procedure: Coronary Angiogram;  Surgeon: Sherif Fuentes MD;  Location: [facility] CATH LAB CV     CV LEFT HEART CATH N/A 5/6/2022    Procedure: Left Heart Catheterization;  Surgeon: Sherif Fuentes MD;  Location: [facility] CATH LAB CV     CV LEFT VENTRICULOGRAM N/A 8/1/2021    Procedure: Left Ventriculogram;  Surgeon: Deidre Lopes MD;  Location: [facility] CATH LAB CV     CV PCI N/A 5/6/2022    Procedure: Percutaneous Coronary Intervention;  Surgeon: Sherif Fuentes MD;  Location: [facility] CATH LAB CV     OTHER SURGICAL HISTORY      LEG TRAUMA     PICC TRIPLE LUMEN PLACEMENT  10/13/2022          Social History:  Social History     Socioeconomic  "History     Marital status:      Spouse name: Not on file     Number of children: Not on file     Years of education: Not on file     Highest education level: Not on file   Occupational History     Not on file   Tobacco Use     Smoking status: Never     Smokeless tobacco: Never   Substance and Sexual Activity     Alcohol use: No     Drug use: No     Sexual activity: Yes     Partners: Female     Birth control/protection: None   Other Topics Concern     Parent/sibling w/ CABG, MI or angioplasty before 65F 55M? Not Asked   Social History Narrative     Not on file     Social Determinants of Health     Financial Resource Strain: Not on file   Food Insecurity: Not on file   Transportation Needs: Not on file   Physical Activity: Not on file   Stress: Not on file   Social Connections: Not on file   Intimate Partner Violence: Not on file   Housing Stability: Not on file     Family History:  No family history on file.    Allergies:  Allergies   Allergen Reactions     Dulaglutide Dizziness     Weak and muscle weak  Other reaction(s): Dizziness  Weak and muscle weak     Januvia [Sitagliptin] Unknown     HEADACHE     Tylenol [Acetaminophen] Itching     Physical Exam:  Resp: 28    /69   Pulse 109   Temp 97.9  F (36.6  C) (Oral)   Resp 28   Ht 1.575 m (5' 2\")   Wt 49.9 kg (109 lb 14.4 oz)   SpO2 94%   BMI 20.10 kg/m      Intake/Output last 3 shifts:  I/O last 3 completed shifts:  In: 480 [P.O.:480]  Out: 250 [Urine:250]  Intake/Output this shift:  I/O this shift:  In: 51.52 [I.V.:51.52]  Out: -     Physical Exam  Gen: Alert, oriented, no distress  HEENT: NT, no STEVENSON  CV: RRR, no m/g/r  Resp: Bibasilar crackles  Abd: soft, nontender, BS+  Skin: no rashes or lesions  Ext: no edema  Neuro: PERRL, nonfocal exam    LABS:  Reviewed in detail    IMAGING:  Personally reviewed and interpreted    CT scans reviewed                  "

## 2022-10-13 NOTE — PROGRESS NOTES
HEART CARE NOTE          Assessment/Recommendations     1. HFrEF/ICM c/b ADHF   Assessment / Plan    No significant UOP s/p IV diuresis; however, ELIZABETH notable on am labs so will hold further diuresis and give albumin bolus x1     Hemodynamics are borderline, will hold on dobutamine as choice of inotrope given un-revascularized coronary anatomy, but will start low dose dopamine and continue to monitor - continue to hold BBlockers    GDMT as detailed below - repeat echo negative for LV thrombus    Will need ICD given persistent decline in LVSF - will arrange as outpatient     Current Pharmacotherapy AHA Guideline-Directed Medical Therapy   Lisinopril - on hold given ELIZABETH and borderline BP Lisinopril 20 mg twice daily   Metoprolol 12.5 mg daily - on hold Carvedilol 25 mg twice daily   Spironolactone 25 mg daily - on hold Spironolactone 25 mg once daily   Hydralazine NA Hydralazine 100 mg three times daily   Isosorbide mononitrate 20 mg daily - on hold Isosorbide dinitrate 40 mg three times daily   SGLT2 inhibitor:not started Dapagliflozin or Empagliflozin 10 mg daily      2. Severe multivessel CAD c/b NSTEMI  Assessment / Plan    Hx of STEMI 8/21 - at which time the pateint left AMA    Known severe multivessel disease - patient declines recommended CABG    Continue ASA, high intensity rosuvastatin; denies anginal equivalents - does have some pleuritic chest pain    Hx of cardiac MRI significant inducible ischemia in the mid to distal inferoseptal segments, mid-distal anterior segments and mid inferolateral segment. There is almost transmural infarction in the inferolateral walls while the rest of the myocardium is viable; plan for impella assisted PCI initially on hold given patient's absence of symptoms and adequate functional capacity at the time; patient later declined coronary angiogram outright upon readmission; he would not like to pursue intervention; reported that he would instead like to return to home/[place] to  pursue herbal remedy;  Lab results and known coronary anatomy were reviewed at that time with patient and as well as the risks associated with holding off on potential intervention at which time patient voiced comprehension.     3. Sepsis 2/2 RSV and underlying PNA  Assessment / Plan    Supportive care and management per primary team     3. Acute respiratory failure  Assessment / Plan    2/2 RSV, PNA, ADHF; course c/b sepsis    Supportive care per primary team/ID; IV diuresis as above     3. Valvular heart disease  Assessment / Plan    Moderate MR and TR - continue oral afterload reduction     4. DM2  Assessment / Plan    Management per primary team     Patient remains critically ill in the ICU requiring inotrope for hemodynamic support in the setting of RSV. 50 minutes spent on critical care.    History of Present Illness/Subjective      Mr. Av Fernando is a 62 year old male with a PMHx significant for ( per Dr. Miranda's note) h/o diabetes, chronic afib on anticoagulation, and has a pacemaker presented with SOB and weakness. Patient was 84% in triage and placed on 3L is 95%     Today, Mr. Fernando denies any acute cardiac events or complaints; Management plan as detailed above     ECG: Personally reviewed. sinus tachycardia.     ECHO (personnaly Reviewed):   Severe biatrial enlargement.  The left ventricle is mildly dilated.  The visual ejection fraction is 25-30%.  There is inferolateral wall akinesis.  There is apical dyskinesis.  There is moderate anterior wall hypokinesis.  Distal inferior akinesis.  Mildly decreased right ventricular systolic function  There is mod-severe to severe (3-4+) mitral regurgitation.  There is mild (1+) aortic regurgitation.  There is moderate (2+) tricuspid regurgitation.  The right ventricular systolic pressure is approximated at 48mmHg plus the  right atrial pressure.  Compared to the prior study dated 2/21/2022, there are changes as noted. There  is now severe mitral  "regurgitation and moderate tricuspid regurgitation with  moderate to severe pulmonary HTN.          Physical Examination Review of Systems   BP 96/68 (BP Location: Left arm, Patient Position: Semi-Armando's)   Pulse 83   Temp 97.8  F (36.6  C) (Oral)   Resp 22   Ht 1.575 m (5' 2\")   Wt 49 kg (108 lb)   SpO2 94%   BMI 19.75 kg/m    Body mass index is 19.75 kg/m .  Wt Readings from Last 3 Encounters:   10/11/22 49 kg (108 lb)   06/09/22 50.8 kg (112 lb 1.6 oz)   05/16/22 53.7 kg (118 lb 4.8 oz)     General Appearance:   normal body habitus   ENT/Mouth: membranes moist, no oral lesions or bleeding gums.      EYES:  no scleral icterus, normal conjunctivae   Neck: no carotid bruits or thyromegaly   Chest/Lungs:   lungs are clear to auscultation, no rales or wheezing, equal chest wall expansion    Cardiovascular:   Regular. Normal first and second heart sounds with no murmurs, rubs, or gallops; the carotid, radial and posterior tibial pulses are intact, + JVD and trace LE edema bilaterally    Abdomen:  no organomegaly, masses, bruits, or tenderness; bowel sounds are present   Extremities: no cyanosis or clubbing   Skin: no xanthelasma, warm.    Neurologic: alert and oriented x3, calm     Psychiatric: alert and oriented x3, calm     A complete 10 systems ROS was reviewed  And is negative except what is listed in the HPI.          Medical History  Surgical History Family History Social History   Past Medical History:   Diagnosis Date     Congestive heart failure (H) 08/2021    ischemic CM with LVEF 30-35%     Coronary artery disease 08/2021    STEMI with multivessel CAD on angiography     Hyperlipidemia      Hypertension      LV (left ventricular) mural thrombus 01/2022     Myocardial infarction (H) 08/2021    inferior STEMI     Type 2 diabetes, HbA1C goal < 8% (H)     Past Surgical History:   Procedure Laterality Date     CV CORONARY ANGIOGRAM N/A 8/1/2021    Procedure: Coronary Angiogram;  Surgeon: Deidre Lopes, " MD;  Location: [facility] CATH LAB CV     CV CORONARY ANGIOGRAM N/A 5/6/2022    Procedure: Coronary Angiogram;  Surgeon: Sherif Fuentes MD;  Location: [facility] CV     CV LEFT HEART CATH N/A 5/6/2022    Procedure: Left Heart Catheterization;  Surgeon: Sherif Fuentes MD;  Location: [facility] CV     CV LEFT VENTRICULOGRAM N/A 8/1/2021    Procedure: Left Ventriculogram;  Surgeon: Deidre Lopes MD;  Location: [facility]ATH [facility] CV     CV PCI N/A 5/6/2022    Procedure: Percutaneous Coronary Intervention;  Surgeon: Sherif Fuentes MD;  Location: [facility] CATH [facility] CV     OTHER SURGICAL HISTORY      LEG TRAUMA    no family history of premature coronary artery disease Social History     Socioeconomic History     Marital status:      Spouse name: Not on file     Number of children: Not on file     Years of education: Not on file     Highest education level: Not on file   Occupational History     Not on file   Tobacco Use     Smoking status: Never     Smokeless tobacco: Never   Substance and Sexual Activity     Alcohol use: No     Drug use: No     Sexual activity: Yes     Partners: Female     Birth control/protection: None   Other Topics Concern     Parent/sibling w/ CABG, MI or angioplasty before 65F 55M? Not Asked   Social History Narrative     Not on file     Social Determinants of Health     Financial Resource Strain: Not on file   Food Insecurity: Not on file   Transportation Needs: Not on file   Physical Activity: Not on file   Stress: Not on file   Social Connections: Not on file   Intimate Partner Violence: Not on file   Housing Stability: Not on file           Lab Results    Chemistry/lipid CBC Cardiac Enzymes/BNP/TSH/INR   Lab Results   Component Value Date    CHOL 125 05/06/2022    HDL 56 05/06/2022    TRIG 43 05/06/2022    BUN 46.8 (H) 10/12/2022     (L) 10/12/2022    CO2 21 (L) 10/12/2022    Lab Results   Component Value Date    WBC 5.7 10/12/2022    HGB 10.5 (L) 10/12/2022     HCT 33.0 (L) 10/12/2022    MCV 93 10/12/2022     (L) 10/12/2022    Lab Results   Component Value Date    TROPONINI 0.71 (HH) 05/04/2022    BNP 4,308 (H) 05/04/2022    INR 1.10 10/11/2022     Lab Results   Component Value Date    TROPONINI 0.71 (HH) 05/04/2022          Weight:    Wt Readings from Last 3 Encounters:   10/11/22 49 kg (108 lb)   06/09/22 50.8 kg (112 lb 1.6 oz)   05/16/22 53.7 kg (118 lb 4.8 oz)       Allergies  Allergies   Allergen Reactions     Dulaglutide Dizziness     Weak and muscle weak  Other reaction(s): Dizziness  Weak and muscle weak     Januvia [Sitagliptin] Unknown     HEADACHE     Tylenol [Acetaminophen] Itching         Surgical History  Past Surgical History:   Procedure Laterality Date     CV CORONARY ANGIOGRAM N/A 8/1/2021    Procedure: Coronary Angiogram;  Surgeon: Deidre Lopes MD;  Location: [facility] CATH LAB CV     CV CORONARY ANGIOGRAM N/A 5/6/2022    Procedure: Coronary Angiogram;  Surgeon: Sherif Fuentes MD;  Location: [facility] CATH LAB CV     CV LEFT HEART CATH N/A 5/6/2022    Procedure: Left Heart Catheterization;  Surgeon: Sherif Fuentes MD;  Location: [facility] CATH LAB CV     CV LEFT VENTRICULOGRAM N/A 8/1/2021    Procedure: Left Ventriculogram;  Surgeon: Deidre Lopes MD;  Location: [facility] CATH LAB CV     CV PCI N/A 5/6/2022    Procedure: Percutaneous Coronary Intervention;  Surgeon: Sherif Fuentes MD;  Location: [facility] CATH LAB CV     OTHER SURGICAL HISTORY      LEG TRAUMA       Social History  Tobacco:   History   Smoking Status     Never   Smokeless Tobacco     Never    Alcohol:   Social History    Substance and Sexual Activity      Alcohol use: No   Illicit Drugs:   History   Drug Use No       Family History  No family history on file.       Dougie Gupta MD on 10/13/2022      cc: Stefano Thapa

## 2022-10-13 NOTE — PROGRESS NOTES
[facility] ID Inpatient follow up       Patient:  Av Fernando  Date of birth 1960, Medical record number [id number]  Date of Visit:  10/13/2022  Attending Physician: Mary Watkins MD         Assessment and Recommendations:   Assessment:  Av Fernando is a 62 year old male with   1. DM II with good control.   2. History of Ischemic heart disease/STEMI  3. History of CHF with EF at 20-25%.  4.  Sick contact with daughter, granddaughter, and wife.  They have had fever, cough, shortness of breath.  5. Acute hypoxic respiratory failure. Multifactorial with RSV infection and CHF exacerbation. Positive nasal swab for RSV PCR on 10/11. Influenza A/B and COVID negative on 10/11. Pro-BNP 41,466. Fissures filled with fluid on CT/chest.  On room air.  Cardiology following.  6. Sepsis with Lactic acid 6.7. .  Secondary to RSV infection.  IV vancomycin and Zosyn discontinued on 10/12/2022.  No new fever  7. ELIZABETH. Creatinine at 1.42 on admission from normal 0.79 on 10/3.  Creatinine worsening, at 2.55 on 10/13/2022.  Consider nephrology consult.     Recommendations:   Consider nephrology consult  Supportive care.  Monitor off antibiotic.    Diuretics per primary team and cardiology.       Discussed with the patient, family, nursing staff.    ID will follow peripherally      Sheri Singh MD.  [facility].   [facility]  Office Telephone [phone].  Fax [phone]  [facility] paging            Interval History:     HPI:  The interval history was reviewed.   Complains of chest pain when he coughs.  Also complaining of itching of the scalp.  No new positive cultures.  Kidney function worsening.  Remains on room air.    Pertinent cultures include:  No results found for: CULT    Recent Inflammatory Biomarkers:   Recent Labs   Lab Test 10/13/22  0724 10/12/22  0704 10/11/22  1937 10/10/22  1044 10/03/22  1250 09/28/22  0140 05/07/22  0507  01/15/22  0222 01/14/22  0941   CRP  --   --   --  320.00* <3.00 <3.00  --   --   --    PCAL  --   --   --   --   --   --   --   --  0.03   WBC 6.4 5.7 5.0  --  5.6 6.5 4.9   < > 5.5    < > = values in this interval not displayed.            Review of Systems:   CONSTITUTIONAL:    Temp Max: Temp (24hrs), Av.7  F (36.5  C), Min:97.6  F (36.4  C), Max:97.8  F (36.6  C)   .  Negative except for findings in the HPI.           Current Medications (antimicrobials listed in bold):       albuterol  2 puff Inhalation 4x Daily     apixaban ANTICOAGULANT  5 mg Oral BID     aspirin  81 mg Oral Daily     atorvastatin  80 mg Oral Daily     furosemide  40 mg Intravenous Q8H     gabapentin  100 mg Oral TID     insulin aspart  3 Units Subcutaneous TID w/meals     insulin aspart  1-9 Units Subcutaneous TID AC     insulin glargine  8 Units Subcutaneous At Bedtime     levETIRAcetam  500 mg Oral BID     [Held by provider] metoprolol succinate ER  12.5 mg Oral Daily     pantoprazole  40 mg Oral Daily              Allergies:     Allergies   Allergen Reactions     Dulaglutide Dizziness     Weak and muscle weak  Other reaction(s): Dizziness  Weak and muscle weak     Januvia [Sitagliptin] Unknown     HEADACHE     Tylenol [Acetaminophen] Itching            Physical Exam:   Vitals were reviewed  Patient Vitals for the past 24 hrs:   BP Temp Temp src Pulse Resp SpO2   10/13/22 0755 95/59 -- -- 70 16 96 %   10/13/22 0724 -- -- -- 71 -- 95 %   10/13/22 0420 97/65 -- -- 71 16 97 %   10/13/22 0341 -- -- -- -- -- 94 %   10/12/22 2041 96/68 97.8  F (36.6  C) Oral 83 22 100 %   10/12/22 1530 92/63 -- -- 90 25 100 %   10/12/22 1256 99/70 -- -- 93 15 100 %   10/12/22 1251 97/69 -- -- 93 29 100 %   10/12/22 0930 91/58 -- -- 83 22 96 %   10/12/22 0900 -- 97.6  F (36.4  C) Oral 85 27 92 %       Physical Examination:  Gen: Pleasant in no acute distress.  HEENT: NCAT. EOMI. PERRL.  Neck: No bruit, JVD or thyromegaly.  Lungs: Bilateral crackles  Card:  RRR. NSR. No RMG. Peripheral pulses present and symmetric. No edema.  Abd: Soft NT ND. No mass. Normal bowel sounds.  Skin: No rash.  Extr: No edema.  Neuro: Alert and oriented to place time and person. Cranial nerves II to XII intact.          Laboratory Data:   ID Labs:  Microbiology labs:  Reviewed.    Recent Labs   Lab Test 10/10/22  1044 10/03/22  1250 09/28/22  0140   .00* <3.00 <3.00     Recent Labs   Lab Test 10/13/22  0724 10/12/22  0704 10/11/22  1937 10/03/22  1250 09/28/22  0140 05/07/22  0507   WBC 6.4 5.7 5.0 5.6 6.5 4.9     Recent Labs   Lab Test 10/13/22  0724 10/12/22  0704 10/11/22  1719 10/10/22  1044   CR 2.55* 1.67* 1.38* 1.42*   GFRESTIMATED 28* 46* 58* 56*       Hematology Studies  Recent Labs   Lab Test 10/13/22  0724 10/12/22  0704 10/11/22  1937 10/03/22  1250 09/28/22  0140 05/07/22  0507   WBC 6.4 5.7 5.0 5.6 6.5 4.9   ANEU  --   --  3.9  --   --   --    AEOS  --   --  0.0  --   --   --    HCT 27.0* 33.0* 30.5* 31.3* 37.2* 34.4*   * 138* 169 152 177 135*       Metabolic  Recent Labs   Lab Test 10/13/22  0724 10/12/22  0704 10/11/22  1719   * 128* 126*   BUN 61.1* 46.8* 41.4*   CO2 24 21* 23   CR 2.55* 1.67* 1.38*   GFRESTIMATED 28* 46* 58*       Hepatic Studies  Recent Labs   Lab Test 10/12/22  0704 10/11/22  1719 10/10/22  1044 10/03/22  1250 09/28/22  0140 04/24/22  2212   BILITOTAL  --  1.5* 1.3*  --   --  1.0   ALKPHOS  --  120 127  --   --  77   ALBUMIN 2.5* 2.7* 3.1*  --   --  3.6   AST  --  46 26 30   < > 88*   ALT  --  16 19  --   --  52*    < > = values in this interval not displayed.       Immunologlobulins  Recent Labs   Lab Test 10/03/22  1250 09/28/22  0140   SED 13 16*            Imaging Data:   Reviewed

## 2022-10-13 NOTE — PLAN OF CARE
Problem: Gas Exchange Impaired  Goal: Optimal Gas Exchange  Outcome: Progressing  Intervention: Optimize Oxygenation and Ventilation  Recent Flowsheet Documentation  Taken 10/13/2022 0300 by Juan Miguel Pierre, RN  Head of Bed (HOB) Positioning: HOB at 20-30 degrees  Taken 10/12/2022 2042 by Juan Miguel Pierre, RN  Head of Bed ([facility]) Positioning: HOB at 20-30 degrees   Goal Outcome Evaluation:       Pt c/o 4/10 generalized aches and pains and received PRN Ibuprofen, which was effective. A&Ox4. Hmong speaking but does understand a moderate amount of english.     Weaned off O2 overnight. Now on RA w/ SpO2 95-98%. LS: Coarse crackles throughout. BP soft but MAP > 65. Family reports that Bp's normally run in the 90's systolic.     Continues on contact/droplet precautions for dx of RSV. Lactate to be re-drawn this AM per .     TELE: NSR w/ occasional PVC's.     Continue on scheduuled IV Lasix. 250ml out overnight plus one urinary incontinence.     Incontinent of bowel x1. Loose brown/green stool.

## 2022-10-13 NOTE — ED NOTES
Gave pt's breakfast. Repositioned and assisted to eat.  Pt appears tired but answers appropriately

## 2022-10-13 NOTE — PROCEDURES
"PICC Line Insertion Procedure Note    Pt. Name:   Av Fernando  MRN:          [id number]    Procedure: Insertion of a  TRIPLE Lumen  5 fr  Bard SOLO (valved) Power PICC, Lot number [id number]    Indications: Sepsis    Contraindications : LEFT Pacer    Procedure Details:    Patient identified with 2 identifiers and \"Time Out\" conducted.    Central line insertion bundle followed: Hand hygiene performed prior to procedure, site cleansed with Cholraprep (CHG), hat, mask, sterile gloves, sterile gown worn, patient draped with maximum barrier head to toe drape, sterile field maintained.    The vein was assessed and found to be compressible and of adequate size.     Lidocaine 1% 2.5 ml administered SQ to the insertion site.     Modified Seldinger Technique (MST) used for insertion, ONE attempt(s) required to access vein.     A 5 Fr PICC was inserted into the BRACHIAL-medial vein of the right upper arm.       Catheter threaded without difficulty. Good blood return noted.    Catheter was flushed with 20 cc normal saline.     Catheter secured with Statlock, Biopatch (CHG), and Tegaderm dressing applied.    The sharps that are included in the PICC insertion kit were accounted for and disposed of in the sharps container prior to breakdown of the sterile field.    CLABSI prevention brochure left at bedside.    Patient  tolerated procedure well.     Patient's primary RN notified PICC is ready for use.      Findings:    Total catheter length  37 cm, with 0 cm exposed. Mid upper arm circumference is 23 cm.       Tip placement verified in the distal SVC by [employer]:        Comments:  None        Cristian Nicholas, RN, MSN, [facility]   Vascular Access - [facility]        "

## 2022-10-14 NOTE — PROGRESS NOTES
SBP was dropped  85/54 at 1500, asymptomatic. Notified Dr Mikey Khan. Intensivist was consulted. Dr Castillo ordered IV fluid bolus 250ml x2.

## 2022-10-14 NOTE — CONSULTS
CLINICAL NUTRITION SERVICES - ASSESSMENT NOTE     Nutrition Prescription    RECOMMENDATIONS FOR MDs/PROVIDERS TO ORDER:      Malnutrition Status:    TBD    Recommendations already ordered by Registered Dietitian (RD):  None.  Pt declines supplements    Future/Additional Recommendations:  Diet ed, NFPE     REASON FOR ASSESSMENT  Av Fernando is a/an 62 year old male assessed by the dietitian for Provider Order - Nutrition Education - 2 gram Na    NUTRITION HISTORY  Stopped to see patient 2 times today.  Pt eating lunch at first visit he reported decreased po intake, visit brief as patient eating.  Later RD stopped in and patient c/o being weak, not a good time for diet ed.  Per chart review RD saw patient on 1/16/22 and 4/26/22 for low sodium diet ed.  On 1/16/22 pt noted to have severe malnutrition in context of chronic illness.    CURRENT NUTRITION ORDERS  Diet: 2 g Sodium, Moderate Consistent Carbohydrate and 1500 mL Fluid Restriction  Intake/Tolerance: intake at meals varies % since admission.    LABS  Labs:  Electrolytes  Potassium (mmol/L)   Date Value   10/14/2022 3.5   10/14/2022 3.3 (L)   10/13/2022 3.6   05/07/2022 3.5   05/06/2022 3.8   05/05/2022 3.6     Phosphorus (mg/dL)   Date Value   10/12/2022 4.9 (H)   01/17/2022 3.6    Blood Glucose  Glucose (mg/dL)   Date Value   05/07/2022 152 (H)   05/06/2022 82   05/05/2022 162 (H)   05/04/2022 282 (H)   04/27/2022 165 (H)     GLUCOSE BY METER POCT (mg/dL)   Date Value   10/14/2022 279 (H)   10/14/2022 140 (H)   10/13/2022 156 (H)   10/13/2022 157 (H)   10/13/2022 194 (H)     Hemoglobin A1C (%)   Date Value   04/05/2022 9.6 (H)   12/10/2021 10.4 (H)   08/02/2021 10.4 (H)   05/12/2021 12.5 (H)   07/20/2020 13.4 (H)    Inflammatory Markers  CRP Inflammation (mg/L)   Date Value   10/10/2022 320.00 (H)   10/03/2022 <3.00   09/28/2022 <3.00     WBC Count (10e3/uL)   Date Value   10/13/2022 6.4   10/12/2022 5.7   10/11/2022 5.0     Albumin (g/dL)   Date Value  "  10/12/2022 2.5 (L)   10/11/2022 2.7 (L)   10/10/2022 3.1 (L)   04/24/2022 3.6   04/05/2022 3.6   02/04/2022 3.4 (L)      Magnesium (mg/dL)   Date Value   10/14/2022 2.2   10/13/2022 2.4 (H)   10/12/2022 2.2     Sodium (mmol/L)   Date Value   10/13/2022 135 (L)   10/12/2022 128 (L)   10/11/2022 126 (L)    Renal  Urea Nitrogen (mg/dL)   Date Value   10/13/2022 61.1 (H)   10/12/2022 46.8 (H)   10/11/2022 41.4 (H)   05/07/2022 16   05/06/2022 22   05/05/2022 28 (H)     Creatinine (mg/dL)   Date Value   10/14/2022 1.91 (H)   10/13/2022 2.55 (H)   10/12/2022 1.67 (H)     Additional  Triglycerides (mg/dL)   Date Value   05/06/2022 43   08/02/2021 70   05/12/2021 131     Ketones Urine (mg/dL)   Date Value   10/11/2022 Negative        Labs reviewed    MEDICATIONS    albuterol  2 puff Inhalation 4x Daily     apixaban ANTICOAGULANT  2.5 mg Oral BID     aspirin  81 mg Oral Daily     atorvastatin  80 mg Oral Daily     furosemide  40 mg Intravenous Q8H     gabapentin  100 mg Oral TID     insulin aspart  3 Units Subcutaneous TID w/meals     insulin aspart  1-9 Units Subcutaneous TID AC     insulin glargine  8 Units Subcutaneous At Bedtime     levETIRAcetam  500 mg Oral BID     [Held by provider] metoprolol succinate ER  12.5 mg Oral Daily     pantoprazole  40 mg Oral Daily     piperacillin-tazobactam  3.375 g Intravenous Q8H     sodium chloride (PF)  10-40 mL Intracatheter Q7 Days        sodium chloride 10 mL/hr at 10/13/22 1523      alum & mag hydroxide-simethicone, sore throat, bisacodyl, bisacodyl, glucose **OR** dextrose **OR** glucagon, hydrOXYzine, ibuprofen, lidocaine 4%, lidocaine (buffered or not buffered), magnesium hydroxide, melatonin, nitroGLYcerin, oxyCODONE, senna-docusate, sodium chloride (PF), sodium chloride (PF), sodium chloride (PF)   Medications reviewed    ANTHROPOMETRICS  Height: 157.5 cm (5' 2\")  Admit 108 lb 10/11/22, 109 lb 14.4 oz 10/13/22.  Most Recent Weight: 48.9 kg (107 lb 11.2 oz)    BMI: Normal " BMI  Weight History:   Wt Readings from Last 10 Encounters:   10/14/22 48.9 kg (107 lb 11.2 oz)   06/09/22 50.8 kg (112 lb 1.6 oz)   05/16/22 53.7 kg (118 lb 4.8 oz)   05/06/22 54.8 kg (120 lb 14.4 oz)   04/27/22 51.7 kg (114 lb)   04/07/22 53 kg (116 lb 12.8 oz)   04/05/22 54 kg (119 lb)   03/22/22 53.5 kg (118 lb)   03/04/22 52.8 kg (116 lb 6.4 oz)   02/28/22 52.8 kg (116 lb 6.4 oz)   110 lb 11.2 oz 1/19/22.  122 lb 9/28/21 pt started on lasix at this appt.  Wt varies with fluid shifts, pt on diuretic    Dosing Weight: 48.9 kg    ASSESSED NUTRITION NEEDS  Estimated Energy Needs: 1370+ kcals/day (28+)  Justification: Maintenance  Estimated Protein Needs: 49-59 grams protein/day (1 - 1.2 grams of pro/kg)  Justification: elevated creat  Estimated Fluid Needs: 1500 mL/day (per MD)   Justification: On a fluid restriction    PHYSICAL FINDINGS  See malnutrition section below.  LBM 10/14, loose       MALNUTRITION:  % Weight Loss:  Weight loss does not meet criteria for malnutrition, wt varies with fluid shifts.  Pt on diuretic and diuresis increased each time pt is in the hospital  % Intake:  Decreased intake does not meet criteria for malnutrition, variable intake  Subcutaneous Fat Loss:  Deferred as pt resting.  Muscle Loss:  deferred  Fluid Retention:  deferred    Malnutrition Diagnosis: Unable to determine due to need NFPE, intake hx.      NUTRITION DIAGNOSIS  Altered nutrition-related laboratory values related to chronic illness as evidenced by elevated BG and creat.      INTERVENTIONS  Implementation  Nutrition Education: Pt too tired this afternoon, left written materials on the Heart Healthy Consistent Carb diet   Pt declines sweet supplements  F/u early next week for diet ed and NFPE     Goals  Meet nutrition needs  BG<180  Participate in diet ed.     Monitoring/Evaluation  Progress toward goals will be monitored and evaluated per protocol.

## 2022-10-14 NOTE — PROGRESS NOTES
Care Management Follow Up    Length of Stay (days): 3    Expected Discharge Date:  10/16/2022    Concerns to be Addressed:  Cardio and pulm status, infection, diagnostic work up       Patient plan of care discussed at interdisciplinary rounds: Yes    Anticipated Discharge Disposition:  TBD     Anticipated Discharge Services:  TBD    Anticipated Discharge DME:  TBD         Additional Information:  Patient admitted with severe sepsis, acute hypoxic respiratory failure, CHF, ELIZABETH. Currently on room air. ID and Cardiology following.     Therapy recommending TCU vs home with assist. Patient ambulating 50feet, with rolling walker, poor endurance    Social history:  Patient is  and lives with his wife Jasmin and their children. He is independent with activities of daily living at baseline.    Final discharge plan pending progression.         Elvie Butler RN

## 2022-10-14 NOTE — PROGRESS NOTES
Respiratory Care Note    Patient using aerobika for bronchial hygiene independently.  Breath sounds were coarse with crackles, SpO2 92% on room air, RR 18.  Patient tolerated flutter valve well.  Breath sounds unchanged post flutter valve. Effective, nonproductive cough this morning.     Jasmin Coy, RT

## 2022-10-14 NOTE — PROGRESS NOTES
[facility] ID Inpatient follow up       Patient:  Av Fernando  Date of birth 1960, Medical record number [id number]  Date of Visit:  10/14/2022  Attending Physician: Mary Watkins MD         Assessment and Recommendations:   Assessment:  Av Fernando is a 62 year old male with   1. DM II with good control.   2. History of Ischemic heart disease/STEMI  3. History of CHF with EF at 20-25%.  4.  Sick contact with daughter, granddaughter, and wife.  They have had fever, cough, shortness of breath.  5. Acute hypoxic respiratory failure. Multifactorial with RSV infection and CHF exacerbation. Positive nasal swab for RSV PCR on 10/11. Influenza A/B and COVID negative on 10/11. Pro-BNP 41,466. Fissures filled with fluid on CT/chest.  On room air.  Cardiology following.  6. Sepsis with Lactic acid 6.7. .  Secondary to RSV infection.  IV vancomycin and Zosyn discontinued on 10/12/2022.  No new fever  7. ELIZABETH. Creatinine at 1.42 on admission from normal 0.79 on 10/3.  Creatinine worsening, at 2.55 on 10/13/2022.  Consider nephrology consult.  8. Possible bacterial PNA on top of RSV. Procalcitonin at 14. IV Zosyn resumed 10/13.      Recommendations:   Supportive care  Continue Zosyn   Monitor kidney function   Diuretics per primary team and cardiology.       Discussed with the patient, family, nursing staff.    ID will follow       Sheri Singh MD.  [facility].   [facility]  Office Telephone [phone].  Fax [phone]  [facility] paging            Interval History:     HPI:  The interval history was reviewed.   Was transferred to the ICU yesterday for hypotension. Required some pressor, now off. Procalcitonin noted to be high.    Pertinent cultures include:  No results found for: CULT    Recent Inflammatory Biomarkers:   Recent Labs   Lab Test 10/13/22  1649 10/13/22  0724 10/12/22  0704 10/11/22  1937 10/10/22  1044  10/03/22  1250 22  0140 22  0507 01/15/22  0222 22  0941   CRP  --   --   --   --  320.00* <3.00 <3.00  --   --   --    PCAL 14.05*  --   --   --   --   --   --   --   --  0.03   WBC  --  6.4 5.7 5.0  --  5.6 6.5 4.9   < > 5.5    < > = values in this interval not displayed.            Review of Systems:   CONSTITUTIONAL:    Temp Max: Temp (24hrs), Av.7  F (36.5  C), Min:97.6  F (36.4  C), Max:97.8  F (36.6  C)   .  Negative except for findings in the HPI.           Current Medications (antimicrobials listed in bold):       albuterol  2 puff Inhalation 4x Daily     apixaban ANTICOAGULANT  2.5 mg Oral BID     aspirin  81 mg Oral Daily     atorvastatin  80 mg Oral Daily     furosemide  40 mg Intravenous Q8H     gabapentin  100 mg Oral TID     insulin aspart  3 Units Subcutaneous TID w/meals     insulin aspart  1-9 Units Subcutaneous TID AC     insulin glargine  8 Units Subcutaneous At Bedtime     levETIRAcetam  500 mg Oral BID     [Held by provider] metoprolol succinate ER  12.5 mg Oral Daily     pantoprazole  40 mg Oral Daily     piperacillin-tazobactam  3.375 g Intravenous Q8H     sodium chloride (PF)  10-40 mL Intracatheter Q7 Days              Allergies:     Allergies   Allergen Reactions     Dulaglutide Dizziness     Weak and muscle weak  Other reaction(s): Dizziness  Weak and muscle weak     Januvia [Sitagliptin] Unknown     HEADACHE     Tylenol [Acetaminophen] Itching            Physical Exam:   Vitals were reviewed  Patient Vitals for the past 24 hrs:   BP Temp Temp src Pulse Resp SpO2 Weight   10/14/22 0900 101/64 -- -- 86 30 94 % --   10/14/22 0807 -- -- -- 81 19 92 % --   10/14/22 08 107/72 -- -- 82 (!) 0 92 % --   10/14/22 0748 106/69 99.4  F (37.4  C) Oral 86 24 91 % --   10/14/22 0700 108/68 -- -- 92 (!) 35 90 % --   10/14/22 0600 95/60 99.1  F (37.3  C) Oral 86 28 92 % --   10/14/22 0500 98/59 -- -- 93 18 94 % 48.9 kg (107 lb 11.2 oz)   10/14/22 0400 101/58 99.2  F (37.3  C) Oral  88 15 91 % --   10/14/22 0300 108/63 -- -- 93 19 92 % --   10/14/22 0200 104/59 -- -- 89 22 90 % --   10/14/22 0100 105/63 -- -- 90 22 92 % --   10/14/22 0000 106/64 99.2  F (37.3  C) Oral 87 24 93 % --   10/13/22 2300 105/66 -- -- 89 25 92 % --   10/13/22 2230 107/67 -- -- 92 25 92 % --   10/13/22 2200 104/65 -- -- 93 25 92 % --   10/13/22 2130 107/70 -- -- 92 24 94 % --   10/13/22 2100 107/72 -- -- 92 11 95 % --   10/13/22 2030 101/65 -- -- 91 24 91 % --   10/13/22 2000 98/64 99.1  F (37.3  C) Oral 92 26 92 % --   10/13/22 1900 97/65 -- -- 93 24 95 % --   10/13/22 1800 98/68 98.5  F (36.9  C) Oral 92 24 95 % --   10/13/22 1745 98/66 -- -- 90 23 95 % --   10/13/22 1730 99/65 -- -- 90 24 94 % --   10/13/22 1715 97/64 -- -- 88 26 95 % --   10/13/22 1700 96/63 -- -- 86 28 94 % --   10/13/22 1645 105/68 -- -- 89 28 94 % --   10/13/22 1630 98/64 -- -- 88 25 96 % --   10/13/22 1615 107/73 -- -- 94 29 99 % --   10/13/22 1600 96/66 -- -- 92 (!) 32 98 % --   10/13/22 1545 96/66 -- -- 89 29 98 % --   10/13/22 1515 112/69 -- -- 109 28 94 % --   10/13/22 1500 91/64 97.9  F (36.6  C) Oral 86 28 98 % 49.9 kg (109 lb 14.4 oz)   10/13/22 1300 92/61 -- -- 80 24 94 % --   10/13/22 1230 (!) 88/57 -- -- 78 24 92 % --   10/13/22 1209 (!) 88/60 -- -- 76 26 94 % --   10/13/22 1200 -- -- -- 76 24 93 % --   10/13/22 1150 103/59 -- -- 77 25 96 % --       Physical Examination:  Gen: Pleasant in no acute distress.  HEENT: NCAT. EOMI. PERRL.  Neck: No bruit, JVD or thyromegaly.  Lungs: Bilateral crackles  Card: RRR. NSR. No RMG. Peripheral pulses present and symmetric. No edema.  Abd: Soft NT ND. No mass. Normal bowel sounds.  Skin: No rash.  Extr: No edema.  Neuro: Alert and oriented to place time and person. Cranial nerves II to XII intact.          Laboratory Data:   ID Labs:  Microbiology labs:  Reviewed.    Recent Labs   Lab Test 10/10/22  1044 10/03/22  1250 09/28/22  0140   .00* <3.00 <3.00     Recent Labs   Lab Test  10/13/22  0724 10/12/22  0704 10/11/22  1937 10/03/22  1250 09/28/22  0140 05/07/22  0507   WBC 6.4 5.7 5.0 5.6 6.5 4.9     Recent Labs   Lab Test 10/14/22  0518 10/13/22  0724 10/12/22  0704 10/11/22  1719   CR 1.91* 2.55* 1.67* 1.38*   GFRESTIMATED 39* 28* 46* 58*       Hematology Studies  Recent Labs   Lab Test 10/13/22  0724 10/12/22  0704 10/11/22  1937 10/03/22  1250 09/28/22  0140 05/07/22  0507   WBC 6.4 5.7 5.0 5.6 6.5 4.9   ANEU  --   --  3.9  --   --   --    AEOS  --   --  0.0  --   --   --    HCT 27.0* 33.0* 30.5* 31.3* 37.2* 34.4*   * 138* 169 152 177 135*       Metabolic  Recent Labs   Lab Test 10/14/22  0518 10/13/22  0724 10/12/22  0704 10/11/22  1719   NA  --  135* 128* 126*   BUN  --  61.1* 46.8* 41.4*   CO2  --  24 21* 23   CR 1.91* 2.55* 1.67* 1.38*   GFRESTIMATED 39* 28* 46* 58*       Hepatic Studies  Recent Labs   Lab Test 10/12/22  0704 10/11/22  1719 10/10/22  1044 10/03/22  1250 09/28/22  0140 04/24/22  2212   BILITOTAL  --  1.5* 1.3*  --   --  1.0   ALKPHOS  --  120 127  --   --  77   ALBUMIN 2.5* 2.7* 3.1*  --   --  3.6   AST  --  46 26 30   < > 88*   ALT  --  16 19  --   --  52*    < > = values in this interval not displayed.       Immunologlobulins  Recent Labs   Lab Test 10/03/22  1250 09/28/22  0140   SED 13 16*            Imaging Data:   Reviewed

## 2022-10-14 NOTE — PROGRESS NOTES
ICU Update Note    Pt has been off vasopressor since yesterday.    Will sign off, please let our service know if any change in clinical condition or questions.    Discussed with Dr. Bill Castillo DO  Pulmonary and Critical Care Attending  pgr [phone]

## 2022-10-14 NOTE — PLAN OF CARE
Problem: Plan of Care - These are the overarching goals to be used throughout the patient stay.    Goal: Optimal Comfort and Wellbeing  Intervention: Monitor Pain and Promote Comfort  Recent Flowsheet Documentation  Taken 10/13/2022 2000 by Valerie Barba RN  Pain Management Interventions: medication (see MAR)     Problem: Pain Acute  Goal: Optimal Pain Control and Function  Outcome: Adequate for Care Transition     Problem: Sepsis/Septic Shock  Goal: Absence of Infection Signs and Symptoms  Intervention: Promote Recovery  Recent Flowsheet Documentation  Taken 10/14/2022 0400 by Valerie Barba, RN  Activity Management:   activity adjusted per tolerance   up to bedside commode  Taken 10/14/2022 0000 by Valerie Barba, RN  Activity Management:   activity adjusted per tolerance   up to bedside commode  Taken 10/13/2022 2000 by Valerie Barba, RN  Activity Management:   activity adjusted per tolerance   up to bedside commode   Goal Outcome Evaluation:    [facility] - ICU    RN Progress Note:            Pertinent Assessments:      Please refer to flowsheet rows for full assessment      Patient off dopamine drip overnight maintaining bps above map goal.  Patient's pain managed with PRN medications.  VSS.         Mobility Level:     4         Key Events - This Shift:     Patient continues to be very tired and weak.  Voided adequately after scheduled diuretics. Mag 2.2 replacing.  K 3.3 Replacing.                 Plan:     Continue to monitor.

## 2022-10-14 NOTE — PROGRESS NOTES
HEART CARE NOTE          Assessment/Recommendations   1. HFrEF/ICM c/b cardiogenic shock  Assessment / Plan    Diuresing well on current regimen of IV furosemide and dopamine (wean as tolerated) - no changes at this time - continue to hold BBlockers    GDMT om hold given the above - repeat echo negative for LV thrombus    Will need ICD given persistent decline in LVSF - will arrange as outpatient     Current Pharmacotherapy AHA Guideline-Directed Medical Therapy   Lisinopril - on hold given ELIZABETH and borderline BP Lisinopril 20 mg twice daily   Metoprolol 12.5 mg daily - on hold Carvedilol 25 mg twice daily   Spironolactone 25 mg daily - on hold Spironolactone 25 mg once daily   Hydralazine NA Hydralazine 100 mg three times daily   Isosorbide mononitrate 20 mg daily - on hold Isosorbide dinitrate 40 mg three times daily   SGLT2 inhibitor:not started Dapagliflozin or Empagliflozin 10 mg daily      2. Severe multivessel CAD c/b NSTEMI  Assessment / Plan    Hx of STEMI 8/21 - at which time the pateint left AMA    Known severe multivessel disease - patient declines recommended CABG    Continue ASA, high intensity rosuvastatin; denies anginal equivalents - does have some pleuritic chest pain    Hx of cardiac MRI significant inducible ischemia in the mid to distal inferoseptal segments, mid-distal anterior segments and mid inferolateral segment. There is almost transmural infarction in the inferolateral walls while the rest of the myocardium is viable; plan for impella assisted PCI initially on hold given patient's absence of symptoms and adequate functional capacity at the time; patient later declined coronary angiogram outright upon readmission; he would not like to pursue intervention; reported that he would instead like to return to home/[place] to pursue herbal remedy;  Lab results and known coronary anatomy were reviewed at that time with patient and as well as the risks associated with holding off on potential  intervention at which time patient voiced comprehension.     3. Sepsis 2/2 RSV and underlying PNA  Assessment / Plan    Supportive care and management per primary team     3. Acute respiratory failure  Assessment / Plan    2/2 RSV, PNA, ADHF; course c/b sepsis    Supportive care per primary team/ID; IV diuresis as above     3. Valvular heart disease  Assessment / Plan    Moderate MR and TR     4. DM2  Assessment / Plan    Management per primary team    Patient remains critically ill in the ICU requiring hemodynamic support via IV inotrope. 40 minutes spent on critical care.      History of Present Illness/Subjective      Mr. Av Fernando is a 62 year old male with a PMHx significant for ( per Dr. Miranda's note) h/o diabetes, chronic afib on anticoagulation, and has a pacemaker presented with SOB and weakness. Patient was 84% in triage and placed on 3L is 95%     Today, Mr. Fernando denies any acute cardiac events or complaints; Management plan as detailed above     ECG: Personally reviewed. sinus tachycardia.     ECHO (personnaly Reviewed):   Severe biatrial enlargement.  The left ventricle is mildly dilated.  The visual ejection fraction is 25-30%.  There is inferolateral wall akinesis.  There is apical dyskinesis.  There is moderate anterior wall hypokinesis.  Distal inferior akinesis.  Mildly decreased right ventricular systolic function  There is mod-severe to severe (3-4+) mitral regurgitation.  There is mild (1+) aortic regurgitation.  There is moderate (2+) tricuspid regurgitation.  The right ventricular systolic pressure is approximated at 48mmHg plus the  right atrial pressure.  Compared to the prior study dated 2/21/2022, there are changes as noted. There  is now severe mitral regurgitation and moderate tricuspid regurgitation with  moderate to severe pulmonary HTN.             Physical Examination Review of Systems   BP 95/60 (BP Location: Left arm)   Pulse 86   Temp 99.1  F (37.3  C) (Oral)   Resp 28   " Ht 1.575 m (5' 2\")   Wt 48.9 kg (107 lb 11.2 oz)   SpO2 92%   BMI 19.70 kg/m    Body mass index is 19.7 kg/m .  Wt Readings from Last 3 Encounters:   10/14/22 48.9 kg (107 lb 11.2 oz)   06/09/22 50.8 kg (112 lb 1.6 oz)   05/16/22 53.7 kg (118 lb 4.8 oz)     General Appearance:   no distress, normal body habitus   ENT/Mouth: membranes moist, no oral lesions or bleeding gums.      EYES:  no scleral icterus, normal conjunctivae   Neck: no carotid bruits or thyromegaly   Chest/Lungs:   lungs are clear to auscultation, no rales or wheezing, equal chest wall expansion    Cardiovascular:   Regular. Normal first and second heart sounds with no murmurs, rubs, or gallops; the carotid, radial and posterior tibial pulses are intact, + JVD and LE edema bilaterally    Abdomen:  no organomegaly, masses, bruits, or tenderness; bowel sounds are present   Extremities: no cyanosis or clubbing   Skin: no xanthelasma, warm.    Neurologic: alert and oriented x3, calm     Psychiatric: alert and oriented x3, calm     A complete 10 systems ROS was reviewed  And is negative except what is listed in the HPI.          Medical History  Surgical History Family History Social History   Past Medical History:   Diagnosis Date     Congestive heart failure (H) 08/2021    ischemic CM with LVEF 30-35%     Coronary artery disease 08/2021    STEMI with multivessel CAD on angiography     Hyperlipidemia      Hypertension      LV (left ventricular) mural thrombus 01/2022     Myocardial infarction (H) 08/2021    inferior STEMI     Type 2 diabetes, HbA1C goal < 8% (H)     Past Surgical History:   Procedure Laterality Date     CV CORONARY ANGIOGRAM N/A 8/1/2021    Procedure: Coronary Angiogram;  Surgeon: Deidre Lopes MD;  Location: [facility] CATH LAB CV     CV CORONARY ANGIOGRAM N/A 5/6/2022    Procedure: Coronary Angiogram;  Surgeon: Sherif Fuentes MD;  Location: [facility] CATH LAB CV     CV LEFT HEART CATH N/A 5/6/2022    Procedure: Left Heart " Catheterization;  Surgeon: Sherif Fuentes MD;  Location: [facility] CATH LAB CV     CV LEFT VENTRICULOGRAM N/A 8/1/2021    Procedure: Left Ventriculogram;  Surgeon: Deidre Lopes MD;  Location: [facility] CATH LAB CV     CV PCI N/A 5/6/2022    Procedure: Percutaneous Coronary Intervention;  Surgeon: Sherif Fuentes MD;  Location: [facility] CATH LAB CV     OTHER SURGICAL HISTORY      LEG TRAUMA     PICC TRIPLE LUMEN PLACEMENT  10/13/2022         no family history of premature coronary artery disease Social History     Socioeconomic History     Marital status:      Spouse name: Not on file     Number of children: Not on file     Years of education: Not on file     Highest education level: Not on file   Occupational History     Not on file   Tobacco Use     Smoking status: Never     Smokeless tobacco: Never   Substance and Sexual Activity     Alcohol use: No     Drug use: No     Sexual activity: Yes     Partners: Female     Birth control/protection: None   Other Topics Concern     Parent/sibling w/ CABG, MI or angioplasty before 65F 55M? Not Asked   Social History Narrative     Not on file     Social Determinants of Health     Financial Resource Strain: Not on file   Food Insecurity: Not on file   Transportation Needs: Not on file   Physical Activity: Not on file   Stress: Not on file   Social Connections: Not on file   Intimate Partner Violence: Not on file   Housing Stability: Not on file           Lab Results    Chemistry/lipid CBC Cardiac Enzymes/BNP/TSH/INR   Lab Results   Component Value Date    CHOL 125 05/06/2022    HDL 56 05/06/2022    TRIG 43 05/06/2022    BUN 61.1 (H) 10/13/2022     (L) 10/13/2022    CO2 24 10/13/2022    Lab Results   Component Value Date    WBC 6.4 10/13/2022    HGB 8.9 (L) 10/13/2022    HCT 27.0 (L) 10/13/2022    MCV 90 10/13/2022     (L) 10/13/2022    Lab Results   Component Value Date    TROPONINI 0.71 (HH) 05/04/2022    BNP 4,308 (H) 05/04/2022    INR 1.10  10/11/2022     Lab Results   Component Value Date    TROPONINI 0.71 (HH) 05/04/2022          Weight:    Wt Readings from Last 3 Encounters:   10/14/22 48.9 kg (107 lb 11.2 oz)   06/09/22 50.8 kg (112 lb 1.6 oz)   05/16/22 53.7 kg (118 lb 4.8 oz)       Allergies  Allergies   Allergen Reactions     Dulaglutide Dizziness     Weak and muscle weak  Other reaction(s): Dizziness  Weak and muscle weak     Januvia [Sitagliptin] Unknown     HEADACHE     Tylenol [Acetaminophen] Itching         Surgical History  Past Surgical History:   Procedure Laterality Date     CV CORONARY ANGIOGRAM N/A 8/1/2021    Procedure: Coronary Angiogram;  Surgeon: Deidre Lopes MD;  Location: [facility] CATH LAB CV     CV CORONARY ANGIOGRAM N/A 5/6/2022    Procedure: Coronary Angiogram;  Surgeon: Sherif Fuentes MD;  Location: [facility] CATH LAB CV     CV LEFT HEART CATH N/A 5/6/2022    Procedure: Left Heart Catheterization;  Surgeon: Sherif Fuentes MD;  Location: [facility] CATH LAB CV     CV LEFT VENTRICULOGRAM N/A 8/1/2021    Procedure: Left Ventriculogram;  Surgeon: Deidre Lopes MD;  Location: [facility] CATH LAB CV     CV PCI N/A 5/6/2022    Procedure: Percutaneous Coronary Intervention;  Surgeon: Sherif Fuentes MD;  Location: [facility] CATH LAB CV     OTHER SURGICAL HISTORY      LEG TRAUMA     PICC TRIPLE LUMEN PLACEMENT  10/13/2022            Social History  Tobacco:   History   Smoking Status     Never   Smokeless Tobacco     Never    Alcohol:   Social History    Substance and Sexual Activity      Alcohol use: No   Illicit Drugs:   History   Drug Use No       Family History  No family history on file.       Dougie Gupta MD on 10/14/2022      cc: Stefano Thapa

## 2022-10-14 NOTE — PROGRESS NOTES
[facility]    Medicine Progress Note - Hospitalist Service    Date of Admission:  10/11/2022    Assessment & Plan          62 y Dominguez HUTCHINS with a history of ICM EF 20% s/p ICD, HTN HLD, DM, who presented with SOB and weakness, hypoxemia and shock/hypotension. RSV+ pneumonia and decompensated CHF/cardiogenic shock.     #CV  --Severe biventricular heart failure with EF 25% and moderate-severe MR and moderate-severe pulmonary HTN with decompensated CHF.  -- Admitted with shock, lactic 6.7 --> 1.3. I don't think this is septic shock, which would not be expected from a viral PNA, unless another bacterial source is identified. Seems more consistent with cardiogenic shock with cardiorenal.  -- I/O +1L and up 1 kg and BNP 41,000  -- Patient off dopamine gtt  -- Cardiology following  -- Ordered for lasix q8H     #Resp  -- RSV pneumonia. Looks fairly dense on imaging.   -- However is on room air  -- Continue to monitor  -- Procalcitonin elevated; continue Zosyn IV     #Renal  -- ELIZABETH, suspect cardiorenal  -- Diuresis ordered  -- Monitor I/O and lytes     #ID  -- RSV+. Negative U/A, Bcx pending. No leukocytosis  -- ID signed-off; may re-engage  -- Monitor off abx.  -- At risk for superimposed bacterial PNA.  -- Procalcitonin elevated     #GI  -- GERD - home PPI  -- DM diet     #Neuro  -- No acute issues  -- On home neurontin     #Hematologic  -- Stable counts. Plts run low at baseline possibly due to advanced CHF     #Endocrine  -- Hx T2DM  -- Sliding scale + lantus + mealtime        Diet: Combination Diet Moderate Consistent Carb (60 g CHO per Meal) Diet; 2 gm NA Diet  Fluid restriction 1500 ML FLUID    DVT Prophylaxis: DOAC  Bustamante Catheter: Not present  Central Lines: PRESENT  PICC 10/13/22 Triple Lumen Right Brachial vein medial Sepsis protocol-Site Assessment: WDL  Cardiac Monitoring: None  Code Status: Full Code      Disposition Plan      Expected Discharge Date: 10/16/2022    Discharge Delays: IV  Medication - consider oral or Home Infusion    Discharge Comments: diagnostic work up        The patient's care was discussed with the Bedside Nurse, Care Coordinator/, Patient, Patient's Family and Intensivist Consultant.    Mikey Khan DO  Hospitalist Service  [facility]  Securely message with the Vocera Web Console (learn more here)  Text page via [employer] Paging/Directory         Clinically Significant Risk Factors Present on Admission                     ______________________________________________________________________    Interval History   Patient reports feeling extremely tired.  Patient reports work of breathing is improved.  Patient denies chest pain at present.  Patient denies abdominal pain, nausea, vomiting.    Data reviewed today: I reviewed all medications, new labs and imaging results over the last 24 hours. I personally reviewed no images or EKG's today.    Physical Exam   Vital Signs: Temp: 98.2  F (36.8  C) Temp src: Oral BP: 90/57 Pulse: 85   Resp: 24 SpO2: 91 % O2 Device: None (Room air)    Weight: 107 lbs 11.2 oz    GENERAL: Alert and oriented x 3; no acute distress; well-nourished.  HEENT: Normocephalic; atraumatic; PERRLA; MMM.  CV: Distant heart sounds; normal S1, S2; no rubs, murmurs, or gallops.  RESP: Lung fields clear to aucultation B/L; no wheezing or crepitations.  GI: Abdomen is soft, nontender, nondistended; no organomegaly; normal bowel sounds.  : Deferred genital examination.   MSK: No clubbing, cyanosis, or edema.  DERM: Skin is intact; no rash, lesions, or skin breakdown.  NEURO: No focal deficits appreciated; strength & sensorium are grossly intact.  PSYCH: No active hallucinations; affect, insight appear within normal limits.    Data   Recent Labs   Lab 10/14/22  1132 10/14/22  1130 10/14/22  0752 10/14/22  0518 10/13/22  2059 10/13/22  0847 10/13/22  0724 10/12/22  0958 10/12/22  0704 10/11/22  1937 10/11/22  1719 10/11/22  1640  10/10/22  1044   WBC  --   --   --   --   --   --  6.4  --  5.7 5.0  --   --   --    HGB  --   --   --   --   --   --  8.9*  --  10.5* 10.4*  --   --   --    MCV  --   --   --   --   --   --  90  --  93 89  --   --   --    PLT  --   --   --   --   --   --  131*  --  138* 169  --   --   --    INR  --   --   --   --   --   --   --   --   --   --  1.10  --   --    NA  --   --   --   --   --   --  135*  --  128*  --  126*  --  130*   POTASSIUM  --  3.5  --  3.3*  --   --  3.6  --  4.6  --  5.1  --  4.4   CHLORIDE  --   --   --   --   --   --  98  --  91*  --  87*  --  90*   CO2  --   --   --   --   --   --  24  --  21*  --  23  --  24   BUN  --   --   --   --   --   --  61.1*  --  46.8*  --  41.4*  --  38.5*   CR  --   --   --  1.91*  --   --  2.55*  --  1.67*  --  1.38*  --  1.42*   ANIONGAP  --   --   --   --   --   --  13  --  16*  --  16*  --  16*   LISA  --   --   --   --   --   --  8.1*  --  8.2*  --  8.7*  --  8.8   *  --  140*  --  156*   < > 116*   < > 358*  --  278*   < > 294*   ALBUMIN  --   --   --   --   --   --   --   --  2.5*  --  2.7*  --  3.1*   PROTTOTAL  --   --   --   --   --   --   --   --   --   --  7.2  --  6.3*   BILITOTAL  --   --   --   --   --   --   --   --   --   --  1.5*  --  1.3*   ALKPHOS  --   --   --   --   --   --   --   --   --   --  120  --  127   ALT  --   --   --   --   --   --   --   --   --   --  16  --  19   AST  --   --   --   --   --   --   --   --   --   --  46  --  26    < > = values in this interval not displayed.     No results found for this or any previous visit (from the past 24 hour(s)).  Medications     sodium chloride 10 mL/hr at 10/13/22 1523       albuterol  2 puff Inhalation 4x Daily     apixaban ANTICOAGULANT  2.5 mg Oral BID     aspirin  81 mg Oral Daily     atorvastatin  80 mg Oral Daily     furosemide  40 mg Intravenous Q8H     gabapentin  100 mg Oral TID     insulin aspart  3 Units Subcutaneous TID w/meals     insulin aspart  1-9 Units Subcutaneous TID  AC     insulin glargine  10 Units Subcutaneous At Bedtime     levETIRAcetam  500 mg Oral BID     [Held by provider] metoprolol succinate ER  12.5 mg Oral Daily     pantoprazole  40 mg Oral Daily     piperacillin-tazobactam  3.375 g Intravenous Q8H     sodium chloride (PF)  10-40 mL Intracatheter Q7 Days

## 2022-10-15 NOTE — PLAN OF CARE
[facility] - ICU    RN Progress Note:            Pertinent Assessments:      Please refer to flowsheet rows for full assessment     Vitals stable with Levophed off. Still having frequent productive cough. Denied any pain. PRN Imodium given once for loose stools.  Appetite was slightly approved. Code status was changed to DNR/DNI.          Mobility Level:     3        Key Events - This Shift:     Uneventful day.           Plan:     Will continue to monitor any change.     Evon Copeland RN

## 2022-10-15 NOTE — PLAN OF CARE
Patient remains on RA, Sats high 90's. Flutter valve done as scheduled.     Dafne Santiago, RT

## 2022-10-15 NOTE — PLAN OF CARE
[facility] - ICU    RN Progress Note:            Pertinent Assessments:      Please refer to flowsheet rows for full assessment     Levophed stopped at 3:00 AM. MAP maintaining >65. Denies of pain. Has productive cough. Interrupted sleep due to persistent cough. PRN vistaril given for itchy. Will continue to monitor.         Mobility Level:     3         Key Events - This Shift:     Levo stopped at 3:00AM. Maintaining MAP >65.               Plan:     Keep MAP >65.

## 2022-10-15 NOTE — PROGRESS NOTES
"[facility]  Cardiac Electrophysiology  [address]  [place] [address]   Office: [phone]  Fax: [phone]     Cardiology Progress Note    Patient: Av Fernando   : 1960       Assessment/Recommendations   Mr. Av Fernando is a 62 year old male with chronic systolic heart failure related to ischemic cardiomyopathy, multivessel CAD with prior STEMI, permanent atrial fibrillation admitted with acute on chronic systolic heart failure with cardiogenic shock.    Acute on chronic systolic heart failure related to ischemic cardiomyopathy with cardiogenic shock - LVEF 25-30%, 3-4+ MR by 2022 TTE  - continue lasix 40mg IV every 8hrs  - GDMT on hold due to marginal blood pressure, ELIZABETH (resolving)  - consideration for goals of care and possible hospice may be reasonable    Multivessel CAD with prior STEMI - patient declines CABG and PCI  - continue aspiring 81mg daily  - continue atorvastatin 80mg twice daily  - metoprolol XL 12.5mg daily on hold    Paroxysmal vs persistent atrial fibrillation - in sinus rhythm this admission  [id number]  - continue apixaban 5mg twice daily at discharge (wt <60kg, Cr <2.5, age <80)           Interval Events   Dopamine infusion discontinued 10/13/2022 due to hypotension - started on norepinephrine infusion 10/14/2022, discontinued this morning.  BP 90-100s/50-70s, HR 70-90s  Continues on lasix 40mg IV every 8hrs - 24hr I/O 1893/2700, Cr 1.66 (1.91).  He feels very fatigued.  He notes cough productive of sputum.  He notes intermittent dyspnea.  Seen today with two family members at bedside.       Physical Examination  Review of Systems   VITALS: BP 97/63   Pulse 87   Temp 98.3  F (36.8  C) (Oral)   Resp 27   Ht 1.575 m (5' 2\")   Wt 47.5 kg (104 lb 11.2 oz)   SpO2 94%   BMI 19.15 kg/m    Wt Readings from Last 3 Encounters:   10/15/22 47.5 kg (104 lb 11.2 oz)   22 50.8 kg (112 lb 1.6 oz)   22 53.7 kg (118 lb 4.8 oz) "       Intake/Output Summary (Last 24 hours) at 10/15/2022 0922  Last data filed at 10/15/2022 0646  Gross per 24 hour   Intake 1413.6 ml   Output 1850 ml   Net -436.4 ml     CONSTITUTIONAL: appears fatigued  EYES:  Conjunctivae pink, sclerae clear.    E/N/T:  Oral mucosa pink, moist mucous membranes  RESPIRATORY:  Respiratory effort is normal  CARDIOVASCULAR:  normal S1 and S2  GASTROINTESTINAL:  Abdomen without masses or tenderness  EXTREMITIES:  No clubbing or cyanosis.    MUSCULOSKELETAL:  Overall grossly normal muscle strength  SKIN:  Overall, skin warm and dry, no lesions.  NEURO/PSYCH:  Oriented x 3 with normal affect.   Constitutional:  No weight loss or loss of appetite    Cardiovascular: As detailed above  Respiratory: No cough  Genitourinary: No trouble urinating           Medical History  Surgical History   Past Medical History:   Diagnosis Date     Congestive heart failure (H) 08/2021    ischemic CM with LVEF 30-35%     Coronary artery disease 08/2021    STEMI with multivessel CAD on angiography     Hyperlipidemia      Hypertension      LV (left ventricular) mural thrombus 01/2022     Myocardial infarction (H) 08/2021    inferior STEMI     Type 2 diabetes, HbA1C goal < 8% (H)     Past Surgical History:   Procedure Laterality Date     CV CORONARY ANGIOGRAM N/A 8/1/2021    Procedure: Coronary Angiogram;  Surgeon: Deidre Lopes MD;  Location: [facility] CV     CV CORONARY ANGIOGRAM N/A 5/6/2022    Procedure: Coronary Angiogram;  Surgeon: Sherif Fuentes MD;  Location: [facility] CV     CV LEFT HEART CATH N/A 5/6/2022    Procedure: Left Heart Catheterization;  Surgeon: Sherif Fuentes MD;  Location: [facility] CV     CV LEFT VENTRICULOGRAM N/A 8/1/2021    Procedure: Left Ventriculogram;  Surgeon: Deidre Lopes MD;  Location: [facility] CV     CV PCI N/A 5/6/2022    Procedure: Percutaneous Coronary Intervention;  Surgeon: Sherif Fuentes MD;  Location: [facility]  LAB CV     OTHER SURGICAL HISTORY      LEG TRAUMA     PICC TRIPLE LUMEN PLACEMENT  10/13/2022              Family History Social History   No family history on file.     Social History     Tobacco Use     Smoking status: Never     Smokeless tobacco: Never   Substance Use Topics     Alcohol use: No     Drug use: No         Medications  Allergies     Current Facility-Administered Medications:      albuterol (PROVENTIL HFA/VENTOLIN HFA) inhaler, 2 puff, Inhalation, 4x Daily, Tatiana Miranda MD, 2 puff at 10/15/22 0747     alum & mag hydroxide-simethicone (MAALOX) suspension 30 mL, 30 mL, Oral, Q4H PRN, Tatiana Miranda MD     apixaban ANTICOAGULANT (ELIQUIS) tablet 2.5 mg, 2.5 mg, Oral, BID, Mary Watkins MD, 2.5 mg at 10/15/22 0746     aspirin EC tablet 81 mg, 81 mg, Oral, Daily, Tatiana Miranda MD, 81 mg at 10/15/22 0746     atorvastatin (LIPITOR) tablet 80 mg, 80 mg, Oral, Daily, Tatiana Miranda MD, 80 mg at 10/15/22 0746     benzocaine-menthol (CEPACOL) 15-3.6 MG lozenge 1 lozenge, 1 lozenge, Buccal, Q1H PRN, Tatiana Miranda MD     bisacodyl (DULCOLAX) EC tablet 5 mg, 5 mg, Oral, Daily PRN, Tatiana Miranda MD     bisacodyl (DULCOLAX) suppository 10 mg, 10 mg, Rectal, Daily PRN, Tatiana Miranda MD     glucose gel 15-30 g, 15-30 g, Oral, Q15 Min PRN **OR** dextrose 50 % injection 25-50 mL, 25-50 mL, Intravenous, Q15 Min PRN **OR** glucagon injection 1 mg, 1 mg, Subcutaneous, Q15 Min PRN, Tatiana Miranda MD     furosemide (LASIX) injection 40 mg, 40 mg, Intravenous, Q8H, Dougie Gupta MD, 40 mg at 10/15/22 0407     gabapentin (NEURONTIN) capsule 100 mg, 100 mg, Oral, TID, Tatiana Miranda MD, 100 mg at 10/15/22 0746     hydrOXYzine (ATARAX) tablet 25 mg, 25 mg, Oral, 4x Daily PRN, Tatiana Miranda MD, 25 mg at 10/14/22 2152     ibuprofen (ADVIL/MOTRIN) tablet 400 mg, 400 mg, Oral, Q4H PRN, Mary Watkins MD, 400 mg at 10/14/22 1225     insulin aspart (NovoLOG)  injection (RAPID ACTING), 3 Units, Subcutaneous, TID w/meals, Mary Watkins MD, 3 Units at 10/15/22 0747     insulin aspart (NovoLOG) injection (RAPID ACTING), 1-9 Units, Subcutaneous, TID AC, Mary Watkins MD, 5 Units at 10/15/22 0746     insulin glargine (LANTUS PEN) injection 10 Units, 10 Units, Subcutaneous, At Bedtime, Mikey Khan DO, 10 Units at 10/14/22 2149     levETIRAcetam (KEPPRA) tablet 500 mg, 500 mg, Oral, BID, Tatiana Miranda MD, 500 mg at 10/15/22 0746     lidocaine (LMX4) cream, , Topical, Q1H PRN, Elinor Montano MD     lidocaine 1 % 0.1-5 mL, 0.1-5 mL, Other, Q1H PRN, Elinor Montano MD, 2.5 mL at 10/13/22 1353     magnesium hydroxide (MILK OF MAGNESIA) suspension 30 mL, 30 mL, Oral, Daily PRN, Tatiana Miranda MD     melatonin tablet 3 mg, 3 mg, Oral, At Bedtime PRN, Tatiana Miranda MD     [Held by provider] metoprolol succinate ER (TOPROL-XL) 24 hr half-tab 12.5 mg, 12.5 mg, Oral, Daily, Tatiana Miranda MD     nitroGLYcerin (NITROSTAT) sublingual tablet 0.4 mg, 0.4 mg, Sublingual, Q5 Min PRN, Tatiana Miranda MD     norepinephrine (LEVOPHED) 4 mg in  mL infusion PREMIX, 0.01-0.6 mcg/kg/min, Intravenous, Continuous, IvAntonio soliz MD, Stopped at 10/15/22 0300     oxyCODONE (ROXICODONE) tablet 5 mg, 5 mg, Oral, Q4H PRN, Mary Watkins MD     pantoprazole (PROTONIX) EC tablet 40 mg, 40 mg, Oral, Daily, Tatiana Miranda MD, 40 mg at 10/15/22 0746     piperacillin-tazobactam (ZOSYN) 3.375 g vial to attach to  mL bag, 3.375 g, Intravenous, Q8H, Elinor Montano MD, 3.375 g at 10/15/22 0311     senna-docusate (SENOKOT-S/PERICOLACE) 8.6-50 MG per tablet 1 tablet, 1 tablet, Oral, BID PRN, Tatiana Miranda MD     sodium chloride (PF) 0.9% PF flush 10-20 mL, 10-20 mL, Intracatheter, q1 min prn, Mary Watkins MD     sodium chloride (PF) 0.9% PF flush 10-40 mL, 10-40 mL, Intracatheter, Once PRN, Elinor Montano MD     sodium chloride (PF) 0.9%  PF flush 10-40 mL, 10-40 mL, Intracatheter, Q7 Days, Mary Watkins MD, 40 mL at 10/13/22 1524     sodium chloride (PF) 0.9% PF flush 10-40 mL, 10-40 mL, Intracatheter, Q1H PRN, Mary Watkins MD     sodium chloride 0.9% infusion, , Intravenous, Continuous, Carl Plata MD, Last Rate: 10 mL/hr at 10/13/22 1523, New Bag at 10/13/22 1523     Allergies   Allergen Reactions     Dulaglutide Dizziness     Weak and muscle weak  Other reaction(s): Dizziness  Weak and muscle weak     Januvia [Sitagliptin] Unknown     HEADACHE     Tylenol [Acetaminophen] Itching          Lab Results    Chemistry CBC Cardiac Enzymes/BNP/TSH/INR   Recent Labs   Lab Test 10/15/22  0741 10/15/22  0519   NA  --  141   POTASSIUM  --  3.3*   CHLORIDE  --  103   CO2  --  25   * 286*   BUN  --  35.9*   CR  --  1.66*   GFRESTIMATED  --  46*   LISA  --  7.6*     Recent Labs   Lab Test 10/15/22  0519 10/14/22  0518 10/13/22  0724   CR 1.66* 1.91* 2.55*          Recent Labs   Lab Test 10/15/22  0519   WBC 9.6   HGB 8.8*   HCT 26.7*   MCV 90   *     Recent Labs   Lab Test 10/15/22  0519 10/13/22  0724 10/12/22  0704   HGB 8.8* 8.9* 10.5*    Recent Labs   Lab Test 05/04/22  2340 05/04/22  1836 05/04/22  1234   TROPONINI 0.71* 0.69* 0.73*     Recent Labs   Lab Test 10/11/22  1719 10/10/22  1044 05/04/22  1234 04/24/22  2212 01/13/22  1731 09/28/21  0852   BNP  --   --  4,308* 3,288* 2,494*  --    NTBNPI 41,466*  --   --   --   --   --    NTBNP  --  32,780*  --   --   --  3,508*     No results for input(s): TSH in the last 68716 hours.  Recent Labs   Lab Test 10/11/22  1719 05/04/22  1234 04/24/22  2212   INR 1.10 1.28* 1.24*            Chaim Kendall MD  10/15/2022  9:22 AM

## 2022-10-15 NOTE — PROGRESS NOTES
Hospitalist Progress Note    Assessment/Plan    Principal Problem:    Severe sepsis (H)  Active Problems:    Type 2 diabetes, HbA1C goal < 8% (H)    Coronary artery disease involving native coronary artery of native heart, unspecified whether angina present    CHF (congestive heart failure), NYHA class I, acute on chronic, combined (H)    Ischemic cardiomyopathy    Hyponatremia    RSV (respiratory syncytial virus infection)    Pneumonia of both lower lobes due to infectious organism    Acute congestive heart failure, unspecified heart failure type (H)    Seizure disorder (H)    Herpes zoster of eye    Stage 3b chronic kidney disease (H)    Acute respiratory failure with hypoxia (H)    Lactic acid acidosis  62-year-old patient with history of ischemic cardiomyopathy ejection fraction 20% status post ICD, hypertension, DM, presented with hypoxemia shortness of breath and weakness, he was found to have RSV with pneumonia and decompensated heart failure cardiogenic shock,    Cardiogenic shock, acute on chronic systolic heart failure,,  With severe biventricular heart failure with EF of 25%  Moderate-severe MR and moderate to severe pulmonary hypertension   On admission with lactic acidosis of 6.7,  BNP was 42510 required Levophed has been off Levophed since 3 AM in the morning  ICU signed off  Cardiology following was on dobutamine drip currently on Lasix with good output, does not require oxygen,     Coronary artery disease,  Patient declined CABG and PCI, continue aspirin and statin metoprolol currently on hold, cardiology is following    Acute kidney injury  Likely cardiorenal, slightly improving creatinine 1.6 today    Hypokalemia  Potassium 3.3 today, likely secondary to diuresis, improved with replacement    Loose stools  Has been having loose stools, per nursing staff does not smell like C. Difficile,  Has been on antibiotic,  Imodium is helping  RSV pneumonia  Was initially on Vanco and Zosyn then it was  discontinued there was a concern about of superimposed bacterial pneumonia with the increased procalcitonin so Zosyn was resumed on October 13    Paroxysmal A. Fib  Currently in sinus rhythm, on apixaban,    Type 2 diabetes with hyperglycemia  Blood sugar elevated today we will increase preprandial insulin and bedtime Lantus   We will add morning Lantus-    Barriers to Discharge: Not clinically ready for discharge    Anticipated discharge date/Disposition: TBD    Subjective  Patient was seen today with family at bedside he still feels very weak, I i  Objective    Vital signs in last 24 hours  Temp:  [97.7  F (36.5  C)-98.4  F (36.9  C)] 98.2  F (36.8  C)  Pulse:  [73-95] 87  Resp:  [9-33] 22  BP: ()/(51-73) 95/58  SpO2:  [89 %-97 %] 96 % [unfilled] O2 Device: None (Room air)    Weight:   [unfilled] Weight change: -2.359 kg (-5 lb 3.2 oz)    Intake/Output last 3 shifts  I/O last 3 completed shifts:  In: 1733.6 [P.O.:1400; I.V.:333.6]  Out: 2700 [Urine:2700]  Body mass index is 19.15 kg/m .    Physical Exam:  General Appearance: Alert, oriented x3, lethargic  HEENT: Normocephalic. No scleral icterus, . Mucous membranes moist.  Heart: :Regular rate and rhythm, normal S1 ,S2, No murmurs, no JVD, no pedal edema   Lungs: Clear to auscultation bilaterally. No wheezing or crackles  Abdomen: Soft, non tender, no rebound or rigidity, non distended, bowel sounds present.  Extremity: No deformity. No joint swelling.      Pertinent Labs   Lab Results: personally reviewed.   Recent Labs   Lab 10/15/22  0519 10/13/22  0724 10/12/22  0704 10/11/22  1719 10/10/22  1044    135* 128* 126* 130*   CO2 25 24 21* 23 24   BUN 35.9* 61.1* 46.8* 41.4* 38.5*   ALBUMIN 2.2*  --  2.5* 2.7* 3.1*   ALKPHOS 266*  --   --  120 127   *  --   --  16 19   *  --   --  46 26     Recent Labs   Lab 10/15/22  0519 10/13/22  0724 10/12/22  0704   WBC 9.6 6.4 5.7   HGB 8.8* 8.9* 10.5*   HCT 26.7* 27.0* 33.0*   * 131* 138*      No results for input(s): CKTOTAL, TROPONINI in the last 168 hours.    Invalid input(s): TROPONINT, CKMBINDEX  Invalid input(s): POCGLUFGR        Advanced Care Planning:  Discharge Planning discussed with patient and family at bedside  Total time with this patient is 35 min with 50% of time spent in examining the patient, reviewing records, discussing plan of care and counseling, 50% of time spent in coordination of care.  Care discussed and coordinated with RIO.      Juan Elkins MD  Internal Medicine Hospitalist  10/15/2022

## 2022-10-15 NOTE — PROGRESS NOTES
[facility] Pulmonary/Critical Care Consult Team Note    Av Fernando,  1960, MRN [id number]  Admitting Dx: Urinary retention [R33.9]  RSV (respiratory syncytial virus infection) [B33.8]  Severe sepsis (H) [A41.9, R65.20]  Pneumonia of both lower lobes due to infectious organism [J18.9]  Acute congestive heart failure, unspecified heart failure type (H) [I50.9]  Date / Time of Admission:  10/11/2022  4:42 PM    Overnight Events:  Intake/Output last 3 shifts:  I/O last 3 completed shifts:  In: 1893.6 [P.O.:1560; I.V.:333.6]  Out: 2700 [Urine:2700]  He was off dopamine yesterday and then in the early evening yesterday had low blood pressures and was started on levophed  He says he is very tired  He has no pain  He would like me to enter a DNR/DNI order which was discussed with his wife and daughter in the room      Assessment/Plan: 62 y H[place] M with a history of ICM EF 20% s/p ICD, HTN HLD, DM, who presented with SOB and weakness, hypoxemia and shock/hypotension. RSV+ pneumonia and decompensated CHF/cardiogenic shock    PULM/ID: RSV pneumonia causing sepsis  - room air  - Continue to monitor  pH Arterial   Date Value Ref Range Status   10/12/2022 7.47 (H) 7.37 - 7.44 Final     pCO2 Arterial   Date Value Ref Range Status   10/12/2022 32 (L) 35 - 45 mm Hg Final     pO2 Arterial   Date Value Ref Range Status   10/12/2022 85 80 - 90 mm Hg Final     Bicarbonate Arterial   Date Value Ref Range Status   10/12/2022 24 23 - 29 mmol/L Final     O2 Sat, Arterial   Date Value Ref Range Status   10/12/2022 96.6 96.0 - 97.0 % Final     Base Excess/Deficit (+/-)   Date Value Ref Range Status   10/12/2022 -0.6   mmol/L Final       CV: Severe biventricular heart failure with EF 25% and moderate-severe MR and moderate-severe pulmonary HTN with decompensated CHF.  - admitted with shock, lactic 6.7 --> 1.3. I don't think this is septic shock, which would not be expected from a viral PNA, unless another bacterial source is  "identified. Seems more consistent with cardiogenic shock with cardiorenal.  - Monitor on tele  - cardiology following  - lasix q8H    GI: eating  - Malnutrition:    - Level of malnutrition: Moderate   - GERD - home PPI  - DM diet    RENAL: ELIZABETH, suspect cardiorenal  - Diuresis ordered  - Monitor I/O and lytes  - Follow BUN/Creatinine  - strict I/O's    NEURO: generalized weakness  - PT eval    ENDO: Hx DM2  - Sliding scale + lantus + mealtime    Pt has critical illness and impairs circulation on levophed such as there is high probability of imminent of life threatening deterioration in the patient's condition.    Code Status: DNR/DNI    Infusions:    norepinephrine Stopped (10/15/22 0300)     sodium chloride 10 mL/hr at 10/13/22 1523       ICU DAILY CHECKLIST           Can patient transfer out of MICU? no  FAST HUG:  Feeding:  Feeding: Yes  Bustamante:{Yes  Analgesia/Sedation:Yes  Thromboembolic prophylaxis:elliquis  HOB>30:  Yes  Stress Ulcer Protocol Active: Yes; Mode: PPI/H2 Antagonist  Glycemic Control: No components found for: GLU Any glucose > 180 no; Mode of Insulin Therapy: Sliding Scale Insulin  INTUBATED: NA  PHYSICAL THERAPY AND MOBILITY: Can patient have PT and mobility trial: yes Activity: ICU mobility protocol    Critical Care Time excluding procedures and family discussions greater than: 45 Minutes    Risk Factors Present on Admission:  Clinically Significant Risk Factors Present on Admission                         Madison Castillo DO  Pulmonary and Critical Care Attending  pgr [phone]    Allergies   Allergen Reactions     Dulaglutide Dizziness     Weak and muscle weak  Other reaction(s): Dizziness  Weak and muscle weak     Januvia [Sitagliptin] Unknown     HEADACHE     Tylenol [Acetaminophen] Itching       Meds: See MAR    Physical Exam:  /69   Pulse 85   Temp 98.4  F (36.9  C) (Oral)   Resp 23   Ht 1.575 m (5' 2\")   Wt 47.5 kg (104 lb 11.2 oz)   SpO2 95%   BMI 19.15 kg/m    Intake/Output " this shift:  No intake/output data recorded.  GEN: sitting up in bed, NAD  HEENT: cachectic appearing, temporal wasting, poor dentition  CVS: regular rhythm, no murmurs  RESP: CTA BL   ABD: Soft, No abdominal pain with palpation, no guarding, no rigidity  EXT: Warm, well perfused, no edema  NEURO:  Moving all extremities, appears weak  PSYCH: apropriate    Pertinent Labs: Latest lab results in EHR personally reviewed.   CMP  Recent Labs   Lab 10/15/22  0741 10/15/22  0519 10/15/22  0315 10/14/22  2036 10/14/22  1132 10/14/22  1130 10/14/22  0752 10/14/22  0518 10/13/22  0847 10/13/22  0724 10/12/22  0958 10/12/22  0704 10/11/22  1719 10/11/22  1640 10/10/22  1044   NA  --  141  --   --   --   --   --   --   --  135*  --  128* 126*  --  130*   POTASSIUM  --  3.3*  --   --   --  3.5  --  3.3*  --  3.6  --  4.6 5.1  --  4.4   CHLORIDE  --  103  --   --   --   --   --   --   --  98  --  91* 87*  --  90*   CO2  --  25  --   --   --   --   --   --   --  24  --  21* 23  --  24   ANIONGAP  --  13  --   --   --   --   --   --   --  13  --  16* 16*  --  16*   * 286* 308* 226*   < >  --    < >  --    < > 116*   < > 358* 278*   < > 294*   BUN  --  35.9*  --   --   --   --   --   --   --  61.1*  --  46.8* 41.4*  --  38.5*   CR  --  1.66*  --   --   --   --   --  1.91*  --  2.55*  --  1.67* 1.38*  --  1.42*   GFRESTIMATED  --  46*  --   --   --   --   --  39*  --  28*  --  46* 58*  --  56*   LISA  --  7.6*  --   --   --   --   --   --   --  8.1*  --  8.2* 8.7*  --  8.8   MAG  --  1.8  --   --   --   --   --  2.2  --  2.4*  --  2.2 2.0   < >  --    PHOS  --   --   --   --   --   --   --   --   --   --   --  4.9*  --   --   --    PROTTOTAL  --  6.0*  --   --   --   --   --   --   --   --   --   --  7.2  --  6.3*   ALBUMIN  --  2.2*  --   --   --   --   --   --   --   --   --  2.5* 2.7*  --  3.1*   BILITOTAL  --  0.9  --   --   --   --   --   --   --   --   --   --  1.5*  --  1.3*   ALKPHOS  --  266*  --   --   --   --   --    --   --   --   --   --  120  --  127   AST  --  188*  --   --   --   --   --   --   --   --   --   --  46  --  26   ALT  --  114*  --   --   --   --   --   --   --   --   --   --  16  --  19    < > = values in this interval not displayed.     CBC  Recent Labs   Lab 10/15/22  0519 10/13/22  0724 10/12/22  0704 10/11/22  1937   WBC 9.6 6.4 5.7 5.0   RBC 2.96* 3.01* 3.54* 3.44*   HGB 8.8* 8.9* 10.5* 10.4*   HCT 26.7* 27.0* 33.0* 30.5*   MCV 90 90 93 89   MCH 29.7 29.6 29.7 30.2   MCHC 33.0 33.0 31.8 34.1   RDW 17.2* 16.7* 16.7* 16.5*   * 131* 138* 169     INR  Recent Labs   Lab 10/11/22  1719   INR 1.10     Arterial Blood Gas  Recent Labs   Lab 10/12/22  0958   PH 7.47*   PCO2 32*   PO2 85   HCO3 24   O2PER 0.21       Cultures: personally reviewed.     Imaging: personally reviewed.   Results for orders placed or performed during the hospital encounter of 10/11/22   CT Chest Pulmonary Embolism w Contrast    Addendum: 10/11/2022    CORRECTION: No central, lobar, or segmental PE. The subsegmental vessels in the left lower lobe are not adequately assessed on this study.       Narrative    EXAM: CT ABDOMEN PELVIS W CONTRAST, CT CHEST PULMONARY EMBOLISM W CONTRAST  LOCATION: [facility]  DATE/TIME: 10/11/2022 8:27 PM    INDICATION: fever  COMPARISON: 1/19/2022 and 11/5/2021   TECHNIQUE: CT chest pulmonary angiogram and routine CT abdomen pelvis with IV contrast. Arterial phase through the chest and venous phase through the abdomen and pelvis. Multiplanar reformats and MIP reconstructions were performed. Dose reduction   techniques were used.   CONTRAST: isovue 370  80ml    FINDINGS:  ANGIOGRAM CHEST: The main pulmonary artery is 32 mm in diameter, which suggests pulmonary hypertension. No central, lobar, or segmental PE. Subsegmental vessels in the left lower lobe are not well assessed due to lack of contrast opacification.  Thoracic   aorta is negative for dissection.    LUNGS AND PLEURA:  Bronchial wall thickening is present. There are numerous lower lung predominant centrilobular pulmonary nodules which are confluent in some areas. There is mild bronchiectasis in the superior segment of the left lower lobe.     MEDIASTINUM/AXILLAE: The heart is enlarged. A right lower paratracheal lymph node is 15 mm. Otherwise there are prominent and mildly enlarged mediastinal and hilar lymph nodes. There is a left chest wall ICD.     CORONARY ARTERY CALCIFICATION: Moderate.    HEPATOBILIARY: Normal.    PANCREAS: Normal.    SPLEEN: Normal.    ADRENAL GLANDS: Normal.    KIDNEYS/BLADDER: Wall thickening of the urinary bladder.     BOWEL: Normal.    LYMPH NODES: Normal.    VASCULATURE: Atherosclerotic disease.     PELVIC ORGANS: Normal.    MUSCULOSKELETAL: Normal.        Impression    IMPRESSION:  1.  No PE.  2.  Extensive lower lung predominant pulmonary opacities can be seen with edema, aspiration, or infection. Bronchial wall thickening can be seen with edema and bronchitis.  3.  Cardiomegaly.  4.  Bladder wall thickening may be from underdistention or cystitis.    CT Abdomen Pelvis w Contrast    Addendum: 10/11/2022    CORRECTION: No central, lobar, or segmental PE. The subsegmental vessels in the left lower lobe are not adequately assessed on this study.       Narrative    EXAM: CT ABDOMEN PELVIS W CONTRAST, CT CHEST PULMONARY EMBOLISM W CONTRAST  LOCATION: [facility]  DATE/TIME: 10/11/2022 8:27 PM    INDICATION: fever  COMPARISON: 1/19/2022 and 11/5/2021   TECHNIQUE: CT chest pulmonary angiogram and routine CT abdomen pelvis with IV contrast. Arterial phase through the chest and venous phase through the abdomen and pelvis. Multiplanar reformats and MIP reconstructions were performed. Dose reduction   techniques were used.   CONTRAST: isovue 370  80ml    FINDINGS:  ANGIOGRAM CHEST: The main pulmonary artery is 32 mm in diameter, which suggests pulmonary hypertension. No central, lobar,  or segmental PE. Subsegmental vessels in the left lower lobe are not well assessed due to lack of contrast opacification.  Thoracic   aorta is negative for dissection.    LUNGS AND PLEURA: Bronchial wall thickening is present. There are numerous lower lung predominant centrilobular pulmonary nodules which are confluent in some areas. There is mild bronchiectasis in the superior segment of the left lower lobe.     MEDIASTINUM/AXILLAE: The heart is enlarged. A right lower paratracheal lymph node is 15 mm. Otherwise there are prominent and mildly enlarged mediastinal and hilar lymph nodes. There is a left chest wall ICD.     CORONARY ARTERY CALCIFICATION: Moderate.    HEPATOBILIARY: Normal.    PANCREAS: Normal.    SPLEEN: Normal.    ADRENAL GLANDS: Normal.    KIDNEYS/BLADDER: Wall thickening of the urinary bladder.     BOWEL: Normal.    LYMPH NODES: Normal.    VASCULATURE: Atherosclerotic disease.     PELVIC ORGANS: Normal.    MUSCULOSKELETAL: Normal.        Impression    IMPRESSION:  1.  No PE.  2.  Extensive lower lung predominant pulmonary opacities can be seen with edema, aspiration, or infection. Bronchial wall thickening can be seen with edema and bronchitis.  3.  Cardiomegaly.  4.  Bladder wall thickening may be from underdistention or cystitis.        Patient Active Problem List   Diagnosis     Peripheral Neuropathy     Arthralgias In Multiple Sites     Delayed Post-traumatic Stress Disorder     Brain tumor (H)     Hypercholesterolemia     Strain of right wrist     Type 2 diabetes, HbA1C goal < 8% (H)     Cervical radiculopathy at C6     Vitamin D deficiency     Brachial neuritis or radiculitis     Peripheral vascular disease (H)     ST elevation myocardial infarction (STEMI), unspecified artery (H)     Syncope, unspecified syncope type     Status post coronary angiogram     ACS (acute coronary syndrome) (H)  8/2021      Coronary artery disease involving native coronary artery of native heart, unspecified  whether angina present     Chronic midline low back pain with right-sided sciatica     CHF (congestive heart failure), NYHA class I, acute on chronic, combined (H)     ELIZABETH (acute kidney injury) (H)     Mural thrombus of heart 1/2022      Ischemic cardiomyopathy     HFrEF (heart failure with reduced ejection fraction) (H)     NSTEMI (non-ST elevated myocardial infarction) (H)     Chest pain, unspecified type     Elevated troponin     Cardiac arrest (H)     Hyponatremia     Renal insufficiency syndrome     Right shoulder strain     Trigger thumb of right hand     RSV (respiratory syncytial virus infection)     Severe sepsis (H)     Pneumonia of both lower lobes due to infectious organism     Acute congestive heart failure, unspecified heart failure type (H)     Seizure disorder (H)     Herpes zoster of eye     Stage 3b chronic kidney disease (H)     Acute respiratory failure with hypoxia (H)     Lactic acid acidosis       Madison Castillo DO  Pulmonary and Critical Care Attending  pgr [phone]    Securely message with the Vocera Web Console (learn more here)

## 2022-10-15 NOTE — PROGRESS NOTES
[facility] Inpatient follow up       Patient:  Av Fernando  Date of birth 1960, Medical record number [id number]  Date of Visit:  10/15/2022  Attending Physician: Mary Watkins MD         Assessment and Recommendations:   Assessment:  Av Fernando is a 62 year old male with   1. DM II with good control.   2. History of Ischemic heart disease/STEMI  3. History of CHF with EF at 20-25%.  4.  Sick contact with daughter, granddaughter, and wife.  They have had fever, cough, shortness of breath.  5. Acute hypoxic respiratory failure. Multifactorial with RSV infection and CHF exacerbation. Positive nasal swab for RSV PCR on 10/11. Influenza A/B and COVID negative on 10/11. Pro-BNP 41,466. Fissures filled with fluid on CT/chest.  On room air.  Cardiology following.  6. Sepsis with Lactic acid 6.7. .  Secondary to RSV infection.  IV vancomycin and Zosyn discontinued on 10/12/2022.  No new fever  7. ELIZABETH. Creatinine at 1.42 on admission from normal 0.79 on 10/3.  Creatinine worsening, at 2.55 on 10/13/2022.    8. Possible bacterial PNA on top of RSV. Procalcitonin at 14. IV Zosyn resumed 10/13. Could be that hypotension was over-diuresis. No clear bacterial infection.     Recommendations:   Supportive care  Continue Zosyn for now        Cristian Simmons MD   [facility].   Formerly [facility]  Office Telephone [phone].  Fax [phone]  [facility] paging            Interval History:     HPI:  Feels a little better but progress is slow. Pain in abd with coughing. He is very weak. Feels that his body is having a tough time. Having loose stools -- 1 so far today he reports.       Recent Inflammatory Biomarkers:   Recent Labs   Lab Test 10/15/22  0519 10/13/22  1649 10/13/22  0724 10/12/22  0704 10/11/22  1937 10/10/22  1044 10/03/22  1250 09/28/22  0140 01/15/22  0222 01/14/22  0941   CRP  --   --   --   --   --  320.00* <3.00 <3.00  --   --     PCAL  --  14.05*  --   --   --   --   --   --   --  0.03   WBC 9.6  --  6.4 5.7 5.0  --  5.6 6.5   < > 5.5    < > = values in this interval not displayed.            Review of Systems:   CONSTITUTIONAL:    Temp Max: Temp (24hrs), Av.7  F (36.5  C), Min:97.6  F (36.4  C), Max:97.8  F (36.6  C)   .  Negative except for findings in the HPI.           Current Medications (antimicrobials listed in bold):       albuterol  2 puff Inhalation 4x Daily     apixaban ANTICOAGULANT  2.5 mg Oral BID     aspirin  81 mg Oral Daily     atorvastatin  80 mg Oral Daily     furosemide  40 mg Intravenous Q8H     gabapentin  100 mg Oral TID     insulin aspart  3 Units Subcutaneous TID w/meals     insulin aspart  1-9 Units Subcutaneous TID AC     insulin glargine  10 Units Subcutaneous At Bedtime     levETIRAcetam  500 mg Oral BID     [Held by provider] metoprolol succinate ER  12.5 mg Oral Daily     pantoprazole  40 mg Oral Daily     piperacillin-tazobactam  3.375 g Intravenous Q8H     sodium chloride (PF)  10-40 mL Intracatheter Q7 Days              Allergies:     Allergies   Allergen Reactions     Dulaglutide Dizziness     Weak and muscle weak  Other reaction(s): Dizziness  Weak and muscle weak     Januvia [Sitagliptin] Unknown     HEADACHE     Tylenol [Acetaminophen] Itching            Physical Exam:   Vitals were reviewed  Patient Vitals for the past 24 hrs:   BP Temp Temp src Pulse Resp SpO2 Weight   10/15/22 0915 97/63 -- -- 87 27 94 % --   10/15/22 09 -- -- 83 (!) 31 93 % --   10/15/22 0830 93/57 -- -- 91 22 90 % --   10/15/22 08 103/72 98.3  F (36.8  C) Oral 85 28 91 % --   10/15/22 0730 103/69 -- -- 85 23 95 % --   10/15/22 07 95/63 -- -- 83 15 95 % --   10/15/22 0645 97/ -- -- 82 22 95 % 47.5 kg (104 lb 11.2 oz)   10/15/22 0630 98/67 -- -- 82 12 95 % --   10/15/22 0615 90/58 -- -- 86 (!) 31 92 % --   10/15/22 0600 104/73 -- -- 92 21 96 % --   10/15/22 0545 102/66 -- -- 88 14 94 % --   10/15/22 0515 108/73 --  -- 90 17 97 % --   10/15/22 0500 111/72 -- -- 88 21 94 % --   10/15/22 0445 109/63 -- -- 92 28 91 % --   10/15/22 0430 100/68 -- -- 89 15 96 % --   10/15/22 0415 102/71 -- -- 92 22 97 % --   10/15/22 0400 98/66 98.4  F (36.9  C) Oral 88 24 95 % --   10/15/22 0345 95/64 -- -- 84 14 97 % --   10/15/22 0330 (!) 88/60 -- -- 80 10 96 % --   10/15/22 0315 91/57 -- -- 86 29 93 % --   10/15/22 0300 101/71 -- -- 86 25 93 % --   10/15/22 0245 102/65 -- -- 85 25 93 % --   10/15/22 0230 94/59 -- -- 82 18 96 % --   10/15/22 0215 92/61 -- -- 80 20 96 % --   10/15/22 0200 94/63 -- -- 84 24 95 % --   10/15/22 0145 102/68 -- -- 84 22 96 % --   10/15/22 0130 105/66 -- -- 86 26 95 % --   10/15/22 0115 106/68 -- -- 89 29 94 % --   10/15/22 0100 98/67 -- -- 89 11 97 % --   10/15/22 0045 99/69 -- -- 89 12 95 % --   10/15/22 0030 110/67 -- -- 85 18 96 % --   10/15/22 0015 99/60 -- -- 86 20 97 % --   10/15/22 0000 99/66 98.4  F (36.9  C) Oral 83 23 96 % --   10/14/22 2345 104/69 -- -- 87 22 96 % --   10/14/22 2330 99/63 -- -- 85 23 96 % --   10/14/22 2315 101/68 -- -- 83 23 96 % --   10/14/22 2300 103/68 -- -- 85 10 94 % --   10/14/22 2245 -- -- -- 92 (!) 33 90 % --   10/14/22 2230 104/66 -- -- 83 15 94 % --   10/14/22 2215 100/64 -- -- 81 18 94 % --   10/14/22 2200 98/65 -- -- 81 18 95 % --   10/14/22 2145 97/63 -- -- 79 10 94 % --   10/14/22 2130 95/59 -- -- 81 30 93 % --   10/14/22 2115 94/61 -- -- 81 13 96 % --   10/14/22 2101 -- -- -- -- -- 94 % --   10/14/22 2100 99/64 -- -- 79 25 94 % --   10/14/22 2045 100/61 -- -- 80 19 95 % --   10/14/22 2030 120/68 -- -- 81 22 96 % --   10/14/22 2015 105/67 -- -- 81 21 96 % --   10/14/22 2000 101/70 98.1  F (36.7  C) Oral 82 23 96 % --   10/14/22 1945 98/62 -- -- 78 19 95 % --   10/14/22 1930 111/69 -- -- 85 23 95 % --   10/14/22 1900 (!) 84/54 -- -- 80 23 90 % --   10/14/22 1830 97/54 -- -- 79 21 (!) 89 % --   10/14/22 1800 (!) 81/54 -- -- 74 24 90 % --   10/14/22 1700 93/53 -- -- 75 21 93 % --    10/14/22 1630 (!) 85/51 -- -- 73 20 91 % --   10/14/22 1600 (!) 82/53 97.7  F (36.5  C) Oral 76 18 92 % --   10/14/22 1530 (!) 88/51 -- -- 76 23 91 % --   10/14/22 1500 (!) 85/54 -- -- 84 16 90 % --   10/14/22 1430 (!) 89/55 -- -- 80 18 91 % --   10/14/22 1400 90/57 98.2  F (36.8  C) Oral 85 24 91 % --   10/14/22 1330 93/59 -- -- 92 (!) 5 91 % --   10/14/22 1300 100/63 -- -- 93 14 95 % --   10/14/22 1200 104/66 98.5  F (36.9  C) Oral 97 24 93 % --       Physical Examination:  Gen: Pleasant in no acute distress. Very weak with coughing spell. On room air. Off pressors.   HEENT: NCAT. EOMI. PERRL.  Neck: No bruit, JVD or thyromegaly.  Pacemaker   Lungs: Bilateral crackles  Card: RRR. NSR. No RMG. Peripheral pulses present and symmetric. No edema.  Abd: Soft NT ND. No mass. Normal bowel sounds.  Skin: No rash.  Extr: No edema.  Neuro: Alert and oriented to place time and person. Cranial nerves II to XII intact.   PICC         Laboratory Data:   ID Labs:  Microbiology labs:  Reviewed.    Recent Labs   Lab Test 10/10/22  1044 10/03/22  1250 09/28/22  0140   .00* <3.00 <3.00     Recent Labs   Lab Test 10/15/22  0519 10/13/22  0724 10/12/22  0704 10/11/22  1937 10/03/22  1250 09/28/22  0140   WBC 9.6 6.4 5.7 5.0 5.6 6.5     Recent Labs   Lab Test 10/15/22  0519 10/14/22  0518 10/13/22  0724 10/12/22  0704   CR 1.66* 1.91* 2.55* 1.67*   GFRESTIMATED 46* 39* 28* 46*       Hematology Studies  Recent Labs   Lab Test 10/15/22  0519 10/13/22  0724 10/12/22  0704 10/11/22  1937 10/03/22  1250 09/28/22  0140   WBC 9.6 6.4 5.7 5.0 5.6 6.5   ANEU  --   --   --  3.9  --   --    AEOS  --   --   --  0.0  --   --    HCT 26.7* 27.0* 33.0* 30.5* 31.3* 37.2*   * 131* 138* 169 152 177       Metabolic  Recent Labs   Lab Test 10/15/22  0519 10/14/22  0518 10/13/22  0724 10/12/22  0704     --  135* 128*   BUN 35.9*  --  61.1* 46.8*   CO2 25  --  24 21*   CR 1.66* 1.91* 2.55* 1.67*   GFRESTIMATED 46* 39* 28* 46*        Hepatic Studies  Recent Labs   Lab Test 10/15/22  0519 10/12/22  0704 10/11/22  1719 10/10/22  1044   BILITOTAL 0.9  --  1.5* 1.3*   ALKPHOS 266*  --  120 127   ALBUMIN 2.2* 2.5* 2.7* 3.1*   *  --  46 26   *  --  16 19       Immunologlobulins  Recent Labs   Lab Test 10/03/22  1250 09/28/22  0140   SED 13 16*            Imaging Data:   Reviewed

## 2022-10-15 NOTE — PROGRESS NOTES
Patient feeling like he has no energy. Currently on room air. Spo2 94%. Flutter done. Patient able to use device correctly and independently. Patient has strong productive cough. RT will continue to monitor.

## 2022-10-16 NOTE — PROGRESS NOTES
Care Management Follow Up    Length of Stay (days): 5    Expected Discharge Date: 10/19/2022     Concerns to be Addressed: medical progression      Patient plan of care discussed at interdisciplinary rounds: Yes    Anticipated Discharge Disposition:  Home vs TCU     Anticipated Discharge Services:    Anticipated Discharge DME:      Patient/family educated on Medicare website which has current facility and service quality ratings:    Education Provided on the Discharge Plan:    Patient/Family in Agreement with the Plan:      Referrals Placed by CM/SW:  None at this time  Private pay costs discussed: Not applicable    Additional Information:  Chart review:  Pt is  and lives with his wife and children. He is independent with ADLs at baseline. RNCM to follow for medical progression, recommendations, and final discharge plan.        Amanda Alonso RN

## 2022-10-16 NOTE — PROGRESS NOTES
BG  68 was low before breakfast, two cups of juice offered right away. Rechecked BG 82 in 15 min. Evon Copeland RN

## 2022-10-16 NOTE — PROGRESS NOTES
ICU Update Note    Pt off vasopressor since yesterday afternoon.     Will sign off, please let our service know if any change in clinical condition or questions.    Madison Castillo,   Pulmonary and Critical Care Attending  [facility] [phone]

## 2022-10-16 NOTE — PROGRESS NOTES
ID chart check    Temp around 100 last evening. Continuing to follow. We will see him tomorrow. Please call in meantime if questions.     Cristian Simmons MD

## 2022-10-16 NOTE — PROGRESS NOTES
Patient remains on room air. Flutter done with great effort and technique. RT will continue to monitor.

## 2022-10-16 NOTE — PROGRESS NOTES
[facility]    Medicine Progress Note - Hospitalist Service    Date of Admission:  10/11/2022    Assessment & Plan   Acute on chronic systolic heart failure related to ischemic cardiomyopathy with cardiogenic shock  -LVEF 25 to 30%, 3-4+ MR by 6/21/2022 TTE  -Plan to continue with Lasix 40 mg IV every 8 hours.  -GDMT on hold due to marginal blood pressure  -Cardiology consulted and following.  Appreciate their recommendations  -Off pressors since yesterday.  -Critical care/pulmonology signed off    Multivessel CAD with prior STEMI.  -Per cardiology patient declines CABG and PCI  -Continue aspirin, and statin.  -Metoprolol on hold at this time due to marginal blood pressure.  Arrhythmia, paroxysmal versus persistent A. fib.  Has been on sinus rhythm.  [id number] 4.  -Continue apixaban 5 mg p.o. twice daily     RSV pneumonia.  Improving.  Patient initially was on vancomycin and Zosyn but this was discontinued however there was concern that this superimposed bacterial pneumonia with increased procalcitonin.  Continue Zosyn.  Zosyn resumed 10/13    Hypokalemia.  Stable at this time.  Potassium 3.6.  Replenish and monitor.    ELIZABETH.  Improving.  Most likely cardiorenal.  Monitor with BMP    Physical deconditioning/generalized weakness.  PT OT.  Fall precautions.    Diet: Combination Diet Moderate Consistent Carb (60 g CHO per Meal) Diet; 2 gm NA Diet  Fluid restriction 1500 ML FLUID    DVT Prophylaxis: DOAC  Bustamante Catheter: Not present  Central Lines: PRESENT  PICC 10/13/22 Triple Lumen Right Brachial vein medial Sepsis protocol-Site Assessment: WDL  Cardiac Monitoring: None  Code Status: No CPR- Do NOT Intubate      Disposition Plan      Expected Discharge Date: 10/19/2022    Discharge Delays: IV Medication - consider oral or Home Infusion    Discharge Comments: diagnostic work up        The patient's care was discussed with the Bedside Nurse and Care Coordinator/.    Joe Valderrama  MD  Hospitalist Service  [facility]  Securely message with the [employer] Web Console (learn more here)  Text page via [employer] Paging/Directory   ______________________________________________________________________    Interval History   Patient is resting in bed comfortably.  Family at bedside.  He states he feels weak.  Otherwise he is off pressors.  No new events overnight.    Data reviewed today: I reviewed all medications, new labs and imaging results over the last 24 hours. I personally reviewed     Physical Exam   Vital Signs: Temp: 99.8  F (37.7  C) Temp src: Oral BP: 100/64 Pulse: 85   Resp: 25 SpO2: 96 % O2 Device: None (Room air)    Weight: 103 lbs 12.8 oz  General: Patient is resting in bed comfortably appears weak.  HEENT.  Head is normocephalic atraumatic eyes pupils appear equal round and reactive to light  Lungs: He has a normal respiratory effort on auscultation breath sounds are clear.  Heart: He has a good S1 and S2 no obvious murmurs, no JVD  Abdomen: Soft nontender nondistended bowel sounds are noted no obvious organomegaly  Musculoskeletal: Good muscle tone no obvious lower extremity edema noted.  I did not examine range of motion  Skin: No obvious rashes on inspection warm to touch skin turgor appear normal    Data   Recent Labs   Lab 10/16/22  1144 10/16/22  1142 10/16/22  0807 10/16/22  0752 10/16/22  0522 10/15/22  1941 10/15/22  1853 10/15/22  0741 10/15/22  0519 10/13/22  0847 10/13/22  0724 10/11/22  1937 10/11/22  1719   WBC 6.7  --   --   --   --   --   --   --  9.6  --  6.4   < >  --    HGB 9.1*  --   --   --   --   --   --   --  8.8*  --  8.9*   < >  --    MCV 90  --   --   --   --   --   --   --  90  --  90   < >  --      --   --   --   --   --   --   --  143*  --  131*   < >  --    INR  --   --   --   --   --   --   --   --   --   --   --   --  1.10   *  --   --   --   --   --   --   --  141  --  135*   < > 126*   POTASSIUM 3.6  3.6  --   --   --   3.2*  --  4.2   < > 3.3*   < > 3.6   < > 5.1   CHLORIDE 95*  --   --   --   --   --   --   --  103  --  98   < > 87*   CO2 27  --   --   --   --   --   --   --  25  --  24   < > 23   BUN 22.9  --   --   --   --   --   --   --  35.9*  --  61.1*   < > 41.4*   CR 1.45*  --   --   --  1.56*  --   --   --  1.66*   < > 2.55*   < > 1.38*   ANIONGAP 11  --   --   --   --   --   --   --  13  --  13   < > 16*   LISA 7.9*  --   --   --   --   --   --   --  7.6*  --  8.1*   < > 8.7*   * 275* 82   < >  --    < >  --    < > 286*   < > 116*   < > 278*   ALBUMIN 2.4*  --   --   --   --   --   --   --  2.2*  --   --    < > 2.7*   PROTTOTAL 6.4  --   --   --   --   --   --   --  6.0*  --   --   --  7.2   BILITOTAL 1.0  --   --   --   --   --   --   --  0.9  --   --   --  1.5*   ALKPHOS 277*  --   --   --   --   --   --   --  266*  --   --   --  120   ALT 88*  --   --   --   --   --   --   --  114*  --   --   --  16   *  --   --   --   --   --   --   --  188*  --   --   --  46    < > = values in this interval not displayed.     No results found for this or any previous visit (from the past 24 hour(s)).  Medications     sodium chloride 10 mL/hr at 10/13/22 1523       albuterol  2 puff Inhalation 4x Daily     apixaban ANTICOAGULANT  2.5 mg Oral BID     aspirin  81 mg Oral Daily     atorvastatin  80 mg Oral Daily     furosemide  40 mg Intravenous Q8H     gabapentin  100 mg Oral TID     insulin aspart  6 Units Subcutaneous TID w/meals     insulin aspart  1-9 Units Subcutaneous TID AC     insulin glargine  14 Units Subcutaneous At Bedtime     levETIRAcetam  500 mg Oral BID     [Held by provider] metoprolol succinate ER  12.5 mg Oral Daily     pantoprazole  40 mg Oral Daily     piperacillin-tazobactam  3.375 g Intravenous Q8H     potassium chloride  10 mEq Oral Once     sodium chloride (PF)  10-40 mL Intracatheter Q7 Days

## 2022-10-16 NOTE — PLAN OF CARE
Alert and oriented x4. Vitals stable. Denied any pain. Appetite was slightly approving. Participated PT and OT therapies today. Pt was up to use commode with assist-1. Will continue to monitor. Evon Copeland RN

## 2022-10-16 NOTE — PLAN OF CARE
[facility] - ICU    RN Progress Note:            Pertinent Assessments:      Please refer to flowsheet rows for full assessment     - Alert and orientedx4. Denies of pain. Vitals signs stable. Have a persistent cough.  1500ml Fluid restrictions. Interrupted sleep due to persistent cough.         Mobility Level:     3         Key Events - This Shift:     Vital signs stable.             Plan:     Possible downgrade to cardiac tele/medical surg. telemetry.

## 2022-10-16 NOTE — PROGRESS NOTES
"[facility]  Cardiac Electrophysiology  [address]  [place] [address]   Office: [phone]  Fax: [phone]     Cardiology Progress Note    Patient: Av Fernando   : 1960       Assessment/Recommendations   Mr. Av Fernando is a 62 year old male with chronic systolic heart failure related to ischemic cardiomyopathy, multivessel CAD with prior STEMI, permanent atrial fibrillation admitted with acute on chronic systolic heart failure with cardiogenic shock.     Acute on chronic systolic heart failure related to ischemic cardiomyopathy with cardiogenic shock - LVEF 25-30%, 3-4+ MR by 2022 TTE  - continue lasix 40mg IV every 8hrs  - GDMT on hold due to marginal blood pressure, ELIZABETH (resolving)  - consideration for goals of care and possible hospice may be reasonable  - further consideration for elective primary prevention ICD on an outpatient basis pending considerations regarding goals of care     Multivessel CAD with prior STEMI - patient declines CABG and PCI  - continue aspiring 81mg daily  - continue atorvastatin 80mg twice daily  - metoprolol XL 12.5mg daily on hold     Paroxysmal vs persistent atrial fibrillation - in sinus rhythm this admission  [id number]  - continue apixaban 5mg twice daily at discharge (wt <60kg, Cr <2.5, age <80)           Interval Events   Remains off of pressors, dopamine infusions.  BP 90-100s/50-70s, HR 70-90s  Continues on lasix 40mg IV every 8hrs - 24hr I/O 1658/3650 (-2.7L since admission), Cr 1.56 (1.66, 1.91).  He continues to feel very fatigued.  He notes cough, less sputum today.  He notes intermittent dyspnea.  Seen today with two family members at bedside.       Physical Examination  Review of Systems   VITALS: BP 98/60 (BP Location: Left arm)   Pulse 82   Temp 99.7  F (37.6  C) (Oral)   Resp 20   Ht 1.575 m (5' 2\")   Wt 47.1 kg (103 lb 12.8 oz)   SpO2 93%   BMI 18.99 kg/m    Wt Readings from Last 3 Encounters: "   10/16/22 47.1 kg (103 lb 12.8 oz)   06/09/22 50.8 kg (112 lb 1.6 oz)   05/16/22 53.7 kg (118 lb 4.8 oz)       Intake/Output Summary (Last 24 hours) at 10/16/2022 0728  Last data filed at 10/16/2022 0516  Gross per 24 hour   Intake 1658.3 ml   Output 3650 ml   Net -1991.7 ml     CONSTITUTIONAL: appears fatigued  EYES:  Conjunctivae pink, sclerae clear.    E/N/T:  Oral mucosa pink, moist mucous membranes  RESPIRATORY:  Respiratory effort is normal  CARDIOVASCULAR:  normal S1 and S2  GASTROINTESTINAL:  Abdomen without masses or tenderness  EXTREMITIES:  No clubbing or cyanosis.    MUSCULOSKELETAL:  Overall grossly normal muscle strength  SKIN:  Overall, skin warm and dry, no lesions.  NEURO/PSYCH:  Oriented x 3 with normal affect.   Constitutional:  No weight loss or loss of appetite    Cardiovascular: As detailed above  Respiratory: No cough  Genitourinary: No trouble urinating           Medical History  Surgical History   Past Medical History:   Diagnosis Date     Congestive heart failure (H) 08/2021    ischemic CM with LVEF 30-35%     Coronary artery disease 08/2021    STEMI with multivessel CAD on angiography     Hyperlipidemia      Hypertension      LV (left ventricular) mural thrombus 01/2022     Myocardial infarction (H) 08/2021    inferior STEMI     Type 2 diabetes, HbA1C goal < 8% (H)     Past Surgical History:   Procedure Laterality Date     CV CORONARY ANGIOGRAM N/A 8/1/2021    Procedure: Coronary Angiogram;  Surgeon: Deidre Lopes MD;  Location: [facility] LAB CV     CV CORONARY ANGIOGRAM N/A 5/6/2022    Procedure: Coronary Angiogram;  Surgeon: Sherif Fuentes MD;  Location: [facility] CATH LAB CV     CV LEFT HEART CATH N/A 5/6/2022    Procedure: Left Heart Catheterization;  Surgeon: Sherif Fuentes MD;  Location: [facility] CATH LAB CV     CV LEFT VENTRICULOGRAM N/A 8/1/2021    Procedure: Left Ventriculogram;  Surgeon: Deidre Lopes MD;  Location: [facility] CATH LAB CV     CV PCI N/A 5/6/2022     Procedure: Percutaneous Coronary Intervention;  Surgeon: Sherif Fuentes MD;  Location: [facility] CATH LAB CV     OTHER SURGICAL HISTORY      LEG TRAUMA     PICC TRIPLE LUMEN PLACEMENT  10/13/2022              Family History Social History   No family history on file.     Social History     Tobacco Use     Smoking status: Never     Smokeless tobacco: Never   Substance Use Topics     Alcohol use: No     Drug use: No         Medications  Allergies     Current Facility-Administered Medications:      albuterol (PROVENTIL HFA/VENTOLIN HFA) inhaler, 2 puff, Inhalation, 4x Daily, Tatiana Miranda MD, 2 puff at 10/15/22 1952     alum & mag hydroxide-simethicone (MAALOX) suspension 30 mL, 30 mL, Oral, Q4H PRN, Tatiana Miranda MD     apixaban ANTICOAGULANT (ELIQUIS) tablet 2.5 mg, 2.5 mg, Oral, BID, Mary Watkins MD, 2.5 mg at 10/15/22 1952     aspirin EC tablet 81 mg, 81 mg, Oral, Daily, Tatiana Miranda MD, 81 mg at 10/15/22 0746     atorvastatin (LIPITOR) tablet 80 mg, 80 mg, Oral, Daily, Tatiana Miranda MD, 80 mg at 10/15/22 0746     benzocaine-menthol (CEPACOL) 15-3.6 MG lozenge 1 lozenge, 1 lozenge, Buccal, Q1H PRN, Tatiana Miranda MD     bisacodyl (DULCOLAX) EC tablet 5 mg, 5 mg, Oral, Daily PRN, Tatiana Miranda MD     bisacodyl (DULCOLAX) suppository 10 mg, 10 mg, Rectal, Daily PRN, Tatiana Miranda MD     glucose gel 15-30 g, 15-30 g, Oral, Q15 Min PRN **OR** dextrose 50 % injection 25-50 mL, 25-50 mL, Intravenous, Q15 Min PRN **OR** glucagon injection 1 mg, 1 mg, Subcutaneous, Q15 Min PRN, Tatiana Miranda MD     furosemide (LASIX) injection 40 mg, 40 mg, Intravenous, Q8H, Dougie Gupta MD, 40 mg at 10/16/22 0320     gabapentin (NEURONTIN) capsule 100 mg, 100 mg, Oral, TID, Tatiana Miranda MD, 100 mg at 10/15/22 1952     hydrOXYzine (ATARAX) tablet 25 mg, 25 mg, Oral, 4x Daily PRN, Tatiana Miranda MD, 25 mg at 10/14/22 2152     ibuprofen (ADVIL/MOTRIN) tablet  400 mg, 400 mg, Oral, Q4H PRN, Mary Watkins MD, 400 mg at 10/14/22 1225     insulin aspart (NovoLOG) injection (RAPID ACTING), 6 Units, Subcutaneous, TID w/meals, Juan Elkins MD, 6 Units at 10/15/22 1719     insulin aspart (NovoLOG) injection (RAPID ACTING), 1-9 Units, Subcutaneous, TID AC, Mary Watkins MD, 1 Units at 10/15/22 1627     insulin glargine (LANTUS PEN) injection 14 Units, 14 Units, Subcutaneous, At Bedtime, Juan Elkins MD, 14 Units at 10/15/22 2200     levETIRAcetam (KEPPRA) tablet 500 mg, 500 mg, Oral, BID, Tatiana Miranda MD, 500 mg at 10/15/22 1952     lidocaine (LMX4) cream, , Topical, Q1H PRN, Elinor Montano MD     lidocaine 1 % 0.1-5 mL, 0.1-5 mL, Other, Q1H PRN, Elinor Montano MD, 2.5 mL at 10/13/22 1353     loperamide (IMODIUM) capsule 2 mg, 2 mg, Oral, 4x Daily PRN, Madison Castillo DO, 2 mg at 10/15/22 1130     magnesium hydroxide (MILK OF MAGNESIA) suspension 30 mL, 30 mL, Oral, Daily PRN, Tatiana Miranda MD     magnesium sulfate 2 g in water intermittent infusion, 2 g, Intravenous, Once, Tatiana Miranda MD, 2 g at 10/16/22 0642     melatonin tablet 3 mg, 3 mg, Oral, At Bedtime PRN, Tatiana Miranda MD     [Held by provider] metoprolol succinate ER (TOPROL-XL) 24 hr half-tab 12.5 mg, 12.5 mg, Oral, Daily, Tatiana Miranda MD     nitroGLYcerin (NITROSTAT) sublingual tablet 0.4 mg, 0.4 mg, Sublingual, Q5 Min PRN, Tatiana Miranda MD     norepinephrine (LEVOPHED) 4 mg in  mL infusion PREMIX, 0.01-0.6 mcg/kg/min, Intravenous, Continuous, IvAntonio soliz MD, Stopped at 10/15/22 0300     oxyCODONE (ROXICODONE) tablet 5 mg, 5 mg, Oral, Q4H PRN, Mary Watkins MD     pantoprazole (PROTONIX) EC tablet 40 mg, 40 mg, Oral, Daily, Tatiana Miarnda MD, 40 mg at 10/15/22 0746     piperacillin-tazobactam (ZOSYN) 3.375 g vial to attach to  mL bag, 3.375 g, Intravenous, Q8H, Elinor Montano MD, 3.375 g at 10/16/22 0318      senna-docusate (SENOKOT-S/PERICOLACE) 8.6-50 MG per tablet 1 tablet, 1 tablet, Oral, BID PRN, Tatiana Miranda MD     sodium chloride (PF) 0.9% PF flush 10-20 mL, 10-20 mL, Intracatheter, q1 min prn, Mary Watkins MD     sodium chloride (PF) 0.9% PF flush 10-40 mL, 10-40 mL, Intracatheter, Once PRN, Elinor Montano MD     sodium chloride (PF) 0.9% PF flush 10-40 mL, 10-40 mL, Intracatheter, Q7 Days, Mary Watkins MD, 40 mL at 10/13/22 1524     sodium chloride (PF) 0.9% PF flush 10-40 mL, 10-40 mL, Intracatheter, Q1H PRN, Mary Watkins MD     sodium chloride 0.9% infusion, , Intravenous, Continuous, Carl Plata MD, Last Rate: 10 mL/hr at 10/13/22 1523, New Bag at 10/13/22 1523     Allergies   Allergen Reactions     Dulaglutide Dizziness     Weak and muscle weak  Other reaction(s): Dizziness  Weak and muscle weak     Januvia [Sitagliptin] Unknown     HEADACHE     Tylenol [Acetaminophen] Itching          Lab Results    Chemistry CBC Cardiac Enzymes/BNP/TSH/INR   Recent Labs   Lab Test 10/16/22  0522 10/16/22  0219 10/15/22  0741 10/15/22  0519   NA  --   --   --  141   POTASSIUM 3.2*  --    < > 3.3*   CHLORIDE  --   --   --  103   CO2  --   --   --  25   GLC  --  84   < > 286*   BUN  --   --   --  35.9*   CR 1.56*  --   --  1.66*   GFRESTIMATED 50*  --   --  46*   LISA  --   --   --  7.6*    < > = values in this interval not displayed.     Recent Labs   Lab Test 10/16/22  0522 10/15/22  0519 10/14/22  0518   CR 1.56* 1.66* 1.91*          Recent Labs   Lab Test 10/15/22  0519   WBC 9.6   HGB 8.8*   HCT 26.7*   MCV 90   *     Recent Labs   Lab Test 10/15/22  0519 10/13/22  0724 10/12/22  0704   HGB 8.8* 8.9* 10.5*    Recent Labs   Lab Test 05/04/22  2340 05/04/22  1836 05/04/22  1234   TROPONINI 0.71* 0.69* 0.73*     Recent Labs   Lab Test 10/11/22  1719 10/10/22  1044 05/04/22  1234 04/24/22  2212 01/13/22  1731 09/28/21  0852   BNP  --   --  4,308* 3,288* 2,494*  --    NTBNPI 41,466*  --    --   --   --   --    NTBNP  --  32,780*  --   --   --  3,508*     No results for input(s): TSH in the last 99448 hours.  Recent Labs   Lab Test 10/11/22  1719 05/04/22  1234 04/24/22  2212   INR 1.10 1.28* 1.24*            Chaim Kendall MD  10/16/2022  7:28 AM

## 2022-10-17 NOTE — PROGRESS NOTES
[facility] Inpatient follow up       Patient:  Av Fernando  Date of birth 1960, Medical record number [id number]  Date of Visit:  10/17/2022  Attending Physician: Nicole Sena MD           Assessment and Recommendations:   Assessment:  Av Fernando is a 62 year old male with   1. DM II with good control.   2. History of Ischemic heart disease/STEMI  3. History of CHF with EF at 20-25%.  4.  Sick contact with daughter, granddaughter, and wife.  They have had fever, cough, shortness of breath.  5. Acute hypoxic respiratory failure. Multifactorial with RSV infection and CHF exacerbation. Positive nasal swab for RSV PCR on 10/11. Influenza A/B and COVID negative on 10/11. Pro-BNP 41,466. Fissures filled with fluid on CT/chest.  On room air.  Cardiology following.  6. Sepsis with Lactic acid 6.7. .  Secondary to RSV infection.  IV vancomycin and Zosyn discontinued on 10/12/2022.  No new fever  7. ELIZABETH. Creatinine at 1.42 on admission from normal 0.79 on 10/3.  Creatinine worsening, at 2.55 on 10/13/2022.    8. Possible bacterial PNA on top of RSV. Procalcitonin at 14. IV Zosyn resumed 10/13. Could be that hypotension was over-diuresis. No clear bacterial infection.     Recommendations:   Supportive care  Change zosyn to augmentin for 5 more days.      JACE LIRA MD   [facility].   [facility]  Office Telephone [phone].  Fax [phone]  [facility] paging            Interval History:     HPI:  Main complaint is weakness.  eatgin some.       Recent Inflammatory Biomarkers:   Recent Labs   Lab Test 10/17/22  0501 10/16/22  1144 10/15/22  0519 10/13/22  1649 10/13/22  0724 10/12/22  0704 10/11/22  1937 10/10/22  1044 10/03/22  1250 09/28/22  0140 01/15/22  0222 01/14/22  0941   CRP  --   --   --   --   --   --   --  320.00* <3.00 <3.00  --   --    PCAL 1.31*  --   --  14.05*  --   --   --   --   --   --   --  0.03   WBC 6.7  6.7 9.6  --  6.4 5.7 5.0  --  5.6 6.5   < > 5.5    < > = values in this interval not displayed.            Review of Systems:     Negative except for findings in the HPI.           Current Medications (antimicrobials listed in bold):       apixaban ANTICOAGULANT  2.5 mg Oral BID     aspirin  81 mg Oral Daily     atorvastatin  80 mg Oral Daily     bumetanide  2 mg Oral Daily     gabapentin  100 mg Oral TID     guaiFENesin  1,200 mg Oral BID     insulin aspart  6 Units Subcutaneous TID w/meals     insulin aspart  1-9 Units Subcutaneous TID AC     insulin glargine  14 Units Subcutaneous At Bedtime     ipratropium - albuterol 0.5 mg/2.5 mg/3 mL  3 mL Nebulization 4x daily     levETIRAcetam  500 mg Oral BID     melatonin  5 mg Oral At Bedtime     metoprolol succinate ER  12.5 mg Oral Daily     pantoprazole  40 mg Oral Daily     piperacillin-tazobactam  3.375 g Intravenous Q8H     sodium chloride (PF)  10-40 mL Intracatheter Q7 Days              Allergies:     Allergies   Allergen Reactions     Dulaglutide Dizziness     Weak and muscle weak  Other reaction(s): Dizziness  Weak and muscle weak     Januvia [Sitagliptin] Unknown     HEADACHE     Tylenol [Acetaminophen] Itching            Physical Exam:   Vitals were reviewed  Patient Vitals for the past 24 hrs:   BP Temp Temp src Pulse Resp SpO2 Weight   10/17/22 0837 103/72 97.7  F (36.5  C) Oral 80 -- 98 % --   10/17/22 0617 -- -- -- -- -- -- 46.9 kg (103 lb 4.8 oz)   10/17/22 0600 -- -- -- 76 20 96 % --   10/17/22 0400 100/61 97.8  F (36.6  C) Oral 80 19 94 % --   10/17/22 0025 97/65 100.2  F (37.9  C) Oral 85 22 94 % --   10/16/22 2200 -- -- -- 86 15 93 % --   10/16/22 2000 98/66 98.7  F (37.1  C) Axillary 85 15 96 % --   10/16/22 1615 103/64 99.3  F (37.4  C) Oral 85 20 97 % --       Physical Examination:  Gen: Pleasant in no acute distress. Very weak not much cough.  On room air. Off pressors.   HEENT: NCAT. EOMI. PERRL.  Neck: No bruit, JVD or thyromegaly.  Pacemaker    Lungs: mostly clear  Card: RRR. NSR. No RMG. Peripheral pulses present and symmetric. No edema.  Abd: Soft NT ND. No mass. Normal bowel sounds.  Skin: No rash.  Extr: No edema.  Neuro: Alert and oriented to place time and person. Cranial nerves II to XII intact.   PICC         Laboratory Data:   ID Labs:  Microbiology labs:  Reviewed.    Recent Labs   Lab Test 10/10/22  1044 10/03/22  1250 09/28/22  0140   .00* <3.00 <3.00     Recent Labs   Lab Test 10/17/22  0501 10/16/22  1144 10/15/22  0519 10/13/22  0724 10/12/22  0704 10/11/22  1937   WBC 6.7 6.7 9.6 6.4 5.7 5.0     Recent Labs   Lab Test 10/17/22  0501 10/16/22  1144 10/16/22  0522 10/15/22  0519   CR 1.48* 1.45* 1.56* 1.66*   GFRESTIMATED 53* 54* 50* 46*       Hematology Studies  Recent Labs   Lab Test 10/17/22  0501 10/16/22  1144 10/15/22  0519 10/13/22  0724 10/12/22  0704 10/11/22  1937   WBC 6.7 6.7 9.6 6.4 5.7 5.0   ANEU  --   --   --   --   --  3.9   AEOS  --   --   --   --   --  0.0   HCT 30.9* 28.0* 26.7* 27.0* 33.0* 30.5*    153 143* 131* 138* 169       Metabolic  Recent Labs   Lab Test 10/17/22  0501 10/16/22  1144 10/16/22  0522 10/15/22  0519   * 133*  --  141   BUN 18.7 22.9  --  35.9*   CO2 28 27  --  25   CR 1.48* 1.45* 1.56* 1.66*   GFRESTIMATED 53* 54* 50* 46*       Hepatic Studies  Recent Labs   Lab Test 10/17/22  0501 10/16/22  1144 10/15/22  0519   BILITOTAL 1.0 1.0 0.9   ALKPHOS 257* 277* 266*   ALBUMIN 2.5* 2.4* 2.2*   AST 95* 114* 188*   ALT 75* 88* 114*       Immunologlobulins  Recent Labs   Lab Test 10/03/22  1250 09/28/22  0140   SED 13 16*            Imaging Data:   Reviewed

## 2022-10-17 NOTE — PLAN OF CARE
Alert and oriented x4. Vital signs stable. Still have a persistent cough. Interrupted sleep due to persistent cough. Blood sugar of 60 at 2:30 am. Orange juice given and blood sugar went up to 120. Will continue to monitor.

## 2022-10-17 NOTE — DISCHARGE INSTRUCTIONS
Homecare:    Counts include 234 beds at the [facility] for PT/OT services  If you do not hear from them within 24-48 hours following your discharge from the hospital please call [phone].

## 2022-10-17 NOTE — PROGRESS NOTES
HEART CARE NOTE          Assessment/Recommendations     1. HFrEF/ICM c/b cardiogenic shock  Assessment / Plan    Diuresing well on current regimen of IV furosemide- nearing euvolemia so will transition to oral diuretic regimen and continue to monitor    GDMT on hold given the above - repeat echo negative for LV thrombus    Will need ICD given persistent decline in LVSF - will arrange as outpatient     Current Pharmacotherapy AHA Guideline-Directed Medical Therapy   Lisinopril - on hold given ELIZABETH and borderline BP Lisinopril 20 mg twice daily   Metoprolol 12.5 mg daily - on hold Carvedilol 25 mg twice daily   Spironolactone 25 mg daily - on hold Spironolactone 25 mg once daily   Hydralazine NA Hydralazine 100 mg three times daily   Isosorbide mononitrate 20 mg daily - on hold Isosorbide dinitrate 40 mg three times daily   SGLT2 inhibitor:not started Dapagliflozin or Empagliflozin 10 mg daily      2. Severe multivessel CAD c/b NSTEMI  Assessment / Plan    Hx of STEMI 8/21 - at which time the pateint left AMA    Known severe multivessel disease - patient declines recommended CABG    Continue ASA, high intensity rosuvastatin; denies anginal equivalents - does have some pleuritic chest pain    Hx of cardiac MRI significant inducible ischemia in the mid to distal inferoseptal segments, mid-distal anterior segments and mid inferolateral segment. There is almost transmural infarction in the inferolateral walls while the rest of the myocardium is viable; plan for impella assisted PCI initially on hold given patient's absence of symptoms and adequate functional capacity at the time; patient later declined coronary angiogram outright upon readmission; he would not like to pursue intervention; reported that he would instead like to return to home/[place] to pursue herbal remedy;  Lab results and known coronary anatomy were reviewed at that time with patient and as well as the risks associated with holding off on potential  "intervention at which time patient voiced comprehension.     3. Sepsis 2/2 RSV and underlying PNA  Assessment / Plan    Supportive care and management per primary team     3. Acute respiratory failure  Assessment / Plan    2/2 RSV, PNA, ADHF; course c/b sepsis    Supportive care per primary team/ID; IV diuresis as above     3. Valvular heart disease  Assessment / Plan    Moderate MR and TR     4. DM2  Assessment / Plan    Management per primary team      History of Present Illness/Subjective      Mr. Av Fernando is a 62 year old male with a PMHx significant for ( per Dr. Miranda's note) h/o diabetes, chronic afib on anticoagulation, and has a pacemaker presented with SOB and weakness. Patient was 84% in triage and placed on 3L is 95%     Today, Mr. Fernando denies any acute cardiac events or complaints; Management plan as detailed above     ECG: Personally reviewed. sinus tachycardia.     ECHO (personnaly Reviewed):   Severe biatrial enlargement.  The left ventricle is mildly dilated.  The visual ejection fraction is 25-30%.  There is inferolateral wall akinesis.  There is apical dyskinesis.  There is moderate anterior wall hypokinesis.  Distal inferior akinesis.  Mildly decreased right ventricular systolic function  There is mod-severe to severe (3-4+) mitral regurgitation.  There is mild (1+) aortic regurgitation.  There is moderate (2+) tricuspid regurgitation.  The right ventricular systolic pressure is approximated at 48mmHg plus the  right atrial pressure.  Compared to the prior study dated 2/21/2022, there are changes as noted. There  is now severe mitral regurgitation and moderate tricuspid regurgitation with  moderate to severe pulmonary HTN.          Physical Examination Review of Systems   /61 (BP Location: Left arm)   Pulse 80   Temp 97.8  F (36.6  C) (Oral)   Resp 19   Ht 1.575 m (5' 2\")   Wt 47.1 kg (103 lb 12.8 oz)   SpO2 94%   BMI 18.99 kg/m    Body mass index is 18.99 kg/m .  Wt Readings " from Last 3 Encounters:   10/16/22 47.1 kg (103 lb 12.8 oz)   06/09/22 50.8 kg (112 lb 1.6 oz)   05/16/22 53.7 kg (118 lb 4.8 oz)     General Appearance:   no distress, normal body habitus   ENT/Mouth: membranes moist, no oral lesions or bleeding gums.      EYES:  no scleral icterus, normal conjunctivae   Neck: no carotid bruits or thyromegaly   Chest/Lungs:   lungs are clear to auscultation, no rales or wheezing, equal chest wall expansion    Cardiovascular:   Regular. Normal first and second heart sounds with + FLORA; no rubs, or gallops; the carotid, radial and posterior tibial pulses are intact, no JVD or LE edema bilaterally    Abdomen:  no organomegaly, masses, bruits, or tenderness; bowel sounds are present   Extremities: no cyanosis or clubbing   Skin: no xanthelasma, warm.    Neurologic: alert and oriented x3, calm     Psychiatric: alert and oriented x3, calm     A complete 10 systems ROS was reviewed  And is negative except what is listed in the HPI.          Medical History  Surgical History Family History Social History   Past Medical History:   Diagnosis Date     Congestive heart failure (H) 08/2021    ischemic CM with LVEF 30-35%     Coronary artery disease 08/2021    STEMI with multivessel CAD on angiography     Hyperlipidemia      Hypertension      LV (left ventricular) mural thrombus 01/2022     Myocardial infarction (H) 08/2021    inferior STEMI     Type 2 diabetes, HbA1C goal < 8% (H)     Past Surgical History:   Procedure Laterality Date     CV CORONARY ANGIOGRAM N/A 8/1/2021    Procedure: Coronary Angiogram;  Surgeon: Deidre Lopes MD;  Location: [facility] LAB CV     CV CORONARY ANGIOGRAM N/A 5/6/2022    Procedure: Coronary Angiogram;  Surgeon: Sherif Fuentes MD;  Location: [facility] CATH LAB CV     CV LEFT HEART CATH N/A 5/6/2022    Procedure: Left Heart Catheterization;  Surgeon: Sherif Fuentes MD;  Location: [facility] LAB CV     CV LEFT VENTRICULOGRAM N/A 8/1/2021     Procedure: Left Ventriculogram;  Surgeon: Deidre Lopes MD;  Location: [facility] CATH LAB CV     CV PCI N/A 5/6/2022    Procedure: Percutaneous Coronary Intervention;  Surgeon: Sherif Fuentes MD;  Location: [facility] CATH LAB CV     OTHER SURGICAL HISTORY      LEG TRAUMA     PICC TRIPLE LUMEN PLACEMENT  10/13/2022         no family history of premature coronary artery disease Social History     Socioeconomic History     Marital status:      Spouse name: Not on file     Number of children: Not on file     Years of education: Not on file     Highest education level: Not on file   Occupational History     Not on file   Tobacco Use     Smoking status: Never     Smokeless tobacco: Never   Substance and Sexual Activity     Alcohol use: No     Drug use: No     Sexual activity: Yes     Partners: Female     Birth control/protection: None   Other Topics Concern     Parent/sibling w/ CABG, MI or angioplasty before 65F 55M? Not Asked   Social History Narrative     Not on file     Social Determinants of Health     Financial Resource Strain: Not on file   Food Insecurity: Not on file   Transportation Needs: Not on file   Physical Activity: Not on file   Stress: Not on file   Social Connections: Not on file   Intimate Partner Violence: Not on file   Housing Stability: Not on file           Lab Results    Chemistry/lipid CBC Cardiac Enzymes/BNP/TSH/INR   Lab Results   Component Value Date    CHOL 125 05/06/2022    HDL 56 05/06/2022    TRIG 43 05/06/2022    BUN 22.9 10/16/2022     (L) 10/16/2022    CO2 27 10/16/2022    Lab Results   Component Value Date    WBC 6.7 10/17/2022    HGB 10.0 (L) 10/17/2022    HCT 30.9 (L) 10/17/2022    MCV 89 10/17/2022     10/17/2022    Lab Results   Component Value Date    TROPONINI 0.71 (HH) 05/04/2022    BNP 4,308 (H) 05/04/2022    INR 1.10 10/11/2022     Lab Results   Component Value Date    TROPONINI 0.71 (HH) 05/04/2022          Weight:    Wt Readings from Last 3  Encounters:   10/16/22 47.1 kg (103 lb 12.8 oz)   06/09/22 50.8 kg (112 lb 1.6 oz)   05/16/22 53.7 kg (118 lb 4.8 oz)       Allergies  Allergies   Allergen Reactions     Dulaglutide Dizziness     Weak and muscle weak  Other reaction(s): Dizziness  Weak and muscle weak     Januvia [Sitagliptin] Unknown     HEADACHE     Tylenol [Acetaminophen] Itching         Surgical History  Past Surgical History:   Procedure Laterality Date     CV CORONARY ANGIOGRAM N/A 8/1/2021    Procedure: Coronary Angiogram;  Surgeon: Deidre Lopes MD;  Location: [facility] CATH LAB CV     CV CORONARY ANGIOGRAM N/A 5/6/2022    Procedure: Coronary Angiogram;  Surgeon: Sherif uFentes MD;  Location: [facility] CATH LAB CV     CV LEFT HEART CATH N/A 5/6/2022    Procedure: Left Heart Catheterization;  Surgeon: Sherif Fuentes MD;  Location: [facility] CATH LAB CV     CV LEFT VENTRICULOGRAM N/A 8/1/2021    Procedure: Left Ventriculogram;  Surgeon: Deidre Lopes MD;  Location: [facility] CATH LAB CV     CV PCI N/A 5/6/2022    Procedure: Percutaneous Coronary Intervention;  Surgeon: hSerif Fuentes MD;  Location: [facility] CATH LAB CV     OTHER SURGICAL HISTORY      LEG TRAUMA     PICC TRIPLE LUMEN PLACEMENT  10/13/2022            Social History  Tobacco:   History   Smoking Status     Never   Smokeless Tobacco     Never    Alcohol:   Social History    Substance and Sexual Activity      Alcohol use: No   Illicit Drugs:   History   Drug Use No       Family History  No family history on file.       Dougie Gupta MD on 10/17/2022      cc: Stefano Thapa

## 2022-10-17 NOTE — PROGRESS NOTES
[facility]    Medicine Progress Note - Hospitalist Service    Date of Admission:  10/11/2022    Assessment & Plan   Acute on chronic systolic heart failure related to ischemic cardiomyopathy with cardiogenic shock  -LVEF 25 to 30%, 3-4+ MR by 6/21/2022 TTE  -Plan to continue with Lasix 40 mg IV every 8 hours till 10/18, then reevaluate.  -GDMT on hold due to marginal blood pressure  -Cardiology consulted and following.  Appreciate their recommendations  -Off pressors since 10/15  -Toprol-XL was on hold, resumed 10/17  -Critical care/pulmonology signed off    Multivessel CAD with prior STEMI.  -Per cardiology patient declines CABG and PCI  -Continue aspirin, and statin.  -Metoprolol on hold at this time due to marginal blood pressure, BP improved and metoprolol resumed 10/17  Arrhythmia, paroxysmal versus persistent A. fib.  Has been on sinus rhythm.  [id number] 4.  -Continue apixaban 5 mg p.o. twice daily     RSV pneumonia.  Improving.  Patient initially was on vancomycin and Zosyn but this was discontinued however there was concern that this superimposed bacterial pneumonia with increased procalcitonin. Zosyn resumed 10/13\  10/17 added scheduled DuoNeb.  Days, low of albuterol inhaler  Added guaifenesin    Hypokalemia.  Stable at this time.  Potassium 3.6.  Replenish and monitor.    ELIZABETH.  Improving.  Most likely cardiorenal.  Monitor with BMP    Physical deconditioning/generalized weakness.  PT OT.  Fall precautions.    Diet: Combination Diet Moderate Consistent Carb (60 g CHO per Meal) Diet; 2 gm NA Diet  Fluid restriction 1500 ML FLUID    DVT Prophylaxis: DOAC  Bustamante Catheter: Not present  Central Lines: PRESENT  PICC 10/13/22 Triple Lumen Right Brachial vein medial Sepsis protocol-Site Assessment: WDL  Cardiac Monitoring: None  Code Status: No CPR- Do NOT Intubate      Disposition Plan      Expected Discharge Date: 10/19/2022    Discharge Delays: IV Medication - consider oral or Home  Infusion    Discharge Comments: diagnostic work up        The patient's care was discussed with the Bedside Nurse and Care Coordinator/.    Nicole Sena MD  Hospitalist Service  [facility]  Securely message with the Vocera Web Console (learn more here)  Text page via [employer]ch[employer] Paging/Directory   ______________________________________________________________________    Interval History   Patient is new to me.  Chart reviewed.  Patient seen and examined, with his daughter at the bedside.  He is complaining of dry cough, without associated chest pain, fever, chills, abdominal pain, nausea.  Per nursing, declining albuterol inhaler.  Patient states is irritating his throat and he is not able to inhale it deeply and feels like his swallowing medication better healing.    He is not sleeping well, due to cough and hard time falling asleep.  Not requiring supplemental oxygen.  Vital stable.  T-max 100.2 over the last 24 hours.    Data reviewed today: I reviewed all medications, new labs and imaging results over the last 24 hours. I personally reviewed     Physical Exam   Vital Signs: Temp: 97.7  F (36.5  C) Temp src: Oral BP: 103/72 Pulse: 80   Resp: 20 SpO2: 98 % O2 Device: None (Room air)    Weight: 103 lbs 4.8 oz  General: Patient is resting in bed comfortably appears weak.  HEENT.  Head is normocephalic atraumatic eyes pupils appear equal round and reactive to light  Lungs: He has a normal respiratory effort on auscultation breath sounds are clear.  Heart: He has a good S1 and S2 no obvious murmurs, no JVD  Abdomen: Soft nontender nondistended bowel sounds are noted no obvious organomegaly  Musculoskeletal: Good muscle tone no obvious lower extremity edema noted.  I did not examine range of motion  Skin: No obvious rashes on inspection warm to touch skin turgor appear normal    Data   Recent Labs   Lab 10/17/22  0830 10/17/22  0501 10/17/22  0325 10/16/22  1623 10/16/22  1144  10/16/22  0752 10/16/22  0522 10/15/22  0741 10/15/22  0519 10/11/22  1937 10/11/22  1719   WBC  --  6.7  --   --  6.7  --   --   --  9.6   < >  --    HGB  --  10.0*  --   --  9.1*  --   --   --  8.8*   < >  --    MCV  --  89  --   --  90  --   --   --  90   < >  --    PLT  --  168  --   --  153  --   --   --  143*   < >  --    INR  --   --   --   --   --   --   --   --   --   --  1.10   NA  --  135*  --   --  133*  --   --   --  141   < > 126*   POTASSIUM  --  3.6  --   --  3.6  3.6  --  3.2*   < > 3.3*   < > 5.1   CHLORIDE  --  94*  --   --  95*  --   --   --  103   < > 87*   CO2  --  28  --   --  27  --   --   --  25   < > 23   BUN  --  18.7  --   --  22.9  --   --   --  35.9*   < > 41.4*   CR  --  1.48*  --   --  1.45*  --  1.56*  --  1.66*   < > 1.38*   ANIONGAP  --  13  --   --  11  --   --   --  13   < > 16*   LISA  --  8.1*  --   --  7.9*  --   --   --  7.6*   < > 8.7*   * 127* 120*   < > 259*   < >  --    < > 286*   < > 278*   ALBUMIN  --  2.5*  --   --  2.4*  --   --   --  2.2*   < > 2.7*   PROTTOTAL  --  6.9  --   --  6.4  --   --   --  6.0*  --  7.2   BILITOTAL  --  1.0  --   --  1.0  --   --   --  0.9  --  1.5*   ALKPHOS  --  257*  --   --  277*  --   --   --  266*  --  120   ALT  --  75*  --   --  88*  --   --   --  114*  --  16   AST  --  95*  --   --  114*  --   --   --  188*  --  46    < > = values in this interval not displayed.     No results found for this or any previous visit (from the past 24 hour(s)).  Medications     sodium chloride 10 mL/hr at 10/13/22 1523       albuterol  2 puff Inhalation 4x Daily     apixaban ANTICOAGULANT  2.5 mg Oral BID     aspirin  81 mg Oral Daily     atorvastatin  80 mg Oral Daily     bumetanide  2 mg Oral Daily     gabapentin  100 mg Oral TID     insulin aspart  6 Units Subcutaneous TID w/meals     insulin aspart  1-9 Units Subcutaneous TID AC     insulin glargine  14 Units Subcutaneous At Bedtime     levETIRAcetam  500 mg Oral BID     [Held by provider]  metoprolol succinate ER  12.5 mg Oral Daily     pantoprazole  40 mg Oral Daily     piperacillin-tazobactam  3.375 g Intravenous Q8H     sodium chloride (PF)  10-40 mL Intracatheter Q7 Days

## 2022-10-17 NOTE — PROGRESS NOTES
"CLINICAL NUTRITION SERVICES - REASSESSMENT NOTE     Nutrition Prescription    RECOMMENDATIONS FOR MDs/PROVIDERS TO ORDER:  Consider MVI daily    Malnutrition Status:    TBD    Recommendations already ordered by Registered Dietitian (RD):  Pt declines supplements    Future/Additional Recommendations:  NFPE, diet ed as able.     EVALUATION OF THE PROGRESS TOWARD GOALS   Diet: 2 g Sodium, Moderate Consistent Carbohydrate and 1500 mL Fluid Restriction  Pt declines supplements  Intake: eating % at meals.       NEW FINDINGS   Pt reports he is able to drink broth, jello, liquids.  Pt complains that his throat cannot get it down.   Family members visiting.  Patient's daughter asking about a vitamin that would help patient eat.  We briefly discussed a feeding tube but patient states that he is not there yet.    ANTHROPOMETRICS  Height: 157.5 cm (5' 2\")  Admit wt 108 lb 10/11/22  Most Recent Weight: 46.9 kg (103 lb 4.8 oz)    -3.68 L since admit.  diuresing    PHYSICAL FINDINGS  See malnutrition section below.  Pt is thin.    GI CONCERNS  Last BM 10/16.    LABS  Labs:  Electrolytes  Potassium (mmol/L)   Date Value   10/17/2022 3.6   10/16/2022 3.6   10/16/2022 3.6   05/07/2022 3.5   05/06/2022 3.8   05/05/2022 3.6     Phosphorus (mg/dL)   Date Value   10/12/2022 4.9 (H)   01/17/2022 3.6    Blood Glucose  Glucose (mg/dL)   Date Value   10/17/2022 127 (H)   05/07/2022 152 (H)   05/06/2022 82   05/05/2022 162 (H)   05/04/2022 282 (H)   04/27/2022 165 (H)     GLUCOSE BY METER POCT (mg/dL)   Date Value   10/17/2022 180 (H)   10/17/2022 117 (H)   10/17/2022 120 (H)   10/17/2022 60 (L)   10/16/2022 114 (H)     Hemoglobin A1C (%)   Date Value   04/05/2022 9.6 (H)   12/10/2021 10.4 (H)   08/02/2021 10.4 (H)   05/12/2021 12.5 (H)   07/20/2020 13.4 (H)    Inflammatory Markers  CRP Inflammation (mg/L)   Date Value   10/10/2022 320.00 (H)   10/03/2022 <3.00   09/28/2022 <3.00     WBC Count (10e3/uL)   Date Value   10/17/2022 6.7 "   10/16/2022 6.7   10/15/2022 9.6     Albumin (g/dL)   Date Value   10/17/2022 2.5 (L)   10/16/2022 2.4 (L)   10/15/2022 2.2 (L)   04/24/2022 3.6   04/05/2022 3.6   02/04/2022 3.4 (L)      Magnesium (mg/dL)   Date Value   10/17/2022 2.3   10/16/2022 2.0   10/15/2022 1.8     Sodium (mmol/L)   Date Value   10/17/2022 135 (L)   10/16/2022 133 (L)   10/15/2022 141    Renal  Urea Nitrogen (mg/dL)   Date Value   10/17/2022 18.7   10/16/2022 22.9   10/15/2022 35.9 (H)   05/07/2022 16   05/06/2022 22   05/05/2022 28 (H)     Creatinine (mg/dL)   Date Value   10/17/2022 1.48 (H)   10/16/2022 1.45 (H)   10/16/2022 1.56 (H)     Additional  Triglycerides (mg/dL)   Date Value   05/06/2022 43   08/02/2021 70   05/12/2021 131     Ketones Urine (mg/dL)   Date Value   10/11/2022 Negative        Reviewed    MEDICATIONS    amoxicillin-clavulanate  1 tablet Oral Q12H [facility] (08/20)     apixaban ANTICOAGULANT  2.5 mg Oral BID     aspirin  81 mg Oral Daily     atorvastatin  80 mg Oral Daily     bumetanide  2 mg Oral Daily     gabapentin  100 mg Oral TID     guaiFENesin  1,200 mg Oral BID     insulin aspart  6 Units Subcutaneous TID w/meals     insulin aspart  1-9 Units Subcutaneous TID AC     insulin glargine  14 Units Subcutaneous At Bedtime     ipratropium - albuterol 0.5 mg/2.5 mg/3 mL  3 mL Nebulization 4x daily     levETIRAcetam  500 mg Oral BID     melatonin  5 mg Oral At Bedtime     metoprolol succinate ER  12.5 mg Oral Daily     pantoprazole  40 mg Oral Daily     sodium chloride (PF)  10-40 mL Intracatheter Q7 Days        sodium chloride 10 mL/hr at 10/13/22 1523      alum & mag hydroxide-simethicone, sore throat, benzonatate, bisacodyl, bisacodyl, glucose **OR** dextrose **OR** glucagon, hydrOXYzine, ibuprofen, loperamide, magnesium hydroxide, melatonin, naloxone **OR** naloxone **OR** naloxone **OR** naloxone, nitroGLYcerin, oxyCODONE, senna-docusate, sodium chloride (PF), sodium chloride (PF)   Reviewed    MALNUTRITION:  % Weight  Loss:  Weight loss does not meet criteria for malnutrition Pt on diuretic.  % Intake:  Variable intake  Subcutaneous Fat Loss:  Pt declines  Muscle Loss:  Pt declines  Fluid Retention:  None noted    Malnutrition Diagnosis: Patient does not meet two of the above criteria necessary for diagnosing malnutrition    Goals   Meet nutrition needs-progressing  BG<180 -progressing  Participate in diet ed-not met.  Pt has materials.    CURRENT NUTRITION DIAGNOSIS  Altered nutrition-related laboratory values related to chronic illness as evidenced by elevated BG and creat.      INTERVENTIONS  Implementation  Pt declines supplements  Consider MVI daily.    Monitoring/Evaluation  Progress toward goals will be monitored and evaluated per protocol.

## 2022-10-17 NOTE — CONSULTS
Care Management Initial Consult    General Information  Assessment completed with: Patient, ChildrenAv  Type of CM/SW Visit: Initial Assessment    Primary Care Provider verified and updated as needed: Yes   Readmission within the last 30 days: no previous admission in last 30 days      Reason for Consult: discharge planning  Advance Care Planning:    None on file       Communication Assessment  Patient's communication style: spoken language (English or Bilingual), spoken language (non-English)    Hearing Difficulty or Deaf: no   Wear Glasses or Blind: yes    Cognitive  Cognitive/Neuro/Behavioral: WDL  Level of Consciousness: alert  Arousal Level: arouses to voice  Orientation: oriented x 4  Mood/Behavior: calm, cooperative  Best Language: 0 - No aphasia  Speech: clear    Living Environment:   People in home: spouse, child(rupert), dependent, child(rupert), adult  Jasmin  Current living Arrangements: house      Able to return to prior arrangements:         Family/Social Support:  Care provided by: spouse/significant other  Provides care for: no one  Marital Status:              Description of Support System:           Current Resources:   Patient receiving home care services:       Community Resources:    Equipment currently used at home: walker, rolling  Supplies currently used at home:      Employment/Financial:  Employment Status: disabled        Financial Concerns: No concerns identified   Referral to Financial Worker: No       Lifestyle & Psychosocial Needs:  Social Determinants of Health     Tobacco Use: Low Risk      Smoking Tobacco Use: Never     Smokeless Tobacco Use: Never     Passive Exposure: Not on file   Alcohol Use: Not on file   Financial Resource Strain: Not on file   Food Insecurity: Not on file   Transportation Needs: Not on file   Physical Activity: Not on file   Stress: Not on file   Social Connections: Not on file   Intimate Partner Violence: Not on file   Depression: Not at risk     PHQ-2  Score: 2   Housing Stability: Not on file       Functional Status:  Prior to admission patient needed assistance:   Dependent ADLs:: Independent, Ambulation-walker  Dependent IADLs:: Shopping, Cooking, Cleaning       Mental Health Status:          Chemical Dependency Status:                Values/Beliefs:  Spiritual, Cultural Beliefs, Cheondoism Practices, Values that affect care: no               Additional Information:  RNCM met with patient and daughter at bedside. Initial assessment completed. Patient lives at home with spouse and dependent children, older adult children also involved in patients care. Has a walker at home.     Discussed discharge plans, declined TCU, agreeable to homecare, discussed referral options with insurance plan, referrals sent.     CM will continue to follow care progression and aide in discharge planning as needed. Family to transport at discharge.     Dodie Felton RN

## 2022-10-17 NOTE — PROGRESS NOTES
Patient remains on room air. Has a strong productive cough and able to do Flutter independently. Will PRN Flutter.

## 2022-10-17 NOTE — PLAN OF CARE
Problem: Plan of Care - These are the overarching goals to be used throughout the patient stay.    Goal: Plan of Care Review  Description: The Plan of Care Review/Shift note should be completed every shift.  The Outcome Evaluation is a brief statement about your assessment that the patient is improving, declining, or no change.  This information will be displayed automatically on your shift note.  Outcome: Progressing   Goal Outcome Evaluation:  [facility] - ICU    RN Progress Note:            Pertinent Assessments:      Please refer to flowsheet rows for full assessment     Pt doing well. VSS. Afebrile. Blood glucose this evening high at 366. MD ordered one time dose of Novolog insulin 12 units. Continues on 1500ml fluid restriction.         Mobility Level:     Up with assist of one. PT and OT ordered.          Key Events - This Shift:          Uneventful shift.               Plan:     Will continue to monitor blood glucose.          Point of Contact Update   If No, reason:   Name:Isatu Young  Phone Number:In chart  Summary of Conversation: In [facility]

## 2022-10-18 NOTE — PROGRESS NOTES
[facility] Inpatient follow up       Patient:  Av Fernando  Date of birth 1960, Medical record number [id number]  Date of Visit:  10/18/2022  Attending Physician: Nicole Sena MD           Assessment and Recommendations:   Assessment:  Av Fernando is a 62 year old male with   1. DM II with good control.   2. History of Ischemic heart disease/STEMI  3. History of CHF with EF at 20-25%.  4.  Sick contact with daughter, granddaughter, and wife.  They have had fever, cough, shortness of breath.  5. Acute hypoxic respiratory failure. Multifactorial with RSV infection and CHF exacerbation. Positive nasal swab for RSV PCR on 10/11. Influenza A/B and COVID negative on 10/11. Pro-BNP 41,466. Fissures filled with fluid on CT/chest.  On room air.  Cardiology following.  6. Sepsis with Lactic acid 6.7. .  Secondary to RSV infection.  IV vancomycin and Zosyn discontinued on 10/12/2022.  No new fever    7. ELIZABETH. Creatinine at 1.42 on admission from normal 0.79 on 10/3.  Creatinine worsening, at 2.55 on 10/13/2022.    8. Possible bacterial PNA on top of RSV. Procalcitonin at 14. IV Zosyn resumed 10/13. Could be that hypotension was over-diuresis. No clear bacterial infection.     Recommendations:   Supportive care  Finish augmentin for 5 more days.    Nothing more to offer from ID standpoint at this time.    We'll sign off.  Please call if questions or problems.         JACE LIRA MD   [facility].   [facility]  Office Telephone [phone].  Fax [phone]  [facility] paging            Interval History:     HPI:  Main complaint is feeling tired and not eating much.       Recent Inflammatory Biomarkers:   Recent Labs   Lab Test 10/18/22  0517 10/17/22  0501 10/16/22  1144 10/15/22  0519 10/13/22  1649 10/13/22  0724 10/12/22  0704 10/11/22  1937 10/10/22  1044 10/03/22  1250 09/28/22  0140 01/15/22  0222 01/14/22  0941   CRP  --   --    --   --   --   --   --   --  320.00* <3.00 <3.00  --   --    PCAL  --  1.31*  --   --  14.05*  --   --   --   --   --   --   --  0.03   WBC 6.2 6.7 6.7 9.6  --  6.4 5.7   < >  --  5.6 6.5   < > 5.5    < > = values in this interval not displayed.            Review of Systems:     Negative except for findings in the HPI.           Current Medications (antimicrobials listed in bold):       amoxicillin-clavulanate  1 tablet Oral Q12H MARSHA (08/20)     apixaban ANTICOAGULANT  2.5 mg Oral BID     aspirin  81 mg Oral Daily     atorvastatin  80 mg Oral Daily     bumetanide  2 mg Intravenous Once     bumetanide  2 mg Oral Daily     gabapentin  100 mg Oral TID     guaiFENesin  1,200 mg Oral BID     insulin aspart  1-7 Units Subcutaneous TID AC     insulin aspart  1-5 Units Subcutaneous At Bedtime     insulin aspart  4 Units Subcutaneous TID w/meals     insulin glargine  10 Units Subcutaneous At Bedtime     ipratropium - albuterol 0.5 mg/2.5 mg/3 mL  3 mL Nebulization 4x daily     levETIRAcetam  500 mg Oral BID     melatonin  5 mg Oral At Bedtime     metoprolol succinate ER  12.5 mg Oral Daily     pantoprazole  40 mg Oral Daily     sodium chloride (PF)  10-40 mL Intracatheter Q7 Days              Allergies:     Allergies   Allergen Reactions     Dulaglutide Dizziness     Weak and muscle weak  Other reaction(s): Dizziness  Weak and muscle weak     Januvia [Sitagliptin] Unknown     HEADACHE     Tylenol [Acetaminophen] Itching            Physical Exam:   Vitals were reviewed  Patient Vitals for the past 24 hrs:   BP Temp Temp src Pulse Resp SpO2 Weight   10/18/22 0809 96/57 97.8  F (36.6  C) Axillary 90 22 90 % --   10/18/22 0650 -- -- -- -- -- -- 45.1 kg (99 lb 8 oz)   10/18/22 0303 103/69 97.8  F (36.6  C) Axillary 80 20 97 % --   10/18/22 0130 -- -- -- 80 21 95 % --   10/18/22 0100 -- -- -- 79 12 97 % --   10/18/22 0030 -- -- -- 78 25 98 % --   10/18/22 0000 -- -- -- 79 11 97 % --   10/17/22 2334 99/68 98.7  F (37.1  C) Oral 79  10 97 % --   10/17/22 2029 98/62 98.5  F (36.9  C) Oral 88 16 93 % --   10/17/22 1610 102/55 98.8  F (37.1  C) Oral 85 11 93 % --   10/17/22 1600 -- -- -- 82 24 98 % --   10/17/22 1451 98/65 98  F (36.7  C) Oral 86 12 95 % --       Physical Examination:  Gen: Pleasant in no acute distress. Says feels tired.  Moving legs to exercise  On room air. Off pressors.   HEENT: NCAT. EOMI. PERRL.  Neck: No bruit, JVD or thyromegaly.  Pacemaker   Lungs: mostly clear  Card: RRR. NSR. No RMG. Peripheral pulses present and symmetric. No edema.  Abd: Soft NT ND. No mass. Normal bowel sounds.  Skin: No rash.  Extr: No edema.  Neuro: Alert and oriented to place time and person. Cranial nerves II to XII intact.   PICC         Laboratory Data:   ID Labs:  Microbiology labs:  Reviewed.    Recent Labs   Lab Test 10/10/22  1044 10/03/22  1250 09/28/22  0140   .00* <3.00 <3.00     Recent Labs   Lab Test 10/18/22  0517 10/17/22  0501 10/16/22  1144 10/15/22  0519 10/13/22  0724 10/12/22  0704   WBC 6.2 6.7 6.7 9.6 6.4 5.7     Recent Labs   Lab Test 10/18/22  0517 10/17/22  0501 10/16/22  1144 10/16/22  0522   CR 1.40* 1.48* 1.45* 1.56*   GFRESTIMATED 57* 53* 54* 50*       Hematology Studies  Recent Labs   Lab Test 10/18/22  0517 10/17/22  0501 10/16/22  1144 10/15/22  0519 10/13/22  0724 10/12/22  0704 10/11/22  1937   WBC 6.2 6.7 6.7 9.6 6.4 5.7 5.0   ANEU  --   --   --   --   --   --  3.9   AEOS  --   --   --   --   --   --  0.0   HCT 29.3* 30.9* 28.0* 26.7* 27.0* 33.0* 30.5*    168 153 143* 131* 138* 169       Metabolic  Recent Labs   Lab Test 10/18/22  0517 10/17/22  0501 10/16/22  1144   * 135* 133*   BUN 17.4 18.7 22.9   CO2 27 28 27   CR 1.40* 1.48* 1.45*   GFRESTIMATED 57* 53* 54*       Hepatic Studies  Recent Labs   Lab Test 10/17/22  0501 10/16/22  1144 10/15/22  0519   BILITOTAL 1.0 1.0 0.9   ALKPHOS 257* 277* 266*   ALBUMIN 2.5* 2.4* 2.2*   AST 95* 114* 188*   ALT 75* 88* 114*       Immunologlobulins  Recent  Labs   Lab Test 10/03/22  1250 09/28/22  0140   SED 13 16*            Imaging Data:   Reviewed

## 2022-10-18 NOTE — PROGRESS NOTES
"Duoneb tx given, BS diminished bilat clear. Unchanged after tx. O2 at 1 lpm/NC, SpO2 98 %, decreased to room air.    BP 96/57   Pulse 85   Temp 97.8  F (36.6  C) (Axillary)   Resp 18   Ht 1.575 m (5' 2\")   Wt 45.1 kg (99 lb 8 oz)   SpO2 97%   BMI 18.20 kg/m      "

## 2022-10-18 NOTE — PLAN OF CARE
Problem: Plan of Care - These are the overarching goals to be used throughout the patient stay.    Goal: Plan of Care Review  Description: The Plan of Care Review/Shift note should be completed every shift.  The Outcome Evaluation is a brief statement about your assessment that the patient is improving, declining, or no change.  This information will be displayed automatically on your shift note.  Outcome: Progressing   Goal Outcome Evaluation:               Blood sugar were on higher side last evening, gave extra 8u of novolog  as per order which helped to bring the sugar down initially but around 2:30 am patient c/o not feeling well checked spot Blood sugar was 61. Tried giving juice  but patient declined, so finally gave D50 25 ml which bought his sugar up.Pt non- compliant with carb limit. Has congested cough intermittently.

## 2022-10-18 NOTE — PROGRESS NOTES
[facility]    Medicine Progress Note - Hospitalist Service    Date of Admission:  10/11/2022    Assessment & Plan   Acute on chronic systolic heart failure related to ischemic cardiomyopathy with cardiogenic shock  -LVEF 25 to 30%, 3-4+ MR by 6/21/2022 TTE  -Plan to continue with Lasix 40 mg IV every 8 hours till 10/18, then reevaluate.  -GDMT on hold due to marginal blood pressure  -Cardiology consulted and following.  Appreciate their recommendations  -Off pressors since 10/15  -Toprol-XL was on hold, resumed 10/17  -Critical care/pulmonology signed off    Multivessel CAD with prior STEMI.  S/p CABG on 7/15 with ICD placement on 7/20with Dr. Coy at [facility] [place]  -Continue aspirin, and statin.  -Metoprolol on hold at this time due to marginal blood pressure, BP improved and metoprolol resumed 10/17  Arrhythmia, paroxysmal versus persistent A. fib.  Has been on sinus rhythm.  [id number] 4.  -Continue apixaban 5 mg p.o. twice daily     RSV pneumonia.  Improving.  Patient initially was on vancomycin and Zosyn but this was discontinued however there was concern that this superimposed bacterial pneumonia with increased procalcitonin. Zosyn resumed 10/13\  10/17 added scheduled DuoNeb.  Days, low of albuterol inhaler  Added guaifenesin    Hypokalemia.  Stable at this time.  Potassium 3.6.  Replenish and monitor.    ELIZABETH.  Improving.  Most likely cardiorenal.  Monitor with BMP    Physical deconditioning/generalized weakness.  PT OT.  Fall precautions.    Diet: Combination Diet Moderate Consistent Carb (60 g CHO per Meal) Diet; 2 gm NA Diet  Fluid restriction 1500 ML FLUID    DVT Prophylaxis: DOAC  Bustamante Catheter: Not present  Central Lines: PRESENT  PICC 10/13/22 Triple Lumen Right Brachial vein medial Sepsis protocol-Site Assessment: WDL  Cardiac Monitoring: None  Code Status: No CPR- Do NOT Intubate      Disposition Plan      Expected Discharge Date: 10/20/2022        Discharge  Comments: diagnostic work up        The patient's care was discussed with the Bedside Nurse and Care Coordinator/.    Nicole Sena MD  Hospitalist Service  [facility]  Securely message with the Vocera Web Console (learn more here)  Text page via [employer] Paging/Directory   ______________________________________________________________________    Interval History   Patient seen and examined, with his wife, sister and son at the bedside.   Reviewed overnight events.  Patient was hypoglycemia last night, family was feeding him home made food, without notifying nurse and timing/amount of food.  BG up in 400s.  Received NovoLog.  2 AM hypoglycemic at 57, was treated.  Feels tired and sleepy today, as he had numerous work-ups during the night from BG checks.  No focal weakness, no new complaints besides feeling tired.  Reviewed with family recommendations to notify patient's nurse when they bring in food, before starting feeding patient.  Also reviewed all administered meds today, as patient's son had questions regarding possible side effects of medications-somnolence.  Only new med since yesterday was IV Bumex.  Patient on low-dose Neurontin, similar as at home.    Data reviewed today: I reviewed all medications, new labs and imaging results over the last 24 hours. I personally reviewed     Physical Exam   Vital Signs: Temp: 98.1  F (36.7  C) Temp src: Axillary BP: 95/63 Pulse: 90   Resp: 23 SpO2: 94 % O2 Device: Nasal cannula Oxygen Delivery: 1 LPM  Weight: 99 lbs 8 oz  General: Patient is resting in bed comfortably appears weak.  HEENT.  Head is normocephalic atraumatic eyes pupils appear equal round and reactive to light  Lungs: He has a normal respiratory effort on auscultation breath sounds are clear.  Heart: He has a good S1 and S2 no obvious murmurs, no JVD  Abdomen: Soft nontender nondistended bowel sounds are noted no obvious organomegaly  Musculoskeletal: Good muscle tone  no obvious lower extremity edema noted.  I did not examine range of motion  Skin: No obvious rashes on inspection warm to touch skin turgor appear normal    Data   Recent Labs   Lab 10/18/22  1201 10/18/22  0827 10/18/22  0517 10/17/22  0830 10/17/22  0501 10/16/22  1623 10/16/22  1144 10/11/22  1937 10/11/22  1719   WBC  --   --  6.2  --  6.7  --  6.7   < >  --    HGB  --   --  9.8*  --  10.0*  --  9.1*   < >  --    MCV  --   --  88  --  89  --  90   < >  --    PLT  --   --  169  --  168  --  153   < >  --    INR  --   --   --   --   --   --   --   --  1.10   NA  --   --  133*  --  135*  --  133*   < > 126*   POTASSIUM  --   --  3.5  --  3.6  --  3.6  3.6   < > 5.1   CHLORIDE  --   --  95*  --  94*  --  95*   < > 87*   CO2  --   --  27  --  28  --  27   < > 23   BUN  --   --  17.4  --  18.7  --  22.9   < > 41.4*   CR  --   --  1.40*  --  1.48*  --  1.45*   < > 1.38*   ANIONGAP  --   --  11  --  13  --  11   < > 16*   LISA  --   --  7.8*  --  8.1*  --  7.9*   < > 8.7*   * 123* 108*   < > 127*   < > 259*   < > 278*   ALBUMIN  --   --   --   --  2.5*  --  2.4*   < > 2.7*   PROTTOTAL  --   --   --   --  6.9  --  6.4   < > 7.2   BILITOTAL  --   --   --   --  1.0  --  1.0   < > 1.5*   ALKPHOS  --   --   --   --  257*  --  277*   < > 120   ALT  --   --   --   --  75*  --  88*   < > 16   AST  --   --   --   --  95*  --  114*   < > 46    < > = values in this interval not displayed.     No results found for this or any previous visit (from the past 24 hour(s)).  Medications     sodium chloride 10 mL/hr at 10/13/22 1523       amoxicillin-clavulanate  1 tablet Oral Q12H [facility] (08/20)     apixaban ANTICOAGULANT  2.5 mg Oral BID     aspirin  81 mg Oral Daily     atorvastatin  80 mg Oral Daily     bumetanide  2 mg Oral Daily     gabapentin  100 mg Oral TID     guaiFENesin  1,200 mg Oral BID     insulin aspart  1-7 Units Subcutaneous TID AC     insulin aspart  1-5 Units Subcutaneous At Bedtime     insulin aspart  4 Units  Subcutaneous TID w/meals     insulin glargine  10 Units Subcutaneous At Bedtime     ipratropium - albuterol 0.5 mg/2.5 mg/3 mL  3 mL Nebulization 4x daily     levETIRAcetam  500 mg Oral BID     melatonin  5 mg Oral At Bedtime     metoprolol succinate ER  12.5 mg Oral Daily     pantoprazole  40 mg Oral Daily     sodium chloride (PF)  10-40 mL Intracatheter Q7 Days

## 2022-10-18 NOTE — PROGRESS NOTES
Patient remains on room air. Pt was seen for Duoneb treatment x2. Pt has a strong nonproductive cough.  Will continue to monitor.    Apolonia Tripathi, RT

## 2022-10-18 NOTE — PLAN OF CARE
Problem: Plan of Care - These are the overarching goals to be used throughout the patient stay.    Goal: Plan of Care Review  Description: The Plan of Care Review/Shift note should be completed every shift.  The Outcome Evaluation is a brief statement about your assessment that the patient is improving, declining, or no change.  This information will be displayed automatically on your shift note.  Outcome: Progressing      Alert and oriented x4. Vitals stable. Denied any pain. Educated pt about meals schedule and eating protein rich and carb consistent diet to maintain stable BG, insulin adjusted by provider. Pt C/O feeling very tried this morning. Pt was up to use commode with assist-1. Will continue to monitor. Evon Copeland RN

## 2022-10-18 NOTE — PROGRESS NOTES
"Room air SpO2 95 %, BS diminished bilat (shallow breathing). Duoneb tx given, tolerated well, BS same after. RT to monitor    /69   Pulse 80   Temp 97.8  F (36.6  C) (Axillary)   Resp 20   Ht 1.575 m (5' 2\")   Wt 45.1 kg (99 lb 8 oz)   SpO2 97%   BMI 18.20 kg/m      "

## 2022-10-19 NOTE — PROGRESS NOTES
"Room air SpO2 92 %, BS clear and diminished biklt, Duoneb tx given, tolerated well, BS same after, dry npc. RT to follow.    BP 93/51 (BP Location: Left arm)   Pulse 86   Temp 98.7  F (37.1  C) (Oral)   Resp 22   Ht 1.575 m (5' 2\")   Wt 45.1 kg (99 lb 8 oz)   SpO2 92%   BMI 18.20 kg/m      "

## 2022-10-19 NOTE — DISCHARGE SUMMARY
[facility]  Hospitalist Discharge Summary      Date of Admission:  10/11/2022  Date of Discharge:  10/19/2022  Discharging Provider: Nicole Sena MD  Discharge Service: Hospitalist Service    Discharge Diagnoses   Acute on chronic systolic heart failure related to ischemic cardiomyopathy with cardiogenic shock.  Multivessel CAD with prior STEMI.  Status post quadruple bypass on 7/15/2022 at [facility] with Dr. Coy.  Status post ICD placement on 7/20/2022 at [facility] with Dr. oCy.  RSV pneumonia.  Hypokalemia  Acute heart failure  Physical deconditioning    Follow-ups Needed After Discharge   Follow-up Appointments     Follow-up and recommended labs and tests       Follow up with primary care provider, Stefano Thapa, within 7 days to   evaluate medication change and for hospital follow- up.  The following   labs/tests are recommended: CBC, BMP, Mg.     Follow up with cardiology, DR. Coy, in 2-3 weeks.       Unresulted Labs Ordered in the Past 30 Days of this Admission     Date and Time Order Name Status Description    10/19/2022 12:02 AM Vitamin D Deficiency In process       These results will be followed up by me    Discharge Disposition   Discharged to home  Condition at discharge: Stable    Hospital Course   Patient is 63-year-old male with PMH of atrial fibrillation, LV thrombus, anticoagulated on Eliquis, IDDM, PPM/ICD placement, CAD with H/O for ax bypass on 7/15/2022 at [facility] by Dr. Coy, presented to ED with dyspnea, weakness and was hypoxic in triage 84% on room air.    Acute on chronic systolic heart failure related to ischemic cardiomyopathy with cardiogenic shock  LVEF 25 to 30%, 3-4+ MR by 6/21/2022 TTE  Patient diuresed with IV Lasix, then briefly IV Bumex and transition to p.o. Bumex, per cardiology.  Required pressors, off pressors since 10/15/2022.  Toprol-XL was held due to low BP, resumed on 10/17.    Multivessel CAD  with prior STEMI.  S/p CABG on 7/15 with ICD placement on 7/20with Dr. Coy at [facility]  Continue aspirin, and statin, and metoprolol was held then resumed as above..    Arrhythmia, paroxysmal versus persistent A. fib.  Has been on sinus rhythm.  [id number] 4.  On apixaban 5 mg p.o. twice daily     RSV pneumonia. Patient initially was on vancomycin and Zosyn but this was discontinued however there was concern that this superimposed bacterial pneumonia with increased procalcitonin. Zosyn resumed 10/13/23. 10/17 added scheduled DuoNeb, as he did not tolerate albuterol inhaler.  Also added guaifenesin.    History of LV thrombus.  Anticoagulated on Eliquis.  No  Seizure disorder-on Keppra.  Insulin-dependent diabetes mellitus.  PTA on 6-8 units of Lantus at bedtime.  During hospitalization required as high as 14 units of Lantus, developed tendency to a.m. low BG.  At discharge resume home dose of Lantus.    Hypokalemia.  Stable at this time.  Potassium 3.6.  Replenish and monitor.    ELIZABETH.  Improving.  Most likely cardiorenal.  Monitor with BMP    Physical deconditioning/generalized weakness.  PT OT recommended home health care with therapies.     Diet: Combination Diet Moderate Consistent Carb (60 g CHO per Meal) Diet; 2 gm NA Diet  Fluid restriction 1500 ML FLUID    DVT Prophylaxis: DOAC  Bustamante Catheter: Not present  Central Lines: PRESENT  PICC 10/13/22 Triple Lumen Right Brachial vein medial Sepsis protocol-Site Assessment: WDL  Cardiac Monitoring: None  Code Status: No CPR- Do NOT Intubate      Consultations This Hospital Stay   CARDIOLOGY IP CONSULT  CORE CLINIC EVALUATION IP CONSULT  OCCUPATIONAL THERAPY ADULT IP CONSULT  NUTRITION SERVICES ADULT IP CONSULT  CARE MANAGEMENT / SOCIAL WORK IP CONSULT  OCCUPATIONAL THERAPY ADULT IP CONSULT  PHYSICAL THERAPY ADULT IP CONSULT  INFECTIOUS DISEASES IP CONSULT  PHARMACY TO DOSE VANCO  INTENSIVIST IP CONSULT  VASCULAR ACCESS ADULT IP CONSULT  VASCULAR  ACCESS ADULT IP CONSULT  INTENSIVIST IP CONSULT  CARE MANAGEMENT / SOCIAL WORK IP CONSULT  PHYSICAL THERAPY ADULT IP CONSULT    Code Status   No CPR- Do NOT Intubate    Time Spent on this Encounter   I, Nicole Sena MD, personally saw the patient today and spent greater than 30 minutes discharging this patient.     Nicole Sena MD  [facility] ICU [address] [address]  Phone: [phone]  Fax: [phone]  ______________________________________________________________________    Physical Exam   Vital Signs: Temp: 98.6  F (37  C) Temp src: Oral BP: 102/63 Pulse: 87   Resp: 22 SpO2: 90 % O2 Device: None (Room air) Oxygen Delivery: 1 LPM  Weight: 99 lbs 8 oz  General: Patient is resting in bed comfortably appears weak.  Patient's daughter and sister at the bedside.  HEENT.  Head is normocephalic atraumatic eyes pupils appear equal round and reactive to light  Lungs: He has a normal respiratory effort on auscultation breath sounds are clear.  Heart: Well-healed sternotomy scar.  He has a good S1 and S2 no murmurs, no JVD.  ICD in left precordial area.  Abdomen: Soft nontender nondistended bowel sounds are noted no obvious organomegaly  Musculoskeletal: Good muscle tone no obvious lower extremity edema noted.  I did not examine range of motion  Skin: No obvious rashes on inspection warm to touch skin turgor appear normal       Primary Care Physician   Stefano Thapa    Discharge Orders      Home Care Referral      Reason for your hospital stay    Pneumonia, CHF exacerbation     Follow-up and recommended labs and tests     Follow up with primary care provider, Stefano Thapa, within 7 days to evaluate medication change and for hospital follow- up.  The following labs/tests are recommended: CBC, BMP, Mg.     Follow up with cardiology, DR. Coy, in 2-3 weeks.     Activity    Your activity upon discharge: activity as tolerated     Diet    Follow this diet upon  discharge: Orders Placed This Encounter      Fluid restriction 1500 ML FLUID      Combination Diet Moderate Consistent Carb (60 g CHO per Meal) Diet; 2 gm NA Diet     Significant Results and Procedures   Results for orders placed or performed during the hospital encounter of 10/11/22   CT Chest Pulmonary Embolism w Contrast    Addendum: 10/11/2022    CORRECTION: No central, lobar, or segmental PE. The subsegmental vessels in the left lower lobe are not adequately assessed on this study.       Narrative    EXAM: CT ABDOMEN PELVIS W CONTRAST, CT CHEST PULMONARY EMBOLISM W CONTRAST  LOCATION: [facility]  DATE/TIME: 10/11/2022 8:27 PM    INDICATION: fever  COMPARISON: 1/19/2022 and 11/5/2021   TECHNIQUE: CT chest pulmonary angiogram and routine CT abdomen pelvis with IV contrast. Arterial phase through the chest and venous phase through the abdomen and pelvis. Multiplanar reformats and MIP reconstructions were performed. Dose reduction   techniques were used.   CONTRAST: isovue 370  80ml    FINDINGS:  ANGIOGRAM CHEST: The main pulmonary artery is 32 mm in diameter, which suggests pulmonary hypertension. No central, lobar, or segmental PE. Subsegmental vessels in the left lower lobe are not well assessed due to lack of contrast opacification.  Thoracic   aorta is negative for dissection.    LUNGS AND PLEURA: Bronchial wall thickening is present. There are numerous lower lung predominant centrilobular pulmonary nodules which are confluent in some areas. There is mild bronchiectasis in the superior segment of the left lower lobe.     MEDIASTINUM/AXILLAE: The heart is enlarged. A right lower paratracheal lymph node is 15 mm. Otherwise there are prominent and mildly enlarged mediastinal and hilar lymph nodes. There is a left chest wall ICD.     CORONARY ARTERY CALCIFICATION: Moderate.    HEPATOBILIARY: Normal.    PANCREAS: Normal.    SPLEEN: Normal.    ADRENAL GLANDS: Normal.    KIDNEYS/BLADDER: Wall  thickening of the urinary bladder.     BOWEL: Normal.    LYMPH NODES: Normal.    VASCULATURE: Atherosclerotic disease.     PELVIC ORGANS: Normal.    MUSCULOSKELETAL: Normal.        Impression    IMPRESSION:  1.  No PE.  2.  Extensive lower lung predominant pulmonary opacities can be seen with edema, aspiration, or infection. Bronchial wall thickening can be seen with edema and bronchitis.  3.  Cardiomegaly.  4.  Bladder wall thickening may be from underdistention or cystitis.    CT Abdomen Pelvis w Contrast    Addendum: 10/11/2022    CORRECTION: No central, lobar, or segmental PE. The subsegmental vessels in the left lower lobe are not adequately assessed on this study.       Narrative    EXAM: CT ABDOMEN PELVIS W CONTRAST, CT CHEST PULMONARY EMBOLISM W CONTRAST  LOCATION: [facility]  DATE/TIME: 10/11/2022 8:27 PM    INDICATION: fever  COMPARISON: 1/19/2022 and 11/5/2021   TECHNIQUE: CT chest pulmonary angiogram and routine CT abdomen pelvis with IV contrast. Arterial phase through the chest and venous phase through the abdomen and pelvis. Multiplanar reformats and MIP reconstructions were performed. Dose reduction   techniques were used.   CONTRAST: isovue 370  80ml    FINDINGS:  ANGIOGRAM CHEST: The main pulmonary artery is 32 mm in diameter, which suggests pulmonary hypertension. No central, lobar, or segmental PE. Subsegmental vessels in the left lower lobe are not well assessed due to lack of contrast opacification.  Thoracic   aorta is negative for dissection.    LUNGS AND PLEURA: Bronchial wall thickening is present. There are numerous lower lung predominant centrilobular pulmonary nodules which are confluent in some areas. There is mild bronchiectasis in the superior segment of the left lower lobe.     MEDIASTINUM/AXILLAE: The heart is enlarged. A right lower paratracheal lymph node is 15 mm. Otherwise there are prominent and mildly enlarged mediastinal and hilar lymph nodes. There  is a left chest wall ICD.     CORONARY ARTERY CALCIFICATION: Moderate.    HEPATOBILIARY: Normal.    PANCREAS: Normal.    SPLEEN: Normal.    ADRENAL GLANDS: Normal.    KIDNEYS/BLADDER: Wall thickening of the urinary bladder.     BOWEL: Normal.    LYMPH NODES: Normal.    VASCULATURE: Atherosclerotic disease.     PELVIC ORGANS: Normal.    MUSCULOSKELETAL: Normal.      Impression    IMPRESSION:  1.  No PE.  2.  Extensive lower lung predominant pulmonary opacities can be seen with edema, aspiration, or infection. Bronchial wall thickening can be seen with edema and bronchitis.  3.  Cardiomegaly.  4.  Bladder wall thickening may be from underdistention or cystitis.      Discharge Medications   Current Discharge Medication List      START taking these medications    Details   amoxicillin-clavulanate (AUGMENTIN) 875-125 MG tablet Take 1 tablet by mouth every 12 hours for 4 days  Qty: 8 tablet, Refills: 0    Associated Diagnoses: Pneumonia of both lower lobes due to infectious organism      bumetanide (BUMEX) 2 MG tablet Take 1 tablet (2 mg) by mouth daily for 30 days  Qty: 30 tablet, Refills: 0    Associated Diagnoses: CHF (congestive heart failure), NYHA class I, acute on chronic, combined (H)      guaiFENesin (MUCINEX) 600 MG 12 hr tablet Take 2 tablets (1,200 mg) by mouth 2 times daily for 5 days  Qty: 20 tablet, Refills: 0    Associated Diagnoses: Pneumonia of both lower lobes due to infectious organism         CONTINUE these medications which have CHANGED    Details   potassium chloride ER (KLOR-CON M) 20 MEQ CR tablet Take 1 tablet (20 mEq) by mouth daily for 30 days  Qty: 90 tablet, Refills: 0    Associated Diagnoses: CHF (congestive heart failure), NYHA class I, acute on chronic, combined (H)         CONTINUE these medications which have NOT CHANGED    Details   apixaban ANTICOAGULANT (ELIQUIS ANTICOAGULANT) 5 MG tablet Take 1 tablet (5 mg) by mouth 2 times daily  Qty: 180 tablet, Refills: 3    Associated Diagnoses:  CHF (congestive heart failure), NYHA class I, acute on chronic, combined (H)      aspirin (ASA) 81 MG EC tablet Take 1 tablet (81 mg) by mouth daily Start tomorrow.  Qty: 30 tablet, Refills: 3    Associated Diagnoses: ST elevation myocardial infarction (STEMI), unspecified artery (H)      atorvastatin (LIPITOR) 80 MG tablet Take 1 tablet (80 mg) by mouth daily  Qty: 90 tablet, Refills: 3      gabapentin (NEURONTIN) 100 MG capsule Take 1 capsule (100 mg) by mouth 3 times daily      hydrOXYzine (ATARAX) 25 MG tablet Take 1 tablet (25 mg) by mouth 4 times daily as needed for itching      insulin glargine (LANTUS PEN) 100 UNIT/ML pen Inject 6 Units Subcutaneous At Bedtime  Qty: 15 mL, Refills: 3    Comments: If Lantus is not covered by insurance, may substitute Basaglar or Semglee or other insulin glargine product per insurance preference at same dose and frequency.    Associated Diagnoses: Type 2 diabetes mellitus with microalbuminuria, with long-term current use of insulin (H)      insulin lispro (HUMALOG KWIKPEN) 100 UNIT/ML (1 unit dial) KWIKPEN Inject 3-7 Units Subcutaneous 3 times daily (before meals)  Qty: 15 mL, Refills: 1    Associated Diagnoses: Type 2 diabetes mellitus with microalbuminuria, with long-term current use of insulin (H)      levETIRAcetam (KEPPRA) 500 MG tablet Take 1 tablet (500 mg) by mouth 2 times daily      metoprolol succinate ER (TOPROL XL) 25 MG 24 hr tablet Take 0.5 tablets (12.5 mg) by mouth daily  Qty: 45 tablet, Refills: 3    Associated Diagnoses: CHF (congestive heart failure), NYHA class I, acute on chronic, combined (H)      nitroGLYcerin (NITROSTAT) 0.4 MG sublingual tablet For chest pain place 1 tablet under the tongue every 5 minutes for 3 doses. If symptoms persist 5 minutes after 1st dose call 911.  Qty: 25 tablet, Refills: 0    Associated Diagnoses: Coronary artery disease of native artery with stable angina pectoris, unspecified whether native or transplanted heart (H)       oxyCODONE (ROXICODONE) 5 MG tablet Take 1 tablet (5 mg) by mouth every 4 hours as needed for severe pain      pantoprazole (PROTONIX) 40 MG EC tablet Take 40 mg by mouth daily      STIMULANT LAXATIVE 8.6-50 MG tablet Take 2 tablets by mouth daily  Qty: 180 tablet, Refills: 3    Associated Diagnoses: Chronic idiopathic constipation      blood glucose (ACCU-CHEK GUIDE) test strip Use to test blood sugar 3 times daily or as directed.  Qty: 100 strip, Refills: 11    Associated Diagnoses: Type 2 diabetes mellitus with microalbuminuria, with long-term current use of insulin (H)      Continuous Blood Gluc Sensor (FREESTYLE KATIE 14 DAY SENSOR) MISC 1 Device every 14 days Change sensor as directed every 14 days  Qty: 2 each, Refills: 11    Associated Diagnoses: Type 2 diabetes mellitus with microalbuminuria, with long-term current use of insulin (H)         STOP taking these medications       spironolactone (ALDACTONE) 25 MG tablet Comments:   Reason for Stopping:         Torsemide 40 MG TABS Comments:   Reason for Stopping:             Allergies   Allergies   Allergen Reactions     Dulaglutide Dizziness     Weak and muscle weak  Other reaction(s): Dizziness  Weak and muscle weak     Januvia [Sitagliptin] Unknown     HEADACHE     Tylenol [Acetaminophen] Itching     Nicole Sena MD

## 2022-10-19 NOTE — PROGRESS NOTES
Pt tolerated nebulizer tx well. B.S. diminished T/O. NPC. Rt to follow.    Darrin Scott, RT on 10/19/2022 at 3:43 AM

## 2022-10-19 NOTE — PROGRESS NOTES
Care Management Discharge Note    Discharge Date: 10/19/2022       Discharge Disposition: Home, Home Care    Discharge Services:      Discharge DME:      Discharge Transportation: family or friend will provide    Private pay costs discussed: Not applicable    PAS Confirmation Code:    Patient/family educated on Medicare website which has current facility and service quality ratings:      Education Provided on the Discharge Plan:    Persons Notified of Discharge Plans: Patient/ Family  Patient/Family in Agreement with the Plan: yes    Handoff Referral Completed: Yes    Additional Information:  Patient to discharge home with Homecare, family to provide transportation.     Dodie Felton RN

## 2022-10-19 NOTE — PLAN OF CARE
Goal Outcome Evaluation:       Vital signs stable overnight, BP a bit soft, remains on RA, LS diminished with some crackles in bases. Voiding adequate amounts in urinal. Pt denies pain overnight but this AM is complaining that his left eye is very itchy and somewhat along left side of head, requesting atarax for itching.

## 2022-10-19 NOTE — PLAN OF CARE
Goal Outcome Evaluation:       Patient discharged to home via wheelchair. Belongings sent with patient. Discharge instructions reviewed with patient and family member present. [employer]  via [employer] [address] utilized for discharge. Discharge instructions sent home with patient. Attempted to make a follow up appointment with Dr. Kaufman per patient's request. Dr. Kaufman's schedule is full so his clinic with call the patient to set up an appointment

## 2022-10-19 NOTE — PROGRESS NOTES
Occupational Therapy Discharge Summary    Reason for therapy discharge:    Discharged to home with home therapy.    Progress towards therapy goal(s). See goals on Care Plan in [facility] electronic health record for goal details.  Goals partially met.  Barriers to achieving goals:   discharge from facility.    Therapy recommendation(s):    Continued therapy is recommended.  Rationale/Recommendations:  Home OT to increase fx ind with ADL tasks in home setting.    Zenia Corona OT OTD  10/19/22

## 2022-10-19 NOTE — PROGRESS NOTES
Care Management Follow Up    Length of Stay (days): 8    Expected Discharge Date: 10/20/2022     Concerns to be Addressed:       Patient plan of care discussed at interdisciplinary rounds: Yes    Anticipated Discharge Disposition:       Anticipated Discharge Services:    Anticipated Discharge DME:      Patient/family educated on Medicare website which has current facility and service quality ratings:    Education Provided on the Discharge Plan:    Patient/Family in Agreement with the Plan:      Referrals Placed by CM/SW:    Private pay costs discussed: Not applicable    Additional Information:  Patient from home with spouse and family. Declined TCU stay. Plan to discharge home with homecare for PT/OT services.  Tenative plan for patient to be admitted to homecare on 10/23 pending hospital discharge.     CM will continue to follow care progression and aide in discharge planning as needed.     Dodie Felton RN

## 2022-10-19 NOTE — PLAN OF CARE
Physical Therapy Discharge Summary    Reason for therapy discharge:    Discharged to home with home therapy.    Progress towards therapy goal(s). See goals on Care Plan in [facility] electronic health record for goal details.  Goals not met.  Barriers to achieving goals:   Pt was working towards the goals. .    Therapy recommendation(s):    Continued therapy is recommended.  Rationale/Recommendations:  to improve mobility and strength. .

## 2022-10-21 NOTE — PROGRESS NOTES
[facility] Resource [facility] Contact  [facility]/Voicemail     Clinical Data: Transitional Care Management Outreach     Outreach attempted x 2. Spoke with [place]   who states no [facility]  is available at this time. Will defer to tomorrow to care team for another attempt.    Plan:  [facility] will do no further outreaches at this time.       Dodie Díaz RN  [facility], [facility]    *Connected Care Resource Team does NOT follow patient ongoing. Referrals are identified based on internal discharge reports and the outreach is to ensure patient has an understanding of their discharge instructions.

## 2022-10-24 NOTE — PROGRESS NOTES
Clinic Care Coordination Contact  [facility]: Post-Discharge Note  SITUATION                                                      Admission:    Admission Date: 10/11/22   Reason for Admission: Severe sepsis (H)  Discharge:   Discharge Date: 10/19/22  Discharge Diagnosis: Severe sepsis (H)    BACKGROUND                                                      Patient is 63-year-old male with PMH of atrial fibrillation, LV thrombus, anticoagulated on Eliquis, IDDM, PPM/ICD placement, CAD with H/O for ax bypass on 7/15/2022 at [facility] by Dr. Coy, presented to ED with dyspnea, weakness and was hypoxic in triage 84% on room air.     Acute on chronic systolic heart failure related to ischemic cardiomyopathy with cardiogenic shock  LVEF 25 to 30%, 3-4+ MR by 6/21/2022 TTE  Patient diuresed with IV Lasix, then briefly IV Bumex and transition to p.o. Bumex, per cardiology.  Required pressors, off pressors since 10/15/2022.  Toprol-XL was held due to low BP, resumed on 10/17.     Multivessel CAD with prior STEMI.  S/p CABG on 7/15 with ICD placement on 7/20with Dr. Coy at [facility]  Continue aspirin, and statin, and metoprolol was held then resumed as above..     Arrhythmia, paroxysmal versus persistent A. fib.  Has been on sinus rhythm.  [id number] 4.  On apixaban 5 mg p.o. twice daily      RSV pneumonia. Patient initially was on vancomycin and Zosyn but this was discontinued however there was concern that this superimposed bacterial pneumonia with increased procalcitonin. Zosyn resumed 10/13/23. 10/17 added scheduled DuoNeb, as he did not tolerate albuterol inhaler.  Also added guaifenesin.     History of LV thrombus.  Anticoagulated on Eliquis.  No  Seizure disorder-on Keppra.  Insulin-dependent diabetes mellitus.  PTA on 6-8 units of Lantus at bedtime.  During hospitalization required as high as 14 units of Lantus, developed tendency to a.m. low BG.  At discharge resume home dose of  Lantus.     Hypokalemia.  Stable at this time.  Potassium 3.6.  Replenish and monitor.     ELIZABETH.  Improving.  Most likely cardiorenal.  Monitor with BMP     Physical deconditioning/generalized weakness.  PT OT recommended home health care with therapies.      Diet: Combination Diet Moderate Consistent Carb (60 g CHO per Meal) Diet; 2 gm NA Diet  Fluid restriction 1500 ML FLUID    DVT Prophylaxis: DOAC  Bustamante Catheter: Not present  Central Lines: PRESENT  PICC 10/13/22 Triple Lumen Right Brachial vein medial Sepsis protocol-Site Assessment: WDL  Cardiac Monitoring: None  Code Status: No CPR- Do NOT Intubate               ASSESSMENT           Discharge Assessment  How are you doing now that you are home?: Patient is doing well, a little weak, but has been resting. Has home care coming in today. No other concners/needs at this time.  How are your symptoms? (Red Flag symptoms escalate to triage hotline per guidelines): Improved  Do you feel your condition is stable enough to be safe at home until your provider visit?: Yes  Does the patient have their discharge instructions? : Yes  Does the patient have questions regarding their discharge instructions? : No  Were you started on any new medications or were there changes to any of your previous medications? : Yes  Does the patient have all of their medications?: Yes  Do you have questions regarding any of your medications? : No  Do you have all of your needed medical supplies or equipment (DME)?  (i.e. oxygen tank, CPAP, cane, etc.): Yes  Discharge follow-up appointment scheduled within 14 calendar days? : Yes  Discharge Follow Up Appointment Date: 10/28/22  Discharge Follow Up Appointment Scheduled with?: Specialty Care Provider    Post-op ([facility] Only)  If the patient had a surgery or procedure, do they have any questions for a nurse?: No    Post-op (Clinicians Only)  Did the patient have surgery or a procedure: No  Fever: No  Chills: No        PLAN                                                       Outpatient Plan:     Follow up with primary care provider, Stefano Thapa, within 7 days to evaluate medication change and for hospital follow- up.  The following labs/tests are recommended: CBC, BMP, Mg.      Follow up with cardiology, DR. Coy, in 2-3 weeks.         Future Appointments   Date Time Provider Department Center   10/25/2022  8:30 AM [facility]         For any urgent concerns, please contact our 24 hour nurse triage line: [phone] ([phone])         NARCISO Melendrez

## 2022-10-24 NOTE — TELEPHONE ENCOUNTER
Order/Referral Request    Who is requesting: [facility]    Orders being requested:   Skilled nursing  1 time weekly for 4 weeks  1 PRN  Medication management     - eval and treat    Also have questions regarding medications        Reason service is needed/diagnosis: n/a    When are orders needed by: n/a    Has this been discussed with Provider: No    Does patient have a preference on a Group/Provider/Facility? n/a    Does patient have an appointment scheduled?: No    Where to send orders: N/A    Could we send this information to you in [employer] or would you prefer to receive a phone call?:   No preference   Okay to leave a detailed message?: Yes at Other phone number:  [phone]

## 2022-10-24 NOTE — TELEPHONE ENCOUNTER
Spoke with Anna, home care nurse with [facility].     Pt update:     During most recent hospitalization, pt had pneumonia and is finishing augmentin antibiotic, when nurse assessed patient today, she noted crackles in lower lobes of lungs.     Medication refill request:     Hydroxyzine 25 mg PRN four times daily for itching - pt was given this during recent hospitalization for itching of forehead from shingles per report of home care nurse.     She is requesting the following home care orders:     Skilled nursing  1 time weekly for 4 weeks  1 PRN  Medication management      - eval and treat    Requesting provider advisement on following medications:     - Aspirin 81 MG EC tablet - pt wants to take every other day and not daily as written because he feels he is on too many medications   - Gabapentin 100 mg capsule - pt is only taking the medication once daily, he thinks that this works and does not want to take it 3 times daily as written   - Keppra 500 mg twice daily - pt is going to give this medication one more month to work and then will refuse to take it.

## 2022-10-25 NOTE — PROGRESS NOTES
This is a recent snapshot of the patient's [facility] medical record.  For current drug dose and complete information and questions, call [phone] or In Basket pool, fv home infusion ([id number])  CSN Number:  [id number]

## 2022-10-25 NOTE — TELEPHONE ENCOUNTER
Spoke with Anna and gave her the verbal orders for home care as requested as well as the update from Dr. Thapa regarding crackles / medication refill.

## 2022-10-25 NOTE — TELEPHONE ENCOUNTER
The crackles in the lung are not a concern, they take awhile to resolve.  I refilled the medication.  Dr. Alanis MD

## 2022-10-31 NOTE — PROGRESS NOTES
Clinic Care Coordination Contact  RUST/Voicemail       Clinical Data: Care Coordinator Outreach  Outreach attempted x 1. No answer at time of CHW call today- voicemail has not been set up on primary number  Plan: Care Coordinator CHW to discuss and update current CC goal progression  Care Coordinator will try to reach patient again in 10 business days= 11/9/2022    Eunice LATIF  Community Health Worker  [facility] Care Coordination  [email or web address]  Office: [phone]

## 2022-10-31 NOTE — TELEPHONE ENCOUNTER
Reason for Call: Request for an order or referral:    Order or referral being requested: Flory from [facility] called to request a verbal order for 1 follow up short visit.    Date needed: as soon as possible    Has the patient been seen by the PCP for this problem? YES    Additional comments: Flory also stated that the patient told [facility] that his PCP was not Dr. Thapa as he only saw him once since Dr. Kaufman was not in that day. He is not doing well and they recommended him to go to the hospital but he declined.     Phone number Patient can be reached at:  Other phone number:  [phone] Flory    Best Time:  Anytime    Can we leave a detailed message on this number?  YES    Call taken on 10/31/2022 at 2:25 PM by Jasmin Manning

## 2022-11-01 NOTE — PROGRESS NOTES
Clinic Care Coordination Contact  CCC RN spoke with patient today to follow up on whether or not he was staying with [facility] or if he was transferring to Dr. Kaufman at [facility]. Patient stated he has transferred to Dr. Kaufman at the [facility] in [place]. He stated Dr. Kaufman has been his physician for 20 years and knows him well. Patient made aware he can enroll in Care Coordination at the [facility] is he wishes. Patient said he was grateful for the call today. Writer will forward this note to [facility] PCP.

## 2022-11-01 NOTE — TELEPHONE ENCOUNTER
Dr Thapa, Are you his new PCP?  Per the chart you are please advise on this    Donna Rodriguez CMA ([facility])

## 2022-11-02 NOTE — TELEPHONE ENCOUNTER
Flory called back and the OK for 1 follow up short visit was OK'ed by Dr Alanis Rodriguez CMA ([facility])

## 2022-11-02 NOTE — ED NOTES
"ED Triage Provider Note  [facility]  Encounter Date: Nov 2, 2022    History:  Chief Complaint   Patient presents with     Abnormal Labs     Av Fernando is a 62 year old male who presents to the ED with abnormal labs. Recent hospital admission. Increased generalized weakness. Reports no pain. Worsening LFTs and kidney function in clinic. No fevers. No chest pain. No abdominal pain. Some vomiting. No diarrhea. Some shortness of breath.    Review of Systems:  Review of Systems   Constitutional: Negative for fever.   Cardiovascular: Negative for chest pain.   Gastrointestinal: Positive for vomiting. Negative for abdominal pain.   Neurological: Positive for weakness.        Exam:  /61   Pulse 66   Temp 97.4  F (36.3  C) (Temporal)   Resp 16   Ht 1.575 m (5' 2\")   Wt 44 kg (97 lb)   SpO2 100%   BMI 17.74 kg/m    General: No acute distress. Appears stated age.   Cardio: Regular rate, extremities well perfused  Resp: Normal work of breathing, grossly normal respiratory rate  Neuro: Alert. CN II-XII grossly intact. Grossly intact strength.   Skin: warm, dry    Medical Decision Making:  Patient arriving to the ED with problem as above. A medical screening exam was performed. Here with increased generalized weakness and increased LFTs in clinic. Recent admission for CHF, RSV, ELIZABETH. Plan for IV, labs.    Interventions:  Orders Placed This Encounter     CBC (+ platelets, no diff)     Basic metabolic panel     Troponin T, High Sensitivity (now)     Hepatic function panel     Diagnosis:  Generalized weakness  Abnormal LFTs       Rhonda Ayala MD  11/02/22 4091    "

## 2022-11-02 NOTE — ED TRIAGE NOTES
PATIENT PRESENTS TO ED FOR EVALUATION OF ABNORMAL LABS AND WEAKNESS. FOLLOW UP WITH PCP AND HAD BLOOD DRAWN AND FOUND KIDNEY AND LIVER LABS TO BE OFF. DENIES PAIN. TOLD  TO COME HERE     Triage Assessment     Row Name 11/02/22 6545       Triage Assessment (Adult)    Airway WDL WDL       Respiratory WDL    Respiratory WDL WDL       Skin Circulation/Temperature WDL    Skin Circulation/Temperature WDL WDL       Cardiac WDL    Cardiac WDL WDL       Peripheral/Neurovascular WDL    Peripheral Neurovascular WDL WDL       Cognitive/Neuro/Behavioral WDL    Cognitive/Neuro/Behavioral WDL WDL

## 2022-11-02 NOTE — ED NOTES
Expected Patient Referral to ED  5:41 PM    Referring Clinic/Provider:  [facility]    Reason for referral/Clinical facts:  CAD, DM, cardiomyopathy.  Recent discharge from hospital. Seen on Monday in follow up.  Weakness, malaise.  Labs returned today, LFTs elevated.  Elevated in past but trending worse.   Call to patient today, severe weakness.  Patient en route to hospital.    Recommendations provided:  Send to ED for further evaluation    Caller was informed that this institution does possess the capabilities and/or resources to provide for patient and should be transferred to our facility.    Discussed that if direct admit is sought and any hurdles are encountered, this ED would be happy to see the patient and evaluate.    Informed caller that recommendations provided are recommendations based only on the facts provided and that they responsible to accept or reject the advice, or to seek a formal in person consultation as needed and that this ED will see/treat patient should they arrive.      Mikey Paez MD  [facility] EMERGENCY DEPARTMENT  [address] [place] [id number] [phone]       Mikey Paez MD  11/02/22 1746

## 2022-11-03 NOTE — ED PROVIDER NOTES
EMERGENCY DEPARTMENT ENCOUNTER      NAME: Av Fernando  AGE: 62 year old male  YOB: 1960  MRN: [id number]  EVALUATION DATE & TIME: No admission date for patient encounter.    PCP: Stefano Thapa    ED PROVIDER: Neptali Gaona M.D.      Chief Complaint   Patient presents with     Abnormal Labs       FINAL IMPRESSION:  1. Abnormal LFTs    2. Acute kidney injury (H)        ED COURSE & MEDICAL DECISION MAKIN year old male presents to the Emergency Department for evaluation of abnormal labs and generalized weakness.  Hospitalized recently for acute on chronic congestive heart failure.  He has baseline CKD and mildly abnormal liver function tests dating back for many months.  He had followed up  in clinic the day prior to presenting here.  His main complaints were nausea, decreased oral intake, and weakness.  He had a creatinine increased to greater than 2 from baseline around 1.4-1.5.  He also had AST and ALT elevated 400 and a mildly elevated bilirubin.  He does not report any abdominal pain.  They made medication adjustments, it sounds like discontinuing his Keppra and his statin.  He says things have been slowly improving over the last couple of days since this adjustment however he was referred here due to his abnormal labs.  On repeat check today, his creatinine is actually improved 1.7 and his liver function tests have improved although not quite down to his normal baseline.  He has no abdominal tenderness or symptoms which suggest any kind of acute obstructive process.  He is not appear to be fluid overloaded today, his BNP is half of what it was during his recent admission, he has no extremity edema, oxygen saturations are 100% on room air and lungs are clear.  His metabolic panel is otherwise generally stable with some chronic hyponatremia and mild to moderate hyperglycemia.  At this point he appears euvolemic.  Orthostatic vital signs are negative.  He is able to stand independently  although seems a little bit unsteady per nursing.  I discussed the patient's case with the on-call provider for [facility].  I do think that his recent medication adjustments are helping with his abnormal labs, he says symptoms have been improving over the last day or 2 since this adjustment and we did discuss possibility of hospital observation/admission however at this point I think with close attention and follow-up he could still continue his evaluation as an outpatient. Dr. Orozco is going to send a message to his primary clinic provider.  It sounds like a stat GI referral has already been placed for him.  We discussed plan for discharge.  He will try to increase his liquid intake slightly and continue all other medications.  We discussed return precautions including for any new abdominal pain, tractable vomiting, progressive weakness or inability to eat.  Patient was discharged in stable condition.    10:09 PM I met with the patient to gather history and to perform my initial exam. We discussed plans for the ED course, including diagnostic testing and treatment.    11:00 PM I spoke with [facility], Dr. Orozco.   12:26 AM I met with the patient and discussed the plan for discharge. I discussed potential symptoms/reasons to return to the ED and all questions were answered. The patient is comfortable with the plan.     At the conclusion of the encounter I discussed the results of all of the tests and the disposition. The questions were answered. The patient or family acknowledged understanding and was agreeable with the care plan.         MEDICATIONS GIVEN IN THE EMERGENCY:  Medications - No data to display    NEW PRESCRIPTIONS STARTED AT TODAY'S ER VISIT  Discharge Medication List as of 11/3/2022 12:45 AM             =================================================================    HPI    Patient information was obtained from: patient    Use of : Yes (Phone for family member  "(not patient) - Language: Dominguez Fernando is a 62 year old male with a pertinent history of brain tumor, seizure disorder, type 2 diabetes, congestive heart failure, heart failure NSTEMI, STEMI, hypertension, left heart catheterization, cardiac arrest, pacemaker, severe sepsis, stage IIIb chronic kidney disease, and acute respiratory failure with hypoxia who presents to this ED via walk in with family member for evaluation of abnormal labs.    Per chart review, patient presented to [facility] on 10/11/2022 and discharged 10/19/2022 for evaluation of dyspnea, weakness, and hypoxia. Priniciple problem was severe sepsis. He was seen yesterday at [facility] and not admitted to the hospital. They were seen in clinic yesterday and he did not look well and Dr. Kaufman called and was very concerned about the patient being in decompensated heart failure and not able to manage at home. Patient tested positive for RSV. CT scan showed bibasilar infection versus edema. Started on antibiotics and plan for admission. Bustamante catheter was placed because he was retaining over 300 mL of urine.      Patient presents with concern for abnormal labs. Patient reports he was seen by his primary care doctor yesterday where he had blood tests done and he was informed today that his liver and kidney lab tests were \"down.\" He then presented to the ED for further evaluation. Patient also reports he has been sick since 10/27/2022 and has been feeling nauseous and vomiting since. He reports not being able to keep water and food down. He also reports of generalized weakness. He reports he started a new medication, but stopped taking it yesterday. He was able to eat a little food today. Patient denies fever, chest pain, and abdominal pain.    Patient's family member reports the patient needs help getting up and ambulating. Patient does not report of any other medical concerns or complaints at this time.      REVIEW OF SYSTEMS "   All systems reviewed and negative except as noted in HPI.    PAST MEDICAL HISTORY:  Past Medical History:   Diagnosis Date     Congestive heart failure (H) 08/2021    ischemic CM with LVEF 30-35%     Coronary artery disease 08/2021    STEMI with multivessel CAD on angiography     Hyperlipidemia      Hypertension      LV (left ventricular) mural thrombus 01/2022     Myocardial infarction (H) 08/2021    inferior STEMI     Type 2 diabetes, HbA1C goal < 8% (H)        PAST SURGICAL HISTORY:  Past Surgical History:   Procedure Laterality Date     CV CORONARY ANGIOGRAM N/A 8/1/2021    Procedure: Coronary Angiogram;  Surgeon: Deidre Lopes MD;  Location: [facility] [place] CATH LAB CV     CV CORONARY ANGIOGRAM N/A 5/6/2022    Procedure: Coronary Angiogram;  Surgeon: Sherif Fuentes MD;  Location: [facility] [place] CATH LAB CV     CV LEFT HEART CATH N/A 5/6/2022    Procedure: Left Heart Catheterization;  Surgeon: Sherif Fuentes MD;  Location: [facility] [place] CATH LAB CV     CV LEFT VENTRICULOGRAM N/A 8/1/2021    Procedure: Left Ventriculogram;  Surgeon: Deidre Lopes MD;  Location: [facility] [place] CATH LAB CV     CV PCI N/A 5/6/2022    Procedure: Percutaneous Coronary Intervention;  Surgeon: Sherif Fuentes MD;  Location: [facility] [place] CATH LAB CV     OTHER SURGICAL HISTORY      LEG TRAUMA     PICC TRIPLE LUMEN PLACEMENT  10/13/2022                CURRENT MEDICATIONS:    No current facility-administered medications for this encounter.     Current Outpatient Medications   Medication     apixaban ANTICOAGULANT (ELIQUIS ANTICOAGULANT) 5 MG tablet     aspirin (ASA) 81 MG EC tablet     atorvastatin (LIPITOR) 80 MG tablet     blood glucose (ACCU-CHEK GUIDE) test strip     bumetanide (BUMEX) 2 MG tablet     Continuous Blood Gluc Sensor (FREESTYLE KATIE 14 DAY SENSOR) MISC     gabapentin (NEURONTIN) 100 MG capsule     hydrOXYzine (ATARAX) 25 MG tablet     insulin glargine (LANTUS PEN) 100 UNIT/ML pen     insulin lispro (HUMALOG KWIKPEN) 100 UNIT/ML (1  "unit dial) KWIKPEN     levETIRAcetam (KEPPRA) 500 MG tablet     metoprolol succinate ER (TOPROL XL) 25 MG 24 hr tablet     nitroGLYcerin (NITROSTAT) 0.4 MG sublingual tablet     oxyCODONE (ROXICODONE) 5 MG tablet     pantoprazole (PROTONIX) 40 MG EC tablet     potassium chloride ER (KLOR-CON M) 20 MEQ CR tablet     STIMULANT LAXATIVE 8.6-50 MG tablet         ALLERGIES:  Allergies   Allergen Reactions     Dulaglutide Dizziness     Weak and muscle weak  Other reaction(s): Dizziness  Weak and muscle weak     Januvia [Sitagliptin] Unknown     HEADACHE     Tylenol [Acetaminophen] Itching       FAMILY HISTORY:  No family history on file.    SOCIAL HISTORY:   Social History     Socioeconomic History     Marital status:    Tobacco Use     Smoking status: Never     Smokeless tobacco: Never   Substance and Sexual Activity     Alcohol use: No     Drug use: No     Sexual activity: Yes     Partners: Female     Birth control/protection: None       VITALS:  /77   Pulse 64   Temp 97.4  F (36.3  C) (Temporal)   Resp 24   Ht 1.575 m (5' 2\")   Wt 44 kg (97 lb)   SpO2 100%   BMI 17.74 kg/m      PHYSICAL EXAM    Constitutional: Chronically ill-appearing middle-age male patient, laying in bed, no acute distress  HENT: Normocephalic, Atraumatic. Neck Supple.  Eyes: EOMI, Conjunctiva normal.  Respiratory: Breathing comfortably on room air. Speaks full sentences easily. Lungs clear to ascultation.  Cardiovascular: Normal heart rate, Regular rhythm. No peripheral edema.  Abdomen: Soft, nontender  Musculoskeletal: Good range of motion in all major joints. No major deformities noted.  Integument: Warm, Dry.  Neurologic: Alert & awake, Normal motor function, Normal sensory function, No focal deficits noted.   Psychiatric: Cooperative. Affect appropriate.     LAB:  All pertinent labs reviewed and interpreted.  Labs Ordered and Resulted from Time of ED Arrival to Time of ED Departure   CBC WITH PLATELETS - Abnormal       " Result Value    WBC Count 4.6      RBC Count 3.62 (*)     Hemoglobin 10.8 (*)     Hematocrit 32.4 (*)     MCV 90      MCH 29.8      MCHC 33.3      RDW 20.8 (*)     Platelet Count 100 (*)    BASIC METABOLIC PANEL - Abnormal    Sodium 129 (*)     Potassium 3.6      Chloride 89 (*)     Carbon Dioxide (CO2) 30 (*)     Anion Gap 10      Urea Nitrogen 48.3 (*)     Creatinine 1.77 (*)     Calcium 8.1 (*)     Glucose 256 (*)     GFR Estimate 43 (*)    TROPONIN T, HIGH SENSITIVITY - Abnormal    Troponin T, High Sensitivity 59 (*)    HEPATIC FUNCTION PANEL - Abnormal    Protein Total 7.7      Albumin 2.7 (*)     Bilirubin Total 1.0      Alkaline Phosphatase 485 (*)     AST         (*)     Bilirubin Direct 0.57 (*)    TROPONIN T, HIGH SENSITIVITY - Abnormal    Troponin T, High Sensitivity 55 (*)    NT PROBNP INPATIENT - Abnormal    N terminal Pro BNP Inpatient 17,863 (*)    AST - Abnormal     (*)          EKG:    Performed at: Nov 2, 2022 19:21    Impression: Sinus rhythm. Possible left atrial enlargement. Nonspecific ST and T wave abnormality. Prolonged QT.     Rate: 66 BPM  Rhythm: Sinus rhythm  Axis: Normal  NC Interval: 170 ms  QRS Interval: 102 ms  QTc Interval: 484 ms  ST Changes: Nonspecific ST-T wave abnormality  Comparison: When compared with ECG of Oct 11, 2022 16:51, vent. Rate has decreased by 42 BPM and T wave amplitude has decreased in anterior leads.     I have independently reviewed and interpreted the EKG(s) documented above.    I, Babak Rodriguez, am serving as a scribe to document services personally performed by Dr. Neptali Gaona, based on my observation and the provider's statements to me. I, Neptali Gaona MD attest that Babak Rodriguez is acting in a scribe capacity, has observed my performance of the services and has documented them in accordance with my direction.    Neptali Gaona M.D.  Emergency Medicine  [facility] EMERGENCY DEPARTMENT  [id number]  [facility] [address]  [phone]  Dept: [phone]     Neptali Gaona MD  11/03/22 0315

## 2022-11-03 NOTE — DISCHARGE INSTRUCTIONS
You were seen in the [facility] Emergency Department for abnormal lab tests, vomiting, and generalized weakness. Your lab testing today actually looks improved since yesterday. We think the medication adjustments made by Dr. Rea are helping improve your liver tests. Your kidney testing is also better. We don't see any signs of severe infection or heart issue today. We discussed your case with the on call provider for [facility]. At this point we think we can keep monitoring things at home.  Please make sure you are drinking plenty of liquids to stay hydrated and try to eat a balanced diet.  We are optimistic that your symptoms will continue to improve.  If you do have any other immediate concerns like intractable vomiting, severe breathing difficulties, new abdominal pain, etc. we can reevaluate at any time in the emergency department.

## 2022-11-04 PROBLEM — R65.20 SEVERE SEPSIS (H): Status: RESOLVED | Noted: 2022-01-01 | Resolved: 2022-01-01

## 2022-11-04 PROBLEM — A41.9 SEVERE SEPSIS (H): Status: RESOLVED | Noted: 2022-01-01 | Resolved: 2022-01-01

## 2022-11-04 PROBLEM — J18.9 PNEUMONIA OF BOTH LOWER LOBES DUE TO INFECTIOUS ORGANISM: Status: RESOLVED | Noted: 2022-01-01 | Resolved: 2022-01-01

## 2022-11-04 NOTE — LETTER
11/4/2022    Stefano Thapa MD  [address] [place] [address]    RE: Av Fernando       Dear Colleague,     I had the pleasure of seeing Av Fernando in the [facility].  HEART CARE PROGRESS NOTE      [facility]  [phone]      Assessment/Recommendations   Assessment:    1. Ischemic cardiomyopathy, heart failure with reduced ejection fraction, ejection fraction 27%: He has no symptoms of acute fluid retention.  He has chronic dyspnea on exertion and fatigue but denies any worsening since hospital discharge.  Lung sounds are clear.  He has no edema.  Weight is stable.    2.  Coronary artery disease: MI in August 2021 without treatment since he left AMA.  PCI to LAD in May 2022.  Coronary artery bypass surgery x3 in July 2022.  He denies any chest pain.    3.  ICD: Implanted at [facility]    4.  Atrial fibrillation: He continues to take Eliquis    Plan:  1.   Heart failure medications:  - Beta blocker therapy with metoprolol succinate 12.5 mg daily  - ACEI/ARB/ARNI not currently being used due to hypotension  - Diuretic therapy with bumetanide 2 mg daily  2.  Continue current medications  3.  Low-sodium diet and daily weights    Av Fernando will follow up with [facility] heart [facility] rather than [facility].  Follow-up in heart failure clinic in 1 month.  He has been referred to [facility] for advanced heart failure therapies but has not made an appointment yet (referral placed yesterday by [facility] PA).       History of Present Illness/Subjective    Mr. Av Fernando is a 62 year old male seen at [facility] today for hospital follow-up.  He has a history of coronary artery disease with prior MI, atrial fibrillation, diabetes, LV thrombus, seizures, and chronic kidney disease.  He was hospitalized in August 2021 with STEMI and refused coronary artery bypass surgery.  He left AMA.  He had PCI to LAD in May 2022.  He had  "cardiac arrest in July 2022 and had coronary artery bypass surgery x3 at [facility].  ICD implanted in July 2022.  He was hospitalized again in August 2022.  Echocardiogram from August 30, 2022 showed ejection fraction of 27%, mild aortic regurgitation, moderate mitral regurgitation, and moderate tricuspid regurgitation.    He was hospitalized October 11 to October 19, 2022 with heart failure with reduced ejection fraction and cardiogenic shock.  He also had RSV.    He followed up with PA at [facility] yesterday.  Referral to [facility] for advanced heart failure therapies.  Patient prefers [facility] rather than [facility] since he had a family member go to [facility] previously.  He has chronic dyspnea on exertion but denies any worsening.  He continues to feel fatigued.  He denies any chest pain or lower extremity edema.  He denies lightheadedness.      His clinic weight is stable since discharge weight of 99 pounds.  He is following a low-sodium diet.  He does not have much of an appetite.          Physical Examination Review of Systems   Vitals: /70 (BP Location: Left arm, Patient Position: Sitting, Cuff Size: Adult Small)   Pulse 63   Resp 16   Ht 1.575 m (5' 2\")   Wt 44.9 kg (99 lb)   BMI 18.11 kg/m    BMI= Body mass index is 18.11 kg/m .  Wt Readings from Last 3 Encounters:   11/04/22 44.9 kg (99 lb)   11/02/22 44 kg (97 lb)   10/18/22 45.1 kg (99 lb 8 oz)       General Appearance:     Alert, cooperative and in no acute distress.   ENT/Mouth: membranes moist, no oral lesions or bleeding gums.      EYES:  no scleral icterus, normal conjunctivae   Chest/Lungs:   lungs are clear to auscultation, no rales or wheezing, respirations unlabored   Cardiovascular:   Normal first and second heart sounds, no edema bilateral lower extremities    Abdomen:  Soft, nontender, nondistended, bowel sounds present   Extremities: no cyanosis or clubbing   Skin: warm, dry.    Neurologic: " mood and affect are appropriate, alert and oriented x3         Please refer above for cardiac ROS details.      Medical History  Surgical History Family History Social History   Past Medical History:   Diagnosis Date     Congestive heart failure (H) 08/2021    ischemic CM with LVEF 30-35%     Coronary artery disease 08/2021    STEMI with multivessel CAD on angiography     Hyperlipidemia      Hypertension      LV (left ventricular) mural thrombus 01/2022     Myocardial infarction (H) 08/2021    inferior STEMI     Type 2 diabetes, HbA1C goal < 8% (H)      Past Surgical History:   Procedure Laterality Date     CV CORONARY ANGIOGRAM N/A 8/1/2021    Procedure: Coronary Angiogram;  Surgeon: Deidre Lopes MD;  Location: [facility] CATH LAB CV     CV CORONARY ANGIOGRAM N/A 5/6/2022    Procedure: Coronary Angiogram;  Surgeon: Sherif Fuentes MD;  Location: [facility] CATH LAB CV     CV LEFT HEART CATH N/A 5/6/2022    Procedure: Left Heart Catheterization;  Surgeon: Sherif Fuentes MD;  Location: [facility] CATH LAB CV     CV LEFT VENTRICULOGRAM N/A 8/1/2021    Procedure: Left Ventriculogram;  Surgeon: Deidre Lopes MD;  Location: [facility] CATH LAB CV     CV PCI N/A 5/6/2022    Procedure: Percutaneous Coronary Intervention;  Surgeon: Sherif Fuentes MD;  Location: [facility] CATH LAB CV     OTHER SURGICAL HISTORY      LEG TRAUMA     PICC TRIPLE LUMEN PLACEMENT  10/13/2022          No family history on file. Social History     Socioeconomic History     Marital status:      Spouse name: Not on file     Number of children: Not on file     Years of education: Not on file     Highest education level: Not on file   Occupational History     Not on file   Tobacco Use     Smoking status: Never     Smokeless tobacco: Never   Substance and Sexual Activity     Alcohol use: No     Drug use: No     Sexual activity: Yes     Partners: Female     Birth control/protection: None   Other Topics Concern     Parent/sibling w/ CABG,  MI or angioplasty before 65F 55M? Not Asked   Social History Narrative     Not on file     Social Determinants of Health     Financial Resource Strain: Not on file   Food Insecurity: Not on file   Transportation Needs: Not on file   Physical Activity: Not on file   Stress: Not on file   Social Connections: Not on file   Intimate Partner Violence: Not on file   Housing Stability: Not on file          Medications  Allergies   Current Outpatient Medications   Medication Sig Dispense Refill     apixaban ANTICOAGULANT (ELIQUIS ANTICOAGULANT) 5 MG tablet Take 1 tablet (5 mg) by mouth 2 times daily 180 tablet 3     aspirin (ASA) 81 MG EC tablet Take 1 tablet (81 mg) by mouth daily Start tomorrow. 30 tablet 3     atorvastatin (LIPITOR) 80 MG tablet Take 1 tablet (80 mg) by mouth daily 90 tablet 3     blood glucose (ACCU-CHEK GUIDE) test strip Use to test blood sugar 3 times daily or as directed. 100 strip 11     bumetanide (BUMEX) 2 MG tablet Take 1 tablet (2 mg) by mouth daily for 30 days 30 tablet 0     Continuous Blood Gluc Sensor (takealot.comYLE KATIE 14 DAY SENSOR) MISC 1 Device every 14 days Change sensor as directed every 14 days 2 each 11     gabapentin (NEURONTIN) 100 MG capsule Take 1 capsule (100 mg) by mouth 3 times daily       hydrALAZINE (APRESOLINE) 25 MG tablet Take 1 tablet by mouth 2 times daily (with meals)       insulin glargine (LANTUS PEN) 100 UNIT/ML pen Inject 6 Units Subcutaneous At Bedtime 15 mL 3     insulin lispro (HUMALOG KWIKPEN) 100 UNIT/ML (1 unit dial) KWIKPEN Inject 3-7 Units Subcutaneous 3 times daily (before meals) 15 mL 1     levETIRAcetam (KEPPRA) 500 MG tablet Take 1 tablet (500 mg) by mouth 2 times daily       metoprolol succinate ER (TOPROL XL) 25 MG 24 hr tablet Take 0.5 tablets (12.5 mg) by mouth daily 45 tablet 3     nitroGLYcerin (NITROSTAT) 0.4 MG sublingual tablet For chest pain place 1 tablet under the tongue every 5 minutes for 3 doses. If symptoms persist 5 minutes after 1st dose  call 911. 25 tablet 0     oxyCODONE (ROXICODONE) 5 MG tablet Take 1 tablet (5 mg) by mouth every 4 hours as needed for severe pain       STIMULANT LAXATIVE 8.6-50 MG tablet Take 2 tablets by mouth daily 180 tablet 3    Allergies   Allergen Reactions     Dulaglutide Dizziness     Weak and muscle weak  Other reaction(s): Dizziness  Weak and muscle weak     Januvia [Sitagliptin] Unknown     HEADACHE     Tylenol [Acetaminophen] Itching         Lab Results    Chemistry/lipid CBC Cardiac Enzymes/BNP/TSH/INR   Recent Labs   Lab Test 05/06/22 0422   CHOL 125   HDL 56   LDL 60   TRIG 43     Recent Labs   Lab Test 05/06/22  0422 08/02/21  0418 05/12/21  0934   LDL 60 109 159*     Recent Labs   Lab Test 11/02/22 1941   *   POTASSIUM 3.6   CHLORIDE 89*   CO2 30*   *   BUN 48.3*   CR 1.77*   GFRESTIMATED 43*   LISA 8.1*     Recent Labs   Lab Test 11/02/22  1941 10/31/22  1639 10/19/22  0450   CR 1.77* 2.37* 1.29*     Recent Labs   Lab Test 10/19/22  0450 04/05/22  1212 12/10/21  1520   A1C 11.6* 9.6* 10.4*    Recent Labs   Lab Test 11/02/22 1941   WBC 4.6   HGB 10.8*   HCT 32.4*   MCV 90   *     Recent Labs   Lab Test 11/02/22  1941 10/19/22  0450 10/18/22  0517   HGB 10.8* 10.0* 9.8*    Recent Labs   Lab Test 05/04/22  2340 05/04/22  1836 05/04/22  1234   TROPONINI 0.71* 0.69* 0.73*     Recent Labs   Lab Test 11/02/22  1941 10/11/22  1719 10/10/22  1044 05/04/22  1234 04/24/22  2212 01/13/22  1731 09/28/21  0852   BNP  --   --   --  4,308* 3,288* 2,494*  --    NTBNPI 17,863* 41,466*  --   --   --   --   --    NTBNP  --   --  32,780*  --   --   --  3,508*     Recent Labs   Lab Test 10/31/22  1639   TSH 31.80*     Recent Labs   Lab Test 10/11/22  1719 05/04/22  1234 04/24/22  2212   INR 1.10 1.28* 1.24*        40 minutes spent on the date of encounter doing chart review, review of outside records, review of test results, patient visit and documentation.            Thank you for allowing me to participate in  the care of your patient.      Sincerely,     Hiral Morataya NP     [facility]  cc:   Dougie Gupta MD  [address]  [place] [address]

## 2022-11-04 NOTE — PROGRESS NOTES
HEART CARE PROGRESS NOTE      [facility]  [phone]      Assessment/Recommendations   Assessment:    1. Ischemic cardiomyopathy, heart failure with reduced ejection fraction, ejection fraction 27%: He has no symptoms of acute fluid retention.  He has chronic dyspnea on exertion and fatigue but denies any worsening since hospital discharge.  Lung sounds are clear.  He has no edema.  Weight is stable.    2.  Coronary artery disease: MI in August 2021 without treatment since he left AMA.  PCI to LAD in May 2022.  Coronary artery bypass surgery x3 in July 2022.  He denies any chest pain.    3.  ICD: Implanted at [facility]    4.  Atrial fibrillation: He continues to take Eliquis    Plan:  1.   Heart failure medications:  - Beta blocker therapy with metoprolol succinate 12.5 mg daily  - ACEI/ARB/ARNI not currently being used due to hypotension  - Diuretic therapy with bumetanide 2 mg daily  2.  Continue current medications  3.  Low-sodium diet and daily weights    Av Fernando will follow up with [facility] heart [facility] rather than [facility].  Follow-up in heart failure clinic in 1 month.  He has been referred to [facility] for advanced heart failure therapies but has not made an appointment yet (referral placed yesterday by [facility] PA).       History of Present Illness/Subjective    Mr. Av Fernando is a 62 year old male seen at [facility] today for hospital follow-up.  He has a history of coronary artery disease with prior MI, atrial fibrillation, diabetes, LV thrombus, seizures, and chronic kidney disease.  He was hospitalized in August 2021 with STEMI and refused coronary artery bypass surgery.  He left AMA.  He had PCI to LAD in May 2022.  He had cardiac arrest in July 2022 and had coronary artery bypass surgery x3 at [facility].  ICD implanted in July 2022.  He was hospitalized again in August 2022.  Echocardiogram from August 30,  "2022 showed ejection fraction of 27%, mild aortic regurgitation, moderate mitral regurgitation, and moderate tricuspid regurgitation.    He was hospitalized October 11 to October 19, 2022 with heart failure with reduced ejection fraction and cardiogenic shock.  He also had RSV.    He followed up with PA at [facility] yesterday.  Referral to [facility] for advanced heart failure therapies.  Patient prefers [facility] rather than [facility] since he had a family member go to [facility] previously.  He has chronic dyspnea on exertion but denies any worsening.  He continues to feel fatigued.  He denies any chest pain or lower extremity edema.  He denies lightheadedness.      His clinic weight is stable since discharge weight of 99 pounds.  He is following a low-sodium diet.  He does not have much of an appetite.          Physical Examination Review of Systems   Vitals: /70 (BP Location: Left arm, Patient Position: Sitting, Cuff Size: Adult Small)   Pulse 63   Resp 16   Ht 1.575 m (5' 2\")   Wt 44.9 kg (99 lb)   BMI 18.11 kg/m    BMI= Body mass index is 18.11 kg/m .  Wt Readings from Last 3 Encounters:   11/04/22 44.9 kg (99 lb)   11/02/22 44 kg (97 lb)   10/18/22 45.1 kg (99 lb 8 oz)       General Appearance:     Alert, cooperative and in no acute distress.   ENT/Mouth: membranes moist, no oral lesions or bleeding gums.      EYES:  no scleral icterus, normal conjunctivae   Chest/Lungs:   lungs are clear to auscultation, no rales or wheezing, respirations unlabored   Cardiovascular:   Normal first and second heart sounds, no edema bilateral lower extremities    Abdomen:  Soft, nontender, nondistended, bowel sounds present   Extremities: no cyanosis or clubbing   Skin: warm, dry.    Neurologic: mood and affect are appropriate, alert and oriented x3         Please refer above for cardiac ROS details.      Medical History  Surgical History Family History Social History   Past Medical History: "   Diagnosis Date     Congestive heart failure (H) 08/2021    ischemic CM with LVEF 30-35%     Coronary artery disease 08/2021    STEMI with multivessel CAD on angiography     Hyperlipidemia      Hypertension      LV (left ventricular) mural thrombus 01/2022     Myocardial infarction (H) 08/2021    inferior STEMI     Type 2 diabetes, HbA1C goal < 8% (H)      Past Surgical History:   Procedure Laterality Date     CV CORONARY ANGIOGRAM N/A 8/1/2021    Procedure: Coronary Angiogram;  Surgeon: Deidre Lopes MD;  Location: [facility] CATH LAB CV     CV CORONARY ANGIOGRAM N/A 5/6/2022    Procedure: Coronary Angiogram;  Surgeon: Sherif Fuentes MD;  Location: [facility] CATH LAB CV     CV LEFT HEART CATH N/A 5/6/2022    Procedure: Left Heart Catheterization;  Surgeon: Sherif Fuentes MD;  Location: [facility] CATH LAB CV     CV LEFT VENTRICULOGRAM N/A 8/1/2021    Procedure: Left Ventriculogram;  Surgeon: Deidre Lopes MD;  Location: [facility] CATH LAB CV     CV PCI N/A 5/6/2022    Procedure: Percutaneous Coronary Intervention;  Surgeon: Sherif Fuentes MD;  Location: [facility] CATH LAB CV     OTHER SURGICAL HISTORY      LEG TRAUMA     PICC TRIPLE LUMEN PLACEMENT  10/13/2022          No family history on file. Social History     Socioeconomic History     Marital status:      Spouse name: Not on file     Number of children: Not on file     Years of education: Not on file     Highest education level: Not on file   Occupational History     Not on file   Tobacco Use     Smoking status: Never     Smokeless tobacco: Never   Substance and Sexual Activity     Alcohol use: No     Drug use: No     Sexual activity: Yes     Partners: Female     Birth control/protection: None   Other Topics Concern     Parent/sibling w/ CABG, MI or angioplasty before 65F 55M? Not Asked   Social History Narrative     Not on file     Social Determinants of Health     Financial Resource Strain: Not on file   Food Insecurity: Not on file    Transportation Needs: Not on file   Physical Activity: Not on file   Stress: Not on file   Social Connections: Not on file   Intimate Partner Violence: Not on file   Housing Stability: Not on file          Medications  Allergies   Current Outpatient Medications   Medication Sig Dispense Refill     apixaban ANTICOAGULANT (ELIQUIS ANTICOAGULANT) 5 MG tablet Take 1 tablet (5 mg) by mouth 2 times daily 180 tablet 3     aspirin (ASA) 81 MG EC tablet Take 1 tablet (81 mg) by mouth daily Start tomorrow. 30 tablet 3     atorvastatin (LIPITOR) 80 MG tablet Take 1 tablet (80 mg) by mouth daily 90 tablet 3     blood glucose (ACCU-CHEK GUIDE) test strip Use to test blood sugar 3 times daily or as directed. 100 strip 11     bumetanide (BUMEX) 2 MG tablet Take 1 tablet (2 mg) by mouth daily for 30 days 30 tablet 0     Continuous Blood Gluc Sensor (Friend TrustedSTYLE KATIE 14 DAY SENSOR) MISC 1 Device every 14 days Change sensor as directed every 14 days 2 each 11     gabapentin (NEURONTIN) 100 MG capsule Take 1 capsule (100 mg) by mouth 3 times daily       hydrALAZINE (APRESOLINE) 25 MG tablet Take 1 tablet by mouth 2 times daily (with meals)       insulin glargine (LANTUS PEN) 100 UNIT/ML pen Inject 6 Units Subcutaneous At Bedtime 15 mL 3     insulin lispro (HUMALOG KWIKPEN) 100 UNIT/ML (1 unit dial) KWIKPEN Inject 3-7 Units Subcutaneous 3 times daily (before meals) 15 mL 1     levETIRAcetam (KEPPRA) 500 MG tablet Take 1 tablet (500 mg) by mouth 2 times daily       metoprolol succinate ER (TOPROL XL) 25 MG 24 hr tablet Take 0.5 tablets (12.5 mg) by mouth daily 45 tablet 3     nitroGLYcerin (NITROSTAT) 0.4 MG sublingual tablet For chest pain place 1 tablet under the tongue every 5 minutes for 3 doses. If symptoms persist 5 minutes after 1st dose call 911. 25 tablet 0     oxyCODONE (ROXICODONE) 5 MG tablet Take 1 tablet (5 mg) by mouth every 4 hours as needed for severe pain       STIMULANT LAXATIVE 8.6-50 MG tablet Take 2 tablets by  mouth daily 180 tablet 3    Allergies   Allergen Reactions     Dulaglutide Dizziness     Weak and muscle weak  Other reaction(s): Dizziness  Weak and muscle weak     Januvia [Sitagliptin] Unknown     HEADACHE     Tylenol [Acetaminophen] Itching         Lab Results    Chemistry/lipid CBC Cardiac Enzymes/BNP/TSH/INR   Recent Labs   Lab Test 05/06/22  0422   CHOL 125   HDL 56   LDL 60   TRIG 43     Recent Labs   Lab Test 05/06/22  0422 08/02/21  0418 05/12/21  0934   LDL 60 109 159*     Recent Labs   Lab Test 11/02/22 1941   *   POTASSIUM 3.6   CHLORIDE 89*   CO2 30*   *   BUN 48.3*   CR 1.77*   GFRESTIMATED 43*   LIAS 8.1*     Recent Labs   Lab Test 11/02/22  1941 10/31/22  1639 10/19/22  0450   CR 1.77* 2.37* 1.29*     Recent Labs   Lab Test 10/19/22  0450 04/05/22  1212 12/10/21  1520   A1C 11.6* 9.6* 10.4*    Recent Labs   Lab Test 11/02/22 1941   WBC 4.6   HGB 10.8*   HCT 32.4*   MCV 90   *     Recent Labs   Lab Test 11/02/22  1941 10/19/22  0450 10/18/22  0517   HGB 10.8* 10.0* 9.8*    Recent Labs   Lab Test 05/04/22  2340 05/04/22  1836 05/04/22  1234   TROPONINI 0.71* 0.69* 0.73*     Recent Labs   Lab Test 11/02/22  1941 10/11/22  1719 10/10/22  1044 05/04/22  1234 04/24/22 2212 01/13/22  1731 09/28/21  0852   BNP  --   --   --  4,308* 3,288* 2,494*  --    NTBNPI 17,863* 41,466*  --   --   --   --   --    NTBNP  --   --  32,780*  --   --   --  3,508*     Recent Labs   Lab Test 10/31/22  1639   TSH 31.80*     Recent Labs   Lab Test 10/11/22  1719 05/04/22  1234 04/24/22 2212   INR 1.10 1.28* 1.24*        40 minutes spent on the date of encounter doing chart review, review of outside records, review of test results, patient visit and documentation.

## 2022-11-04 NOTE — PATIENT INSTRUCTIONS
Av Fernando,    It was a pleasure to see you today at the [facility].     My recommendations after this visit include:  - No changes to your medications.    - See Deborah in 1 month.   - Continue to monitor for signs of retaining fluid (increasing weights, shortness of breath, swelling) and call with any concerns.  - If you have any questions or concerns, please call [phone] to talk with my nurse.    Hiral Fuller, CNP

## 2022-11-11 PROBLEM — R56.9 SEIZURES (H): Status: ACTIVE | Noted: 2022-01-01

## 2022-11-11 PROBLEM — G40.901 STATUS EPILEPTICUS (H): Status: ACTIVE | Noted: 2022-01-01

## 2022-11-11 PROBLEM — R06.00 DYSPNEA, UNSPECIFIED TYPE: Status: ACTIVE | Noted: 2022-01-01

## 2022-11-11 NOTE — ED NOTES
"[facility] ED Handoff Report    ED Chief Complaint: seizures    ED Diagnosis:  (R56.9) Seizures (H)  Comment:   Plan:     (R06.00) Dyspnea, unspecified type  Comment:   Plan:        PMH:    Past Medical History:   Diagnosis Date    Congestive heart failure (H) 08/2021    ischemic CM with LVEF 30-35%    Coronary artery disease 08/2021    STEMI with multivessel CAD on angiography    Hyperlipidemia     Hypertension     LV (left ventricular) mural thrombus 01/2022    Myocardial infarction (H) 08/2021    inferior STEMI    Type 2 diabetes, HbA1C goal < 8% (H)         Code Status:  Full Code     Falls Risk: Yes Band: Applied    Current Living Situation/Residence: lives with a significant other     Elimination Status: Continent: Yes purewick has been placed as a precaution    Activity Level: Total assist/lift usually independant    Patients Preferred Language:  English     Needed: No    Vital Signs:  BP 96/62   Pulse 82   Temp 99.7  F (37.6  C) (Temporal)   Resp (!) 40   Wt 49.9 kg (110 lb)   SpO2 99%   BMI 20.12 kg/m       Cardiac Rhythm: sr with frequent pvcs    Pain Score: Patient is unable to quantify.    Is the Patient Confused:  No    Last Food or Drink: 11/10/22 at 1600    Focused Assessment:  Pt presenting with dyspnea since Wednesday around 1300, no chest pain. Seen in primary clinic earlier today and told his \"heart was abnormal\".  Uncertain exactly what this means, but exam here reveals a murmur and pt has a history of ischemic cardiomyopathy with CHF and EF of 27% s/p ICD, recently hospitalized for this in Oct.  Pt followed up again with cardiology just 6 days ago.  Pt anticoagulated on Eliquis.  RA O2 sats are 100%, HR 80s. Pt hypotensive and required a liter of fluid to get his systolic bp over 100. Pt had three seizure while in the er. He had almost no postictal period after the first two but an extended postictal period after the third. He has been given h3budja of ativan for the " seizures and 1000mg kepra. CT of head reveals no bleeding. He does have a leason but this was noted during at 2016 ct scan. HE currently is able to say his own name and know that he is at the hospital. Suspecting that he is coming out of his postictal period. His biggest complaint has been sob for the duration of his stay. However, his spo2 has remained in the upper 20s on room air.     Tests Performed: Done: Labs and Imaging    Treatments Provided:  ativan and kepra for seizures. Fluid bolus for bp     Family Dynamics/Concerns: No    Family Updated On Visitor Policy: Yes    Plan of Care Communicated to Family: Yes    Who Was Updated about Plan of Care: wife    Belongings Checklist Done and Signed by Patient: Yes    Medications sent with patient:     Covid: asymptomatic , negative    Additional Information:     RN: Manuel Weaver RN   11/11/2022 2:57 AM

## 2022-11-11 NOTE — CONSULTS
Critical Care consult note      11/11/2022    Name: Av Fernando MRN#: [id number]   Age: 62 year old YOB: 1960                    Problem List:   Active Problems:    Seizures (H)    Dyspnea, unspecified type  Cardiac arrest  Acute hypoxic respiratory failure      Clinically Significant Risk Factors Present on Admission         # Hyponatremia: Lowest Na = 130 mmol/L (Ref range: 136-145) in last 2 days, will monitor as appropriate      # Hypoalbuminemia: Lowest albumin = 2.8 g/dL (Ref range: 3.5-5.2) at 11/10/2022  3:46 PM, will monitor as appropriate   # Drug Induced Coagulation Defect: home medication list includes an anticoagulant medication   # Acute Respiratory Failure: Documented O2 saturation < 91%.  Continue supplemental oxygen as needed  # Circulatory Shock: currently requiring pressors for blood pressure support  # Anemia: based on hgb <11               HPI:     63-year-old male with history of atrial fibrillation, LV thrombus, ischemic cardiomyopathy status post CABG and PPM/ICD placement.  Insulin-dependent diabetes mellitus.    Presented to the ED on 11/10 with complaints of shortness of breath.  He had 3 witnessed seizures in the emergency room.  Received Ativan and Keppra.  Chest x-ray showed bibasilar infiltrates.  Admitted to the medical floor where he had another seizure and after which he was brought to the ICU.    Upon arrival to the ICU he was awake and following commands.  Neurology was contacted and valproate was added.    Later on this morning he had a brief episode of PEA arrest.  Did not receive epinephrine.  Short round of chest compressions.  O2 sats in the 80s.  Intubated by the anesthesia team.      Assessment and plan :       I have personally reviewed the labs, imaging studies, cultures and discussed the case with referring physician and consulting physicians.     My assessment and plan by system for this patient is as follows:    Neurology/Psychiatry:   Patient has history  of seizures on Keppra.  Per family compliant.  I do not see levels checked.    Presented to the ED with shortness of breath and had 3 episodes of witnessed seizures followed by another seizure on the floor.  Valproate added to Keppra.    EEG now  Neurology following    Cardiovascular:   Chronic systolic heart failure due to ischemic cardiomyopathy  Status post ICD placement on 7/20/2022 at [facility].  Status post CABG x4 during that hospitalization  Last known EF 25 to 30%    Norepinephrine order was placed just in case he needs it, does not currently.      Pulmonary/Ventilator Management:   Acute hypoxic respiratory failure following cardiac arrest.  We will start him on tidal volume 400 mL, rate of 16 and PEEP of 5.    Chest x-ray earlier today with patchy infiltrates.  Already on Zosyn and we will continue that.      GI and Nutrition :   Place OG tube      Renal/Fluids/Electrolytes:   CKD 3.    Hyponatremia    - monitor function and electrolytes as needed with replacement per ICU protocols. - generally avoid nephrotoxic agents such as NSAID, IV contrast unless specifically required  - adjust medications as needed for renal clearance  - follow I/O's as appropriate.    Infectious Disease:   Possible pneumonia.  Continue Zosyn  Sputum culture  Check procalcitonin      Endocrine:   Continue insulin  TSH is elevated.  Will not start levothyroxine right now but this should be addressed as an outpatient.  Plan  - ICU insulin protocol, goal sugar <180      ICU Prophylaxis:   1. DVT: Apixaban  2. VAP: HOB 30 degrees, chlorhexidine rinse  3. Stress Ulcer: PPI  4. Restraints: Nonviolent soft two point restraints required and necessary for patient safety and continued cares and good effect as patient continues to pull at necessary lines, tubes despite education and distraction. Will readdress daily.     Category: Non-violent   Type of Restraint: Soft limb x2.   Behavior: Pulling at IVand turner catheter tubings.   Root  cause of behavior: Critical illness.   Less-restrictive methods that have failed: Redirection, reorientation. 1:1 NA at bedside.   Response to restraint: Not actively pulling atcurrent restraints.   Criteria for release from restraint: Responds to redirection. Leaves medical devices in place.               Medical History:     Past Medical History:   Diagnosis Date     Congestive heart failure (H) 08/2021    ischemic CM with LVEF 30-35%     Coronary artery disease 08/2021    STEMI with multivessel CAD on angiography     Hyperlipidemia      Hypertension      LV (left ventricular) mural thrombus 01/2022     Myocardial infarction (H) 08/2021    inferior STEMI     Type 2 diabetes, HbA1C goal < 8% (H)      Past Surgical History:   Procedure Laterality Date     CV CORONARY ANGIOGRAM N/A 8/1/2021    Procedure: Coronary Angiogram;  Surgeon: Deidre Lopes MD;  Location: [facility] CATH LAB CV     CV CORONARY ANGIOGRAM N/A 5/6/2022    Procedure: Coronary Angiogram;  Surgeon: Sherif Fuentes MD;  Location: [facility] CATH LAB CV     CV LEFT HEART CATH N/A 5/6/2022    Procedure: Left Heart Catheterization;  Surgeon: Sherif Fuentes MD;  Location: [facility] CATH LAB CV     CV LEFT VENTRICULOGRAM N/A 8/1/2021    Procedure: Left Ventriculogram;  Surgeon: Deidre Lopes MD;  Location: [facility] CATH LAB CV     CV PCI N/A 5/6/2022    Procedure: Percutaneous Coronary Intervention;  Surgeon: Sherif Fuentes MD;  Location: [facility] CATH LAB CV     OTHER SURGICAL HISTORY      LEG TRAUMA     PICC TRIPLE LUMEN PLACEMENT  10/13/2022          Social History     Socioeconomic History     Marital status:      Spouse name: Not on file     Number of children: Not on file     Years of education: Not on file     Highest education level: Not on file   Occupational History     Not on file   Tobacco Use     Smoking status: Never     Smokeless tobacco: Never   Substance and Sexual Activity     Alcohol use: No     Drug use: No      Sexual activity: Yes     Partners: Female     Birth control/protection: None   Other Topics Concern     Parent/sibling w/ CABG, MI or angioplasty before 65F 55M? Not Asked   Social History Narrative     Not on file     Social Determinants of Health     Financial Resource Strain: Not on file   Food Insecurity: Not on file   Transportation Needs: Not on file   Physical Activity: Not on file   Stress: Not on file   Social Connections: Not on file   Intimate Partner Violence: Not on file   Housing Stability: Not on file        Allergies   Allergen Reactions     Dulaglutide Dizziness     Weak and muscle weak  Other reaction(s): Dizziness  Weak and muscle weak     Januvia [Sitagliptin] Unknown     HEADACHE     Tylenol [Acetaminophen] Itching              Key Medications:       apixaban ANTICOAGULANT  5 mg Oral BID     aspirin  81 mg Oral Daily     atorvastatin  80 mg Oral QPM     chlorhexidine  15 mL Mouth/Throat Q12H     gabapentin  100 mg Oral TID     levETIRAcetam  500 mg Intravenous Q12H     LORazepam  1 mg Intravenous Once     magnesium sulfate  1 g Intravenous Once     pantoprazole  40 mg Per Feeding Tube QAM AC    Or     pantoprazole  40 mg Intravenous QAM AC     piperacillin-tazobactam  3.375 g Intravenous Q8H     propofol  30 mg Intravenous Once     sodium chloride (PF)  3 mL Intracatheter Q8H     valproate  1,000 mg Intravenous Once     valproate  500 mg Intravenous Q8H       dexmedetomidine 0.1 mcg/kg/hr (11/11/22 0636)     dexmedetomidine       dextrose 5% and 0.9% NaCl 50 mL/hr at 11/11/22 0415     norepinephrine 0.03 mcg/kg/min (11/11/22 0633)        Home Meds  No current facility-administered medications on file prior to encounter.  apixaban ANTICOAGULANT (ELIQUIS ANTICOAGULANT) 5 MG tablet, Take 1 tablet (5 mg) by mouth 2 times daily  aspirin (ASA) 81 MG EC tablet, Take 1 tablet (81 mg) by mouth daily Start tomorrow.  atorvastatin (LIPITOR) 80 MG tablet, Take 1 tablet (80 mg) by mouth daily  blood glucose  (ACCU-CHEK GUIDE) test strip, Use to test blood sugar 3 times daily or as directed.  bumetanide (BUMEX) 2 MG tablet, Take 1 tablet (2 mg) by mouth daily for 30 days  Continuous Blood Gluc Sensor (FREESTYLE KATIE 14 DAY SENSOR) [facility] 1 Device every 14 days Change sensor as directed every 14 days  gabapentin (NEURONTIN) 100 MG capsule, Take 1 capsule (100 mg) by mouth 3 times daily  hydrALAZINE (APRESOLINE) 25 MG tablet, Take 1 tablet by mouth 2 times daily (with meals)  insulin glargine (LANTUS PEN) 100 UNIT/ML pen, Inject 6 Units Subcutaneous At Bedtime  insulin lispro (HUMALOG KWIKPEN) 100 UNIT/ML (1 unit dial) KWIKPEN, Inject 3-7 Units Subcutaneous 3 times daily (before meals)  levETIRAcetam (KEPPRA) 500 MG tablet, Take 1 tablet (500 mg) by mouth 2 times daily  metoprolol succinate ER (TOPROL XL) 25 MG 24 hr tablet, Take 0.5 tablets (12.5 mg) by mouth daily  nitroGLYcerin (NITROSTAT) 0.4 MG sublingual tablet, For chest pain place 1 tablet under the tongue every 5 minutes for 3 doses. If symptoms persist 5 minutes after 1st dose call 911.  oxyCODONE (ROXICODONE) 5 MG tablet, Take 1 tablet (5 mg) by mouth every 4 hours as needed for severe pain  STIMULANT LAXATIVE 8.6-50 MG tablet, Take 2 tablets by mouth daily  [DISCONTINUED] rosuvastatin (CRESTOR) 40 MG tablet, Take 1 tablet (40 mg) by mouth daily               Physical Examination:   Temp:  [97.8  F (36.6  C)-99.7  F (37.6  C)] 98.4  F (36.9  C)  Pulse:  [67-92] 73  Resp:  [15-48] 30  BP: ()/() 91/61  SpO2:  [81 %-100 %] 100 %  No intake or output data in the 24 hours ending 11/11/22 0638  Wt Readings from Last 4 Encounters:   11/11/22 49.3 kg (108 lb 9.6 oz)   11/04/22 44.9 kg (99 lb)   11/02/22 44 kg (97 lb)   10/18/22 45.1 kg (99 lb 8 oz)     BP - Mean:  [59-93] 70  Resp: 30    Recent Labs   Lab 11/11/22  0526   PH 7.20*       GEN: no acute distress   HEENT: head ncat, sclera anicteric, OP patent, trachea midline   PULM: unlabored synchronous with  vent, clear anteriorly    CV/COR: RRR S1S2 no gallop,  No rub, no murmur  ABD: soft nontender, hypoactive bowel sounds, no mass  EXT: No edema  NEURO: Sedated  SKIN: no obvious rash  LINES: clean, dry intact         Data:   All data and imaging reviewed     ROUTINE ICU LABS (Last four results)  CMP  Recent Labs   Lab 11/11/22  0526 11/11/22  0439 11/11/22  0217 11/10/22  2343 11/10/22  2320 11/10/22  2117 11/10/22  1546 11/07/22  1517   *  --   --  130*  --   --  134* 134*   POTASSIUM 5.1  --   --  4.8  --   --  4.2 3.6   CHLORIDE 97*  --   --  99  --   --  99 97*   CO2 18*  --   --  18*  --   --  24 25   ANIONGAP 17*  --   --  13  --   --  11 12   * 62* 105* 129*   < >  --  128* 171*   BUN 25.4*  --   --  24.2*  --   --  23.4* 20.6   CR 1.71*  --   --  1.45*  --   --  1.34* 1.26*   GFRESTIMATED 45*  --   --  54*  --   --  60* 64   LISA 8.1*  --   --  8.2*  --   --  8.5* 8.3*   MAG 1.8  --   --   --   --  1.9  --   --    PROTTOTAL  --   --   --   --   --  7.7 7.2 7.1   ALBUMIN  --   --   --   --   --  2.9* 2.8* 2.7*   BILITOTAL  --   --   --   --   --  0.7 0.8 0.8   ALKPHOS  --   --   --   --   --  255* 266* 291*   AST  --   --   --   --   --  71* 51* 52*   ALT  --   --   --   --   --  85* 90* 127*    < > = values in this interval not displayed.     CBC  Recent Labs   Lab 11/10/22  2117   WBC 6.9   RBC 3.63*   HGB 10.8*   HCT 33.2*   MCV 92   MCH 29.8   MCHC 32.5   RDW 21.5*        INRNo lab results found in last 7 days.  Arterial Blood Gas  Recent Labs   Lab 11/11/22  0526   PH 7.20*       All cultures:  No results for input(s): CULT in the last 168 hours.  Recent Results (from the past 24 hour(s))   Chest XR,  PA & LAT    Narrative    EXAM: XR CHEST 2 VIEWS  LOCATION: [facility]  DATE/TIME: 11/10/2022 10:13 PM    INDICATION: sob  COMPARISON: 10/11/2022      Impression    IMPRESSION: Single cardiac lead. Cardiac enlargement and median sternotomy. Right perihilar and  bibasilar infiltrates. Small effusions. Atherosclerotic aorta.   Head CT w/o contrast    Narrative    EXAM: CT HEAD W/O CONTRAST  LOCATION: [facility]  DATE/TIME: 11/11/2022 12:42 AM    INDICATION: Confusion.  COMPARISON: MRI 6/20/2016  TECHNIQUE: Routine CT Head without IV contrast. Multiplanar reformats. Dose reduction techniques were used.    FINDINGS:  INTRACRANIAL CONTENTS: Again demonstrated is a partially calcified lesion in the left parietal lobe which is stable in size, measuring approximately 12 x 13 x 12 mm. There is mild adjacent mass effect without evidence of associated edema. No acute   intracranial hemorrhage. No CT evidence of acute infarct. Mild presumed chronic small vessel ischemic changes. Mild generalized volume loss. No hydrocephalus.     VISUALIZED ORBITS/SINUSES/MASTOIDS: No intraorbital abnormality. No paranasal sinus mucosal disease. No middle ear or mastoid effusion.    BONES/SOFT TISSUES: No acute abnormality.      Impression    IMPRESSION:  1.  No acute intracranial hemorrhage or CT evidence of recent infarct.  2.  There is a partially calcified lesion in the left parietal lobe measuring approximately 12 x 13 x 12 mm which is stable in size dating back at least until 6/20/2016. Given lack of change in size over time, this most likely represents a nonaggressive   lesion and is favored to reflect the sequelae of previous infection/inflammation. A low-grade neoplasm remains within the differential diagnosis but considered less likely.  3.  Underlying mild age-related changes.   XR Chest Port 1 View    Narrative    EXAM: XR CHEST PORT 1 VIEW  LOCATION: [facility]  DATE/TIME: 11/11/2022 4:50 AM    INDICATION: Difficulty breathing. Concern for pneumonia.  COMPARISON: 11/10/2022.      Impression    IMPRESSION: Poststernotomy and coronary artery bypass grafting. Stable cardiomegaly. Pulmonary vascularity within normal limits. Persistent patchy  alveolar infiltrates throughout both lungs concerning for bilateral pneumonia. Small bilateral pleural   effusions. No pneumothorax. Left-sided pacemaker unchanged.   US Lower Extremity Venous Duplex Bilateral    Narrative    EXAM: US LOWER EXTREMITY VENOUS DUPLEX BILATERAL  LOCATION: [facility]  DATE/TIME: 11/11/2022 6:11 AM    INDICATION: dyspnea, +d dimer  COMPARISON: None.  TECHNIQUE: Venous Duplex ultrasound of bilateral lower extremities with and without compression, augmentation and duplex. Color flow and spectral Doppler with waveform analysis performed.    FINDINGS: Exam includes the common femoral, femoral, popliteal veins as well as segmentally visualized deep calf veins and greater saphenous vein.     RIGHT: No deep vein thrombosis. No superficial thrombophlebitis. No popliteal cyst.    LEFT: No deep vein thrombosis. No superficial thrombophlebitis. No popliteal cyst.      Impression    IMPRESSION:  1.  No deep venous thrombosis in the bilateral lower extremities.         Billing: This patient is critically ill: Yes. Total critical care time today 38 min exclusive of procedures or teaching.

## 2022-11-11 NOTE — DISCHARGE SUMMARY
Death Summary    Date of admission: 11/10/22  Date of death: 22  Time of death: 1202  Cause of death: cardiac arrest    Diagnoses:  Cardiac arrest  Ischemic cardiomyopathy with severely reduced EF s/p ICD placement  Coronary artery disease s/p CABG  Status epilepticus  Primary HTN  Dyslipidemia  Hyperkalemia  Thrombocytopenia  Diabetes mellitus type 2    Death summary: The patient is  62 year old male with the above diagnoses who presented to the ED on 11/10 with dyspnea, hypoxia, and clinical evidence of acute decompensated heart failure. While in the ED, he experienced two seizures that were treated with lorazepam and levetiracetam loading, subsequent valproate. After admission to the hospital, had a recurrent seizure followed by hypoxia and cardiac arrest, underwent CPR and intubated, had ROSC but refractory shock on multiple pressors and despite correcting metabolic and other reversible factors, had recurrent PEA arrest requiring >30 minutes of CPR. Bedside US without pericardial effusion or dilated RV, sliding lung bilaterally with dense B-lines confirming pulmonary edema/contusion and ruling out pneumothorax (confirmed on CXR). Treated for severe acidosis. Despite this, required high doses of epinephrine, norepinephrine, vasopressin. Dopamine led to PVCs and near-return of VF so stopped. Family present, made patient DNR but continued support. Despite this, developed bradycardia then asystole and  on 22 at 1202. Exam confirmed absence of heart and lung sounds, absence of cranial nerve responses. Family was present at the bedside.    Rory Martinez MD  Pulmonary and Critical Care Medicine  [facility]  Office [phone]

## 2022-11-11 NOTE — ED NOTES
PT had a third seizure at this time. This one was longer and lasted about 1.5 minutes. Postictal period occurred after this seizure and the patient will not open his eyes or follow commands. Ativan 1mg and zofran 4mg given IV. PT to be taken to ct on transport monitor.

## 2022-11-11 NOTE — PROGRESS NOTES
"SPIRITUAL HEALTH SERVICES NOTE  [facility]; ICU    SPIRITUAL ASSESSMENT    Family in shock; grieving appropriately; emotionally exhausted    SPIRITUAL CARE NOTE  Responding to code, I met with Av' family in the hallway outside the ICU. Provided support to his wife, his daughters Cyndi and Rachael, and sons Tony and Clayton while the code team worked to revive and try to stabilize him. This took a long time. His family shares that he has had 6 or 7 hospital admissions in the last year and that he required CPR while admitted at [facility] earlier this summer. They stated that it took about 2 minutes to revive him at that time. Tony shares that their family is in shock and is trying to process the information they are hearing this morning. Dr. Martinez gave the family multiple updates, explaining that his heart is quite weak and that he is at risk of his heart stopping again. When I asked Tony if Av had talked to his family about his desire for CPR he said \"Yes\" but hesitated and acknowledged that it is complicated. Av hold traditional Hmong beliefs. The family welcomed silent prayers on his behalf. The family prefers to be near Av' room, but I opened the ICU family lounge for them to make it available, as they anticipate other family members arriving later.      Follow-up after Av' death to express my condolences to his family. They appreciate my offer of support and are not able to identify any specific needs or concerns at this time. Tony notes that this has been an exhausting experience for his family and that they are grateful that Av is now at peace.     Time Spent with Patient/Family: 75 minutes    Plan of Care: Available for further follow-up as requested by patient, family or staff.      Chata Quintanilla M.Div.      Office: [phone] (for non-urgent requests)  Please Vocera or page through [facility] for time-sensitive requests    "

## 2022-11-11 NOTE — CONSULTS
NEUROLOGY INPATIENT CONSULTATION NOTE       [facility]  [address] [place] [address]  Tel: [phone]  Fax: [phone]  [email or web address]     Av Fernando,  1960, MRN [id number]  PCP: Stefano Thapa  Date: 2022     ASSESSMENT & PLAN     Diagnosis code: Status epilepticus    Status epilepticus  63-year-old male with history of ischemic cardiomyopathy, s/p CABG, ICD, DM, diabetic polyneuropathy, A. fib, CKD stage III, HTN, HLD admitted with shortness of breath and developed recurrent seizures    CT of the head shows calcified lesion in the left parietal lobe that has remained unchanged compared to 2016 and likely a benign lesion due to previous infection/inflammation    Unable to do MRI scan due to pacemaker but at least I would recommend repeating CT with contrast    EEG    Check Keppra level    Loaded with IV Depacon 15 mg/kg followed by maintenance dose of 500 mg every 8 hours.  Check free and bound valproic acid level    Thank you again for this referral, please feel free to contact me if you have any questions.    Johnnie Leigh MD  [facility]  (Formerly, [facility]     CHIEF COMPLAINT <principal problem not specified>     HISTORY OF PRESENT ILLNESS     We have been requested by Dr. Bolivar to evaluate Av Fernando who is a 62 year old  male for recurrent seizures    Patient is a 62-year-old male with history of ischemic cardiomyopathy, s/p CABG, PPM/ICD, DM, A. fib, CKD stage III, HTN, HLD, diabetic peripheral neuropathy who presented to the emergency room for shortness of breath earlier this morning.  He reports he began to feel unwell starting yesterday and progressively shortness of breath got worse.  At 11:20 PM he had a seizure and it resolved spontaneously.  This recurred at 12:10 AM and again at 12:15 AM.  He received some Ativan and after he was transferred to the floor had another  episode while on the floor and was transferred to ICU.  As noted above he has a history of seizures and on Keppra but blood level was not checked and he was loaded with IV Depacon and admitted for further evaluation.CT of the brain showed a partially calcified lesion in the left parietal lobe measuring 12 x 13 x 12 mm that has remained unchanged compared to 2016 and likely represent sequela of previous infection/inflammation less likely to be growth.  Patient became unresponsive at 6:15 AM and was in PEA and code was called.  Subsequently he was intubated     PROBLEM LIST      Patient Active Problem List   Diagnosis Code     Peripheral Neuropathy G60.9     Arthralgias In Multiple Sites M25.50     Delayed Post-traumatic Stress Disorder F43.10     Brain tumor (H) D49.6     Hypercholesterolemia E78.00     Strain of right wrist S66.911A     Type 2 diabetes, HbA1C goal < 8% (H) E11.9     Cervical radiculopathy at C6 M54.12     Vitamin D deficiency E55.9     Brachial neuritis or radiculitis M54.12     Peripheral vascular disease (H) I73.9     ST elevation myocardial infarction (STEMI), unspecified artery (H) I21.3     Syncope, unspecified syncope type R55     Status post coronary angiogram Z98.890     ACS (acute coronary syndrome) (H)  8/2021  I24.9     Coronary artery disease involving native coronary artery of native heart, unspecified whether angina present I25.10     Chronic midline low back pain with right-sided sciatica M54.41, G89.29     ELIZABETH (acute kidney injury) (H) N17.9     Mural thrombus of heart 1/2022  I51.3     Ischemic cardiomyopathy I25.5     HFrEF (heart failure with reduced ejection fraction) (H) I50.20     NSTEMI (non-ST elevated myocardial infarction) (H) I21.4     Elevated troponin R77.8     Cardiac arrest (H) I46.9     Hyponatremia E87.1     Renal insufficiency syndrome N28.9     Right shoulder strain S46.911A     Trigger thumb of right hand M65.311     RSV (respiratory syncytial virus infection)  B33.8     Seizure disorder (H) G40.909     Herpes zoster of eye B02.30     Stage 3b chronic kidney disease (H) N18.32     Lactic acid acidosis E87.20     Status epilepticus (H) G40.901     Dyspnea, unspecified type R06.00      Clinically Significant Risk Factors Present on Admission         # Hyponatremia: Lowest Na = 130 mmol/L (Ref range: 136-145) in last 2 days, will monitor as appropriate      # Hypoalbuminemia: Lowest albumin = 2.8 g/dL (Ref range: 3.5-5.2) at 11/10/2022  3:46 PM, will monitor as appropriate  # Drug Induced Coagulation Defect: home medication list includes an anticoagulant medication     # Hypotension, Unspecified etiology     PAST MEDICAL & SURGICAL HISTORY     Past Medical History: Patient  has a past medical history of Congestive heart failure (H) (08/2021), Coronary artery disease (08/2021), Hyperlipidemia, Hypertension, LV (left ventricular) mural thrombus (01/2022), Myocardial infarction (H) (08/2021), and Type 2 diabetes, HbA1C goal < 8% (H).    Past Surgical History: He  has a past surgical history that includes other surgical history; Coronary Angiogram (N/A, 8/1/2021); Left Ventriculogram (N/A, 8/1/2021); Coronary Angiogram (N/A, 5/6/2022); Percutaneous Coronary Intervention (N/A, 5/6/2022); Left Heart Catheterization (N/A, 5/6/2022); and PICC/Midline Placement (10/13/2022).     SOCIAL HISTORY     Reviewed, and he  reports that he has never smoked. He has never used smokeless tobacco. He reports that he does not drink alcohol and does not use drugs.     FAMILY HISTORY     Reviewed, and family history is not on file.     ALLERGIES     Allergies   Allergen Reactions     Dulaglutide Dizziness     Weak and muscle weak  Other reaction(s): Dizziness  Weak and muscle weak     Januvia [Sitagliptin] Unknown     HEADACHE     Tylenol [Acetaminophen] Itching        REVIEW OF SYSTEMS     Pertinent items are noted in HPI.     HOME & HOSPITAL MEDICATIONS     Prior to Admission  Medications  Medications Prior to Admission   Medication Sig Dispense Refill Last Dose     apixaban ANTICOAGULANT (ELIQUIS ANTICOAGULANT) 5 MG tablet Take 1 tablet (5 mg) by mouth 2 times daily 180 tablet 3      aspirin (ASA) 81 MG EC tablet Take 1 tablet (81 mg) by mouth daily Start tomorrow. 30 tablet 3      atorvastatin (LIPITOR) 80 MG tablet Take 1 tablet (80 mg) by mouth daily 90 tablet 3      blood glucose (ACCU-CHEK GUIDE) test strip Use to test blood sugar 3 times daily or as directed. 100 strip 11      bumetanide (BUMEX) 2 MG tablet Take 1 tablet (2 mg) by mouth daily for 30 days 30 tablet 0      Continuous Blood Gluc Sensor (FREESTYLE KATIE 14 DAY SENSOR) MISC 1 Device every 14 days Change sensor as directed every 14 days 2 each 11      gabapentin (NEURONTIN) 100 MG capsule Take 1 capsule (100 mg) by mouth 3 times daily        hydrALAZINE (APRESOLINE) 25 MG tablet Take 1 tablet by mouth 2 times daily (with meals)        insulin glargine (LANTUS PEN) 100 UNIT/ML pen Inject 6 Units Subcutaneous At Bedtime 15 mL 3      insulin lispro (HUMALOG KWIKPEN) 100 UNIT/ML (1 unit dial) KWIKPEN Inject 3-7 Units Subcutaneous 3 times daily (before meals) 15 mL 1      levETIRAcetam (KEPPRA) 500 MG tablet Take 1 tablet (500 mg) by mouth 2 times daily        metoprolol succinate ER (TOPROL XL) 25 MG 24 hr tablet Take 0.5 tablets (12.5 mg) by mouth daily 45 tablet 3      nitroGLYcerin (NITROSTAT) 0.4 MG sublingual tablet For chest pain place 1 tablet under the tongue every 5 minutes for 3 doses. If symptoms persist 5 minutes after 1st dose call 911. 25 tablet 0      oxyCODONE (ROXICODONE) 5 MG tablet Take 1 tablet (5 mg) by mouth every 4 hours as needed for severe pain        STIMULANT LAXATIVE 8.6-50 MG tablet Take 2 tablets by mouth daily 180 tablet 3        Hospital Medications    apixaban ANTICOAGULANT  5 mg Oral BID     aspirin  81 mg Oral Daily     atorvastatin  80 mg Oral QPM     chlorhexidine  15 mL Mouth/Throat Q12H      gabapentin  100 mg Oral TID     insulin aspart  1-12 Units Subcutaneous Q4H     levETIRAcetam  500 mg Intravenous Q12H     LORazepam  1 mg Intravenous Once     magnesium sulfate  1 g Intravenous Once     pantoprazole  40 mg Per Feeding Tube QAM AC    Or     pantoprazole  40 mg Intravenous QAM AC     piperacillin-tazobactam  3.375 g Intravenous Q8H     propofol  30 mg Intravenous Once     sodium chloride (PF)  3 mL Intracatheter Q8H     valproate  1,000 mg Intravenous Once     valproate  500 mg Intravenous Q8H        PHYSICAL EXAM     Vital signs  Temp:  [97.8  F (36.6  C)-99.7  F (37.6  C)] 98.4  F (36.9  C)  Pulse:  [44-92] 60  Resp:  [15-48] 25  BP: ()/() 83/54  FiO2 (%):  [100 %] 100 %  SpO2:  [81 %-100 %] 100 %    Weight:   Wt Readings from Last 1 Encounters:   11/11/22 49.3 kg (108 lb 9.6 oz)      General Physical Exam: Patient is alert and oriented x 0. Vital signs were reviewed and are documented in EMR. Neck was supple, no carotid bruit, thyromegaly, JVD or lymphadenopathy noted.  Neurological Exam:  Patient intubated and sedated.  Pupil 4 mm minimally reactive face symmetrical no withdrawal to painful stimuli reflexes absent toes equivocal.  Rest of the neurological exam not possible     DIAGNOSTIC STUDIES     Pertinent Radiology   Radiology Results: Reviewed impression and images     CT  1.  No acute intracranial hemorrhage or CT evidence of recent infarct.  2.  There is a partially calcified lesion in the left parietal lobe measuring approximately 12 x 13 x 12 mm which is stable in size dating back at least until 6/20/2016. Given lack of change in size over time, this most likely represents a nonaggressive   lesion and is favored to reflect the sequelae of previous infection/inflammation. A low-grade neoplasm remains within the differential diagnosis but considered less likely.  3.  Underlying mild age-related changes.    MRI  Unable to do due to pacemaker    Pertinent Labs   Lab Results:  Personally Reviewed   Recent Results (from the past 24 hour(s))   Comprehensive metabolic panel    Collection Time: 11/10/22  3:46 PM   Result Value Ref Range    Sodium 134 (L) 136 - 145 mmol/L    Potassium 4.2 3.4 - 5.3 mmol/L    Chloride 99 98 - 107 mmol/L    Carbon Dioxide (CO2) 24 22 - 29 mmol/L    Anion Gap 11 7 - 15 mmol/L    Urea Nitrogen 23.4 (H) 8.0 - 23.0 mg/dL    Creatinine 1.34 (H) 0.67 - 1.17 mg/dL    Calcium 8.5 (L) 8.8 - 10.2 mg/dL    Glucose 128 (H) 70 - 99 mg/dL    Alkaline Phosphatase 266 (H) 40 - 129 U/L    AST 51 (H) 10 - 50 U/L    ALT 90 (H) 10 - 50 U/L    Protein Total 7.2 6.4 - 8.3 g/dL    Albumin 2.8 (L) 3.5 - 5.2 g/dL    Bilirubin Total 0.8 <=1.2 mg/dL    GFR Estimate 60 (L) >60 mL/min/1.73m2   CRP inflammation    Collection Time: 11/10/22  3:46 PM   Result Value Ref Range    CRP Inflammation 13.60 (H) <5.00 mg/L   TSH    Collection Time: 11/10/22  3:46 PM   Result Value Ref Range    TSH 18.60 (H) 0.30 - 4.20 uIU/mL   ECG 12-LEAD WITH MUSE ([facility])    Collection Time: 11/10/22  9:06 PM   Result Value Ref Range    Systolic Blood Pressure  mmHg    Diastolic Blood Pressure  mmHg    Ventricular Rate 89 BPM    Atrial Rate 89 BPM    LA Interval 174 ms    QRS Duration 88 ms     ms    QTc 537 ms    P Axis 72 degrees    R AXIS 16 degrees    T Axis 118 degrees    Interpretation ECG       Sinus rhythm  Possible Left atrial enlargement  Nonspecific ST and T wave abnormality  Prolonged QT  Abnormal ECG  When compared with ECG of 02-NOV-2022 19:21,  QT has lengthened     Troponin T, High Sensitivity    Collection Time: 11/10/22  9:17 PM   Result Value Ref Range    Troponin T, High Sensitivity 80 (H) <=22 ng/L   CBC with platelets and differential    Collection Time: 11/10/22  9:17 PM   Result Value Ref Range    WBC Count 6.9 4.0 - 11.0 10e3/uL    RBC Count 3.63 (L) 4.40 - 5.90 10e6/uL    Hemoglobin 10.8 (L) 13.3 - 17.7 g/dL    Hematocrit 33.2 (L) 40.0 - 53.0 %    MCV 92 78 - 100 fL    MCH 29.8 26.5 -  33.0 pg    MCHC 32.5 31.5 - 36.5 g/dL    RDW 21.5 (H) 10.0 - 15.0 %    Platelet Count 229 150 - 450 10e3/uL    % Neutrophils 61 %    % Lymphocytes 29 %    % Monocytes 7 %    % Eosinophils 2 %    % Basophils 1 %    % Immature Granulocytes 0 %    NRBCs per 100 WBC 0 <1 /100    Absolute Neutrophils 4.2 1.6 - 8.3 10e3/uL    Absolute Lymphocytes 2.0 0.8 - 5.3 10e3/uL    Absolute Monocytes 0.5 0.0 - 1.3 10e3/uL    Absolute Eosinophils 0.1 0.0 - 0.7 10e3/uL    Absolute Basophils 0.1 0.0 - 0.2 10e3/uL    Absolute Immature Granulocytes 0.0 <=0.4 10e3/uL    Absolute NRBCs 0.0 10e3/uL   Hepatic function panel    Collection Time: 11/10/22  9:17 PM   Result Value Ref Range    Protein Total 7.7 6.4 - 8.3 g/dL    Albumin 2.9 (L) 3.5 - 5.2 g/dL    Bilirubin Total 0.7 <=1.2 mg/dL    Alkaline Phosphatase 255 (H) 40 - 129 U/L    AST 71 (H) 10 - 50 U/L    ALT 85 (H) 10 - 50 U/L    Bilirubin Direct 0.23 0.00 - 0.30 mg/dL   Magnesium    Collection Time: 11/10/22  9:17 PM   Result Value Ref Range    Magnesium 1.9 1.7 - 2.3 mg/dL   Symptomatic; Unknown Influenza A/B & SARS-CoV2 (COVID-19) Virus PCR Multiplex Nasopharyngeal    Collection Time: 11/10/22  9:40 PM    Specimen: Nasopharyngeal; Swab   Result Value Ref Range    Influenza A PCR Negative Negative    Influenza B PCR Negative Negative    RSV PCR Negative Negative    SARS CoV2 PCR Negative Negative   Glucose by meter    Collection Time: 11/10/22 11:20 PM   Result Value Ref Range    GLUCOSE BY METER POCT 129 (H) 70 - 99 mg/dL   Basic metabolic panel    Collection Time: 11/10/22 11:43 PM   Result Value Ref Range    Sodium 130 (L) 136 - 145 mmol/L    Potassium 4.8 3.4 - 5.3 mmol/L    Chloride 99 98 - 107 mmol/L    Carbon Dioxide (CO2) 18 (L) 22 - 29 mmol/L    Anion Gap 13 7 - 15 mmol/L    Urea Nitrogen 24.2 (H) 8.0 - 23.0 mg/dL    Creatinine 1.45 (H) 0.67 - 1.17 mg/dL    Calcium 8.2 (L) 8.8 - 10.2 mg/dL    Glucose 129 (H) 70 - 99 mg/dL    GFR Estimate 54 (L) >60 mL/min/1.73m2   Troponin  T, High Sensitivity    Collection Time: 11/10/22 11:43 PM   Result Value Ref Range    Troponin T, High Sensitivity 81 (H) <=22 ng/L   Iron and iron binding capacity    Collection Time: 11/10/22 11:43 PM   Result Value Ref Range    Iron 70 61 - 157 ug/dL    Iron Sat Index 22 15 - 46 %    Iron Binding Capacity 318 240 - 430 ug/dL   Glucose by meter    Collection Time: 11/11/22  2:17 AM   Result Value Ref Range    GLUCOSE BY METER POCT 105 (H) 70 - 99 mg/dL   D dimer quantitative    Collection Time: 11/11/22  4:09 AM   Result Value Ref Range    D-Dimer Quantitative 3.27 (H) 0.00 - 0.50 ug/mL FEU   Blood gas venous    Collection Time: 11/11/22  4:10 AM   Result Value Ref Range    pH Venous 7.22 (LL) 7.35 - 7.45    pCO2 Venous 53 (H) 35 - 50 mm Hg    pO2 Venous <15 (L) 25 - 47 mm Hg    Bicarbonate Venous 22 (L) 24 - 30 mmol/L    Base Excess/Deficit (+/-) -6.1   mmol/L    Oxyhemoglobin Venous 9.9 (L) 70.0 - 75.0 %    O2 Sat, Venous 10.0 (L) 70.0 - 75.0 %   Lactic acid whole blood    Collection Time: 11/11/22  4:10 AM   Result Value Ref Range    Lactic Acid 7.9 (HH) 0.7 - 2.0 mmol/L   Glucose by meter    Collection Time: 11/11/22  4:39 AM   Result Value Ref Range    GLUCOSE BY METER POCT 62 (L) 70 - 99 mg/dL   ECG 12-LEAD WITH MUSE (LHE)    Collection Time: 11/11/22  4:47 AM   Result Value Ref Range    Systolic Blood Pressure  mmHg    Diastolic Blood Pressure  mmHg    Ventricular Rate 79 BPM    Atrial Rate 79 BPM    TN Interval 190 ms    QRS Duration 86 ms     ms    QTc 490 ms    P Axis 65 degrees    R AXIS 50 degrees    T Axis 118 degrees    Interpretation ECG       Sinus rhythm  Possible Left atrial enlargement  Nonspecific ST and T wave abnormality  Prolonged QT  Abnormal ECG  When compared with ECG of 10-NOV-2022 23:22,  Premature ventricular complexes are no longer Present  Criteria for Septal infarct are no longer Present  T wave inversion now evident in Lateral leads     Basic metabolic panel    Collection  Time: 11/11/22  5:26 AM   Result Value Ref Range    Sodium 132 (L) 136 - 145 mmol/L    Potassium 5.1 3.4 - 5.3 mmol/L    Chloride 97 (L) 98 - 107 mmol/L    Carbon Dioxide (CO2) 18 (L) 22 - 29 mmol/L    Anion Gap 17 (H) 7 - 15 mmol/L    Urea Nitrogen 25.4 (H) 8.0 - 23.0 mg/dL    Creatinine 1.71 (H) 0.67 - 1.17 mg/dL    Calcium 8.1 (L) 8.8 - 10.2 mg/dL    Glucose 140 (H) 70 - 99 mg/dL    GFR Estimate 45 (L) >60 mL/min/1.73m2   Magnesium    Collection Time: 11/11/22  5:26 AM   Result Value Ref Range    Magnesium 1.8 1.7 - 2.3 mg/dL   Ionized Calcium    Collection Time: 11/11/22  5:26 AM   Result Value Ref Range    Calcium, Ionized pH 7.4      pH 7.20 (LL) 7.35 - 7.45    Calcium, Ionized Measured 1.07 (L) 1.11 - 1.30 mmol/L   Troponin T, High Sensitivity    Collection Time: 11/11/22  5:26 AM   Result Value Ref Range    Troponin T, High Sensitivity 96 (H) <=22 ng/L   Extra Purple Top Tube    Collection Time: 11/11/22  5:26 AM   Result Value Ref Range    Hold Specimen JIC        Total time spent for face to face visit, reviewing labs/imaging studies, counseling and coordination of care was: 1 Hour 15 Minutes More than 50% of this time was spent on counseling and coordination of care.      This note was dictated using voice recognition software.  Any grammatical or context distortions are unintentional and inherent to the software.

## 2022-11-11 NOTE — ED NOTES
PT resting on his bed with his eyes closed. He states that he had a pacemaker placed in July due to bradycardia. Saw his primary today. Primary stated that there was something wrong with his heart ( the patient was unable to explain what specifically was wrong). He states that his primary wanted him to have an echocardiogram. He was told to come to the er if he felt worse. The patient went home but felt like his sob was worse and his hr was decreasing so he decided to come to the er. He denies cp. He reports he started feeling nauseous in triage.

## 2022-11-11 NOTE — H&P
[facility]    History and Physical - Hospitalist Service       Date of Admission:  11/10/2022    Assessment & Plan      Av Fernando is a 62 year old male admitted on 11/10/2022.     Status epilepticus  -extra 1000mg iv keppra given by ED, resume home keppra 500mg iv bid  -iv ativan prn  -neurologist Dr. Leigh was consulted and agreed to see him this morning, he wants to add depacon 1000 mg iv x 1 followed by 500mg iv q8h, start propafol drip if all else fails to control his seizure  -seizure precautions  -EEG pending  -CT of head showed nothing acute  -can't do MRI of brain unless cardiology approves due to presence of AICD  -intensivist consulted and agreed to ICU transfer, high risk of needing intubation for airway protection    Aspiration pneumonia  Lactic acidosis  -empiric iv zosyn  -ER gave 1 liter IVF, can't give more aggressively in light of CHF    Chronic Hyponatremia  -monitor    CKD3  -avoid nephrotoxins and renal dose meds    Demand ischemia  Anemia  A fib  CAD  Chronic systolic CHF, s/p AICD  History of HTN  Hyperlipidemia  -trend troponins  -iron/b12 pending  -home ASA and eliquis and lipitor  -home hydralazine and metoprolol and bumex on hold due to hypotension       Diet: NPO for Medical/Clinical Reasons Except for: Meds    DVT Prophylaxis: DOAC  Bustamante Catheter: Not present  Central Lines: None  Cardiac Monitoring: ACTIVE order. Indication: AMI (NSTEMI/ STEMI) (48 hours)  Code Status: Full Code      Clinically Significant Risk Factors Present on Admission         # Hyponatremia: Lowest Na = 130 mmol/L (Ref range: 136-145) in last 2 days, will monitor as appropriate      # Hypoalbuminemia: Lowest albumin = 2.8 g/dL (Ref range: 3.5-5.2) at 11/10/2022  3:46 PM, will monitor as appropriate  # Drug Induced Coagulation Defect: home medication list includes an anticoagulant medication    # Hypertension: home medication list includes antihypertensive(s)  # Chronic systolic heart  failure: echo within the past year with EF <40%   # Acute Respiratory Failure: Documented O2 saturation < 91%.  Continue supplemental oxygen as needed            Disposition Plan      Expected Discharge Date: 11/13/2022                The patient's care was discussed with the Patient's Family.    Rupa Bolivar MD  Hospitalist Service  [facility]  Securely message with the Vocera Web Console (learn more here)  Text page via [employer] Paging/Directory         ______________________________________________________________________    Chief Complaint   Dyspnea and seizures    History of Present Illness   Av Fernando is a 62 year old male with a pertinent history of CAD with prior MI, atrial fibrillation anticoagulated on Eliquis, congestive heart failure, hyperlipidemia, hypertension, T2DM, NSTEMI, seizure disorder, CKD3, peripheral neuropathy, s/p coronary angiogram,  who presents to this ED via walk in for evaluation of shortness of breath since yesterday morning. Symptoms do not worsen with exertion. He states that he has had similar episodes before about 7-8 times in the past and has had to come into the ED. He does have a history of heart failure.      He has had prior seizures in July or August. he has been compliant with keppra.      No chest pain, nausea ,vomiting, diarrhea, abdominal pain, fever, black or bloody stools or any leg pain or swelling. No other medical complaints at this time.     Review of Systems    The 10 point Review of Systems is negative other than noted in the HPI or here.     Past Medical History    I have reviewed this patient's medical history and updated it with pertinent information if needed.   Past Medical History:   Diagnosis Date     Congestive heart failure (H) 08/2021    ischemic CM with LVEF 30-35%     Coronary artery disease 08/2021    STEMI with multivessel CAD on angiography     Hyperlipidemia      Hypertension      LV (left ventricular) mural thrombus 01/2022      Myocardial infarction (H) 08/2021    inferior STEMI     Type 2 diabetes, HbA1C goal < 8% (H)    He was hospitalized in August 2021 with STEMI and refused coronary artery bypass surgery.  He left AMA.    He had PCI to LAD in May 2022.    He had cardiac arrest in July 2022 and had coronary artery bypass surgery x3 and AICD placement at [facility].    He was hospitalized again in August 2022.  Echocardiogram from August 30, 2022 showed ejection fraction of 27%, mild aortic regurgitation, moderate mitral regurgitation, and moderate tricuspid regurgitation.   He was hospitalized October 11 to October 19, 2022 with heart failure with reduced ejection fraction and cardiogenic shock and RSV.     Past Surgical History   I have reviewed this patient's surgical history and updated it with pertinent information if needed.  Past Surgical History:   Procedure Laterality Date     CV CORONARY ANGIOGRAM N/A 8/1/2021    Procedure: Coronary Angiogram;  Surgeon: Deidre Lopes MD;  Location: [facility] CV     CV CORONARY ANGIOGRAM N/A 5/6/2022    Procedure: Coronary Angiogram;  Surgeon: Sherif Fuentes MD;  Location: [facility]ATH [facility] CV     CV LEFT HEART CATH N/A 5/6/2022    Procedure: Left Heart Catheterization;  Surgeon: Sherif Fuentes MD;  Location: [facility] CV     CV LEFT VENTRICULOGRAM N/A 8/1/2021    Procedure: Left Ventriculogram;  Surgeon: Deidre Lopes MD;  Location: [facility] CV     CV PCI N/A 5/6/2022    Procedure: Percutaneous Coronary Intervention;  Surgeon: Sherif Fuentes MD;  Location: [facility] CATH LAB CV     OTHER SURGICAL HISTORY      LEG TRAUMA     PICC TRIPLE LUMEN PLACEMENT  10/13/2022            Social History   I have reviewed this patient's social history and updated it with pertinent information if needed.  Social History     Tobacco Use     Smoking status: Never     Smokeless tobacco: Never   Substance Use Topics     Alcohol use: No     Drug use: No        Family History   No CAD    Prior to Admission Medications   Prior to Admission Medications   Prescriptions Last Dose Informant Patient Reported? Taking?   Continuous Blood Gluc Sensor (FREESTYLE KATIE 14 DAY SENSOR) [facility] [place]   No No   Si Device every 14 days Change sensor as directed every 14 days   STIMULANT LAXATIVE 8.6-50 MG tablet   No No   Sig: Take 2 tablets by mouth daily   apixaban ANTICOAGULANT (ELIQUIS ANTICOAGULANT) 5 MG tablet   No No   Sig: Take 1 tablet (5 mg) by mouth 2 times daily   aspirin (ASA) 81 MG EC tablet   No No   Sig: Take 1 tablet (81 mg) by mouth daily Start tomorrow.   atorvastatin (LIPITOR) 80 MG tablet   No No   Sig: Take 1 tablet (80 mg) by mouth daily   blood glucose (ACCU-CHEK GUIDE) test strip   No No   Sig: Use to test blood sugar 3 times daily or as directed.   bumetanide (BUMEX) 2 MG tablet   No No   Sig: Take 1 tablet (2 mg) by mouth daily for 30 days   gabapentin (NEURONTIN) 100 MG capsule   Yes No   Sig: Take 1 capsule (100 mg) by mouth 3 times daily   hydrALAZINE (APRESOLINE) 25 MG tablet   Yes No   Sig: Take 1 tablet by mouth 2 times daily (with meals)   insulin glargine (LANTUS PEN) 100 UNIT/ML pen   No No   Sig: Inject 6 Units Subcutaneous At Bedtime   insulin lispro (HUMALOG KWIKPEN) 100 UNIT/ML (1 unit dial) KWIKPEN   No No   Sig: Inject 3-7 Units Subcutaneous 3 times daily (before meals)   levETIRAcetam (KEPPRA) 500 MG tablet   Yes No   Sig: Take 1 tablet (500 mg) by mouth 2 times daily   metoprolol succinate ER (TOPROL XL) 25 MG 24 hr tablet   No No   Sig: Take 0.5 tablets (12.5 mg) by mouth daily   nitroGLYcerin (NITROSTAT) 0.4 MG sublingual tablet   No No   Sig: For chest pain place 1 tablet under the tongue every 5 minutes for 3 doses. If symptoms persist 5 minutes after 1st dose call 911.   oxyCODONE (ROXICODONE) 5 MG tablet   Yes No   Sig: Take 1 tablet (5 mg) by mouth every 4 hours as needed for severe pain      Facility-Administered Medications: None      Allergies   Allergies   Allergen Reactions     Dulaglutide Dizziness     Weak and muscle weak  Other reaction(s): Dizziness  Weak and muscle weak     Januvia [Sitagliptin] Unknown     HEADACHE     Tylenol [Acetaminophen] Itching       Physical Exam   Vital Signs: Temp: 99.7  F (37.6  C) Temp src: Temporal BP: 96/62 Pulse: 82   Resp: (!) 40 SpO2: 99 %      Weight: 110 lbs 0 oz    Constitutional:       General: He is not in acute distress.     Appearance: Normal appearance.   HENT:      Head: Normocephalic and atraumatic.      Nose: Nose normal.      Mouth/Throat:      Mouth: Mucous membranes are moist.   Eyes:      Pupils: Pupils are equal, round, and reactive to light.   Neck:      Comments: Mild JVD  Cardiovascular:      Rate and Rhythm: Normal rate and regular rhythm.      Pulses: Normal pulses.           Radial pulses are 2+ on the right side and 2+ on the left side.        Dorsalis pedis pulses are 2+ on the right side and 2+ on the left side.      Heart sounds: Murmur heard.    Systolic murmur is present with a grade of 2/6.  Pulmonary:      Effort: Pulmonary effort is normal. No respiratory distress.      Breath sounds: Normal breath sounds.   Abdominal:      Palpations: Abdomen is soft.      Tenderness: There is no abdominal tenderness.   Musculoskeletal:      Cervical back: Full passive range of motion without pain and neck supple.      Comments: No calf tenderness or swelling b/l   Skin:     General: Skin is warm.      Findings: No rash.   Neurological:      unable to assess due to post ictal state/hypersomnolence    Data   Data reviewed today: I reviewed all medications, new labs and imaging results over the last 24 hours. I personally reviewed .    CXR  Single cardiac lead. Cardiac enlargement and median sternotomy. Right perihilar and bibasilar infiltrates. Small effusions. Atherosclerotic aorta.    CT of head wo   1.  No acute intracranial hemorrhage or CT evidence of recent infarct.  2.  There is a  partially calcified lesion in the left parietal lobe measuring approximately 12 x 13 x 12 mm which is stable in size dating back at least until 6/20/2016. Given lack of change in size over time, this most likely represents a nonaggressive   lesion and is favored to reflect the sequelae of previous infection/inflammation. A low-grade neoplasm remains within the differential diagnosis but considered less likely.  3.  Underlying mild age-related changes.    EKG  Sinus rhythm   Possible Left atrial enlargement   Nonspecific ST and T wave abnormality   Prolonged QT   Abnormal ECG   When compared with ECG of 02-NOV-2022 19:21,   QT has lengthened     Recent Labs   Lab 11/11/22  0217 11/10/22  2343 11/10/22  2320 11/10/22  2117 11/10/22  1546 11/07/22  1517   WBC  --   --   --  6.9  --   --    HGB  --   --   --  10.8*  --   --    MCV  --   --   --  92  --   --    PLT  --   --   --  229  --   --    NA  --  130*  --   --  134* 134*   POTASSIUM  --  4.8  --   --  4.2 3.6   CHLORIDE  --  99  --   --  99 97*   CO2  --  18*  --   --  24 25   BUN  --  24.2*  --   --  23.4* 20.6   CR  --  1.45*  --   --  1.34* 1.26*   ANIONGAP  --  13  --   --  11 12   LISA  --  8.2*  --   --  8.5* 8.3*   * 129* 129*  --  128* 171*   ALBUMIN  --   --   --  2.9* 2.8* 2.7*   PROTTOTAL  --   --   --  7.7 7.2 7.1   BILITOTAL  --   --   --  0.7 0.8 0.8   ALKPHOS  --   --   --  255* 266* 291*   ALT  --   --   --  85* 90* 127*   AST  --   --   --  71* 51* 52*      No

## 2022-11-11 NOTE — ED NOTES
PT resting in his bed. He is tachypneac. Sp02 is 100% on room air. Denies history of anxiety. He is A&Ox4 and answers all questions appropriately. Rates his sob at 8/10

## 2022-11-11 NOTE — PROCEDURES
"PICC Line Insertion Procedure Note  Pt. Name: Av Fernando  MRN:        [id number]    Procedure: Insertion of a  Triple Lumen  5 fr  Bard SOLO (valved) Power PICC, Lot number [id number]    Indications: Vasopressor,Access    Contraindications : Left pacer    Procedure Details     Patient identified with 2 identifiers and \"Time Out\" conducted.  .     Central line insertion bundle followed: hand hygiene performed prior to procedure, site cleansed with cholraprep, hat, mask, sterile gloves, sterile gown worn, patient draped with maximum barrier head to toe drape, sterile field maintained.    The vein was assessed and found to be compressible and of adequate size.     Lidocaine 1% 2 ml administered sq to the insertion site. A 5 Fr PICC was inserted into the basilic vein of the right arm with ultrasound guidance. 1 attempt(s) required to access vein.   Catheter threaded without difficulty. Good blood return noted.    Modified Seldinger Technique used for insertion.    The 8 sharps that are included in the PICC insertion kit were accounted for and disposed of in the sharps container prior to breakdown of the sterile field.    Catheter secured with Statlock, biopatch and Tegaderm dressing applied.    Findings:    Total catheter length  38 cm, with 0 cm exposed. Mid upper arm circumference is 22 cm. Catheter was flushed with 30 cc NS. Patient  tolerated procedure well.    Tip placement verified by 3CG technology . Tip placement in the SVC.    CLABSI prevention brochure left at bedside.    Patient's primary RN notified PICC is ready for use.      Comments:        Pierre DEL ANGELN,RN,VA-BC  Vascular Access - [facility]        "

## 2022-11-11 NOTE — ED NOTES
The Patient is currently able to open his eyes when asked. He knows his name and that his is in the hospital. Skin pwd. Family is at bedside,.

## 2022-11-11 NOTE — ED NOTES
PT wife called for help. RIO franks entered the room and found the patient to be staring to the right and shaking his head. Seizure lasting about 30 seconds. He did not have a postictal period and was able to talk right after. Diaphoretic. Complained of nausea.

## 2022-11-11 NOTE — PROGRESS NOTES
Respiratory Care Note    Pt was pronounced dead at 1202. ETT was pulled at 1330. Vent removed from the room. Family members and attending RN at the bedside.     Jasmin Coy, RT

## 2022-11-11 NOTE — PLAN OF CARE
"  Problem: Plan of Care - These are the overarching goals to be used throughout the patient stay.    Goal: Plan of Care Review  Description: The Plan of Care Review/Shift note should be completed every shift.  The Outcome Evaluation is a brief statement about your assessment that the patient is improving, declining, or no change.  This information will be displayed automatically on your shift note.  Outcome: Progressing  Flowsheets (Taken 11/11/2022 0536)  Plan of Care Reviewed With: patient  Overall Patient Progress: no change  Goal: Patient-Specific Goal (Individualized)  Description: You can add care plan individualizations to a care plan. Examples of Individualization might be:  \"Parent requests to be called daily at 9am for status\", \"I have a hard time hearing out of my right ear\", or \"Do not touch me to wake me up as it startles me\".  Outcome: Progressing  Goal: Absence of Hospital-Acquired Illness or Injury  Outcome: Progressing  Intervention: Identify and Manage Fall Risk  Recent Flowsheet Documentation  Taken 11/11/2022 0515 by Dodie Mayer RN  Safety Promotion/Fall Prevention:   activity supervised   bed alarm on   clutter free environment maintained   fall prevention program maintained   increase visualization of patient   room near nurse's station   room organization consistent   safety round/check completed  Intervention: Prevent Skin Injury  Recent Flowsheet Documentation  Taken 11/11/2022 0515 by Dodie Mayer, RN  Body Position: position changed independently  Goal: Optimal Comfort and Wellbeing  Outcome: Progressing  Goal: Readiness for Transition of Care  Outcome: Progressing   Goal Outcome Evaluation:      Plan of Care Reviewed With: patient    Overall Patient Progress: no changeOverall Patient Progress: no change           "

## 2022-11-11 NOTE — PROGRESS NOTES
PT coded again at 0845. Compressions started and PT taken off ventilator and manually ventilated with BVM. CPR was stopped and resumed twice. PT achieved ROSC and place back on ventilator. RT will continue to follow.    Jroge Díaz, RT

## 2022-11-11 NOTE — ANESTHESIA PROCEDURE NOTES
Airway       Patient location during procedure: OR       Procedure Start/Stop Times: 11/11/2022 6:27 AM  Staff -        CRNA: Parish Leblanc APRN CRNA       Performed By: CRNA  Consent for Airway        Urgency: elective  Indications and Patient Condition       Indications for airway management: tom-procedural       Induction type:intravenous       Mask difficulty assessment: 1 - vent by mask    Final Airway Details       Final airway type: endotracheal airway       Successful airway: ETT - single  Endotracheal Airway Details        ETT size (mm): 7.0       Cuffed: yes       Cuff volume (mL): 5       Successful intubation technique: direct laryngoscopy       DL Blade Type: Quintanilla 2       Grade View of Cords: 1       Adjucts: stylet and tooth guard       Position: Right       Measured from: lips       Secured at (cm): 21       Bite block used: None    Post intubation assessment        Placement verified by: capnometry, equal breath sounds and chest rise        Number of attempts at approach: 1       Number of other approaches attempted: 0       Secured with: silk tape       Ease of procedure: easy       Dentition: Intact and Unchanged       Dental guard used and removed.    Medication(s) Administered   Medication Administration Time: 11/11/2022 6:27 AM

## 2022-11-11 NOTE — PROGRESS NOTES
[facility] Vascular access  Re:PICC placement    PICC placement on hold temporarily due to pending consent . Tony Fernando (patient son) wants an update first from MD regarding patient condition. Primary RN and Dr. Martinez Aware.

## 2022-11-11 NOTE — ED NOTES
Pt had a second seizure at 2319. 1 mg iv ativan was given. PT hypotensive. He did dry heave before the second seizure. Second seizure lasted about 1 minute.

## 2022-11-11 NOTE — PROGRESS NOTES
Chart reviewed.    Patient is a readmit (10/11-10/19.  He was discharged home with [facility] Care (RN, OT, and SW) services.    Patient admitted from home with complaints of shortness of breath and witnessed seizures x 3 in the ED.  Patient had code blue x 2 this morning, is intubated, family is distraught.  Will defer initial admission assessment at this time.    Care Management / Social Work Inpatient Consult order placed.  CM to follow up with family for initial care management admission assessment and to follow care progression for possible discharge planning needs.    12:50 PM - Brief chart review follow up.  Noted that patient .  CM notified Pending sale to [facility] Intake.    Crystal Ramirez RN

## 2022-11-11 NOTE — SIGNIFICANT EVENT
Lactic acid 7.9, code sepsis called.    Pt's BP 72/52, still oriented to self and place and responding to questions. Pt placed in trendelenburg.     Rapid response called.  Blood sugar check 62, D50 IV given.  NS bolus started, but stopped per hospitalist.     Pt transferred to  with all belongings-clothes.     Wife, Jasmin updated about transfer.

## 2022-11-11 NOTE — ED TRIAGE NOTES
Patient states he began to feel unwell starting yesterday.  C/o shortness of breath.  Denies any CP. States seen by PCP today- was advised to come here if he was not feeling better. States cardiac hx.

## 2022-11-11 NOTE — ED PROVIDER NOTES
"  Emergency Department Encounter     Evaluation Date & Time:   11/10/2022  9:17 PM    CHIEF COMPLAINT:  Shortness of Breath      Triage Note:Patient states he began to feel unwell starting yesterday.  C/o shortness of breath.  Denies any CP. States seen by PCP today- was advised to come here if he was not feeling better. States cardiac hx.                ED COURSE & MEDICAL DECISION MAKING:     ED Course as of 11/11/22 0154   Thu Nov 10, 2022   2141 CBC unremarkable, mild anemia stable.   2210 Trop 80 in a chronic pt. Will get 2 hour delta per pathway.     2250 Bps softer, so I performed bedside cardiac US and no large pericardial effusion or signs of tamponade.     2309 Hepatic similar to baseline.  Awaiting Chemistry and repeat Trop.   2310 CXR reviewed.  Pt has no real cough or fevers.  WBC wnl.     Fri Nov 11, 2022   0016 Repeat trop 81.  ACS ruled out per pathway protocol.   0031 3 seizures since pt has been in ED.  Last seizure was some time ago, taking keppra, which he states he's compliant.  Pt here with dyspnea, still felt dyspneic, but no hypoxia, anticoagulated, workup reassuring. Will get CT head now, hospitalize for monitoring.  Pt received Keppra IV, as well as multiple ativan doses to manage acute seizures.   0104 CT head neg for acute pathology.    Chemistry overall similar to baseline/previous.     Pt presenting with dyspnea since Wednesday around 1300, no chest pain. Seen in primary clinic earlier today and told his \"heart was abnormal\".  Uncertain exactly what this means, but exam here reveals a murmur and pt has a history of ischemic cardiomyopathy with CHF and EF of 27% s/p ICD, recently hospitalized for this in Oct.  Pt followed up again with cardiology just 6 days ago.  Pt anticoagulated on Eliquis.  RA O2 sats are 100%, HR 80s, no distress here.  Pt does not appear grossly fluid overloaded at this time.  Will get labs, CXR, covid/flu testing and reassess.      9:30 PM I met with the patient, " obtained history, performed an initial exam, and discussed options and plan for diagnostics and treatment here in the ED. PPE worn: n95 mask and nitrile gloves.   10:30 PM I performed cardiac ultrasound.   11:20 PM Patient developed a seizure and I was called into the room for reassessment. Wife states that he has had prior seizure episodes before and is currently on Keppra, which he is taking.  No recent seizures, however.  11:35 PM Patient is resting comfortably after Ativan. His blood pressure is stabilized.   12:10 AM RN reports that patient is twitching but was able to respond. His vitals are better but he is still complaining of shortness of breath but no pain. Keppra has completed.   12:15 AM Patient had another seizure. Will proceed to CT.   1:00 AM Pt slowly coming out of seizure and ativan.  A total of 2mg ativan has been given for 3 separate seizures with most recent seizure having true post-ictal period.  Pt also received 1g IV keppra.  1:19 AM Care discussed with Dr. Bolivar, hospitalist who accepts the patient for admission.      Medical Decision Making    Supplemental history from: N/A    External Record(s) Reviewed: Outpatient Record    Differential Diagnosis: See MDM charting for differential considered.     I performed an independent interpretation of the: EKG: See my documentation. and Point of Care Ultrasound: See my documentation.    Discussed with radiology regarding test interpretation: N/A    Discussion of management with another provider: See ED Course and Hospitalist    The following testing was considered but ultimately not selected: None    I considered prescription management with: N/A    The patient's care impacted: Chronic Kidney Disease, Diabetes and Heart Disease    Consideration of Admission/Observation: N/A - Patient admitted    Care significantly affected by Social Determinants of Health including: N/A    At the conclusion of the encounter I discussed the results of all the tests  and the disposition. The questions were answered. The patient or family acknowledged understanding and was agreeable with the care plan.      MEDICATIONS GIVEN IN THE EMERGENCY DEPARTMENT:  Medications   LORazepam (ATIVAN) 2 MG/ML injection (has no administration in time range)   LORazepam (ATIVAN) injection 1 mg (has no administration in time range)   0.9% sodium chloride BOLUS (500 mLs Intravenous New Bag 11/10/22 2252)   LORazepam (ATIVAN) 2 MG/ML injection (1 mg  Given 11/10/22 2319)   levETIRAcetam (KEPPRA) 1,000 mg in sodium chloride 0.9 % 100 mL intermittent infusion (1,000 mg Intravenous New Bag 11/10/22 2355)   LORazepam (ATIVAN) injection 1 mg (1 mg Intravenous Given 11/11/22 0022)   0.9% sodium chloride BOLUS (500 mLs Intravenous New Bag 11/10/22 2342)   ondansetron (ZOFRAN) injection 4 mg (4 mg Intravenous Given 11/11/22 0024)       NEW PRESCRIPTIONS STARTED AT TODAY'S ED VISIT:  New Prescriptions    No medications on file       HPI     Av Fernando is a 62 year old male with a pertinent history of CAD with prior MI, atrial fibrillation anticoagulated on Eliquis, congestive heart failure, hyperlipidemia, hypertension, T2DM, NSTEMI, seizure disorder, CKD3, peripheral neuropathy, s/p coronary angiogram,  who presents to this ED via walk in for evaluation of shortness of breath.    Patient here with shortness of breath since yesterday morning around 1-2PM after taking pills his primary care provider prescribed him. Today, he did see his primary care provider for a follow up appointment to which they advised him to go into ED if symptoms worsen. He went home after the appointment and was able to eat a bit but then every 4-5 minutes, he noticed his shortness of breath progressively worsening which prompt him to come into the ED. Symptoms do not worsen with exertion. He states that he has had similar episodes before about 7-8 times in the past and has had to come into the ED. He does have a history of heart  failure.     He has had prior seizures in the past in July or August. He is on anti seizure medications, Keppra which he has been compliant with. He did take it today as well. No complications since then.     No chest pain, nausea ,vomiting, diarrhea, abdominal pain, fever, black or bloody stools or any leg pain or swelling. No other medical complaints at this time.     Chart Review:  11/4/22: [facility]: Patient presented for hospital follow up. He was hospitalized in August 2021 with STEMI and refused coronary artery bypass surgery.  He left AMA.  He had PCI to LAD in May 2022.  He had cardiac arrest in July 2022 and had coronary artery bypass surgery x3 at [facility].  ICD implanted in July 2022.  He was hospitalized again in August 2022.  Echocardiogram from August 30, 2022 showed ejection fraction of 27%, mild aortic regurgitation, moderate mitral regurgitation, and moderate tricuspid regurgitation. He was hospitalized October 11 to October 19, 2022 with heart failure with reduced ejection fraction and cardiogenic shock.  He also had RSV.    REVIEW OF SYSTEMS:  Review of Systems   Constitutional: Negative for chills and fever.   HENT: Negative for sore throat.    Eyes: Negative for visual disturbance.   Respiratory: Positive for cough (mild) and shortness of breath.    Cardiovascular: Negative for chest pain and leg swelling.   Gastrointestinal: Negative for abdominal pain, blood in stool, diarrhea, nausea and vomiting.   Endocrine: Negative for polyuria.   Genitourinary: Negative for dysuria and hematuria.        - urinary changes     Musculoskeletal: Negative for joint swelling.   Skin: Negative for rash.   Neurological: Negative for dizziness.   Psychiatric/Behavioral: Negative for confusion.   All other systems reviewed and are negative.          Medical History     Past Medical History:   Diagnosis Date     Congestive heart failure (H) 08/2021     Coronary artery  disease 08/2021     Hyperlipidemia      Hypertension      LV (left ventricular) mural thrombus 01/2022     Myocardial infarction (H) 08/2021     Type 2 diabetes, HbA1C goal < 8% (H)        Past Surgical History:   Procedure Laterality Date     CV CORONARY ANGIOGRAM N/A 8/1/2021    Procedure: Coronary Angiogram;  Surgeon: Deidre Lopes MD;  Location: [facility] CATH LAB CV     CV CORONARY ANGIOGRAM N/A 5/6/2022    Procedure: Coronary Angiogram;  Surgeon: Sherif Fuentes MD;  Location: [facility] CATH LAB CV     CV LEFT HEART CATH N/A 5/6/2022    Procedure: Left Heart Catheterization;  Surgeon: Sherif Fuentes MD;  Location: [facility] CATH LAB CV     CV LEFT VENTRICULOGRAM N/A 8/1/2021    Procedure: Left Ventriculogram;  Surgeon: Deidre Lopes MD;  Location: [facility] CATH LAB CV     CV PCI N/A 5/6/2022    Procedure: Percutaneous Coronary Intervention;  Surgeon: Sherif Fuentes MD;  Location: [facility] CATH LAB CV     OTHER SURGICAL HISTORY      LEG TRAUMA     PICC TRIPLE LUMEN PLACEMENT  10/13/2022            History reviewed. No pertinent family history.    Social History     Tobacco Use     Smoking status: Never     Smokeless tobacco: Never   Substance Use Topics     Alcohol use: No     Drug use: No       apixaban ANTICOAGULANT (ELIQUIS ANTICOAGULANT) 5 MG tablet  aspirin (ASA) 81 MG EC tablet  atorvastatin (LIPITOR) 80 MG tablet  blood glucose (ACCU-CHEK GUIDE) test strip  bumetanide (BUMEX) 2 MG tablet  Continuous Blood Gluc Sensor (FREESTYLE KATIE 14 DAY SENSOR) MISC  gabapentin (NEURONTIN) 100 MG capsule  hydrALAZINE (APRESOLINE) 25 MG tablet  insulin glargine (LANTUS PEN) 100 UNIT/ML pen  insulin lispro (HUMALOG KWIKPEN) 100 UNIT/ML (1 unit dial) KWIKPEN  levETIRAcetam (KEPPRA) 500 MG tablet  metoprolol succinate ER (TOPROL XL) 25 MG 24 hr tablet  nitroGLYcerin (NITROSTAT) 0.4 MG sublingual tablet  oxyCODONE (ROXICODONE) 5 MG tablet  STIMULANT LAXATIVE 8.6-50 MG tablet        Physical Exam      Vitals:  BP 96/62   Pulse 82   Temp 99.7  F (37.6  C) (Temporal)   Resp (!) 40   Wt 49.9 kg (110 lb)   SpO2 99%   BMI 20.12 kg/m      PHYSICAL EXAM:   Physical Exam  Vitals and nursing note reviewed.   Constitutional:       General: He is not in acute distress.     Appearance: Normal appearance.   HENT:      Head: Normocephalic and atraumatic.      Nose: Nose normal.      Mouth/Throat:      Mouth: Mucous membranes are moist.   Eyes:      Pupils: Pupils are equal, round, and reactive to light.   Neck:      Comments: Mild JVD  Cardiovascular:      Rate and Rhythm: Normal rate and regular rhythm.      Pulses: Normal pulses.           Radial pulses are 2+ on the right side and 2+ on the left side.        Dorsalis pedis pulses are 2+ on the right side and 2+ on the left side.      Heart sounds: Murmur heard.    Systolic murmur is present with a grade of 2/6.  Pulmonary:      Effort: Pulmonary effort is normal. No respiratory distress.      Breath sounds: Normal breath sounds.   Abdominal:      Palpations: Abdomen is soft.      Tenderness: There is no abdominal tenderness.   Musculoskeletal:      Cervical back: Full passive range of motion without pain and neck supple.      Comments: No calf tenderness or swelling b/l   Skin:     General: Skin is warm.      Findings: No rash.   Neurological:      General: No focal deficit present.      Mental Status: He is alert. Mental status is at baseline.      Comments: Fluent speech, no acute lateralizing deficits   Psychiatric:         Mood and Affect: Mood normal.         Behavior: Behavior normal.       Results     LAB:  All pertinent labs reviewed and interpreted  Labs Ordered and Resulted from Time of ED Arrival to Time of ED Departure   BASIC METABOLIC PANEL - Abnormal       Result Value    Sodium 130 (*)     Potassium 4.8      Chloride 99      Carbon Dioxide (CO2) 18 (*)     Anion Gap 13      Urea Nitrogen 24.2 (*)     Creatinine 1.45 (*)     Calcium 8.2 (*)      Glucose 129 (*)     GFR Estimate 54 (*)    TROPONIN T, HIGH SENSITIVITY - Abnormal    Troponin T, High Sensitivity 80 (*)    CBC WITH PLATELETS AND DIFFERENTIAL - Abnormal    WBC Count 6.9      RBC Count 3.63 (*)     Hemoglobin 10.8 (*)     Hematocrit 33.2 (*)     MCV 92      MCH 29.8      MCHC 32.5      RDW 21.5 (*)     Platelet Count 229      % Neutrophils 61      % Lymphocytes 29      % Monocytes 7      % Eosinophils 2      % Basophils 1      % Immature Granulocytes 0      NRBCs per 100 WBC 0      Absolute Neutrophils 4.2      Absolute Lymphocytes 2.0      Absolute Monocytes 0.5      Absolute Eosinophils 0.1      Absolute Basophils 0.1      Absolute Immature Granulocytes 0.0      Absolute NRBCs 0.0     HEPATIC FUNCTION PANEL - Abnormal    Protein Total 7.7      Albumin 2.9 (*)     Bilirubin Total 0.7      Alkaline Phosphatase 255 (*)     AST 71 (*)     ALT 85 (*)     Bilirubin Direct 0.23     TROPONIN T, HIGH SENSITIVITY - Abnormal    Troponin T, High Sensitivity 81 (*)    GLUCOSE BY METER - Abnormal    GLUCOSE BY METER POCT 129 (*)    MAGNESIUM - Normal    Magnesium 1.9     INFLUENZA A/B & SARS-COV2 PCR MULTIPLEX - Normal    Influenza A PCR Negative      Influenza B PCR Negative      RSV PCR Negative      SARS CoV2 PCR Negative         RADIOLOGY:  Head CT w/o contrast   Final Result   IMPRESSION:   1.  No acute intracranial hemorrhage or CT evidence of recent infarct.   2.  There is a partially calcified lesion in the left parietal lobe measuring approximately 12 x 13 x 12 mm which is stable in size dating back at least until 6/20/2016. Given lack of change in size over time, this most likely represents a nonaggressive    lesion and is favored to reflect the sequelae of previous infection/inflammation. A low-grade neoplasm remains within the differential diagnosis but considered less likely.   3.  Underlying mild age-related changes.      Chest XR,  PA & LAT   Final Result   IMPRESSION: Single cardiac lead.  Cardiac enlargement and median sternotomy. Right perihilar and bibasilar infiltrates. Small effusions. Atherosclerotic aorta.                   ECG:  NSR, rate 89, QTc 537, no acute ischemia, similar morphology to previous EKG from 11/2/22, but longer QTc    I have independently reviewed and interpreted the EKG(s) documented above     PROCEDURES:  Procedures  PROCEDURE:    Emergency Department Limited Bedside Screening Cardiac Ultrasound   INDICATIONS: shortness of breath   PROCEDURE PROVIDER: Christopher Milian    WINDOW AND FINDINGS:    SUB-XYPHOID     :      Cardiac activity: Hypokinetic  Pericardial effusion: No clinically significant pericardial effusion  Signs of Tamponade physiology: Absent   PARASTERNAL    :  Cardiac activity: Hypokinetic  Pericardial effusion: No clinically significant pericardial effusion  Signs of Tamponade physiology: Absent     IMAGES SAVED AND STORED FOR ARCHIVE AND REVIEW: No           FINAL IMPRESSION:    ICD-10-CM    1. Seizures (H)  R56.9       2. Dyspnea, unspecified type  R06.00         CRITICAL CARE  55 minutes of critical care time spent managing multiple seizures requiring multiple doses of benzodiazepines and antiepileptic IV keppra.  Time spent speaking with family, interpreting labs, imaging, documenting.  Independent of any procedures performed.      I, Heidi Coy, am serving as a scribe to document services personally performed by Dr. Garcia Milian, based on my observations and the provider's statements to me. I, Garcia Milian MD attest that Heidi Coy, is acting in a scribe capacity, has observed my performance of the services and has documented them in accordance with my direction.      Garcia Milian,   Emergency Medicine  [facility] EMERGENCY DEPARTMENT  11/10/2022  9:27 PM        Garcia Milian MD  11/11/22 0155

## 2022-11-11 NOTE — SIGNIFICANT EVENT
Pt became unresponsive at 0611. Was PEA. Code called. Short round of compressions given and pulse obtained. Pt intubated at bs by Anesthesia.   See code sheet for meds given and exact times.   See MAR for drips started. See chart for all new orders and flowsheet for documented vitals    Pt stable at this time. Report given to Clemencia at 0715

## 2022-11-11 NOTE — PROCEDURES
ARTERIAL LINE INSERTION PROCEDURE NOTE    Procedure Date: 11/11/2022   Performing Physician: Rory Martinez MD    Procedure: insertion of right femoral arterial line  Indications: cardiac arrest, cardiogenic shock    Estimated Blood Loss: minimal  Complications: none immediate    Findings: identified right femoral arterial line on ultrasound    Procedure Details:   Procedure was done as an emergency: Yes  Consent was not obtained and time out was not performed due to the emergent nature of the procedure.    Under sterile conditions the skin over the right inguinal area was prepped with chlorhexidine and covered with a sterile drape. Strict sterile conditions were maintained as best as possible with patient undergoing active resuscitation. Under continuous ultrasound guidance using a sterile probe cover, a right femoral arterial line was placed via Seldinger technique. The line was sutured in place. A dressing was applied.      Condition: moribund with ongoing resuscitative efforts    Rory Martinez MD  Pulmonary and Critical Care Medicine  [facility]  Office [phone]

## 2022-11-11 NOTE — PLAN OF CARE
"  Problem: Heart Failure Comorbidity  Goal: Maintenance of Heart Failure Symptom Control  Outcome: Unable to Meet  Intervention: Maintain Heart Failure Management  Recent Flowsheet Documentation  Taken 2022 0800 by Clemencia Wiley, RN  Medication Review/Management: medications reviewed     Problem: End-of-Life Care  Goal: Comfort, Peace and Preserved Dignity  Outcome: [facility] - ICU    RN Progress Note:            Pertinent Assessments:      Please refer to flowsheet rows for full assessment     PEA arrest from 0848 to 0919 (See code record) Bradycardia with BP drop at 0940 given one amp. Bicarb and 1 mg Atropine. Dr Pepe at bedside at time. Pupils blown after arrest. Bustamante inserted/only 10 ml output. Frequent bloody frothy sputum suctioned.         Mobility Level:     Bedrest. Full care and turning. Sedated/restrained. ROM done.         Key Events - This Shift:          PEA arrest. After arrest on levophed max. dosing, vasopressin, epinephrine max. dosing, and shortly after Dopamine added at max. dose. Amiodarone bolus and drip at 1 mg. Bicarb IV at 50 ml/hr. 500 ml LR bolus before arrest and once during arrest.   After arrest many family members joined wife (security at door concerned) Son stated \"we thought it was like every other time and I didn't come\" At 1120 family considering end of life care. Did family \"last photo\" at bedside. Eleven family members crying and praying. Brought two minors they had been asked not to bring. At 1155 bradycardia and hypotension. Dr Pepe spoke with family and changed to DNR. Went into PEA. No further CPR/Declared dead by Dr pepe at 1202. Family continues to sing and pray with him. Medical devices removed and family able to dress in traveling clothes.               Plan:     At 1415 Son shares there is a delay in  home coming and they want to stay with body until he is picked up. Continue to support family as needed. "

## 2022-11-13 LAB
VALPROATE FREE MFR SERPL: 26 %
VALPROATE FREE SERPL-MCNC: 12 UG/ML
VALPROATE SERPL-MCNC: 46 UG/ML

## 2022-11-16 LAB — BACTERIA BLD CULT: NO GROWTH

## 2023-01-12 LAB
ATRIAL RATE - MUSE: 66 BPM
ATRIAL RATE - MUSE: 89 BPM
DIASTOLIC BLOOD PRESSURE - MUSE: NORMAL MMHG
DIASTOLIC BLOOD PRESSURE - MUSE: NORMAL MMHG
INTERPRETATION ECG - MUSE: NORMAL
INTERPRETATION ECG - MUSE: NORMAL
P AXIS - MUSE: 56 DEGREES
P AXIS - MUSE: 72 DEGREES
PR INTERVAL - MUSE: 170 MS
PR INTERVAL - MUSE: 174 MS
QRS DURATION - MUSE: 102 MS
QRS DURATION - MUSE: 88 MS
QT - MUSE: 442 MS
QT - MUSE: 462 MS
QTC - MUSE: 484 MS
QTC - MUSE: 537 MS
R AXIS - MUSE: 16 DEGREES
R AXIS - MUSE: 6 DEGREES
SYSTOLIC BLOOD PRESSURE - MUSE: NORMAL MMHG
SYSTOLIC BLOOD PRESSURE - MUSE: NORMAL MMHG
T AXIS - MUSE: 101 DEGREES
T AXIS - MUSE: 118 DEGREES
VENTRICULAR RATE- MUSE: 66 BPM
VENTRICULAR RATE- MUSE: 89 BPM

## 2023-11-14 NOTE — PROGRESS NOTES
Thank you, Dr. Garcia Kaufman, for asking the [facility] Heart Care team to see Mr. Av Fernando to evaluate ischemic heart disease.    Assessment/Recommendations   Assessment:    1.  Coronary artery disease, status post ST elevation inferior wall myocardial infarction in August 2021 with subsequent coronary angiography demonstrating multivessel coronary artery disease.  Was advised to undergo evaluation for cardiovascular surgery; however, the patient left AMA as he has lost to distant cousins following bypass surgery.  Currently reports symptoms of exertional dyspnea and chest tightness with minimal activity.  Suggested adding low-dose isosorbide mononitrate to see if we can help with symptoms.  Also brought up the option of cardiovascular surgical consultation so that he can formally hear options and morbidity/mortality associated with his surgery.  He seems reluctant to consider this today.  2.  Moderate ischemic cardiomyopathy, EF 30 to 35%.  He does report symptoms of shortness of breath, orthopnea and frequent coughing at night.  Suspect he may have an element of volume overload.  Will initiate furosemide at low-dose as his BNP was elevated in the hospital and he was discharged without any diuretic.  We will plan to check BNP today along with basic metabolic profile.  3.  Hypercholesterolemia, now on rosuvastatin 40 mg daily.  4.  Type 2 diabetes mellitus    Plan:  1.  Continue current medications  2.  Check BNP and basic metabolic profile today  3.  Begin isosorbide mononitrate 10 mg daily.  We will also initiate furosemide 20 mg daily for the next 2 days then decrease to 10 mg daily  4.  Follow-up with me in 1 month     History of Present Illness    Mr. Av Fernando is a 61 year old male with history of essential hypertension, hypercholesterolemia, type 2 diabetes mellitus who presented to the ED in early August 2021 with complaint of chest pain.  He was found to have evidence of an acute ST  "elevation inferior wall MI and was taken emergently to cardiac catheterization lab where he was found to have severe three-vessel coronary artery disease.  No stenting was performed and it was recommended that the patient be seen by cardiovascular surgery regarding coronary artery bypass grafting.  Unfortunately, patient became quite fearful as he had 2 cousins who  some time following bypass surgery.  As a result, he signed out AGAINST MEDICAL ADVICE and had plans to pursue a second opinion at [facility] although this never occurred.  Over the last several weeks, he has noted symptoms of exertional dyspnea with some chest tightness.  Has used sublingual nitroglycerin but states it takes a couple hours for his symptoms to improve.  Does report orthopnea as well as a nonproductive cough at nighttime.  Was not discharged on any diuretic despite elevated BNP of 963 at admission.    ECG (personally reviewed): No ECG today    Cardiac Imaging Studies (personally reviewed): No new imaging     Physical Examination Review of Systems   /62 (BP Location: Left arm, Patient Position: Sitting, Cuff Size: Adult Regular)   Pulse 72   Resp 16   Ht 1.588 m (5' 2.5\")   Wt 55.3 kg (122 lb)   BMI 21.96 kg/m    Body mass index is 21.96 kg/m .  Wt Readings from Last 3 Encounters:   21 55.3 kg (122 lb)   21 56 kg (123 lb 6.4 oz)   21 55.5 kg (122 lb 4.8 oz)     General Appearance:   Awake, Alert, No acute distress.   HEENT:  No scleral icterus; the mucous membranes were pink and moist.   Neck: No cervical bruits or jugular venous distention    Chest: The spine was straight. The chest was symmetric.   Lungs:   Respirations unlabored; the lungs are relatively clear with few crackles noted at the right base   Cardiovascular:    Regular rate and rhythm.  S1, S2 normal.  No murmur or gallop   Abdomen:  No organomegaly, masses, bruits, or tenderness. Bowels sounds are present   Extremities:  No peripheral " Detail Level: Zone "edema, clubbing or cyanosis   Skin: No xanthelasma. Warm, Dry.   Musculoskeletal: No tenderness.   Neurologic: Mood and affect are appropriate.    Enc Vitals  BP: 102/62  Pulse: 72  Resp: 16  Weight: 55.3 kg (122 lb)  Height: 158.8 cm (5' 2.5\")                                         Medical History  Surgical History Family History Social History   Past Medical History:   Diagnosis Date     Diabetes mellitus (H)     Past Surgical History:   Procedure Laterality Date     CV CORONARY ANGIOGRAM N/A 8/1/2021    Procedure: Coronary Angiogram;  Surgeon: Deidre Lopes MD;  Location: [facility] CATH LAB CV     CV LEFT VENTRICULOGRAM N/A 8/1/2021    Procedure: Left Ventriculogram;  Surgeon: Deidre Lopes MD;  Location: [facility] CATH LAB CV     OTHER SURGICAL HISTORY      LEG TRAUMA    No family history on file. Social History     Socioeconomic History     Marital status:      Spouse name: Not on file     Number of children: Not on file     Years of education: Not on file     Highest education level: Not on file   Occupational History     Not on file   Tobacco Use     Smoking status: Never Smoker     Smokeless tobacco: Never Used   Substance and Sexual Activity     Alcohol use: No     Drug use: No     Sexual activity: Yes     Partners: Female     Birth control/protection: None   Other Topics Concern     Not on file   Social History Narrative     Not on file     Social Determinants of Health     Financial Resource Strain:      Difficulty of Paying Living Expenses:    Food Insecurity:      Worried About Running Out of Food in the Last Year:      Ran Out of Food in the Last Year:    Transportation Needs:      Lack of Transportation (Medical):      Lack of Transportation (Non-Medical):    Physical Activity:      Days of Exercise per Week:      Minutes of Exercise per Session:    Stress:      Feeling of Stress :    Social Connections:      Frequency of Communication with Friends and Family:      Frequency of Social " Continue Regimen: Humira\\nTacrolimus "Gatherings with Friends and Family:      Attends Anabaptist Services:      Active Member of Clubs or Organizations:      Attends Club or Organization Meetings:      Marital Status:    Intimate Partner Violence:      Fear of Current or Ex-Partner:      Emotionally Abused:      Physically Abused:      Sexually Abused:           Medications  Allergies   Current Outpatient Medications   Medication Sig Dispense Refill     ACCU-CHEK SOFTCLIX LANCETS lancets [ACCU-CHEK SOFTCLIX LANCETS LANCETS] CHECK BLOOD SUGAR FOUR TIMES DAILY 400 each 0     aspirin (ASA) 81 MG EC tablet Take 1 tablet (81 mg) by mouth daily 30 tablet 0     BD ULTRA-FINE JERSON PEN NEEDLES 32 gauge x 5/32\" Ndle [BD ULTRA-FINE JERSON PEN NEEDLES 32 GAUGE X 5/32\" NDLE] USE TO INJECT NOVOLOG AND LANTUS INSULIN WITH 4 NEEDLES DAILY 100 each 12     blood glucose meter (GLUCOMETER) [BLOOD GLUCOSE METER (GLUCOMETER)] Use 1 each As Directed as needed. Dispense glucometer brand per patient's insurance at pharmacy discretion. 1 each 0     blood glucose test (ACCU-CHEK SMARTVIEW TEST STRIP) strips [BLOOD GLUCOSE TEST (ACCU-CHEK SMARTVIEW TEST STRIP) STRIPS] Use to test blood sugars four times daily. Dispense brand per patient's insurance at pharmacy discretion. 300 strip 3     carvedilol (COREG) 3.125 MG tablet Take 1 tablet (3.125 mg) by mouth 2 times daily (with meals) 180 tablet 3     clopidogrel (PLAVIX) 75 MG tablet Take 1 tablet (75 mg) by mouth daily 90 tablet 3     continuous blood glucose monitoring (FREESTYLE KATIE) sensor 1 each continuous 2 each 11     flash glucose scanning reader (FREESTYLE KATIE 14 DAY READER) Misc [FLASH GLUCOSE SCANNING READER (FREESTYLE KATIE 14 DAY READER) MISC] Use 1 each As Directed continuous as needed. Use to read blood sugars as 's instructions 1 each 0     furosemide (LASIX) 20 MG tablet Take 1 tablet (20 mg) by mouth daily 90 tablet 3     insulin lispro (HUMALOG KWIKPEN INSULIN) 100 unit/mL pen [INSULIN LISPRO " Render In Strict Bullet Format?: No (HUMALOG KWIKPEN INSULIN) 100 UNIT/ML PEN] INJECT 10 UNITS UNDER THE SKIN THREE TIMES DAILY BEFORE MEALS 24 mL 0     isosorbide mononitrate (ISMO/MONOKET) 10 MG tablet Take 2 tablets (20 mg) by mouth daily 90 tablet 3     LANTUS SOLOSTAR U-100 INSULIN 100 unit/mL (3 mL) pen [LANTUS SOLOSTAR U-100 INSULIN 100 UNIT/ML (3 ML) PEN] INJECT 24 UNITS UNDER THE SKIN AT BEDTIME 30 mL 2     lisinopril (ZESTRIL) 5 MG tablet Take 1 tablet (5 mg) by mouth daily 90 tablet 3     metFORMIN (GLUCOPHAGE) 1000 MG tablet [METFORMIN (GLUCOPHAGE) 1000 MG TABLET] Take 1 tablet (1,000 mg total) by mouth 2 (two) times a day. With lunch and dinner (Patient taking differently: Take 1,000 mg by mouth 2 times daily as needed ) 180 tablet 3     nitroGLYcerin (NITROSTAT) 0.4 MG sublingual tablet For chest pain place 1 tablet under the tongue every 5 minutes for 3 doses. If symptoms persist 5 minutes after 1st dose call 911. 10 tablet 0     rosuvastatin (CRESTOR) 40 MG tablet Take 1 tablet (40 mg) by mouth daily 90 tablet 3      Allergies   Allergen Reactions     Januvia [Sitagliptin] Unknown     HEADACHE     Trulicity [Dulaglutide] Dizziness     Weak and muscle weak         Lab Results    Chemistry/lipid CBC Cardiac Enzymes/BNP/TSH/INR   Recent Labs   Lab Test 08/09/21  1636 08/02/21 0418 08/02/21 0418   TRIG  --   --  70   LDL  --   --  109   BUN 11   < > 12   *   < > 144   CO2 22   < > 28    < > = values in this interval not displayed.    Recent Labs   Lab Test 08/02/21 0418   WBC 9.9   HGB 12.1*   HCT 35.7*   MCV 95       Recent Labs   Lab Test 08/02/21  0418 08/01/21  1637 08/01/21  1600 08/01/21  1600   TROPONINI 39.19*  --    < > 1.27*   BNP  --   --   --  963*   INR  --  0.96  --   --     < > = values in this interval not displayed.        A total of 61 minutes was spent reviewing patient's medical records, obtaining history and performing examination, as well as discussing diagnoses/ recommendations with patient and  Continue Regimen: Continue ketoconazole cream as needed for flares answering all questions.

## 2024-02-10 NOTE — PROGRESS NOTES
HEART CARE NOTE          Assessment/Recommendations     1. HFrEF/ICM c/b cardiogenic shock  Assessment / Plan    Tolerating oral diuretic regimen - no changes to regimen at this time    GDMT on hold given the above - most recent echo negative for LV thrombus    Will need ICD given persistent decline in LVSF - will arrange as outpatient provided patient follows-up     Current Pharmacotherapy AHA Guideline-Directed Medical Therapy   Lisinopril - on hold given ELIZABETH and borderline BP Lisinopril 20 mg twice daily   Metoprolol 12.5 mg daily - on hold Carvedilol 25 mg twice daily   Spironolactone 25 mg daily - on hold Spironolactone 25 mg once daily   Hydralazine NA Hydralazine 100 mg three times daily   Isosorbide mononitrate 20 mg daily - on hold Isosorbide dinitrate 40 mg three times daily   SGLT2 inhibitor:not started Dapagliflozin or Empagliflozin 10 mg daily      2. Severe multivessel CAD c/b NSTEMI  Assessment / Plan    Hx of STEMI 8/21 - at which time the pateint left AMA    Known severe multivessel disease - patient declines recommended CABG    Continue ASA, high intensity rosuvastatin; denies anginal equivalents - does have some pleuritic chest pain    Hx of cardiac MRI significant inducible ischemia in the mid to distal inferoseptal segments, mid-distal anterior segments and mid inferolateral segment. There is almost transmural infarction in the inferolateral walls while the rest of the myocardium is viable; plan for impella assisted PCI initially on hold given patient's absence of symptoms and adequate functional capacity at the time; patient later declined coronary angiogram outright upon readmission; he would not like to pursue intervention; reported that he would instead like to return to home/[place] to pursue herbal remedy;  Lab results and known coronary anatomy were reviewed at that time with patient and as well as the risks associated with holding off on potential intervention at which time patient voiced  "comprehension.    Denies current chest pain or anginal equivalent     3. Sepsis 2/2 RSV and underlying PNA  Assessment / Plan    Supportive care and management per primary team     3. Acute respiratory failure  Assessment / Plan    2/2 RSV, PNA, ADHF; course c/b sepsis - resolved     3. Valvular heart disease  Assessment / Plan    Moderate MR and TR     4. DM2  Assessment / Plan    Management per primary team    History of Present Illness/Subjective      Mr. Av Fernando is a 62 year old male with a PMHx significant for ( per Dr. Miranda's note) h/o diabetes, chronic afib on anticoagulation, and has a pacemaker presented with SOB and weakness. Patient was 84% in triage and placed on 3L is 95%     Today, Mr. Fernando denies any acute cardiac events or complaints; Management plan as detailed above     ECG: Personally reviewed. sinus tachycardia.     ECHO (personnaly Reviewed):   Severe biatrial enlargement.  The left ventricle is mildly dilated.  The visual ejection fraction is 25-30%.  There is inferolateral wall akinesis.  There is apical dyskinesis.  There is moderate anterior wall hypokinesis.  Distal inferior akinesis.  Mildly decreased right ventricular systolic function  There is mod-severe to severe (3-4+) mitral regurgitation.  There is mild (1+) aortic regurgitation.  There is moderate (2+) tricuspid regurgitation.  The right ventricular systolic pressure is approximated at 48mmHg plus the  right atrial pressure.  Compared to the prior study dated 2/21/2022, there are changes as noted. There  is now severe mitral regurgitation and moderate tricuspid regurgitation with  moderate to severe pulmonary HTN.          Physical Examination Review of Systems   /69   Pulse 80   Temp 97.8  F (36.6  C) (Axillary)   Resp 20   Ht 1.575 m (5' 2\")   Wt 46.9 kg (103 lb 4.8 oz)   SpO2 97%   BMI 18.89 kg/m    Body mass index is 18.89 kg/m .  Wt Readings from Last 3 Encounters:   10/17/22 46.9 kg (103 lb 4.8 oz) "   06/09/22 50.8 kg (112 lb 1.6 oz)   05/16/22 53.7 kg (118 lb 4.8 oz)     General Appearance:   no distress, normal body habitus   ENT/Mouth: membranes moist, no oral lesions or bleeding gums.      EYES:  no scleral icterus, normal conjunctivae   Neck: no carotid bruits or thyromegaly   Chest/Lungs:   lungs are clear to auscultation, no rales or wheezing, equal chest wall expansion    Cardiovascular:   Regular. Normal first and second heart sounds with no murmurs, rubs, or gallops; the carotid, radial and posterior tibial pulses are intact, no JVD or LE edema bilaterally    Abdomen:  no organomegaly, masses, bruits, or tenderness; bowel sounds are present   Extremities: no cyanosis or clubbing   Skin: no xanthelasma, warm.    Neurologic: alert and oriented x3, calm     Psychiatric: alert and oriented x3, calm     A complete 10 systems ROS was reviewed  And is negative except what is listed in the HPI.          Medical History  Surgical History Family History Social History   Past Medical History:   Diagnosis Date     Congestive heart failure (H) 08/2021    ischemic CM with LVEF 30-35%     Coronary artery disease 08/2021    STEMI with multivessel CAD on angiography     Hyperlipidemia      Hypertension      LV (left ventricular) mural thrombus 01/2022     Myocardial infarction (H) 08/2021    inferior STEMI     Type 2 diabetes, HbA1C goal < 8% (H)     Past Surgical History:   Procedure Laterality Date     CV CORONARY ANGIOGRAM N/A 8/1/2021    Procedure: Coronary Angiogram;  Surgeon: Deidre Lopes MD;  Location: [facility] CV     CV CORONARY ANGIOGRAM N/A 5/6/2022    Procedure: Coronary Angiogram;  Surgeon: Sherif Fuentes MD;  Location: [facility] CATH LAB CV     CV LEFT HEART CATH N/A 5/6/2022    Procedure: Left Heart Catheterization;  Surgeon: Sherif Fuentes MD;  Location: [facility] CATH LAB CV     CV LEFT VENTRICULOGRAM N/A 8/1/2021    Procedure: Left Ventriculogram;  Surgeon: Deidre Lopes MD;   Location: [facility] CATH LAB CV     CV PCI N/A 5/6/2022    Procedure: Percutaneous Coronary Intervention;  Surgeon: Sherif Fuentes MD;  Location: [facility] CATH LAB CV     OTHER SURGICAL HISTORY      LEG TRAUMA     PICC TRIPLE LUMEN PLACEMENT  10/13/2022         no family history of premature coronary artery disease Social History     Socioeconomic History     Marital status:      Spouse name: Not on file     Number of children: Not on file     Years of education: Not on file     Highest education level: Not on file   Occupational History     Not on file   Tobacco Use     Smoking status: Never     Smokeless tobacco: Never   Substance and Sexual Activity     Alcohol use: No     Drug use: No     Sexual activity: Yes     Partners: Female     Birth control/protection: None   Other Topics Concern     Parent/sibling w/ CABG, MI or angioplasty before 65F 55M? Not Asked   Social History Narrative     Not on file     Social Determinants of Health     Financial Resource Strain: Not on file   Food Insecurity: Not on file   Transportation Needs: Not on file   Physical Activity: Not on file   Stress: Not on file   Social Connections: Not on file   Intimate Partner Violence: Not on file   Housing Stability: Not on file           Lab Results    Chemistry/lipid CBC Cardiac Enzymes/BNP/TSH/INR   Lab Results   Component Value Date    CHOL 125 05/06/2022    HDL 56 05/06/2022    TRIG 43 05/06/2022    BUN 18.7 10/17/2022     (L) 10/17/2022    CO2 28 10/17/2022    Lab Results   Component Value Date    WBC 6.7 10/17/2022    HGB 10.0 (L) 10/17/2022    HCT 30.9 (L) 10/17/2022    MCV 89 10/17/2022     10/17/2022    Lab Results   Component Value Date    TROPONINI 0.71 (HH) 05/04/2022    BNP 4,308 (H) 05/04/2022    INR 1.10 10/11/2022     Lab Results   Component Value Date    TROPONINI 0.71 (HH) 05/04/2022          Weight:    Wt Readings from Last 3 Encounters:   10/17/22 46.9 kg (103 lb 4.8 oz)   06/09/22 50.8 kg (112 lb  1.6 oz)   05/16/22 53.7 kg (118 lb 4.8 oz)       Allergies  Allergies   Allergen Reactions     Dulaglutide Dizziness     Weak and muscle weak  Other reaction(s): Dizziness  Weak and muscle weak     Januvia [Sitagliptin] Unknown     HEADACHE     Tylenol [Acetaminophen] Itching         Surgical History  Past Surgical History:   Procedure Laterality Date     CV CORONARY ANGIOGRAM N/A 8/1/2021    Procedure: Coronary Angiogram;  Surgeon: Deidre Lopes MD;  Location: [facility] CV     CV CORONARY ANGIOGRAM N/A 5/6/2022    Procedure: Coronary Angiogram;  Surgeon: Sherif Fuentes MD;  Location: [facility] CATH LAB CV     CV LEFT HEART CATH N/A 5/6/2022    Procedure: Left Heart Catheterization;  Surgeon: Sherif Fuentes MD;  Location: [facility] CV     CV LEFT VENTRICULOGRAM N/A 8/1/2021    Procedure: Left Ventriculogram;  Surgeon: Deidre Lopes MD;  Location: [facility] CV     CV PCI N/A 5/6/2022    Procedure: Percutaneous Coronary Intervention;  Surgeon: Sherif Fuentes MD;  Location: [facility] CATH LAB CV     OTHER SURGICAL HISTORY      LEG TRAUMA     PICC TRIPLE LUMEN PLACEMENT  10/13/2022            Social History  Tobacco:   History   Smoking Status     Never   Smokeless Tobacco     Never    Alcohol:   Social History    Substance and Sexual Activity      Alcohol use: No   Illicit Drugs:   History   Drug Use No       Family History  No family history on file.       Dougie Gupta MD on 10/18/2022      cc: Stefano Thapa     hide

## 2024-03-15 NOTE — ASSESSMENT & PLAN NOTE
"Stopped Carvedilol   StoppedFurosemide 20 mg 1 daily    Metoprolol ER Succinate  12.5 mg BID  this is to take the pressure off your heart     Bumetanide 2 mg 1 DDaily   This is to help to keep the water levels lower in your circulation again to take the pressure off your heart     Added:  Spironolactone 25 mg Daily  For heart support     Increased dose:  Isosorbide mononitrate 10 mg 2 Twice daily 7:30 AM/ 5:00 PM   This is to help keep the heart arteries flowing freely     Clopidogrel 75 mg 1 daily this is a blood thinner  Aspirin 81 mg 1 daily this is a blood thinner     Did not have  Rosuvastatin 40 mg 1 at bedtime this is to help prevent buildup of plaque or stuff in the arteries of the heart     Last NTG 2 weeks Triggered  \"hard time to breathe\"   Have used one yesterday   3 days ago   \"at night Chest was tight>> NTG >> 30 minutes better\"  Went to kitchen hot water >> even better        Walking 1/2 mile every day?? Cannot do it anymore \"can't breathe\"      " Left voicemail for a call back regarding eye symptoms. No more same day opening today, please offer Monday same day opening, or [facility], or urgent care if she is unable to wait that long.

## 2024-03-15 NOTE — Clinical Note
Pt a&o x4 breathing equal and unlabored. Pt given option to stay for MRI or be discharged and follow up outpatient - pt decided to stay for MRI. PT denies pain at this time. PT on cardiac monitor reading 102 bpm in sinus tachycardia and  at 99% on room air. Sheath prepped and inserted in the right femoral artery.

## 2024-11-16 NOTE — TELEPHONE ENCOUNTER
"Called Av with the assistance of a [facility] . We discussed cath conference today and the decision to offer him a staged PCI of his RCA ( likely with roto or shockwave). He is agreeable to proceed and will get this set up for him.    During the conversation, he discussed with writer that he is experiencing symptoms like difficulty sleeping and chest pains overnight. He also reported that he has very little appetite and he can get short of breath with eating his meals such as rice or soup broth. He says that he will drink 2 glass of water and then also take additional water pills to help with these symptoms.     With the , writer went over how these symptoms are very concerning. He has been unable to make it to multiple HF/CORE appointments due to hospitalization. He was due for follow up with [facility] in . He was hospitalized last for HF in  at [facility] and has not been seen in CORE clinic post this hospitalization.     We discussed urgent eval in ER based on his symptoms that he reported. He stated that if symptoms worsening, he will present to [facility]. He refuses to go to [facility] ER as he reports that he almost  there twice. He again admits to self adjusting diuretics and apparently still on Eliquis 2.5 mg at bedtime because \"it helps him breathe\". We discussed how this is a blood thinner, not a diuretic and it was for a blood clot in his heart that has since resolved.     He requests a refill for his Furosemide 40 mg- he thinks he is only on it once a day technically but discharge note from [facility] has it written as BID. The medication list also still had Bumex on it as well.     At the end of conversation, patient agreed to urgent HF appt to get his medications straighten out and to ensure that he is safe to undergo this procedure from a fluid level status. Again, it was reiterated that he should present to the ER should his symptoms persist or worsen. Transferred to " schedulers to get this arranged and case request placed. Will FYI MY.       Addendum: writer called [facility] and spoke with the pharmacist. He has not filled Bumex since February. Furosemide on file there is 40 mg daily. -[facility]         Case Type: Staged PCI to RCA - will likely be Roto or shockwave case with MY only  Ordering Provider: MY  H&P: Will need if patient does not show to Dr. Gupta appt on 7/12  Alerts: Hmong speaking, possible Eliquis hold, complex procedure with shockwave or roto  Additional Info/Urgency: staged  COVID Testing (Antigen, PCR or Not Needed): Set up for PCR             unknown

## (undated) DEVICE — SHTH INTRO 0.021IN ID 6FR DIA

## (undated) DEVICE — SYR ANGIOGRAPHY MULTIUSE KIT ACIST 014612

## (undated) DEVICE — MANIFOLD KIT ANGIO AUTOMATED 014613

## (undated) DEVICE — KIT HAND CONTROL ACIST 014644 AR-P54

## (undated) DEVICE — ELECTRODE ADULT PACING MULTI P-211-M1

## (undated) DEVICE — GUIDEWIRE FORTE FLOPPY J TOP 34949-05J

## (undated) DEVICE — CATH LAUNCHER 6FR EBU 3.5 LA6EBU35

## (undated) DEVICE — CATH BALLOON EMERGE 2.0X12MM H7493918912200

## (undated) DEVICE — SHEATH ULTIMUM 6FR 12CM 407845

## (undated) DEVICE — WATCHDOG HEMOSTASIS VALVE

## (undated) DEVICE — EXCHANGE WIRE .035 260 STAR/JFC/035/260/ M001491681

## (undated) DEVICE — VALVE HEMOSTASIS .096" COPILOT MECH 1003331

## (undated) DEVICE — CATH ANGIO INFINITI JR4 4FRX100CM 538421

## (undated) DEVICE — CATH ANGIO INFINITI JL4 4FRX100CM 538420

## (undated) DEVICE — INTRO MICRO MINI STICK 4FR STIFF NITINOL 45-753

## (undated) DEVICE — DEVICE INFLATION SYR W/ HEMOSTASIS VALVE 12IN EXT IN4904

## (undated) DEVICE — CATH DIAG 4FR ANG PIG 538453S

## (undated) DEVICE — CUSTOM PACK CORONARY SAN5BCRHEA

## (undated) DEVICE — SLEEVE TR BAND RADIAL COMPRESSION DEVICE 24CM TRB24-REG

## (undated) DEVICE — CATH GUIDING 5FR X 100CM LA5PAPA1

## (undated) DEVICE — DEVICE INFLATION W/KIT & 6IN TUBING

## (undated) DEVICE — CATH ANGIO INFINITI JL3.5 4FRX100CM 538418

## (undated) RX ORDER — FENTANYL CITRATE 50 UG/ML
INJECTION, SOLUTION INTRAMUSCULAR; INTRAVENOUS
Status: DISPENSED
Start: 2021-08-01

## (undated) RX ORDER — NITROGLYCERIN 0.4 MG/1
TABLET SUBLINGUAL
Status: DISPENSED
Start: 2022-01-01

## (undated) RX ORDER — DIAZEPAM 5 MG
TABLET ORAL
Status: DISPENSED
Start: 2022-01-01

## (undated) RX ORDER — ASPIRIN 81 MG/1
TABLET, CHEWABLE ORAL
Status: DISPENSED
Start: 2022-01-01

## (undated) RX ORDER — FENTANYL CITRATE 50 UG/ML
INJECTION, SOLUTION INTRAMUSCULAR; INTRAVENOUS
Status: DISPENSED
Start: 2022-01-01